# Patient Record
Sex: FEMALE | Race: WHITE | NOT HISPANIC OR LATINO | Employment: OTHER | URBAN - NONMETROPOLITAN AREA
[De-identification: names, ages, dates, MRNs, and addresses within clinical notes are randomized per-mention and may not be internally consistent; named-entity substitution may affect disease eponyms.]

---

## 2017-01-11 ENCOUNTER — HISTORY (OUTPATIENT)
Dept: FAMILY MEDICINE | Facility: OTHER | Age: 72
End: 2017-01-11

## 2017-01-11 ENCOUNTER — COMMUNICATION - GICH (OUTPATIENT)
Dept: FAMILY MEDICINE | Facility: OTHER | Age: 72
End: 2017-01-11

## 2017-01-11 DIAGNOSIS — J43.9 PULMONARY EMPHYSEMA (H): ICD-10-CM

## 2017-01-13 ENCOUNTER — OFFICE VISIT - GICH (OUTPATIENT)
Dept: FAMILY MEDICINE | Facility: OTHER | Age: 72
End: 2017-01-13

## 2017-01-13 ENCOUNTER — HISTORY (OUTPATIENT)
Dept: FAMILY MEDICINE | Facility: OTHER | Age: 72
End: 2017-01-13

## 2017-01-13 DIAGNOSIS — M89.9 DISORDER OF BONE: ICD-10-CM

## 2017-01-13 DIAGNOSIS — J43.9 PULMONARY EMPHYSEMA (H): ICD-10-CM

## 2017-01-13 DIAGNOSIS — K21.9 GASTRO-ESOPHAGEAL REFLUX DISEASE WITHOUT ESOPHAGITIS: ICD-10-CM

## 2017-01-13 DIAGNOSIS — F17.200 NICOTINE DEPENDENCE, UNCOMPLICATED: ICD-10-CM

## 2017-01-13 DIAGNOSIS — Z23 ENCOUNTER FOR IMMUNIZATION: ICD-10-CM

## 2017-01-13 DIAGNOSIS — M94.9 DISORDER OF CARTILAGE: ICD-10-CM

## 2017-01-19 ENCOUNTER — AMBULATORY - GICH (OUTPATIENT)
Dept: RADIOLOGY | Facility: OTHER | Age: 72
End: 2017-01-19

## 2017-01-19 DIAGNOSIS — Z12.31 ENCOUNTER FOR SCREENING MAMMOGRAM FOR MALIGNANT NEOPLASM OF BREAST: ICD-10-CM

## 2017-01-24 ENCOUNTER — HOSPITAL ENCOUNTER (OUTPATIENT)
Dept: RADIOLOGY | Facility: OTHER | Age: 72
End: 2017-01-24
Attending: NURSE PRACTITIONER

## 2017-01-24 ENCOUNTER — HISTORY (OUTPATIENT)
Dept: RADIOLOGY | Facility: OTHER | Age: 72
End: 2017-01-24

## 2017-01-24 ENCOUNTER — OFFICE VISIT - GICH (OUTPATIENT)
Dept: FAMILY MEDICINE | Facility: OTHER | Age: 72
End: 2017-01-24

## 2017-01-24 DIAGNOSIS — Z12.31 ENCOUNTER FOR SCREENING MAMMOGRAM FOR MALIGNANT NEOPLASM OF BREAST: ICD-10-CM

## 2017-01-24 DIAGNOSIS — Z72.0 TOBACCO USE: ICD-10-CM

## 2017-01-30 ENCOUNTER — HISTORY (OUTPATIENT)
Dept: FAMILY MEDICINE | Facility: OTHER | Age: 72
End: 2017-01-30

## 2017-02-09 ENCOUNTER — COMMUNICATION - GICH (OUTPATIENT)
Dept: FAMILY MEDICINE | Facility: OTHER | Age: 72
End: 2017-02-09

## 2017-05-26 ENCOUNTER — HOSPITAL ENCOUNTER (OUTPATIENT)
Dept: RADIOLOGY | Facility: OTHER | Age: 72
End: 2017-05-26
Attending: NURSE PRACTITIONER

## 2017-05-26 ENCOUNTER — HISTORY (OUTPATIENT)
Dept: FAMILY MEDICINE | Facility: OTHER | Age: 72
End: 2017-05-26

## 2017-05-26 ENCOUNTER — OFFICE VISIT - GICH (OUTPATIENT)
Dept: FAMILY MEDICINE | Facility: OTHER | Age: 72
End: 2017-05-26

## 2017-05-26 DIAGNOSIS — J18.1 LOBAR PNEUMONIA (H): ICD-10-CM

## 2017-05-26 DIAGNOSIS — R50.9 FEVER: ICD-10-CM

## 2017-05-26 DIAGNOSIS — R69 ILLNESS: ICD-10-CM

## 2017-05-26 LAB
A/G RATIO - HISTORICAL: 1 (ref 1–2)
ABSOLUTE BASOPHILS - HISTORICAL: 0 THOU/CU MM
ABSOLUTE EOSINOPHILS - HISTORICAL: 0.1 THOU/CU MM
ABSOLUTE LYMPHOCYTES - HISTORICAL: 1 THOU/CU MM (ref 0.9–2.9)
ABSOLUTE MONOCYTES - HISTORICAL: 0.6 THOU/CU MM
ABSOLUTE NEUTROPHILS - HISTORICAL: 8.8 THOU/CU MM (ref 1.7–7)
ALBUMIN SERPL-MCNC: 3.7 G/DL (ref 3.5–5.7)
ALP SERPL-CCNC: 65 IU/L (ref 34–104)
ALT (SGPT) - HISTORICAL: 13 IU/L (ref 7–52)
ANION GAP - HISTORICAL: 11 (ref 5–18)
AST SERPL-CCNC: 17 IU/L (ref 13–39)
BASOPHILS # BLD AUTO: 0.2 %
BILIRUB SERPL-MCNC: 0.8 MG/DL (ref 0.3–1)
BUN SERPL-MCNC: 9 MG/DL (ref 7–25)
BUN/CREAT RATIO - HISTORICAL: 11
CALCIUM SERPL-MCNC: 9.2 MG/DL (ref 8.6–10.3)
CHLORIDE SERPLBLD-SCNC: 102 MMOL/L (ref 98–107)
CO2 SERPL-SCNC: 24 MMOL/L (ref 21–31)
CREAT SERPL-MCNC: 0.8 MG/DL (ref 0.7–1.3)
EOSINOPHIL NFR BLD AUTO: 0.8 %
ERYTHROCYTE [DISTWIDTH] IN BLOOD BY AUTOMATED COUNT: 13.3 % (ref 11.5–15.5)
GFR IF NOT AFRICAN AMERICAN - HISTORICAL: >60 ML/MIN/1.73M2
GLOBULIN - HISTORICAL: 3.6 G/DL (ref 2–3.7)
GLUCOSE SERPL-MCNC: 107 MG/DL (ref 70–105)
HCT VFR BLD AUTO: 35.9 % (ref 33–51)
HEMOGLOBIN: 12.1 G/DL (ref 12–16)
LYMPHOCYTES NFR BLD AUTO: 9.5 % (ref 20–44)
MCH RBC QN AUTO: 30.7 PG (ref 26–34)
MCHC RBC AUTO-ENTMCNC: 33.7 G/DL (ref 32–36)
MCV RBC AUTO: 91 FL (ref 80–100)
MONOCYTES NFR BLD AUTO: 5.6 %
NEUTROPHILS NFR BLD AUTO: 83.4 % (ref 42–72)
PLATELET # BLD AUTO: 203 THOU/CU MM (ref 140–440)
PMV BLD: 10.7 FL (ref 6.5–11)
POTASSIUM SERPL-SCNC: 3.8 MMOL/L (ref 3.5–5.1)
PROT SERPL-MCNC: 7.3 G/DL (ref 6.4–8.9)
RED BLOOD COUNT - HISTORICAL: 3.94 MIL/CU MM (ref 4–5.2)
SODIUM SERPL-SCNC: 137 MMOL/L (ref 133–143)
WHITE BLOOD COUNT - HISTORICAL: 10.5 THOU/CU MM (ref 4.5–11)

## 2017-05-28 ENCOUNTER — HISTORY (OUTPATIENT)
Dept: EMERGENCY MEDICINE | Facility: OTHER | Age: 72
End: 2017-05-28

## 2017-06-06 ENCOUNTER — HISTORY (OUTPATIENT)
Dept: FAMILY MEDICINE | Facility: OTHER | Age: 72
End: 2017-06-06

## 2017-06-06 ENCOUNTER — OFFICE VISIT - GICH (OUTPATIENT)
Dept: FAMILY MEDICINE | Facility: OTHER | Age: 72
End: 2017-06-06

## 2017-06-06 DIAGNOSIS — F17.200 NICOTINE DEPENDENCE, UNCOMPLICATED: ICD-10-CM

## 2017-06-06 DIAGNOSIS — J18.1 LOBAR PNEUMONIA (H): ICD-10-CM

## 2017-06-06 DIAGNOSIS — J43.8 OTHER EMPHYSEMA (H): ICD-10-CM

## 2017-06-06 DIAGNOSIS — R01.1 CARDIAC MURMUR: ICD-10-CM

## 2017-06-08 LAB
HISTOPLASMA AG RESULT - HISTORICAL: NEGATIVE
Lab: 0 NG/ML

## 2017-06-09 LAB
INTERPRETATION - HISTORICAL: NEGATIVE
Lab: NORMAL
Lab: NORMAL NG/ML

## 2017-06-19 ENCOUNTER — MEDICAL CORRESPONDENCE (OUTPATIENT)
Facility: CLINIC | Age: 72
End: 2017-06-19
Payer: MEDICARE

## 2017-06-19 ENCOUNTER — TRANSFERRED RECORDS (OUTPATIENT)
Dept: HEALTH INFORMATION MANAGEMENT | Facility: CLINIC | Age: 72
End: 2017-06-19

## 2017-06-19 ENCOUNTER — HOSPITAL ENCOUNTER (OUTPATIENT)
Dept: RADIOLOGY | Facility: OTHER | Age: 72
End: 2017-06-19
Attending: FAMILY MEDICINE

## 2017-06-19 DIAGNOSIS — R01.1 CARDIAC MURMUR: ICD-10-CM

## 2017-06-19 PROCEDURE — 93306 TTE W/DOPPLER COMPLETE: CPT | Mod: 26 | Performed by: INTERNAL MEDICINE

## 2017-12-28 NOTE — PROGRESS NOTES
Patient Information     Patient Name MRN Sex Loulou Mondragon [id number] Female 1945      Progress Notes by Obi Johnston MD at 2017 10:30 AM     Author:  Obi Johnston MD Service:  (none) Author Type:  Physician     Filed:  2017 11:03 AM Encounter Date:  2017 Status:  Signed     :  Obi Johnston MD (Physician)            SUBJECTIVE:  Loulou Lucero is a 72 y.o. female here for follow-up. Patient was recently in emergency room for shortness of breath and cough. While there she ultimately had a chest CT which showed apical nodules bilaterally and left lower lobe pneumonia. She was placed on Levaquin and she has 4 days left of that. She continues with her Advair and is also been switched to DuoNeb 4 times a day. She states that she continues to have short of breath, weakness and fatigue and she's had no fevers or chills. She continues to smoke daily. She has a history of emphysema. She's had no chest pain.      Patient Active Problem List       Diagnosis  Date Noted     Systolic murmur  2017     Advanced care planning/counseling discussion  2014     Declined         COPD  2010     TOBACCO ABUSE       Trying to quit          PULMONARY NODULE       Pulmonary nodule, stable since 2002.  It is located in her right upper   lobe peripheral pulmonary nodule.          DIVERTICULOSIS, COLON       OSTEOPENIA       Hip            Past Medical History:     Diagnosis  Date     Benign paroxysmal positional vertigo 2010     Bloody diarrhea 10/1/2015     COPD 2010     DIVERTICULOSIS, COLON      Hx of pregnancy     G-3, P-2, A-0  (Son had SIDS)      Intervertebral disc protrusion     L3, L4-4; Last MRI 12      Lower abdominal pain 10/1/2015     OSTEOPENIA     Hip; Last dxa 2010      PULMONARY NODULE     Stable since 2002. RU lobe.      SHINGLES 3/31/2012     VARICOSE VEINS, LOWER EXTREMITIES 2012       Past Surgical History:      Procedure   Laterality Date     COLONOSCOPY SCREENING  03/22/2010    Normal; + family hx, next due 2015       COLONOSCOPY SCREENING  2015    recommend follow up in 2020.       COLONOSCOPY W/ POLYPECTOMY  10/1/2015            ESOPHAGOGASTRODUODENOSCOPY  4/23/2014    Arce's esophagus fu egd 2 yrs       Tonsillectomy and adenoidectomy  1951     TUBAL LIGATION  1978       Current Outpatient Prescriptions       Medication  Sig Dispense Refill     acetaminophen (TYLENOL) 325 mg tablet Take 650 mg by mouth every 4 hours if needed. Max acetaminophen dose: 4000mg in 24 hrs.       albuterol HFA 90 mcg/actuation inhaler Inhale 1-2 Puffs by mouth 4 times daily if needed. 1 Inhaler 0     albuterol-ipratropium (DUONEB) (2.5-0.5 mg) in 3 mL NEBULIZATION solution Inhale 3 mL via a nebulizer 4 times daily. 1 box 3     alendronate (FOSAMAX) 70 mg tablet Take 1 tablet by mouth once a week in the morning. Take on empty stomach with full glass of water. Do not lie down for 1 hr. 12 tablet 3     ascorbic acid (VITAMIN C) 500 mg tablet Take 1 tablet by mouth once daily.  0     calcium carbonate-vitamin D3, 500 mg-400 units, (OSCAL 500 + D) tablet Take 1 tablet by mouth once daily.  0     fluticasone-salmeterol (ADVAIR DISKUS) 250-50 mcg/Dose diskus inhaler Inhale 1 Puff by mouth 2 times daily. 3 Inhaler 3     levoFLOXacin (LEVAQUIN) 750 mg tablet Take 1 tablet by mouth once daily for 14 days. 14 tablet 0     Nebulizer Nebulizer, disposable neb kit x 4, reuseable neb kit x 1, mask x 1, filters x 1.   Frequency of use: daily;  Medication: duoneb  Length of need: 1 months 1 Device 0     pantoprazole (PROTONIX) 40 mg delayed-release tablet Take 1 tablet by mouth once daily. 90 tablet 3     predniSONE (DELTASONE) 20 mg tablet Take 1 tablet by mouth once daily with a meal for 5 days. 5 tablet 0     No current facility-administered medications for this visit.      Medications have been reviewed by me and are current to the best of my knowledge and  ability.      Allergies:  Allergies      Allergen   Reactions     Metronidazole  Nausea And Vomiting     Pneumococcal Vaccine  Edema     Arm swelling      Tramadol  Hallucinations       Family History       Problem   Relation Age of Onset     Cancer-colon  Father      Other  Mother      Alzheimer's       Other  Maternal Grandmother      Alzheimer's       Other  Brother      Alzheimer's       Other  Brother      aneurysm and stents       Cancer  Brother      lung       Cancer  Brother      skin       Other  Brother      cirrhosis of the liver       Other  Maternal Uncle      3, Alzheimer's       Cancer-breast  No Family History        Social History      Substance Use Topics        Smoking status:  Current Every Day Smoker     Packs/day: 1.00     Years: 50.00     Types: Cigarettes     Smokeless tobacco:  Never Used     Alcohol use  No       ROS:    As above otherwise ROS is unremarkable.      OBJECTIVE:  /68  Pulse 84  Wt 45.5 kg (100 lb 6.4 oz)  BMI 20.28 kg/m2    EXAM:  General Appearance: Pleasant, alert, appropriate appearance for age. No acute distress  Head: Normal. Normocephalic, atraumatic.  Eyes: PERRL, EOMI  Ears: Normal TM's bilaterally. Normal auditory canals and external ears.   OroPharynx: Dental hygiene adequate. Normal buccal mucosa. Normal pharynx.  Neck: Supple, no masses or nodes, no lymphadenopathy.  No thyromegaly.  Lungs: Normal chest wall and respirations. Clear to auscultation, no wheezes or crackles.  Cardiovascular: Regular rate and rhythm. S1, S2. 2/6 systolic murmur heard best in the right upper sternal border.  Musculoskeletal: No edema.    ASSESSEMENT AND PLAN:    Loulou was seen today for follow up.    Diagnoses and all orders for this visit:    Pneumonia of left lower lobe due to infectious organism (HC)  -     albuterol-ipratropium (DUONEB) (2.5-0.5 mg) in 3 mL NEBULIZATION solution; Inhale 3 mL via a nebulizer 4 times daily.  -     BLASTOMYCES ANTIGEN; Future  -      HISTOPLASMA ANTIGEN,URINE; Future  -     LEGIONELLA URINE ANTIGEN; Future    Other emphysema (HC)  -     predniSONE (DELTASONE) 20 mg tablet; Take 1 tablet by mouth once daily with a meal for 5 days.    TOBACCO ABUSE    Systolic murmur  -     ECHO COMPLETE WO CONTRAST; Future    Given her history of COPD we'll continue with her antibiotics regimen until completion but will add prednisone 20 mg daily for 5 days. Discussed potential side effects.    We'll also check urine for blasto, histo and Legionella at her request.    Discussed importance of quitting smoking, she is not interested at this time.    She has a history of systolic murmur but given her short of breath and fatigue will get an echocardiogram to assess severity.      Alvaro Johnston MD

## 2017-12-30 NOTE — NURSING NOTE
Patient Information     Patient Name MRN Loulou Olson [id number] Female 1945      Nursing Note by Anabella Goodwin at 2017 10:30 AM     Author:  Anabella Goodwin Service:  (none) Author Type:  (none)     Filed:  2017 10:46 AM Encounter Date:  2017 Status:  Signed     :  Anabella Goodwin            Patient presents today to follow up from an ED visit on . Patient states she is still feeling weak and experiencing shortness of breath.  Anabella Goodwin LPN .............2017  10:42 AM

## 2018-01-02 NOTE — TELEPHONE ENCOUNTER
Patient Information     Patient Name MRN Sex Loulou Mondragon [id number] Female 1945      Telephone Encounter by Titi Gilmore RN at 2017 10:23 AM     Author:  Titi Gilmore RN Service:  (none) Author Type:  NURS- Registered Nurse     Filed:  2017 10:33 AM Encounter Date:  2017 Status:  Signed     :  Titi Gilmore RN (NURS- Registered Nurse)            Writer called patient back as requested. Patient stated she is looking for a refill of her advair, and would like the rx refill sent to [facility] pharmacy in [place], [place]. Patient states that she has about a 3 week supply of Advair left at this time. Writer and patient discussed her chart, and that she is actually due for a medication management/physical office visit with PCP. Patient was agreeable to this. Was transferred to  to schedule an appointment. Writer will fill a three month supply of advair per protocol. Patient in agreement with this as well.    Asthma/COPD    Office visit in the past 12 months.    Last visit with [facility] was on: 2016 in [facility]  Next visit with [facility] is on: 2017 in [facility]  Next visit with Family Practice is on: 2017 in [facility]    Max refills 12 months from last office visit.    Prescription refilled per RN Medication Refill Policy.................... Titi Gilmore RN ....................  2017   10:30 AM

## 2018-01-03 NOTE — PROGRESS NOTES
Patient Information     Patient Name MRN Sex Loulou Mondragon [id number] Female 1945      Progress Notes by Cora Catalan NP at 2017  1:00 PM     Author:  Cora Catalan NP Service:  (none) Author Type:  PHYS- Nurse Practitioner     Filed:  2017  8:47 PM Encounter Date:  2017 Status:  Signed     :  Cora Catalan NP (PHYS- Nurse Practitioner)            SUBJECTIVE:    Loulou Lucero is a 71 y.o. female who presents for breast exam and screening mammography. Last mammogram  and negative. Denies any breast concerns. Tobacco 50 + years.      HPI    Allergies      Allergen   Reactions     Metronidazole  Nausea And Vomiting     Pneumococcal Vaccine  Edema     Arm swelling      Tramadol  Hallucinations   ,   Family History       Problem   Relation Age of Onset     Cancer-colon  Father      Other  Mother      Alzheimer's       Other  Maternal Grandmother      Alzheimer's       Other  Brother      Alzheimer's       Other  Brother      aneurysm and stents       Cancer  Brother      lung       Cancer  Brother      skin       Other  Brother      cirrhosis of the liver       Other  Maternal Uncle      3, Alzheimer's       Cancer-breast  No Family History    ,   Current Outpatient Prescriptions on File Prior to Visit       Medication  Sig Dispense Refill     alendronate (FOSAMAX) 70 mg tablet Take 1 tablet by mouth once a week in the morning. Take on empty stomach with full glass of water. Do not lie down for 1 hr. 12 tablet 3     ascorbic acid (VITAMIN C) 500 mg tablet Take 1 tablet by mouth once daily.  0     calcium carbonate-vitamin D3, 500 mg-400 units, (OSCAL 500 + D) tablet Take 2 tablets by mouth once daily.  0     fluticasone-salmeterol (ADVAIR DISKUS) 250-50 mcg/Dose diskus inhaler Inhale 1 Puff by mouth 2 times daily. 3 Inhaler 3     pantoprazole (PROTONIX) 40 mg delayed-release tablet Take 1 tablet by mouth once daily. 90 tablet 3     No current facility-administered medications on  file prior to visit.    ,   Current Outpatient Prescriptions:      alendronate (FOSAMAX) 70 mg tablet, Take 1 tablet by mouth once a week in the morning. Take on empty stomach with full glass of water. Do not lie down for 1 hr., Disp: 12 tablet, Rfl: 3     ascorbic acid (VITAMIN C) 500 mg tablet, Take 1 tablet by mouth once daily., Disp: , Rfl: 0     calcium carbonate-vitamin D3, 500 mg-400 units, (OSCAL 500 + D) tablet, Take 2 tablets by mouth once daily., Disp: , Rfl: 0     fluticasone-salmeterol (ADVAIR DISKUS) 250-50 mcg/Dose diskus inhaler, Inhale 1 Puff by mouth 2 times daily., Disp: 3 Inhaler, Rfl: 3     pantoprazole (PROTONIX) 40 mg delayed-release tablet, Take 1 tablet by mouth once daily., Disp: 90 tablet, Rfl: 3  Medications have been reviewed by me and are current to the best of my knowledge and ability.,   Past Medical History      Diagnosis   Date     Benign paroxysmal positional vertigo  12/29/2010     Bloody diarrhea  10/1/2015     COPD  12/29/2010     DIVERTICULOSIS, COLON       Hx of pregnancy       G-3, P-2, A-0  (Son had SIDS)      Intervertebral disc protrusion       L3, L4-4; Last MRI 7/09/12      Lower abdominal pain  10/1/2015     OSTEOPENIA       Hip; Last dxa 06/2010      PULMONARY NODULE       Stable since July of 2002. RU lobe.      SHINGLES  3/31/2012     VARICOSE VEINS, LOWER EXTREMITIES  6/12/2012   ,   Patient Active Problem List       Diagnosis  Date Noted     Advanced care planning/counseling discussion  04/07/2014     Declined         COPD  12/29/2010     TOBACCO ABUSE       Trying to quit          PULMONARY NODULE       Pulmonary nodule, stable since July of 2002.  It is located in her right upper   lobe peripheral pulmonary nodule.          DIVERTICULOSIS, COLON       OSTEOPENIA       Hip        ,   Past Surgical History       Procedure   Laterality Date     Colonoscopy screening   03/22/2010     Normal; + family hx, next due 2015       Tonsillectomy and adenoidectomy   1951      "Tubal ligation   1978     Esophagogastroduodenoscopy   4/23/2014     Arce's esophagus fu egd 2 yrs       Colonoscopy w/ polypectomy   10/1/2015             Colonoscopy screening   2015     recommend follow up in 2020.      and   Social History        Substance Use Topics          Smoking status:   Current Every Day Smoker      Packs/day:  1.00      Years:  50.00      Types:  Cigarettes      Smokeless tobacco:   Never Used      Alcohol use   0.0 oz/week     0 Standard drinks or equivalent per week        Comment: Rarely         REVIEW OF SYSTEMS:  Review of Systems   Constitutional: Negative.    HENT: Negative.    Eyes: Negative.    Respiratory: Negative.    Cardiovascular: Negative.    Gastrointestinal: Negative.    Genitourinary: Negative.    Musculoskeletal: Negative.    Skin: Negative.    Neurological: Negative.    Endo/Heme/Allergies: Negative.    Psychiatric/Behavioral: Negative.        OBJECTIVE:  /64  Pulse 82  Ht 1.52 m (4' 11.84\")  Wt 45.9 kg (101 lb 4 oz)  BMI 19.88 kg/m2    EXAM:   Physical Exam   Constitutional: She is oriented to person, place, and time and well-developed, well-nourished, and in no distress.   Cardiovascular: Normal rate.    Pulmonary/Chest: Effort normal.   Bilateral breast exam negative for any palpable masses. No erythema or dimpling. No nipple discharge. No palpable lymphadenopathy   Musculoskeletal: Normal range of motion.   Neurological: She is alert and oriented to person, place, and time. Gait normal.   Skin: Skin is warm and dry.   Psychiatric: Mood, memory, affect and judgment normal.   Nursing note and vitals reviewed.      ASSESSMENT/PLAN:    ICD-10-CM    1. Encounter for screening mammogram for breast cancer Z12.31    2. Tobacco use Z72.0       no positive findings on breast exam    Plan:  Screening mammography scheduled today, results pending    Recommend yearly mammography screening and physicals  continue to do self exams    Strongly encouraged tobacco " cessation

## 2018-01-03 NOTE — NURSING NOTE
Patient Information     Patient Name MRN Sex Loulou Mondragon [id number] Female 1945      Nursing Note by Shari Rodriguez at 2017  1:00 PM     Author:  Shari Rodriguez Service:  (none) Author Type:  (none)     Filed:  2017  1:21 PM Encounter Date:  2017 Status:  Signed     :  Shari Rodriguez            Patient presents to clinic today for a breast exam.  Shari Rodriguez LPN...................2017  1:07 PM

## 2018-01-03 NOTE — PROGRESS NOTES
Patient Information     Patient Name MRN Sex Loulou Mondragon [id number] Female 1945      Progress Notes by Marilin Cordova at 2017  1:48 PM     Author:  Marilin Cordova Service:  (none) Author Type:  (none)     Filed:  2017  1:48 PM Date of Service:  2017  1:48 PM Status:  Signed     :  Marilin Cordova            Falls Risk Criteria:    Age 65 and older or under age 4        Sensory deficits    Poor vision    Use of ambulatory aides    Impaired judgment    Unable to walk independently    Meets High Risk criteria for falls:  Yes             1.  Do you have dizziness or vertigo?    no                    2.  Do you need help standing or walking?   no                 3.  Have you fallen within the last 6 months?    no           4.  Has the patient been fasting?      no       If any risks are marked Yes, the following interventions are utilized:    Do not leave patient unattended     Assist patient in the dressing room and bathroom    Have ambulatory aides available throughout procedure    Involve patient s family if available

## 2018-01-03 NOTE — TELEPHONE ENCOUNTER
Patient Information     Patient Name MRN Loulou Olson [id number] Female 1945      Telephone Encounter by Marion Hooper at 2/10/2017  1:44 PM     Author:  Marion Hooper Service:  (none) Author Type:  NURS- Registered Nurse     Filed:  2/10/2017  1:48 PM Encounter Date:  2017 Status:  Signed     :  Marion Hooper (NURS- Registered Nurse)            Patient calling about questionnaire she received in the mail. Was seen on 2017 with  for mammogram for breast cancer. Patient stated that she will try and fill out questionnaire.     Marion Hooper RN ....................  2/10/2017   1:47 PM

## 2018-01-03 NOTE — TELEPHONE ENCOUNTER
Patient Information     Patient Name MRN Loulou Olson [id number] Female 1945      Telephone Encounter by Sudha Davila at 2017  3:34 PM     Author:  Sudha Davila Service:  (none) Author Type:  (none)     Filed:  2017  3:42 PM Encounter Date:  2017 Status:  Signed     :  Sudha Davila            Patient received a questionnaire about her last visit on 2017 but does not remember what she was in for.

## 2018-01-03 NOTE — PROGRESS NOTES
Patient Information     Patient Name MRN Sex Loulou Mondragon [id number] Female 1945      Progress Notes by Obi Johnston MD at 2017 12:00 PM     Author:  Obi Johnston MD Service:  (none) Author Type:  Physician     Filed:  2017 12:52 PM Encounter Date:  2017 Status:  Signed     :  Obi Johnston MD (Physician)            SUBJECTIVE:  Loulou Lucero is a 71 y.o. female here for follow-up.  Patient has a history of COPD and continues on Advair daily. Her breathing is overall been well recently. She also has a history of osteopenia. She tried Boniva last year but she had an upset stomach for the first 2 months that she use this so she stopped on her own. She is currently not taking anything for her bones.    Finally, she has a history of acid reflux and continues on Protonix daily. She tried stopping this but has not been able to.    She continues to smoke daily has not itched in quitting.      Patient Active Problem List       Diagnosis  Date Noted     Advanced care planning/counseling discussion  2014     Declined         COPD  2010     TOBACCO ABUSE       Trying to quit          PULMONARY NODULE       Pulmonary nodule, stable since 2002.  It is located in her right upper   lobe peripheral pulmonary nodule.          DIVERTICULOSIS, COLON       OSTEOPENIA       Hip            Past Medical History      Diagnosis   Date     Benign paroxysmal positional vertigo  2010     Bloody diarrhea  10/1/2015     COPD  2010     DIVERTICULOSIS, COLON       Hx of pregnancy       G-3, P-2, A-0  (Son had SIDS)      Intervertebral disc protrusion       L3, L4-4; Last MRI 12      Lower abdominal pain  10/1/2015     OSTEOPENIA       Hip; Last dxa 2010      PULMONARY NODULE       Stable since 2002. RU lobe.      SHINGLES  3/31/2012     VARICOSE VEINS, LOWER EXTREMITIES  2012       Past Surgical History       Procedure   Laterality Date     Colonoscopy  screening   03/22/2010     Normal; + family hx, next due 2015       Tonsillectomy and adenoidectomy   1951     Tubal ligation   1978     Esophagogastroduodenoscopy   4/23/2014     Arce's esophagus fu egd 2 yrs       Colonoscopy w/ polypectomy   10/1/2015             Colonoscopy screening   2015     recommend follow up in 2020.         Current Outpatient Prescriptions       Medication  Sig Dispense Refill     alendronate (FOSAMAX) 70 mg tablet Take 1 tablet by mouth once a week in the morning. Take on empty stomach with full glass of water. Do not lie down for 1 hr. 12 tablet 3     ascorbic acid (VITAMIN C) 500 mg tablet Take 1 tablet by mouth once daily.  0     calcium carbonate-vitamin D3, 500 mg-400 units, (OSCAL 500 + D) tablet Take 2 tablets by mouth once daily.  0     fluticasone-salmeterol (ADVAIR DISKUS) 250-50 mcg/Dose diskus inhaler Inhale 1 Puff by mouth 2 times daily. 3 Inhaler 3     pantoprazole (PROTONIX) 40 mg delayed-release tablet Take 1 tablet by mouth once daily. 90 tablet 3     No current facility-administered medications for this visit.      Medications have been reviewed by me and are current to the best of my knowledge and ability.      Allergies:  Allergies      Allergen   Reactions     Metronidazole  Nausea And Vomiting     Pneumococcal Vaccine  Edema     Arm swelling      Tramadol  Hallucinations       Family History       Problem   Relation Age of Onset     Cancer-colon  Father      Other  Mother      Alzheimer's       Other  Maternal Grandmother      Alzheimer's       Other  Brother      Alzheimer's       Other  Brother      aneurysm and stents       Cancer  Brother      lung       Cancer  Brother      skin       Other  Brother      cirrhosis of the liver       Other  Maternal Uncle      3, Alzheimer's       Cancer-breast  No Family History        Social History        Substance Use Topics          Smoking status:   Current Every Day Smoker      Packs/day:  1.00      Years:  50.00       "Types:  Cigarettes      Smokeless tobacco:   Never Used      Alcohol use   0.0 oz/week     0 Standard drinks or equivalent per week        Comment: Rarely         ROS:    As above otherwise ROS is unremarkable.      OBJECTIVE:  /77  Pulse 75  Ht 1.518 m (4' 11.75\")  Wt 45.5 kg (100 lb 3.2 oz)  Breastfeeding? No  BMI 19.73 kg/m2    EXAM:  General Appearance: Pleasant, alert, appropriate appearance for age. No acute distress  Head: Normal. Normocephalic, atraumatic.  Eyes: PERRL, EOMI  Ears: Normal TM's bilaterally. Normal auditory canals and external ears.   OroPharynx: Dental hygiene adequate. Normal buccal mucosa. Normal pharynx.  Neck: Supple, no masses or nodes, no lymphadenopathy.  No thyromegaly.  Lungs: Normal chest wall and respirations. Clear to auscultation, no wheezes or crackles.  Cardiovascular: Regular rate and rhythm. S1, S2.  2/6 REGGIE heard best in the right upper sternal border.  Gastrointestinal: Soft, nontender, no abnormal masses or organomegaly. BS normal.  Musculoskeletal: No edema.  Skin: no concerning or new rashes.  Neurologic Exam: CN 2-12 grossly intact.  Normal gait.  Symmetric DTRs, No focal motor or sensory deficits. No tremor.  Psychiatric Exam: Alert and oriented, appropriate affect.    ASSESSEMENT AND PLAN:    Loulou was seen today for physical.    Diagnoses and all orders for this visit:    Pulmonary emphysema, unspecified emphysema type  -     fluticasone-salmeterol (ADVAIR DISKUS) 250-50 mcg/Dose diskus inhaler; Inhale 1 Puff by mouth 2 times daily.    Continue Advair daily follow-up if breathing worsens.    Gastroesophageal reflux disease without esophagitis  -     pantoprazole (PROTONIX) 40 mg delayed-release tablet; Take 1 tablet by mouth once daily.    Continue Protonix daily.    OSTEOPENIA  -     alendronate (FOSAMAX) 70 mg tablet; Take 1 tablet by mouth once a week in the morning. Take on empty stomach with full glass of water. Do not lie down for 1 hr.    we discussed " her options and I would recommend that she try Fosamax weekly. Also discussed importance of calcium plus vitamin D and weightbearing exercise.    TOBACCO ABUSE    Discussed the importance of quitting smoking. Discussed nicotine replacement options, bupropion and chantix.  Information on quit plan was given.  Greater then 3 minutes were spent in separate discussion of this.     Needs flu shot  -     FLU VACCINE => 65 YRS HIGH DOSE TRIVALENT IIV3 IM    Flu shot was given.          Alvaro Johnston MD

## 2018-01-05 NOTE — PROGRESS NOTES
Patient Information     Patient Name MRN Sex Loulou Mondragon [id number] Female 1945      Progress Notes by Maya Trujillo NP at 2017  6:00 PM     Author:  Maya Trujillo NP Service:  (none) Author Type:  PHYS- Nurse Practitioner     Filed:  2017  3:11 PM Encounter Date:  2017 Status:  Signed     :  Maya Trujillo NP (PHYS- Nurse Practitioner)            HPI:  Nursing Notes:   Cheryl Curran  2017  6:27 PM  Signed  Patient presents with headache, sinus congestion sore throat last week. Starting Tuesday fever, body aches, nausea, lethargy. Cheryl Curran LPN .............2017  6:05 PM    Loulou Lucero is a 72 y.o. female who presents to clinic today for headache, sinus congestion, fever, body aches, hurts to breath and sore throat symptoms that started ten days ago. Denies vomiting, diarrhea, SOB, difficulty breathing, wheezing. Treating symptoms with Tylenol. Hasn't eaten today, decreased oral intake. Tylenol improves symptoms, nothing worsens symptoms. Smokes daily, history of COPD.    Past Medical History:     Diagnosis  Date     Benign paroxysmal positional vertigo 2010     Bloody diarrhea 10/1/2015     COPD 2010     DIVERTICULOSIS, COLON      Hx of pregnancy     G-3, P-2, A-0  (Son had SIDS)      Intervertebral disc protrusion     L3, L4-4; Last MRI 12      Lower abdominal pain 10/1/2015     OSTEOPENIA     Hip; Last dxa 2010      PULMONARY NODULE     Stable since 2002. RU lobe.      SHINGLES 3/31/2012     VARICOSE VEINS, LOWER EXTREMITIES 2012     Past Surgical History:      Procedure  Laterality Date     COLONOSCOPY SCREENING  2010    Normal; + family hx, next due        COLONOSCOPY SCREENING      recommend follow up in .       COLONOSCOPY W/ POLYPECTOMY  10/1/2015            ESOPHAGOGASTRODUODENOSCOPY  2014    Arce's esophagus fu egd 2 yrs       Tonsillectomy and  "adenoidectomy  1951     TUBAL LIGATION  1978     Social History      Substance Use Topics        Smoking status:  Current Every Day Smoker     Packs/day: 1.00     Years: 50.00     Types: Cigarettes     Smokeless tobacco:  Never Used     Alcohol use  No     Current Outpatient Prescriptions       Medication  Sig Dispense Refill     alendronate (FOSAMAX) 70 mg tablet Take 1 tablet by mouth once a week in the morning. Take on empty stomach with full glass of water. Do not lie down for 1 hr. 12 tablet 3     ascorbic acid (VITAMIN C) 500 mg tablet Take 1 tablet by mouth once daily.  0     calcium carbonate-vitamin D3, 500 mg-400 units, (OSCAL 500 + D) tablet Take 2 tablets by mouth once daily.  0     fluticasone-salmeterol (ADVAIR DISKUS) 250-50 mcg/Dose diskus inhaler Inhale 1 Puff by mouth 2 times daily. 3 Inhaler 3     pantoprazole (PROTONIX) 40 mg delayed-release tablet Take 1 tablet by mouth once daily. 90 tablet 3     No current facility-administered medications for this visit.      Medications have been reviewed by me and are current to the best of my knowledge and ability.    Allergies      Allergen   Reactions     Metronidazole  Nausea And Vomiting     Pneumococcal Vaccine  Edema     Arm swelling      Tramadol  Hallucinations       ROS:  Refer to HPI    /82  Pulse (!) 109  Temp 100.2  F (37.9  C) (Oral)   Ht 1.535 m (5' 0.43\")  Wt 46.6 kg (102 lb 12.8 oz)  SpO2 95% Comment: RA  Breastfeeding? No  BMI 19.79 kg/m2    EXAM:  General Appearance: Ill appearing female, appropriate appearance for age. No acute distress  Ears: Left TM with bony landmarks appreciated with cone of light, no erythema, no effusion, no bulging, no purulence.  Right TM with bony landmarks appreciated with cone of light, no erythema, no effusion, no bulging, no purulence.   Left auditory canal clear, Right auditory canal clear, normal external ears, non tender.  Orophayrnx: moist mucous membranes, posterior pharynx, tonsils without " hypertrophy, no erythema, no exudates or petechiae, no post nasal drip seen.  No sinus pain upon palpation of the frontal, maxillary, or ethmoid sinuses  Neck: supple without adenopathy  Respiratory: normal chest wall and respirations.  Normal effort.  Clear to auscultation bilaterally, no wheezes or rhonchi or congestion, no cough appreciated, oxygen saturation-95%  Cardiac: RRR   Psychological: normal affect, alert and pleasant    ASSESSMENT/PLAN:    ICD-10-CM    1. Pneumonia of left lower lobe due to infectious organism (HC) J18.1 azithromycin (ZITHROMAX) 250 mg tablet      albuterol HFA 90 mcg/actuation inhaler   2. Influenza-like illness R69 AL PULSE OXIMETRY SINGLE DETERMINATION   3. Fever, unspecified fever cause R50.9 CBC WITH DIFFERENTIAL      COMPLETE METABOLIC PANEL      XR CHEST 2 VIEWS PA AND LATERAL      ibuprofen 400 mg tablet (ADVIL; MOTRIN)      CBC WITH DIFFERENTIAL      COMPLETE METABOLIC PANEL      CBC WITH AUTO DIFFERENTIAL      CANCELED: THROAT RAPID STREP A WITH REFLEX   Cough, congestion, fever and sore throat for 10 days  On exam: ill appearing female with fever, lungs clear to auscultation, no sinus tenderness  Ibuprofen 400 mgs given in clinic for fever >101, fever down to 100.2 after treatment  Results for orders placed or performed in visit on 05/26/17      COMPLETE METABOLIC PANEL      Result  Value Ref Range    SODIUM 137 133 - 143 mmol/L    POTASSIUM 3.8 3.5 - 5.1 mmol/L    CHLORIDE 102 98 - 107 mmol/L    CO2,TOTAL 24 21 - 31 mmol/L    ANION GAP 11 5 - 18                    GLUCOSE 107 (H) 70 - 105 mg/dL    CALCIUM 9.2 8.6 - 10.3 mg/dL    BUN 9 7 - 25 mg/dL    CREATININE 0.80 0.70 - 1.30 mg/dL    BUN/CREAT RATIO           11                    GFR if African American >60 >60 ml/min/1.73m2    GFR if not African American >60 >60 ml/min/1.73m2    ALBUMIN 3.7 3.5 - 5.7 g/dL    PROTEIN,TOTAL 7.3 6.4 - 8.9 g/dL    GLOBULIN                  3.6 2.0 - 3.7 g/dL    A/G RATIO 1.0 1.0 - 2.0                     BILIRUBIN,TOTAL 0.8 0.3 - 1.0 mg/dL    ALK PHOSPHATASE 65 34 - 104 IU/L    ALT (SGPT) 13 7 - 52 IU/L    AST (SGOT) 17 13 - 39 IU/L   CBC WITH AUTO DIFFERENTIAL      Result  Value Ref Range    WHITE BLOOD COUNT         10.5 4.5 - 11.0 thou/cu mm    RED BLOOD COUNT           3.94 (L) 4.00 - 5.20 mil/cu mm    HEMOGLOBIN                12.1 12.0 - 16.0 g/dL    HEMATOCRIT                35.9 33.0 - 51.0 %    MCV                       91 80 - 100 fL    MCH                       30.7 26.0 - 34.0 pg    MCHC                      33.7 32.0 - 36.0 g/dL    RDW                       13.3 11.5 - 15.5 %    PLATELET COUNT            203 140 - 440 thou/cu mm    MPV                       10.7 6.5 - 11.0 fL    NEUTROPHILS               83.4 (H) 42.0 - 72.0 %    LYMPHOCYTES               9.5 (L) 20.0 - 44.0 %    MONOCYTES                 5.6 <12.0 %    EOSINOPHILS               0.8 <8.0 %    BASOPHILS                 0.2 <3.0 %    ABSOLUTE NEUTROPHILS      8.8 (H) 1.7 - 7.0 thou/cu mm    ABSOLUTE LYMPHOCYTES      1.0 0.9 - 2.9 thou/cu mm    ABSOLUTE MONOCYTES        0.6 <0.9 thou/cu mm    ABSOLUTE EOSINOPHILS      0.1 <0.5 thou/cu mm    ABSOLUTE BASOPHILS        0.0 <0.3 thou/cu mm   Chest xray obtained and reviewed-Lingular consolidation  Diagnosis: Community Acquired Pneumonia  Treat with Zithromax 500 mgs today, then 250 mgs days 2-5  Albuterol every 4-6 hours prn wheezing  Mucinex DM as directed cough/congestion  Follow up if symptoms persist or worsen    Patient Instructions      Index Related topics   Pneumococcal Pneumonia   ________________________________________________________________________  KEY POINTS    Pneumococcal pneumonia is an infection of the lungs caused by bacteria.    Treatment may include oxygen, a machine to help you breathe, medicines, or a procedure to clear your lungs.    It helps if you avoid smoking, smoky places, polluted air, dust, fumes, and mold. Wash your hands often, and get a flu shot  each year.  ________________________________________________________________________  What is pneumococcal pneumonia?  Pneumococcal pneumonia is an infection of the lungs caused by Streptococcus pneumoniae or pneumococcal bacteria. These bacteria can also cause dangerous infections of the blood or brain.  What is the cause?  Pneumococcal pneumonia is caused by bacteria passed from person to person by sneezing or coughing. You may be more at risk for pneumonia if:    You have recently had a cold or the flu    You have another disease, such as diabetes, chronic bronchitis, or cancer    You are over age 65    You have not had the pneumococcal pneumonia vaccine  What are the symptoms?  The symptoms may seem to start suddenly and include:    Fever and chills    Feeling short of breath    Chest pain, especially when you take a deep breath    Cough that may bring up rust-colored or bloody mucus  How is it diagnosed?  Your healthcare provider will ask about your symptoms and medical history and examine you. Tests may include:    Blood tests    Sputum culture, which is a test of a sample of mucus coughed up from deep in your lungs    Chest X-ray  How is it treated?  Your healthcare provider may prescribe antibiotics you can take by mouth at home. Usually you will start to feel better after 2 to 3 days of antibiotics. You should feel close to normal after a week or so. If you are over 60 years old or have other medical problems, it may take longer to feel normal.  If you are very ill, you may need to be in the hospital. Treatment may include:    Giving you oxygen to breathe    Giving you IV fluids and medicines, such as antibiotics to treat infection and inhaled medicines to open up the airway    If you have a severe case, having a tube in your throat and a machine to help you breathe and to make sure you are getting enough oxygen  How can I take care of myself?  Follow the full course of treatment prescribed by your  healthcare provider. In addition:    If you are taking an antibiotic, take the medicine for as long as your healthcare provider prescribes, even if you feel better. If you stop taking the medicine too soon, you may not kill all of the bacteria and you may get sick again.    Drink more liquids (water or tea) every day to help you cough up mucus more easily unless your provider has told you to limit your fluids.    Cough up mucus as much as possible. Use cough medicine only if your provider recommends it, such as when your cough makes it hard to sleep.    Don t smoke, and stay away from others who are smoking.    Avoid breathing dust and chemical fumes.    Get extra rest.    Use a humidifier to put more moisture in the air. Avoid steam vaporizers because they can cause burns. Be sure to keep the humidifier clean, as recommended in the 's instructions. It's important to keep the water container clean to prevent bacteria and mold from growing in it.    Take nonprescription medicine, such as acetaminophen, ibuprofen, or naproxen to treat pain and fever. Read the label and take as directed. Unless recommended by your healthcare provider, you should not take these medicines for more than 10 days.    Nonsteroidal anti-inflammatory medicines (NSAIDs), such as ibuprofen, naproxen, and aspirin, may cause stomach bleeding and other problems. These risks increase with age.    Acetaminophen may cause liver damage or other problems. Unless recommended by your provider, don't take more than 3000 milligrams (mg) in 24 hours. To make sure you don t take too much, check other medicines you take to see if they also contain acetaminophen. Ask your provider if you need to avoid drinking alcohol while taking this medicine.    Contact your healthcare provider if you have new or worsening symptoms.  Ask your provider:    How and when you will get your test results    How long it will take to recover    If there are activities  you should avoid and when you can return to your normal activities    How to take care of yourself at home    What symptoms or problems you should watch for and what to do if you have them  Make sure you know when you should come back for a checkup. Keep all appointments for provider visits or tests.  How can I help prevent pneumonia?  To reduce your risk of getting a lung infection:    Wash your hands often and especially after using the restroom, coughing, sneezing, or blowing your nose. Also wash your hands before eating or touching your eyes.    Stay at least 6 feet away from people who are sick, if you can.    Take care of your health. Try to get at least 7 to 9 hours of sleep each night. Eat a healthy diet and try to keep a healthy weight. If you smoke, try to quit. If you want to drink alcohol, ask your healthcare provider how much is safe for you to drink. Learn ways to manage stress. Exercise according to your healthcare provider's instructions.    Ask your healthcare provider if you should get the pneumococcal shot. This shot helps prevent serious complications of pneumonia, such as an infection of the blood or brain.  Developed by [employer].  Adult Advisor 2016.3 published by [employer].  Last modified: 2016-04-14  Last reviewed: 2015-06-30  This content is reviewed periodically and is subject to change as new health information becomes available. The information is intended to inform and educate and is not a replacement for medical evaluation, advice, diagnosis or treatment by a healthcare professional.  References   Adult Advisor 2016.3 Index    Copyright   2016 [employer], a division of [employer] All rights reserved.

## 2018-01-05 NOTE — NURSING NOTE
Patient Information     Patient Name MRN Loulou Olson [id number] Female 1945      Nursing Note by Cheryl Curran at 2017  6:00 PM     Author:  Cheryl Curran Service:  (none) Author Type:  NURS- Student Practical Nurse     Filed:  2017  6:27 PM Encounter Date:  2017 Status:  Signed     :  Cheryl Curran (NURS- Student Practical Nurse)            Patient presents with headache, sinus congestion sore throat last week. Starting Tuesday fever, body aches, nausea, lethargy. Cheryl Curran LPN .............2017  6:05 PM

## 2018-01-05 NOTE — PATIENT INSTRUCTIONS
Patient Information     Patient Name MRN Loulou Olson [id number] Female 1945      Patient Instructions by Maya Trujillo NP at 2017  6:00 PM     Author:  Maya Trujillo NP Service:  (none) Author Type:  PHYS- Nurse Practitioner     Filed:  2017  7:37 PM Encounter Date:  2017 Status:  Signed     :  Maya Trujillo NP (PHYS- Nurse Practitioner)               Index Related topics   Pneumococcal Pneumonia   ________________________________________________________________________  KEY POINTS    Pneumococcal pneumonia is an infection of the lungs caused by bacteria.    Treatment may include oxygen, a machine to help you breathe, medicines, or a procedure to clear your lungs.    It helps if you avoid smoking, smoky places, polluted air, dust, fumes, and mold. Wash your hands often, and get a flu shot each year.  ________________________________________________________________________  What is pneumococcal pneumonia?  Pneumococcal pneumonia is an infection of the lungs caused by Streptococcus pneumoniae or pneumococcal bacteria. These bacteria can also cause dangerous infections of the blood or brain.  What is the cause?  Pneumococcal pneumonia is caused by bacteria passed from person to person by sneezing or coughing. You may be more at risk for pneumonia if:    You have recently had a cold or the flu    You have another disease, such as diabetes, chronic bronchitis, or cancer    You are over age 65    You have not had the pneumococcal pneumonia vaccine  What are the symptoms?  The symptoms may seem to start suddenly and include:    Fever and chills    Feeling short of breath    Chest pain, especially when you take a deep breath    Cough that may bring up rust-colored or bloody mucus  How is it diagnosed?  Your healthcare provider will ask about your symptoms and medical history and examine you. Tests may include:    Blood tests    Sputum culture,  which is a test of a sample of mucus coughed up from deep in your lungs    Chest X-ray  How is it treated?  Your healthcare provider may prescribe antibiotics you can take by mouth at home. Usually you will start to feel better after 2 to 3 days of antibiotics. You should feel close to normal after a week or so. If you are over 60 years old or have other medical problems, it may take longer to feel normal.  If you are very ill, you may need to be in the hospital. Treatment may include:    Giving you oxygen to breathe    Giving you IV fluids and medicines, such as antibiotics to treat infection and inhaled medicines to open up the airway    If you have a severe case, having a tube in your throat and a machine to help you breathe and to make sure you are getting enough oxygen  How can I take care of myself?  Follow the full course of treatment prescribed by your healthcare provider. In addition:    If you are taking an antibiotic, take the medicine for as long as your healthcare provider prescribes, even if you feel better. If you stop taking the medicine too soon, you may not kill all of the bacteria and you may get sick again.    Drink more liquids (water or tea) every day to help you cough up mucus more easily unless your provider has told you to limit your fluids.    Cough up mucus as much as possible. Use cough medicine only if your provider recommends it, such as when your cough makes it hard to sleep.    Don t smoke, and stay away from others who are smoking.    Avoid breathing dust and chemical fumes.    Get extra rest.    Use a humidifier to put more moisture in the air. Avoid steam vaporizers because they can cause burns. Be sure to keep the humidifier clean, as recommended in the 's instructions. It's important to keep the water container clean to prevent bacteria and mold from growing in it.    Take nonprescription medicine, such as acetaminophen, ibuprofen, or naproxen to treat pain and fever.  Read the label and take as directed. Unless recommended by your healthcare provider, you should not take these medicines for more than 10 days.    Nonsteroidal anti-inflammatory medicines (NSAIDs), such as ibuprofen, naproxen, and aspirin, may cause stomach bleeding and other problems. These risks increase with age.    Acetaminophen may cause liver damage or other problems. Unless recommended by your provider, don't take more than 3000 milligrams (mg) in 24 hours. To make sure you don t take too much, check other medicines you take to see if they also contain acetaminophen. Ask your provider if you need to avoid drinking alcohol while taking this medicine.    Contact your healthcare provider if you have new or worsening symptoms.  Ask your provider:    How and when you will get your test results    How long it will take to recover    If there are activities you should avoid and when you can return to your normal activities    How to take care of yourself at home    What symptoms or problems you should watch for and what to do if you have them  Make sure you know when you should come back for a checkup. Keep all appointments for provider visits or tests.  How can I help prevent pneumonia?  To reduce your risk of getting a lung infection:    Wash your hands often and especially after using the restroom, coughing, sneezing, or blowing your nose. Also wash your hands before eating or touching your eyes.    Stay at least 6 feet away from people who are sick, if you can.    Take care of your health. Try to get at least 7 to 9 hours of sleep each night. Eat a healthy diet and try to keep a healthy weight. If you smoke, try to quit. If you want to drink alcohol, ask your healthcare provider how much is safe for you to drink. Learn ways to manage stress. Exercise according to your healthcare provider's instructions.    Ask your healthcare provider if you should get the pneumococcal shot. This shot helps prevent serious  complications of pneumonia, such as an infection of the blood or brain.  Developed by [employer].  Adult Advisor 2016.3 published by [employer].  Last modified: 2016-04-14  Last reviewed: 2015-06-30  This content is reviewed periodically and is subject to change as new health information becomes available. The information is intended to inform and educate and is not a replacement for medical evaluation, advice, diagnosis or treatment by a healthcare professional.  References   Adult Advisor 2016.3 Index    Copyright   2016 [employer], a division of [employer] All rights reserved.

## 2018-01-08 ENCOUNTER — COMMUNICATION - GICH (OUTPATIENT)
Dept: FAMILY MEDICINE | Facility: OTHER | Age: 73
End: 2018-01-08

## 2018-01-08 DIAGNOSIS — M89.9 DISORDER OF BONE: ICD-10-CM

## 2018-01-08 DIAGNOSIS — M94.9 DISORDER OF CARTILAGE: ICD-10-CM

## 2018-01-23 ENCOUNTER — DOCUMENTATION ONLY (OUTPATIENT)
Dept: FAMILY MEDICINE | Facility: OTHER | Age: 73
End: 2018-01-23

## 2018-01-23 PROBLEM — J98.4 OTHER DISEASES OF LUNG, NOT ELSEWHERE CLASSIFIED: Status: ACTIVE | Noted: 2018-01-23

## 2018-01-23 PROBLEM — M89.9 DISORDER OF BONE AND CARTILAGE: Status: ACTIVE | Noted: 2018-01-23

## 2018-01-23 PROBLEM — K57.30 DIVERTICULAR DISEASE OF COLON: Status: ACTIVE | Noted: 2018-01-23

## 2018-01-23 PROBLEM — R01.1 SYSTOLIC MURMUR: Status: ACTIVE | Noted: 2017-06-06

## 2018-01-23 PROBLEM — Z72.0 TOBACCO ABUSE: Status: ACTIVE | Noted: 2018-01-23

## 2018-01-23 PROBLEM — M94.9 DISORDER OF BONE AND CARTILAGE: Status: ACTIVE | Noted: 2018-01-23

## 2018-01-23 RX ORDER — PANTOPRAZOLE SODIUM 40 MG/1
40 TABLET, DELAYED RELEASE ORAL DAILY
COMMUNITY
Start: 2017-01-13 | End: 2018-07-31

## 2018-01-23 RX ORDER — ACETAMINOPHEN 325 MG/1
650 TABLET ORAL EVERY 4 HOURS PRN
COMMUNITY
End: 2019-09-03

## 2018-01-23 RX ORDER — IPRATROPIUM BROMIDE AND ALBUTEROL SULFATE 2.5; .5 MG/3ML; MG/3ML
3 SOLUTION RESPIRATORY (INHALATION) 4 TIMES DAILY
COMMUNITY
Start: 2017-06-06 | End: 2018-03-29

## 2018-01-23 RX ORDER — ALBUTEROL SULFATE 90 UG/1
1-2 AEROSOL, METERED RESPIRATORY (INHALATION) 4 TIMES DAILY PRN
COMMUNITY
Start: 2017-05-26 | End: 2018-09-24

## 2018-01-23 RX ORDER — ALENDRONATE SODIUM 70 MG/1
70 TABLET ORAL
COMMUNITY
Start: 2018-01-08 | End: 2018-03-29

## 2018-01-23 RX ORDER — ASCORBIC ACID 500 MG
500 TABLET ORAL DAILY
COMMUNITY
Start: 2012-10-22 | End: 2022-10-18

## 2018-01-25 ENCOUNTER — COMMUNICATION - GICH (OUTPATIENT)
Dept: FAMILY MEDICINE | Facility: OTHER | Age: 73
End: 2018-01-25

## 2018-01-25 DIAGNOSIS — K21.9 GASTRO-ESOPHAGEAL REFLUX DISEASE WITHOUT ESOPHAGITIS: ICD-10-CM

## 2018-01-26 VITALS
BODY MASS INDEX: 19.88 KG/M2 | HEIGHT: 60 IN | WEIGHT: 100.4 LBS | SYSTOLIC BLOOD PRESSURE: 132 MMHG | SYSTOLIC BLOOD PRESSURE: 130 MMHG | DIASTOLIC BLOOD PRESSURE: 68 MMHG | BODY MASS INDEX: 19.71 KG/M2 | HEART RATE: 84 BPM | HEART RATE: 82 BPM | WEIGHT: 101.25 LBS | DIASTOLIC BLOOD PRESSURE: 64 MMHG

## 2018-01-26 VITALS
SYSTOLIC BLOOD PRESSURE: 153 MMHG | HEIGHT: 60 IN | HEART RATE: 75 BPM | DIASTOLIC BLOOD PRESSURE: 77 MMHG | WEIGHT: 100.2 LBS | BODY MASS INDEX: 19.67 KG/M2

## 2018-01-26 VITALS
WEIGHT: 102.8 LBS | TEMPERATURE: 100.2 F | DIASTOLIC BLOOD PRESSURE: 82 MMHG | HEIGHT: 60 IN | HEART RATE: 109 BPM | OXYGEN SATURATION: 95 % | SYSTOLIC BLOOD PRESSURE: 170 MMHG | BODY MASS INDEX: 20.18 KG/M2

## 2018-02-06 ENCOUNTER — HOSPITAL ENCOUNTER (OUTPATIENT)
Dept: RADIOLOGY | Facility: OTHER | Age: 73
End: 2018-02-06
Attending: FAMILY MEDICINE

## 2018-02-06 ENCOUNTER — HISTORY (OUTPATIENT)
Dept: RADIOLOGY | Facility: OTHER | Age: 73
End: 2018-02-06

## 2018-02-06 DIAGNOSIS — Z12.31 ENCOUNTER FOR SCREENING MAMMOGRAM FOR MALIGNANT NEOPLASM OF BREAST: ICD-10-CM

## 2018-02-11 ENCOUNTER — HEALTH MAINTENANCE LETTER (OUTPATIENT)
Age: 73
End: 2018-02-11

## 2018-02-12 NOTE — TELEPHONE ENCOUNTER
Patient Information     Patient Name MRN Loulou Olson [id number] Female 1945      Telephone Encounter by Titi Gilmore RN at 2018  8:04 AM     Author:  Titi Gilmore RN Service:  (none) Author Type:  NURS- Registered Nurse     Filed:  2018  8:13 AM Encounter Date:  2018 Status:  Signed     :  Titi Gilmore RN (NURS- Registered Nurse)            Osteoporosis    Office visit in the past 12 months or per provider note.    Last visit with [facility] was on: 2017 in [facility]  Next visit with [facility] is on: No future appointment listed with this provider  Next visit with Family Practice is on: No future appointment listed in this department    No limitation on refills for calcium and calcium with D.  Max refill for 12 months from last office visit or per provider note.    Chart review shows that patient was last seen by PCP for ED follow up on 17. Rx as requested was noted and reviewed by PCP as per office visit notes on that date. No changes noted. Writer will refill rx as requested at this time.    Prescription refilled per RN Medication Refill Policy.................... Titi Gilmore RN ....................  2018   8:07 AM

## 2018-02-12 NOTE — TELEPHONE ENCOUNTER
Patient Information     Patient Name MRN Loulou Olson [id number] Female 1945      Telephone Encounter by Titi Gilmore RN at 2018  8:11 AM     Author:  Titi Gilmore RN Service:  (none) Author Type:  NURS- Registered Nurse     Filed:  2018  8:13 AM Encounter Date:  2018 Status:  Signed     :  Titi Gilmore RN (NURS- Registered Nurse)            Writer contacted patient and let her know that a refill of her fosamax had been sent in as requested. Patient happy with plan of care. Nothing further needed at this time.    Titi Gilmore RN ....................  2018   8:12 AM

## 2018-02-13 ENCOUNTER — HOSPITAL ENCOUNTER (OUTPATIENT)
Dept: MAMMOGRAPHY | Facility: OTHER | Age: 73
End: 2018-02-13
Attending: FAMILY MEDICINE
Payer: MEDICARE

## 2018-02-13 ENCOUNTER — HOSPITAL ENCOUNTER (OUTPATIENT)
Dept: ULTRASOUND IMAGING | Facility: OTHER | Age: 73
Discharge: HOME | End: 2018-02-13
Attending: FAMILY MEDICINE | Admitting: FAMILY MEDICINE
Payer: MEDICARE

## 2018-02-13 ENCOUNTER — HOSPITAL ENCOUNTER (OUTPATIENT)
Dept: GENERAL RADIOLOGY | Facility: OTHER | Age: 73
End: 2018-02-13
Attending: FAMILY MEDICINE
Payer: MEDICARE

## 2018-02-13 DIAGNOSIS — R92.8 ABNORMAL FINDING ON BREAST IMAGING: ICD-10-CM

## 2018-02-13 PROCEDURE — 76642 ULTRASOUND BREAST LIMITED: CPT | Mod: LT

## 2018-02-13 PROCEDURE — 77065 DX MAMMO INCL CAD UNI: CPT | Mod: LT

## 2018-02-13 NOTE — PROGRESS NOTES
Patient Information     Patient Name MRN Sex Luolou Mondragon [id number] Female 1945      Progress Notes by Courtney Collins at 2018  2:54 PM     Author:  Courtney Collins Service:  (none) Author Type:  (none)     Filed:  2018  2:54 PM Date of Service:  2018  2:54 PM Status:  Signed     :  Courtney Collins            Falls Risk Criteria:    Age 65 and older or under age 4        Sensory deficits    Poor vision    Use of ambulatory aides    Impaired judgment    Unable to walk independently    Meets High Risk criteria for falls:  no

## 2018-02-13 NOTE — TELEPHONE ENCOUNTER
Patient Information     Patient Name MRN Loulou Olson [id number] Female 1945      Telephone Encounter by Titi Gilmore RN at 2018 10:00 AM     Author:  Titi Gilmore RN Service:  (none) Author Type:  NURS- Registered Nurse     Filed:  2018 10:03 AM Encounter Date:  2018 Status:  Signed     :  Titi Gilmore RN (NURS- Registered Nurse)            Writer also called patient. Advised her that rx as requested had been filled as requested. Patient happy with plan of care. Writer will close this encounter at this time.    Titi Gilmore RN ....................  2018   10:03 AM

## 2018-02-13 NOTE — TELEPHONE ENCOUNTER
Patient Information     Patient Name MRN Sex Loulou Mondragon [id number] Female 1945      Telephone Encounter by Titi Gilmore RN at 2018  9:55 AM     Author:  Titi Gilmore RN Service:  (none) Author Type:  NURS- Registered Nurse     Filed:  2018 10:03 AM Encounter Date:  2018 Status:  Signed     :  Titi Gilmore RN (NURS- Registered Nurse)            Proton Pump Inhibitors    Office visit in the past 12 months or per provider note.    Last visit with DEXTER MERCHANT was on: 2017 in [facility]  Next visit with DEXTER MERCHANT is on: No future appointment listed with this provider  Next visit with Family Practice is on: No future appointment listed in this department    Max refill for 12 months from last office visit or per provider note.    Chart review shows that patient was last seen by PCP for an ED follow up on 17. Rx as requested was noted and reviewed by PCP as per office visit notes on that date. No changes to rx as requested. Writer will refill rx as requested at this time.    Prescription refilled per RN Medication Refill Policy.................... Titi Gilmore RN ....................  2018   9:58 AM

## 2018-03-29 ENCOUNTER — OFFICE VISIT (OUTPATIENT)
Dept: PEDIATRICS | Facility: OTHER | Age: 73
End: 2018-03-29
Attending: INTERNAL MEDICINE
Payer: MEDICARE

## 2018-03-29 VITALS
WEIGHT: 102.5 LBS | BODY MASS INDEX: 19.73 KG/M2 | SYSTOLIC BLOOD PRESSURE: 130 MMHG | HEART RATE: 102 BPM | TEMPERATURE: 97.2 F | DIASTOLIC BLOOD PRESSURE: 90 MMHG

## 2018-03-29 DIAGNOSIS — M54.9 MUSCULOSKELETAL BACK PAIN: Primary | ICD-10-CM

## 2018-03-29 DIAGNOSIS — Z86.19 HISTORY OF SHINGLES: ICD-10-CM

## 2018-03-29 DIAGNOSIS — M62.830 BACK MUSCLE SPASM: ICD-10-CM

## 2018-03-29 PROCEDURE — 99213 OFFICE O/P EST LOW 20 MIN: CPT | Performed by: INTERNAL MEDICINE

## 2018-03-29 PROCEDURE — G0463 HOSPITAL OUTPT CLINIC VISIT: HCPCS

## 2018-03-29 RX ORDER — CYCLOBENZAPRINE HCL 10 MG
10 TABLET ORAL 3 TIMES DAILY PRN
Qty: 30 TABLET | Refills: 0 | Status: SHIPPED | OUTPATIENT
Start: 2018-03-29 | End: 2018-09-30

## 2018-03-29 RX ORDER — VALACYCLOVIR HYDROCHLORIDE 1 G/1
1000 TABLET, FILM COATED ORAL 3 TIMES DAILY
Qty: 21 TABLET | Refills: 0 | Status: SHIPPED | OUTPATIENT
Start: 2018-03-29 | End: 2018-10-18

## 2018-03-29 ASSESSMENT — PAIN SCALES - GENERAL: PAINLEVEL: EXTREME PAIN (8)

## 2018-03-29 NOTE — NURSING NOTE
Pt states that she feels like she may have shingles, has a stabbing pain in back that started at 3:30 this morning  Piedad Jones LPN.......3/29/2018 10:05 AM

## 2018-03-29 NOTE — PATIENT INSTRUCTIONS
-- Physical therapy for muscular back pain   -- Valtrex prescribed, okay to wait for rash before starting   -- Trial of flexeril for muscle spasm   -- If pain/symptoms persist, follow-up as further testing including imaging of back would be recommended      Shingles  Shingles is a viral infection caused by the same virus as chicken pox. Anyone who has had chicken pox may get shingles later in life. The virus stays in the body, but remains dormant (asleep). Shingles often occurs in older persons or persons with lowered immunity. But it can affect anyone at any age.  Shingles starts as a tingling patch of skin on one side of the body. Small, painful blisters may then appear. The rash does not spread to the rest of the body.  Exposure to shingles cannot cause shingles. However, it can cause chicken pox in anyone who has not had chicken pox or has not been vaccinated. The contagious period ends when all blisters have crusted over (generally about 2 weeks after the illness begins).  After the blisters heal, the affected skin may be sensitive or painful for months (neuralgia). This often gradually goes away.  A shingles vaccine is available. This can help prevent shingles or make it less painful. It is generally recommended for adults over the age of 60 who have had chicken pox in the past, but who have never had shingles. Adults over 60 who have had neither chicken pox nor shingles can prevent both diseases with the chicken pox vaccine. Ask your healthcare provider about these vaccines.  Home care    Medicines may be prescribed to help relieve pain. Take these medicines as directed. Ask your healthcare provider or pharmacist before using over-the-counter medicines for helping treat pain and itching.    In certain cases, antiviral medicines may be prescribed to reduce pain, shorten the illness, and prevent neuralgia. Take these medicines as directed.    Compresses made from a solution of cool water mixed with  cornstarch or baking soda may help relieve pain and itching.     Gently wash skin daily with soap and water to help prevent infection.  Be certain to rinse off all of the soap, which can be irritating.    Trim fingernails and try not to scratch. Scratching the sores may leave scars.    Stay home from work or school until all blisters have formed a crust and you are no longer contagious.  Follow-up care  Follow up with your healthcare provider or as directed by our staff.  When to seek medical advice    Fever of 100.4 F (38 C) or higher, or as directed by your healthcare provider    Affected skin is on the face or neck and any of the following occur:    Headache    Eye pain    Changes in vision    Sores near the eye    Weakness of facial muscles    Pain, redness, or swelling of a joint    Signs of skin infection: colored drainage from the sores, warmth, increasing redness, or increasing pain  Date Last Reviewed: 9/25/2015 2000-2017 [employer]. [address] [place] [address], [place] [address]. All rights reserved. This information is not intended as a substitute for professional medical care. Always follow your healthcare professional's instructions.

## 2018-03-29 NOTE — PROGRESS NOTES
Subjective  Loulou Lucero is a 73 year old female who presents for recurrence of shingles.  She tends to get shingles a lot.  Happens with some regular frequency but she cannot remember the last time she had it.  She only broke out with a rash the first time.  Every time after that she comes in for the medication.  In the middle the night she woke up with pain to the left of her midline back.  It feels like a stabbing sensation.  Comes in waves at 15-20 seconds.  She has been doing a lot of shoveling of heavy wet snow.  This is associated with left shoulder discomfort as well.    Problem List/PMH: reviewed in EMR, and made relevant updates today.  Medications: reviewed in EMR, and made relevant updates today.  Allergies: reviewed in EMR, and made relevant updates today.    Social Hx:  Social History   Substance Use Topics     Smoking status: Current Every Day Smoker     Packs/day: 1.00     Years: 50.00     Types: Cigarettes     Smokeless tobacco: Never Used     Alcohol use No     Social History     Social History Narrative    Patient in second marriage. Has two living children, both live in [place], [place]. Has 2 grandchildren. Is retired, worked at [employer] in [place].      Patient currently smokes, 1 ppd x 50 yrs. Smokes less in the winter months due to not smoking in the house    .   Alcohol Use - no  - disabled.     I reviewed social history and made relevant updates today.    Family Hx:   Family History   Problem Relation Age of Onset     Colon Cancer Father      Cancer-colon     Other - See Comments Mother      Alzheimer's     Other - See Comments Maternal Grandmother      Alzheimer's     Other - See Comments Brother      Alzheimer's     Other - See Comments Brother      aneurysm and stents     CANCER Brother      Cancer,lung     CANCER Brother      Cancer,skin     Other - See Comments Brother      cirrhosis of the liver     Other - See Comments Maternal Uncle      3, Alzheimer's     Breast Cancer No  family hx of      Cancer-breast       Objective  Vitals: reviewed in EMR.  /90 (BP Location: Right arm, Patient Position: Sitting, Cuff Size: Adult Regular)  Pulse 102  Temp 97.2  F (36.2  C) (Tympanic)  Wt 102 lb 8 oz (46.5 kg)  BMI 19.73 kg/m2    Gen: Pleasant female, NAD.  HEENT: MMM  Neck: Supple  Pulm: Breathing easily  Neuro: Grossly intact  Back: No midline tenderness to palpation.  Mild tenderness to palpation over the left back over the rhomboid muscle area.  No masses.  No tenderness to percussion.  Skin: No concerning lesions.  Psychiatric: Normal affect and insight. Does not appear anxious or depressed.      Assessment    ICD-10-CM    1. Musculoskeletal back pain M54.9 PHYSICAL THERAPY REFERRAL   2. History of shingles Z86.19 valACYclovir (VALTREX) 1000 mg tablet   3. Back muscle spasm M62.830 cyclobenzaprine (FLEXERIL) 10 MG tablet     Orders Placed This Encounter   Procedures     PHYSICAL THERAPY REFERRAL       I think it is most likely all musculoskeletal.  I encouraged her to undertake physical therapy.  I think it is unlikely this is shingles however the patient was adamant about receiving Valtrex.    Plan   -- Expected clinical course discussed   -- Medications and their side effects discussed  Patient Instructions    -- Physical therapy for muscular back pain   -- Valtrex prescribed, okay to wait for rash before starting   -- Trial of flexeril for muscle spasm   -- If pain/symptoms persist, follow-up as further testing including imaging of back would be recommended      Shingles  Shingles is a viral infection caused by the same virus as chicken pox. Anyone who has had chicken pox may get shingles later in life. The virus stays in the body, but remains dormant (asleep). Shingles often occurs in older persons or persons with lowered immunity. But it can affect anyone at any age.  Shingles starts as a tingling patch of skin on one side of the body. Small, painful blisters may then appear.  The rash does not spread to the rest of the body.  Exposure to shingles cannot cause shingles. However, it can cause chicken pox in anyone who has not had chicken pox or has not been vaccinated. The contagious period ends when all blisters have crusted over (generally about 2 weeks after the illness begins).  After the blisters heal, the affected skin may be sensitive or painful for months (neuralgia). This often gradually goes away.  A shingles vaccine is available. This can help prevent shingles or make it less painful. It is generally recommended for adults over the age of 60 who have had chicken pox in the past, but who have never had shingles. Adults over 60 who have had neither chicken pox nor shingles can prevent both diseases with the chicken pox vaccine. Ask your healthcare provider about these vaccines.  Home care    Medicines may be prescribed to help relieve pain. Take these medicines as directed. Ask your healthcare provider or pharmacist before using over-the-counter medicines for helping treat pain and itching.    In certain cases, antiviral medicines may be prescribed to reduce pain, shorten the illness, and prevent neuralgia. Take these medicines as directed.    Compresses made from a solution of cool water mixed with cornstarch or baking soda may help relieve pain and itching.     Gently wash skin daily with soap and water to help prevent infection.  Be certain to rinse off all of the soap, which can be irritating.    Trim fingernails and try not to scratch. Scratching the sores may leave scars.    Stay home from work or school until all blisters have formed a crust and you are no longer contagious.  Follow-up care  Follow up with your healthcare provider or as directed by our staff.  When to seek medical advice    Fever of 100.4 F (38 C) or higher, or as directed by your healthcare provider    Affected skin is on the face or neck and any of the following occur:    Headache    Eye pain    Changes  in vision    Sores near the eye    Weakness of facial muscles    Pain, redness, or swelling of a joint    Signs of skin infection: colored drainage from the sores, warmth, increasing redness, or increasing pain  Date Last Reviewed: 9/25/2015 2000-2017 [employer]. [address] [place] [address]. All rights reserved. This information is not intended as a substitute for professional medical care. Always follow your healthcare professional's instructions.          Signed, Jesse Douglas MD  Internal Medicine & Pediatrics

## 2018-03-29 NOTE — MR AVS SNAPSHOT
After Visit Summary   3/29/2018    Loulou Lucero    MRN: [id number]           Patient Information     Date Of Birth          1945        Visit Information        Provider Department      3/29/2018 10:00 AM Jesse Douglas MD [facility]        Today's Diagnoses     Musculoskeletal back pain    -  1    History of shingles        Back muscle spasm          Care Instructions     -- Physical therapy for muscular back pain   -- Valtrex prescribed, okay to wait for rash before starting   -- Trial of flexeril for muscle spasm   -- If pain/symptoms persist, follow-up as further testing including imaging of back would be recommended      Shingles  Shingles is a viral infection caused by the same virus as chicken pox. Anyone who has had chicken pox may get shingles later in life. The virus stays in the body, but remains dormant (asleep). Shingles often occurs in older persons or persons with lowered immunity. But it can affect anyone at any age.  Shingles starts as a tingling patch of skin on one side of the body. Small, painful blisters may then appear. The rash does not spread to the rest of the body.  Exposure to shingles cannot cause shingles. However, it can cause chicken pox in anyone who has not had chicken pox or has not been vaccinated. The contagious period ends when all blisters have crusted over (generally about 2 weeks after the illness begins).  After the blisters heal, the affected skin may be sensitive or painful for months (neuralgia). This often gradually goes away.  A shingles vaccine is available. This can help prevent shingles or make it less painful. It is generally recommended for adults over the age of 60 who have had chicken pox in the past, but who have never had shingles. Adults over 60 who have had neither chicken pox nor shingles can prevent both diseases with the chicken pox vaccine. Ask your healthcare provider about these vaccines.  Home  care    Medicines may be prescribed to help relieve pain. Take these medicines as directed. Ask your healthcare provider or pharmacist before using over-the-counter medicines for helping treat pain and itching.    In certain cases, antiviral medicines may be prescribed to reduce pain, shorten the illness, and prevent neuralgia. Take these medicines as directed.    Compresses made from a solution of cool water mixed with cornstarch or baking soda may help relieve pain and itching.     Gently wash skin daily with soap and water to help prevent infection.  Be certain to rinse off all of the soap, which can be irritating.    Trim fingernails and try not to scratch. Scratching the sores may leave scars.    Stay home from work or school until all blisters have formed a crust and you are no longer contagious.  Follow-up care  Follow up with your healthcare provider or as directed by our staff.  When to seek medical advice    Fever of 100.4 F (38 C) or higher, or as directed by your healthcare provider    Affected skin is on the face or neck and any of the following occur:    Headache    Eye pain    Changes in vision    Sores near the eye    Weakness of facial muscles    Pain, redness, or swelling of a joint    Signs of skin infection: colored drainage from the sores, warmth, increasing redness, or increasing pain  Date Last Reviewed: 9/25/2015 2000-2017 [employer]. [address] [place] [address], [place] [address]. All rights reserved. This information is not intended as a substitute for professional medical care. Always follow your healthcare professional's instructions.                Follow-ups after your visit        Additional Services     PHYSICAL THERAPY REFERRAL                 Who to contact     If you have questions or need follow up information about today's clinic visit or your schedule please contact [facility] directly at [phone].  Normal or non-critical lab and imaging  "results will be communicated to you by MyChart, letter or phone within 4 business days after the clinic has received the results. If you do not hear from us within 7 days, please contact the clinic through [employer] or phone. If you have a critical or abnormal lab result, we will notify you by phone as soon as possible.  Submit refill requests through [employer] or call your pharmacy and they will forward the refill request to us. Please allow 3 business days for your refill to be completed.          Additional Information About Your Visit        [employer] Information     [employer] lets you send messages to your doctor, view your test results, renew your prescriptions, schedule appointments and more. To sign up, go to [email or web address] . Click on \"Log in\" on the left side of the screen, which will take you to the Welcome page. Then click on \"Sign up Now\" on the right side of the page.     You will be asked to enter the access code listed below, as well as some personal information. Please follow the directions to create your username and password.     Your access code is: [email or web address]  Expires: 2018 10:25 AM     Your access code will  in 90 days. If you need help or a new code, please call your [facility] clinic or [phone].        Care EveryWhere ID     This is your Care EveryWhere ID. This could be used by other organizations to access your [facility] medical records  [id number]        Your Vitals Were     Pulse Temperature BMI (Body Mass Index)             102 97.2  F (36.2  C) (Tympanic) 19.73 kg/m2          Blood Pressure from Last 3 Encounters:   18 130/90   17 132/68   17 170/82    Weight from Last 3 Encounters:   18 102 lb 8 oz (46.5 kg)   17 100 lb 6.4 oz (45.5 kg)   17 102 lb 12.8 oz (46.6 kg)              We Performed the Following     PHYSICAL THERAPY REFERRAL          Today's Medication Changes          These changes are accurate as of 3/29/18 10:26 AM.  If " you have any questions, ask your nurse or doctor.               Start taking these medicines.        Dose/Directions    cyclobenzaprine 10 MG tablet   Commonly known as:  FLEXERIL   Used for:  Back muscle spasm   Started by:  Jesse Douglas MD        Dose:  10 mg   Take 1 tablet (10 mg) by mouth 3 times daily as needed for muscle spasms   Quantity:  30 tablet   Refills:  0       valACYclovir 1000 mg tablet   Commonly known as:  VALTREX   Used for:  History of shingles   Started by:  Jesse Douglas MD        Dose:  1000 mg   Take 1 tablet (1,000 mg) by mouth 3 times daily   Quantity:  21 tablet   Refills:  0            Where to get your medicines      These medications were sent to [facility] - [place], [place] - [address]  [address] [facility] [address]     Phone:  [phone]     cyclobenzaprine 10 MG tablet    valACYclovir 1000 mg tablet                Primary Care Provider Office Phone # Fax #    Obi Johnston [phone]       [address]  [facility] [address]        Equal Access to Services     [facility]: Hadii aad ku hadasho Soomaali, waaxda luqadaha, qaybta kaalmada adeegyaradha, fadia velasco . So [facility] [phone].    ATENCIÓN: Si habla español, tiene a taylor disposición servicios gratuitos de asistencia lingüística. [facility] [phone].    We comply with applicable federal civil rights laws and [place] laws. We do not discriminate on the basis of race, color, national origin, age, disability, sex, sexual orientation, or gender identity.            Thank you!     Thank you for choosing [facility] AND [facility]  for your care. Our goal is always to provide you with excellent care. Hearing back from our patients is one way we can continue to improve our services. Please take a few minutes to complete the written survey that you may receive in the mail after your visit with us. Thank  you!             Your Updated Medication List - Protect others around you: Learn how to safely use, store and throw away your medicines at www.disposemymeds.org.          This list is accurate as of 3/29/18 10:26 AM.  Always use your most recent med list.                   Brand Name Dispense Instructions for use Diagnosis    acetaminophen 325 MG tablet    TYLENOL     Take 650 mg by mouth every 4 hours as needed Max acetaminophen dose 4000 mg in 24 hrs        ADVAIR DISKUS 250-50 MCG/DOSE diskus inhaler   Generic drug:  fluticasone-salmeterol      Inhale 1 puff into the lungs 2 times daily        albuterol 108 (90 BASE) MCG/ACT Inhaler    PROAIR HFA/PROVENTIL HFA/VENTOLIN HFA     Inhale 1-2 puffs into the lungs 4 times daily as needed        ascorbic acid 500 MG tablet    VITAMIN C     Take 500 mg by mouth daily        cyclobenzaprine 10 MG tablet    FLEXERIL    30 tablet    Take 1 tablet (10 mg) by mouth 3 times daily as needed for muscle spasms    Back muscle spasm       pantoprazole 40 MG EC tablet    PROTONIX     Take 40 mg by mouth daily        SM CALCIUM 500/VITAMIN D3 500-400 MG-UNIT Tabs per tablet   Generic drug:  calcium carbonate-vitamin D      Take 1 tablet by mouth daily        valACYclovir 1000 mg tablet    VALTREX    21 tablet    Take 1 tablet (1,000 mg) by mouth 3 times daily    History of shingles

## 2018-04-06 DIAGNOSIS — J43.9 PULMONARY EMPHYSEMA, UNSPECIFIED EMPHYSEMA TYPE (H): Primary | ICD-10-CM

## 2018-04-06 NOTE — TELEPHONE ENCOUNTER
Writer contacted patient as requested. Patient reports she has a month left of her advair, but is looking for a refill as it takes 10 days to get refills from [facility] pharmacy, and pharmacy states she is out of refills. Writer advised patient that he would look at getting rx filled for her. Patient happy with plan of care.     Chart review shows that patient was seen by Dr. Douglas recently on 3/29/18. However, rx as requested needs to be printed to send in to [facility] pharmacy??    Writer is unable to fill rx as requested as cannot print and sign rx. Will augusto up and route rx request to PCP fo rhis consideration/approval at this time.    Unable to complete prescription refill per RN Medication Refill Policy. Titi Gilmore 4/6/2018 1:21 PM

## 2018-06-26 ENCOUNTER — TELEPHONE (OUTPATIENT)
Dept: FAMILY MEDICINE | Facility: OTHER | Age: 73
End: 2018-06-26

## 2018-06-26 DIAGNOSIS — M81.0 AGE-RELATED OSTEOPOROSIS WITHOUT CURRENT PATHOLOGICAL FRACTURE: Primary | ICD-10-CM

## 2018-06-26 PROBLEM — M89.9 DISORDER OF BONE AND CARTILAGE: Status: RESOLVED | Noted: 2018-01-23 | Resolved: 2018-06-26

## 2018-06-26 PROBLEM — J98.4 OTHER DISEASES OF LUNG, NOT ELSEWHERE CLASSIFIED: Status: RESOLVED | Noted: 2018-01-23 | Resolved: 2018-06-26

## 2018-06-26 PROBLEM — M94.9 DISORDER OF BONE AND CARTILAGE: Status: RESOLVED | Noted: 2018-01-23 | Resolved: 2018-06-26

## 2018-06-26 RX ORDER — ALENDRONATE SODIUM 70 MG/1
TABLET ORAL
Qty: 12 TABLET | Refills: 3 | Status: SHIPPED | OUTPATIENT
Start: 2018-06-26 | End: 2018-07-10

## 2018-06-26 NOTE — TELEPHONE ENCOUNTER
"Returned call to patient. Patient is requesting a refill of FOSAMAX 70mg. It looks like the medication was discontinued 3/29/2018. Patient reports,\"she has still been taking it and she will be out\". Pharmacy and allergies have been reviewed. Please advise    Mehnaz Ledesma LPN on 6/26/2018 at 1:18 PM    "

## 2018-06-26 NOTE — TELEPHONE ENCOUNTER
Pt states that refill for medication needs to be [facility]

## 2018-07-10 ENCOUNTER — TELEPHONE (OUTPATIENT)
Dept: FAMILY MEDICINE | Facility: OTHER | Age: 73
End: 2018-07-10

## 2018-07-10 DIAGNOSIS — M81.0 AGE-RELATED OSTEOPOROSIS WITHOUT CURRENT PATHOLOGICAL FRACTURE: ICD-10-CM

## 2018-07-10 RX ORDER — ALENDRONATE SODIUM 70 MG/1
TABLET ORAL
Qty: 12 TABLET | Refills: 3 | Status: SHIPPED | OUTPATIENT
Start: 2018-07-10 | End: 2018-07-10

## 2018-07-10 RX ORDER — ALENDRONATE SODIUM 70 MG/1
TABLET ORAL
Qty: 2 TABLET | Refills: 0 | Status: SHIPPED | OUTPATIENT
Start: 2018-07-10 | End: 2019-06-20

## 2018-07-10 NOTE — TELEPHONE ENCOUNTER
Patient called stating she is OUT OF MEDICATION.    This was filled:   alendronate (FOSAMAX) 70 MG tablet 12 tablet 3 2018  --   Sig: Take 1 tablet (70 mg) by mouth with 8oz water every 7 days 30 minutes before breakfast and remain upright during this time.   Class: Local Print  [facility]   Order: [id number]     Called and spoke to Patient after verifying last name and date of birth. She states, [facility] [place] and they never received the Rx on  and she was told it was not fillable. Patient used her last pill on Monday. Patient is leaving out of town tomorrow before noon and will be gone past next Monday and will not have a tablet for that day.    [facility] and [facility] Lauren , after verifying Patient's last name and . She states they are only able to pull up the Patient by SSN, Medicare # (nothing found under number listed in chart), or [facility] number. She also states she sees nothing under [employer].    Per face to face with Dr. Johnston refill nurse given authorization to resent Rx to [facility] via eRx and emergency refill of 2 tablets to [facility].    Mary Mccormack RN .............. 7/10/2018  1:11 PM

## 2018-07-24 NOTE — PROGRESS NOTES
Patient Information     Patient Name  Loulou Lucero MRN  [id number] Sex  Female   1945      Letter by Mary Suero MD at      Author:  Mary Suero MD Service:  (none) Author Type:  (none)    Filed:   Date of Service:   Status:  (Other)       [facility]  [address]  [place] [address]  [phone]         Loulou Lucero   [address] [facility] [address]      2018  Date of Breast Imagin2018  3:08 PM    Dear Ms. Lucero:    Your recent breast imaging examination showed a finding that requires additional imaging studies for a complete evaluation. Most findings are benign (not cancer). Please call [phone] to schedule an appointment for these tests if you have not already done so.    A report of your results was sent to your health care provider(s).    Your images will become part of your medical file here at [facility] and will be available for your continuing care. You are responsible for informing any new health care provider or breast imaging facility of the date and location of this examination.    Although mammography is the most accurate method for early detection, not all cancers are found through mammography. If you notice any new changes in your breast(s) please inform your health care provider without delay.    Thank you for choosing [facility] to participate in your healthcare needs.       [facility] Recommendations for Early Breast Cancer Detection   in Women without Symptoms  When to start having mammograms to screen for breast cancer, and how often to have them, is a personal decision. It should be based on your preferences, your values and your risk for developing breast cancer. [facility] recommends that you and your health care provider together determine when mammograms are right for you.    [facility] recommends the following guidelines for  women who have an average risk for breast cancer, based on American Cancer Society guidelines:    Age 40 to 44: Mammograms are optional.     Age 45 to 54: Have a mammogram every year.    Age 55 and older: Have a mammogram every year, or transition to having one every 2 years. Continue to have mammograms as long as your health is good.    If you have a higher than average risk for breast cancer, your health care provider may recommend a different schedule.

## 2018-07-24 NOTE — PROGRESS NOTES
"Patient Information     Patient Name  Loulou Lucero MRN  [id number] Sex  Female   1945      Letter by Obi Johnston MD at      Author:  Obi Johnston MD Service:  (none) Author Type:  (none)    Filed:   Encounter Date:  2017 Status:  (Other)           Loulou Lucero  [address] [facility] [address]          2017    Dear Ms. Lucero:    Your recent lab values can be seen below.     Your urine testing for blastomycosis, histoplasmosis and legionella all came back negative which is reassuring.    If you have any questions, do not hesitate to contact me.    Results for orders placed or performed in visit on 17      BLASTOMYCES ANTIGEN      Result  Value Ref Range    SPECIMEN TYPE URINE     BLASTOMYCES ANTIGEN None Detected ng/mL    INTERPRETATION Negative    HISTOPLASMA ANTIGEN,URINE      Result  Value Ref Range    HISTOPLASMA AG RESULT Negative Negative    HISTOPLASMA AG VALUE 0.00 ng/mL   LEGIONELLA URINE ANTIGEN      Result  Value Ref Range    LEGIONELLA URINE ANTIGEN Negative \" \"         Sincerely,        Alvaro Johnston MD  Family Medicine          "

## 2018-07-24 NOTE — PROGRESS NOTES
Patient Information     Patient Name  Loulou Lucero MRN  [id number] Sex  Female   1945      Letter by Obi Johnston MD at      Author:  Obi Johnston MD Service:  (none) Author Type:  (none)    Filed:   Date of Service:   Status:  (Other)           Loulou Lucero  [address] [facility] [address]          2017    Dear Ms. Lucero:    Your recent echocardiogram came back showing aortic sclerosis without significant aortic stenosis. This means that you have calcification on your aortic valve however this is not producing any significant problems with the valve. Overall this is reassuring. Feel free to contact me if you have any further concerns.    Sincerely,        Obi Johnston MD

## 2018-07-31 DIAGNOSIS — K21.9 GASTROESOPHAGEAL REFLUX DISEASE WITHOUT ESOPHAGITIS: Primary | ICD-10-CM

## 2018-07-31 RX ORDER — PANTOPRAZOLE SODIUM 40 MG/1
40 TABLET, DELAYED RELEASE ORAL DAILY
Qty: 90 TABLET | Refills: 3 | Status: SHIPPED | OUTPATIENT
Start: 2018-07-31 | End: 2019-08-19

## 2018-07-31 NOTE — TELEPHONE ENCOUNTER
In clinical absence of patient's primary, Obi Johnston, patient is requesting that this message be sent to the primary provider's Teamlet for consideration please.  Dr. Johnston is out of the clinic all week, rounding.    pantoprazole (PROTONIX) 40 MG EC tablet  Last Written Prescription Date:  1/25/18  Last Fill Quantity: 90,   # refills: 1  Last Office Visit: 3/29/18  Future Office visit:   None.    Routing refill request to provider for review/approval because:  PPI Protocol Failed7/31 1:56 PM   No diagnosis of osteoporosis on record     Unable to complete prescription refill per RN Medication Refill Policy. Mary Mccormack RN .............. 7/31/2018  1:57 PM

## 2018-08-03 ENCOUNTER — TELEPHONE (OUTPATIENT)
Dept: FAMILY MEDICINE | Facility: OTHER | Age: 73
End: 2018-08-03

## 2018-08-21 ENCOUNTER — TELEPHONE (OUTPATIENT)
Dept: FAMILY MEDICINE | Facility: OTHER | Age: 73
End: 2018-08-21

## 2018-08-21 DIAGNOSIS — K21.9 GASTROESOPHAGEAL REFLUX DISEASE WITHOUT ESOPHAGITIS: ICD-10-CM

## 2018-08-21 NOTE — TELEPHONE ENCOUNTER
Contacted patient and shared that an Rx was sent and received by pharmacy ([facility]) 7/31/2018 for 90 X 3 (1 year).  Patient states this the second time she has called but still has not received medication.    Contacted pharmacy and they state that medication was backordered and is now in the mail.  No ETA at this time.    Contacted patient back and advised her to call back if she does not receive medication before the weekend as she has run out and may need a limited refill to get her through.    Patient appreciative.    Nataliia Marrero RN  ....................  8/21/2018   12:13 PM

## 2018-09-24 ENCOUNTER — OFFICE VISIT (OUTPATIENT)
Dept: FAMILY MEDICINE | Facility: OTHER | Age: 73
End: 2018-09-24
Attending: NURSE PRACTITIONER
Payer: MEDICARE

## 2018-09-24 ENCOUNTER — HOSPITAL ENCOUNTER (OUTPATIENT)
Dept: GENERAL RADIOLOGY | Facility: OTHER | Age: 73
Discharge: HOME | End: 2018-09-24
Attending: NURSE PRACTITIONER | Admitting: NURSE PRACTITIONER
Payer: MEDICARE

## 2018-09-24 VITALS
HEART RATE: 93 BPM | SYSTOLIC BLOOD PRESSURE: 138 MMHG | OXYGEN SATURATION: 95 % | DIASTOLIC BLOOD PRESSURE: 66 MMHG | BODY MASS INDEX: 19.73 KG/M2 | WEIGHT: 102.5 LBS | TEMPERATURE: 99.4 F

## 2018-09-24 DIAGNOSIS — Z72.0 TOBACCO ABUSE: ICD-10-CM

## 2018-09-24 DIAGNOSIS — J44.9 CHRONIC OBSTRUCTIVE PULMONARY DISEASE, UNSPECIFIED COPD TYPE (H): ICD-10-CM

## 2018-09-24 DIAGNOSIS — R05.9 COUGH: ICD-10-CM

## 2018-09-24 DIAGNOSIS — R50.9 FEVER, UNSPECIFIED FEVER CAUSE: ICD-10-CM

## 2018-09-24 DIAGNOSIS — J22 LOWER RESPIRATORY INFECTION (E.G., BRONCHITIS, PNEUMONIA, PNEUMONITIS, PULMONITIS): Primary | ICD-10-CM

## 2018-09-24 LAB
ERYTHROCYTE [DISTWIDTH] IN BLOOD BY AUTOMATED COUNT: 13.3 % (ref 10–15)
HCT VFR BLD AUTO: 37 % (ref 35–47)
HGB BLD-MCNC: 12.4 G/DL (ref 11.7–15.7)
MCH RBC QN AUTO: 31.1 PG (ref 26.5–33)
MCHC RBC AUTO-ENTMCNC: 33.5 G/DL (ref 31.5–36.5)
MCV RBC AUTO: 93 FL (ref 78–100)
PLATELET # BLD AUTO: 255 10E9/L (ref 150–450)
RBC # BLD AUTO: 3.99 10E12/L (ref 3.8–5.2)
WBC # BLD AUTO: 9.8 10E9/L (ref 4–11)

## 2018-09-24 PROCEDURE — 85027 COMPLETE CBC AUTOMATED: CPT | Performed by: NURSE PRACTITIONER

## 2018-09-24 PROCEDURE — 36415 COLL VENOUS BLD VENIPUNCTURE: CPT | Performed by: NURSE PRACTITIONER

## 2018-09-24 PROCEDURE — 71046 X-RAY EXAM CHEST 2 VIEWS: CPT

## 2018-09-24 PROCEDURE — 99213 OFFICE O/P EST LOW 20 MIN: CPT | Performed by: NURSE PRACTITIONER

## 2018-09-24 PROCEDURE — G0463 HOSPITAL OUTPT CLINIC VISIT: HCPCS | Mod: 25

## 2018-09-24 PROCEDURE — G0463 HOSPITAL OUTPT CLINIC VISIT: HCPCS

## 2018-09-24 RX ORDER — ALBUTEROL SULFATE 90 UG/1
1-2 AEROSOL, METERED RESPIRATORY (INHALATION) 4 TIMES DAILY PRN
Qty: 1 INHALER | Refills: 3 | Status: SHIPPED | OUTPATIENT
Start: 2018-09-24 | End: 2020-04-20

## 2018-09-24 RX ORDER — AZITHROMYCIN 250 MG/1
TABLET, FILM COATED ORAL
Qty: 6 TABLET | Refills: 0 | Status: SHIPPED | OUTPATIENT
Start: 2018-09-24 | End: 2018-09-30

## 2018-09-24 NOTE — PROGRESS NOTES
Cough, sore throat, head congestion, intermittent loss of voice. Started with sore throat and runny nose around Sept 12.  Cough worse when lying down. Has taken daytime cold medications, tylenol for headache, which helped. Fevers started yesterday, this morning 101.2F at home. Having chills. Coughing up a little bit. Feels a little worse. Decreased appetite, denies current n/v, but cough did make her throw up in beginning. No diarrhea/constipation. Seems like she is urinating a lot. Has been using just Advair inhaler, which she always uses. About a year ago she was sick and had nebulizers prescribed. No sick contacts. Some SOB, feels like throat is closing.

## 2018-09-24 NOTE — NURSING NOTE
Patient presents to clinic today for a cough, fever, chills starting about two weeks ago. She states it started as a cold and has progressed.    Shari Rodriguez LPN...................9/24/2018  2:48 PM

## 2018-09-24 NOTE — PROGRESS NOTES
HPI:    Loulou Lucero is a 73 year old female who presents to clinic today for cough. Has had cough for 2 weeks. Started with cold sx that has progressed. Having cough, fever, chills. Cough is productive someimes. Has COPD, uses advair inhaler, but not albuterol currently. Continues to smoke daily. Today she is complaining of cough, sore throat, head congestion, and intermittent loss of voice.  Some shortness of breath, and feeling like her throat is closing, but denies respiratory distress.  Illness started with sore throat and runny nose around Sept 12.  Cough is worse when lying down. Has taken daytime cold medications and tylenol for headache, which helped. Fevers started yesterday, this morning 101.2F at home. Endorsing chills, no sweats. Overall she feels this illness has changed in the type of symptoms, but severity has remained the same. Also endorses decreased appetite, denies current n/v, but cough did make her vomit in beginning. No diarrhea/constipation or abdominal pain. She states it seems like she is urinating a lot, but denies increasing fluid intake. She has been using just her Advair inhaler twice a day, which she always uses. About a year ago she was sick and had nebulizers prescribed. No known sick contacts.    Past Medical History:   Diagnosis Date     Asymptomatic varicose veins of lower extremity     6/12/2012     Benign paroxysmal vertigo     12/29/2010     Chronic obstructive pulmonary disease (H)     12/29/2010     Diarrhea     10/1/2015     Disorder of cartilage     Hip; Last dxa 06/2010     Diverticulosis of large intestine without perforation or abscess without bleeding     No Comments Provided     Lower abdominal pain     10/1/2015     Other disorders of lung (CODE)     Stable since July of 2002. RU lobe.     Personal history of other medical treatment (CODE)     L3, L4-4; Last MRI 7/09/12     Personal history of other medical treatment (CODE)     G-3, P-2, A-0  (Son had SIDS)     Zoster  without complications     3/31/2012       Social History     Social History     Marital status:      Spouse name: N/A     Number of children: N/A     Years of education: N/A     Occupational History     Not on file.     Social History Main Topics     Smoking status: Current Every Day Smoker     Packs/day: 1.00     Years: 50.00     Types: Cigarettes     Smokeless tobacco: Never Used     Alcohol use No     Drug use: No      Comment: Drug use: No     Sexual activity: No     Other Topics Concern     Not on file     Social History Narrative    Patient in second marriage. Has two living children, both live in [place], [place]. Has 2 grandchildren. Is retired, worked at [employer] in [place].      Patient currently smokes, 1 ppd x 50 yrs. Smokes less in the winter months due to not smoking in the house    .   Alcohol Use - no  - disabled.       Current Outpatient Prescriptions   Medication Sig Dispense Refill     acetaminophen (TYLENOL) 325 MG tablet Take 650 mg by mouth every 4 hours as needed Max acetaminophen dose 4000 mg in 24 hrs       albuterol (PROAIR HFA/PROVENTIL HFA/VENTOLIN HFA) 108 (90 Base) MCG/ACT inhaler Inhale 1-2 puffs into the lungs 4 times daily as needed for shortness of breath / dyspnea or wheezing 1 Inhaler 3     alendronate (FOSAMAX) 70 MG tablet Take 1 tablet (70 mg) by mouth with 8oz water every 7 days 30 minutes before breakfast and remain upright during this time. 2 tablet 0     ascorbic acid (VITAMIN C) 500 MG tablet Take 500 mg by mouth daily       azithromycin (ZITHROMAX) 250 MG tablet Two tablets first day, then one tablet daily for four days. 6 tablet 0     calcium carbonate-vitamin D (SM CALCIUM 500/VITAMIN D3) 500-400 MG-UNIT TABS per tablet Take 1 tablet by mouth daily       cyclobenzaprine (FLEXERIL) 10 MG tablet Take 1 tablet (10 mg) by mouth 3 times daily as needed for muscle spasms 30 tablet 0     fluticasone-salmeterol (ADVAIR DISKUS) 250-50 MCG/DOSE diskus inhaler Inhale 1  puff into the lungs 2 times daily 3 Inhaler 3     pantoprazole (PROTONIX) 40 MG EC tablet Take 1 tablet (40 mg) by mouth daily 90 tablet 3     valACYclovir (VALTREX) 1000 mg tablet Take 1 tablet (1,000 mg) by mouth 3 times daily 21 tablet 0     [DISCONTINUED] albuterol (PROAIR HFA/PROVENTIL HFA/VENTOLIN HFA) 108 (90 BASE) MCG/ACT Inhaler Inhale 1-2 puffs into the lungs 4 times daily as needed         Allergies   Allergen Reactions     Metronidazole Nausea and Vomiting     Pneumococcal Vaccine      Other reaction(s): Edema  Arm swelling     Tramadol Visual Disturbance       ROS:  Pertinent positives and negatives are noted in HPI.    EXAM:  General appearance: well appearing female, in no acute distress  Head: nontender maxillary and frontal sinuses  Ears: TM's with cone of light, effusion behind right TM, no erythema, canals clear bilaterally  Orophayrnx: moist mucous membranes, tonsils with mild erythema, no exudates or petechiae, no post nasal drip seen  Neck: supple without adenopathy  Respiratory: clear to auscultation bilaterally  Cardiac: RRR with pansystolic murmur  Psychological: normal affect, alert and pleasant  Xray: xray independently reviewed and no acute findings appreciated; pending radiology over-read      ASSESSMENT AND PLAN:    1. Lower respiratory infection (e.g., bronchitis, pneumonia, pneumonitis, pulmonitis)    2. Cough    3. Fever, unspecified fever cause    4. Chronic obstructive pulmonary disease, unspecified COPD type (H)    5. Tobacco abuse      Albuterol inhaler 1-2 puffs up to four times daily as needed for wheezing. Recommend sx management. Tx with azithromycin. Reviewed need to complete all antibiotics. Discussed typical course of illness, symptomatic treatment and when to return to clinic. Patient in agreement with plan and all questions were answered.        Avani Barrera..................9/24/2018 2:48 PM

## 2018-09-24 NOTE — MR AVS SNAPSHOT
"              After Visit Summary   9/24/2018    Loulou Lucero    MRN: [id number]           Patient Information     Date Of Birth          1945        Visit Information        Provider Department      9/24/2018 2:45 PM Avani Barrera APRN CNP [facility]        Today's Diagnoses     Lower respiratory infection (e.g., bronchitis, pneumonia, pneumonitis, pulmonitis)    -  1    Cough        Fever, unspecified fever cause        Chronic obstructive pulmonary disease, unspecified COPD type (H)        Tobacco abuse           Follow-ups after your visit        Future tests that were ordered for you today     Open Future Orders        Priority Expected Expires Ordered    XR Chest 2 Views Routine 9/24/2018 9/24/2019 9/24/2018            Who to contact     If you have questions or need follow up information about today's clinic visit or your schedule please contact [facility] directly at [phone].  Normal or non-critical lab and imaging results will be communicated to you by Tr[employer], letter or phone within 4 business days after the clinic has received the results. If you do not hear from us within 7 days, please contact the clinic through Tr[employer] or phone. If you have a critical or abnormal lab result, we will notify you by phone as soon as possible.  Submit refill requests through [employer] or call your pharmacy and they will forward the refill request to us. Please allow 3 business days for your refill to be completed.          Additional Information About Your Visit        Tr[employer] Information     Act[employer]agine lets you send messages to your doctor, view your test results, renew your prescriptions, schedule appointments and more. To sign up, go to [email or web address] . Click on \"Log in\" on the left side of the screen, which will take you to the Welcome page. Then click on \"Sign up Now\" on the right side of the page.     You will be asked to enter the access code listed " below, as well as some personal information. Please follow the directions to create your username and password.     Your access code is: [email or web address]  Expires: 2018  3:33 PM     Your access code will  in 90 days. If you need help or a new code, please call your [facility] clinic or [phone].        Care EveryWhere ID     This is your Care EveryWhere ID. This could be used by other organizations to access your [facility] medical records  [id number]        Your Vitals Were     Pulse Temperature Pulse Oximetry Breastfeeding? BMI (Body Mass Index)       93 99.4  F (37.4  C) (Tympanic) 95% No 19.73 kg/m2        Blood Pressure from Last 3 Encounters:   18 138/66   18 130/90   17 132/68    Weight from Last 3 Encounters:   18 102 lb 8 oz (46.5 kg)   18 102 lb 8 oz (46.5 kg)   17 100 lb 6.4 oz (45.5 kg)              We Performed the Following     CBC W PLT No Diff          Today's Medication Changes          These changes are accurate as of 18  3:34 PM.  If you have any questions, ask your nurse or doctor.               Start taking these medicines.        Dose/Directions    azithromycin 250 MG tablet   Commonly known as:  ZITHROMAX   Used for:  Lower respiratory infection (e.g., bronchitis, pneumonia, pneumonitis, pulmonitis)   Started by:  Avani Barrera APRN CNP        Two tablets first day, then one tablet daily for four days.   Quantity:  6 tablet   Refills:  0         These medicines have changed or have updated prescriptions.        Dose/Directions    albuterol 108 (90 Base) MCG/ACT inhaler   Commonly known as:  PROAIR HFA/PROVENTIL HFA/VENTOLIN HFA   This may have changed:  reasons to take this   Used for:  Lower respiratory infection (e.g., bronchitis, pneumonia, pneumonitis, pulmonitis)   Changed by:  Avani Barrera APRN CNP        Dose:  1-2 puff   Inhale 1-2 puffs into the lungs 4 times daily as needed for shortness of breath / dyspnea or wheezing    Quantity:  1 Inhaler   Refills:  3            Where to get your medicines      These medications were sent to [id number] - [place], [place] - [address]  [address], [place] [address]     Phone:  [phone]     albuterol 108 (90 Base) MCG/ACT inhaler    azithromycin 250 MG tablet                Primary Care Provider Office Phone # Fax #    Obi Johnston [phone] [address]  [facility] [address]        Equal Access to Services     [facility]: Hadii aad ku hadasho Soomaali, waaxda luqadaha, qaybta kaalmada adeegyada, fadia velasco . So [facility] [phone].    ATENCIÓN: Si habla español, tiene a taylor disposición servicios gratuitos de asistencia lingüística. [facility] [phone].    We comply with applicable federal civil rights laws and [place] laws. We do not discriminate on the basis of race, color, national origin, age, disability, sex, sexual orientation, or gender identity.            Thank you!     Thank you for choosing [facility]  for your care. Our goal is always to provide you with excellent care. Hearing back from our patients is one way we can continue to improve our services. Please take a few minutes to complete the written survey that you may receive in the mail after your visit with us. Thank you!             Your Updated Medication List - Protect others around you: Learn how to safely use, store and throw away your medicines at www.disposemymeds.org.          This list is accurate as of 9/24/18  3:34 PM.  Always use your most recent med list.                   Brand Name Dispense Instructions for use Diagnosis    acetaminophen 325 MG tablet    TYLENOL     Take 650 mg by mouth every 4 hours as needed Max acetaminophen dose 4000 mg in 24 hrs        albuterol 108 (90 Base) MCG/ACT inhaler    PROAIR HFA/PROVENTIL HFA/VENTOLIN HFA    1 Inhaler    Inhale 1-2 puffs into  the lungs 4 times daily as needed for shortness of breath / dyspnea or wheezing    Lower respiratory infection (e.g., bronchitis, pneumonia, pneumonitis, pulmonitis)       alendronate 70 MG tablet    FOSAMAX    2 tablet    Take 1 tablet (70 mg) by mouth with 8oz water every 7 days 30 minutes before breakfast and remain upright during this time.    Age-related osteoporosis without current pathological fracture       ascorbic acid 500 MG tablet    VITAMIN C     Take 500 mg by mouth daily        azithromycin 250 MG tablet    ZITHROMAX    6 tablet    Two tablets first day, then one tablet daily for four days.    Lower respiratory infection (e.g., bronchitis, pneumonia, pneumonitis, pulmonitis)       cyclobenzaprine 10 MG tablet    FLEXERIL    30 tablet    Take 1 tablet (10 mg) by mouth 3 times daily as needed for muscle spasms    Back muscle spasm       fluticasone-salmeterol 250-50 MCG/DOSE diskus inhaler    ADVAIR DISKUS    3 Inhaler    Inhale 1 puff into the lungs 2 times daily    Pulmonary emphysema, unspecified emphysema type (H)       pantoprazole 40 MG EC tablet    PROTONIX    90 tablet    Take 1 tablet (40 mg) by mouth daily    Gastroesophageal reflux disease without esophagitis       SM CALCIUM 500/VITAMIN D3 500-400 MG-UNIT Tabs per tablet   Generic drug:  calcium carbonate-vitamin D      Take 1 tablet by mouth daily        valACYclovir 1000 mg tablet    VALTREX    21 tablet    Take 1 tablet (1,000 mg) by mouth 3 times daily    History of shingles

## 2018-09-25 ASSESSMENT — PATIENT HEALTH QUESTIONNAIRE - PHQ9: SUM OF ALL RESPONSES TO PHQ QUESTIONS 1-9: 0

## 2018-09-30 ENCOUNTER — OFFICE VISIT (OUTPATIENT)
Dept: FAMILY MEDICINE | Facility: OTHER | Age: 73
End: 2018-09-30
Payer: MEDICARE

## 2018-09-30 ENCOUNTER — HOSPITAL ENCOUNTER (EMERGENCY)
Facility: OTHER | Age: 73
Discharge: HOME | End: 2018-09-30
Admitting: PHYSICIAN ASSISTANT
Payer: MEDICARE

## 2018-09-30 ENCOUNTER — APPOINTMENT (OUTPATIENT)
Dept: CT IMAGING | Facility: OTHER | Age: 73
End: 2018-09-30
Attending: PHYSICIAN ASSISTANT
Payer: MEDICARE

## 2018-09-30 VITALS
BODY MASS INDEX: 20.03 KG/M2 | DIASTOLIC BLOOD PRESSURE: 85 MMHG | OXYGEN SATURATION: 95 % | HEIGHT: 60 IN | RESPIRATION RATE: 16 BRPM | TEMPERATURE: 98 F | SYSTOLIC BLOOD PRESSURE: 159 MMHG | HEART RATE: 90 BPM | WEIGHT: 102 LBS

## 2018-09-30 VITALS
BODY MASS INDEX: 20.18 KG/M2 | WEIGHT: 102.8 LBS | HEART RATE: 91 BPM | SYSTOLIC BLOOD PRESSURE: 140 MMHG | OXYGEN SATURATION: 94 % | DIASTOLIC BLOOD PRESSURE: 72 MMHG | TEMPERATURE: 98 F | HEIGHT: 60 IN

## 2018-09-30 DIAGNOSIS — R07.1 PAINFUL RESPIRATION: ICD-10-CM

## 2018-09-30 DIAGNOSIS — J18.9 PNEUMONIA OF LEFT LOWER LOBE DUE TO INFECTIOUS ORGANISM: ICD-10-CM

## 2018-09-30 DIAGNOSIS — J44.1 COPD EXACERBATION (H): ICD-10-CM

## 2018-09-30 DIAGNOSIS — Z53.9 ERRONEOUS ENCOUNTER--DISREGARD: Primary | ICD-10-CM

## 2018-09-30 LAB
ANION GAP SERPL CALCULATED.3IONS-SCNC: 10 MMOL/L (ref 3–14)
BASOPHILS # BLD AUTO: 0.1 10E9/L (ref 0–0.2)
BASOPHILS NFR BLD AUTO: 0.7 %
BUN SERPL-MCNC: 8 MG/DL (ref 7–25)
CALCIUM SERPL-MCNC: 9.3 MG/DL (ref 8.6–10.3)
CHLORIDE SERPL-SCNC: 104 MMOL/L (ref 98–107)
CO2 SERPL-SCNC: 28 MMOL/L (ref 21–31)
CREAT SERPL-MCNC: 0.78 MG/DL (ref 0.6–1.2)
CRP SERPL-MCNC: 7.9 MG/L
DIFFERENTIAL METHOD BLD: ABNORMAL
EOSINOPHIL # BLD AUTO: 0.1 10E9/L (ref 0–0.7)
EOSINOPHIL NFR BLD AUTO: 1.1 %
ERYTHROCYTE [DISTWIDTH] IN BLOOD BY AUTOMATED COUNT: 12.8 % (ref 10–15)
GFR SERPL CREATININE-BSD FRML MDRD: 72 ML/MIN/1.7M2
GLUCOSE SERPL-MCNC: 90 MG/DL (ref 70–105)
HCT VFR BLD AUTO: 33.5 % (ref 35–47)
HGB BLD-MCNC: 11 G/DL (ref 11.7–15.7)
IMM GRANULOCYTES # BLD: 0 10E9/L (ref 0–0.4)
IMM GRANULOCYTES NFR BLD: 0.4 %
LYMPHOCYTES # BLD AUTO: 1.8 10E9/L (ref 0.8–5.3)
LYMPHOCYTES NFR BLD AUTO: 25.7 %
MCH RBC QN AUTO: 30.6 PG (ref 26.5–33)
MCHC RBC AUTO-ENTMCNC: 32.8 G/DL (ref 31.5–36.5)
MCV RBC AUTO: 93 FL (ref 78–100)
MONOCYTES # BLD AUTO: 0.6 10E9/L (ref 0–1.3)
MONOCYTES NFR BLD AUTO: 8.8 %
NEUTROPHILS # BLD AUTO: 4.4 10E9/L (ref 1.6–8.3)
NEUTROPHILS NFR BLD AUTO: 63.3 %
PLATELET # BLD AUTO: 293 10E9/L (ref 150–450)
POTASSIUM SERPL-SCNC: 4.1 MMOL/L (ref 3.5–5.1)
RBC # BLD AUTO: 3.6 10E12/L (ref 3.8–5.2)
SODIUM SERPL-SCNC: 142 MMOL/L (ref 134–144)
TROPONIN I SERPL-MCNC: <0.03 UG/L (ref 0–0.03)
WBC # BLD AUTO: 7 10E9/L (ref 4–11)

## 2018-09-30 PROCEDURE — 25000132 ZZH RX MED GY IP 250 OP 250 PS 637: Mod: GY | Performed by: PHYSICIAN ASSISTANT

## 2018-09-30 PROCEDURE — 25000125 ZZHC RX 250: Performed by: PHYSICIAN ASSISTANT

## 2018-09-30 PROCEDURE — 36415 COLL VENOUS BLD VENIPUNCTURE: CPT | Performed by: PHYSICIAN ASSISTANT

## 2018-09-30 PROCEDURE — 99284 EMERGENCY DEPT VISIT MOD MDM: CPT | Mod: Z6 | Performed by: PHYSICIAN ASSISTANT

## 2018-09-30 PROCEDURE — 25500064 ZZH RX 255 OP 636: Performed by: RADIOLOGY

## 2018-09-30 PROCEDURE — 80048 BASIC METABOLIC PNL TOTAL CA: CPT | Performed by: PHYSICIAN ASSISTANT

## 2018-09-30 PROCEDURE — A9270 NON-COVERED ITEM OR SERVICE: HCPCS | Mod: GY | Performed by: PHYSICIAN ASSISTANT

## 2018-09-30 PROCEDURE — 93010 ELECTROCARDIOGRAM REPORT: CPT | Performed by: INTERNAL MEDICINE

## 2018-09-30 PROCEDURE — 71260 CT THORAX DX C+: CPT

## 2018-09-30 PROCEDURE — 86140 C-REACTIVE PROTEIN: CPT | Performed by: PHYSICIAN ASSISTANT

## 2018-09-30 PROCEDURE — G0463 HOSPITAL OUTPT CLINIC VISIT: HCPCS

## 2018-09-30 PROCEDURE — 85025 COMPLETE CBC W/AUTO DIFF WBC: CPT | Performed by: PHYSICIAN ASSISTANT

## 2018-09-30 PROCEDURE — 84484 ASSAY OF TROPONIN QUANT: CPT | Performed by: PHYSICIAN ASSISTANT

## 2018-09-30 PROCEDURE — 99285 EMERGENCY DEPT VISIT HI MDM: CPT | Mod: 25 | Performed by: PHYSICIAN ASSISTANT

## 2018-09-30 RX ORDER — PREDNISONE 20 MG/1
40 TABLET ORAL ONCE
Status: COMPLETED | OUTPATIENT
Start: 2018-09-30 | End: 2018-09-30

## 2018-09-30 RX ORDER — LEVOFLOXACIN 500 MG/1
500 TABLET, FILM COATED ORAL DAILY
Qty: 10 TABLET | Refills: 0 | Status: SHIPPED | OUTPATIENT
Start: 2018-09-30 | End: 2018-10-10

## 2018-09-30 RX ORDER — LEVOFLOXACIN 750 MG/1
750 TABLET, FILM COATED ORAL ONCE
Status: COMPLETED | OUTPATIENT
Start: 2018-09-30 | End: 2018-09-30

## 2018-09-30 RX ORDER — PREDNISONE 20 MG/1
TABLET ORAL
Qty: 10 TABLET | Refills: 0 | Status: SHIPPED | OUTPATIENT
Start: 2018-09-30 | End: 2018-10-18

## 2018-09-30 RX ADMIN — IOHEXOL 100 ML: 350 INJECTION, SOLUTION INTRAVENOUS at 15:29

## 2018-09-30 RX ADMIN — LEVOFLOXACIN 750 MG: 750 TABLET, FILM COATED ORAL at 16:55

## 2018-09-30 RX ADMIN — PREDNISONE 40 MG: 20 TABLET ORAL at 16:55

## 2018-09-30 ASSESSMENT — PAIN SCALES - GENERAL: PAINLEVEL: MODERATE PAIN (5)

## 2018-09-30 NOTE — ED TRIAGE NOTES
Pt presents to ED from [facility] for c/o sharp left upper back/rib pain that increases with deep breaths. Per pt, was seen earlier this week at [facility] clinic for URI symptoms, was given a Z-pack, which was completed on Friday. Pt has had intermittent productive cough, has had decreased appetite. Pt states SOB with exertion, unable to take a deep breath without sharp pain. Has been waking at night with sweats and low-grade temps for last few days. Hx of COPD, lives next door to family and lives with disabled .   Penny Barfield

## 2018-09-30 NOTE — ED PROVIDER NOTES
History     Chief Complaint   Patient presents with     Shortness of Breath     Rib Pain     HPI  Loulou Lucero is a 73 year old female who is here from [facility] complaining of sharp left upper back pain worse with inspiration.  She has been short of breath as well.  She does have a history of COPD.  She was recently diagnosed with bronchitis and given Z-Alex which was completed on Friday.  She takes Advair daily.  She did not have a rescue inhaler until she was seen in [facility] and was given pro-air.  He smokes a pack a day rolls her own cigarettes.  No fever in the daytime she feels feverish at night has been having night sweats.  Been ongoing since the original illness started.  She has not had any lightheadedness no headache.  No sore throat.  I believe her cough is nonproductive.  No abdominal pain nausea vomiting diarrhea no dysuria no flank pain no pain numbness tingling weakness swelling to the extremities.  No rashes.    Problem List:    Patient Active Problem List    Diagnosis Date Noted     Age-related osteoporosis without current pathological fracture 06/26/2018     Priority: Medium     Diverticular disease of colon 01/23/2018     Priority: Medium     Tobacco abuse 01/23/2018     Priority: Medium     Overview:   Trying to quit       Systolic murmur 06/06/2017     Priority: Medium     Overview:   Aortic sclerosis with no significant aortic stenosis per echo June 2017       Advanced care planning/counseling discussion 04/07/2014     Priority: Medium     Overview:   Declined        Chronic obstructive pulmonary disease (H) 12/29/2010     Priority: Medium        Past Medical History:    Past Medical History:   Diagnosis Date     Asymptomatic varicose veins of lower extremity      Benign paroxysmal vertigo      Chronic obstructive pulmonary disease (H)      Diarrhea      Disorder of cartilage      Diverticulosis of large intestine without perforation or abscess without bleeding      Lower abdominal pain       Other disorders of lung (CODE)      Personal history of other medical treatment (CODE)      Personal history of other medical treatment (CODE)      Zoster without complications        Past Surgical History:    Past Surgical History:   Procedure Laterality Date     COLONOSCOPY      03/22/2010,Normal; + family hx, next due 2015     COLONOSCOPY      2015,recommend follow up in 2020.     ESOPHAGOSCOPY, GASTROSCOPY, DUODENOSCOPY (EGD), COMBINED      4/23/2014,Arce's esophagus fu egd 2 yrs     LAPAROSCOPIC TUBAL LIGATION      1978     OTHER SURGICAL HISTORY      1951,[id number],OTHER     OTHER SURGICAL HISTORY      10/1/2015,[id number],COLONOSCOPY W/ POLYPECTOMY       Family History:    Family History   Problem Relation Age of Onset     Colon Cancer Father      Cancer-colon     Other - See Comments Mother      Alzheimer's     Other - See Comments Maternal Grandmother      Alzheimer's     Other - See Comments Brother      Alzheimer's     Other - See Comments Brother      aneurysm and stents     Cancer Brother      Cancer,lung     Cancer Brother      Cancer,skin     Other - See Comments Brother      cirrhosis of the liver     Other - See Comments Maternal Uncle      3, Alzheimer's     Breast Cancer No family hx of      Cancer-breast       Social History:  Marital Status:   [2]  Social History   Substance Use Topics     Smoking status: Current Every Day Smoker     Packs/day: 1.00     Years: 50.00     Types: Cigarettes     Smokeless tobacco: Never Used     Alcohol use No        Medications:      acetaminophen (TYLENOL) 325 MG tablet   albuterol (PROAIR HFA/PROVENTIL HFA/VENTOLIN HFA) 108 (90 Base) MCG/ACT inhaler   alendronate (FOSAMAX) 70 MG tablet   ascorbic acid (VITAMIN C) 500 MG tablet   calcium carbonate-vitamin D (SM CALCIUM 500/VITAMIN D3) 500-400 MG-UNIT TABS per tablet   fluticasone-salmeterol (ADVAIR DISKUS) 250-50 MCG/DOSE diskus inhaler   pantoprazole (PROTONIX) 40 MG EC tablet   valACYclovir (VALTREX)  "1000 mg tablet         Review of Systems  I did do a complete 10 point review of systems. Pertinent positives and negatives listed per HPI. All other systems have been reviewed and are negative.      Physical Exam   BP: 140/72  Pulse: 90  Temp: 98  F (36.7  C)  Resp: 16  Height: 153 cm (5' 0.24\")  Weight: 46.3 kg (102 lb)  SpO2: 98 %      Physical Exam   Constitutional: She is oriented to person, place, and time. She appears well-developed and well-nourished. No distress.   HENT:   Head: Normocephalic and atraumatic.   Mouth/Throat: Oropharynx is clear and moist. No oropharyngeal exudate.   Eyes: Conjunctivae and EOM are normal. Pupils are equal, round, and reactive to light. No scleral icterus.   Neck: Neck supple.   Cardiovascular: Normal rate, regular rhythm and intact distal pulses.    Murmur heard.  She has a mild known murmur   Pulmonary/Chest: Effort normal and breath sounds normal. No stridor. No respiratory distress. She exhibits no tenderness.   She does have pain on inspiration you can tell when she has had certain point where it catches and gives her sharp pain it interrupts her respiration.   Abdominal: Soft. Bowel sounds are normal. There is no tenderness.   Musculoskeletal: She exhibits no edema or tenderness.   Neurological: She is alert and oriented to person, place, and time. No cranial nerve deficit.   Skin: Skin is warm and dry. No rash noted. She is not diaphoretic. No erythema.   Psychiatric: She has a normal mood and affect. Her behavior is normal. Judgment and thought content normal.   Nursing note and vitals reviewed.      ED Course   She presents here with pleuritic chest pain she was recently seen in clinic had a negative chest x-ray started on Z-Alex completed on Friday really has had no improvement now she has more severe pleuritic pain with some shortness of breath on exertion.  She been having some night sweats low-grade temperature at night.  History of COPD and emphysema smokes a pack " a day she also herself has no intention of quitting.  She has pro-air which she just received on her last visit [facility] does not use that she does take Advair twice daily.  My concern today is that she may have a PE we did obtain some laboratories did do a PE study.  CT PE study shows no PE but she does have a left lower lobe consolidation which was there may however notes a little more prevalent and a little multifocal looking like possible multifocal multifocal pneumonia.  It is unclear if this is chronic or this recurrent.  I had a discussion with her regarding this I think that we will treat with Levaquin but I would like her to have a follow-up chest CT through her primary care doctor after she is done with Levaquin feeling well will make sure that this area in the left lower lobe resolves because of does not present with concern for malignancy and she need further workup.  Both her daughter tell me understand this.  I did talk to her about different options for quitting smoking but ultimately does not seem like she is interested in quitting smoking.  He does not wish to have anything for pain.  She was given 750 of Levaquin p.o. here.  She was given 40 of prednisone here as well.  She will be discharged home on Levaquin 500 daily for 10 days.  She will be given 40 mg of prednisone daily for 4 more days.  Vital signs are stable laboratories are unremarkable.  At this time she is comfortable being discharged home  EKG unremarkable troponin is negative  Procedures                   Results for orders placed or performed during the hospital encounter of 09/30/18 (from the past 24 hour(s))   Basic metabolic panel   Result Value Ref Range    Sodium 142 134 - 144 mmol/L    Potassium 4.1 3.5 - 5.1 mmol/L    Chloride 104 98 - 107 mmol/L    Carbon Dioxide 28 21 - 31 mmol/L    Anion Gap 10 3 - 14 mmol/L    Glucose 90 70 - 105 mg/dL    Urea Nitrogen 8 7 - 25 mg/dL    Creatinine 0.78 0.60 - 1.20 mg/dL    GFR Estimate 72  >60 mL/min/1.7m2    GFR Estimate If Black 88 >60 mL/min/1.7m2    Calcium 9.3 8.6 - 10.3 mg/dL   Troponin I (now)   Result Value Ref Range    Troponin I ES <0.030 0.000 - 0.034 ug/L   CBC with platelets differential   Result Value Ref Range    WBC 7.0 4.0 - 11.0 10e9/L    RBC Count 3.60 (L) 3.8 - 5.2 10e12/L    Hemoglobin 11.0 (L) 11.7 - 15.7 g/dL    Hematocrit 33.5 (L) 35.0 - 47.0 %    MCV 93 78 - 100 fl    MCH 30.6 26.5 - 33.0 pg    MCHC 32.8 31.5 - 36.5 g/dL    RDW 12.8 10.0 - 15.0 %    Platelet Count 293 150 - 450 10e9/L    Diff Method Automated Method     % Neutrophils 63.3 %    % Lymphocytes 25.7 %    % Monocytes 8.8 %    % Eosinophils 1.1 %    % Basophils 0.7 %    % Immature Granulocytes 0.4 %    Absolute Neutrophil 4.4 1.6 - 8.3 10e9/L    Absolute Lymphocytes 1.8 0.8 - 5.3 10e9/L    Absolute Monocytes 0.6 0.0 - 1.3 10e9/L    Absolute Eosinophils 0.1 0.0 - 0.7 10e9/L    Absolute Basophils 0.1 0.0 - 0.2 10e9/L    Abs Immature Granulocytes 0.0 0 - 0.4 10e9/L   CRP inflammation   Result Value Ref Range    CRP Inflammation 7.9 (H) <0.5 mg/L   CT Chest Pulmonary Embolism w Contrast    Narrative    CT CHEST PULMONARY EMBOLISM W CONTRAST  9/30/2018 3:39 PM    CLINICAL HISTORY: Female, age 73 years,  pleuritic cp and dyspnea; ;    Comparison:  CT scan of the chest 5/28/2017    TECHNIQUE:  CT was performed of the chest  with IV contrast.   Sagittal, coronal and axial reconstructions were reviewed.     FINDINGS:  Chest CT:   Lungs : Emphysematous changes are similar in appearance. A  consolidative process seen previously within the left lower lobe  appears more diffuse and multifocal when compared to prior study.  Nodularity throughout both lungs does not appear to be significantly  changed.  Thyroid: The visualized portions are normal.  Heart and Great Vessels:  Good quality examination demonstrates no  evidence of pulmonary embolus. The central pulmonary arterial  structures are prominent in caliber suggesting  pulmonary arterial  hypertension. Scattered calcifications are again seen throughout the  aortic arch.  Lymph Nodes:  Prominent hilar and subcarinal lymph nodes are similar  in appearance.  Pleura: Nodular pleural thickening, most evident posteriorly in the  right upper lobe and in the apex of the left hemithorax.  Bony Structures:  No acute abnormality. Mild degenerative changes are  again seen throughout the thoracic spine.  Esophagus/stomach: Normal.    Visualized portions of the upper abdomen are unremarkable.      Impression    IMPRESSION:  1.   Good quality examination demonstrates no evidence of acute  vascular abnormality.    2.  Recurrent versus chronic pneumonia in the left lower lobe now  appears more diffuse and multifocal when compared to the previous  study dated 5/28/2017.    3.  Chronic changes including emphysema, pleural thickening and  nodularity of both the parenchyma of the lungs and pleural are similar  in appearance. Prominent mediastinal lymph nodes and hilar nodes are  also unchanged.    DEE LEIGH MD       Medications   levofloxacin (LEVAQUIN) tablet 750 mg (not administered)   predniSONE (DELTASONE) tablet 40 mg (not administered)   iohexol (OMNIPAQUE) 350 mg/mL solution 100 mL (100 mLs Intravenous Given 9/30/18 6938)       Assessments & Plan (with Medical Decision Making)     I have reviewed the nursing notes.    I have reviewed the findings, diagnosis, plan and need for follow up with the patient.      New Prescriptions    No medications on file       Final diagnoses:   Pneumonia of left lower lobe due to infectious organism (H)   Painful respiration   COPD exacerbation (H)       9/30/2018   [facility] [place] [place]  09/30/18 4919

## 2018-09-30 NOTE — ED AVS SNAPSHOT
[facility]    [address]    [place] [address]    Phone:  [phone]    Fax:  [phone]                                       Loulou Lucero   MRN: [id number]    Department:  [facility]   Date of Visit:  9/30/2018           After Visit Summary Signature Page     I have received my discharge instructions, and my questions have been answered. I have discussed any challenges I see with this plan with the nurse or doctor.    ..........................................................................................................................................  Patient/Patient Representative Signature      ..........................................................................................................................................  Patient Representative Print Name and Relationship to Patient    ..................................................               ................................................  Date                                   Time    ..........................................................................................................................................  Reviewed by Signature/Title    ...................................................              ..............................................  Date                                               Time          22EPIC Rev 08/18

## 2018-09-30 NOTE — ED AVS SNAPSHOT
[facility]    [address]    [place] [address]    Phone:  [phone]    Fax:  [phone]                                       Loulou Lucero   MRN: [id number]    Department:  [facility]   Date of Visit:  9/30/2018           Patient Information     Date Of Birth          1945        Your diagnoses for this visit were:     Pneumonia of left lower lobe due to infectious organism (H)     Painful respiration     COPD exacerbation (H)       Follow-up Information     Follow up with Obi Johnston MD.    Specialty:  Family Practice    Contact information:    [address]  [place] [address] [phone]          Discharge Instructions       Levaquin as directed.  Continue your Advair as directed.  Use the pro-air 2 puffs every 4-6 hours as needed.  Prednisone as directed.  Follow-up with your primary care doctor after the antibiotics are finished and you are feeling well for a repeat CT to make sure that this area in the lung has resolved.  Return here for concerns no worsening symptoms.    24 Hour Appointment Hotline     To schedule an appointment at [facility], please call [phone]. If you don't have a family doctor or clinic, we will help you find one. [facility] clinics are conveniently located to serve the needs of you and your family.           Review of your medicines      Our records show that you are taking the medicines listed below. If these are incorrect, please call your family doctor or clinic.        Dose / Directions Last dose taken    acetaminophen 325 MG tablet   Commonly known as:  TYLENOL   Dose:  650 mg        Take 650 mg by mouth every 4 hours as needed Max acetaminophen dose 4000 mg in 24 hrs   Refills:  0        albuterol 108 (90 Base) MCG/ACT inhaler   Commonly known as:  PROAIR HFA/PROVENTIL HFA/VENTOLIN HFA   Dose:  1-2 puff   Quantity:  1 Inhaler        Inhale 1-2 puffs into the lungs 4 times daily as needed for  shortness of breath / dyspnea or wheezing   Refills:  3        alendronate 70 MG tablet   Commonly known as:  FOSAMAX   Quantity:  2 tablet        Take 1 tablet (70 mg) by mouth with 8oz water every 7 days 30 minutes before breakfast and remain upright during this time.   Refills:  0        ascorbic acid 500 MG tablet   Commonly known as:  VITAMIN C   Dose:  500 mg        Take 500 mg by mouth daily   Refills:  0        fluticasone-salmeterol 250-50 MCG/DOSE diskus inhaler   Commonly known as:  ADVAIR DISKUS   Dose:  1 puff   Quantity:  3 Inhaler        Inhale 1 puff into the lungs 2 times daily   Refills:  3        pantoprazole 40 MG EC tablet   Commonly known as:  PROTONIX   Dose:  40 mg   Quantity:  90 tablet        Take 1 tablet (40 mg) by mouth daily   Refills:  3        SM CALCIUM 500/VITAMIN D3 500-400 MG-UNIT Tabs per tablet   Dose:  1 tablet   Generic drug:  calcium carbonate-vitamin D        Take 1 tablet by mouth daily   Refills:  0        valACYclovir 1000 mg tablet   Commonly known as:  VALTREX   Dose:  1000 mg   Quantity:  21 tablet        Take 1 tablet (1,000 mg) by mouth 3 times daily   Refills:  0                Procedures and tests performed during your visit     Basic metabolic panel    CBC with platelets differential    CRP inflammation    CT Chest Pulmonary Embolism w Contrast    EKG 12 lead    Troponin I (now)      Orders Needing Specimen Collection     None      Pending Results     No orders found from 9/28/2018 to 10/1/2018.            Pending Culture Results     No orders found from 9/28/2018 to 10/1/2018.            Pending Results Instructions     If you had any lab results that were not finalized at the time of your Discharge, you can call the ED Lab Result RN at [phone]. You will be contacted by this team for any positive Lab results or changes in treatment. The nurses are available 7 days a week from 10A to 6:30P.  You can leave a message 24 hours per day and they will return your  call.        Thank you for choosing [facility]       Thank you for choosing [facility] for your care. Our goal is always to provide you with excellent care. Hearing back from our patients is one way we can continue to improve our services. Please take a few minutes to complete the written survey that you may receive in the mail after you visit with us. Thank you!        Care EveryWhere ID     This is your Care EveryWhere ID. This could be used by other organizations to access your [facility] medical records  [id number]        Equal Access to Services     JOCELYNN HERRERA : Devan rochao Sonicole, waaxda luqadaha, qaybta kaalmada adeyady, fadia velasco . So [facility] [phone].    ATENCIÓN: Si habla español, tiene a taylor disposición servicios gratuitos de asistencia lingüística. [facility] [phone].    We comply with applicable federal civil rights laws and [place] laws. We do not discriminate on the basis of race, color, national origin, age, disability, sex, sexual orientation, or gender identity.            After Visit Summary       This is your record. Keep this with you and show to your community pharmacist(s) and doctor(s) at your next visit.

## 2018-09-30 NOTE — NURSING NOTE
After speaking with Gay Mckeon MD brought patient to ED via w/mely. Cheryl Curran LPN .............9/30/2018  12:57 PM

## 2018-09-30 NOTE — DISCHARGE INSTRUCTIONS
Levaquin as directed.  Continue your Advair as directed.  Use the pro-air 2 puffs every 4-6 hours as needed.  Prednisone as directed.  Follow-up with your primary care doctor after the antibiotics are finished and you are feeling well for a repeat CT to make sure that this area in the lung has resolved.  Return here for concerns no worsening symptoms.

## 2018-09-30 NOTE — MR AVS SNAPSHOT
"              After Visit Summary   9/30/2018    Loulou Lucero    MRN: [id number]           Patient Information     Date Of Birth          1945        Visit Information        Provider Department      9/30/2018 12:30 PM Gay Mckeon MD [facility]        Today's Diagnoses     ERRONEOUS ENCOUNTER--DISREGARD    -  1       Follow-ups after your visit        Who to contact     If you have questions or need follow up information about today's clinic visit or your schedule please contact [facility] directly at [phone].  Normal or non-critical lab and imaging results will be communicated to you by MyChart, letter or phone within 4 business days after the clinic has received the results. If you do not hear from us within 7 days, please contact the clinic through MyChart or phone. If you have a critical or abnormal lab result, we will notify you by phone as soon as possible.  Submit refill requests through [employer] or call your pharmacy and they will forward the refill request to us. Please allow 3 business days for your refill to be completed.          Additional Information About Your Visit        Care EveryWhere ID     This is your Care EveryWhere ID. This could be used by other organizations to access your [facility] medical records  [id number]        Your Vitals Were     Pulse Temperature Height Pulse Oximetry Breastfeeding? BMI (Body Mass Index)    91 98  F (36.7  C) (Tympanic) 5' 0.24\" (1.53 m) 94% No 19.92 kg/m2       Blood Pressure from Last 3 Encounters:   09/30/18 140/72   09/30/18 140/72   09/24/18 138/66    Weight from Last 3 Encounters:   09/30/18 102 lb (46.3 kg)   09/30/18 102 lb 12.8 oz (46.6 kg)   09/24/18 102 lb 8 oz (46.5 kg)              Today, you had the following     No orders found for display       Primary Care Provider Office Phone # Fax #    Obi Johnston [phone] [address]   [facility] [facility] [address]      "   Equal Access to Services     [facility]: Hadii wilbert alberto jay jay Rader, wastephenda luqadaha, qaybta kahiltonfadia velez. So [facility] [phone].    ATENCIÓN: Si habla español, tiene a taylor disposición servicios gratuitos de asistencia lingüística. Ankitame al [phone].    We comply with applicable federal civil rights laws and [place] laws. We do not discriminate on the basis of race, color, national origin, age, disability, sex, sexual orientation, or gender identity.            Thank you!     Thank you for choosing [facility]  for your care. Our goal is always to provide you with excellent care. Hearing back from our patients is one way we can continue to improve our services. Please take a few minutes to complete the written survey that you may receive in the mail after your visit with us. Thank you!             Your Updated Medication List - Protect others around you: Learn how to safely use, store and throw away your medicines at www.disposemymeds.org.          This list is accurate as of 9/30/18  1:30 PM.  Always use your most recent med list.                   Brand Name Dispense Instructions for use Diagnosis    acetaminophen 325 MG tablet    TYLENOL     Take 650 mg by mouth every 4 hours as needed Max acetaminophen dose 4000 mg in 24 hrs        albuterol 108 (90 Base) MCG/ACT inhaler    PROAIR HFA/PROVENTIL HFA/VENTOLIN HFA    1 Inhaler    Inhale 1-2 puffs into the lungs 4 times daily as needed for shortness of breath / dyspnea or wheezing    Lower respiratory infection (e.g., bronchitis, pneumonia, pneumonitis, pulmonitis)       alendronate 70 MG tablet    FOSAMAX    2 tablet    Take 1 tablet (70 mg) by mouth with 8oz water every 7 days 30 minutes before breakfast and remain upright during this time.    Age-related osteoporosis without current pathological fracture       ascorbic acid 500 MG tablet    VITAMIN C     Take 500 mg by mouth daily         fluticasone-salmeterol 250-50 MCG/DOSE diskus inhaler    ADVAIR DISKUS    3 Inhaler    Inhale 1 puff into the lungs 2 times daily    Pulmonary emphysema, unspecified emphysema type (H)       pantoprazole 40 MG EC tablet    PROTONIX    90 tablet    Take 1 tablet (40 mg) by mouth daily    Gastroesophageal reflux disease without esophagitis       SM CALCIUM 500/VITAMIN D3 500-400 MG-UNIT Tabs per tablet   Generic drug:  calcium carbonate-vitamin D      Take 1 tablet by mouth daily        valACYclovir 1000 mg tablet    VALTREX    21 tablet    Take 1 tablet (1,000 mg) by mouth 3 times daily    History of shingles

## 2018-09-30 NOTE — NURSING NOTE
Patient presents with pain in back with inhalation. Patient was given antibiotic on the 24th. Patient is not getting better. Patient has had pleurisy in the past. Patient states if she holds breath she does not have the pain.  Medication Reconciliation: complete    Cheryl Curran LPN  ..................9/30/2018   12:39 PM

## 2018-10-18 ENCOUNTER — OFFICE VISIT (OUTPATIENT)
Dept: FAMILY MEDICINE | Facility: OTHER | Age: 73
End: 2018-10-18
Attending: FAMILY MEDICINE
Payer: MEDICARE

## 2018-10-18 VITALS
BODY MASS INDEX: 19.53 KG/M2 | DIASTOLIC BLOOD PRESSURE: 72 MMHG | HEART RATE: 88 BPM | OXYGEN SATURATION: 97 % | WEIGHT: 100.8 LBS | SYSTOLIC BLOOD PRESSURE: 140 MMHG

## 2018-10-18 DIAGNOSIS — Z72.0 TOBACCO ABUSE: ICD-10-CM

## 2018-10-18 DIAGNOSIS — Z23 NEED FOR INFLUENZA VACCINATION: ICD-10-CM

## 2018-10-18 DIAGNOSIS — R91.8 LEFT LOWER LOBE PULMONARY INFILTRATE: Primary | ICD-10-CM

## 2018-10-18 PROCEDURE — G0008 ADMIN INFLUENZA VIRUS VAC: HCPCS

## 2018-10-18 PROCEDURE — G0463 HOSPITAL OUTPT CLINIC VISIT: HCPCS | Mod: 25

## 2018-10-18 PROCEDURE — 99213 OFFICE O/P EST LOW 20 MIN: CPT | Performed by: FAMILY MEDICINE

## 2018-10-18 PROCEDURE — G0463 HOSPITAL OUTPT CLINIC VISIT: HCPCS

## 2018-10-18 PROCEDURE — 90662 IIV NO PRSV INCREASED AG IM: CPT | Performed by: FAMILY MEDICINE

## 2018-10-18 ASSESSMENT — PAIN SCALES - GENERAL: PAINLEVEL: NO PAIN (0)

## 2018-10-18 NOTE — PROGRESS NOTES
SUBJECTIVE:  Loulou Lucero is a 73 year old female here for follow-up.  She was diagnosed with left lower lobe pneumonia in September after CT scan showed a left lower lobe infiltrate.  She completed antibiotics and is scheduled to repeat a CT scan to make sure that this has resolved.  She does have a history of smoking.  She continues to use Advair daily.  She overall feels that she has improved significantly and is essentially back to normal.    Allergies:  Allergies   Allergen Reactions     Metronidazole Nausea and Vomiting     Pneumococcal Vaccine      Other reaction(s): Edema  Arm swelling     Tramadol Visual Disturbance       ROS:    As above otherwise ROS is unremarkable.    OBJECTIVE:  /72  Pulse 88  Wt 100 lb 12.8 oz (45.7 kg)  SpO2 97%  BMI 19.53 kg/m2    EXAM:  General Appearance: Pleasant, alert, appropriate appearance for age. No acute distress  Lungs: Normal chest wall and respirations. Clear to auscultation, no wheezes or crackles.    ASSESSEMENT AND PLAN:    1. Left lower lobe pulmonary infiltrate    2. Need for influenza vaccination    3. Tobacco abuse      Clinically she seems to have improved.  We will have her repeat a CT scan at the end of November to make sure that her left lower lobe infiltrate has resolved.    Flu shot was given today.    Alvaro Johnston MD    This document was prepared using voice generated software.  While every attempt was made for accuracy, grammatical errors may exist.

## 2018-10-18 NOTE — MR AVS SNAPSHOT
After Visit Summary   10/18/2018    Loulou Lucero    MRN: [id number]           Patient Information     Date Of Birth          1945        Visit Information        Provider Department      10/18/2018 10:30 AM Obi Johnston MD [facility]        Today's Diagnoses     Left lower lobe pulmonary infiltrate    -  1    Need for influenza vaccination        Tobacco abuse           Follow-ups after your visit        Your next 10 appointments already scheduled     Nov 30, 2018 12:30 PM CST   (Arrive by 12:15 PM)   CT CHEST W/O CONTRAST with GHCT1   [facility] ([facility])    [address]  [place] [address]   [phone]           How do I prepare for my exam? (Food and drink instructions) No Food and Drink Restrictions.  How do I prepare for my exam? (Other instructions) You do not need to do anything special to prepare for this exam. For a sinus scan: Use your nose spray (nasal decongestant spray) as directed.  What should I wear: Please wear loose clothing, such as a sweat suit or jogging clothes. Avoid snaps, zippers and other metal. We may ask you to undress and put on a hospital gown.  How long does the exam take: Most scans take less than 20 minutes.  What should I bring: Please bring any scans or X-rays taken at other hospitals, if similar tests were done. Also bring a list of your medicines, including vitamins, minerals and over-the-counter drugs. It is safest to leave personal items at home.  Do I need a : No  is needed.  What do I need to tell my doctor? Be sure to tell your doctor: * If you have any allergies. * If there s any chance you are pregnant. * If you are breastfeeding.  What should I do after the exam: No restrictions, You may resume normal activities.  What is this test: A CT (computed tomography) scan is a series of pictures that allows us to look inside your body. The scanner creates images of the  body in cross sections, much like slices of bread. This helps us see any problems more clearly.  Who should I call with questions: If you have any questions, please call the Imaging Department where you will have your exam. Directions, parking instructions, and other information is available on our website, [email or web address].              Future tests that were ordered for you today     Open Future Orders        Priority Expected Expires Ordered    CT Chest w/o Contrast Routine  10/18/2019 10/18/2018            Who to contact     If you have questions or need follow up information about today's clinic visit or your schedule please contact [facility] directly at [phone].  Normal or non-critical lab and imaging results will be communicated to you by MyChart, letter or phone within 4 business days after the clinic has received the results. If you do not hear from us within 7 days, please contact the clinic through MyChart or phone. If you have a critical or abnormal lab result, we will notify you by phone as soon as possible.  Submit refill requests through [employer] or call your pharmacy and they will forward the refill request to us. Please allow 3 business days for your refill to be completed.          Additional Information About Your Visit        Care EveryWhere ID     This is your Care EveryWhere ID. This could be used by other organizations to access your [facility] medical records  [id number]        Your Vitals Were     Pulse Pulse Oximetry BMI (Body Mass Index)             88 97% 19.53 kg/m2          Blood Pressure from Last 3 Encounters:   10/18/18 140/72   09/30/18 159/85   09/30/18 140/72    Weight from Last 3 Encounters:   10/18/18 100 lb 12.8 oz (45.7 kg)   09/30/18 102 lb (46.3 kg)   09/30/18 102 lb 12.8 oz (46.6 kg)              We Performed the Following     HC FLU VACCINE, INCREASED ANTIGEN, PRESV FREE        Primary Care Provider Office Phone # Fax #    Obi Johnston MD  [phone]       [address]   [place] [address]        Equal Access to Services     JOCELYNN HERRERA : Hadii aad ku hadbarron Rader, sandra juliajb, haylee valle, fadia carlosin hayaafatimah giordanoanastasiia rios laKiracatie loredo. So [facility] [phone].    ATENCIÓN: Si habla español, tiene a taylor disposición servicios gratuitos de asistencia lingüística. Llame al [phone].    We comply with applicable federal civil rights laws and [place] laws. We do not discriminate on the basis of race, color, national origin, age, disability, sex, sexual orientation, or gender identity.            Thank you!     Thank you for choosing [facility]  for your care. Our goal is always to provide you with excellent care. Hearing back from our patients is one way we can continue to improve our services. Please take a few minutes to complete the written survey that you may receive in the mail after your visit with us. Thank you!             Your Updated Medication List - Protect others around you: Learn how to safely use, store and throw away your medicines at www.disposemymeds.org.          This list is accurate as of 10/18/18 11:09 AM.  Always use your most recent med list.                   Brand Name Dispense Instructions for use Diagnosis    acetaminophen 325 MG tablet    TYLENOL     Take 650 mg by mouth every 4 hours as needed Max acetaminophen dose 4000 mg in 24 hrs        albuterol 108 (90 Base) MCG/ACT inhaler    PROAIR HFA/PROVENTIL HFA/VENTOLIN HFA    1 Inhaler    Inhale 1-2 puffs into the lungs 4 times daily as needed for shortness of breath / dyspnea or wheezing    Lower respiratory infection (e.g., bronchitis, pneumonia, pneumonitis, pulmonitis)       alendronate 70 MG tablet    FOSAMAX    2 tablet    Take 1 tablet (70 mg) by mouth with 8oz water every 7 days 30 minutes before breakfast and remain upright during this time.    Age-related osteoporosis without current pathological fracture        ascorbic acid 500 MG tablet    VITAMIN C     Take 500 mg by mouth daily        fluticasone-salmeterol 250-50 MCG/DOSE diskus inhaler    ADVAIR DISKUS    3 Inhaler    Inhale 1 puff into the lungs 2 times daily    Pulmonary emphysema, unspecified emphysema type (H)       pantoprazole 40 MG EC tablet    PROTONIX    90 tablet    Take 1 tablet (40 mg) by mouth daily    Gastroesophageal reflux disease without esophagitis       SM CALCIUM 500/VITAMIN D3 500-400 MG-UNIT Tabs per tablet   Generic drug:  calcium carbonate-vitamin D      Take 1 tablet by mouth daily

## 2018-10-18 NOTE — NURSING NOTE
Patient presents today for ER follow up for pneumonia.     Mehnaz Ledesma LPN on 10/18/2018 at 10:26 AM

## 2018-10-19 ASSESSMENT — PATIENT HEALTH QUESTIONNAIRE - PHQ9: SUM OF ALL RESPONSES TO PHQ QUESTIONS 1-9: 0

## 2018-11-30 ENCOUNTER — HOSPITAL ENCOUNTER (OUTPATIENT)
Dept: CT IMAGING | Facility: OTHER | Age: 73
Discharge: HOME | End: 2018-11-30
Attending: FAMILY MEDICINE | Admitting: FAMILY MEDICINE
Payer: MEDICARE

## 2018-11-30 DIAGNOSIS — R91.8 LEFT LOWER LOBE PULMONARY INFILTRATE: ICD-10-CM

## 2018-11-30 PROCEDURE — 71250 CT THORAX DX C-: CPT

## 2018-12-03 ENCOUNTER — TELEPHONE (OUTPATIENT)
Dept: FAMILY MEDICINE | Facility: OTHER | Age: 73
End: 2018-12-03

## 2019-04-11 DIAGNOSIS — J43.9 PULMONARY EMPHYSEMA, UNSPECIFIED EMPHYSEMA TYPE (H): ICD-10-CM

## 2019-04-11 NOTE — TELEPHONE ENCOUNTER
Writer contacted patient who reports that she would like a refill of Advair sent to [facility] pharmacy. She reports that she opened her last inhaler today so refill needs to be cared for promptly. Writer advised patient that he would care for Rx request at this time. Chart review shows that patient was last seen by PCP on 10/18/18. Use of Rx as requested is noted in office visit notes on that date with no changes and writer does not note a specific follow up timeline as well. Writer will refill Rx as requested for a 6 month supply.    Prescription refilled per RN Medication Refill Policy..................Titi Gilmore 4/11/2019 12:06 PM

## 2019-04-25 ENCOUNTER — APPOINTMENT (OUTPATIENT)
Dept: CT IMAGING | Facility: OTHER | Age: 74
End: 2019-04-25
Attending: PHYSICIAN ASSISTANT
Payer: MEDICARE

## 2019-04-25 ENCOUNTER — TELEPHONE (OUTPATIENT)
Dept: FAMILY MEDICINE | Facility: OTHER | Age: 74
End: 2019-04-25

## 2019-04-25 ENCOUNTER — HOSPITAL ENCOUNTER (EMERGENCY)
Facility: OTHER | Age: 74
Discharge: HOME | End: 2019-04-25
Attending: PHYSICIAN ASSISTANT | Admitting: PHYSICIAN ASSISTANT
Payer: MEDICARE

## 2019-04-25 VITALS
SYSTOLIC BLOOD PRESSURE: 164 MMHG | WEIGHT: 99 LBS | BODY MASS INDEX: 19.96 KG/M2 | RESPIRATION RATE: 16 BRPM | HEIGHT: 59 IN | OXYGEN SATURATION: 97 % | HEART RATE: 89 BPM | TEMPERATURE: 98.1 F | DIASTOLIC BLOOD PRESSURE: 80 MMHG

## 2019-04-25 DIAGNOSIS — K57.32 DIVERTICULITIS OF SIGMOID COLON: ICD-10-CM

## 2019-04-25 DIAGNOSIS — R11.0 NAUSEA: ICD-10-CM

## 2019-04-25 DIAGNOSIS — R10.31 BILATERAL LOWER ABDOMINAL PAIN: ICD-10-CM

## 2019-04-25 DIAGNOSIS — R10.32 BILATERAL LOWER ABDOMINAL PAIN: ICD-10-CM

## 2019-04-25 LAB
ALBUMIN SERPL-MCNC: 4 G/DL (ref 3.5–5.7)
ALBUMIN UR-MCNC: NEGATIVE MG/DL
ALP SERPL-CCNC: 48 U/L (ref 34–104)
ALT SERPL W P-5'-P-CCNC: 6 U/L (ref 7–52)
ANION GAP SERPL CALCULATED.3IONS-SCNC: 7 MMOL/L (ref 3–14)
APPEARANCE UR: CLEAR
AST SERPL W P-5'-P-CCNC: 14 U/L (ref 13–39)
BASOPHILS # BLD AUTO: 0 10E9/L (ref 0–0.2)
BASOPHILS NFR BLD AUTO: 0.8 %
BILIRUB SERPL-MCNC: 0.5 MG/DL (ref 0.3–1)
BILIRUB UR QL STRIP: NEGATIVE
BUN SERPL-MCNC: 11 MG/DL (ref 7–25)
CALCIUM SERPL-MCNC: 9.7 MG/DL (ref 8.6–10.3)
CHLORIDE SERPL-SCNC: 105 MMOL/L (ref 98–107)
CO2 SERPL-SCNC: 30 MMOL/L (ref 21–31)
COLOR UR AUTO: YELLOW
CREAT SERPL-MCNC: 0.88 MG/DL (ref 0.6–1.2)
DIFFERENTIAL METHOD BLD: NORMAL
EOSINOPHIL # BLD AUTO: 0.1 10E9/L (ref 0–0.7)
EOSINOPHIL NFR BLD AUTO: 2.7 %
ERYTHROCYTE [DISTWIDTH] IN BLOOD BY AUTOMATED COUNT: 12.6 % (ref 10–15)
GFR SERPL CREATININE-BSD FRML MDRD: 63 ML/MIN/{1.73_M2}
GLUCOSE SERPL-MCNC: 97 MG/DL (ref 70–105)
GLUCOSE UR STRIP-MCNC: NEGATIVE MG/DL
HCT VFR BLD AUTO: 39.1 % (ref 35–47)
HGB BLD-MCNC: 12.9 G/DL (ref 11.7–15.7)
HGB UR QL STRIP: ABNORMAL
IMM GRANULOCYTES # BLD: 0 10E9/L (ref 0–0.4)
IMM GRANULOCYTES NFR BLD: 0.4 %
KETONES UR STRIP-MCNC: NEGATIVE MG/DL
LACTATE SERPL-SCNC: 0.6 MMOL/L (ref 0.5–2.2)
LEUKOCYTE ESTERASE UR QL STRIP: NEGATIVE
LYMPHOCYTES # BLD AUTO: 1.4 10E9/L (ref 0.8–5.3)
LYMPHOCYTES NFR BLD AUTO: 27.1 %
MCH RBC QN AUTO: 31.3 PG (ref 26.5–33)
MCHC RBC AUTO-ENTMCNC: 33 G/DL (ref 31.5–36.5)
MCV RBC AUTO: 95 FL (ref 78–100)
MONOCYTES # BLD AUTO: 0.4 10E9/L (ref 0–1.3)
MONOCYTES NFR BLD AUTO: 6.8 %
NEUTROPHILS # BLD AUTO: 3.2 10E9/L (ref 1.6–8.3)
NEUTROPHILS NFR BLD AUTO: 62.2 %
NITRATE UR QL: NEGATIVE
PH UR STRIP: 6.5 PH (ref 5–9)
PLATELET # BLD AUTO: 241 10E9/L (ref 150–450)
POTASSIUM SERPL-SCNC: 3.9 MMOL/L (ref 3.5–5.1)
PROT SERPL-MCNC: 7.7 G/DL (ref 6.4–8.9)
RBC # BLD AUTO: 4.12 10E12/L (ref 3.8–5.2)
RBC #/AREA URNS AUTO: NORMAL /HPF
SODIUM SERPL-SCNC: 142 MMOL/L (ref 134–144)
SOURCE: ABNORMAL
SP GR UR STRIP: 1.01 (ref 1–1.03)
TROPONIN I SERPL-MCNC: <0.03 UG/L (ref 0–0.03)
UROBILINOGEN UR STRIP-ACNC: 0.2 EU/DL (ref 0.2–1)
WBC # BLD AUTO: 5.1 10E9/L (ref 4–11)
WBC #/AREA URNS AUTO: NORMAL /HPF

## 2019-04-25 PROCEDURE — 36415 COLL VENOUS BLD VENIPUNCTURE: CPT | Mod: 91 | Performed by: PHYSICIAN ASSISTANT

## 2019-04-25 PROCEDURE — 99285 EMERGENCY DEPT VISIT HI MDM: CPT | Mod: 25 | Performed by: PHYSICIAN ASSISTANT

## 2019-04-25 PROCEDURE — 25500064 ZZH RX 255 OP 636: Performed by: PHYSICIAN ASSISTANT

## 2019-04-25 PROCEDURE — 96374 THER/PROPH/DIAG INJ IV PUSH: CPT | Mod: XU | Performed by: PHYSICIAN ASSISTANT

## 2019-04-25 PROCEDURE — 93010 ELECTROCARDIOGRAM REPORT: CPT | Performed by: INTERNAL MEDICINE

## 2019-04-25 PROCEDURE — 25800030 ZZH RX IP 258 OP 636: Performed by: PHYSICIAN ASSISTANT

## 2019-04-25 PROCEDURE — 87040 BLOOD CULTURE FOR BACTERIA: CPT | Performed by: PHYSICIAN ASSISTANT

## 2019-04-25 PROCEDURE — 25000128 H RX IP 250 OP 636: Performed by: PHYSICIAN ASSISTANT

## 2019-04-25 PROCEDURE — 25000132 ZZH RX MED GY IP 250 OP 250 PS 637: Performed by: PHYSICIAN ASSISTANT

## 2019-04-25 PROCEDURE — 81001 URINALYSIS AUTO W/SCOPE: CPT | Performed by: PHYSICIAN ASSISTANT

## 2019-04-25 PROCEDURE — 36415 COLL VENOUS BLD VENIPUNCTURE: CPT | Performed by: PHYSICIAN ASSISTANT

## 2019-04-25 PROCEDURE — 93005 ELECTROCARDIOGRAM TRACING: CPT | Performed by: PHYSICIAN ASSISTANT

## 2019-04-25 PROCEDURE — 99283 EMERGENCY DEPT VISIT LOW MDM: CPT | Mod: Z6 | Performed by: PHYSICIAN ASSISTANT

## 2019-04-25 PROCEDURE — 80053 COMPREHEN METABOLIC PANEL: CPT | Performed by: PHYSICIAN ASSISTANT

## 2019-04-25 PROCEDURE — 87040 BLOOD CULTURE FOR BACTERIA: CPT | Mod: 91 | Performed by: PHYSICIAN ASSISTANT

## 2019-04-25 PROCEDURE — 84484 ASSAY OF TROPONIN QUANT: CPT | Performed by: PHYSICIAN ASSISTANT

## 2019-04-25 PROCEDURE — 85025 COMPLETE CBC W/AUTO DIFF WBC: CPT | Performed by: PHYSICIAN ASSISTANT

## 2019-04-25 PROCEDURE — 83605 ASSAY OF LACTIC ACID: CPT | Performed by: PHYSICIAN ASSISTANT

## 2019-04-25 PROCEDURE — A9270 NON-COVERED ITEM OR SERVICE: HCPCS | Performed by: PHYSICIAN ASSISTANT

## 2019-04-25 PROCEDURE — 74177 CT ABD & PELVIS W/CONTRAST: CPT

## 2019-04-25 RX ORDER — CIPROFLOXACIN 500 MG/1
500 TABLET, FILM COATED ORAL 2 TIMES DAILY
Qty: 20 TABLET | Refills: 0 | Status: SHIPPED | OUTPATIENT
Start: 2019-04-25 | End: 2019-09-03

## 2019-04-25 RX ORDER — CIPROFLOXACIN 500 MG/1
500 TABLET, FILM COATED ORAL ONCE
Status: COMPLETED | OUTPATIENT
Start: 2019-04-25 | End: 2019-04-25

## 2019-04-25 RX ORDER — ONDANSETRON 4 MG/1
4 TABLET, ORALLY DISINTEGRATING ORAL EVERY 6 HOURS PRN
Qty: 20 TABLET | Refills: 0 | Status: SHIPPED | OUTPATIENT
Start: 2019-04-25 | End: 2019-09-03

## 2019-04-25 RX ORDER — SODIUM CHLORIDE 9 MG/ML
1000 INJECTION, SOLUTION INTRAVENOUS CONTINUOUS
Status: DISCONTINUED | OUTPATIENT
Start: 2019-04-25 | End: 2019-04-25 | Stop reason: HOSPADM

## 2019-04-25 RX ORDER — ONDANSETRON 2 MG/ML
4 INJECTION INTRAMUSCULAR; INTRAVENOUS EVERY 30 MIN PRN
Status: DISCONTINUED | OUTPATIENT
Start: 2019-04-25 | End: 2019-04-25 | Stop reason: HOSPADM

## 2019-04-25 RX ADMIN — IOHEXOL 100 ML: 350 INJECTION, SOLUTION INTRAVENOUS at 14:31

## 2019-04-25 RX ADMIN — SODIUM CHLORIDE 1000 ML: 9 INJECTION, SOLUTION INTRAVENOUS at 12:35

## 2019-04-25 RX ADMIN — AMOXICILLIN AND CLAVULANATE POTASSIUM 1 TABLET: 875; 125 TABLET, FILM COATED ORAL at 15:42

## 2019-04-25 RX ADMIN — SODIUM CHLORIDE 1000 ML: 9 INJECTION, SOLUTION INTRAVENOUS at 14:45

## 2019-04-25 RX ADMIN — ONDANSETRON 4 MG: 2 INJECTION INTRAMUSCULAR; INTRAVENOUS at 12:35

## 2019-04-25 RX ADMIN — CIPROFLOXACIN HYDROCHLORIDE 500 MG: 500 TABLET, FILM COATED ORAL at 15:42

## 2019-04-25 ASSESSMENT — MIFFLIN-ST. JEOR: SCORE: 854.69

## 2019-04-25 NOTE — ED TRIAGE NOTES
Pt reports abdomen pain since Tuesday and had fever 101.2. Pt states a lot of lower abdomen pressure, poor appetite, and states she feels like a ball is in her stomach. Hx: diverticulitis. Denies urinary symptoms and states to have had 2 normal BM's today.

## 2019-04-25 NOTE — ED AVS SNAPSHOT
[facility]  [address]  [place] [address]  Phone:  [phone]  Fax:  [phone]                                    Loulou Lucero   MRN: [id number]    Department:  [facility]   Date of Visit:  4/25/2019           After Visit Summary Signature Page    I have received my discharge instructions, and my questions have been answered. I have discussed any challenges I see with this plan with the nurse or doctor.    ..........................................................................................................................................  Patient/Patient Representative Signature      ..........................................................................................................................................  Patient Representative Print Name and Relationship to Patient    ..................................................               ................................................  Date                                   Time    ..........................................................................................................................................  Reviewed by Signature/Title    ...................................................              ..............................................  Date                                               Time          22EPIC Rev 08/18

## 2019-04-25 NOTE — TELEPHONE ENCOUNTER
Writer was asked to complete triage on patient by Keren in scheduling. Per scheduling staff, patient is calling for an appointment as she has abdominal pain. Writer accepted soft transfer. Patient reports that she started with abdominal pain on Tuesday. Symptoms started in the morning. Whole stomach feels as though it is in a knot. She is kind of bloated feeling as well. Tuesday night she had a fever, chills, sweating, but fever broke Tuesday night-overnight. She is stooling as per her norm, is voiding as per her norm. No vomiting, but she did feel nauseated on Tuesday and nausea has resolved. It hurts to walk. She can walk normally but she does walk slower due to the cramps. Cramps do not let up, even with use of tylenol. She has to walk a little hunched over. She does have a history of diverticulitis. Pain is a 5 out of 10 at this time, worst pain is a 7 or 8 out of 10.    She does have her appendix intact at this time.    As patient is unable to walk straight up with abdominal pain as present, writer is concerned that patient may have appendicitis. Clinic appointment is not appropriate at this time, patient should be seen in the ED now. Writer advised patient as such. She states understanding and is willing to come to the ED at this time. She will have her daughter drive her. Writer did advise patient to be NPO as well.     Patient is happy with plan of care. She will call back as needed.  will close this encounter at this time.    Titi Gilmore RN on 4/25/2019 at 9:07 AM

## 2019-04-26 NOTE — ED PROVIDER NOTES
History     Chief Complaint   Patient presents with     Abdominal Pain     This is a 74-year-old female who has had abdominal pain over the past couple days.  She did have a fever up to 101 2 days ago but she reports this has improved.  She has had some decreased appetite as well but denies any nausea or vomiting.  She did have 2 bowel movements this morning which appeared to be normal.  This patient reports she does have a history of diverticulitis in the past but the presentation today is different.  She feels like there is a mass or a ball in her lower abdomen.  She has pain on palpation to both lower quadrants.  Denies any history of abdominal surgeries.  No history of kidney stones.  She appears in no distress.            Allergies:  Allergies   Allergen Reactions     Metronidazole Nausea and Vomiting     Pneumococcal Vaccine      Other reaction(s): Edema  Arm swelling     Tramadol Visual Disturbance       Problem List:    Patient Active Problem List    Diagnosis Date Noted     Age-related osteoporosis without current pathological fracture 06/26/2018     Priority: Medium     Diverticular disease of colon 01/23/2018     Priority: Medium     Tobacco abuse 01/23/2018     Priority: Medium     Overview:   Trying to quit       Systolic murmur 06/06/2017     Priority: Medium     Overview:   Aortic sclerosis with no significant aortic stenosis per echo June 2017       GERD (gastroesophageal reflux disease) 06/18/2014     Priority: Medium     Advanced care planning/counseling discussion 04/07/2014     Priority: Medium     Overview:   Declined        Chronic obstructive pulmonary disease (H) 12/29/2010     Priority: Medium        Past Medical History:    Past Medical History:   Diagnosis Date     Asymptomatic varicose veins of lower extremity      Benign paroxysmal vertigo      Chronic obstructive pulmonary disease (H)      Diarrhea      Disorder of cartilage      Diverticulosis of large intestine without perforation  or abscess without bleeding      Lower abdominal pain      Other disorders of lung (CODE)      Personal history of other medical treatment (CODE)      Personal history of other medical treatment (CODE)      Zoster without complications        Past Surgical History:    Past Surgical History:   Procedure Laterality Date     COLONOSCOPY  03/22/2010    Normal; + family hx, next due 2015     COLONOSCOPY  10/01/2015    W/ POLYPECTOMY recommend follow up in 2020.     ESOPHAGOSCOPY, GASTROSCOPY, DUODENOSCOPY (EGD), COMBINED  04/23/2014    Arce's esophagus fu egd 2 yrs     LAPAROSCOPIC TUBAL LIGATION  1978          TONSILLECTOMY & ADENOIDECTOMY  1951            Family History:    Family History   Problem Relation Age of Onset     Colon Cancer Father         Cancer-colon     Other - See Comments Mother         Alzheimer's     Other - See Comments Maternal Grandmother         Alzheimer's     Other - See Comments Brother         Alzheimer's     Other - See Comments Brother         aneurysm and stents     Cancer Brother         Cancer,lung     Cancer Brother         Cancer,skin     Other - See Comments Brother         cirrhosis of the liver     Other - See Comments Maternal Uncle         3, Alzheimer's     Breast Cancer No family hx of         Cancer-breast       Social History:  Marital Status:   [2]  Social History     Tobacco Use     Smoking status: Current Every Day Smoker     Packs/day: 1.00     Years: 50.00     Pack years: 50.00     Types: Cigarettes     Smokeless tobacco: Never Used   Substance Use Topics     Alcohol use: Yes     Alcohol/week: 0.0 oz     Comment: rare     Drug use: No        Medications:      acetaminophen (TYLENOL) 325 MG tablet   amoxicillin-clavulanate (AUGMENTIN) 875-125 MG tablet   ascorbic acid (VITAMIN C) 500 MG tablet   calcium carbonate-vitamin D (SM CALCIUM 500/VITAMIN D3) 500-400 MG-UNIT TABS per tablet   ciprofloxacin (CIPRO) 500 MG tablet   fluticasone-salmeterol (ADVAIR DISKUS)  "250-50 MCG/DOSE inhaler   ondansetron (ZOFRAN ODT) 4 MG ODT tab   pantoprazole (PROTONIX) 40 MG EC tablet   albuterol (PROAIR HFA/PROVENTIL HFA/VENTOLIN HFA) 108 (90 Base) MCG/ACT inhaler   alendronate (FOSAMAX) 70 MG tablet         Review of Systems   Constitutional: Positive for appetite change. Negative for chills and fever.   HENT: Negative for congestion.    Eyes: Negative for visual disturbance.   Respiratory: Negative for chest tightness and shortness of breath.    Cardiovascular: Negative for chest pain.   Gastrointestinal: Positive for abdominal distention and abdominal pain. Negative for nausea and vomiting.   Genitourinary: Negative for dysuria, flank pain, hematuria and urgency.   Musculoskeletal: Negative for back pain.   Skin: Negative for rash and wound.   Neurological: Negative for dizziness, seizures, syncope and light-headedness.   Hematological: Negative for adenopathy. Does not bruise/bleed easily.   Psychiatric/Behavioral: Negative for confusion.       Physical Exam   BP: 164/80  Pulse: 89  Temp: 98.1  F (36.7  C)  Resp: 16  Height: 149.9 cm (4' 11\")  Weight: 44.9 kg (99 lb)  SpO2: 97 %      Physical Exam   Constitutional: She is oriented to person, place, and time. She appears well-developed and well-nourished. No distress.   HENT:   Head: Normocephalic and atraumatic.   Eyes: Conjunctivae are normal. No scleral icterus.   Neck: Neck supple.   Cardiovascular: Normal rate and regular rhythm.   Pulmonary/Chest: Effort normal.   Abdominal: Soft. She exhibits no mass. There is tenderness. There is no rebound.   Abdomen is soft but with some tenderness to both lower quadrants.  No rebound or masses.  Bowel sounds are hyperactive.   Musculoskeletal: Normal range of motion. She exhibits no deformity.   Lymphadenopathy:     She has no cervical adenopathy.   Neurological: She is alert and oriented to person, place, and time.   Skin: Skin is warm and dry. No rash noted. She is not diaphoretic. "   Psychiatric: She has a normal mood and affect.       ED Course        Procedures          EKG shows normal sinus rhythm.  Minimal voltage criteria for LVH.  May be normal variant.  Heart rate is 79.  This is unchanged from previous EKG on 9/30/2018.             Results for orders placed or performed during the hospital encounter of 04/25/19 (from the past 24 hour(s))   UA reflex to Microscopic and Culture   Result Value Ref Range    Color Urine Yellow     Appearance Urine Clear     Glucose Urine Negative NEG^Negative mg/dL    Bilirubin Urine Negative NEG^Negative    Ketones Urine Negative NEG^Negative mg/dL    Specific Gravity Urine 1.015 1.000 - 1.030    Blood Urine Small (A) NEG^Negative    pH Urine 6.5 5.0 - 9.0 pH    Protein Albumin Urine Negative NEG^Negative mg/dL    Urobilinogen Urine 0.2 0.2 - 1.0 EU/dL    Nitrite Urine Negative NEG^Negative    Leukocyte Esterase Urine Negative NEG^Negative    Source Midstream Urine    Urine Microscopic   Result Value Ref Range    WBC Urine 0 - 5 OTO5^0 - 5 /HPF    RBC Urine O - 2 OTO2^O - 2 /HPF   Comprehensive metabolic panel   Result Value Ref Range    Sodium 142 134 - 144 mmol/L    Potassium 3.9 3.5 - 5.1 mmol/L    Chloride 105 98 - 107 mmol/L    Carbon Dioxide 30 21 - 31 mmol/L    Anion Gap 7 3 - 14 mmol/L    Glucose 97 70 - 105 mg/dL    Urea Nitrogen 11 7 - 25 mg/dL    Creatinine 0.88 0.60 - 1.20 mg/dL    GFR Estimate 63 >60 mL/min/[1.73_m2]    GFR Estimate If Black 76 >60 mL/min/[1.73_m2]    Calcium 9.7 8.6 - 10.3 mg/dL    Bilirubin Total 0.5 0.3 - 1.0 mg/dL    Albumin 4.0 3.5 - 5.7 g/dL    Protein Total 7.7 6.4 - 8.9 g/dL    Alkaline Phosphatase 48 34 - 104 U/L    ALT 6 (L) 7 - 52 U/L    AST 14 13 - 39 U/L   Troponin I   Result Value Ref Range    Troponin I ES <0.030 0.000 - 0.034 ug/L   Lactic acid   Result Value Ref Range    Lactic Acid 0.6 0.5 - 2.2 mmol/L   CBC with platelets differential   Result Value Ref Range    WBC 5.1 4.0 - 11.0 10e9/L    RBC Count 4.12  3.8 - 5.2 10e12/L    Hemoglobin 12.9 11.7 - 15.7 g/dL    Hematocrit 39.1 35.0 - 47.0 %    MCV 95 78 - 100 fl    MCH 31.3 26.5 - 33.0 pg    MCHC 33.0 31.5 - 36.5 g/dL    RDW 12.6 10.0 - 15.0 %    Platelet Count 241 150 - 450 10e9/L    Diff Method Automated Method     % Neutrophils 62.2 %    % Lymphocytes 27.1 %    % Monocytes 6.8 %    % Eosinophils 2.7 %    % Basophils 0.8 %    % Immature Granulocytes 0.4 %    Absolute Neutrophil 3.2 1.6 - 8.3 10e9/L    Absolute Lymphocytes 1.4 0.8 - 5.3 10e9/L    Absolute Monocytes 0.4 0.0 - 1.3 10e9/L    Absolute Eosinophils 0.1 0.0 - 0.7 10e9/L    Absolute Basophils 0.0 0.0 - 0.2 10e9/L    Abs Immature Granulocytes 0.0 0 - 0.4 10e9/L   CT Abdomen Pelvis w Contrast    Narrative    PROCEDURE:  CT ABDOMEN PELVIS W CONTRAST    HISTORY: Abd pain, diverticulitis suspected    TECHNIQUE:  Helical CT of the abdomen and pelvis was performed  following injection of intravenous contrast.  Ingested oral contrast  partially opacifies the bowel.      COMPARISON:  10/7/2015    MEDS/CONTRAST: 100 mL of Omnipaque    FINDINGS:      Limited images through the lung bases demonstrate some centrilobular  emphysematous changes. The heart size is normal.    The liver demonstrates no mass or intrahepatic biliary ductal  dilatation.  The gallbladder, spleen, pancreas and adrenal glands are  unremarkable.  Symmetric nephrograms are present; fullness of the  renal pelvis on each side is noted. The aorta is normal in size with  advanced atherosclerotic calcifications.    The bowel is normal in caliber. Extensive colonic diverticula  reticular disease is noted. There is focal thickening and mucosal  hyperemia at a diverticulum within the mid to distal sigmoid colon  best seen on coronal image 69. There is adjacent fat stranding. No  free air or abscess formation is seen.    No adenopathy is present.  No suspicious osseous lesions are  identified.      Impression    IMPRESSION:      Acute sigmoid diverticulitis  without evidence of complication.  Consider follow-up colonoscopy if not recently performed.    ARIS CANNON MD       Medications   0.9% sodium chloride BOLUS (0 mLs Intravenous Stopped 4/25/19 1358)   iohexol (OMNIPAQUE) 240 mg/mL solution 50 mL (50 mLs Oral Given 4/25/19 1230)   iohexol (OMNIPAQUE) 350 mg/mL solution 100 mL (100 mLs Intravenous Given 4/25/19 1431)   ciprofloxacin (CIPRO) tablet 500 mg (500 mg Oral Given 4/25/19 1542)   amoxicillin-clavulanate (AUGMENTIN) 875-125 MG per tablet 1 tablet (1 tablet Oral Given 4/25/19 1542)       Assessments & Plan (with Medical Decision Making)     I have reviewed the nursing notes.    I have reviewed the findings, diagnosis, plan and need for follow up with the patient.         Medication List      Started    amoxicillin-clavulanate 875-125 MG tablet  Commonly known as:  AUGMENTIN  1 tablet, Oral, 2 TIMES DAILY     ciprofloxacin 500 MG tablet  Commonly known as:  CIPRO  500 mg, Oral, 2 TIMES DAILY     ondansetron 4 MG ODT tab  Commonly known as:  ZOFRAN ODT  4 mg, Oral, EVERY 6 HOURS PRN            Final diagnoses:   Diverticulitis of sigmoid colon   Nausea   Bilateral lower abdominal pain   Afebrile.  Vital signs stable.  2-day history of fever and nausea as well as decreased appetite.  IV established and she was given fluids.  EKG shows normal sinus rhythm.  Minimal voltage criteria for LVH.  May be normal variant.  Heart rate is 79.  This is unchanged from previous EKG on 9/30/2018.  Troponin is normal. CBC shows normal white blood cells no left shift.  CMP is normal.  UA is unremarkable.  Blood cultures pending.  Lactate is 0.6.  Given her history of diverticulitis as well as her continued abdominal pain CT abdomen pelvis with oral and IV contrast was performed and shows Acute sigmoid diverticulitis without evidence of complication.  Patient does have an allergy to Flagyl.  I discussed antibiotic treatment with pharmacist.  Patient was given her first  Cipro and Augmentin orally in the ER.  Rx for these as well.  Discussed fluid diet gradually transitioning to a brat diet as tolerated.  Follow-up with her primary care provider as needed for further evaluation should her symptoms persist sooner if there is any concerns problems or questions.           4/25/2019 [facility]     Henok Saavedra PA-C  04/25/19 1913

## 2019-05-01 LAB
BACTERIA SPEC CULT: NORMAL
BACTERIA SPEC CULT: NORMAL
SPECIMEN SOURCE: NORMAL
SPECIMEN SOURCE: NORMAL

## 2019-05-01 NOTE — RESULT ENCOUNTER NOTE
Final blood culture report is NEGATIVE.    No treatment or change in treatment per [facility] ED Lab Result protocol.

## 2019-06-19 ENCOUNTER — TELEPHONE (OUTPATIENT)
Dept: FAMILY MEDICINE | Facility: OTHER | Age: 74
End: 2019-06-19

## 2019-06-19 DIAGNOSIS — M81.0 AGE-RELATED OSTEOPOROSIS WITHOUT CURRENT PATHOLOGICAL FRACTURE: ICD-10-CM

## 2019-06-19 NOTE — TELEPHONE ENCOUNTER
Pt calling to request a refill on her Alendronate, states that her pharmacy will not send in a refill request.

## 2019-06-19 NOTE — TELEPHONE ENCOUNTER
Pt requesting refill for:    alendronate (FOSAMAX) 70 MG tablet    Noted medication listed as filled for 2 tablets to [facility] 7/10/2018    Contacted pt and she stated that she has been filling through [facility] for the past year or so and has been taking the medication weekly (every Monday) as prescribed by Dr. Johnston.  Pt looked at a bottle and stated the medication had refills starting in July 2018 and Dr. Johnston's name was on the bottle.  Offered transfer to scheduling for assistance in setting up a med management appt as pt will be due in July.  Pt verbalized understanding and will call back to schedule as she has several other upcoming appts and would like to look at her schedule first.  Relayed to pt that pharmacy would be contacted to verify and a limited supply would be ordered to allow time to schedule appt and if any issues would contact pt. Verified medication/dose with pt.  Pt appreciative.      Contacted [facility] to Lauren GAN and they confirmed that patient had been prescribed a 12 month supply of Fosamax July 2018 and last fill 3/26/19 for 90 day supply and prescribed by Dr. Johnston.     Limited supply sent    Nataliia Marrero RN  ....................  6/20/2019   11:02 AM

## 2019-06-20 RX ORDER — ALENDRONATE SODIUM 70 MG/1
TABLET ORAL
Qty: 8 TABLET | Refills: 0 | Status: SHIPPED | OUTPATIENT
Start: 2019-06-20 | End: 2019-08-19

## 2019-08-19 DIAGNOSIS — K21.9 GASTROESOPHAGEAL REFLUX DISEASE WITHOUT ESOPHAGITIS: ICD-10-CM

## 2019-08-19 DIAGNOSIS — M81.0 AGE-RELATED OSTEOPOROSIS WITHOUT CURRENT PATHOLOGICAL FRACTURE: ICD-10-CM

## 2019-08-19 RX ORDER — PANTOPRAZOLE SODIUM 40 MG/1
40 TABLET, DELAYED RELEASE ORAL DAILY
Qty: 90 TABLET | Refills: 3 | Status: SHIPPED | OUTPATIENT
Start: 2019-08-19 | End: 2020-04-20

## 2019-08-19 RX ORDER — ALENDRONATE SODIUM 70 MG/1
TABLET ORAL
Qty: 12 TABLET | Refills: 3 | Status: SHIPPED | OUTPATIENT
Start: 2019-08-19 | End: 2020-04-20

## 2019-08-19 NOTE — TELEPHONE ENCOUNTER
Called and left message for patient to please return call.     Desirae Vyas LPN on 8/19/2019 at 12:49 PM

## 2019-08-19 NOTE — TELEPHONE ENCOUNTER
Patient called back. Full name and  verified. Patient stated she took her last dose of weekly Fosamax today and only has about 2 weeks left of her Protonix remaining. Has appt 9/3/19. Wondering if she can get a temp supply. Did not want meds sent to local pharmacy.     Medications pending for approval.     Thank you!     Desirae Vyas LPN on 2019 at 1:59 PM

## 2019-08-19 NOTE — TELEPHONE ENCOUNTER
Patient called in regards to getting a short refill on her medication because she cant be seen telll 09/03/2019. Please call her back in regards to this. Thank you!

## 2019-08-20 NOTE — TELEPHONE ENCOUNTER
Called and notified patient that RX's were sent to pharmacy per request.     Desirae Vyas LPN on 8/20/2019 at 8:42 AM

## 2019-09-03 ENCOUNTER — OFFICE VISIT (OUTPATIENT)
Dept: FAMILY MEDICINE | Facility: OTHER | Age: 74
End: 2019-09-03
Attending: FAMILY MEDICINE
Payer: MEDICARE

## 2019-09-03 VITALS
OXYGEN SATURATION: 98 % | BODY MASS INDEX: 18.67 KG/M2 | SYSTOLIC BLOOD PRESSURE: 136 MMHG | HEIGHT: 59 IN | RESPIRATION RATE: 18 BRPM | DIASTOLIC BLOOD PRESSURE: 64 MMHG | HEART RATE: 88 BPM | WEIGHT: 92.6 LBS

## 2019-09-03 DIAGNOSIS — J43.9 PULMONARY EMPHYSEMA, UNSPECIFIED EMPHYSEMA TYPE (H): ICD-10-CM

## 2019-09-03 DIAGNOSIS — M81.0 AGE-RELATED OSTEOPOROSIS WITHOUT CURRENT PATHOLOGICAL FRACTURE: ICD-10-CM

## 2019-09-03 DIAGNOSIS — Z12.31 ENCOUNTER FOR SCREENING MAMMOGRAM FOR BREAST CANCER: Primary | ICD-10-CM

## 2019-09-03 PROCEDURE — 99213 OFFICE O/P EST LOW 20 MIN: CPT | Performed by: FAMILY MEDICINE

## 2019-09-03 PROCEDURE — G0463 HOSPITAL OUTPT CLINIC VISIT: HCPCS

## 2019-09-03 ASSESSMENT — MIFFLIN-ST. JEOR: SCORE: 825.66

## 2019-09-03 ASSESSMENT — PAIN SCALES - GENERAL: PAINLEVEL: NO PAIN (0)

## 2019-09-03 NOTE — NURSING NOTE
Patient presents today for medication management.    Medication Reconciliation Complete    Mehnaz Ledesma LPN  9/3/2019 3:24 PM

## 2019-09-03 NOTE — PROGRESS NOTES
SUBJECTIVE:  Loulou Lucero is a 74 year old female here for follow-up.  She has a history of COPD and is due for refill of her Advair.  She had pneumonia last year and she has recovered from that.  She does not use albuterol in the past year.  She reports that she is had no shortness of breath, chest pain or other exertional symptoms.    She continues using Fosamax for osteoporosis and Protonix for acid reflux.    She otherwise has no concerns today.      Patient Active Problem List    Diagnosis Date Noted     Age-related osteoporosis without current pathological fracture 06/26/2018     Priority: Medium     Diverticular disease of colon 01/23/2018     Priority: Medium     Tobacco abuse 01/23/2018     Priority: Medium     Overview:   Trying to quit       Systolic murmur 06/06/2017     Priority: Medium     Overview:   Aortic sclerosis with no significant aortic stenosis per echo June 2017       GERD (gastroesophageal reflux disease) 06/18/2014     Priority: Medium     Advanced care planning/counseling discussion 04/07/2014     Priority: Medium     Overview:   Declined        Chronic obstructive pulmonary disease (H) 12/29/2010     Priority: Medium       Past Medical History:   Diagnosis Date     Asymptomatic varicose veins of lower extremity     6/12/2012     Benign paroxysmal vertigo     12/29/2010     Chronic obstructive pulmonary disease (H)     12/29/2010     Diarrhea     10/1/2015     Disorder of cartilage     Hip; Last dxa 06/2010     Diverticulosis of large intestine without perforation or abscess without bleeding           Lower abdominal pain     10/1/2015     Other disorders of lung (CODE)     Stable since July of 2002. RU lobe.     Personal history of other medical treatment (CODE)     L3, L4-4; Last MRI 7/09/12     Personal history of other medical treatment (CODE)     G-3, P-2, A-0  (Son had SIDS)     Zoster without complications     3/31/2012       Past Surgical History:   Procedure Laterality Date      COLONOSCOPY  03/22/2010    Normal; + family hx, next due 2015     COLONOSCOPY  10/01/2015    W/ POLYPECTOMY recommend follow up in 2020.     ESOPHAGOSCOPY, GASTROSCOPY, DUODENOSCOPY (EGD), COMBINED  04/23/2014    Arce's esophagus fu egd 2 yrs     LAPAROSCOPIC TUBAL LIGATION  1978          TONSILLECTOMY & ADENOIDECTOMY  1951            Current Outpatient Medications   Medication Sig Dispense Refill     alendronate (FOSAMAX) 70 MG tablet Take 1 tablet (70 mg) by mouth with 8oz water every 7 days 30 minutes before breakfast and remain upright during this time. 12 tablet 3     ascorbic acid (VITAMIN C) 500 MG tablet Take 500 mg by mouth daily       calcium carbonate-vitamin D (SM CALCIUM 500/VITAMIN D3) 500-400 MG-UNIT TABS per tablet Take 1 tablet by mouth daily       fluticasone-salmeterol (ADVAIR DISKUS) 250-50 MCG/DOSE inhaler Inhale 1 puff into the lungs 2 times daily 3 Inhaler 1     pantoprazole (PROTONIX) 40 MG EC tablet Take 1 tablet (40 mg) by mouth daily 90 tablet 3     albuterol (PROAIR HFA/PROVENTIL HFA/VENTOLIN HFA) 108 (90 Base) MCG/ACT inhaler Inhale 1-2 puffs into the lungs 4 times daily as needed for shortness of breath / dyspnea or wheezing (Patient not taking: Reported on 9/3/2019) 1 Inhaler 3       Allergies:  Allergies   Allergen Reactions     Metronidazole Nausea and Vomiting     Pneumococcal Vaccine      Other reaction(s): Edema  Arm swelling     Tramadol Visual Disturbance       Family History   Problem Relation Age of Onset     Colon Cancer Father         Cancer-colon     Other - See Comments Mother         Alzheimer's     Other - See Comments Maternal Grandmother         Alzheimer's     Other - See Comments Brother         Alzheimer's     Other - See Comments Brother         aneurysm and stents     Cancer Brother         Cancer,lung     Cancer Brother         Cancer,skin     Other - See Comments Brother         cirrhosis of the liver     Other - See Comments Maternal Uncle         3,  "Alzheimer's     Breast Cancer No family hx of         Cancer-breast       Social History     Tobacco Use     Smoking status: Current Every Day Smoker     Packs/day: 1.00     Years: 50.00     Pack years: 50.00     Types: Cigarettes     Smokeless tobacco: Never Used   Substance Use Topics     Alcohol use: Yes     Alcohol/week: 0.0 oz     Comment: rare     Drug use: No       ROS:    As above otherwise ROS is unremarkable.      OBJECTIVE:  /64   Pulse 88   Resp 18   Ht 1.499 m (4' 11\")   Wt 42 kg (92 lb 9.6 oz)   SpO2 98%   BMI 18.70 kg/m      EXAM:  General Appearance: Pleasant, alert, appropriate appearance for age. No acute distress  Lungs: Normal chest wall and respirations. Clear to auscultation, no wheezes or crackles.  Cardiovascular: Regular rate and rhythm. S1, S2.  2 out of 6 soft murmur heard best at the right upper sternal border..  Musculoskeletal: No edema.  Skin 2 small seborrheic keratoses are seen on her left arm.  Neurologic Exam: CN 2-12 grossly intact.  Normal gait.   Psychiatric Exam: Alert and oriented, appropriate affect.    ASSESSEMENT AND PLAN:    1. Encounter for screening mammogram for breast cancer    2. Pulmonary emphysema, unspecified emphysema type (H)    3. Age-related osteoporosis without current pathological fracture      Medications refilled again for another year.    She reports that she has had pneumonia vaccines in the past and is \"allergic\" to them.  She will get flu shot later this fall.    Referral for mammography was completed.    Last DEXA scan 2 years ago and showed osteoporosis, will not repeat at this time.    Alvaro Johnston MD  Family Medicine      This document was prepared using voice generated software.  While every attempt was made for accuracy, grammatical errors may exist.  "

## 2019-09-11 ENCOUNTER — HOSPITAL ENCOUNTER (OUTPATIENT)
Dept: MAMMOGRAPHY | Facility: OTHER | Age: 74
Discharge: HOME | End: 2019-09-11
Attending: FAMILY MEDICINE | Admitting: FAMILY MEDICINE
Payer: MEDICARE

## 2019-09-11 DIAGNOSIS — Z12.31 ENCOUNTER FOR SCREENING MAMMOGRAM FOR BREAST CANCER: ICD-10-CM

## 2019-09-11 PROCEDURE — 77063 BREAST TOMOSYNTHESIS BI: CPT

## 2019-10-11 ENCOUNTER — TELEPHONE (OUTPATIENT)
Dept: FAMILY MEDICINE | Facility: OTHER | Age: 74
End: 2019-10-11

## 2019-10-11 NOTE — TELEPHONE ENCOUNTER
Patient received the generic version of the ADVAIR inhaler and she was unsure why. Patient was informed what prescription we sent in. I told her to call the pharmacy if she had questions on why she received the generic version.  Mehnaz Ledesma LPN on 10/11/2019 at 10:13 AM

## 2019-10-11 NOTE — TELEPHONE ENCOUNTER
Pt has questions about the new prescription for Wixela [facility].  Would like to know if DWS knew about the change

## 2019-10-24 ENCOUNTER — APPOINTMENT (OUTPATIENT)
Dept: CT IMAGING | Facility: OTHER | Age: 74
End: 2019-10-24
Attending: EMERGENCY MEDICINE
Payer: MEDICARE

## 2019-10-24 ENCOUNTER — HOSPITAL ENCOUNTER (EMERGENCY)
Facility: OTHER | Age: 74
Discharge: HOME | End: 2019-10-24
Attending: EMERGENCY MEDICINE | Admitting: EMERGENCY MEDICINE
Payer: MEDICARE

## 2019-10-24 ENCOUNTER — OFFICE VISIT (OUTPATIENT)
Dept: FAMILY MEDICINE | Facility: OTHER | Age: 74
End: 2019-10-24
Attending: PHYSICIAN ASSISTANT
Payer: MEDICARE

## 2019-10-24 VITALS
HEART RATE: 87 BPM | RESPIRATION RATE: 20 BRPM | TEMPERATURE: 99.2 F | WEIGHT: 88.6 LBS | SYSTOLIC BLOOD PRESSURE: 138 MMHG | OXYGEN SATURATION: 96 % | BODY MASS INDEX: 17.86 KG/M2 | DIASTOLIC BLOOD PRESSURE: 84 MMHG | HEIGHT: 59 IN

## 2019-10-24 VITALS
OXYGEN SATURATION: 94 % | RESPIRATION RATE: 15 BRPM | DIASTOLIC BLOOD PRESSURE: 67 MMHG | SYSTOLIC BLOOD PRESSURE: 139 MMHG | BODY MASS INDEX: 17.86 KG/M2 | TEMPERATURE: 100 F | HEART RATE: 84 BPM | WEIGHT: 88.6 LBS | HEIGHT: 59 IN

## 2019-10-24 DIAGNOSIS — J20.9 ACUTE BRONCHITIS, UNSPECIFIED ORGANISM: ICD-10-CM

## 2019-10-24 DIAGNOSIS — R19.7 DIARRHEA, UNSPECIFIED TYPE: ICD-10-CM

## 2019-10-24 DIAGNOSIS — R63.4 WEIGHT LOSS: ICD-10-CM

## 2019-10-24 DIAGNOSIS — R53.1 WEAKNESS: Primary | ICD-10-CM

## 2019-10-24 DIAGNOSIS — R11.10 VOMITING AND DIARRHEA: ICD-10-CM

## 2019-10-24 DIAGNOSIS — R19.7 VOMITING AND DIARRHEA: ICD-10-CM

## 2019-10-24 LAB
ALBUMIN SERPL-MCNC: 4.2 G/DL (ref 3.5–5.7)
ALP SERPL-CCNC: 44 U/L (ref 34–104)
ALT SERPL W P-5'-P-CCNC: 8 U/L (ref 7–52)
ANION GAP SERPL CALCULATED.3IONS-SCNC: 7 MMOL/L (ref 3–14)
AST SERPL W P-5'-P-CCNC: 17 U/L (ref 13–39)
BASOPHILS # BLD AUTO: 0 10E9/L (ref 0–0.2)
BASOPHILS NFR BLD AUTO: 0.6 %
BILIRUB SERPL-MCNC: 0.7 MG/DL (ref 0.3–1)
BUN SERPL-MCNC: 9 MG/DL (ref 7–25)
CALCIUM SERPL-MCNC: 9.1 MG/DL (ref 8.6–10.3)
CHLORIDE SERPL-SCNC: 103 MMOL/L (ref 98–107)
CO2 SERPL-SCNC: 30 MMOL/L (ref 21–31)
CREAT SERPL-MCNC: 0.81 MG/DL (ref 0.6–1.2)
DIFFERENTIAL METHOD BLD: NORMAL
EOSINOPHIL # BLD AUTO: 0.1 10E9/L (ref 0–0.7)
EOSINOPHIL NFR BLD AUTO: 1.1 %
ERYTHROCYTE [DISTWIDTH] IN BLOOD BY AUTOMATED COUNT: 12.9 % (ref 10–15)
GFR SERPL CREATININE-BSD FRML MDRD: 69 ML/MIN/{1.73_M2}
GLUCOSE SERPL-MCNC: 131 MG/DL (ref 70–105)
HCT VFR BLD AUTO: 36.5 % (ref 35–47)
HGB BLD-MCNC: 12.1 G/DL (ref 11.7–15.7)
IMM GRANULOCYTES # BLD: 0 10E9/L (ref 0–0.4)
IMM GRANULOCYTES NFR BLD: 0.3 %
LACTATE SERPL-SCNC: 0.8 MMOL/L (ref 0.5–2.2)
LIPASE SERPL-CCNC: 15 U/L (ref 11–82)
LYMPHOCYTES # BLD AUTO: 1.1 10E9/L (ref 0.8–5.3)
LYMPHOCYTES NFR BLD AUTO: 16.2 %
MCH RBC QN AUTO: 31.1 PG (ref 26.5–33)
MCHC RBC AUTO-ENTMCNC: 33.2 G/DL (ref 31.5–36.5)
MCV RBC AUTO: 94 FL (ref 78–100)
MONOCYTES # BLD AUTO: 0.5 10E9/L (ref 0–1.3)
MONOCYTES NFR BLD AUTO: 7.4 %
NEUTROPHILS # BLD AUTO: 5.2 10E9/L (ref 1.6–8.3)
NEUTROPHILS NFR BLD AUTO: 74.4 %
PLATELET # BLD AUTO: 230 10E9/L (ref 150–450)
POTASSIUM SERPL-SCNC: 3.4 MMOL/L (ref 3.5–5.1)
PROT SERPL-MCNC: 7.6 G/DL (ref 6.4–8.9)
RBC # BLD AUTO: 3.89 10E12/L (ref 3.8–5.2)
SODIUM SERPL-SCNC: 140 MMOL/L (ref 134–144)
WBC # BLD AUTO: 7 10E9/L (ref 4–11)

## 2019-10-24 PROCEDURE — 94640 AIRWAY INHALATION TREATMENT: CPT

## 2019-10-24 PROCEDURE — 80053 COMPREHEN METABOLIC PANEL: CPT | Performed by: EMERGENCY MEDICINE

## 2019-10-24 PROCEDURE — 25000125 ZZHC RX 250: Performed by: EMERGENCY MEDICINE

## 2019-10-24 PROCEDURE — 25500064 ZZH RX 255 OP 636: Performed by: EMERGENCY MEDICINE

## 2019-10-24 PROCEDURE — 83605 ASSAY OF LACTIC ACID: CPT | Performed by: EMERGENCY MEDICINE

## 2019-10-24 PROCEDURE — 83690 ASSAY OF LIPASE: CPT | Performed by: EMERGENCY MEDICINE

## 2019-10-24 PROCEDURE — 40000275 ZZH STATISTIC RCP TIME EA 10 MIN

## 2019-10-24 PROCEDURE — 36415 COLL VENOUS BLD VENIPUNCTURE: CPT | Performed by: EMERGENCY MEDICINE

## 2019-10-24 PROCEDURE — 99283 EMERGENCY DEPT VISIT LOW MDM: CPT | Mod: Z6 | Performed by: EMERGENCY MEDICINE

## 2019-10-24 PROCEDURE — 99285 EMERGENCY DEPT VISIT HI MDM: CPT | Mod: 25 | Performed by: EMERGENCY MEDICINE

## 2019-10-24 PROCEDURE — 85025 COMPLETE CBC W/AUTO DIFF WBC: CPT | Performed by: EMERGENCY MEDICINE

## 2019-10-24 PROCEDURE — 25000128 H RX IP 250 OP 636: Performed by: EMERGENCY MEDICINE

## 2019-10-24 PROCEDURE — 74177 CT ABD & PELVIS W/CONTRAST: CPT | Mod: TC

## 2019-10-24 PROCEDURE — 99207 ZZC NO CHARGE NURSE ONLY: CPT | Performed by: NURSE PRACTITIONER

## 2019-10-24 RX ORDER — IPRATROPIUM BROMIDE AND ALBUTEROL SULFATE 2.5; .5 MG/3ML; MG/3ML
3 SOLUTION RESPIRATORY (INHALATION) ONCE
Status: COMPLETED | OUTPATIENT
Start: 2019-10-24 | End: 2019-10-24

## 2019-10-24 RX ORDER — SODIUM CHLORIDE 9 MG/ML
1000 INJECTION, SOLUTION INTRAVENOUS CONTINUOUS
Status: DISCONTINUED | OUTPATIENT
Start: 2019-10-24 | End: 2019-10-24 | Stop reason: HOSPADM

## 2019-10-24 RX ORDER — AZITHROMYCIN 250 MG/1
TABLET, FILM COATED ORAL
Qty: 6 TABLET | Refills: 0 | Status: SHIPPED | OUTPATIENT
Start: 2019-10-24 | End: 2019-10-31

## 2019-10-24 RX ADMIN — IOHEXOL 54 ML: 350 INJECTION, SOLUTION INTRAVENOUS at 17:50

## 2019-10-24 RX ADMIN — IPRATROPIUM BROMIDE AND ALBUTEROL SULFATE 3 ML: .5; 3 SOLUTION RESPIRATORY (INHALATION) at 15:16

## 2019-10-24 RX ADMIN — SODIUM CHLORIDE 1000 ML: 9 INJECTION, SOLUTION INTRAVENOUS at 15:27

## 2019-10-24 ASSESSMENT — MIFFLIN-ST. JEOR
SCORE: 807.52
SCORE: 807.52

## 2019-10-24 ASSESSMENT — PAIN SCALES - GENERAL: PAINLEVEL: MODERATE PAIN (5)

## 2019-10-24 NOTE — ED PROVIDER NOTES
[facility]  Emergency Department Visit Note    Diarrhea and decreased appetite      History of Present Illness     HPI:  Loulou Lucero is a 74 year old female presenting with cough and diarrhea. The diarrhea started 5 days ago and resolved yesterday but cough and sore throat started yesterday ans has been progressively worsening. The patient has been exposed to sick contacts with diarrhea but not URI symptoms. She is able to tolerate oral intake including plenty of fluids. Her daughter is concerned because she is losing weight and she does not eat well. There is no purulent sputum,  Is no hemoptysis, and is no chest pain. Fever is not present. Shortness of breath is not present. No abdominal pain, nausea, vomiting, rash. She has tried ibuprofen without significant improvement in symptoms. No known history of immunosuppression.    Medications:  Prior to Admission medications    Medication Sig Last Dose Taking? Auth Provider   albuterol (PROAIR HFA/PROVENTIL HFA/VENTOLIN HFA) 108 (90 Base) MCG/ACT inhaler Inhale 1-2 puffs into the lungs 4 times daily as needed for shortness of breath / dyspnea or wheezing 10/24/2019 at 0800 Yes Avani Barrera APRN CNP   ascorbic acid (VITAMIN C) 500 MG tablet Take 500 mg by mouth daily 10/24/2019 at 0800 Yes Reported, Patient   calcium carbonate-vitamin D (SM CALCIUM 500/VITAMIN D3) 500-400 MG-UNIT TABS per tablet Take 1 tablet by mouth daily 10/24/2019 at 0800 Yes Reported, Patient   fluticasone-salmeterol (ADVAIR DISKUS) 250-50 MCG/DOSE inhaler Inhale 1 puff into the lungs 2 times daily 10/24/2019 at 0800 Yes Obi Johnston MD   pantoprazole (PROTONIX) 40 MG EC tablet Take 1 tablet (40 mg) by mouth daily 10/24/2019 at 0800 Yes Obi Johnston MD   alendronate (FOSAMAX) 70 MG tablet Take 1 tablet (70 mg) by mouth with 8oz water every 7 days 30 minutes before breakfast and remain upright during this time. Monday at unknown  Obi Johnston MD        Allergies:  Allergies   Allergen Reactions     Metronidazole Nausea and Vomiting     Pneumococcal Vaccine      Other reaction(s): Edema  Arm swelling     Tramadol Visual Disturbance       Problem List:  Patient Active Problem List   Diagnosis     Advanced care planning/counseling discussion     Chronic obstructive pulmonary disease (H)     Diverticular disease of colon     Systolic murmur     Tobacco abuse     Age-related osteoporosis without current pathological fracture     GERD (gastroesophageal reflux disease)       Past Medical History:  Past Medical History:   Diagnosis Date     Asymptomatic varicose veins of lower extremity     6/12/2012     Benign paroxysmal vertigo     12/29/2010     Chronic obstructive pulmonary disease (H)     12/29/2010     Diarrhea     10/1/2015     Disorder of cartilage     Hip; Last dxa 06/2010     Diverticulosis of large intestine without perforation or abscess without bleeding           Lower abdominal pain     10/1/2015     Other disorders of lung (CODE)     Stable since July of 2002. RU lobe.     Personal history of other medical treatment (CODE)     L3, L4-4; Last MRI 7/09/12     Personal history of other medical treatment (CODE)     G-3, P-2, A-0  (Son had SIDS)     Zoster without complications     3/31/2012       Past Surgical History:  Past Surgical History:   Procedure Laterality Date     COLONOSCOPY  03/22/2010    Normal; + family hx, next due 2015     COLONOSCOPY  10/01/2015    W/ POLYPECTOMY recommend follow up in 2020.     ESOPHAGOSCOPY, GASTROSCOPY, DUODENOSCOPY (EGD), COMBINED  04/23/2014    Arce's esophagus fu egd 2 yrs     LAPAROSCOPIC TUBAL LIGATION  1978          TONSILLECTOMY & ADENOIDECTOMY  1951            Social History:  Social History     Tobacco Use     Smoking status: Current Every Day Smoker     Packs/day: 1.00     Years: 50.00     Pack years: 50.00     Types: Cigarettes     Smokeless tobacco: Never Used   Substance Use Topics     Alcohol use: Yes  "    Alcohol/week: 0.0 standard drinks     Comment: rare     Drug use: No       Review of Systems:  10 point review of systems obtained and pertinent positive and negative findings noted in HPI. Review of systems otherwise negative.      Physical Exam     Vital signs: /65   Pulse 88   Temp 99.1  F (37.3  C)   Resp 15   Ht 1.499 m (4' 11\")   Wt 40.2 kg (88 lb 9.6 oz)   SpO2 94%   Breastfeeding? No   BMI 17.90 kg/m      Physical Exam:    General: awake and alert, coughing intermittently, comfortable  HEENT: atraumatic, no scleral injection, no nasal discharge, neck supple  Chest: clear to auscultation bilaterally without wheezes or crackles, non labored respirations, symmetric chest rise  Cardiovascular: regular rate and rhythm, no murmurs or gallops  Abdomen: soft, mild tenderness to palpation, no rebound or guarding, nondistended  Extremities: no deformities, edema, or tenderness  Skin: warm, dry, no rashes  Neuro: alert and oriented x 3, moving extremities x 4, ambulates without difficulty    Medical Decision Making & ED Course     Loulou Lucero is a 74 year old female presenting with cough, and resolving diarrhea Differential includes viral upper respiratory infection, influenza specifically, pneumonia, bronchitis, pulmonary embolism, pneumothorax, pertussis, sinusitis, post-nasal drip. Given the patient's age, clinical appearance, duration of symptoms, and co-morbidities, a chest radiograph was not indicated. She had a CT abd/pelvis due to diarrhea and cramping similar to previous diverticulitsPulmonary embolism, pneumothorax, or other non-infectious etiology is less likely given preceding symptoms and current findings. The constellation of symptoms and exam findings suggest that bronchitis is the most likely etiology. With lack of fever or purulent sputum, this is likely viral rather than bacterial and antibiotics are not indicated at this time. She was give an Zpak and she did have a temp of 100 on " discharge, Given the duration and intensity of cough, steroids is not indicated. Return to the ED with increasing fever, trouble swallowing, difficulty breathing, or any other concerns. I have palced a referral for Surgery for rotine colonsocpy which is due. All questions were answered and the patient is comfortable with plan for discharge. The patient was discharged in stable condition.    I have reviewed the patient's medical record, diagnostic images and laboratory studies    Diagnosis & Disposition     Diagnosis:  1. Acute bronchitis, unspecified organism    2. Diarrhea, unspecified type        Disposition:  Home    MD Vish Condon Theresa M, MD  10/24/19 1911

## 2019-10-24 NOTE — H&P (VIEW-ONLY)
[facility]  Emergency Department Visit Note    Diarrhea and decreased appetite      History of Present Illness     HPI:  Loulou Lucero is a 74 year old female presenting with cough and diarrhea. The diarrhea started 5 days ago and resolved yesterday but cough and sore throat started yesterday ans has been progressively worsening. The patient has been exposed to sick contacts with diarrhea but not URI symptoms. She is able to tolerate oral intake including plenty of fluids. Her daughter is concerned because she is losing weight and she does not eat well. There is no purulent sputum,  Is no hemoptysis, and is no chest pain. Fever is not present. Shortness of breath is not present. No abdominal pain, nausea, vomiting, rash. She has tried ibuprofen without significant improvement in symptoms. No known history of immunosuppression.    Medications:  Prior to Admission medications    Medication Sig Last Dose Taking? Auth Provider   albuterol (PROAIR HFA/PROVENTIL HFA/VENTOLIN HFA) 108 (90 Base) MCG/ACT inhaler Inhale 1-2 puffs into the lungs 4 times daily as needed for shortness of breath / dyspnea or wheezing 10/24/2019 at 0800 Yes Avani Barrera APRN CNP   ascorbic acid (VITAMIN C) 500 MG tablet Take 500 mg by mouth daily 10/24/2019 at 0800 Yes Reported, Patient   calcium carbonate-vitamin D (SM CALCIUM 500/VITAMIN D3) 500-400 MG-UNIT TABS per tablet Take 1 tablet by mouth daily 10/24/2019 at 0800 Yes Reported, Patient   fluticasone-salmeterol (ADVAIR DISKUS) 250-50 MCG/DOSE inhaler Inhale 1 puff into the lungs 2 times daily 10/24/2019 at 0800 Yes Obi Johnston MD   pantoprazole (PROTONIX) 40 MG EC tablet Take 1 tablet (40 mg) by mouth daily 10/24/2019 at 0800 Yes Obi Johnston MD   alendronate (FOSAMAX) 70 MG tablet Take 1 tablet (70 mg) by mouth with 8oz water every 7 days 30 minutes before breakfast and remain upright during this time. Monday at unknown  Obi Johnston MD        Allergies:  Allergies   Allergen Reactions     Metronidazole Nausea and Vomiting     Pneumococcal Vaccine      Other reaction(s): Edema  Arm swelling     Tramadol Visual Disturbance       Problem List:  Patient Active Problem List   Diagnosis     Advanced care planning/counseling discussion     Chronic obstructive pulmonary disease (H)     Diverticular disease of colon     Systolic murmur     Tobacco abuse     Age-related osteoporosis without current pathological fracture     GERD (gastroesophageal reflux disease)       Past Medical History:  Past Medical History:   Diagnosis Date     Asymptomatic varicose veins of lower extremity     6/12/2012     Benign paroxysmal vertigo     12/29/2010     Chronic obstructive pulmonary disease (H)     12/29/2010     Diarrhea     10/1/2015     Disorder of cartilage     Hip; Last dxa 06/2010     Diverticulosis of large intestine without perforation or abscess without bleeding           Lower abdominal pain     10/1/2015     Other disorders of lung (CODE)     Stable since July of 2002. RU lobe.     Personal history of other medical treatment (CODE)     L3, L4-4; Last MRI 7/09/12     Personal history of other medical treatment (CODE)     G-3, P-2, A-0  (Son had SIDS)     Zoster without complications     3/31/2012       Past Surgical History:  Past Surgical History:   Procedure Laterality Date     COLONOSCOPY  03/22/2010    Normal; + family hx, next due 2015     COLONOSCOPY  10/01/2015    W/ POLYPECTOMY recommend follow up in 2020.     ESOPHAGOSCOPY, GASTROSCOPY, DUODENOSCOPY (EGD), COMBINED  04/23/2014    Arce's esophagus fu egd 2 yrs     LAPAROSCOPIC TUBAL LIGATION  1978          TONSILLECTOMY & ADENOIDECTOMY  1951            Social History:  Social History     Tobacco Use     Smoking status: Current Every Day Smoker     Packs/day: 1.00     Years: 50.00     Pack years: 50.00     Types: Cigarettes     Smokeless tobacco: Never Used   Substance Use Topics     Alcohol use: Yes  "    Alcohol/week: 0.0 standard drinks     Comment: rare     Drug use: No       Review of Systems:  10 point review of systems obtained and pertinent positive and negative findings noted in HPI. Review of systems otherwise negative.      Physical Exam     Vital signs: /65   Pulse 88   Temp 99.1  F (37.3  C)   Resp 15   Ht 1.499 m (4' 11\")   Wt 40.2 kg (88 lb 9.6 oz)   SpO2 94%   Breastfeeding? No   BMI 17.90 kg/m      Physical Exam:    General: awake and alert, coughing intermittently, comfortable  HEENT: atraumatic, no scleral injection, no nasal discharge, neck supple  Chest: clear to auscultation bilaterally without wheezes or crackles, non labored respirations, symmetric chest rise  Cardiovascular: regular rate and rhythm, no murmurs or gallops  Abdomen: soft, mild tenderness to palpation, no rebound or guarding, nondistended  Extremities: no deformities, edema, or tenderness  Skin: warm, dry, no rashes  Neuro: alert and oriented x 3, moving extremities x 4, ambulates without difficulty    Medical Decision Making & ED Course     Loulou Lucero is a 74 year old female presenting with cough, and resolving diarrhea Differential includes viral upper respiratory infection, influenza specifically, pneumonia, bronchitis, pulmonary embolism, pneumothorax, pertussis, sinusitis, post-nasal drip. Given the patient's age, clinical appearance, duration of symptoms, and co-morbidities, a chest radiograph was not indicated. She had a CT abd/pelvis due to diarrhea and cramping similar to previous diverticulitsPulmonary embolism, pneumothorax, or other non-infectious etiology is less likely given preceding symptoms and current findings. The constellation of symptoms and exam findings suggest that bronchitis is the most likely etiology. With lack of fever or purulent sputum, this is likely viral rather than bacterial and antibiotics are not indicated at this time. She was give an Zpak and she did have a temp of 100 on " discharge, Given the duration and intensity of cough, steroids is not indicated. Return to the ED with increasing fever, trouble swallowing, difficulty breathing, or any other concerns. I have palced a referral for Surgery for rotine colonsocpy which is due. All questions were answered and the patient is comfortable with plan for discharge. The patient was discharged in stable condition.    I have reviewed the patient's medical record, diagnostic images and laboratory studies    Diagnosis & Disposition     Diagnosis:  1. Acute bronchitis, unspecified organism    2. Diarrhea, unspecified type        Disposition:  Home    MD Vish Condon Theresa M, MD  10/24/19 1911

## 2019-10-24 NOTE — ED TRIAGE NOTES
"ED Nursing Triage Note (General)   ________________________________    Loulou Lucero is a 74 year old Female that presents to triage private car  With history of  Diarrhea and abdominal cramps with weight loss and general decline reported by daughter  Significant symptoms had onset October 12th  BP (!) 164/74   Temp 99.1  F (37.3  C)   Resp 15   Ht 1.499 m (4' 11\")   Wt 40.2 kg (88 lb 9.6 oz)   BMI 17.90 kg/m  t  Patient appears alert , in mild distress., and cooperative behavior.    GCS Total = 15  Airway: intact  Breathing noted as Normal.  Circulation Normal  Skin normal  Action taken:  To waiting room      PRE HOSPITAL PRIOR LIVING SITUATION Spouse  "

## 2019-10-24 NOTE — ED AVS SNAPSHOT
[facility]  [address]  [place] [address]  Phone:  [phone]  Fax:  [phone]                                    Loulou Lucero   MRN: [id number]    Department:  [facility]   Date of Visit:  10/24/2019           After Visit Summary Signature Page    I have received my discharge instructions, and my questions have been answered. I have discussed any challenges I see with this plan with the nurse or doctor.    ..........................................................................................................................................  Patient/Patient Representative Signature      ..........................................................................................................................................  Patient Representative Print Name and Relationship to Patient    ..................................................               ................................................  Date                                   Time    ..........................................................................................................................................  Reviewed by Signature/Title    ...................................................              ..............................................  Date                                               Time          22EPIC Rev 08/18

## 2019-10-25 ENCOUNTER — TELEPHONE (OUTPATIENT)
Dept: SURGERY | Facility: OTHER | Age: 74
End: 2019-10-25

## 2019-10-25 DIAGNOSIS — R19.7 DIARRHEA, UNSPECIFIED TYPE: Primary | ICD-10-CM

## 2019-10-25 RX ORDER — BISACODYL 5 MG/1
TABLET, DELAYED RELEASE ORAL
Qty: 2 TABLET | Refills: 0 | Status: ON HOLD | OUTPATIENT
Start: 2019-10-25 | End: 2019-11-07

## 2019-10-25 RX ORDER — POLYETHYLENE GLYCOL 3350, SODIUM CHLORIDE, SODIUM BICARBONATE, POTASSIUM CHLORIDE 420; 11.2; 5.72; 1.48 G/4L; G/4L; G/4L; G/4L
4000 POWDER, FOR SOLUTION ORAL ONCE
Qty: 4000 ML | Refills: 0 | Status: ON HOLD | OUTPATIENT
Start: 2019-10-25 | End: 2019-11-07

## 2019-10-25 NOTE — TELEPHONE ENCOUNTER
Screening Questions for the Scheduling of Screening Colonoscopies   (If Colonoscopy is diagnostic, Provider should review the chart before scheduling.)  Are you younger than 50 or older than 80?  NO   Do you take aspirin or fish oil?  NO  (if yes, tell patient to stop 1 week prior to Colonoscopy)  Do you take warfarin (Coumadin), clopidogrel (Plavix), apixaban (Eliquis), dabigatram (Pradaxa), rivaroxaban (Xarelto) or any blood thinner? NO   Do you use oxygen at home?  NO   Do you have kidney disease? NO   Are you on dialysis? NO   Have you had a stroke or heart attack in the last year? NO   Have you had a stent in your heart or any blood vessel in the last year? NO   Have you had a transplant of any organ? NO   Have you had a colonoscopy or upper endoscopy (EGD) before? YES          When?  OVER 5 YRS   Date of scheduled Colonoscopy/ EGD   11/07/2019  Provider [facility]   [facility]

## 2019-10-25 NOTE — TELEPHONE ENCOUNTER
Patient seen in ER yesterday and Colonoscopy has been ordered,  Diagnosis is weight loss and she also has had some diarrhea..  Please advise if ok to schedule colonoscopy.  Thank you. Lashawn Ruff on 10/25/2019 at 8:35 AM

## 2019-10-25 NOTE — PROGRESS NOTES
"Triaged from: [facility]    DIAGNOSIS:   1. Weakness    2. Vomiting and diarrhea    3. Weight loss        Initial /84   Pulse 87   Temp 99.2  F (37.3  C) (Tympanic)   Resp 20   Ht 1.499 m (4' 11\")   Wt 40.2 kg (88 lb 9.6 oz)   SpO2 96%   BMI 17.90 kg/m   Estimated body mass index is 17.9 kg/m  as calculated from the following:    Height as of this encounter: 1.499 m (4' 11\").    Weight as of this encounter: 40.2 kg (88 lb 9.6 oz).    Patient will be transferred to higher level of care.  Patient was not seen or examined.  Patient was transferred by nursing staff to ER for further evaluation and management.    Nevaeh Hernandez NP                "

## 2019-11-06 ENCOUNTER — ANESTHESIA EVENT (OUTPATIENT)
Dept: SURGERY | Facility: OTHER | Age: 74
End: 2019-11-06
Payer: MEDICARE

## 2019-11-06 RX ORDER — ONDANSETRON 2 MG/ML
4 INJECTION INTRAMUSCULAR; INTRAVENOUS EVERY 30 MIN PRN
Status: CANCELLED | OUTPATIENT
Start: 2019-11-06

## 2019-11-06 RX ORDER — FENTANYL CITRATE 50 UG/ML
25-50 INJECTION, SOLUTION INTRAMUSCULAR; INTRAVENOUS
Status: CANCELLED | OUTPATIENT
Start: 2019-11-06

## 2019-11-06 RX ORDER — NALOXONE HYDROCHLORIDE 0.4 MG/ML
.1-.4 INJECTION, SOLUTION INTRAMUSCULAR; INTRAVENOUS; SUBCUTANEOUS
Status: CANCELLED | OUTPATIENT
Start: 2019-11-06 | End: 2019-11-07

## 2019-11-06 RX ORDER — MEPERIDINE HYDROCHLORIDE 50 MG/ML
12.5 INJECTION INTRAMUSCULAR; INTRAVENOUS; SUBCUTANEOUS
Status: CANCELLED | OUTPATIENT
Start: 2019-11-06

## 2019-11-06 RX ORDER — SODIUM CHLORIDE, SODIUM LACTATE, POTASSIUM CHLORIDE, CALCIUM CHLORIDE 600; 310; 30; 20 MG/100ML; MG/100ML; MG/100ML; MG/100ML
INJECTION, SOLUTION INTRAVENOUS CONTINUOUS
Status: CANCELLED | OUTPATIENT
Start: 2019-11-06

## 2019-11-06 RX ORDER — ONDANSETRON 4 MG/1
4 TABLET, ORALLY DISINTEGRATING ORAL EVERY 30 MIN PRN
Status: CANCELLED | OUTPATIENT
Start: 2019-11-06

## 2019-11-06 RX ORDER — HYDROMORPHONE HYDROCHLORIDE 1 MG/ML
.3-.5 INJECTION, SOLUTION INTRAMUSCULAR; INTRAVENOUS; SUBCUTANEOUS EVERY 10 MIN PRN
Status: CANCELLED | OUTPATIENT
Start: 2019-11-06

## 2019-11-07 ENCOUNTER — ANESTHESIA (OUTPATIENT)
Dept: SURGERY | Facility: OTHER | Age: 74
End: 2019-11-07
Payer: MEDICARE

## 2019-11-07 ENCOUNTER — HOSPITAL ENCOUNTER (OUTPATIENT)
Facility: OTHER | Age: 74
Discharge: HOME | End: 2019-11-07
Attending: SURGERY | Admitting: SURGERY
Payer: MEDICARE

## 2019-11-07 VITALS
SYSTOLIC BLOOD PRESSURE: 120 MMHG | DIASTOLIC BLOOD PRESSURE: 66 MMHG | RESPIRATION RATE: 18 BRPM | OXYGEN SATURATION: 92 % | TEMPERATURE: 98.6 F | BODY MASS INDEX: 17.34 KG/M2 | WEIGHT: 86 LBS | HEIGHT: 59 IN | HEART RATE: 79 BPM

## 2019-11-07 PROCEDURE — 25800030 ZZH RX IP 258 OP 636: Performed by: SURGERY

## 2019-11-07 PROCEDURE — 43239 EGD BIOPSY SINGLE/MULTIPLE: CPT | Mod: 51 | Performed by: SURGERY

## 2019-11-07 PROCEDURE — 99100 ANES PT EXTEME AGE<1 YR&>70: CPT | Performed by: NURSE ANESTHETIST, CERTIFIED REGISTERED

## 2019-11-07 PROCEDURE — 88305 TISSUE EXAM BY PATHOLOGIST: CPT

## 2019-11-07 PROCEDURE — 45385 COLONOSCOPY W/LESION REMOVAL: CPT | Performed by: SURGERY

## 2019-11-07 PROCEDURE — 25000125 ZZHC RX 250: Performed by: NURSE ANESTHETIST, CERTIFIED REGISTERED

## 2019-11-07 PROCEDURE — 45385 COLONOSCOPY W/LESION REMOVAL: CPT | Performed by: NURSE ANESTHETIST, CERTIFIED REGISTERED

## 2019-11-07 PROCEDURE — 25000125 ZZHC RX 250: Performed by: SURGERY

## 2019-11-07 PROCEDURE — 43239 EGD BIOPSY SINGLE/MULTIPLE: CPT | Performed by: SURGERY

## 2019-11-07 PROCEDURE — 45380 COLONOSCOPY AND BIOPSY: CPT | Performed by: SURGERY

## 2019-11-07 PROCEDURE — 25000128 H RX IP 250 OP 636: Performed by: NURSE ANESTHETIST, CERTIFIED REGISTERED

## 2019-11-07 RX ORDER — LIDOCAINE 40 MG/G
CREAM TOPICAL
Status: DISCONTINUED | OUTPATIENT
Start: 2019-11-07 | End: 2019-11-07 | Stop reason: HOSPADM

## 2019-11-07 RX ORDER — PROPOFOL 10 MG/ML
INJECTION, EMULSION INTRAVENOUS PRN
Status: DISCONTINUED | OUTPATIENT
Start: 2019-11-07 | End: 2019-11-07

## 2019-11-07 RX ORDER — PROPOFOL 10 MG/ML
INJECTION, EMULSION INTRAVENOUS CONTINUOUS PRN
Status: DISCONTINUED | OUTPATIENT
Start: 2019-11-07 | End: 2019-11-07

## 2019-11-07 RX ORDER — SODIUM CHLORIDE, SODIUM LACTATE, POTASSIUM CHLORIDE, CALCIUM CHLORIDE 600; 310; 30; 20 MG/100ML; MG/100ML; MG/100ML; MG/100ML
INJECTION, SOLUTION INTRAVENOUS CONTINUOUS
Status: DISCONTINUED | OUTPATIENT
Start: 2019-11-07 | End: 2019-11-07 | Stop reason: HOSPADM

## 2019-11-07 RX ORDER — NALOXONE HYDROCHLORIDE 0.4 MG/ML
.1-.4 INJECTION, SOLUTION INTRAMUSCULAR; INTRAVENOUS; SUBCUTANEOUS
Status: DISCONTINUED | OUTPATIENT
Start: 2019-11-07 | End: 2019-11-07 | Stop reason: HOSPADM

## 2019-11-07 RX ORDER — LIDOCAINE HYDROCHLORIDE 20 MG/ML
INJECTION, SOLUTION INFILTRATION; PERINEURAL PRN
Status: DISCONTINUED | OUTPATIENT
Start: 2019-11-07 | End: 2019-11-07

## 2019-11-07 RX ORDER — ONDANSETRON 2 MG/ML
4 INJECTION INTRAMUSCULAR; INTRAVENOUS
Status: DISCONTINUED | OUTPATIENT
Start: 2019-11-07 | End: 2019-11-07 | Stop reason: HOSPADM

## 2019-11-07 RX ORDER — FLUMAZENIL 0.1 MG/ML
0.2 INJECTION, SOLUTION INTRAVENOUS
Status: DISCONTINUED | OUTPATIENT
Start: 2019-11-07 | End: 2019-11-07 | Stop reason: HOSPADM

## 2019-11-07 RX ADMIN — SODIUM CHLORIDE, POTASSIUM CHLORIDE, SODIUM LACTATE AND CALCIUM CHLORIDE: 600; 310; 30; 20 INJECTION, SOLUTION INTRAVENOUS at 09:31

## 2019-11-07 RX ADMIN — PROPOFOL 140 MCG/KG/MIN: 10 INJECTION, EMULSION INTRAVENOUS at 09:51

## 2019-11-07 RX ADMIN — PROPOFOL 40 MG: 10 INJECTION, EMULSION INTRAVENOUS at 09:51

## 2019-11-07 RX ADMIN — LIDOCAINE HYDROCHLORIDE 40 MG: 20 INJECTION, SOLUTION INFILTRATION; PERINEURAL at 09:51

## 2019-11-07 ASSESSMENT — COPD QUESTIONNAIRES
COPD: 1
CAT_SEVERITY: MODERATE

## 2019-11-07 ASSESSMENT — MIFFLIN-ST. JEOR
SCORE: 795.72
SCORE: 809.33

## 2019-11-07 ASSESSMENT — LIFESTYLE VARIABLES: TOBACCO_USE: 1

## 2019-11-07 NOTE — OP NOTE
PROCEDURE NOTE    SURGEON:Santosh Barrera MD    PRE-OP DIAGNOSIS:   Hx of nelson's, diarrhea.       POST-OP DIAGNOSIS: Hx of nelson's, diarrhea.           PROCEDURE PLANNED:   Surveillance  EGD      Diagnostic Colonoscopy    PROCEDURE PERFORMED:  Flexible EGD with biopsies, Colonoscopy with cold snare    SPECIMEN:      ID Type Source Tests Collected by Time Destination   A : duodenal bx's Biopsy Small Intestine, Duodenum SURGICAL PATHOLOGY EXAM Santosh Barrera MD 11/7/2019  9:56 AM    B : antrum biopsies Biopsy Stomach, Antrum SURGICAL PATHOLOGY EXAM Santosh Barrera MD 11/7/2019  9:58 AM    C : distal esophagus biopsies Biopsy Esophagus, Distal SURGICAL PATHOLOGY EXAM Santosh Barrera MD 11/7/2019  9:59 AM    D : ascending colon polyps Polyp Large Intestine, Right/Ascending SURGICAL PATHOLOGY EXAM Santosh Barrera MD 11/7/2019 10:11 AM    E : sigmoid colon polyps Polyp Large Intestine, Sigmoid SURGICAL PATHOLOGY EXAM Santosh Barrera MD 11/7/2019 10:21 AM          ANESTHESIA:  Monitor Anesthesia Care  CRNA Independent: Henrry Burns APRN CRNA; Sheyla Vazquez APRN CRNA  Anesthesia coverage requested due to age over 70    ESTIMATED BLOOD LOSS: none    COMPLICATIONS:  None    INDICATION FOR THE PROCEDURE: The patient is a 74 year old female. The patient presents with diarrhea and hx of barrets. I explained to the patient the risks, benefits and alternatives to diagnostic EGD and colonoscopy for evaluating change in bowel symptoms and surveillance. We specifically discussed the risks of bleeding, infection, perforation, potential inability to reach the cecum and the risks of sedation. The patient's questions were answered and the patient wished to proceed. Informed consent paperwork was completed.    PROCEDURE: The patient was taken to the endoscopy suite. Appropriate monitors were attached.  The patient was placed in the left lateral decubitus position. Bite block was positioned.Timeout was performed  confirming the patient's identity and procedure to be performed.  After appropriate sedation was confirmed, the flexible endoscope was advanced into the oropharynx. The posterior oropharynx appeared grossly normal. The scope was advanced into the proximal esophagus. The esophagus was insufflated with air. The scope was advanced under direct visualization. No acute abnormalities of the esophagus were noted. The scope was advanced into the stomach. The scope was advanced through the pylorus into the duodenal bulb. The bulb and distal duodenum appeared grossly normal. The scope was withdrawn back into the stomach. Antral biopsy was obtained and sent to pathology. The scope was retroflexed and the GE junction inspected.  No abnormalities were noted.  The scope was returned to a neutral position and the stomach was decompressed. The scope was withdrawnto the GE junction and biopsy obtained. The Z line appeared normal. The mucosa of the esophagus was inspected while withdrawing the scope. No abnormalities were noted. The scope was withdrawn from the patient. The bite block was removed.    The patient was repositioned. After appropriate sedation, digital rectal exam was performed.  There was normal tone and no gross abnormality was noted.  The lubricated flexible colonoscope was introduced into the anus the colonwas insufflated with air. The prep quality was adequate. Under direct visualization the scope was advanced to the cecum. The ileocecal valve was intubated and the terminal ileum inspected. No gross abnormality was noted. The scope was withdrawn back into the cecum. Two cecal/ascending colon polyps were removed with cold snare. (5-7mm) The mucosa of colon was inspected while withdrawing the scope.  Two sigmoid polyps were removed with cold snare   (5-7mm). The scope was retroflexed in the rectum and the anorectal junction was inspected. No abnormalities were noted. The scope was returned to a neutral position and  the colon was decompressed. The scope was removed. The patient tolerated the procedure with no immediately apparent complication. The patient was taken to recovery in stable condition.    FOLLOW UP:  RECOMMENDATIONS Follow-up pathology, likely no further colonoscopy due to age.     Santosh Barrera MD     CC: Obi Johnston

## 2019-11-07 NOTE — ANESTHESIA PREPROCEDURE EVALUATION
Anesthesia Pre-Procedure Evaluation    Patient: Loulou Lucero   MRN: [id number] : 1945          Preoperative Diagnosis: * No pre-op diagnosis entered *    Procedure(s):  COLONOSCOPY  ESOPHAGOGASTRODUODENOSCOPY, WITH BIOPSY    Past Medical History:   Diagnosis Date     Asymptomatic varicose veins of lower extremity     2012     Benign paroxysmal vertigo     2010     Chronic obstructive pulmonary disease (H)     2010     Diarrhea     10/1/2015     Disorder of cartilage     Hip; Last dxa 2010     Diverticulosis of large intestine without perforation or abscess without bleeding           Lower abdominal pain     10/1/2015     Other disorders of lung (CODE)     Stable since 2002. RU lobe.     Personal history of other medical treatment (CODE)     L3, L4-4; Last MRI 12     Personal history of other medical treatment (CODE)     G-3, P-2, A-0  (Son had SIDS)     Zoster without complications     3/31/2012     Past Surgical History:   Procedure Laterality Date     COLONOSCOPY  2010    Normal; + family hx, next due      COLONOSCOPY  10/01/2015    W/ POLYPECTOMY recommend follow up in .     ESOPHAGOSCOPY, GASTROSCOPY, DUODENOSCOPY (EGD), COMBINED  2014    Arce's esophagus fu egd 2 yrs     LAPAROSCOPIC TUBAL LIGATION            TONSILLECTOMY & ADENOIDECTOMY              Anesthesia Evaluation     . Pt has had prior anesthetic.     No history of anesthetic complications          ROS/MED HX    ENT/Pulmonary: Comment: Smoking cessation information given to patient.     (+)tobacco use, Current use 1 packs/day  moderate COPD, , recent URI resolved 5 day course of antibiotics finished 1 week ago. : . .    Neurologic:  - neg neurologic ROS     Cardiovascular:  - neg cardiovascular ROS   (+) ----. : . . . :. . Previous cardiac testing Echodate:17results:date: results:ECG reviewed date:19 results: date: results:          METS/Exercise Tolerance:  4 - Raking  "leaves, gardening   Hematologic:  - neg hematologic  ROS       Musculoskeletal:  - neg musculoskeletal ROS       GI/Hepatic:     (+) GERD Asymptomatic on medication,       Renal/Genitourinary:  - ROS Renal section negative       Endo:  - neg endo ROS       Psychiatric:  - neg psychiatric ROS       Infectious Disease:  - neg infectious disease ROS       Malignancy:      - no malignancy   Other:    - neg other ROS                      Physical Exam  Normal systems: cardiovascular and pulmonary    Airway   Mallampati: II  TM distance: >3 FB  Neck ROM: full    Dental   (+) partials    Cardiovascular   Rhythm and rate: normal  (+) murmur       Pulmonary    breath sounds clear to auscultation            Lab Results   Component Value Date    WBC 7.0 10/24/2019    HGB 12.1 10/24/2019    HCT 36.5 10/24/2019     10/24/2019    CRP 7.9 (H) 09/30/2018    SED 43 (H) 04/07/2014     10/24/2019    POTASSIUM 3.4 (L) 10/24/2019    CHLORIDE 103 10/24/2019    CO2 30 10/24/2019    BUN 9 10/24/2019    CR 0.81 10/24/2019     (H) 10/24/2019    DION 9.1 10/24/2019    MAG 2.0 05/28/2017    ALBUMIN 4.2 10/24/2019    PROTTOTAL 7.6 10/24/2019    ALT 8 10/24/2019    AST 17 10/24/2019    ALKPHOS 44 10/24/2019    BILITOTAL 0.7 10/24/2019    LIPASE 15 10/24/2019       Preop Vitals  BP Readings from Last 3 Encounters:   11/07/19 (!) 166/82   10/24/19 139/67   10/24/19 138/84    Pulse Readings from Last 3 Encounters:   11/07/19 87   10/24/19 84   10/24/19 87      Resp Readings from Last 3 Encounters:   11/07/19 18   10/24/19 15   10/24/19 20    SpO2 Readings from Last 3 Encounters:   11/07/19 97%   10/24/19 94%   10/24/19 96%      Temp Readings from Last 1 Encounters:   11/07/19 98.6  F (37  C) (Temporal)    Ht Readings from Last 1 Encounters:   11/07/19 1.499 m (4' 11\")      Wt Readings from Last 1 Encounters:   11/07/19 39 kg (86 lb)    Estimated body mass index is 17.37 kg/m  as calculated from the following:    Height as of this " "encounter: 1.499 m (4' 11\").    Weight as of this encounter: 39 kg (86 lb).       Anesthesia Plan      History & Physical Review      ASA Status:  3 .    NPO Status:  > 6 hours    Plan for MAC with Intravenous induction.          Postoperative Care      Consents  Anesthetic plan, risks, benefits and alternatives discussed with:  Patient..                 VEENA MEDLEY CRNA  "

## 2019-11-07 NOTE — ANESTHESIA POSTPROCEDURE EVALUATION
Patient: Loulou Lucero    Procedure(s):  COLONOSCOPY, WITH POLYPECTOMY AND BIOPSY  ESOPHAGOGASTRODUODENOSCOPY, WITH BIOPSY    Diagnosis:* No pre-op diagnosis entered *  Diagnosis Additional Information: No value filed.    Anesthesia Type:  MAC    Note:  Anesthesia Post Evaluation    Patient location during evaluation: Phase 2  Patient participation: Able to fully participate in evaluation  Level of consciousness: awake and alert  Pain management: adequate  Airway patency: patent  Cardiovascular status: acceptable  Respiratory status: acceptable  Hydration status: acceptable  PONV: none             Last vitals:  Vitals:    11/07/19 0920 11/07/19 1040 11/07/19 1045   BP: (!) 166/82 (!) 144/87 (!) 140/72   Pulse: 87     Resp: 18 18    Temp: 98.6  F (37  C)     SpO2: 97% 95%          Electronically Signed By: VEENA MATTHEWS CRNA  November 7, 2019  10:52 AM

## 2019-11-07 NOTE — INTERVAL H&P NOTE
I saw and examined Loulou Lucero.  I have reviewed the history and physical and find no changes to the patient's medical status or condition with the exceptions noted below.     Santosh Barrera MD   9:43 AM 11/7/2019

## 2020-04-15 DIAGNOSIS — J43.9 PULMONARY EMPHYSEMA, UNSPECIFIED EMPHYSEMA TYPE (H): ICD-10-CM

## 2020-04-15 NOTE — TELEPHONE ENCOUNTER
Reason for call: Medication or medication refill    Name of medication requested: Wixela (Generic Advair)    Are you out of the medication? No    What pharmacy do you use? [facility]    Preferred method for responding to this message: Telephone Call    Phone number patient can be reached at: Home number on file [phone] (home)    If we cannot reach you directly, may we leave a detailed response at the number you provided? Yes

## 2020-04-15 NOTE — TELEPHONE ENCOUNTER
Pt sent Rx request for the following:   fluticasone-salmeterol (ADVAIR DISKUS) 250-50 MCG/DOSE inhaler-(WIXELA)  Sig:  Inhale 1 puff into the lungs 2 times daily WIXELA-Disp as covered by Pt's insurance    Last Prescription Date:   9/3/2019  Last Fill Qty/Refills:         3, R-1    Last Office Visit:              9/3/2019   Future Office visit:           None  Routing refill request to provider for review/approval because:  Long-Acting Beta Agonist Inhalers Protocol Failed   Order for Serevent, Striverdi, or Foradil and pt has steroid inhaler     Unable to complete prescription refill per RN Medication Refill Policy.................... Nataliia Marrero RN ....................  4/15/2020   11:24 AM

## 2020-04-20 ENCOUNTER — VIRTUAL VISIT (OUTPATIENT)
Dept: FAMILY MEDICINE | Facility: OTHER | Age: 75
End: 2020-04-20
Attending: FAMILY MEDICINE
Payer: MEDICARE

## 2020-04-20 DIAGNOSIS — M81.0 AGE-RELATED OSTEOPOROSIS WITHOUT CURRENT PATHOLOGICAL FRACTURE: ICD-10-CM

## 2020-04-20 DIAGNOSIS — K21.9 GASTROESOPHAGEAL REFLUX DISEASE WITHOUT ESOPHAGITIS: ICD-10-CM

## 2020-04-20 DIAGNOSIS — J43.9 PULMONARY EMPHYSEMA, UNSPECIFIED EMPHYSEMA TYPE (H): ICD-10-CM

## 2020-04-20 DIAGNOSIS — J22 LOWER RESPIRATORY INFECTION (E.G., BRONCHITIS, PNEUMONIA, PNEUMONITIS, PULMONITIS): ICD-10-CM

## 2020-04-20 PROCEDURE — 99442 ZZC PHYSICIAN TELEPHONE EVALUATION 11-20 MIN: CPT | Performed by: FAMILY MEDICINE

## 2020-04-20 RX ORDER — ALENDRONATE SODIUM 70 MG/1
TABLET ORAL
Qty: 12 TABLET | Refills: 0 | Status: SHIPPED | OUTPATIENT
Start: 2020-04-20 | End: 2020-08-24

## 2020-04-20 RX ORDER — PANTOPRAZOLE SODIUM 40 MG/1
40 TABLET, DELAYED RELEASE ORAL DAILY
Qty: 90 TABLET | Refills: 0 | Status: SHIPPED | OUTPATIENT
Start: 2020-04-20 | End: 2020-08-24

## 2020-04-20 RX ORDER — ALBUTEROL SULFATE 90 UG/1
1-2 AEROSOL, METERED RESPIRATORY (INHALATION) 4 TIMES DAILY PRN
Qty: 1 INHALER | Refills: 3 | Status: CANCELLED | OUTPATIENT
Start: 2020-04-20

## 2020-04-20 ASSESSMENT — ANXIETY QUESTIONNAIRES
2. NOT BEING ABLE TO STOP OR CONTROL WORRYING: NOT AT ALL
7. FEELING AFRAID AS IF SOMETHING AWFUL MIGHT HAPPEN: NOT AT ALL
IF YOU CHECKED OFF ANY PROBLEMS ON THIS QUESTIONNAIRE, HOW DIFFICULT HAVE THESE PROBLEMS MADE IT FOR YOU TO DO YOUR WORK, TAKE CARE OF THINGS AT HOME, OR GET ALONG WITH OTHER PEOPLE: NOT DIFFICULT AT ALL
3. WORRYING TOO MUCH ABOUT DIFFERENT THINGS: NOT AT ALL
1. FEELING NERVOUS, ANXIOUS, OR ON EDGE: NOT AT ALL
5. BEING SO RESTLESS THAT IT IS HARD TO SIT STILL: NOT AT ALL

## 2020-04-20 ASSESSMENT — PATIENT HEALTH QUESTIONNAIRE - PHQ9: 5. POOR APPETITE OR OVEREATING: NOT AT ALL

## 2020-04-20 NOTE — PROGRESS NOTES
"Loulou Lucero is a 75 year old female who is being evaluated via a billable telephone visit.      The patient has been notified of following:     \"This telephone visit will be conducted via a call between you and your physician/provider. We have found that certain health care needs can be provided without the need for a physical exam.  This service lets us provide the care you need with a short phone conversation.  If a prescription is necessary we can send it directly to your pharmacy.  If lab work is needed we can place an order for that and you can then stop by our lab to have the test done at a later time.    Telephone visits are billed at different rates depending on your insurance coverage. During this emergency period, for some insurers they may be billed the same as an in-person visit.  Please reach out to your insurance provider with any questions.    If during the course of the call the physician/provider feels a telephone visit is not appropriate, you will not be charged for this service.\"    Patient has given verbal consent for Telephone visit?  Yes    How would you like to obtain your AVS?     Subjective     Loulou Lucero is a 75 year old female who presents to clinic today for the following health issues: Patient calls requesting a refill of her Protonix Advair and Fosamax.  States that she is tolerating the medications well.  Last visit was October for weakness vomiting and diarrhea.  She currently has no specific complaints.  States that her inhaler is working well.  Tolerating the  Fosamax without difficulty.  And Protonix.  Patient does have a history of GERD that is under good control.    Medication Followup of refills on medication    Taking Medication as prescribed: yes    Side Effects:  None    Medication Helping Symptoms:  yes                  Reviewed and updated as needed this visit by Provider         Review of Systems          Objective   Reported vitals:  There were no vitals taken for this " visit.     PSYCH: Alert and oriented times 3; coherent speech, normal   rate and volume, able to articulate logical thoughts, able   to abstract reason, no tangential thoughts, no hallucinations   or delusions  Her affect is   RESP: No cough, no audible wheezing, able to talk in full sentences  Remainder of exam unable to be completed due to telephone visits            Assessment/Plan:  1. Pulmonary emphysema, unspecified emphysema type (H)  Continue  - fluticasone-salmeterol (ADVAIR DISKUS) 250-50 MCG/DOSE inhaler; Inhale 1 puff into the lungs 2 times daily WIXELA-Disp as covered by Pt's insurance  Dispense: 3 Inhaler; Refill: 1    2. Gastroesophageal reflux disease without esophagitis  Continue  - pantoprazole (PROTONIX) 40 MG EC tablet; Take 1 tablet (40 mg) by mouth daily  Dispense: 90 tablet; Refill: 0    3. Age-related osteoporosis without current pathological fracture  Continue  - alendronate (FOSAMAX) 70 MG tablet; Take 1 tablet (70 mg) by mouth with 8oz water every 7 days 30 minutes before breakfast and remain upright during this time.  Dispense: 12 tablet; Refill: 0    Patient is advised she is given 3 months.  She is advised that she should follow-up with her PCP prior to next refill.      No follow-ups on file.      Phone call duration:  12 minutes    Aleksandr Iraheta MD

## 2020-06-10 NOTE — DISCHARGE INSTRUCTIONS
From [facility]LDCT due 2nd attempt. Spoke to patient that LDCT is due. Said she will call today to schedule. Updated remind me.    [facility]

## 2020-08-24 ENCOUNTER — TELEPHONE (OUTPATIENT)
Dept: FAMILY MEDICINE | Facility: OTHER | Age: 75
End: 2020-08-24

## 2020-08-24 DIAGNOSIS — M81.0 AGE-RELATED OSTEOPOROSIS WITHOUT CURRENT PATHOLOGICAL FRACTURE: ICD-10-CM

## 2020-08-24 DIAGNOSIS — K21.9 GASTROESOPHAGEAL REFLUX DISEASE WITHOUT ESOPHAGITIS: ICD-10-CM

## 2020-08-24 RX ORDER — PANTOPRAZOLE SODIUM 40 MG/1
40 TABLET, DELAYED RELEASE ORAL DAILY
Qty: 90 TABLET | Refills: 3 | Status: SHIPPED | OUTPATIENT
Start: 2020-08-24 | End: 2021-09-13

## 2020-08-24 RX ORDER — ALENDRONATE SODIUM 70 MG/1
TABLET ORAL
Qty: 12 TABLET | Refills: 3 | Status: SHIPPED | OUTPATIENT
Start: 2020-08-24 | End: 2021-09-13

## 2020-08-24 NOTE — TELEPHONE ENCOUNTER
Patient needs med refills/px  -no appts available until Oct.  Please call to advise. Wants a work in soon.   Alicja Bejarano on 8/24/2020 at 2:01 PM

## 2020-08-24 NOTE — TELEPHONE ENCOUNTER
Transferred to scheduling to schedule Medicare wellness visit.  Norma J. Gosselin, LPN .......  8/24/2020  3:37 PM

## 2020-08-24 NOTE — TELEPHONE ENCOUNTER
You have ortho appt available in the afternoons can patient take one of those for a medicare wellness and refill meds?  meds teed-up if you want refill.  Norma J. Gosselin, LPN .......  8/24/2020  3:11 PM

## 2020-08-24 NOTE — TELEPHONE ENCOUNTER
Was filled for another year.  She can either follow-up as available in October or during a 40-minute visit in the afternoon.

## 2020-09-18 ENCOUNTER — OFFICE VISIT (OUTPATIENT)
Dept: FAMILY MEDICINE | Facility: OTHER | Age: 75
End: 2020-09-18
Attending: PHYSICIAN ASSISTANT
Payer: MEDICARE

## 2020-09-18 VITALS
RESPIRATION RATE: 22 BRPM | BODY MASS INDEX: 17.29 KG/M2 | WEIGHT: 85.6 LBS | TEMPERATURE: 97.3 F | HEART RATE: 84 BPM | DIASTOLIC BLOOD PRESSURE: 92 MMHG | SYSTOLIC BLOOD PRESSURE: 178 MMHG

## 2020-09-18 DIAGNOSIS — M62.830 BACK MUSCLE SPASM: ICD-10-CM

## 2020-09-18 DIAGNOSIS — Z86.19 HISTORY OF SHINGLES: ICD-10-CM

## 2020-09-18 DIAGNOSIS — M54.9 MUSCULOSKELETAL BACK PAIN: Primary | ICD-10-CM

## 2020-09-18 DIAGNOSIS — Z23 NEED FOR SHINGLES VACCINE: ICD-10-CM

## 2020-09-18 PROCEDURE — 99213 OFFICE O/P EST LOW 20 MIN: CPT | Performed by: PHYSICIAN ASSISTANT

## 2020-09-18 PROCEDURE — G0463 HOSPITAL OUTPT CLINIC VISIT: HCPCS

## 2020-09-18 RX ORDER — CYCLOBENZAPRINE HCL 10 MG
5-10 TABLET ORAL 3 TIMES DAILY PRN
Qty: 30 TABLET | Refills: 0 | Status: SHIPPED | OUTPATIENT
Start: 2020-09-18 | End: 2020-10-09

## 2020-09-18 RX ORDER — VALACYCLOVIR HYDROCHLORIDE 1 G/1
1000 TABLET, FILM COATED ORAL 3 TIMES DAILY
Qty: 21 TABLET | Refills: 0 | Status: SHIPPED | OUTPATIENT
Start: 2020-09-18 | End: 2020-10-09

## 2020-09-18 ASSESSMENT — PAIN SCALES - GENERAL: PAINLEVEL: EXTREME PAIN (8)

## 2020-09-18 NOTE — PROGRESS NOTES
Nursing Notes:   Mami Amaro LPN  9/18/2020  9:22 AM  Signed  Chief Complaint   Patient presents with     Musculoskeletal Problem     spasms in back         Medication Reconciliation: complete    Mami Amaro LPN      HPI:    Loulou Lucero is a 75 year old female who presents for shingles concern.  Patient has had shingles several times in the past.  Typically will get a stabbing pain in the back.  Last occurrence was in May 2018.  She only broke out with a rash the first time.  Every time after that she comes in for the medication.  In the middle the night she woke up with pain to the left of her midline back.  It feels like a stabbing sensation.     She has been up since 2 AM.  She states that she picked something on her back 3 to 4 days ago.  Unsure if it was a pimple.  Felt nauseous last night.  No current rash.  No fevers, chills, cough or cold symptoms.  No recent injury or trauma.  Patient has received Flexeril and valacyclovir in the past which helped alleviate her symptoms.    Past Medical History:   Diagnosis Date     Asymptomatic varicose veins of lower extremity     6/12/2012     Benign paroxysmal vertigo     12/29/2010     Chronic obstructive pulmonary disease (H)     12/29/2010     Diarrhea     10/1/2015     Disorder of cartilage     Hip; Last dxa 06/2010     Diverticulosis of large intestine without perforation or abscess without bleeding           Lower abdominal pain     10/1/2015     Other disorders of lung (CODE)     Stable since July of 2002. RU lobe.     Personal history of other medical treatment (CODE)     L3, L4-4; Last MRI 7/09/12     Personal history of other medical treatment (CODE)     G-3, P-2, A-0  (Son had SIDS)     Zoster without complications     3/31/2012       Past Surgical History:   Procedure Laterality Date     COLONOSCOPY  03/22/2010    Normal; + family hx, next due 2015     COLONOSCOPY  10/01/2015    W/ POLYPECTOMY recommend follow up in 2020.      COLONOSCOPY N/A 11/7/2019    4 tubular adenoma, 3 year follow up     ESOPHAGOSCOPY, GASTROSCOPY, DUODENOSCOPY (EGD), COMBINED  04/23/2014    Arce's esophagus fu egd 2 yrs     ESOPHAGOSCOPY, GASTROSCOPY, DUODENOSCOPY (EGD), COMBINED N/A 11/7/2019    Procedure: ESOPHAGOGASTRODUODENOSCOPY, WITH BIOPSY;  Surgeon: Santosh Barrera MD;  Location: [facility]     LAPAROSCOPIC TUBAL LIGATION  1978          TONSILLECTOMY & ADENOIDECTOMY  1951            Family History   Problem Relation Age of Onset     Colon Cancer Father         Cancer-colon     Other - See Comments Mother         Alzheimer's     Other - See Comments Maternal Grandmother         Alzheimer's     Other - See Comments Brother         Alzheimer's     Other - See Comments Brother         aneurysm and stents     Cancer Brother         Cancer,lung     Cancer Brother         Cancer,skin     Other - See Comments Brother         cirrhosis of the liver     Other - See Comments Maternal Uncle         3, Alzheimer's     Breast Cancer No family hx of         Cancer-breast       Social History     Tobacco Use     Smoking status: Current Every Day Smoker     Packs/day: 1.00     Years: 50.00     Pack years: 50.00     Types: Cigarettes     Smokeless tobacco: Never Used   Substance Use Topics     Alcohol use: Yes     Alcohol/week: 0.0 standard drinks     Comment: rare       Current Outpatient Medications   Medication Sig Dispense Refill     alendronate (FOSAMAX) 70 MG tablet Take 1 tablet (70 mg) by mouth with 8oz water every 7 days 30 minutes before breakfast and remain upright during this time. 12 tablet 3     ascorbic acid (VITAMIN C) 500 MG tablet Take 500 mg by mouth daily       calcium carbonate-vitamin D (SM CALCIUM 500/VITAMIN D3) 500-400 MG-UNIT TABS per tablet Take 1 tablet by mouth daily       cyclobenzaprine (FLEXERIL) 10 MG tablet Take 0.5-1 tablets (5-10 mg) by mouth 3 times daily as needed for muscle spasms 30 tablet 0     fluticasone-salmeterol (ADVAIR DISKUS)  250-50 MCG/DOSE inhaler Inhale 1 puff into the lungs 2 times daily WIXELA-Disp as covered by Pt's insurance 3 Inhaler 1     pantoprazole (PROTONIX) 40 MG EC tablet Take 1 tablet (40 mg) by mouth daily 90 tablet 3     valACYclovir (VALTREX) 1000 mg tablet Take 1 tablet (1,000 mg) by mouth 3 times daily for 7 days 21 tablet 0       Allergies   Allergen Reactions     Metronidazole Nausea and Vomiting     Pneumococcal Vaccine      Other reaction(s): Edema  Arm swelling     Tramadol Visual Disturbance     Hallucinations          REVIEW OF SYSTEMS:  Refer to HPI.    EXAM:   Vitals:    BP (!) 178/92 (BP Location: Right arm, Patient Position: Sitting, Cuff Size: Adult Regular)   Pulse 84   Temp 97.3  F (36.3  C)   Resp 22   Wt 38.8 kg (85 lb 9.6 oz)   BMI 17.29 kg/m      General Appearance: Pleasant, alert, appropriate appearance for age. No acute distress  Chest/Respiratory Exam: Normal chest wall and respirations. Clear to auscultation.  Cardiovascular Exam: Regular rate and rhythm. S1, S2, no murmur, click, gallop, or rubs.  Musculoskeletal Exam: Back is straight.  No spinal or paraspinous muscle pain to palpation.  Left lateral mid back pain with palpation.  No bruising or swelling appreciated.  Skin: no rash or abnormalities  Neurologic Exam: Nonfocal, normal gross motor, tone coordination and no tremor.  Psychiatric Exam: Alert and oriented - appropriate affect.    PHQ Depression Screen  PHQ-9 SCORE 9/14/2016 9/24/2018 10/18/2018   PHQ-9 Total Score 4 0 0       ASSESSMENT AND PLAN:      ICD-10-CM    1. Musculoskeletal back pain  M54.9 valACYclovir (VALTREX) 1000 mg tablet     cyclobenzaprine (FLEXERIL) 10 MG tablet   2. History of shingles  Z86.19 valACYclovir (VALTREX) 1000 mg tablet     cyclobenzaprine (FLEXERIL) 10 MG tablet   3. Back muscle spasm  M62.830 valACYclovir (VALTREX) 1000 mg tablet     cyclobenzaprine (FLEXERIL) 10 MG tablet   4. Need for shingles vaccine  Z23  IMM - HC ZOSTER VACCINE RECOMBINANT  ADJUVANTED IM NJX (SHINGRIX)         Patient is adamant that she is having a shingles flare.  Wanting to start on valacyclovir for treatment.  Sent valacyclovir and Flexeril to the pharmacy for treatment.  Gave warning signs and symptoms. Encouraged to take tylenol (1000mg) for relief up to 4 times per day.  Encouraged rest.  Encouraged to use ice 15 minutes at a time several times per day to decrease pain. Return to clinic with any change or worsening of symptoms.      Placed on the Shingrix waiting list.    Patient Instructions   Encouraged to take tylenol (1000mg) for relief up to 4 times per day.  Encouraged rest.  Encouraged to use ice 15 minutes at a time several times per day to decrease pain. Return to clinic with any change or worsening of symptoms.        Gifty Bhakta PA-C PA-C..................9/18/2020 9:22 AM

## 2020-09-18 NOTE — PATIENT INSTRUCTIONS
Encouraged to take tylenol (1000mg) for relief up to 4 times per day.  Encouraged rest.  Encouraged to use ice 15 minutes at a time several times per day to decrease pain. Return to clinic with any change or worsening of symptoms.

## 2020-09-18 NOTE — NURSING NOTE
Chief Complaint   Patient presents with     Musculoskeletal Problem     spasms in back         Medication Reconciliation: complete    Mami Amaro, LPN

## 2020-09-25 ENCOUNTER — TELEPHONE (OUTPATIENT)
Dept: FAMILY MEDICINE | Facility: OTHER | Age: 75
End: 2020-09-25

## 2020-09-25 DIAGNOSIS — Z23 NEED FOR SHINGLES VACCINE: Primary | ICD-10-CM

## 2020-09-25 NOTE — TELEPHONE ENCOUNTER
Wondering if she needs a shingle shot, states she is just getting over shingles.    Can leave a message.

## 2020-09-25 NOTE — TELEPHONE ENCOUNTER
I would recommend waiting until November to get the series to ensure that she is fully healed.  A prescription for the Shingrix vaccine was sent to [facility] pharmacy.  Gifty Bhakta PA-C.......... 9/25/2020 4:17 PM

## 2020-10-09 ENCOUNTER — OFFICE VISIT (OUTPATIENT)
Dept: FAMILY MEDICINE | Facility: OTHER | Age: 75
End: 2020-10-09
Attending: FAMILY MEDICINE
Payer: MEDICARE

## 2020-10-09 VITALS
TEMPERATURE: 97.8 F | SYSTOLIC BLOOD PRESSURE: 156 MMHG | HEIGHT: 60 IN | WEIGHT: 85.4 LBS | OXYGEN SATURATION: 98 % | DIASTOLIC BLOOD PRESSURE: 80 MMHG | HEART RATE: 88 BPM | BODY MASS INDEX: 16.77 KG/M2 | RESPIRATION RATE: 18 BRPM

## 2020-10-09 DIAGNOSIS — Z72.0 TOBACCO ABUSE: ICD-10-CM

## 2020-10-09 DIAGNOSIS — M81.0 AGE-RELATED OSTEOPOROSIS WITHOUT CURRENT PATHOLOGICAL FRACTURE: ICD-10-CM

## 2020-10-09 DIAGNOSIS — Z12.31 ENCOUNTER FOR SCREENING MAMMOGRAM FOR BREAST CANCER: ICD-10-CM

## 2020-10-09 DIAGNOSIS — Z87.891 PERSONAL HISTORY OF TOBACCO USE: ICD-10-CM

## 2020-10-09 DIAGNOSIS — Z00.00 ENCOUNTER FOR MEDICARE ANNUAL WELLNESS EXAM: Primary | ICD-10-CM

## 2020-10-09 DIAGNOSIS — Z23 NEED FOR VIRAL IMMUNIZATION: ICD-10-CM

## 2020-10-09 DIAGNOSIS — R23.4 SKIN TEXTURE CHANGES: ICD-10-CM

## 2020-10-09 DIAGNOSIS — Z13.220 SCREENING CHOLESTEROL LEVEL: ICD-10-CM

## 2020-10-09 DIAGNOSIS — J44.9 CHRONIC OBSTRUCTIVE PULMONARY DISEASE, UNSPECIFIED COPD TYPE (H): ICD-10-CM

## 2020-10-09 LAB
ALBUMIN SERPL-MCNC: 4 G/DL (ref 3.5–5.7)
ALP SERPL-CCNC: 47 U/L (ref 34–104)
ALT SERPL W P-5'-P-CCNC: 9 U/L (ref 7–52)
ANION GAP SERPL CALCULATED.3IONS-SCNC: 7 MMOL/L (ref 3–14)
AST SERPL W P-5'-P-CCNC: 19 U/L (ref 13–39)
BILIRUB SERPL-MCNC: 0.7 MG/DL (ref 0.3–1)
BUN SERPL-MCNC: 21 MG/DL (ref 7–25)
CALCIUM SERPL-MCNC: 10 MG/DL (ref 8.6–10.3)
CHLORIDE SERPL-SCNC: 102 MMOL/L (ref 98–107)
CHOLEST SERPL-MCNC: 205 MG/DL
CO2 SERPL-SCNC: 33 MMOL/L (ref 21–31)
CREAT SERPL-MCNC: 1 MG/DL (ref 0.6–1.2)
GFR SERPL CREATININE-BSD FRML MDRD: 54 ML/MIN/{1.73_M2}
GLUCOSE SERPL-MCNC: 111 MG/DL (ref 70–105)
HDLC SERPL-MCNC: 63 MG/DL (ref 23–92)
LDLC SERPL CALC-MCNC: 120 MG/DL
NONHDLC SERPL-MCNC: 142 MG/DL
POTASSIUM SERPL-SCNC: 4 MMOL/L (ref 3.5–5.1)
PROT SERPL-MCNC: 7.5 G/DL (ref 6.4–8.9)
SODIUM SERPL-SCNC: 142 MMOL/L (ref 134–144)
TRIGL SERPL-MCNC: 109 MG/DL
TSH SERPL DL<=0.05 MIU/L-ACNC: 2.52 IU/ML (ref 0.34–5.6)

## 2020-10-09 PROCEDURE — 84443 ASSAY THYROID STIM HORMONE: CPT | Mod: ZL | Performed by: FAMILY MEDICINE

## 2020-10-09 PROCEDURE — 90662 IIV NO PRSV INCREASED AG IM: CPT

## 2020-10-09 PROCEDURE — G0463 HOSPITAL OUTPT CLINIC VISIT: HCPCS

## 2020-10-09 PROCEDURE — 80053 COMPREHEN METABOLIC PANEL: CPT | Mod: ZL | Performed by: FAMILY MEDICINE

## 2020-10-09 PROCEDURE — G0296 VISIT TO DETERM LDCT ELIG: HCPCS | Performed by: FAMILY MEDICINE

## 2020-10-09 PROCEDURE — 36415 COLL VENOUS BLD VENIPUNCTURE: CPT | Mod: ZL

## 2020-10-09 PROCEDURE — G0439 PPPS, SUBSEQ VISIT: HCPCS | Performed by: FAMILY MEDICINE

## 2020-10-09 PROCEDURE — 99213 OFFICE O/P EST LOW 20 MIN: CPT | Mod: 25 | Performed by: FAMILY MEDICINE

## 2020-10-09 PROCEDURE — 80061 LIPID PANEL: CPT | Mod: ZL | Performed by: FAMILY MEDICINE

## 2020-10-09 ASSESSMENT — MIFFLIN-ST. JEOR: SCORE: 799.9

## 2020-10-09 ASSESSMENT — PAIN SCALES - GENERAL: PAINLEVEL: NO PAIN (0)

## 2020-10-09 NOTE — PROGRESS NOTES
"SUBJECTIVE:   Loulou Lucero is a 75 year old female who presents for Preventive Visit.    She has a history of COPD and continues to smoke approximately pack a day.  She has smoked for 60 years.  She continues on Advair twice daily.  She does not have any rescue inhalers at home and has had no need for that.    She continues on Protonix for acid reflux and Fosamax for osteoporosis.    She has had several readings of elevated blood pressures over last couple of office visits.  She does not check her blood pressure at home.  She is had no chest pain or shortness of breath.    She has noticed that she has had difficulty maintaining her weight, 2 years ago she was 100 pounds, last year 88 pounds and now she is 85 pounds.  She is had no night sweats or chills.  She admits that she is very active and has a poor appetite.  She also has noted thicker hair growth especially on her arms      Patient has been advised of split billing requirements and indicates understanding: Yes  Are you in the first 12 months of your Medicare Part B coverage?  No    Physical Health:    In general, how would you rate your overall physical health? fair    Outside of work, how many days during the week do you exercise? 6-7 days/week    Outside of work, approximately how many minutes a day do you exercise?45-60 minutes takes care of disabled     If you drink alcohol do you typically have >3 drinks per day or >7 drinks per week? Not Applicable    Do you usually eat at least 4 servings of fruit and vegetables a day, include whole grains & fiber and avoid regularly eating high fat or \"junk\" foods? NO    Do you have any problems taking medications regularly?  No    Do you have any side effects from medications? none    Needs assistance for the following daily activities: no assistance needed    Which of the following safety concerns are present in your home?  lack of grab bars in the bathroom     Hearing impairment: No    In the past 6 months, " have you been bothered by leaking of urine? no    Mental Health:    In general, how would you rate your overall mental or emotional health? good  PHQ-2 Score: 0    Do you feel safe in your environment? Yes    Have you ever done Advance Care Planning? (For example, a Health Directive, POLST, or a discussion with a medical provider or your loved ones about your wishes): No, advance care planning information given to patient to review.  Patient plans to discuss their wishes with loved ones or provider.      Additional concerns to address?  No    Fall risk:  Fallen 2 or more times in the past year?: No  Any fall with injury in the past year?: No    Cognitive Screenin) Repeat 3 items (Leader, Season, Table)    2) Clock draw: NORMAL  3) 3 item recall: Recalls 3 objects  Results: NORMAL clock, 1-2 items recalled: COGNITIVE IMPAIRMENT LESS LIKELY    Mini-CogTM Copyright S Lisandro. Licensed by the author for use in [facility]; reprinted with permission (chuck@[facility]). All rights reserved.      Do you have sleep apnea, excessive snoring or daytime drowsiness?: no      Reviewed and updated as needed this visit by clinical staff   Allergies               Reviewed and updated as needed this visit by Provider                Social History     Tobacco Use     Smoking status: Current Every Day Smoker     Packs/day: 1.00     Years: 50.00     Pack years: 50.00     Types: Cigarettes     Smokeless tobacco: Never Used   Substance Use Topics     Alcohol use: Yes     Alcohol/week: 0.0 standard drinks     Comment: rare                           Current providers sharing in care for this patient include:   Patient Care Team:  Obi Johnston MD as PCP - General (Family Practice)  Gifty Bhakta PA-C as Assigned PCP    The following health maintenance items are reviewed in Epic and correct as of today:  Health Maintenance   Topic Date Due     SPIROMETRY  1945     HEPATITIS C SCREENING  1945     COPD ACTION  "PLAN  1945     MEDICARE ANNUAL WELLNESS VISIT  02/16/2010     Pneumococcal Vaccine: 65+ Years (1 of 1 - PPSV23) 02/16/2010     DEXA  01/14/2019     ADVANCE CARE PLANNING  04/07/2019     INFLUENZA VACCINE (1) 09/01/2020     LIPID  10/07/2020     ZOSTER IMMUNIZATION (2 of 3) 09/12/2021 (Originally 2/17/2012)     FALL RISK ASSESSMENT  04/20/2021     MAMMO SCREENING  09/11/2021     DTAP/TDAP/TD IMMUNIZATION (2 - Td) 05/18/2024     COLORECTAL CANCER SCREENING  11/07/2024     PHQ-2  Completed     Pneumococcal Vaccine: Pediatrics (0 to 5 Years) and At-Risk Patients (6 to 64 Years)  Aged Out     IPV IMMUNIZATION  Aged Out     MENINGITIS IMMUNIZATION  Aged Out     HEPATITIS B IMMUNIZATION  Aged Out     Lab work is in process    ROS:  Constitutional, HEENT, cardiovascular, pulmonary, gi and gu systems are negative, except as otherwise noted.    OBJECTIVE:   There were no vitals taken for this visit. Estimated body mass index is 17.29 kg/m  as calculated from the following:    Height as of 11/7/19: 1.499 m (4' 11\").    Weight as of 9/18/20: 38.8 kg (85 lb 9.6 oz).  EXAM:   GENERAL: healthy, alert and no distress  EYES: Eyes grossly normal to inspection, PERRL and conjunctivae and sclerae normal  HENT: ear canals and TM's normal, nose and mouth without ulcers or lesions  NECK: no adenopathy, no asymmetry, masses, or scars and thyroid normal to palpation  RESP: lungs clear to auscultation - no rales, rhonchi or wheezes  CV: regular rate and rhythm, normal S1 S2,.  Systolic murmur heard best at the right upper sternal border with radiation to her right carotid. No click or rub, no peripheral edema and peripheral pulses strong  ABDOMEN: soft, nontender, no hepatosplenomegaly, no masses and bowel sounds normal  MS: no gross musculoskeletal defects noted, no edema  SKIN: no suspicious lesions or rashes  NEURO: Normal strength and tone, mentation intact and speech normal  PSYCH: mentation appears normal, affect " "normal/bright    ASSESSMENT / PLAN:       ICD-10-CM    1. Encounter for Medicare annual wellness exam  Z00.00    2. Need for viral immunization  Z23 FLUZONE HIGH DOSE 65+  [23080]   3. Chronic obstructive pulmonary disease, unspecified COPD type (H)  J44.9    4. Age-related osteoporosis without current pathological fracture  M81.0    5. Tobacco abuse  Z72.0    6. Personal history of tobacco use  Z87.891 Prof fee: Shared Decisionmaking for Lung Cancer Screening     CT Chest Lung Cancer Scrn Low Dose wo   7. Encounter for screening mammogram for breast cancer  Z12.31 MA Screen Bilateral w/Cm   8. Screening cholesterol level  Z13.220 Lipid Panel     Comprehensive Metabolic Panel   9. Skin texture changes  R23.4 TSH Reflex GH     She last had echocardiogram in 2018 which showed aortic sclerosis.  She is currently asymptomatic and discussed repeating this however we will hold off at this time.    We will get fasting labs including a TSH given her weight loss and skin texture changes as well as increased hair growth.    Discussed importance of quitting smoking, she is not interested at this time.  We will set her up for a CT scan looking for lung cancer screening.    Refer for mammography.    Flu shot will be given today.  She reacted to pneumonia vaccines in the past so we will avoid that.  Also discussed pursuing shingles vaccine on her own.    Patient has been advised of split billing requirements and indicates understanding: Yes    COUNSELING:  Reviewed preventive health counseling, as reflected in patient instructions       Regular exercise       Healthy diet/nutrition       Vision screening       Dental care       Fall risk prevention    Estimated body mass index is 17.29 kg/m  as calculated from the following:    Height as of 11/7/19: 1.499 m (4' 11\").    Weight as of 9/18/20: 38.8 kg (85 lb 9.6 oz).    Weight management plan noted, stable and monitoring    She reports that she has been smoking cigarettes. She " has a 50.00 pack-year smoking history. She has never used smokeless tobacco.  Tobacco Cessation Action Plan:   Information offered: Patient not interested at this time    Appropriate preventive services were discussed with this patient, including applicable screening as appropriate for cardiovascular disease, diabetes, osteopenia/osteoporosis, and glaucoma.  As appropriate for age/gender, discussed screening for colorectal cancer, prostate cancer, breast cancer, and cervical cancer. Checklist reviewing preventive services available has been given to the patient.    Reviewed patients plan of care and provided an AVS. The Basic Care Plan (routine screening as documented in Health Maintenance) for Loulou meets the Care Plan requirement. This Care Plan has been established and reviewed with the Patient.    Counseling Resources:  ATP IV Guidelines  Pooled Cohorts Equation Calculator  Breast Cancer Risk Calculator  BRCA-Related Cancer Risk Assessment: FHS-7 Tool  FRAX Risk Assessment  ICSI Preventive Guidelines  Dietary Guidelines for Americans, 2010  USDA's MyPlate  ASA Prophylaxis  Lung CA Screening    Obi Johnston MD  [facility] AND [facility]

## 2020-10-09 NOTE — PATIENT INSTRUCTIONS
Patient Education   Personalized Prevention Plan  You are due for the preventive services outlined below.  Your care team is available to assist you in scheduling these services.  If you have already completed any of these items, please share that information with your care team to update in your medical record.  Health Maintenance Due   Topic Date Due     Breathing Capacity Test  1945     Hepatitis C Screening  1945     COPD Action Plan  1945     Pneumococcal Vaccine (1 of 1 - PPSV23) 02/16/2010     Osteoporosis Screening  01/14/2019     Discuss Advance Care Planning  04/07/2019     Flu Vaccine (1) 09/01/2020     Cholesterol Lab  10/07/2020        Lung Cancer Screening   Frequently Asked Questions  If you are at high-risk for lung cancer, getting screened with low-dose computed tomography (LDCT) every year can help save your life. This handout offers answers to some of the most common questions about lung cancer screening. If you have other questions, please call [phone] ([phone]).     What is it?  Lung cancer screening uses special X-ray technology to create an image of your lung tissue. The exam is quick and easy and takes less than 10 seconds. We don t give you any medicine or use any needles. You can eat before and after the exam. You don t need to change your clothes as long as the clothing on your chest doesn t contain metal. But, you do need to be able to hold your breath for at least 6 seconds during the exam.    What is the goal of lung cancer screening?  The goal of lung cancer screening is to save lives. Many times, lung cancer is not found until a person starts having physical symptoms. Lung cancer screening can help detect lung cancer in the earliest stages when it may be easier to treat.    Who should be screened for lung cancer?  We suggest lung cancer screening for anyone who is at high-risk for lung cancer. You are in the high-risk group if you:      are between  the ages of 55 and 79, and    have smoked at least 1 pack of cigarettes a day for 30 or more years, and    still smoke or have quit within the past 15 years.    However, if you have a new cough or shortness of breath, you should talk to your doctor before being screened.    Some national lung health advocacy groups also recommend screening for people ages 50 to 79 who have smoked an average of 1 pack of cigarettes a day for 20 years. They must also have at least 1 other risk factor for lung cancer, not including exposure to secondhand smoke. Other risk factors are having had cancer in the past, emphysema, pulmonary fibrosis, COPD, a family history of lung cancer, or exposure to certain materials such as arsenic, asbestos, beryllium, cadmium, chromium, diesel fumes, nickel, radon or silica. Your care team can help you know if you have one of these risk factors.     Why does it matter if I have symptoms?  Certain symptoms can be a sign that you have a condition in your lungs that should be checked and treated by your doctor. These symptoms include fever, chest pain, a new or changing cough, shortness of breath that you have never felt before, coughing up blood or unexplained weight loss. Having any of these symptoms can greatly affect the results of lung cancer screening.       Should all smokers get an LDCT lung cancer screening exam?  It depends. Lung cancer screening is for a very specific group of men and women who have a history of heavy smoking over a long period of time (see  Who should be screened for lung cancer  above).  I am in the high-risk group, but have been diagnosed with cancer in the past. Is LDCT lung cancer screening right for me?  In some cases, you should not have LDCT lung screening, such as when your doctor is already following your cancer with CT scan studies. Your doctor will help you decide if LDCT lung screening is right for you.  Do I need to have a screening exam every year?  Yes. If you  are in the high-risk group described earlier, you should get an LDCT lung cancer screening exam every year until you are 79, or are no longer willing or able to undergo screening and possible procedures to diagnose and treat lung cancer.  How effective is LDCT at preventing death from lung cancer?  Studies have shown that LDCT lung cancer screening can lower the risk of death from lung cancer by 20 percent in people who are at high-risk.  What are the risks?  There are some risks and limitations of LDCT lung cancer screening. We want to make sure you understand the risks and benefits, so please let us know if you have any questions. Your doctor may want to talk with you more about these risks.    Radiation exposure: As with any exam that uses radiation, there is a very small increased risk of cancer. The amount of radiation in LDCT is small--about the same amount a person would get from a mammogram. Your doctor orders the exam when he or she feels the potential benefits outweigh the risks.    False negatives: No test is perfect, including LDCT. It is possible that you may have a medical condition, including lung cancer, that is not found during your exam. This is called a false negative result.    False positives and more testing: LDCT very often finds something in the lung that could be cancer, but in fact is not. This is called a false positive result. False positive tests often cause anxiety. To make sure these findings are not cancer, you may need to have more tests. These tests will be done only if you give us permission. Sometimes patients need a treatment that can have side effects, such as a biopsy. For more information on false positives, see  What can I expect from the results?     Findings not related to lung cancer: Your LDCT exam also takes pictures of areas of your body next to your lungs. In a very small number of cases, the CT scan will show an abnormal finding in one of these areas, such as your  kidneys, adrenal glands, liver or thyroid. This finding may not be serious, but you may need more tests. Your doctor can help you decide what other tests you may need, if any.  What can I expect from the results?  About 1 out of 4 LDCT exams will find something that may need more tests. Most of the time, these findings are lung nodules. Lung nodules are very small collections of tissue in the lung. These nodules are very common, and the vast majority--more than 97 percent--are not cancer (benign). Most are normal lymph nodes or small areas of scarring from past infections.  But, if a small lung nodule is found to be cancer, the cancer can be cured more than 90 percent of the time. To know if the nodule is cancer, we may need to get more images before your next yearly screening exam. If the nodule has suspicious features (for example, it is large, has an odd shape or grows over time), we will refer you to a specialist for further testing.  Will my doctor also get the results?  Yes. Your doctor will get a copy of your results.  Is it okay to keep smoking now that there s a cancer screening exam?  No. Tobacco is one of the strongest cancer-causing agents. It causes not only lung cancer, but other cancers and cardiovascular (heart) diseases as well. The damage caused by smoking builds over time. This means that the longer you smoke, the higher your risk of disease. While it is never too late to quit, the sooner you quit, the better.  Where can I find help to quit smoking?  The best way to prevent lung cancer is to stop smoking. If you have already quit smoking, congratulations and keep it up! For help on quitting smoking, please call QUITPLAN at 0-464-798-LZNE ([phone]) or the American Cancer Society at 1-671.134.8798 to find local resources near you.  One-on-one health coaching:  If you d prefer to work individually with a health care provider on tobacco cessation, we offer:      Medication Therapy Management:  Our  specially trained pharmacists work closely with you and your doctor to help you quit smoking.  Call [phone] or [phone] (toll free).     Can Do: Health coaching offered by [facility].  [email or web address]

## 2020-10-09 NOTE — NURSING NOTE
Patient presents today for annual medicare wellness exam.  Medication Reconciliation Complete    Mehnaz Ledesma LPN  10/9/2020 10:06 AM

## 2020-10-09 NOTE — PROGRESS NOTES
Lung Cancer Screening Shared Decision Making Visit     Loulou Lucero is eligible for lung cancer screening on the basis of the information provided in my signed lung cancer screening order.     I have discussed with patient the risks and benefits of screening for lung cancer with low-dose CT.     The risks include:  radiation exposure: one low dose chest CT has as much ionizing radiation as about 15 chest x-rays or 6 months of background radiation living in [place]    false positives: 96% of positive findings/nodules are NOT cancer, but some might still require additional diagnostic evaluation, including biopsy  over-diagnosis: some slow growing cancers that might never have been clinically significant will be detected and treated unnecessarily     The benefit of early detection of lung cancer is contingent upon adherence to annual screening or more frequent follow up if indicated.     Furthermore, reaping the benefits of screening requires Loulou Lucero to be willing and physically able to undergo diagnostic procedures, if indicated. Although no specific guide is available for determining severity of comorbidities, it is reasonable to withhold screening in patients who have greater mortality risk from other diseases.     We did discuss that the only way to prevent lung cancer is to not smoke. Smoking cessation counseling was given, duration 3-10 minutes.      I did not offer risk estimation using a calculator such as this one:    ShouldIScreen

## 2020-10-09 NOTE — LETTER
October 9, 2020      Loulou Lucero  [address] [place] [id number]        Dear ,    We are writing to inform you of your test results.    Your cholesterol panel, diabetes screening with fasting glucose, liver and kidney testing all came back normal.  This is reassuring and should be checked again in 1 year.    Thyroid testing also came back normal which is reassuring.  I would consider annual supplements on a daily basis such as boost or Ensure to try to increase your calorie intake which may help maintain your weight.  We will contact you separately with your CT scan results.    Resulted Orders   TSH Reflex GH   Result Value Ref Range    TSH Reflex 2.52 0.34 - 5.60 IU/mL   Comprehensive Metabolic Panel   Result Value Ref Range    Sodium 142 134 - 144 mmol/L    Potassium 4.0 3.5 - 5.1 mmol/L    Chloride 102 98 - 107 mmol/L    Carbon Dioxide 33 (H) 21 - 31 mmol/L    Anion Gap 7 3 - 14 mmol/L    Glucose 111 (H) 70 - 105 mg/dL    Urea Nitrogen 21 7 - 25 mg/dL    Creatinine 1.00 0.60 - 1.20 mg/dL    GFR Estimate 54 (L) >60 mL/min/[1.73_m2]    GFR Estimate If Black 65 >60 mL/min/[1.73_m2]    Calcium 10.0 8.6 - 10.3 mg/dL    Bilirubin Total 0.7 0.3 - 1.0 mg/dL    Albumin 4.0 3.5 - 5.7 g/dL    Protein Total 7.5 6.4 - 8.9 g/dL    Alkaline Phosphatase 47 34 - 104 U/L    ALT 9 7 - 52 U/L    AST 19 13 - 39 U/L   Lipid Panel   Result Value Ref Range    Cholesterol 205 (H) <200 mg/dL    Triglycerides 109 <150 mg/dL    HDL Cholesterol 63 23 - 92 mg/dL    LDL Cholesterol Calculated 120 (H) <100 mg/dL      Comment:      Above desirable:  100-129 mg/dl  Borderline High:  130-159 mg/dL  High:             160-189 mg/dL  Very high:       >189 mg/dl      Non HDL Cholesterol 142 (H) <130 mg/dL      Comment:      Above Desirable:  130-159 mg/dl  Borderline high:  160-189 mg/dl  High:             190-219 mg/dl  Very high:       >219 mg/dl         If you have any questions or concerns, please call the clinic at the number  listed above.       Sincerely,        Obi Johnston MD

## 2020-11-09 ENCOUNTER — HOSPITAL ENCOUNTER (OUTPATIENT)
Dept: CT IMAGING | Facility: OTHER | Age: 75
End: 2020-11-09
Attending: FAMILY MEDICINE
Payer: MEDICARE

## 2020-11-09 ENCOUNTER — HOSPITAL ENCOUNTER (OUTPATIENT)
Dept: MAMMOGRAPHY | Facility: OTHER | Age: 75
End: 2020-11-09
Attending: FAMILY MEDICINE
Payer: MEDICARE

## 2020-11-09 DIAGNOSIS — Z87.891 PERSONAL HISTORY OF TOBACCO USE: ICD-10-CM

## 2020-11-09 DIAGNOSIS — Z12.31 ENCOUNTER FOR SCREENING MAMMOGRAM FOR BREAST CANCER: ICD-10-CM

## 2020-11-09 PROCEDURE — 77063 BREAST TOMOSYNTHESIS BI: CPT

## 2020-11-09 PROCEDURE — G0297 LDCT FOR LUNG CA SCREEN: HCPCS

## 2020-11-11 ENCOUNTER — TELEPHONE (OUTPATIENT)
Dept: FAMILY MEDICINE | Facility: OTHER | Age: 75
End: 2020-11-11

## 2020-11-11 NOTE — TELEPHONE ENCOUNTER
"Martina from [facility] called to report abnormal finding on patients CT lung. Martina stated,\"patient has extensive bilateral pulmonary scarring but it is noted stable from 2018\".     Mehnaz Ledesma LPN on 11/11/2020 at 3:19 PM      "

## 2020-11-19 ENCOUNTER — VIRTUAL VISIT (OUTPATIENT)
Dept: FAMILY MEDICINE | Facility: OTHER | Age: 75
End: 2020-11-19
Attending: FAMILY MEDICINE
Payer: MEDICARE

## 2020-11-19 DIAGNOSIS — J98.4 PULMONARY SCARRING: ICD-10-CM

## 2020-11-19 DIAGNOSIS — J41.0 SIMPLE CHRONIC BRONCHITIS (H): Primary | ICD-10-CM

## 2020-11-19 PROCEDURE — 99442 PR PHYSICIAN TELEPHONE EVALUATION 11-20 MIN: CPT | Mod: 95 | Performed by: FAMILY MEDICINE

## 2020-11-19 PROCEDURE — G0463 HOSPITAL OUTPT CLINIC VISIT: HCPCS | Mod: TEL

## 2020-11-19 ASSESSMENT — PAIN SCALES - GENERAL: PAINLEVEL: NO PAIN (0)

## 2020-11-19 NOTE — PROGRESS NOTES
"Loulou Lucero is a 75 year old female who is being evaluated via a billable telephone visit.      The patient has been notified of following:     \"This telephone visit will be conducted via a call between you and your physician/provider. We have found that certain health care needs can be provided without the need for a physical exam.  This service lets us provide the care you need with a short phone conversation.  If a prescription is necessary we can send it directly to your pharmacy.  If lab work is needed we can place an order for that and you can then stop by our lab to have the test done at a later time.    Telephone visits are billed at different rates depending on your insurance coverage. During this emergency period, for some insurers they may be billed the same as an in-person visit.  Please reach out to your insurance provider with any questions.    If during the course of the call the physician/provider feels a telephone visit is not appropriate, you will not be charged for this service.\"    Patient has given verbal consent for Telephone visit?  Yes    What phone number would you like to be contacted at? [phone]    How would you like to obtain your AVS? Mail a copy    Subjective     Loulou Lucero is a 75 year old female who presents via phone visit today for the following health issues:    HPI   Patient calls concerned about CT scanning letter.  She states that she received a letter from radiology indicating that she has no nodules but she should follow-up for further evaluation.  A letter according the patient does not state what the further evaluation was.  I discussed with her that I reviewed her CT scan.  There is pulmonary scarring.  Stable.  And the report also indicates mild coronary L calcium.  She currently is not complaining of any chest pain with exertion.  She continues to smoke.             Review of Systems          Objective   Vitals - Patient Reported  Pain Score: No Pain " (0)          PSYCH: Alert and oriented times 3; coherent speech, normal   rate and volume, able to articulate logical thoughts, able   to abstract reason, no tangential thoughts, no hallucinations   or delusions  Her affect is   RESP: No cough, no audible wheezing, able to talk in full sentences  Remainder of exam unable to be completed due to telephone visits            Assessment/Plan:    Assessment & Plan     Simple chronic bronchitis (H)      Pulmonary scarring  I advised patient I did not know what they were concerned about.  I suspect that her age and smoking history mild coronary artery calcium is appropriate.  Encouraged her to quit smoking.  She was only mildly receptive this.  Follow-up with PCP.              No follow-ups on file.    Aleksandr Iraheta MD  [facility]    Phone call duration:  12 minutes

## 2021-01-12 ENCOUNTER — APPOINTMENT (OUTPATIENT)
Dept: FAMILY MEDICINE | Facility: OTHER | Age: 76
End: 2021-01-12
Attending: FAMILY MEDICINE
Payer: MEDICARE

## 2021-01-12 DIAGNOSIS — J43.9 PULMONARY EMPHYSEMA, UNSPECIFIED EMPHYSEMA TYPE (H): ICD-10-CM

## 2021-01-15 ENCOUNTER — TELEPHONE (OUTPATIENT)
Dept: FAMILY MEDICINE | Facility: OTHER | Age: 76
End: 2021-01-15

## 2021-01-15 NOTE — TELEPHONE ENCOUNTER
Let patient know that pharmacy receipt was confirmed on 1/12/21 and to call them to see if they got the order yet. If not, to call back and we will dig more into it to see what is going on.  Genny Carr LPN.......  1/15/2021  9:11 AM

## 2021-09-09 ENCOUNTER — TELEPHONE (OUTPATIENT)
Dept: FAMILY MEDICINE | Facility: OTHER | Age: 76
End: 2021-09-09

## 2021-09-09 DIAGNOSIS — J43.9 PULMONARY EMPHYSEMA, UNSPECIFIED EMPHYSEMA TYPE (H): ICD-10-CM

## 2021-09-09 DIAGNOSIS — K21.9 GASTROESOPHAGEAL REFLUX DISEASE WITHOUT ESOPHAGITIS: ICD-10-CM

## 2021-09-09 DIAGNOSIS — M81.0 AGE-RELATED OSTEOPOROSIS WITHOUT CURRENT PATHOLOGICAL FRACTURE: ICD-10-CM

## 2021-09-13 RX ORDER — ALENDRONATE SODIUM 70 MG/1
TABLET ORAL
Qty: 12 TABLET | Refills: 3 | Status: SHIPPED | OUTPATIENT
Start: 2021-09-13 | End: 2021-10-01

## 2021-09-13 RX ORDER — PANTOPRAZOLE SODIUM 40 MG/1
40 TABLET, DELAYED RELEASE ORAL DAILY
Qty: 90 TABLET | Refills: 3 | Status: SHIPPED | OUTPATIENT
Start: 2021-09-13 | End: 2022-10-18

## 2021-09-13 NOTE — TELEPHONE ENCOUNTER
Patient is needing refill of her medications. She is scheduled for annual physical on 10/01/21.    Mehnaz Ledesma LPN on 9/13/2021 at 8:49 AM

## 2021-10-01 ENCOUNTER — OFFICE VISIT (OUTPATIENT)
Dept: FAMILY MEDICINE | Facility: OTHER | Age: 76
End: 2021-10-01
Attending: FAMILY MEDICINE
Payer: MEDICARE

## 2021-10-01 VITALS
BODY MASS INDEX: 14.45 KG/M2 | DIASTOLIC BLOOD PRESSURE: 72 MMHG | SYSTOLIC BLOOD PRESSURE: 136 MMHG | OXYGEN SATURATION: 97 % | HEIGHT: 60 IN | HEART RATE: 92 BPM | RESPIRATION RATE: 20 BRPM | WEIGHT: 73.6 LBS | TEMPERATURE: 97.5 F

## 2021-10-01 DIAGNOSIS — Z13.220 SCREENING CHOLESTEROL LEVEL: ICD-10-CM

## 2021-10-01 DIAGNOSIS — J41.0 SIMPLE CHRONIC BRONCHITIS (H): ICD-10-CM

## 2021-10-01 DIAGNOSIS — M81.0 AGE-RELATED OSTEOPOROSIS WITHOUT CURRENT PATHOLOGICAL FRACTURE: ICD-10-CM

## 2021-10-01 DIAGNOSIS — R63.4 WEIGHT LOSS: ICD-10-CM

## 2021-10-01 DIAGNOSIS — Z00.00 ENCOUNTER FOR MEDICARE ANNUAL WELLNESS EXAM: Primary | ICD-10-CM

## 2021-10-01 DIAGNOSIS — K21.9 GASTROESOPHAGEAL REFLUX DISEASE WITHOUT ESOPHAGITIS: ICD-10-CM

## 2021-10-01 LAB
ALBUMIN SERPL-MCNC: 3.9 G/DL (ref 3.5–5.7)
ALP SERPL-CCNC: 58 U/L (ref 34–104)
ALT SERPL W P-5'-P-CCNC: 8 U/L (ref 7–52)
ANION GAP SERPL CALCULATED.3IONS-SCNC: 8 MMOL/L (ref 3–14)
AST SERPL W P-5'-P-CCNC: 19 U/L (ref 13–39)
BASOPHILS # BLD AUTO: 0.1 10E3/UL (ref 0–0.2)
BASOPHILS NFR BLD AUTO: 1 %
BILIRUB SERPL-MCNC: 1 MG/DL (ref 0.3–1)
BUN SERPL-MCNC: 15 MG/DL (ref 7–25)
CALCIUM SERPL-MCNC: 9.7 MG/DL (ref 8.6–10.3)
CHLORIDE BLD-SCNC: 102 MMOL/L (ref 98–107)
CHOLEST SERPL-MCNC: 192 MG/DL
CO2 SERPL-SCNC: 30 MMOL/L (ref 21–31)
CREAT SERPL-MCNC: 0.89 MG/DL (ref 0.6–1.2)
EOSINOPHIL # BLD AUTO: 0 10E3/UL (ref 0–0.7)
EOSINOPHIL NFR BLD AUTO: 1 %
ERYTHROCYTE [DISTWIDTH] IN BLOOD BY AUTOMATED COUNT: 13 % (ref 10–15)
FASTING STATUS PATIENT QL REPORTED: ABNORMAL
GFR SERPL CREATININE-BSD FRML MDRD: 63 ML/MIN/1.73M2
GLUCOSE BLD-MCNC: 100 MG/DL (ref 70–105)
HCT VFR BLD AUTO: 37.3 % (ref 35–47)
HDLC SERPL-MCNC: 62 MG/DL (ref 23–92)
HGB BLD-MCNC: 12.3 G/DL (ref 11.7–15.7)
IMM GRANULOCYTES # BLD: 0 10E3/UL
IMM GRANULOCYTES NFR BLD: 0 %
LDLC SERPL CALC-MCNC: 113 MG/DL
LYMPHOCYTES # BLD AUTO: 1 10E3/UL (ref 0.8–5.3)
LYMPHOCYTES NFR BLD AUTO: 19 %
MCH RBC QN AUTO: 30.2 PG (ref 26.5–33)
MCHC RBC AUTO-ENTMCNC: 33 G/DL (ref 31.5–36.5)
MCV RBC AUTO: 92 FL (ref 78–100)
MONOCYTES # BLD AUTO: 0.4 10E3/UL (ref 0–1.3)
MONOCYTES NFR BLD AUTO: 7 %
NEUTROPHILS # BLD AUTO: 4 10E3/UL (ref 1.6–8.3)
NEUTROPHILS NFR BLD AUTO: 72 %
NONHDLC SERPL-MCNC: 130 MG/DL
NRBC # BLD AUTO: 0 10E3/UL
NRBC BLD AUTO-RTO: 0 /100
PLATELET # BLD AUTO: 216 10E3/UL (ref 150–450)
POTASSIUM BLD-SCNC: 3.8 MMOL/L (ref 3.5–5.1)
PROT SERPL-MCNC: 7.1 G/DL (ref 6.4–8.9)
RBC # BLD AUTO: 4.07 10E6/UL (ref 3.8–5.2)
SODIUM SERPL-SCNC: 140 MMOL/L (ref 134–144)
TRIGL SERPL-MCNC: 85 MG/DL
WBC # BLD AUTO: 5.6 10E3/UL (ref 4–11)

## 2021-10-01 PROCEDURE — G0439 PPPS, SUBSEQ VISIT: HCPCS | Performed by: FAMILY MEDICINE

## 2021-10-01 PROCEDURE — 82040 ASSAY OF SERUM ALBUMIN: CPT | Mod: ZL | Performed by: FAMILY MEDICINE

## 2021-10-01 PROCEDURE — G0463 HOSPITAL OUTPT CLINIC VISIT: HCPCS

## 2021-10-01 PROCEDURE — 36415 COLL VENOUS BLD VENIPUNCTURE: CPT | Mod: ZL | Performed by: FAMILY MEDICINE

## 2021-10-01 PROCEDURE — 85025 COMPLETE CBC W/AUTO DIFF WBC: CPT | Mod: ZL | Performed by: FAMILY MEDICINE

## 2021-10-01 PROCEDURE — 83516 IMMUNOASSAY NONANTIBODY: CPT | Mod: ZL | Performed by: FAMILY MEDICINE

## 2021-10-01 PROCEDURE — 80061 LIPID PANEL: CPT | Mod: ZL | Performed by: FAMILY MEDICINE

## 2021-10-01 ASSESSMENT — MIFFLIN-ST. JEOR: SCORE: 737.41

## 2021-10-01 ASSESSMENT — PAIN SCALES - GENERAL: PAINLEVEL: NO PAIN (0)

## 2021-10-01 NOTE — PATIENT INSTRUCTIONS
Patient Education   Personalized Prevention Plan  You are due for the preventive services outlined below.  Your care team is available to assist you in scheduling these services.  If you have already completed any of these items, please share that information with your care team to update in your medical record.  Health Maintenance Due   Topic Date Due     Breathing Capacity Test  Never done     COPD Action Plan  Never done     Pneumococcal Vaccine (1 of 2 - PPSV23) Never done     COVID-19 Vaccine (1) Never done     Hepatitis C Screening  Never done     Zoster (Shingles) Vaccine (2 of 3) 02/17/2012     Osteoporosis Screening  01/14/2019     Flu Vaccine (1) 09/01/2021     FALL RISK ASSESSMENT  10/09/2021     Lung Cancer Screening (CT Scan)  11/09/2021         ----- Message from YAKOV Hardy sent at 1/24/2017 12:59 PM CST -----  Regarding: doppler  Can you call him and see if he scheduled this?

## 2021-10-01 NOTE — PROGRESS NOTES
"  SUBJECTIVE:   Loulou Lucero is a 76 year old female who presents for Preventive Visit.    She has a history of osteoporosis.  She has completed 5 years of bisphosphonates.  She also has a history of acid reflux and continues on Protonix daily.  She continues smoking on a daily basis 1 pack a day.  She uses Advair twice daily and this is controlling her COPD.    She continues to be concerned about her weight loss.  She admits that she is been under a large amount of stress as her  was recently hospitalized and is currently in a nursing home in [place] as well as she is trying to sell some property.  She admits that her diet is poor, she frequently only eats one small meal a day.  She does not count her calories.    Patient has been advised of split billing requirements and indicates understanding: Yes  Are you in the first 12 months of your Medicare Part B coverage?  No    Physical Health:    In general, how would you rate your overall physical health? fair    Outside of work, how many days during the week do you exercise? none    Outside of work, approximately how many minutes a day do you exercise?less than 15 minutes    If you drink alcohol do you typically have >3 drinks per day or >7 drinks per week? No    Do you usually eat at least 4 servings of fruit and vegetables a day, include whole grains & fiber and avoid regularly eating high fat or \"junk\" foods? NO    Do you have any problems taking medications regularly?  No    Do you have any side effects from medications? none    Needs assistance for the following daily activities: no assistance needed    Which of the following safety concerns are present in your home?  none identified     Hearing impairment: No    In the past 6 months, have you been bothered by leaking of urine? no    Mental Health:    In general, how would you rate your overall mental or emotional health? fair  PHQ-2 Score: 2    Do you feel safe in your environment? Yes    Have you ever " done Advance Care Planning? (For example, a Health Directive, POLST, or a discussion with a medical provider or your loved ones about your wishes): No, advance care planning information given to patient to review.  Patient declined advance care planning discussion at this time.    Additional concerns to address?  No    Fall risk:  Fallen 2 or more times in the past year?: No  Any fall with injury in the past year?: No    Cognitive Screenin) Repeat 3 items (Leader, Season, Table)    2) Clock draw: NORMAL  3) 3 item recall: Recalls 3 objects  Results: 3 items recalled: COGNITIVE IMPAIRMENT LESS LIKELY    Mini-CogTM Copyright S Lisandro. Licensed by the author for use in [facility]; reprinted with permission (soob@[facility]). All rights reserved.      Do you have sleep apnea, excessive snoring or daytime drowsiness?: no    Reviewed and updated as needed this visit by clinical staff  Tobacco  Allergies  Meds      Soc Hx        Reviewed and updated as needed this visit by Provider                Social History     Tobacco Use     Smoking status: Current Every Day Smoker     Packs/day: 1.00     Years: 50.00     Pack years: 50.00     Types: Cigarettes     Smokeless tobacco: Never Used   Substance Use Topics     Alcohol use: Yes     Alcohol/week: 0.0 standard drinks     Comment: rare                           Current providers sharing in care for this patient include:   Patient Care Team:  Obi Johnston as PCP - General (Family Practice)  Obi Johnston as Assigned PCP    The following health maintenance items are reviewed in Epic and correct as of today:  Health Maintenance   Topic Date Due     SPIROMETRY  Never done     COPD ACTION PLAN  Never done     Pneumococcal Vaccine: 65+ Years (1 of 2 - PPSV23) Never done     COVID-19 Vaccine (1) Never done     HEPATITIS C SCREENING  Never done     ZOSTER IMMUNIZATION (2 of 3) 2012     DEXA  2019     INFLUENZA VACCINE (1) 2021     FALL RISK  "ASSESSMENT  10/09/2021     LUNG CANCER SCREENING ANNUAL  11/09/2021     MEDICARE ANNUAL WELLNESS VISIT  10/01/2022     DTAP/TDAP/TD IMMUNIZATION (2 - Td or Tdap) 05/18/2024     LIPID  10/09/2025     ADVANCE CARE PLANNING  10/09/2025     PHQ-2  Completed     IPV IMMUNIZATION  Aged Out     MENINGITIS IMMUNIZATION  Aged Out     HEPATITIS B IMMUNIZATION  Aged Out     Lab work is in process    ROS:  Constitutional, HEENT, cardiovascular, pulmonary, gi and gu systems are negative, except as otherwise noted.    OBJECTIVE:   /72   Pulse 92   Temp 97.5  F (36.4  C)   Resp 20   Ht 1.511 m (4' 11.5\")   Wt 33.4 kg (73 lb 9.6 oz)   SpO2 97%   BMI 14.62 kg/m   Estimated body mass index is 14.62 kg/m  as calculated from the following:    Height as of this encounter: 1.511 m (4' 11.5\").    Weight as of this encounter: 33.4 kg (73 lb 9.6 oz).  EXAM:   GENERAL: She is quite thin.  EYES: Eyes grossly normal to inspection, PERRL and conjunctivae and sclerae normal  HENT: ear canals and TM's normal, nose and mouth without ulcers or lesions  NECK: no adenopathy, no asymmetry, masses, or scars and thyroid normal to palpation  RESP: lungs clear to auscultation - no rales, rhonchi or wheezes  BREAST: normal without masses, tenderness or nipple discharge and no palpable axillary masses or adenopathy  CV: regular rate and rhythm, normal S1 S2.  2 out of 6 systolic murmur heard best over the right upper sternal border which has been heard before.  ABDOMEN: soft, nontender, no hepatosplenomegaly, no masses and bowel sounds normal  MS: no gross musculoskeletal defects noted, no edema  SKIN: no suspicious lesions or rashes  NEURO: Normal strength and tone, mentation intact and speech normal  PSYCH: mentation appears normal, affect normal/bright    Diagnostic Test Results:  none     ASSESSMENT / PLAN:       ICD-10-CM    1. Encounter for Medicare annual wellness exam  Z00.00    2. Simple chronic bronchitis (H)  J41.0    3. Age-related " "osteoporosis without current pathological fracture  M81.0     completed 5 years of bisphosphonates    4. Gastroesophageal reflux disease without esophagitis  K21.9    5. Screening cholesterol level  Z13.220 Comprehensive Metabolic Panel     Lipid Panel   6. Weight loss  R63.4 Tissue transglutaminase Ab IgA and IgG     CBC and Differential     We will get fasting labs today.  Given her weight loss will also check for celiac at her request and CBC.  Continue to discuss dietary recommendations including protein shakes, meal replacement supplements etc.  Offered dietitian referral which she declined.    Medications refilled again for another year.    We discussed immunizations at this time she is okay with pursuing Covid vaccination so we will get her first shot today.  She does not want to get her flu shot and pneumonia shot at same time so this can be delayed a little bit later this fall.    P mammogram and CT lung cancer screening later this year when she is due again.    Patient has been advised of split billing requirements and indicates understanding: Yes    COUNSELING:  Reviewed preventive health counseling, as reflected in patient instructions       Regular exercise       Healthy diet/nutrition       Vision screening       Hearing screening       Consider lung cancer screening for ages 55-80 years and 30 pack-year smoking history     Estimated body mass index is 14.62 kg/m  as calculated from the following:    Height as of this encounter: 1.511 m (4' 11.5\").    Weight as of this encounter: 33.4 kg (73 lb 9.6 oz).    Weight management plan , Offered dietitian referral which she declined.    She reports that she has been smoking cigarettes. She has a 50.00 pack-year smoking history. She has never used smokeless tobacco.  Tobacco Cessation Action Plan:   Information offered: Patient not interested at this time    Appropriate preventive services were discussed with this patient, including applicable screening as " appropriate for cardiovascular disease, diabetes, osteopenia/osteoporosis, and glaucoma.  As appropriate for age/gender, discussed screening for colorectal cancer, prostate cancer, breast cancer, and cervical cancer. Checklist reviewing preventive services available has been given to the patient.    Reviewed patients plan of care and provided an AVS. The Basic Care Plan (routine screening as documented in Health Maintenance) for Loulou meets the Care Plan requirement. This Care Plan has been established and reviewed with the Patient.    Counseling Resources:  ATP IV Guidelines  Pooled Cohorts Equation Calculator  Breast Cancer Risk Calculator  BRCA-Related Cancer Risk Assessment: FHS-7 Tool  FRAX Risk Assessment  ICSI Preventive Guidelines  Dietary Guidelines for Americans, 2010  USDA's MyPlate  ASA Prophylaxis  Lung CA Screening    Obi Johnston  [facility]

## 2021-10-01 NOTE — LETTER
October 4, 2021      Loulou Lucero  [address] [address]        Dear ,    We are writing to inform you of your test results.    Your cholesterol panel, diabetes screening a fasting glucose, liver and kidney testing all came back normal.  This is all reassuring and should be checked again in 1 year.    Testing for celiac disease including hemoglobin and celiac panel all came back negative.  This is reassuring and does not show any evidence of celiac disease.    Resulted Orders   Comprehensive Metabolic Panel   Result Value Ref Range    Sodium 140 134 - 144 mmol/L    Potassium 3.8 3.5 - 5.1 mmol/L    Chloride 102 98 - 107 mmol/L    Carbon Dioxide (CO2) 30 21 - 31 mmol/L    Anion Gap 8 3 - 14 mmol/L    Urea Nitrogen 15 7 - 25 mg/dL    Creatinine 0.89 0.60 - 1.20 mg/dL    Calcium 9.7 8.6 - 10.3 mg/dL    Glucose 100 70 - 105 mg/dL    Alkaline Phosphatase 58 34 - 104 U/L    AST 19 13 - 39 U/L    ALT 8 7 - 52 U/L    Protein Total 7.1 6.4 - 8.9 g/dL    Albumin 3.9 3.5 - 5.7 g/dL    Bilirubin Total 1.0 0.3 - 1.0 mg/dL    GFR Estimate 63 >60 mL/min/1.73m2      Comment:      As of July 11, 2021, eGFR is calculated by the CKD-EPI creatinine equation, without race adjustment. eGFR can be influenced by muscle mass, exercise, and diet. The reported eGFR is an estimation only and is only applicable if the renal function is stable.   Lipid Panel   Result Value Ref Range    Cholesterol 192 <200 mg/dL    Triglycerides 85 <150 mg/dL    Direct Measure HDL 62 23 - 92 mg/dL    LDL Cholesterol Calculated 113 (H) <=100 mg/dL    Non HDL Cholesterol 130 (H) <130 mg/dL    Patient Fasting > 8hrs? Unknown     Narrative    Cholesterol  Desirable:  <200 mg/dL    Triglycerides  Normal:  Less than 150 mg/dL  Borderline High:  150-199 mg/dL  High:  200-499 mg/dL  Very High:  Greater than or equal to 500 mg/dL    Direct Measure HDL  Female:  Greater than or equal to 50 mg/dL   Male:  Greater than or equal to 40 mg/dL    LDL  Cholesterol  Desirable:  <100mg/dL  Above Desirable:  100-129 mg/dL   Borderline High:  130-159 mg/dL   High:  160-189 mg/dL   Very High:  >= 190 mg/dL    Non HDL Cholesterol  Desirable:  130 mg/dL  Above Desirable:  130-159 mg/dL  Borderline High:  160-189 mg/dL  High:  190-219 mg/dL  Very High:  Greater than or equal to 220 mg/dL   Tissue transglutaminase Ab IgA and IgG   Result Value Ref Range    Tissue Transglutaminase Antibody IgA 1.3 <7.0 U/mL      Comment:      Negative- The tTG-IgA assay has limited utility for patients with decreased levels of IgA. Screening for celiac disease should include IgA testing to rule out selective IgA deficiency and to guide selection and interpretation of serological testing. tTG-IgG testing may be positive in celiac disease patients with IgA deficiency.    Tissue Transglutaminase Antibody IgG 1.0 <7.0 U/mL      Comment:      Negative   CBC with platelets and differential   Result Value Ref Range    WBC Count 5.6 4.0 - 11.0 10e3/uL    RBC Count 4.07 3.80 - 5.20 10e6/uL    Hemoglobin 12.3 11.7 - 15.7 g/dL    Hematocrit 37.3 35.0 - 47.0 %    MCV 92 78 - 100 fL    MCH 30.2 26.5 - 33.0 pg    MCHC 33.0 31.5 - 36.5 g/dL    RDW 13.0 10.0 - 15.0 %    Platelet Count 216 150 - 450 10e3/uL    % Neutrophils 72 %    % Lymphocytes 19 %    % Monocytes 7 %    % Eosinophils 1 %    % Basophils 1 %    % Immature Granulocytes 0 %    NRBCs per 100 WBC 0 <1 /100    Absolute Neutrophils 4.0 1.6 - 8.3 10e3/uL    Absolute Lymphocytes 1.0 0.8 - 5.3 10e3/uL    Absolute Monocytes 0.4 0.0 - 1.3 10e3/uL    Absolute Eosinophils 0.0 0.0 - 0.7 10e3/uL    Absolute Basophils 0.1 0.0 - 0.2 10e3/uL    Absolute Immature Granulocytes 0.0 <=0.0 10e3/uL    Absolute NRBCs 0.0 10e3/uL       If you have any questions or concerns, please call the clinic at the number listed above.       Sincerely,      Obi Johnston

## 2021-10-01 NOTE — NURSING NOTE
Patient presents today for annual medicare wellness visit.    Medication Reconciliation Complete    Mehnaz Ledesma LPN  10/1/2021 9:04 AM

## 2021-10-03 LAB
TTG IGA SER-ACNC: 1.3 U/ML
TTG IGG SER-ACNC: 1 U/ML

## 2021-10-04 ENCOUNTER — TELEPHONE (OUTPATIENT)
Dept: FAMILY MEDICINE | Facility: OTHER | Age: 76
End: 2021-10-04

## 2021-10-04 NOTE — TELEPHONE ENCOUNTER
Patient calling for results of her labs. Please call. She has no computer      Jayleen Mayes on 10/4/2021 at 3:27 PM

## 2021-10-05 ENCOUNTER — IMMUNIZATION (OUTPATIENT)
Dept: FAMILY MEDICINE | Facility: OTHER | Age: 76
End: 2021-10-05
Attending: FAMILY MEDICINE
Payer: MEDICARE

## 2021-10-05 PROCEDURE — 91300 PR COVID VAC PFIZER DIL RECON 30 MCG/0.3 ML IM: CPT

## 2021-10-15 ENCOUNTER — TELEPHONE (OUTPATIENT)
Dept: FAMILY MEDICINE | Facility: OTHER | Age: 76
End: 2021-10-15
Payer: MEDICARE

## 2021-10-15 DIAGNOSIS — Z87.891 PERSONAL HISTORY OF TOBACCO USE: Primary | ICD-10-CM

## 2021-10-15 PROCEDURE — G0296 VISIT TO DETERM LDCT ELIG: HCPCS | Performed by: FAMILY MEDICINE

## 2021-10-18 NOTE — PATIENT INSTRUCTIONS

## 2021-10-18 NOTE — TELEPHONE ENCOUNTER
Lung Cancer Screening Shared Decision Making Visit     Loulou Lucero is eligible for lung cancer screening on the basis of the information provided in my signed lung cancer screening order.     I have discussed with patient the risks and benefits of screening for lung cancer with low-dose CT.     The risks include:  radiation exposure: one low dose chest CT has as much ionizing radiation as about 15 chest x-rays or 6 months of background radiation living in [place]    false positives: 96% of positive findings/nodules are NOT cancer, but some might still require additional diagnostic evaluation, including biopsy  over-diagnosis: some slow growing cancers that might never have been clinically significant will be detected and treated unnecessarily     The benefit of early detection of lung cancer is contingent upon adherence to annual screening or more frequent follow up if indicated.     Furthermore, reaping the benefits of screening requires Loulou Lucero to be willing and physically able to undergo diagnostic procedures, if indicated. Although no specific guide is available for determining severity of comorbidities, it is reasonable to withhold screening in patients who have greater mortality risk from other diseases.     We did discuss that the only way to prevent lung cancer is to not smoke. Smoking cessation counseling was not given.      I did not offer risk estimation using a calculator such as this one:    ShouldIScreen

## 2021-10-26 ENCOUNTER — IMMUNIZATION (OUTPATIENT)
Dept: FAMILY MEDICINE | Facility: OTHER | Age: 76
End: 2021-10-26
Attending: FAMILY MEDICINE
Payer: MEDICARE

## 2021-10-26 DIAGNOSIS — Z23 NEED FOR PROPHYLACTIC VACCINATION AND INOCULATION AGAINST INFLUENZA: Primary | ICD-10-CM

## 2021-10-26 PROCEDURE — G0008 ADMIN INFLUENZA VIRUS VAC: HCPCS

## 2021-10-26 PROCEDURE — 90662 IIV NO PRSV INCREASED AG IM: CPT

## 2021-10-26 PROCEDURE — 91300 PR COVID VAC PFIZER DIL RECON 30 MCG/0.3 ML IM: CPT

## 2021-10-26 NOTE — ADDENDUM NOTE
Addended by: RICHARD ROBLERO on: 10/26/2021 02:28 PM     Modules accepted: Orders, Level of Service, SmartSet

## 2021-11-04 ENCOUNTER — ALLIED HEALTH/NURSE VISIT (OUTPATIENT)
Dept: FAMILY MEDICINE | Facility: OTHER | Age: 76
End: 2021-11-04
Attending: FAMILY MEDICINE
Payer: MEDICARE

## 2021-11-04 DIAGNOSIS — Z20.822 EXPOSURE TO 2019 NOVEL CORONAVIRUS: ICD-10-CM

## 2021-11-04 DIAGNOSIS — Z20.822 COVID-19 RULED OUT: Primary | ICD-10-CM

## 2021-11-04 PROCEDURE — U0003 INFECTIOUS AGENT DETECTION BY NUCLEIC ACID (DNA OR RNA); SEVERE ACUTE RESPIRATORY SYNDROME CORONAVIRUS 2 (SARS-COV-2) (CORONAVIRUS DISEASE [COVID-19]), AMPLIFIED PROBE TECHNIQUE, MAKING USE OF HIGH THROUGHPUT TECHNOLOGIES AS DESCRIBED BY CMS-2020-01-R: HCPCS | Mod: ZL

## 2021-11-04 PROCEDURE — C9803 HOPD COVID-19 SPEC COLLECT: HCPCS

## 2021-11-06 LAB — SARS-COV-2 RNA RESP QL NAA+PROBE: NEGATIVE

## 2021-11-17 ENCOUNTER — HOSPITAL ENCOUNTER (OUTPATIENT)
Dept: CT IMAGING | Facility: OTHER | Age: 76
End: 2021-11-17
Attending: FAMILY MEDICINE
Payer: MEDICARE

## 2021-11-17 ENCOUNTER — HOSPITAL ENCOUNTER (OUTPATIENT)
Dept: MAMMOGRAPHY | Facility: OTHER | Age: 76
End: 2021-11-17
Attending: FAMILY MEDICINE
Payer: MEDICARE

## 2021-11-17 DIAGNOSIS — Z87.891 PERSONAL HISTORY OF TOBACCO USE: ICD-10-CM

## 2021-11-17 DIAGNOSIS — Z12.31 VISIT FOR SCREENING MAMMOGRAM: ICD-10-CM

## 2021-11-17 PROCEDURE — 71271 CT THORAX LUNG CANCER SCR C-: CPT | Mod: ME

## 2021-11-17 PROCEDURE — 77063 BREAST TOMOSYNTHESIS BI: CPT

## 2022-03-09 ENCOUNTER — NURSE TRIAGE (OUTPATIENT)
Dept: FAMILY MEDICINE | Facility: OTHER | Age: 77
End: 2022-03-09
Payer: OTHER GOVERNMENT

## 2022-03-09 DIAGNOSIS — B02.9 HERPES ZOSTER WITHOUT COMPLICATION: Primary | ICD-10-CM

## 2022-03-09 RX ORDER — ACYCLOVIR 800 MG/1
800 TABLET ORAL
Qty: 35 TABLET | Refills: 0 | Status: SHIPPED | OUTPATIENT
Start: 2022-03-09 | End: 2022-12-21

## 2022-03-09 NOTE — TELEPHONE ENCOUNTER
Called and informed patient of Dr. Duval response and patient verbalized understanding.    Lina Steele RN on 3/9/2022 at 11:38 AM

## 2022-03-09 NOTE — TELEPHONE ENCOUNTER
Pt states that she has shingles and she would like to get a prescription for it.  Having issues since Sunday, please sent prescription to [facility].  Please call.    Seth Reyna on 3/9/2022 at 7:08 AM

## 2022-03-09 NOTE — TELEPHONE ENCOUNTER
S-(situation): patient calling and states she thinks that she has shingles    B-(background): patient  has had shingles before in the past in the same area.    A-(assessment): states started noticing on Sunday a discomfort on back left shoulder blade area.  States does not think there is a rash but unable to see area.  States has a little itching but mainly this stabbing pain rates a 7/10 .  States is the same feeling as last time she had shingles.  Patient  could not sleep much last night.  Did try ice packs last night.    R-(recommendations): informed patient will route message to  for review and consideration .    Lina Steele RN on 3/9/2022 at 8:18 AM      Additional Information    Negative: Difficult to awaken or acting confused (e.g., disoriented, slurred speech)    Negative: Sounds like a life-threatening emergency to the triager    Negative: Localized rash and doesn't match the SYMPTOMS of shingles    Negative: Back pain and doesn't match the SYMPTOMS of shingles    Negative: Shingles Vaccine (Recombinant Zoster Vaccine; RZV; Shingrix), questions about    Negative: Shingles rash of face and eye pain or blurred vision    Negative: Shingles rash on the eyelid or tip of the nose    Negative: Shingles rash and spots start appearing other places on body    Negative: Patient sounds very sick or weak to the triager    Negative: Shingles rash (matches SYMPTOMS) and weak immune system (e.g., HIV positive,  cancer chemotherapy, chronic steroid treatment, splenectomy) and NOT taking antiviral medication    Negative: Shingles rash of face and facial weakness    Negative: Shingles rash of face or ear and earache or ringing in the ear    Negative: Fever > 100.4 F (38.0 C)    Negative: SEVERE pain (e.g., excruciating)    Negative: Shingles rash (matches SYMPTOMS) and onset within past 72 hours    Negative: Looks infected (spreading redness, pus) and no fever    Negative: Patient wants to be  "seen    Negative: Shingles rash and onset > 72 hours ago    Negative: Shingle rash already diagnosed and weak immune system (e.g., HIV positive,  cancer chemotherapy, chronic steroid treatment, splenectomy) and  taking antiviral medication    Negative: Pain lasting > 1 month after rash disappears    Negative: Shingles rash already diagnosed and taking antiviral medication    Negative: Shingles, questions about    Negative: Postherpetic neuralgia, questions about    Negative: Prevention of Shingles (vaccine info), questions about    Answer Assessment - Initial Assessment Questions  1. APPEARANCE of RASH: \"Describe the rash.\"       Does not think so  2. LOCATION: \"Where is the rash located?\"       Left back shoulder blade area  3. ONSET: \"When did the rash start?\"       Sunday back start hurting  4. ITCHING: \"Does the rash itch?\" If so, ask: \"How bad is the itch?\"  (Scale 1-10; or mild, moderate, severe)      \"sanju \" itching  5. PAIN: \"Does the rash hurt?\" If so, ask: \"How bad is the pain?\"  (Scale 1-10; or mild, moderate, severe)      7/10  6. OTHER SYMPTOMS: \"Do you have any other symptoms?\" (e.g., fever)      denies  7. PREGNANCY: \"Is there any chance you are pregnant?\" \"When was your last menstrual period?\"      n/a    Protocols used: SHINGLES-A-OH    "

## 2022-04-25 ENCOUNTER — TELEPHONE (OUTPATIENT)
Dept: FAMILY MEDICINE | Facility: OTHER | Age: 77
End: 2022-04-25
Payer: OTHER GOVERNMENT

## 2022-04-25 NOTE — TELEPHONE ENCOUNTER
Patient was notified she can get her covid booster. She was transferred to scheduling to make her appointment.    Mehnaz Ledesma LPN on 4/25/2022 at 3:18 PM

## 2022-04-25 NOTE — TELEPHONE ENCOUNTER
Patient is wondering if she can get her shingles inj at the same time she gets her 1st Booster.  Please call patient back   Annie Andrew on 4/25/2022 at 2:39 PM

## 2022-04-26 ENCOUNTER — ALLIED HEALTH/NURSE VISIT (OUTPATIENT)
Dept: FAMILY MEDICINE | Facility: OTHER | Age: 77
End: 2022-04-26
Attending: FAMILY MEDICINE
Payer: MEDICARE

## 2022-04-26 DIAGNOSIS — Z23 NEED FOR VACCINATION: Primary | ICD-10-CM

## 2022-04-26 PROCEDURE — 91305 COVID-19,PF,PFIZER (12+ YRS): CPT

## 2022-04-26 NOTE — PROGRESS NOTES
Immunization Documentation  Verified patient's first and last name, and . Stated reason for visit today is to receive COVID vaccine. Accompained to clinic visit with SELF. Denied any concerns with previous immunizations. Allergies reviewed. VIS handout(s) reviewed and given to take home. VACCINE prepared and administered per standing order. Administration documented in IMMUNIZATIONS (see flowsheet and order for further information).  Instructed to wait in lobby for 15 minutes post-injection and notify staff immediately of any reaction.     Marjorie Suarez RN ....................  2022   1:26 PM

## 2022-10-05 ENCOUNTER — IMMUNIZATION (OUTPATIENT)
Dept: FAMILY MEDICINE | Facility: OTHER | Age: 77
End: 2022-10-05
Attending: FAMILY MEDICINE
Payer: MEDICARE

## 2022-10-05 DIAGNOSIS — Z23 NEED FOR PROPHYLACTIC VACCINATION AND INOCULATION AGAINST INFLUENZA: ICD-10-CM

## 2022-10-05 DIAGNOSIS — Z23 HIGH PRIORITY FOR 2019-NCOV VACCINE: Primary | ICD-10-CM

## 2022-10-05 PROCEDURE — 0124A COVID-19,PF,PFIZER BOOSTER BIVALENT: CPT

## 2022-10-05 PROCEDURE — 91312 COVID-19,PF,PFIZER BOOSTER BIVALENT: CPT

## 2022-10-05 PROCEDURE — G0008 ADMIN INFLUENZA VIRUS VAC: HCPCS

## 2022-10-18 ENCOUNTER — OFFICE VISIT (OUTPATIENT)
Dept: FAMILY MEDICINE | Facility: OTHER | Age: 77
End: 2022-10-18
Attending: FAMILY MEDICINE
Payer: MEDICARE

## 2022-10-18 VITALS
SYSTOLIC BLOOD PRESSURE: 126 MMHG | DIASTOLIC BLOOD PRESSURE: 76 MMHG | OXYGEN SATURATION: 99 % | BODY MASS INDEX: 15.89 KG/M2 | RESPIRATION RATE: 18 BRPM | TEMPERATURE: 97.3 F | HEART RATE: 90 BPM | WEIGHT: 80 LBS

## 2022-10-18 DIAGNOSIS — J43.9 PULMONARY EMPHYSEMA, UNSPECIFIED EMPHYSEMA TYPE (H): ICD-10-CM

## 2022-10-18 DIAGNOSIS — Z78.0 ASYMPTOMATIC MENOPAUSAL STATE: ICD-10-CM

## 2022-10-18 DIAGNOSIS — Z87.891 PERSONAL HISTORY OF TOBACCO USE: ICD-10-CM

## 2022-10-18 DIAGNOSIS — R01.1 SYSTOLIC MURMUR: ICD-10-CM

## 2022-10-18 DIAGNOSIS — Z00.00 ENCOUNTER FOR MEDICARE ANNUAL WELLNESS EXAM: Primary | ICD-10-CM

## 2022-10-18 DIAGNOSIS — Z13.220 SCREENING CHOLESTEROL LEVEL: ICD-10-CM

## 2022-10-18 DIAGNOSIS — Z12.31 ENCOUNTER FOR SCREENING MAMMOGRAM FOR BREAST CANCER: ICD-10-CM

## 2022-10-18 PROBLEM — J98.4 PULMONARY SCARRING: Status: RESOLVED | Noted: 2020-11-19 | Resolved: 2022-10-18

## 2022-10-18 LAB
ALBUMIN SERPL-MCNC: 4 G/DL (ref 3.5–5.7)
ALP SERPL-CCNC: 58 U/L (ref 34–104)
ALT SERPL W P-5'-P-CCNC: 9 U/L (ref 7–52)
ANION GAP SERPL CALCULATED.3IONS-SCNC: 6 MMOL/L (ref 3–14)
AST SERPL W P-5'-P-CCNC: 19 U/L (ref 13–39)
BILIRUB SERPL-MCNC: 0.7 MG/DL (ref 0.3–1)
BUN SERPL-MCNC: 15 MG/DL (ref 7–25)
CALCIUM SERPL-MCNC: 9.9 MG/DL (ref 8.6–10.3)
CHLORIDE BLD-SCNC: 103 MMOL/L (ref 98–107)
CHOLEST SERPL-MCNC: 216 MG/DL
CO2 SERPL-SCNC: 34 MMOL/L (ref 21–31)
CREAT SERPL-MCNC: 0.92 MG/DL (ref 0.6–1.2)
FASTING STATUS PATIENT QL REPORTED: NO
GFR SERPL CREATININE-BSD FRML MDRD: 64 ML/MIN/1.73M2
GLUCOSE BLD-MCNC: 94 MG/DL (ref 70–105)
HDLC SERPL-MCNC: 66 MG/DL (ref 23–92)
LDLC SERPL CALC-MCNC: 115 MG/DL
NONHDLC SERPL-MCNC: 150 MG/DL
POTASSIUM BLD-SCNC: 4.6 MMOL/L (ref 3.5–5.1)
PROT SERPL-MCNC: 7.6 G/DL (ref 6.4–8.9)
SODIUM SERPL-SCNC: 143 MMOL/L (ref 134–144)
TRIGL SERPL-MCNC: 174 MG/DL

## 2022-10-18 PROCEDURE — 80061 LIPID PANEL: CPT | Mod: ZL | Performed by: FAMILY MEDICINE

## 2022-10-18 PROCEDURE — G0296 VISIT TO DETERM LDCT ELIG: HCPCS | Performed by: FAMILY MEDICINE

## 2022-10-18 PROCEDURE — G0439 PPPS, SUBSEQ VISIT: HCPCS | Performed by: FAMILY MEDICINE

## 2022-10-18 PROCEDURE — 36415 COLL VENOUS BLD VENIPUNCTURE: CPT | Mod: ZL | Performed by: FAMILY MEDICINE

## 2022-10-18 PROCEDURE — 80053 COMPREHEN METABOLIC PANEL: CPT | Mod: ZL | Performed by: FAMILY MEDICINE

## 2022-10-18 RX ORDER — FLUTICASONE PROPIONATE AND SALMETEROL 250; 50 UG/1; UG/1
1 POWDER RESPIRATORY (INHALATION) 2 TIMES DAILY
Qty: 3 EACH | Refills: 3 | Status: CANCELLED | OUTPATIENT
Start: 2022-10-18

## 2022-10-18 RX ORDER — FLUTICASONE PROPIONATE AND SALMETEROL 250; 50 UG/1; UG/1
1 POWDER RESPIRATORY (INHALATION) 2 TIMES DAILY
Qty: 3 EACH | Refills: 3 | Status: SHIPPED | OUTPATIENT
Start: 2022-10-18 | End: 2023-07-17

## 2022-10-18 ASSESSMENT — PAIN SCALES - GENERAL: PAINLEVEL: NO PAIN (0)

## 2022-10-18 NOTE — PROGRESS NOTES
Lung Cancer Screening Shared Decision Making Visit     Loulou Lucero, a 77 year old female, is eligible for lung cancer screening    History   Smoking Status     Every Day     Packs/day: 1.00     Years: 50.00     Types: Cigarettes   Smokeless Tobacco     Never       I have discussed with patient the risks and benefits of screening for lung cancer with low-dose CT.     The risks include:    radiation exposure: one low dose chest CT has as much ionizing radiation as about 15 chest x-rays, or 6 months of background radiation living in [place]      false positives: most findings/nodules are NOT cancer, but some might still require additional diagnostic evaluation, including biopsy    over-diagnosis: some slow growing cancers that might never have been clinically significant will be detected and treated unnecessarily     The benefit of early detection of lung cancer is contingent upon adherence to annual screening or more frequent follow up if indicated.     Furthermore, to benefit from screening, Loulou must be willing and able to undergo diagnostic procedures, if indicated. Although no specific guide is available for determining severity of comorbidities, it is reasonable to withhold screening in patients who have greater mortality risk from other diseases.     We did discuss that the best way to prevent lung cancer is to not smoke.    Some patients may value a numeric estimation of lung cancer risk when evaluating if lung cancer screening is right for them, here is one calculator:    ShouldIScreen

## 2022-10-18 NOTE — NURSING NOTE
"Chief Complaint   Patient presents with     Physical     Medicare physical      Pt here for annual wellness. Has spot on ball of right foot that seems bothersome.     Initial /76 (BP Location: Right arm, Patient Position: Sitting, Cuff Size: Adult Regular)   Pulse 90   Temp 97.3  F (36.3  C) (Tympanic)   Resp 18   Wt 36.3 kg (80 lb)   SpO2 99%   Breastfeeding No   BMI 15.89 kg/m   Estimated body mass index is 15.89 kg/m  as calculated from the following:    Height as of 10/1/21: 1.511 m (4' 11.5\").    Weight as of this encounter: 36.3 kg (80 lb).     Advance Care Directive on file? no  Advance Care Directive provided to patient? Given infomation    FOOD SECURITY SCREENING QUESTIONS:    The next two questions are to help us understand your food security.  If you are feeling you need any assistance in this area, we have resources available to support you today.    Hunger Vital Signs:  Within the past 12 months we worried whether our food would run out before we got money to buy more. Never  Within the past 12 months the food we bought just didn't last and we didn't have money to get more. Never    Medication review complete?: YES       Breann Victor LPN   "

## 2022-10-18 NOTE — PROGRESS NOTES
SUBJECTIVE:   Loulou is a 77 year old who presents for Preventive Visit.    She has a history of COPD.  She continues using Wixela twice daily with good results.  She lost her  over the summer and continues to adjust.    Patient has been advised of split billing requirements and indicates understanding: Yes  Are you in the first 12 months of your Medicare coverage?  No    HPI  Do you feel safe in your environment? Yes    Have you ever done Advance Care Planning? (For example, a Health Directive, POLST, or a discussion with a medical provider or your loved ones about your wishes): Yes, patient states has an Advance Care Planning document and will bring a copy to the clinic.       Fall risk  Fallen 2 or more times in the past year?: No  Any fall with injury in the past year?: No    Cognitive Screening   1) Repeat 3 items (Leader, Season, Table)    2) Clock draw: NORMAL  3) 3 item recall: Recalls 3 objects  Results: 3 items recalled: COGNITIVE IMPAIRMENT LESS LIKELY    Mini-CogTM Copyright S Lisandro. Licensed by the author for use in [facility]; reprinted with permission (chuck@[email or web address]). All rights reserved.          Reviewed and updated as needed this visit by clinical staff   Tobacco  Allergies  Meds   Med Hx  Surg Hx  Fam Hx  Soc Hx        Reviewed and updated as needed this visit by Provider                 Social History     Tobacco Use     Smoking status: Every Day     Packs/day: 1.00     Years: 50.00     Pack years: 50.00     Types: Cigarettes     Smokeless tobacco: Never   Substance Use Topics     Alcohol use: Not Currently     Comment: rare         No flowsheet data found.        Current providers sharing in care for this patient include:   Patient Care Team:  Obi Johnston as PCP - General (Family Practice)  Obi Johnston as Assigned PCP    The following health maintenance items are reviewed in Epic and correct as of today:  Health Maintenance   Topic Date Due     SPIROMETRY  Never  "done     COPD ACTION PLAN  Never done     HEPATITIS B IMMUNIZATION (1 of 3 - 3-dose series) Never done     Pneumococcal Vaccine: 65+ Years (1 - PCV) Never done     HEPATITIS C SCREENING  Never done     DEXA  01/14/2019     PHQ-2 (once per calendar year)  01/01/2022     COVID-19 Vaccine (4 - Booster for Pfizer series) 06/21/2022     FALL RISK ASSESSMENT  10/01/2022     ZOSTER IMMUNIZATION (3 of 3) 06/21/2022     MEDICARE ANNUAL WELLNESS VISIT  10/01/2022     LUNG CANCER SCREENING  11/17/2022     DTAP/TDAP/TD IMMUNIZATION (2 - Td or Tdap) 05/18/2024     LIPID  10/01/2026     ADVANCE CARE PLANNING  10/01/2026     INFLUENZA VACCINE  Completed     IPV IMMUNIZATION  Aged Out     MENINGITIS IMMUNIZATION  Aged Out     MAMMO SCREENING  Discontinued     COLORECTAL CANCER SCREENING  Discontinued     Lab work is in process    Mammogram Screening - Patient over age 75, has elected to continue with screening.  Pertinent mammograms are reviewed under the imaging tab.    Review of Systems      OBJECTIVE:   /76 (BP Location: Right arm, Patient Position: Sitting, Cuff Size: Adult Regular)   Pulse 90   Temp 97.3  F (36.3  C) (Tympanic)   Resp 18   Wt 36.3 kg (80 lb)   SpO2 99%   Breastfeeding No   BMI 15.89 kg/m   Estimated body mass index is 15.89 kg/m  as calculated from the following:    Height as of 10/1/21: 1.511 m (4' 11.5\").    Weight as of this encounter: 36.3 kg (80 lb).  Physical Exam  GENERAL: healthy, alert and no distress  EYES: Eyes grossly normal to inspection, PERRL and conjunctivae and sclerae normal  RESP: lungs clear to auscultation - no rales, rhonchi or wheezes  CV: regular rates and rhythm, no murmur, click or rub, grade 2/6 systolic murmur heard throughout the precordium, peripheral pulses strong and no peripheral edema  ABDOMEN: soft, nontender, no hepatosplenomegaly, no masses and bowel sounds normal  SKIN: Corn is noted underneath her right first metatarsal head  NEURO: Normal strength and tone, " "mentation intact and speech normal  PSYCH: mentation appears normal, affect normal/bright    ASSESSMENT / PLAN:       ICD-10-CM    1. Encounter for Medicare annual wellness exam  Z00.00 DX Hip/Pelvis/Spine      2. Pulmonary emphysema, unspecified emphysema type (H)  J43.9 fluticasone-salmeterol (ADVAIR DISKUS) 250-50 MCG/ACT inhaler      3. Encounter for screening mammogram for breast cancer  Z12.31 MA Screen Bilateral w/Cm      4. Asymptomatic menopausal state  Z78.0 DX Hip/Pelvis/Spine      5. Personal history of tobacco use  Z87.891 Prof fee: Shared Decision Making for Lung Cancer Screening     CT Chest Lung Cancer Scrn Low Dose wo      6. Screening cholesterol level  Z13.220 Comprehensive Metabolic Panel     Lipid Panel      7. Systolic murmur  R01.1         She is up-to-date on immunizations.    Get fasting labs.    Medications refilled again for another year.    Referral for mammography, DEXA and lung cancer screening.    Patient has been advised of split billing requirements and indicates understanding: Yes     COUNSELING:  Reviewed preventive health counseling, as reflected in patient instructions       Regular exercise       Healthy diet/nutrition       Vision screening       Hearing screening       Dental care    Estimated body mass index is 15.89 kg/m  as calculated from the following:    Height as of 10/1/21: 1.511 m (4' 11.5\").    Weight as of this encounter: 36.3 kg (80 lb).    Weight management plan noted, stable and monitoring    She reports that she has been smoking cigarettes. She has a 50.00 pack-year smoking history. She has never used smokeless tobacco.  Nicotine/Tobacco Cessation Plan:   Information offered: Patient not interested at this time      Appropriate preventive services were discussed with this patient, including applicable screening as appropriate for cardiovascular disease, diabetes, osteopenia/osteoporosis, and glaucoma.  As appropriate for age/gender, discussed screening for " colorectal cancer, prostate cancer, breast cancer, and cervical cancer. Checklist reviewing preventive services available has been given to the patient.    Reviewed patients plan of care and provided an AVS. The Basic Care Plan (routine screening as documented in Health Maintenance) for Loulou meets the Care Plan requirement. This Care Plan has been established and reviewed with the Patient.    Counseling Resources:  ATP IV Guidelines  Pooled Cohorts Equation Calculator  Breast Cancer Risk Calculator  Breast Cancer: Medication to Reduce Risk  FRAX Risk Assessment  ICSI Preventive Guidelines  Dietary Guidelines for Americans, 2010  USDA's MyPlate  ASA Prophylaxis  Lung CA Screening    Obi Johnston  [facility]    Identified Health Risks:

## 2022-10-18 NOTE — PATIENT INSTRUCTIONS
Patient Education   Personalized Prevention Plan  You are due for the preventive services outlined below.  Your care team is available to assist you in scheduling these services.  If you have already completed any of these items, please share that information with your care team to update in your medical record.  Health Maintenance Due   Topic Date Due     Breathing Capacity Test  Never done     COPD Action Plan  Never done     Hepatitis B Vaccine (1 of 3 - 3-dose series) Never done     Pneumococcal Vaccine (1 - PCV) Never done     Hepatitis C Screening  Never done     Osteoporosis Screening  01/14/2019     PHQ-2 (once per calendar year)  01/01/2022     COVID-19 Vaccine (4 - Booster for Pfizer series) 06/21/2022     Zoster (Shingles) Vaccine (3 of 3) 06/21/2022     LUNG CANCER SCREENING  11/17/2022        Lung Cancer Screening   Frequently Asked Questions  If you are at high-risk for lung cancer, getting screened with low-dose computed tomography (LDCT) every year can help save your life. This handout offers answers to some of the most common questions about lung cancer screening. If you have other questions, please call [phone] ([phone]).     What is it?  Lung cancer screening uses special X-ray technology to create an image of your lung tissue. The exam is quick and easy and takes less than 10 seconds. We don t give you any medicine or use any needles. You can eat before and after the exam. You don t need to change your clothes as long as the clothing on your chest doesn t contain metal. But, you do need to be able to hold your breath for at least 6 seconds during the exam.    What is the goal of lung cancer screening?  The goal of lung cancer screening is to save lives. Many times, lung cancer is not found until a person starts having physical symptoms. Lung cancer screening can help detect lung cancer in the earliest stages when it may be easier to treat.    Who should be screened for lung  cancer?  We suggest lung cancer screening for anyone who is at high-risk for lung cancer. You are in the high-risk group if you:      are between the ages of 55 and 79, and    have smoked at least 1 pack of cigarettes a day for 20 or more years, and    still smoke or have quit within the past 15 years.    However, if you have a new cough or shortness of breath, you should talk to your doctor before being screened.    Why does it matter if I have symptoms?  Certain symptoms can be a sign that you have a condition in your lungs that should be checked and treated by your doctor. These symptoms include fever, chest pain, a new or changing cough, shortness of breath that you have never felt before, coughing up blood or unexplained weight loss. Having any of these symptoms can greatly affect the results of lung cancer screening.       Should all smokers get an LDCT lung cancer screening exam?  It depends. Lung cancer screening is for a very specific group of men and women who have a history of heavy smoking over a long period of time (see  Who should be screened for lung cancer  above).  I am in the high-risk group, but have been diagnosed with cancer in the past. Is LDCT lung cancer screening right for me?  In some cases, you should not have LDCT lung screening, such as when your doctor is already following your cancer with CT scan studies. Your doctor will help you decide if LDCT lung screening is right for you.  Do I need to have a screening exam every year?  Yes. If you are in the high-risk group described earlier, you should get an LDCT lung cancer screening exam every year until you are 79, or are no longer willing or able to undergo screening and possible procedures to diagnose and treat lung cancer.  How effective is LDCT at preventing death from lung cancer?  Studies have shown that LDCT lung cancer screening can lower the risk of death from lung cancer by 20 percent in people who are at high-risk.  What are the  risks?  There are some risks and limitations of LDCT lung cancer screening. We want to make sure you understand the risks and benefits, so please let us know if you have any questions. Your doctor may want to talk with you more about these risks.    Radiation exposure: As with any exam that uses radiation, there is a very small increased risk of cancer. The amount of radiation in LDCT is small--about the same amount a person would get from a mammogram. Your doctor orders the exam when he or she feels the potential benefits outweigh the risks.    False negatives: No test is perfect, including LDCT. It is possible that you may have a medical condition, including lung cancer, that is not found during your exam. This is called a false negative result.    False positives and more testing: LDCT very often finds something in the lung that could be cancer, but in fact is not. This is called a false positive result. False positive tests often cause anxiety. To make sure these findings are not cancer, you may need to have more tests. These tests will be done only if you give us permission. Sometimes patients need a treatment that can have side effects, such as a biopsy. For more information on false positives, see  What can I expect from the results?     Findings not related to lung cancer: Your LDCT exam also takes pictures of areas of your body next to your lungs. In a very small number of cases, the CT scan will show an abnormal finding in one of these areas, such as your kidneys, adrenal glands, liver or thyroid. This finding may not be serious, but you may need more tests. Your doctor can help you decide what other tests you may need, if any.  What can I expect from the results?  About 1 out of 4 LDCT exams will find something that may need more tests. Most of the time, these findings are lung nodules. Lung nodules are very small collections of tissue in the lung. These nodules are very common, and the vast  majority--more than 97 percent--are not cancer (benign). Most are normal lymph nodes or small areas of scarring from past infections.  But, if a small lung nodule is found to be cancer, the cancer can be cured more than 90 percent of the time. To know if the nodule is cancer, we may need to get more images before your next yearly screening exam. If the nodule has suspicious features (for example, it is large, has an odd shape or grows over time), we will refer you to a specialist for further testing.  Will my doctor also get the results?  Yes. Your doctor will get a copy of your results.  Is it okay to keep smoking now that there s a cancer screening exam?  No. Tobacco is one of the strongest cancer-causing agents. It causes not only lung cancer, but other cancers and cardiovascular (heart) diseases as well. The damage caused by smoking builds over time. This means that the longer you smoke, the higher your risk of disease. While it is never too late to quit, the sooner you quit, the better.  Where can I find help to quit smoking?  The best way to prevent lung cancer is to stop smoking. If you have already quit smoking, congratulations and keep it up! For help on quitting smoking, please call [employer] at 7-361-QUITNOW (1-737.414.4395) or the American Cancer Society at 1-297.977.6416 to find local resources near you.  One-on-one health coaching:  If you d prefer to work individually with a health care provider on tobacco cessation, we offer:      Medication Therapy Management:  Our specially trained pharmacists work closely with you and your doctor to help you quit smoking.  Call [phone] or [phone] (toll free).

## 2022-10-18 NOTE — LETTER
October 18, 2022      Loulou Lucero  [address] [facility] [address]        Dear ,    We are writing to inform you of your test results.    Your cholesterol panel is mildly elevated when compared to last year.  This can be improved by increasing your daily exercise and improving your diet.    Diabetes testing through fasting glucose, liver and kidney testing came back normal.  We should repeat your labs again in 1 year.    Resulted Orders   Comprehensive Metabolic Panel   Result Value Ref Range    Sodium 143 134 - 144 mmol/L    Potassium 4.6 3.5 - 5.1 mmol/L    Chloride 103 98 - 107 mmol/L    Carbon Dioxide (CO2) 34 (H) 21 - 31 mmol/L    Anion Gap 6 3 - 14 mmol/L    Urea Nitrogen 15 7 - 25 mg/dL    Creatinine 0.92 0.60 - 1.20 mg/dL    Calcium 9.9 8.6 - 10.3 mg/dL    Glucose 94 70 - 105 mg/dL    Alkaline Phosphatase 58 34 - 104 U/L    AST 19 13 - 39 U/L    ALT 9 7 - 52 U/L    Protein Total 7.6 6.4 - 8.9 g/dL    Albumin 4.0 3.5 - 5.7 g/dL    Bilirubin Total 0.7 0.3 - 1.0 mg/dL    GFR Estimate 64 >60 mL/min/1.73m2      Comment:      Effective December 21, 2021 eGFRcr in adults is calculated using the 2021 CKD-EPI creatinine equation which includes age and gender (Jeison vilchis al., [place], DOI: 10.1056/PRWQma1960271)   Lipid Panel   Result Value Ref Range    Cholesterol 216 (H) <200 mg/dL    Triglycerides 174 (H) <150 mg/dL    Direct Measure HDL 66 23 - 92 mg/dL    LDL Cholesterol Calculated 115 (H) <=100 mg/dL    Non HDL Cholesterol 150 (H) <130 mg/dL    Patient Fasting > 8hrs? No     Narrative    Cholesterol  Desirable:  <200 mg/dL    Triglycerides  Normal:  Less than 150 mg/dL  Borderline High:  150-199 mg/dL  High:  200-499 mg/dL  Very High:  Greater than or equal to 500 mg/dL    Direct Measure HDL  Female:  Greater than or equal to 50 mg/dL   Male:  Greater than or equal to 40 mg/dL    LDL Cholesterol  Desirable:  <100mg/dL  Above Desirable:  100-129 mg/dL   Borderline High:  130-159 mg/dL   High:  160-189  mg/dL   Very High:  >= 190 mg/dL    Non HDL Cholesterol  Desirable:  130 mg/dL  Above Desirable:  130-159 mg/dL  Borderline High:  160-189 mg/dL  High:  190-219 mg/dL  Very High:  Greater than or equal to 220 mg/dL       If you have any questions or concerns, please call the clinic at the number listed above.       Sincerely,      Obi Johnston

## 2022-10-27 ENCOUNTER — TELEPHONE (OUTPATIENT)
Dept: FAMILY MEDICINE | Facility: OTHER | Age: 77
End: 2022-10-27

## 2022-10-27 NOTE — TELEPHONE ENCOUNTER
DWS -   Reason for call: Medication or medication refill    Name of medication requested: Wexela    Are you out of the medication? No.  Patient has about 18 days left, but she is concerned because she gets her meds mailed to her.    What pharmacy do you use?  [facility]    Preferred method for responding to this message: Telephone Call    Phone number patient can be reached at: Home number on file [phone] (home)    If we cannot reach you directly, may we leave a detailed response at the number you provided? Yes

## 2022-10-27 NOTE — TELEPHONE ENCOUNTER
Called [facility] mail order pharmacy. They reported that the prescription was mailed out yesterday. Call was placed to patient and gave her this information. States she will followup with pharmacy    Corinne R Thayer, RN on 10/27/2022 at 1:12 PM

## 2022-11-21 ENCOUNTER — HOSPITAL ENCOUNTER (OUTPATIENT)
Dept: MAMMOGRAPHY | Facility: OTHER | Age: 77
Discharge: HOME | End: 2022-11-21
Attending: FAMILY MEDICINE
Payer: MEDICARE

## 2022-11-21 ENCOUNTER — HOSPITAL ENCOUNTER (OUTPATIENT)
Dept: CT IMAGING | Facility: OTHER | Age: 77
Discharge: HOME | End: 2022-11-21
Attending: FAMILY MEDICINE
Payer: MEDICARE

## 2022-11-21 DIAGNOSIS — Z87.891 PERSONAL HISTORY OF TOBACCO USE: ICD-10-CM

## 2022-11-21 DIAGNOSIS — Z12.31 ENCOUNTER FOR SCREENING MAMMOGRAM FOR BREAST CANCER: ICD-10-CM

## 2022-11-21 PROCEDURE — 77067 SCR MAMMO BI INCL CAD: CPT

## 2022-11-21 PROCEDURE — 71271 CT THORAX LUNG CANCER SCR C-: CPT

## 2022-11-22 ENCOUNTER — OFFICE VISIT (OUTPATIENT)
Dept: FAMILY MEDICINE | Facility: OTHER | Age: 77
End: 2022-11-22
Attending: FAMILY MEDICINE
Payer: MEDICARE

## 2022-11-22 VITALS
DIASTOLIC BLOOD PRESSURE: 76 MMHG | TEMPERATURE: 98.9 F | BODY MASS INDEX: 15.51 KG/M2 | RESPIRATION RATE: 18 BRPM | HEIGHT: 60 IN | WEIGHT: 79 LBS | OXYGEN SATURATION: 96 % | HEART RATE: 88 BPM | SYSTOLIC BLOOD PRESSURE: 138 MMHG

## 2022-11-22 DIAGNOSIS — R91.1 LEFT LOWER LOBE PULMONARY NODULE: Primary | ICD-10-CM

## 2022-11-22 PROCEDURE — G0463 HOSPITAL OUTPT CLINIC VISIT: HCPCS

## 2022-11-22 PROCEDURE — 99213 OFFICE O/P EST LOW 20 MIN: CPT | Performed by: FAMILY MEDICINE

## 2022-11-22 ASSESSMENT — PAIN SCALES - GENERAL: PAINLEVEL: NO PAIN (0)

## 2022-11-22 NOTE — PROGRESS NOTES
"  Assessment & Plan     Left lower lobe pulmonary nodule  Reviewed images with radiology.  They would be willing to try a percutaneous biopsy however suggest that she may qualify for radiation therapy without tissue diagnosis.  Will refer to oncology to help sort out appropriate neck steps.  Appreciate their assistance.  - Adult Oncology/Hematology  Referral; Future                 No follow-ups on file.    Obi Johnston MD  [facility]    [facility]   Loulou is a 77 year old, presenting for the following health issues:  RECHECK (CT results)    She comes in today for follow-up.  She had routine lung cancer screening performed yesterday and this returned showing a new nodule in her left lower lobe measuring 14.5 x 11.8 x 10.6 mm.  This was not seen a year ago.  She has a significant smoking history.    HPI           Review of Systems         Objective    /76   Pulse 88   Temp 98.9  F (37.2  C)   Resp 18   Ht 1.511 m (4' 11.5\")   Wt 35.8 kg (79 lb)   SpO2 96%   BMI 15.69 kg/m    Body mass index is 15.69 kg/m .  Physical Exam   GENERAL: healthy, alert and no distress    CT chest lung cancer screening  Findings:  There is a new solid spiculated nodule now seen in the periphery of  the left lower lobe measuring 14.5 mm in craniocaudal by 11.8 x 10.6  mm in size (see image 190 of series 3, image #22 of series 8 and image  #48 of series 6, highly concerning for small malignant neoplasm.     Other nodules are otherwise unchanged dating back to the prior study.     Emphysema: Moderate centrilobular and paraseptal emphysema again seen  throughout both lungs without significant change.     Mosaic attenuation: None     Bronchial wall thickening: Localized bronchiectasis, bronchial wall  thickening and intraluminal debris again seen anteriorly in the  lingula..     Bronchiectasis: Present, not significantly changed.     Coronary artery calcium: Unchanged.     Other portions of the " "chest: Thyroid gland and esophagus are grossly  normal.      Lymph nodes: No pathologic lymph node enlargement.     Upper abdomen: No acute abnormality. Dense atherosclerotic  calcifications are again seen within the arterial structures.      Bones: No acute abnormality. Mild degenerative changes of the thoracic  spine are similar in appearance.                                                                      Impression:   1. ACR Assessment Category:  Lung-RADS Category 4B. Suspicious. .      Recommendation:  Lung-RADS Category 4B. Suspicious. Recommendation:   Chest CT with contrast (please use exam code ) or Chest CT  without contrast (please use exam code ), PET/CT and/or tissue  sampling depending on the \"probability of malignancy and  comorbidities, PET/CT may be used when there is a >/= 8 mm solid  component. .            2. Significant Incidental Finding(s):  Category S: No.  a.  Unchanged emphysema.   b.  Unchanged bronchiectasis and intraluminal bronchial debris of the  lingula.    c.  Unchanged atherosclerotic calcifications of the chest and upper  abdomen.    d.  e.  3. Prior history of Lung cancer: .  4. (Any moderate or severe Emphysema or bronchial wall thickening or  mosaic attenuation?)  5. Avoidance of tobacco smoke is strongly advised. Please consider  referral for smoking cessation to [facility] Medication Therapy Management  (MTM) if clinically appropriate.                   "

## 2022-11-22 NOTE — NURSING NOTE
Patient presents today for follow up on CT results.    Medication Reconciliation Complete    Mehnaz Ledesma LPN  11/22/2022 2:05 PM

## 2022-11-25 DIAGNOSIS — R91.8 PULMONARY NODULES: Primary | ICD-10-CM

## 2022-11-25 NOTE — PROGRESS NOTES
Orders for PET scan placed prior to seeing Reynaldo Hansen MD. Attempted to reach patient. Left message to call back ASAP.  Yareli Bardales RN...........11/25/2022 11:05 AM        Patient notified and PET scan scheduled 12/14.will need consult with Reynaldo Hansen MD week after. Yarlei Bardales RN...........11/28/2022 11:12 AM

## 2022-12-13 ENCOUNTER — TELEPHONE (OUTPATIENT)
Dept: PET IMAGING | Facility: HOSPITAL | Age: 77
End: 2022-12-13

## 2022-12-14 ENCOUNTER — HOSPITAL ENCOUNTER (OUTPATIENT)
Dept: PET IMAGING | Facility: HOSPITAL | Age: 77
Discharge: HOME | End: 2022-12-14
Attending: INTERNAL MEDICINE | Admitting: INTERNAL MEDICINE
Payer: MEDICARE

## 2022-12-14 DIAGNOSIS — R91.8 PULMONARY NODULES: ICD-10-CM

## 2022-12-14 PROCEDURE — G1010 CDSM STANSON: HCPCS | Mod: PI

## 2022-12-14 PROCEDURE — 343N000001 HC RX 343: Performed by: INTERNAL MEDICINE

## 2022-12-14 PROCEDURE — A9552 F18 FDG: HCPCS | Performed by: INTERNAL MEDICINE

## 2022-12-14 RX ADMIN — FLUDEOXYGLUCOSE F-18 11.3 MCI.: 500 INJECTION, SOLUTION INTRAVENOUS at 11:22

## 2022-12-21 ENCOUNTER — ONCOLOGY VISIT (OUTPATIENT)
Dept: ONCOLOGY | Facility: OTHER | Age: 77
End: 2022-12-21
Attending: FAMILY MEDICINE
Payer: MEDICARE

## 2022-12-21 VITALS
OXYGEN SATURATION: 97 % | TEMPERATURE: 97.4 F | SYSTOLIC BLOOD PRESSURE: 118 MMHG | RESPIRATION RATE: 14 BRPM | WEIGHT: 78 LBS | DIASTOLIC BLOOD PRESSURE: 64 MMHG | HEART RATE: 90 BPM | BODY MASS INDEX: 15.49 KG/M2

## 2022-12-21 DIAGNOSIS — R91.8 PULMONARY NODULES: Primary | ICD-10-CM

## 2022-12-21 DIAGNOSIS — R91.1 LEFT LOWER LOBE PULMONARY NODULE: ICD-10-CM

## 2022-12-21 DIAGNOSIS — R73.03 PRE-DIABETES: Primary | ICD-10-CM

## 2022-12-21 DIAGNOSIS — M89.8X9 BONE PAIN: ICD-10-CM

## 2022-12-21 DIAGNOSIS — R63.4 WEIGHT LOSS: ICD-10-CM

## 2022-12-21 LAB
ALBUMIN SERPL BCG-MCNC: 4.3 G/DL (ref 3.5–5.2)
ALP SERPL-CCNC: 63 U/L (ref 35–104)
ALT SERPL W P-5'-P-CCNC: 7 U/L (ref 10–35)
ANION GAP SERPL CALCULATED.3IONS-SCNC: 10 MMOL/L (ref 7–15)
AST SERPL W P-5'-P-CCNC: 20 U/L (ref 10–35)
BASOPHILS # BLD AUTO: 0 10E3/UL (ref 0–0.2)
BASOPHILS NFR BLD AUTO: 1 %
BILIRUB SERPL-MCNC: 0.7 MG/DL
BUN SERPL-MCNC: 13.3 MG/DL (ref 8–23)
CALCIUM SERPL-MCNC: 9.2 MG/DL (ref 8.8–10.2)
CHLORIDE SERPL-SCNC: 103 MMOL/L (ref 98–107)
CREAT SERPL-MCNC: 0.78 MG/DL (ref 0.51–0.95)
DEPRECATED HCO3 PLAS-SCNC: 29 MMOL/L (ref 22–29)
EOSINOPHIL # BLD AUTO: 0.1 10E3/UL (ref 0–0.7)
EOSINOPHIL NFR BLD AUTO: 2 %
ERYTHROCYTE [DISTWIDTH] IN BLOOD BY AUTOMATED COUNT: 12.7 % (ref 10–15)
GFR SERPL CREATININE-BSD FRML MDRD: 78 ML/MIN/1.73M2
GLUCOSE SERPL-MCNC: 83 MG/DL (ref 70–99)
HCT VFR BLD AUTO: 34.7 % (ref 35–47)
HGB BLD-MCNC: 11.6 G/DL (ref 11.7–15.7)
IMM GRANULOCYTES # BLD: 0 10E3/UL
IMM GRANULOCYTES NFR BLD: 0 %
LDH SERPL L TO P-CCNC: 169 U/L (ref 0–250)
LYMPHOCYTES # BLD AUTO: 1.4 10E3/UL (ref 0.8–5.3)
LYMPHOCYTES NFR BLD AUTO: 26 %
MCH RBC QN AUTO: 31.5 PG (ref 26.5–33)
MCHC RBC AUTO-ENTMCNC: 33.4 G/DL (ref 31.5–36.5)
MCV RBC AUTO: 94 FL (ref 78–100)
MONOCYTES # BLD AUTO: 0.4 10E3/UL (ref 0–1.3)
MONOCYTES NFR BLD AUTO: 8 %
NEUTROPHILS # BLD AUTO: 3.4 10E3/UL (ref 1.6–8.3)
NEUTROPHILS NFR BLD AUTO: 63 %
NRBC # BLD AUTO: 0 10E3/UL
NRBC BLD AUTO-RTO: 0 /100
PLATELET # BLD AUTO: 194 10E3/UL (ref 150–450)
POTASSIUM SERPL-SCNC: 3.7 MMOL/L (ref 3.4–5.3)
PROT SERPL-MCNC: 7.3 G/DL (ref 6.4–8.3)
RBC # BLD AUTO: 3.68 10E6/UL (ref 3.8–5.2)
SODIUM SERPL-SCNC: 142 MMOL/L (ref 136–145)
WBC # BLD AUTO: 5.2 10E3/UL (ref 4–11)

## 2022-12-21 PROCEDURE — 83615 LACTATE (LD) (LDH) ENZYME: CPT | Mod: ZL | Performed by: INTERNAL MEDICINE

## 2022-12-21 PROCEDURE — 83521 IG LIGHT CHAINS FREE EACH: CPT | Mod: ZL | Performed by: INTERNAL MEDICINE

## 2022-12-21 PROCEDURE — 36415 COLL VENOUS BLD VENIPUNCTURE: CPT | Mod: ZL | Performed by: INTERNAL MEDICINE

## 2022-12-21 PROCEDURE — 84155 ASSAY OF PROTEIN SERUM: CPT | Mod: ZL | Performed by: INTERNAL MEDICINE

## 2022-12-21 PROCEDURE — 86334 IMMUNOFIX E-PHORESIS SERUM: CPT | Performed by: PATHOLOGY

## 2022-12-21 PROCEDURE — 85025 COMPLETE CBC W/AUTO DIFF WBC: CPT | Mod: ZL | Performed by: INTERNAL MEDICINE

## 2022-12-21 PROCEDURE — 82232 ASSAY OF BETA-2 PROTEIN: CPT | Mod: ZL | Performed by: INTERNAL MEDICINE

## 2022-12-21 PROCEDURE — 99205 OFFICE O/P NEW HI 60 MIN: CPT | Performed by: INTERNAL MEDICINE

## 2022-12-21 PROCEDURE — 82784 ASSAY IGA/IGD/IGG/IGM EACH: CPT | Mod: ZL | Performed by: INTERNAL MEDICINE

## 2022-12-21 PROCEDURE — G0463 HOSPITAL OUTPT CLINIC VISIT: HCPCS

## 2022-12-21 PROCEDURE — 85810 BLOOD VISCOSITY EXAMINATION: CPT | Mod: ZL | Performed by: INTERNAL MEDICINE

## 2022-12-21 PROCEDURE — 84165 PROTEIN E-PHORESIS SERUM: CPT | Mod: TC | Performed by: PATHOLOGY

## 2022-12-21 PROCEDURE — 99417 PROLNG OP E/M EACH 15 MIN: CPT | Performed by: INTERNAL MEDICINE

## 2022-12-21 PROCEDURE — 80053 COMPREHEN METABOLIC PANEL: CPT | Mod: ZL | Performed by: INTERNAL MEDICINE

## 2022-12-21 ASSESSMENT — PAIN SCALES - GENERAL: PAINLEVEL: NO PAIN (0)

## 2022-12-21 NOTE — PATIENT INSTRUCTIONS
Labs today    Follow up with Reynaldo Hansen MD in 3 month      You will need imaging done prior to your next visit.  Radiology scheduling will contact you to arrange these scans.  If you have not received a call in the next 2 weeks, please call them.  For MRIs call [phone] and all other scans call [phone].

## 2022-12-22 LAB — TOTAL PROTEIN SERUM FOR ELP: 7.1 G/DL (ref 6.4–8.3)

## 2022-12-22 NOTE — PROGRESS NOTES
Visit Date: 12/21/2022    HEMATOLOGY/ONCOLOGY CONSULTATION    REASON FOR CONSULTATION:  Left lower lobe lung nodule.    REQUESTING PHYSICIAN:  Dr. Obi Johnston    HISTORY OF PRESENT ILLNESS:  Mrs. Lucero is a 77-year-old white female with history of tobacco abuse, COPD, osteoporosis we were asked to evaluate concerning a new left lower lobe lung nodule.  Apparently she had recently been seen by Dr. Johnston.  Given her history of tobacco abuse, he ordered a low dose CT scan of the chest; this was done on 11/21/2022 and it was read as a new solid spiculated nodule seen in the periphery of the left lower lobe measuring 14.5 mm in craniocaudal by 11.8 x 10.6 mm in size, highly concerning for small malignant neoplasm.  It was decided to order a PET scan.  This was done on 12/14/2022 and it was read as no evidence of abnormal FDG uptake that would suggest malignant disease.  The spiculated nodule seen previously was much smaller in size; it had decreased from 11.8 mm down to 7.8 mm, it was felt to be a sequela of possible pneumonia in the past.  It was felt to be a benign entity, possibly presenting the remnants of a focal pneumonia.  The patient otherwise has a history of tobacco abuse.  She said she smoked for at least 60 years, at least a pack per day; most recently she says she has cut back to half pack per day.  She says she gets dyspneic on exertion, but no cough, hemoptysis.  She has had at least a 15-pound weight loss over the last year.  Otherwise has no complaints of chest pain, palpitations, headaches.  She does complain of diffuse bone pain, which she attributes to osteoporosis.  There is no family history of lung cancer, although she does mention that her father had colon cancer at age 60 and she has been monitored with frequent colonoscopies, which have been reportedly negative.    PAST MEDICAL HISTORY:  Significant for as above, age-related osteoporosis, diverticular disease of the colon, tobacco abuse,  systolic murmur, gastroesophageal reflux disease, COPD.    ALLERGIES:  SHE IS ALLERGIC TO METRONIDAZOLE, PNEUMOCOCCAL VACCINE, TRAMADOL.    MEDICATIONS:  Medications she is currently on include an Advair inhaler.    SOCIAL HISTORY:  Includes a 60-pack-year smoking history.  Alcohol is negative.  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    FAMILY HISTORY:  Significant for father with colon cancer.    REVIEW OF SYSTEMS:   CENTRAL NERVOUS SYSTEM:  Negative for headaches, change in mental status.  ENT:  Negative for hearing loss.  RESPIRATORY:  Significant for dyspnea on exertion.  No cough or hemoptysis.  CARDIAC:  Negative for chest pain, palpitations, orthopnea, PND, ankle edema.  GASTROINTESTINAL:  Negative for change in bowel habits, bright red blood per rectum, hematemesis.  It was unclear if she has reflux.  MUSCULOSKELETAL:  Significant for diffuse bone pain, primarily in the spine.  GENITOURINARY:  Negative for hematuria.  CONSTITUTIONAL:  Negative for fevers, night sweats.  She says she had a 15-pound weight loss over the past year.  HEMATOLOGIC:  Negative for easy bruisability, gingival bleeding, epistaxis.    PHYSICAL EXAMINATION:    GENERAL:  She is a frail, elderly white female in no acute distress, ECOG performance status is a 1.  VITAL SIGNS:  Stable.  She is afebrile.  HEENT:  Significant for temporal wasting.  Oropharynx nonerythematous.  NECK:  Supple, no thyromegaly.  LUNGS:  Reveal few expiratory wheezes on the right.  HEART:  Regular rhythm.  S1, S2 normal with a 3/6 systolic murmur heard best at the left sternal border.  ABDOMEN:  Soft, normoactive bowel sounds.  No masses, nontender.  LYMPHATICS:  No cervical, supraclavicular, axillary or inguinal nodes.  EXTREMITIES:  Without edema.  NEUROLOGIC:  Nonfocal.    LABORATORY DATA:  Laboratories reveal CBC with white count 5.2, H and H 11.6 and 34.7, platelet count is 194.  BUN is 13.3, creatinine 0.78.  LFTs are  normal.  LDH is 169.    IMPRESSION:  New left lower lobe lung nodule in a patient with tobacco abuse.  PET scan indicated a benign entity with decrease in size of lung nodule; doubt this is malignancy.  Nonetheless, given her history of smoking abuse we would like to confirm this by repeating CT chest in 3 months and seeing the patient.  Also, obtain myeloma labs given her history of bone pain to rule out underlying monoclonal gammopathy.  She is slightly anemic.  We will also obtain iron, TIBC, ferritin, B12, folate.    TIME:  110 minutes were spent on this patient.  Time was spent reviewing imaging results with Radiology, performing history and physical, documenting history and physical and ordering followup labs and scans.      Reynaldo Hansen MD        D: 2022   T: 2022   MT: [id number]    Name:     OSMAR COLE  MRN:      [id number]        Account:    [id number]   :      1945           Visit Date: 2022     Document: [id number]    cc:  Obi Johnston MD

## 2022-12-23 ENCOUNTER — HOSPITAL ENCOUNTER (OUTPATIENT)
Dept: BONE DENSITY | Facility: OTHER | Age: 77
Discharge: HOME | End: 2022-12-23
Attending: FAMILY MEDICINE | Admitting: FAMILY MEDICINE
Payer: MEDICARE

## 2022-12-23 DIAGNOSIS — Z00.00 ENCOUNTER FOR MEDICARE ANNUAL WELLNESS EXAM: ICD-10-CM

## 2022-12-23 DIAGNOSIS — Z78.0 ASYMPTOMATIC MENOPAUSAL STATE: ICD-10-CM

## 2022-12-23 LAB
ALBUMIN SERPL ELPH-MCNC: 3.8 G/DL (ref 3.7–5.1)
ALPHA1 GLOB SERPL ELPH-MCNC: 0.4 G/DL (ref 0.2–0.4)
ALPHA2 GLOB SERPL ELPH-MCNC: 0.8 G/DL (ref 0.5–0.9)
B-GLOBULIN SERPL ELPH-MCNC: 0.9 G/DL (ref 0.6–1)
B2 MICROGLOB TUMOR MARKER SER-MCNC: 2.2 MG/L
GAMMA GLOB SERPL ELPH-MCNC: 1.2 G/DL (ref 0.7–1.6)
IGA SERPL-MCNC: 407 MG/DL (ref 84–499)
IGG SERPL-MCNC: 1282 MG/DL (ref 610–1616)
IGM SERPL-MCNC: 110 MG/DL (ref 35–242)
KAPPA LC FREE SER-MCNC: 5.48 MG/DL (ref 0.33–1.94)
KAPPA LC FREE/LAMBDA FREE SER NEPH: 2.21 {RATIO} (ref 0.26–1.65)
LAMBDA LC FREE SERPL-MCNC: 2.48 MG/DL (ref 0.57–2.63)
M PROTEIN SERPL ELPH-MCNC: 0 G/DL
PROT PATTERN SERPL ELPH-IMP: NORMAL
PROT PATTERN SERPL IFE-IMP: NORMAL
VISC SER: 1.7 CP (ref 1.4–1.8)

## 2022-12-23 PROCEDURE — 77080 DXA BONE DENSITY AXIAL: CPT

## 2022-12-23 PROCEDURE — 86334 IMMUNOFIX E-PHORESIS SERUM: CPT | Mod: 26

## 2022-12-23 PROCEDURE — 84165 PROTEIN E-PHORESIS SERUM: CPT | Mod: 26

## 2022-12-29 ENCOUNTER — DOCUMENTATION ONLY (OUTPATIENT)
Dept: OTHER | Facility: CLINIC | Age: 77
End: 2022-12-29

## 2023-03-21 ENCOUNTER — LAB (OUTPATIENT)
Dept: LAB | Facility: OTHER | Age: 78
End: 2023-03-21
Attending: INTERNAL MEDICINE
Payer: MEDICARE

## 2023-03-21 ENCOUNTER — HOSPITAL ENCOUNTER (OUTPATIENT)
Dept: CT IMAGING | Facility: OTHER | Age: 78
Discharge: HOME | End: 2023-03-21
Attending: INTERNAL MEDICINE
Payer: MEDICARE

## 2023-03-21 DIAGNOSIS — M89.8X9 BONE PAIN: ICD-10-CM

## 2023-03-21 DIAGNOSIS — R73.03 PRE-DIABETES: ICD-10-CM

## 2023-03-21 DIAGNOSIS — R91.8 PULMONARY NODULES: ICD-10-CM

## 2023-03-21 DIAGNOSIS — R63.4 WEIGHT LOSS: ICD-10-CM

## 2023-03-21 LAB
CREAT SERPL-MCNC: 0.83 MG/DL (ref 0.51–0.95)
GFR SERPL CREATININE-BSD FRML MDRD: 72 ML/MIN/1.73M2

## 2023-03-21 PROCEDURE — 82565 ASSAY OF CREATININE: CPT | Mod: ZL

## 2023-03-21 PROCEDURE — 250N000011 HC RX IP 250 OP 636: Performed by: INTERNAL MEDICINE

## 2023-03-21 PROCEDURE — 36415 COLL VENOUS BLD VENIPUNCTURE: CPT | Mod: ZL

## 2023-03-21 PROCEDURE — G1010 CDSM STANSON: HCPCS

## 2023-03-21 RX ORDER — IOPAMIDOL 755 MG/ML
50 INJECTION, SOLUTION INTRAVASCULAR ONCE
Status: COMPLETED | OUTPATIENT
Start: 2023-03-21 | End: 2023-03-21

## 2023-03-21 RX ADMIN — IOPAMIDOL 50 ML: 755 INJECTION, SOLUTION INTRAVENOUS at 10:03

## 2023-03-26 NOTE — ANESTHESIA CARE TRANSFER NOTE
Patient: Loulou Lucero    Procedure(s):  COLONOSCOPY, WITH POLYPECTOMY AND BIOPSY  ESOPHAGOGASTRODUODENOSCOPY, WITH BIOPSY    Diagnosis: * No pre-op diagnosis entered *  Diagnosis Additional Information: No value filed.    Anesthesia Type:   MAC     Note:  Airway :Room Air  Patient transferred to:Phase II  Handoff Report: Identifed the Patient, Identified the Reponsible Provider, Reviewed the pertinent medical history, Discussed the surgical course, Reviewed Intra-OP anesthesia mangement and issues during anesthesia, Set expectations for post-procedure period and Allowed opportunity for questions and acknowledgement of understanding      Vitals: (Last set prior to Anesthesia Care Transfer)    CRNA VITALS  11/7/2019 1008 - 11/7/2019 1039      11/7/2019             Resp Rate (set):  10                Electronically Signed By: VEENA MATTHEWS CRNA  November 7, 2019  10:39 AM   No

## 2023-03-28 ENCOUNTER — ONCOLOGY VISIT (OUTPATIENT)
Dept: ONCOLOGY | Facility: OTHER | Age: 78
End: 2023-03-28
Attending: INTERNAL MEDICINE
Payer: MEDICARE

## 2023-03-28 VITALS
TEMPERATURE: 97.4 F | RESPIRATION RATE: 18 BRPM | BODY MASS INDEX: 16.09 KG/M2 | SYSTOLIC BLOOD PRESSURE: 136 MMHG | DIASTOLIC BLOOD PRESSURE: 72 MMHG | HEART RATE: 74 BPM | WEIGHT: 81 LBS | OXYGEN SATURATION: 100 %

## 2023-03-28 DIAGNOSIS — D64.9 ANEMIA, UNSPECIFIED TYPE: Primary | ICD-10-CM

## 2023-03-28 LAB
ALBUMIN SERPL BCG-MCNC: 4.2 G/DL (ref 3.5–5.2)
ALP SERPL-CCNC: 75 U/L (ref 35–104)
ALT SERPL W P-5'-P-CCNC: 10 U/L (ref 10–35)
ANION GAP SERPL CALCULATED.3IONS-SCNC: 8 MMOL/L (ref 7–15)
AST SERPL W P-5'-P-CCNC: 23 U/L (ref 10–35)
BASOPHILS # BLD AUTO: 0 10E3/UL (ref 0–0.2)
BASOPHILS NFR BLD AUTO: 1 %
BILIRUB SERPL-MCNC: 0.5 MG/DL
BUN SERPL-MCNC: 12.5 MG/DL (ref 8–23)
CALCIUM SERPL-MCNC: 9.3 MG/DL (ref 8.8–10.2)
CHLORIDE SERPL-SCNC: 104 MMOL/L (ref 98–107)
CREAT SERPL-MCNC: 0.77 MG/DL (ref 0.51–0.95)
DEPRECATED HCO3 PLAS-SCNC: 30 MMOL/L (ref 22–29)
EOSINOPHIL # BLD AUTO: 0.1 10E3/UL (ref 0–0.7)
EOSINOPHIL NFR BLD AUTO: 3 %
ERYTHROCYTE [DISTWIDTH] IN BLOOD BY AUTOMATED COUNT: 12.9 % (ref 10–15)
FOLATE SERPL-MCNC: 13.5 NG/ML (ref 4.6–34.8)
GFR SERPL CREATININE-BSD FRML MDRD: 79 ML/MIN/1.73M2
GLUCOSE SERPL-MCNC: 98 MG/DL (ref 70–99)
HCT VFR BLD AUTO: 37 % (ref 35–47)
HGB BLD-MCNC: 12.1 G/DL (ref 11.7–15.7)
IMM GRANULOCYTES # BLD: 0 10E3/UL
IMM GRANULOCYTES NFR BLD: 0 %
LDH SERPL L TO P-CCNC: 218 U/L (ref 0–250)
LYMPHOCYTES # BLD AUTO: 1.3 10E3/UL (ref 0.8–5.3)
LYMPHOCYTES NFR BLD AUTO: 31 %
MCH RBC QN AUTO: 31.4 PG (ref 26.5–33)
MCHC RBC AUTO-ENTMCNC: 32.7 G/DL (ref 31.5–36.5)
MCV RBC AUTO: 96 FL (ref 78–100)
MONOCYTES # BLD AUTO: 0.4 10E3/UL (ref 0–1.3)
MONOCYTES NFR BLD AUTO: 8 %
NEUTROPHILS # BLD AUTO: 2.5 10E3/UL (ref 1.6–8.3)
NEUTROPHILS NFR BLD AUTO: 57 %
NRBC # BLD AUTO: 0 10E3/UL
NRBC BLD AUTO-RTO: 0 /100
PLATELET # BLD AUTO: 214 10E3/UL (ref 150–450)
POTASSIUM SERPL-SCNC: 3.7 MMOL/L (ref 3.4–5.3)
PROT SERPL-MCNC: 7.7 G/DL (ref 6.4–8.3)
RBC # BLD AUTO: 3.85 10E6/UL (ref 3.8–5.2)
RETICS # AUTO: 0.04 10E6/UL (ref 0.03–0.1)
RETICS/RBC NFR AUTO: 1 % (ref 0.5–2)
SODIUM SERPL-SCNC: 142 MMOL/L (ref 136–145)
VIT B12 SERPL-MCNC: 187 PG/ML (ref 232–1245)
WBC # BLD AUTO: 4.4 10E3/UL (ref 4–11)

## 2023-03-28 PROCEDURE — 83615 LACTATE (LD) (LDH) ENZYME: CPT | Mod: ZL | Performed by: INTERNAL MEDICINE

## 2023-03-28 PROCEDURE — 82607 VITAMIN B-12: CPT | Mod: ZL | Performed by: INTERNAL MEDICINE

## 2023-03-28 PROCEDURE — G0463 HOSPITAL OUTPT CLINIC VISIT: HCPCS

## 2023-03-28 PROCEDURE — 82784 ASSAY IGA/IGD/IGG/IGM EACH: CPT | Mod: ZL | Performed by: INTERNAL MEDICINE

## 2023-03-28 PROCEDURE — 36415 COLL VENOUS BLD VENIPUNCTURE: CPT | Mod: ZL | Performed by: INTERNAL MEDICINE

## 2023-03-28 PROCEDURE — 84165 PROTEIN E-PHORESIS SERUM: CPT | Mod: ZL | Performed by: PATHOLOGY

## 2023-03-28 PROCEDURE — 80053 COMPREHEN METABOLIC PANEL: CPT | Mod: ZL | Performed by: INTERNAL MEDICINE

## 2023-03-28 PROCEDURE — 85004 AUTOMATED DIFF WBC COUNT: CPT | Mod: ZL | Performed by: INTERNAL MEDICINE

## 2023-03-28 PROCEDURE — 86334 IMMUNOFIX E-PHORESIS SERUM: CPT | Mod: 26

## 2023-03-28 PROCEDURE — 99215 OFFICE O/P EST HI 40 MIN: CPT | Performed by: INTERNAL MEDICINE

## 2023-03-28 PROCEDURE — 83521 IG LIGHT CHAINS FREE EACH: CPT | Mod: ZL | Performed by: INTERNAL MEDICINE

## 2023-03-28 PROCEDURE — 82746 ASSAY OF FOLIC ACID SERUM: CPT | Mod: ZL | Performed by: INTERNAL MEDICINE

## 2023-03-28 PROCEDURE — 84155 ASSAY OF PROTEIN SERUM: CPT | Mod: ZL | Performed by: INTERNAL MEDICINE

## 2023-03-28 PROCEDURE — 84165 PROTEIN E-PHORESIS SERUM: CPT | Mod: 26

## 2023-03-28 PROCEDURE — 85045 AUTOMATED RETICULOCYTE COUNT: CPT | Mod: ZL | Performed by: INTERNAL MEDICINE

## 2023-03-28 PROCEDURE — 82232 ASSAY OF BETA-2 PROTEIN: CPT | Mod: ZL | Performed by: INTERNAL MEDICINE

## 2023-03-28 PROCEDURE — 86334 IMMUNOFIX E-PHORESIS SERUM: CPT | Mod: ZL | Performed by: PATHOLOGY

## 2023-03-28 PROCEDURE — 85810 BLOOD VISCOSITY EXAMINATION: CPT | Mod: ZL | Performed by: INTERNAL MEDICINE

## 2023-03-28 ASSESSMENT — PAIN SCALES - GENERAL: PAINLEVEL: NO PAIN (0)

## 2023-03-28 NOTE — NURSING NOTE
Chief Complaint   Patient presents with     Hematology     F/U Anemia and pulmonary nodules     Medication Reconciliation: complete    Deirdre Mariscal CMA (AAMA)

## 2023-03-28 NOTE — PROGRESS NOTES
Visit Date: 03/28/2023    HISTORY OF PRESENT ILLNESS:  Mrs. Lucero returns for followup of history of the left lower lobe lung nodule.  We had seen the patient in consultation at the request of Dr. Johnston back on 12/21/2022.  At that time, Mrs. Lucero was a 77-year-old white female with history of tobacco abuse and COPD, osteoporosis who we were asked to evaluate concerning a new left lower lobe lung nodule.  Apparently, she had recently been seen by Dr. Johnston.  Given her history of tobacco abuse, he ordered a low dose CT scan of the chest.  This was done on 11/21/2022 and was read as a new solid spiculated nodule seen in the periphery of the left lower lobe measuring 14.5 mm in craniocaudal dimension by 11.8 x 10.6 mm in size, highly concerning for small malignant neoplasm.  We decided to order a PET scan.  This was done on 12/14/2022 and was read as no evidence of abnormal FDG uptake that would suggest malignant disease.  The spiculated nodule seen previously was much smaller in size and decreased from 11.8 mm down to 7.8 mm. It was felt to be sequela of possible pneumonia in the past.  It was felt to be a benign entity, possibly representing the remnants of focal pneumonia.  The patient otherwise had a history of tobacco abuse.  She said she smoked for at least 60 years, at least a pack per day.  She most recently had cut back to half pack per day.  She had at least a 15-pound weight loss over the last year.  She was complaining of diffuse bone pain.  We recommended that we should repeat CT chest in 3 months.  This was repeated on 03/21/2023 and the previous left lower lobe lung nodule had resolved.  There was no evidence of the left lower lobe lung nodule.  It was recommended the patient undergo screening.  She did have evidence of emphysema otherwise.  We also had noted that she was anemic.  We ordered some myeloma labs as well as a CBC, which revealed that she had a hemoglobin of 11.6, hematocrit 34.7.  She did  not have any labs drawn for today.  Otherwise, she still continues to smoke.  She denies any shortness of breath, chest pain, abdominal pain.  She does have a smoker's cough.  She does plan to see her dentist as she has poor dentition.  She says she is not eating as much due to her teeth and that may have contributed to her weight loss.    PHYSICAL EXAMINATION:    GENERAL:  She is a frail, elderly white female in no acute distress.  ECOG performance status of 1.  VITAL SIGNS:  Reveal blood pressure 136/72, pulse 74, respirations 18, temperature 97.4.  HEENT:  Atraumatic, normocephalic.  Oropharynx nonerythematous.  NECK:  Supple.  LUNGS:  Reveal a few expiratory wheezes on the right.  HEART:  Regular rhythm.  S1, S2 normal.  ABDOMEN:  Soft, normoactive bowel sounds, nondistended, nontender.  LYMPHATICS:  No cervical, supraclavicular, axillary or inguinal nodes.  EXTREMITIES:  No edema.  NEUROLOGIC:  Nonfocal.    LABORATORY DATA:  Labs were not done.  Myeloma labs were essentially negative.  She had an elevated kappa free light chain in December.    IMPRESSION:    1.  Left lower lobe lung nodule in a patient with a history of tobacco abuse.  Initial scan was positive, but now her most recent CT chest indicates that the left lower lobe lung nodule has resolved.  This was likely sequelae of a previous pneumonia.  2.  Anemia, elevated kappa free light chains.  We would like to repeat myeloma labs today to confirm her monoclonal gammopathy.  Also, we will obtain a peripheral blood smear, B12 and folate levels.  Otherwise, monoclonal gammopathy was confirmed.  She will likely need a PET scan in 3 months, but we will await the results of the lab work.  Otherwise, if there is no evidence of monoclonal gammopathy or anemia, we likely will repeat labs again in 3 months and then plan on CT chest in 6 months.    TIME:  Seventy-six minutes were spent on this patient.  Time was spent reviewing multiple physician provider notes,  imaging results with the patient, performing a history and physical, documenting history and physical, and ordering followup labs and scans.    Reynaldo Hansen MD        D: 2023   T: 2023   MT: [id number]    Name:     OSMAR COLE  MRN:      [id number]        Account:    [id number]   :      1945           Visit Date: 2023     Document: [id number]    cc:  Obi Johnston MD

## 2023-03-29 LAB — TOTAL PROTEIN SERUM FOR ELP: 7.4 G/DL (ref 6.4–8.3)

## 2023-03-30 ENCOUNTER — TELEPHONE (OUTPATIENT)
Dept: ONCOLOGY | Facility: OTHER | Age: 78
End: 2023-03-30
Payer: MEDICARE

## 2023-03-30 DIAGNOSIS — E53.8 VITAMIN B12 DEFICIENCY (NON ANEMIC): Primary | ICD-10-CM

## 2023-03-30 LAB
ALBUMIN SERPL ELPH-MCNC: 4.1 G/DL (ref 3.7–5.1)
ALPHA1 GLOB SERPL ELPH-MCNC: 0.3 G/DL (ref 0.2–0.4)
ALPHA2 GLOB SERPL ELPH-MCNC: 0.8 G/DL (ref 0.5–0.9)
B-GLOBULIN SERPL ELPH-MCNC: 0.9 G/DL (ref 0.6–1)
GAMMA GLOB SERPL ELPH-MCNC: 1.3 G/DL (ref 0.7–1.6)
IGA SERPL-MCNC: 435 MG/DL (ref 84–499)
IGG SERPL-MCNC: 1252 MG/DL (ref 610–1616)
IGM SERPL-MCNC: 113 MG/DL (ref 35–242)
KAPPA LC FREE SER-MCNC: 6.68 MG/DL (ref 0.33–1.94)
KAPPA LC FREE/LAMBDA FREE SER NEPH: 2.22 {RATIO} (ref 0.26–1.65)
LAMBDA LC FREE SERPL-MCNC: 3.01 MG/DL (ref 0.57–2.63)
M PROTEIN SERPL ELPH-MCNC: 0 G/DL
PROT PATTERN SERPL ELPH-IMP: NORMAL
PROT PATTERN SERPL IFE-IMP: NORMAL
VISC SER: 1.7 CP (ref 1.4–1.8)

## 2023-03-30 RX ORDER — LANOLIN ALCOHOL/MO/W.PET/CERES
1000 CREAM (GRAM) TOPICAL DAILY
Qty: 90 TABLET | Refills: 3 | Status: SHIPPED | OUTPATIENT
Start: 2023-03-30 | End: 2023-07-17

## 2023-03-30 NOTE — TELEPHONE ENCOUNTER
Called patient with lab results. Per Reynaldo Hansen MD needs to start vitamin B12 1000 mcg daily.  Yareli Bardales RN...........3/30/2023 1:36 PM

## 2023-03-31 LAB
B2 MICROGLOB TUMOR MARKER SER-MCNC: 2.4 MG/L
PATH REPORT.FINAL DX SPEC: NORMAL

## 2023-06-15 ENCOUNTER — LAB (OUTPATIENT)
Dept: LAB | Facility: OTHER | Age: 78
End: 2023-06-15
Attending: INTERNAL MEDICINE
Payer: MEDICARE

## 2023-06-15 DIAGNOSIS — D64.9 ANEMIA, UNSPECIFIED TYPE: ICD-10-CM

## 2023-06-15 LAB
ALBUMIN SERPL BCG-MCNC: 4.3 G/DL (ref 3.5–5.2)
ALP SERPL-CCNC: 75 U/L (ref 35–104)
ALT SERPL W P-5'-P-CCNC: 16 U/L (ref 0–50)
ANION GAP SERPL CALCULATED.3IONS-SCNC: 9 MMOL/L (ref 7–15)
AST SERPL W P-5'-P-CCNC: 29 U/L (ref 0–45)
BASOPHILS # BLD AUTO: 0.1 10E3/UL (ref 0–0.2)
BASOPHILS NFR BLD AUTO: 1 %
BILIRUB SERPL-MCNC: 0.5 MG/DL
BUN SERPL-MCNC: 12.9 MG/DL (ref 8–23)
CALCIUM SERPL-MCNC: 9.1 MG/DL (ref 8.8–10.2)
CHLORIDE SERPL-SCNC: 102 MMOL/L (ref 98–107)
CREAT SERPL-MCNC: 0.79 MG/DL (ref 0.51–0.95)
DEPRECATED HCO3 PLAS-SCNC: 30 MMOL/L (ref 22–29)
EOSINOPHIL # BLD AUTO: 0.1 10E3/UL (ref 0–0.7)
EOSINOPHIL NFR BLD AUTO: 1 %
ERYTHROCYTE [DISTWIDTH] IN BLOOD BY AUTOMATED COUNT: 13.2 % (ref 10–15)
FOLATE SERPL-MCNC: 8.4 NG/ML (ref 4.6–34.8)
GFR SERPL CREATININE-BSD FRML MDRD: 76 ML/MIN/1.73M2
GLUCOSE SERPL-MCNC: 125 MG/DL (ref 70–99)
HCT VFR BLD AUTO: 35.2 % (ref 35–47)
HGB BLD-MCNC: 11.8 G/DL (ref 11.7–15.7)
IMM GRANULOCYTES # BLD: 0 10E3/UL
IMM GRANULOCYTES NFR BLD: 0 %
LDH SERPL L TO P-CCNC: 223 U/L (ref 0–250)
LYMPHOCYTES # BLD AUTO: 1.4 10E3/UL (ref 0.8–5.3)
LYMPHOCYTES NFR BLD AUTO: 27 %
MCH RBC QN AUTO: 31.7 PG (ref 26.5–33)
MCHC RBC AUTO-ENTMCNC: 33.5 G/DL (ref 31.5–36.5)
MCV RBC AUTO: 95 FL (ref 78–100)
MONOCYTES # BLD AUTO: 0.3 10E3/UL (ref 0–1.3)
MONOCYTES NFR BLD AUTO: 6 %
NEUTROPHILS # BLD AUTO: 3.3 10E3/UL (ref 1.6–8.3)
NEUTROPHILS NFR BLD AUTO: 65 %
NRBC # BLD AUTO: 0 10E3/UL
NRBC BLD AUTO-RTO: 0 /100
PLATELET # BLD AUTO: 229 10E3/UL (ref 150–450)
POTASSIUM SERPL-SCNC: 3.5 MMOL/L (ref 3.4–5.3)
PROT SERPL-MCNC: 7.8 G/DL (ref 6.4–8.3)
RBC # BLD AUTO: 3.72 10E6/UL (ref 3.8–5.2)
SODIUM SERPL-SCNC: 141 MMOL/L (ref 136–145)
VIT B12 SERPL-MCNC: 843 PG/ML (ref 232–1245)
WBC # BLD AUTO: 5.2 10E3/UL (ref 4–11)

## 2023-06-15 PROCEDURE — 82746 ASSAY OF FOLIC ACID SERUM: CPT | Mod: ZL

## 2023-06-15 PROCEDURE — 80053 COMPREHEN METABOLIC PANEL: CPT | Mod: ZL

## 2023-06-15 PROCEDURE — 84165 PROTEIN E-PHORESIS SERUM: CPT | Mod: TC,ZL | Performed by: PATHOLOGY

## 2023-06-15 PROCEDURE — 82232 ASSAY OF BETA-2 PROTEIN: CPT | Mod: ZL

## 2023-06-15 PROCEDURE — 84155 ASSAY OF PROTEIN SERUM: CPT | Mod: ZL,XU

## 2023-06-15 PROCEDURE — 82784 ASSAY IGA/IGD/IGG/IGM EACH: CPT | Mod: ZL

## 2023-06-15 PROCEDURE — 83521 IG LIGHT CHAINS FREE EACH: CPT | Mod: ZL

## 2023-06-15 PROCEDURE — 83615 LACTATE (LD) (LDH) ENZYME: CPT | Mod: ZL

## 2023-06-15 PROCEDURE — 85004 AUTOMATED DIFF WBC COUNT: CPT | Mod: ZL

## 2023-06-15 PROCEDURE — 86334 IMMUNOFIX E-PHORESIS SERUM: CPT | Mod: ZL | Performed by: PATHOLOGY

## 2023-06-15 PROCEDURE — 84165 PROTEIN E-PHORESIS SERUM: CPT | Mod: 26

## 2023-06-15 PROCEDURE — 36415 COLL VENOUS BLD VENIPUNCTURE: CPT | Mod: ZL

## 2023-06-15 PROCEDURE — 85810 BLOOD VISCOSITY EXAMINATION: CPT | Mod: ZL

## 2023-06-15 PROCEDURE — 86334 IMMUNOFIX E-PHORESIS SERUM: CPT | Mod: 26

## 2023-06-15 PROCEDURE — 82607 VITAMIN B-12: CPT | Mod: ZL

## 2023-06-16 LAB — TOTAL PROTEIN SERUM FOR ELP: 7.4 G/DL (ref 6.4–8.3)

## 2023-06-19 LAB
ALBUMIN SERPL ELPH-MCNC: 4 G/DL (ref 3.7–5.1)
ALPHA1 GLOB SERPL ELPH-MCNC: 0.4 G/DL (ref 0.2–0.4)
ALPHA2 GLOB SERPL ELPH-MCNC: 0.8 G/DL (ref 0.5–0.9)
B-GLOBULIN SERPL ELPH-MCNC: 1 G/DL (ref 0.6–1)
B2 MICROGLOB TUMOR MARKER SER-MCNC: 2.3 MG/L
GAMMA GLOB SERPL ELPH-MCNC: 1.3 G/DL (ref 0.7–1.6)
IGA SERPL-MCNC: 463 MG/DL (ref 84–499)
IGG SERPL-MCNC: 1289 MG/DL (ref 610–1616)
IGM SERPL-MCNC: 114 MG/DL (ref 35–242)
KAPPA LC FREE SER-MCNC: 6.99 MG/DL (ref 0.33–1.94)
KAPPA LC FREE/LAMBDA FREE SER NEPH: 2.31 {RATIO} (ref 0.26–1.65)
LAMBDA LC FREE SERPL-MCNC: 3.02 MG/DL (ref 0.57–2.63)
M PROTEIN SERPL ELPH-MCNC: 0 G/DL
PROT PATTERN SERPL ELPH-IMP: NORMAL
PROT PATTERN SERPL IFE-IMP: NORMAL
VISC SER: 1.7 CP (ref 1.4–1.8)

## 2023-06-26 ENCOUNTER — TELEPHONE (OUTPATIENT)
Dept: FAMILY MEDICINE | Facility: OTHER | Age: 78
End: 2023-06-26
Payer: MEDICARE

## 2023-06-26 DIAGNOSIS — B02.9 HERPES ZOSTER WITHOUT COMPLICATION: Primary | ICD-10-CM

## 2023-06-26 RX ORDER — ACYCLOVIR 800 MG/1
800 TABLET ORAL
Qty: 35 TABLET | Refills: 0 | Status: SHIPPED | OUTPATIENT
Start: 2023-06-26 | End: 2023-07-17

## 2023-06-26 NOTE — TELEPHONE ENCOUNTER
Has the shingles again- same stabbing pain in back- would like to get the medicine. [facility] pharmacy. Please call patient

## 2023-06-26 NOTE — TELEPHONE ENCOUNTER
Someone called in for patient as she is in extreme pain and would like to get this RX asap.      Jayleen Mayes on 6/26/2023 at 10:16 AM

## 2023-06-26 NOTE — TELEPHONE ENCOUNTER
After verifying pts name and date of birth with pt, pt notified of message below and states understanding.  Claudia Barrera LPN

## 2023-06-29 ENCOUNTER — ONCOLOGY VISIT (OUTPATIENT)
Dept: ONCOLOGY | Facility: OTHER | Age: 78
End: 2023-06-29
Attending: NURSE PRACTITIONER
Payer: MEDICARE

## 2023-06-29 VITALS
OXYGEN SATURATION: 98 % | BODY MASS INDEX: 15.85 KG/M2 | SYSTOLIC BLOOD PRESSURE: 136 MMHG | DIASTOLIC BLOOD PRESSURE: 72 MMHG | TEMPERATURE: 97.1 F | RESPIRATION RATE: 12 BRPM | HEART RATE: 104 BPM | WEIGHT: 79.8 LBS

## 2023-06-29 DIAGNOSIS — E53.8 VITAMIN B12 DEFICIENCY (NON ANEMIC): ICD-10-CM

## 2023-06-29 DIAGNOSIS — D47.2 MGUS (MONOCLONAL GAMMOPATHY OF UNKNOWN SIGNIFICANCE): Primary | ICD-10-CM

## 2023-06-29 DIAGNOSIS — R91.8 PULMONARY NODULES: ICD-10-CM

## 2023-06-29 PROCEDURE — 99215 OFFICE O/P EST HI 40 MIN: CPT | Performed by: NURSE PRACTITIONER

## 2023-06-29 PROCEDURE — G0463 HOSPITAL OUTPT CLINIC VISIT: HCPCS | Performed by: NURSE PRACTITIONER

## 2023-06-29 ASSESSMENT — PAIN SCALES - GENERAL: PAINLEVEL: NO PAIN (0)

## 2023-06-29 NOTE — NURSING NOTE
Chief Complaint   Patient presents with     Oncology Clinic Visit     Lung CA     Medication Reconciliation: complete    Norma Ayala CMA (AAMA) 6/29/2023 1:48 PM

## 2023-06-29 NOTE — PATIENT INSTRUCTIONS
Follow up with Reynaldo Hansen MD  in 3 months. Please come prior for lab work    Bone survey orders, radiology will call to set this up

## 2023-06-29 NOTE — PROGRESS NOTES
Oncology Follow-up Visit:  June 29, 2023  Diagnosis:MGUS, lung nodule    History Of Present Illness:  Patient presents for followup of history of the left lower lobe lung nodule.  We had seen the patient in consultation at the request of Dr. Johnston back on 12/21/2022.  At that time, Mrs. Lucero was a 77-year-old white female with history of tobacco abuse and COPD, osteoporosis who we were asked to evaluate concerning a new left lower lobe lung nodule.  Apparently, she had recently been seen by Dr. Johnston.  Given her history of tobacco abuse, he ordered a low dose CT scan of the chest.  This was done on 11/21/2022 and was read as a new solid spiculated nodule seen in the periphery of the left lower lobe measuring 14.5 mm in craniocaudal dimension by 11.8 x 10.6 mm in size, highly concerning for small malignant neoplasm.  We decided to order a PET scan. This was done on 12/14/2022 and was read as no evidence of abnormal FDG uptake that would suggest malignant disease.The spiculated nodule seen previously was much smaller in size and decreased from 11.8 mm down to 7.8 mm. It was felt to be sequela of possible pneumonia in the past.  It was felt to be a benign entity, possibly representing the remnants of focal pneumonia.The patient otherwise had a history of tobacco abuse. She said she smoked for at least 60 years, at least a pack per day. She most recently had cut back to half pack per day. She was complaining of diffuse bone pain.  We recommended that we should repeat CT chest in 3 months.This was repeated on 03/21/2023 and the previous left lower lobe lung nodule had resolved.  There was no evidence of the left lower lobe lung nodule.  It was recommended the patient undergo yearly low dose screening.  We also had noted that she was anemic.  We ordered some myeloma labs as well as a CBC, which revealed that she had a hemoglobin of 11.6, hematocrit 34.7.  She was then found to have elevated kappa free light chains. Plan  was to perform a peripheral smear. This was done on 3/28/23 and showed mild leukopenia with no evidence of dysplasia. Patient was started on oral vitamin b12. Most recent labs from 6/15/23 show a normal hemoglobin, normal wbc. Vitamin B12 is improved at 843. Kappa lambda ratio is elevated but stable. M-spike is normal at 0.0. All other labs are stable. Patient states she was recently started on acyclovir for what she thinks is shingles. She had pain in her back but did not have any lesions or rash. She states she had an episode recently where her left arm felt limp and her fist was clenched. This lasted about a minute. She has not had another episode since. Patient was advised to go to the ER if this happens again. She will also discuss this with her primary provider at her upcomingappointment.  Patient remains active. She is having cataract surgery on her right eye in August. She will be seeing her primary provider in July for follow up.     Review Of Systems:  Review Of Systems  Eyes/Ears/Nose/Throat: reports cataract surgery on right eye will be in August  Respiratory: reports shortness of breath with exertion due to COPD  Cardiovascular: denies chest pain  Gastrointestinal: reports occasional loose stools from medication, denies abdominal pain  Genitourinary: denies dysuria or hematuria  Musculoskeletal: denies new bone pain  Neurologic: reports occasional headaches and dizziness, comes and goes  Hematologic/Lymphatic/Immunologic: denies fevers, no night sweats      There are no exam notes on file for this visit.    Past medical, social, surgical, and family histories reviewed.    Allergies:  Allergies as of 06/29/2023 - Reviewed 03/28/2023   Allergen Reaction Noted     Metronidazole Nausea and Vomiting 09/24/2015     Pneumococcal vaccine  10/08/2012     Tramadol Visual Disturbance 10/08/2012       Current Medications:  Current Outpatient Medications   Medication Sig Dispense Refill     acyclovir (ZOVIRAX) 800  MG tablet Take 1 tablet (800 mg) by mouth 5 times daily for 7 days 35 tablet 0     cyanocobalamin (VITAMIN B-12) 1000 MCG tablet Take 1 tablet (1,000 mcg) by mouth daily 90 tablet 3     fluticasone-salmeterol (ADVAIR DISKUS) 250-50 MCG/ACT inhaler Inhale 1 puff into the lungs 2 times daily WIXELA-Disp as covered by Pt's insurance 3 each 3        Physical Exam:  There were no vitals taken for this visit.    GENERAL APPEARANCE: 78 year old female, alert and no distress     NECK: no adenopathy, no asymmetry or masses     LYMPHATICS: No cervical, supraclavicular, axillary lymphadenopathy     RESP: lungs clear to auscultation - no rales, rhonchi or wheezes     CARDIOVASCULAR: regular rates and rhythm, normal S1 S2     ABDOMEN:  soft, nontender, bowel sounds normal     MUSCULOSKELETAL: extremities normal- no gross deformities noted, No edema b/l LE.     SKIN: no suspicious lesions or rashes on exposed skin     PSYCHIATRIC: mentation appears normal and affect normal    Laboratory/Imaging Studies  No visits with results within 2 Week(s) from this visit.   Latest known visit with results is:   Lab on 06/15/2023   Component Date Value Ref Range Status     Vitamin B12 06/15/2023 843  232 - 1,245 pg/mL Final     Folic Acid 06/15/2023 8.4  4.6 - 34.8 ng/mL Final     Immunofixation ELP 06/15/2023 No monoclonal protein seen on immunofixation. Pathologic significance requires clinical correlation. JODY Serrano M.D., Ph.D., Pathologist   Final     Viscosity Index 06/15/2023 1.7  1.4 - 1.8 Final     Kappa Free Light Chains 06/15/2023 6.99 (H)  0.33 - 1.94 mg/dL Final     Lambda Free Light Chains 06/15/2023 3.02 (H)  0.57 - 2.63 mg/dL Final     Kappa /Lambda Ratio 06/15/2023 2.31 (H)  0.26 - 1.65 Final     Immunoglobulin G 06/15/2023 1,289  610 - 1,616 mg/dL Final     Immunoglobulin A 06/15/2023 463  84 - 499 mg/dL Final     Immunoglobulin M 06/15/2023 114  35 - 242 mg/dL Final     Lactate Dehydrogenase 06/15/2023 223  0 - 250 U/L  Final     Sodium 06/15/2023 141  136 - 145 mmol/L Final     Potassium 06/15/2023 3.5  3.4 - 5.3 mmol/L Final     Chloride 06/15/2023 102  98 - 107 mmol/L Final     Carbon Dioxide (CO2) 06/15/2023 30 (H)  22 - 29 mmol/L Final     Anion Gap 06/15/2023 9  7 - 15 mmol/L Final     Urea Nitrogen 06/15/2023 12.9  8.0 - 23.0 mg/dL Final     Creatinine 06/15/2023 0.79  0.51 - 0.95 mg/dL Final     Calcium 06/15/2023 9.1  8.8 - 10.2 mg/dL Final     Glucose 06/15/2023 125 (H)  70 - 99 mg/dL Final     Alkaline Phosphatase 06/15/2023 75  35 - 104 U/L Final     AST 06/15/2023 29  0 - 45 U/L Final    Reference intervals for this test were updated on 6/12/2023 to more accurately reflect our healthy population. There may be differences in the flagging of prior results with similar values performed with this method. Interpretation of those prior results can be made in the context of the updated reference intervals.     ALT 06/15/2023 16  0 - 50 U/L Final    Reference intervals for this test were updated on 6/12/2023 to more accurately reflect our healthy population. There may be differences in the flagging of prior results with similar values performed with this method. Interpretation of those prior results can be made in the context of the updated reference intervals.       Protein Total 06/15/2023 7.8  6.4 - 8.3 g/dL Final     Albumin 06/15/2023 4.3  3.5 - 5.2 g/dL Final     Bilirubin Total 06/15/2023 0.5  <=1.2 mg/dL Final     GFR Estimate 06/15/2023 76  >60 mL/min/1.73m2 Final    eGFR calculated using 2021 CKD-EPI equation.     Beta-2-Microglobulin 06/15/2023 2.3 (H)  <2.3 mg/L Final     Total Protein Serum for ELP 06/15/2023 7.4  6.4 - 8.3 g/dL Final     Albumin 06/15/2023 4.0  3.7 - 5.1 g/dL Final     Alpha 1 06/15/2023 0.4  0.2 - 0.4 g/dL Final     Alpha 2 06/15/2023 0.8  0.5 - 0.9 g/dL Final     Beta Globulin 06/15/2023 1.0  0.6 - 1.0 g/dL Final     Gamma Globulin 06/15/2023 1.3  0.7 - 1.6 g/dL Final     Monoclonal Peak  06/15/2023 0.0  <=0.0 g/dL Final     ELP Interpretation 06/15/2023 Essentially normal electrophoretic pattern. No obvious monoclonal proteins seen. Pathologic significance requires clinical correlation. JODY Serrano M.D., Ph.D., Pathologist.   Final     WBC Count 06/15/2023 5.2  4.0 - 11.0 10e3/uL Final     RBC Count 06/15/2023 3.72 (L)  3.80 - 5.20 10e6/uL Final     Hemoglobin 06/15/2023 11.8  11.7 - 15.7 g/dL Final     Hematocrit 06/15/2023 35.2  35.0 - 47.0 % Final     MCV 06/15/2023 95  78 - 100 fL Final     MCH 06/15/2023 31.7  26.5 - 33.0 pg Final     MCHC 06/15/2023 33.5  31.5 - 36.5 g/dL Final     RDW 06/15/2023 13.2  10.0 - 15.0 % Final     Platelet Count 06/15/2023 229  150 - 450 10e3/uL Final     % Neutrophils 06/15/2023 65  % Final     % Lymphocytes 06/15/2023 27  % Final     % Monocytes 06/15/2023 6  % Final     % Eosinophils 06/15/2023 1  % Final     % Basophils 06/15/2023 1  % Final     % Immature Granulocytes 06/15/2023 0  % Final     NRBCs per 100 WBC 06/15/2023 0  <1 /100 Final     Absolute Neutrophils 06/15/2023 3.3  1.6 - 8.3 10e3/uL Final     Absolute Lymphocytes 06/15/2023 1.4  0.8 - 5.3 10e3/uL Final     Absolute Monocytes 06/15/2023 0.3  0.0 - 1.3 10e3/uL Final     Absolute Eosinophils 06/15/2023 0.1  0.0 - 0.7 10e3/uL Final     Absolute Basophils 06/15/2023 0.1  0.0 - 0.2 10e3/uL Final     Absolute Immature Granulocytes 06/15/2023 0.0  <=0.4 10e3/uL Final     Absolute NRBCs 06/15/2023 0.0  10e3/uL Final        ASSESSMENT/PLAN:  1. Pulmonary nodule. Left lower lobe lung nodule in a patient with a history of tobacco abuse.  Initial scan was positive, but now her most recent CT chest indicates that the left lower lobe lung nodule has resolved.  This was felt to likely be a sequelae of a previous pneumonia. Will continue with yearly low dose CT scanning.     2. MGUS, elevated kappa free light chains. Patient was found to be anemic. She was then found to have elevated kappa free light chains.  Plan was to perform a peripheral smear. This was done on 3/28/23 and showed mild leukopenia with no evidence of dysplasia. Anemia has resolved. Patient was started on oral vitamin b12. Most recent labs from 6/15/23 show a normal hemoglobin, normal wbc. Vitamin B12 is improved at 843. Kappa lambda ratio is elevated but stable. M-spike is at 0.0. All other labs are stable. Labs were reviewed with Dr Hansen who feels patient has MGUS. He would like a skeletal bone survey and repeat labs in 3 months.     Forty seven minutes spent with this encounter with time spent reviewing patient records, counseling patient regarding disease process, interpretation and review of labs with patient, explaining what MGUS means, obtaining a review of systems, performing a physical exam, discussing patient with oncologist, ordering tests, documenting in EHR and coordination of care

## 2023-07-17 ENCOUNTER — OFFICE VISIT (OUTPATIENT)
Dept: FAMILY MEDICINE | Facility: OTHER | Age: 78
End: 2023-07-17
Attending: FAMILY MEDICINE
Payer: MEDICARE

## 2023-07-17 VITALS
BODY MASS INDEX: 15.84 KG/M2 | HEIGHT: 59 IN | DIASTOLIC BLOOD PRESSURE: 70 MMHG | RESPIRATION RATE: 16 BRPM | OXYGEN SATURATION: 98 % | TEMPERATURE: 98.4 F | HEART RATE: 102 BPM | SYSTOLIC BLOOD PRESSURE: 110 MMHG | WEIGHT: 78.6 LBS

## 2023-07-17 DIAGNOSIS — E53.8 VITAMIN B12 DEFICIENCY (NON ANEMIC): ICD-10-CM

## 2023-07-17 DIAGNOSIS — B02.9 HERPES ZOSTER WITHOUT COMPLICATION: ICD-10-CM

## 2023-07-17 DIAGNOSIS — E78.5 HYPERLIPIDEMIA LDL GOAL <100: ICD-10-CM

## 2023-07-17 DIAGNOSIS — Z01.818 PREOPERATIVE EXAMINATION: Primary | ICD-10-CM

## 2023-07-17 DIAGNOSIS — J43.9 PULMONARY EMPHYSEMA, UNSPECIFIED EMPHYSEMA TYPE (H): ICD-10-CM

## 2023-07-17 DIAGNOSIS — Z72.0 TOBACCO ABUSE: ICD-10-CM

## 2023-07-17 PROCEDURE — 99214 OFFICE O/P EST MOD 30 MIN: CPT | Performed by: FAMILY MEDICINE

## 2023-07-17 PROCEDURE — G0463 HOSPITAL OUTPT CLINIC VISIT: HCPCS

## 2023-07-17 PROCEDURE — 93010 ELECTROCARDIOGRAM REPORT: CPT | Performed by: INTERNAL MEDICINE

## 2023-07-17 PROCEDURE — 93005 ELECTROCARDIOGRAM TRACING: CPT | Performed by: FAMILY MEDICINE

## 2023-07-17 RX ORDER — ASPIRIN 81 MG/1
324 TABLET ORAL DAILY
COMMUNITY
End: 2024-07-26

## 2023-07-17 RX ORDER — FLUTICASONE PROPIONATE AND SALMETEROL 250; 50 UG/1; UG/1
1 POWDER RESPIRATORY (INHALATION) 2 TIMES DAILY
Qty: 3 EACH | Refills: 3 | Status: SHIPPED | OUTPATIENT
Start: 2023-07-17 | End: 2023-10-11

## 2023-07-17 RX ORDER — ACYCLOVIR 800 MG/1
800 TABLET ORAL
Qty: 35 TABLET | Refills: 1 | Status: SHIPPED | OUTPATIENT
Start: 2023-07-17 | End: 2024-01-09

## 2023-07-17 RX ORDER — LANOLIN ALCOHOL/MO/W.PET/CERES
1000 CREAM (GRAM) TOPICAL DAILY
Qty: 90 TABLET | Refills: 3 | Status: SHIPPED | OUTPATIENT
Start: 2023-07-17 | End: 2024-07-26

## 2023-07-17 RX ORDER — SIMVASTATIN 20 MG
20 TABLET ORAL AT BEDTIME
Qty: 90 TABLET | Refills: 3 | Status: SHIPPED | OUTPATIENT
Start: 2023-07-17 | End: 2023-10-11

## 2023-07-17 ASSESSMENT — PAIN SCALES - GENERAL: PAINLEVEL: NO PAIN (0)

## 2023-07-17 NOTE — NURSING NOTE
Chief Complaint   Patient presents with     Pre-Op Exam         Medication Reconciliation: complete    Desirae Vyas, LPN

## 2023-07-17 NOTE — PROGRESS NOTES
[facility]  [address]  [place] [address]  Phone: [phone]  Fax: [phone]  Primary Provider: Dexter Merchant  Pre-op Performing Provider: DEXTER MERCHANT      PREOPERATIVE EVALUATION:  Today's date: 7/17/2023    Loulou Lucero is a 78 year old female who presents for a preoperative evaluation.       No data to display              Surgical Information:  Surgery/Procedure: right eye cataract   Surgery Location: [facility]  Surgeon: Dr. Hansen   Surgery Date: 8/2/2023  Time of Surgery: TBD  Where patient plans to recover: At home with family  Fax number for surgical facility: [phone]    Assessment & Plan     The proposed surgical procedure is considered LOW risk.    Preoperative examination  Patient is optimized for procedure.  She will stop her aspirin 7 days prior to the procedure.  No other medication adjustments are necessary.  - EKG 12-lead, tracing only    Pulmonary emphysema, unspecified emphysema type (H)  Advair was refilled again for another year.  - fluticasone-salmeterol (ADVAIR DISKUS) 250-50 MCG/ACT inhaler; Inhale 1 puff into the lungs 2 times daily WIXELA-Disp as covered by Pt's insurance    Tobacco abuse  Discussed importance of quitting smoking    Herpes zoster without complication  A prescription for acyclovir was sent to her pharmacy.  - acyclovir (ZOVIRAX) 800 MG tablet; Take 1 tablet (800 mg) by mouth 5 times daily    Vitamin B12 deficiency (non anemic)  B12 was refilled once again.  - cyanocobalamin (VITAMIN B-12) 1000 MCG tablet; Take 1 tablet (1,000 mcg) by mouth daily    Hyperlipidemia LDL goal <100  Will initiate simvastatin given her persistent hyperlipidemia.  Potential side effects were discussed.  Follow-up in 3 months for repeat lipid panel.  - simvastatin (ZOCOR) 20 MG tablet; Take 1 tablet (20 mg) by mouth At Bedtime  - Lipid Panel; Future     - No identified additional risk factors other than previously  addressed      RECOMMENDATION:  APPROVAL GIVEN to proceed with proposed procedure, without further diagnostic evaluation.            Subjective       HPI related to upcoming procedure: She is planning on undergoing right cataract removal on August 2.  She has a history of COPD and continues on Advair twice daily.  She does not use a rescue inhaler.    She has had recurrent herpes zoster.  She gets good relief from acyclovir but wonders if she can have her own prescription.  It appears that she received the initial Zostavax in 2011 and only one of the 2 Shingrix since then.          7/17/2023     2:10 PM   Preop Questions   1. Have you ever had a heart attack or stroke? No   2. Have you ever had surgery on your heart or blood vessels, such as a stent placement, a coronary artery bypass, or surgery on an artery in your head, neck, heart, or legs? No   3. Do you have chest pain with activity? No   4. Do you have a history of  heart failure? No   5. Do you currently have a cold, bronchitis or symptoms of other infection? No   6. Do you have a cough, shortness of breath, or wheezing? No   7. Do you or anyone in your family have previous history of blood clots? YES -    8. Do you or does anyone in your family have a serious bleeding problem such as prolonged bleeding following surgeries or cuts? No   9. Have you ever had problems with anemia or been told to take iron pills? YES -normal hemoglobins recently   10. Have you had any abnormal blood loss such as black, tarry or bloody stools, or abnormal vaginal bleeding? No   11. Have you ever had a blood transfusion? No   12. Are you willing to have a blood transfusion if it is medically needed before, during, or after your surgery? Yes   13. Have you or any of your relatives ever had problems with anesthesia? No   14. Do you have sleep apnea, excessive snoring or daytime drowsiness? No   15. Do you have any artifical heart valves or other implanted medical devices like a  pacemaker, defibrillator, or continuous glucose monitor? No   16. Do you have artificial joints? No   17. Are you allergic to latex? No     Health Care Directive:  Patient does not have a Health Care Directive or Living Will: Discussed advance care planning with patient; however, patient declined at this time.    Preoperative Review of :   reviewed - no record of controlled substances prescribed.    Review of Systems  CONSTITUTIONAL: NEGATIVE for fever, chills, change in weight  ENT/MOUTH: NEGATIVE for ear, mouth and throat problems  RESP: NEGATIVE for significant cough or SOB  CV: NEGATIVE for chest pain, palpitations or peripheral edema    Patient Active Problem List    Diagnosis Date Noted     Anemia, unspecified type 03/28/2023     Priority: Medium     Age-related osteoporosis without current pathological fracture 06/26/2018     Priority: Medium     Diverticular disease of colon 01/23/2018     Priority: Medium     Tobacco abuse 01/23/2018     Priority: Medium     Overview:   Trying to quit       Systolic murmur 06/06/2017     Priority: Medium     Overview:   Aortic sclerosis with no significant aortic stenosis per echo June 2017       GERD (gastroesophageal reflux disease) 06/18/2014     Priority: Medium     Advanced care planning/counseling discussion 04/07/2014     Priority: Medium     Overview:   Declined        Chronic obstructive pulmonary disease (H) 12/29/2010     Priority: Medium      Past Medical History:   Diagnosis Date     Asymptomatic varicose veins of lower extremity     6/12/2012     Benign paroxysmal vertigo     12/29/2010     Chronic obstructive pulmonary disease (H)     12/29/2010     Diarrhea     10/1/2015     Disorder of cartilage     Hip; Last dxa 06/2010     Diverticulosis of large intestine without perforation or abscess without bleeding           Lower abdominal pain     10/1/2015     Other disorders of lung (CODE)     Stable since July of 2002. RU lobe.     Personal history of  other medical treatment (CODE)     L3, L4-4; Last MRI 7/09/12     Personal history of other medical treatment (CODE)     G-3, P-2, A-0  (Son had SIDS)     Zoster without complications     3/31/2012     Past Surgical History:   Procedure Laterality Date     COLONOSCOPY  03/22/2010    Normal; + family hx, next due 2015     COLONOSCOPY  10/01/2015    W/ POLYPECTOMY recommend follow up in 2020.     COLONOSCOPY N/A 11/7/2019    4 tubular adenoma, 3 year follow up     ESOPHAGOSCOPY, GASTROSCOPY, DUODENOSCOPY (EGD), COMBINED  04/23/2014    Arce's esophagus fu egd 2 yrs     ESOPHAGOSCOPY, GASTROSCOPY, DUODENOSCOPY (EGD), COMBINED N/A 11/7/2019    Procedure: ESOPHAGOGASTRODUODENOSCOPY, WITH BIOPSY;  Surgeon: Santosh Barrera MD;  Location: [facility] OR     LAPAROSCOPIC TUBAL LIGATION  1978          TONSILLECTOMY & ADENOIDECTOMY  1951          Current Outpatient Medications   Medication Sig Dispense Refill     acyclovir (ZOVIRAX) 800 MG tablet Take 1 tablet (800 mg) by mouth 5 times daily 35 tablet 1     aspirin 81 MG EC tablet Take 81 mg by mouth daily       cyanocobalamin (VITAMIN B-12) 1000 MCG tablet Take 1 tablet (1,000 mcg) by mouth daily 90 tablet 3     fluticasone-salmeterol (ADVAIR DISKUS) 250-50 MCG/ACT inhaler Inhale 1 puff into the lungs 2 times daily WIXELA-Disp as covered by Pt's insurance 3 each 3     simvastatin (ZOCOR) 20 MG tablet Take 1 tablet (20 mg) by mouth At Bedtime 90 tablet 3       Allergies   Allergen Reactions     Metronidazole Nausea and Vomiting     Pneumococcal Vaccine      Other reaction(s): Edema  Arm swelling     Tramadol Visual Disturbance     Hallucinations           Social History     Tobacco Use     Smoking status: Every Day     Packs/day: 0.50     Types: Cigarettes     Start date: 1961     Passive exposure: Past     Smokeless tobacco: Never     Tobacco comments:     Passive exposure in adult home.    Substance Use Topics     Alcohol use: Not Currently     Comment: rare       History   Drug  "Use No         Objective     /70   Pulse 102   Temp 98.4  F (36.9  C) (Temporal)   Resp 16   Ht 1.499 m (4' 11\")   Wt 35.7 kg (78 lb 9.6 oz)   SpO2 98%   Breastfeeding No   BMI 15.88 kg/m      Physical Exam  GENERAL APPEARANCE: healthy, alert and no distress  HENT: ear canals and TM's normal and nose and mouth without ulcers or lesions  RESP: lungs clear to auscultation - no rales, rhonchi or wheezes  CV: regular rates and rhythm, normal S1 S2, no S3 or S4 and grade 2/6 systolic murmur heard best over the RUSB  ABDOMEN: soft, nontender, no HSM or masses and bowel sounds normal  NEURO: Normal strength and tone, sensory exam grossly normal, mentation intact and speech normal    Recent Labs   Lab Test 06/15/23  1403 03/28/23  1521   HGB 11.8 12.1    214    142   POTASSIUM 3.5 3.7   CR 0.79 0.77        Diagnostics:  No labs were ordered during this visit.   EKG performed, personally viewed and shows sinus rhythm without any ischemic changes.    Revised Cardiac Risk Index (RCRI):  The patient has the following serious cardiovascular risks for perioperative complications:   - No serious cardiac risks = 0 points     RCRI Interpretation: 0 points: Class I (very low risk - 0.4% complication rate)           Signed Electronically by: Obi Johnston MD  Copy of this evaluation report is provided to requesting physician.      "

## 2023-07-18 LAB
ATRIAL RATE - MUSE: 78 BPM
DIASTOLIC BLOOD PRESSURE - MUSE: NORMAL MMHG
INTERPRETATION ECG - MUSE: NORMAL
P AXIS - MUSE: 84 DEGREES
PR INTERVAL - MUSE: 144 MS
QRS DURATION - MUSE: 80 MS
QT - MUSE: 396 MS
QTC - MUSE: 451 MS
R AXIS - MUSE: 71 DEGREES
SYSTOLIC BLOOD PRESSURE - MUSE: NORMAL MMHG
T AXIS - MUSE: 65 DEGREES
VENTRICULAR RATE- MUSE: 78 BPM

## 2023-09-18 ENCOUNTER — TELEPHONE (OUTPATIENT)
Dept: ONCOLOGY | Facility: OTHER | Age: 78
End: 2023-09-18
Payer: MEDICARE

## 2023-09-18 NOTE — TELEPHONE ENCOUNTER
Patient called to see if she needed to fast for her upcoming lab appointment on Monday.  Her fasting lab is due to be done in October, so told her she did not need to fast.  Went over what time her appointments were and where to go for them, the diagnostic window.  No further questions at this time from patient.  Mariah López LPN .......9/18/2023 11:21 AM

## 2023-09-25 ENCOUNTER — LAB (OUTPATIENT)
Dept: LAB | Facility: OTHER | Age: 78
End: 2023-09-25
Attending: NURSE PRACTITIONER
Payer: MEDICARE

## 2023-09-25 ENCOUNTER — HOSPITAL ENCOUNTER (OUTPATIENT)
Dept: GENERAL RADIOLOGY | Facility: OTHER | Age: 78
Discharge: HOME | End: 2023-09-25
Attending: NURSE PRACTITIONER
Payer: MEDICARE

## 2023-09-25 DIAGNOSIS — D47.2 MGUS (MONOCLONAL GAMMOPATHY OF UNKNOWN SIGNIFICANCE): ICD-10-CM

## 2023-09-25 DIAGNOSIS — E53.8 VITAMIN B12 DEFICIENCY (NON ANEMIC): ICD-10-CM

## 2023-09-25 LAB
ALBUMIN SERPL BCG-MCNC: 4 G/DL (ref 3.5–5.2)
ALP SERPL-CCNC: 72 U/L (ref 35–104)
ALT SERPL W P-5'-P-CCNC: 10 U/L (ref 0–50)
ANION GAP SERPL CALCULATED.3IONS-SCNC: 9 MMOL/L (ref 7–15)
AST SERPL W P-5'-P-CCNC: 23 U/L (ref 0–45)
BASOPHILS # BLD AUTO: 0.1 10E3/UL (ref 0–0.2)
BASOPHILS NFR BLD AUTO: 1 %
BILIRUB SERPL-MCNC: 0.5 MG/DL
BUN SERPL-MCNC: 10.9 MG/DL (ref 8–23)
CALCIUM SERPL-MCNC: 9.4 MG/DL (ref 8.8–10.2)
CHLORIDE SERPL-SCNC: 104 MMOL/L (ref 98–107)
CREAT SERPL-MCNC: 0.83 MG/DL (ref 0.51–0.95)
DEPRECATED HCO3 PLAS-SCNC: 29 MMOL/L (ref 22–29)
EGFRCR SERPLBLD CKD-EPI 2021: 72 ML/MIN/1.73M2
EOSINOPHIL # BLD AUTO: 0.1 10E3/UL (ref 0–0.7)
EOSINOPHIL NFR BLD AUTO: 2 %
ERYTHROCYTE [DISTWIDTH] IN BLOOD BY AUTOMATED COUNT: 13.2 % (ref 10–15)
GLUCOSE SERPL-MCNC: 86 MG/DL (ref 70–99)
HCT VFR BLD AUTO: 32.8 % (ref 35–47)
HGB BLD-MCNC: 10.6 G/DL (ref 11.7–15.7)
IMM GRANULOCYTES # BLD: 0 10E3/UL
IMM GRANULOCYTES NFR BLD: 0 %
LDH SERPL L TO P-CCNC: 199 U/L (ref 0–250)
LYMPHOCYTES # BLD AUTO: 1.2 10E3/UL (ref 0.8–5.3)
LYMPHOCYTES NFR BLD AUTO: 20 %
MCH RBC QN AUTO: 30.5 PG (ref 26.5–33)
MCHC RBC AUTO-ENTMCNC: 32.3 G/DL (ref 31.5–36.5)
MCV RBC AUTO: 95 FL (ref 78–100)
MONOCYTES # BLD AUTO: 0.4 10E3/UL (ref 0–1.3)
MONOCYTES NFR BLD AUTO: 6 %
NEUTROPHILS # BLD AUTO: 4.3 10E3/UL (ref 1.6–8.3)
NEUTROPHILS NFR BLD AUTO: 71 %
NRBC # BLD AUTO: 0 10E3/UL
NRBC BLD AUTO-RTO: 0 /100
PLATELET # BLD AUTO: 239 10E3/UL (ref 150–450)
POTASSIUM SERPL-SCNC: 3.7 MMOL/L (ref 3.4–5.3)
PROT SERPL-MCNC: 7.5 G/DL (ref 6.4–8.3)
RBC # BLD AUTO: 3.47 10E6/UL (ref 3.8–5.2)
SODIUM SERPL-SCNC: 142 MMOL/L (ref 136–145)
TOTAL PROTEIN SERUM FOR ELP: 7 G/DL (ref 6.4–8.3)
VIT B12 SERPL-MCNC: 1432 PG/ML (ref 232–1245)
WBC # BLD AUTO: 6 10E3/UL (ref 4–11)

## 2023-09-25 PROCEDURE — 86334 IMMUNOFIX E-PHORESIS SERUM: CPT | Mod: ZL | Performed by: STUDENT IN AN ORGANIZED HEALTH CARE EDUCATION/TRAINING PROGRAM

## 2023-09-25 PROCEDURE — 82232 ASSAY OF BETA-2 PROTEIN: CPT | Mod: ZL

## 2023-09-25 PROCEDURE — 82784 ASSAY IGA/IGD/IGG/IGM EACH: CPT | Mod: ZL

## 2023-09-25 PROCEDURE — 77075 RADEX OSSEOUS SURVEY COMPL: CPT

## 2023-09-25 PROCEDURE — 82607 VITAMIN B-12: CPT | Mod: ZL

## 2023-09-25 PROCEDURE — 84155 ASSAY OF PROTEIN SERUM: CPT | Mod: ZL

## 2023-09-25 PROCEDURE — 83615 LACTATE (LD) (LDH) ENZYME: CPT | Mod: ZL

## 2023-09-25 PROCEDURE — 80053 COMPREHEN METABOLIC PANEL: CPT | Mod: ZL

## 2023-09-25 PROCEDURE — 85004 AUTOMATED DIFF WBC COUNT: CPT | Mod: ZL

## 2023-09-25 PROCEDURE — 36415 COLL VENOUS BLD VENIPUNCTURE: CPT | Mod: ZL

## 2023-09-25 PROCEDURE — 85810 BLOOD VISCOSITY EXAMINATION: CPT | Mod: ZL

## 2023-09-25 PROCEDURE — 84165 PROTEIN E-PHORESIS SERUM: CPT | Mod: TC | Performed by: STUDENT IN AN ORGANIZED HEALTH CARE EDUCATION/TRAINING PROGRAM

## 2023-09-25 PROCEDURE — 83521 IG LIGHT CHAINS FREE EACH: CPT | Mod: ZL

## 2023-09-26 LAB
B2 MICROGLOB TUMOR MARKER SER-MCNC: 2.3 MG/L
IGA SERPL-MCNC: 464 MG/DL (ref 84–499)
IGG SERPL-MCNC: 1216 MG/DL (ref 610–1616)
IGM SERPL-MCNC: 108 MG/DL (ref 35–242)
KAPPA LC FREE SER-MCNC: 6.17 MG/DL (ref 0.33–1.94)
KAPPA LC FREE/LAMBDA FREE SER NEPH: 2.02 {RATIO} (ref 0.26–1.65)
LAMBDA LC FREE SERPL-MCNC: 3.05 MG/DL (ref 0.57–2.63)
PROT PATTERN SERPL IFE-IMP: NORMAL
VISC SER: 1.7 CP (ref 1.4–1.8)

## 2023-09-26 PROCEDURE — 86334 IMMUNOFIX E-PHORESIS SERUM: CPT | Mod: 26

## 2023-09-27 LAB
ALBUMIN SERPL ELPH-MCNC: 3.7 G/DL (ref 3.7–5.1)
ALPHA1 GLOB SERPL ELPH-MCNC: 0.4 G/DL (ref 0.2–0.4)
ALPHA2 GLOB SERPL ELPH-MCNC: 0.8 G/DL (ref 0.5–0.9)
B-GLOBULIN SERPL ELPH-MCNC: 0.9 G/DL (ref 0.6–1)
GAMMA GLOB SERPL ELPH-MCNC: 1.2 G/DL (ref 0.7–1.6)
M PROTEIN SERPL ELPH-MCNC: 0 G/DL
PROT PATTERN SERPL ELPH-IMP: NORMAL

## 2023-09-27 PROCEDURE — 84165 PROTEIN E-PHORESIS SERUM: CPT | Mod: 26

## 2023-10-03 ENCOUNTER — TELEPHONE (OUTPATIENT)
Dept: ONCOLOGY | Facility: OTHER | Age: 78
End: 2023-10-03
Payer: MEDICARE

## 2023-10-03 NOTE — TELEPHONE ENCOUNTER
Patient missed her appointment with [facility] today. Rescheduled for next week.  Norma Ayala CMA([facility])..................10/3/2023   2:32 PM

## 2023-10-10 ENCOUNTER — ONCOLOGY VISIT (OUTPATIENT)
Dept: ONCOLOGY | Facility: OTHER | Age: 78
End: 2023-10-10
Attending: INTERNAL MEDICINE
Payer: MEDICARE

## 2023-10-10 VITALS
SYSTOLIC BLOOD PRESSURE: 162 MMHG | BODY MASS INDEX: 15.75 KG/M2 | DIASTOLIC BLOOD PRESSURE: 78 MMHG | RESPIRATION RATE: 16 BRPM | TEMPERATURE: 98 F | HEART RATE: 86 BPM | WEIGHT: 78 LBS | OXYGEN SATURATION: 99 %

## 2023-10-10 DIAGNOSIS — R91.8 PULMONARY NODULES: ICD-10-CM

## 2023-10-10 DIAGNOSIS — D47.2 MGUS (MONOCLONAL GAMMOPATHY OF UNKNOWN SIGNIFICANCE): Primary | ICD-10-CM

## 2023-10-10 PROCEDURE — 99417 PROLNG OP E/M EACH 15 MIN: CPT | Performed by: INTERNAL MEDICINE

## 2023-10-10 PROCEDURE — G0463 HOSPITAL OUTPT CLINIC VISIT: HCPCS

## 2023-10-10 PROCEDURE — 99215 OFFICE O/P EST HI 40 MIN: CPT | Performed by: INTERNAL MEDICINE

## 2023-10-10 ASSESSMENT — PAIN SCALES - GENERAL: PAINLEVEL: NO PAIN (0)

## 2023-10-10 NOTE — NURSING NOTE
Chief Complaint   Patient presents with    Oncology Clinic Visit     F/U MGUS and Anemia     Medication Reconciliation: complete    Deirdre Mariscal CMA ([facility])

## 2023-10-10 NOTE — PROGRESS NOTES
[facility] Progress Note    Patient: Loulou Lucero  MRN: [id number]  Date of Service: Oct 10, 2023         Reason for Visit    Chief Complaint   Patient presents with    Oncology Clinic Visit     F/U MGUS and Anemia       ECOG Performance    1 - Can't do physically strenuous work, but fully ambyulatory and can do light sedentary work      Encounter Diagnoses:  #1: Monoclonal gammopathy   #2: History of left lower lobe lung nodule    Problem List Items Addressed This Visit    None  Visit Diagnoses       MGUS (monoclonal gammopathy of unknown significance)    -  Primary    Relevant Orders    PET Oncology (Eyes to Thighs)    Pulmonary nodules        Relevant Orders    PET Oncology (Eyes to Thighs)             History of Present Illness    Ms. Loulou Lucero  returns for follow-up of monoclonal gammopathy as well as history of left lower lobe lung nodule.We had seen the patient in consultation at the request of Dr. Johnston back on 12/21/2022. At that time, Mrs. Lucero was a 77-year-old white female with history of tobacco abuse and COPD, osteoporosis who we were asked to evaluate concerning a new left lower lobe lung nodule. Apparently, she had recently been seen by Dr. Johnston. Given her history of tobacco abuse, he ordered a low dose CT scan of the chest. This was done on 11/21/2022 and was read as a new solid spiculated nodule seen in the periphery of the left lower lobe measuring 14.5 mm in craniocaudal dimension by 11.8 x 10.6 mm in size, highly concerning for small malignant neoplasm. We decided to order a PET scan. This was done on 12/14/2022 and was read as no evidence of abnormal FDG uptake that would suggest malignant disease. The spiculated nodule seen previously was much smaller in size and decreased from 11.8 mm down to 7.8 mm. It was felt to be sequela of possible pneumonia in the past. It was felt to be a benign entity, possibly representing the remnants of focal pneumonia. The  patient otherwise had a history of tobacco abuse. She said she smoked for at least 60 years, at least a pack per day. She most recently had cut back to half pack per day. She had at least a 15-pound weight loss over the last year. She was complaining of diffuse bone pain. We recommended that we should repeat CT chest in 3 months. This was repeated on 03/21/2023 and the previous left lower lobe lung nodule had resolved. There was no evidence of the left lower lobe lung nodule. It was recommended the patient undergo screening. She did have evidence of emphysema otherwise. We also had noted that she was anemic. We ordered some myeloma labs as well as a CBC, which revealed that she had a hemoglobin of 11.6, hematocrit 34.7. She did not have any labs drawn for today. Otherwise, she still continues to smoke. She denies any shortness of breath, chest pain, abdominal pain. She does have a smoker's cough.   She comes in today for follow-up with complaints of fatigue and inability to gain weight.  She continues to smoke at least a pack per day in the summer and less in the winter.  She denies bone pain, fevers, night sweats, or weight loss.      ______________________________________________________________________________    Past History    Past Medical History:   Diagnosis Date    Asymptomatic varicose veins of lower extremity     6/12/2012    Benign paroxysmal vertigo     12/29/2010    Chronic obstructive pulmonary disease (H)     12/29/2010    Diarrhea     10/1/2015    Disorder of cartilage     Hip; Last dxa 06/2010    Diverticulosis of large intestine without perforation or abscess without bleeding          Lower abdominal pain     10/1/2015    Other disorders of lung (CODE)     Stable since July of 2002. RU lobe.    Personal history of other medical treatment (CODE)     L3, L4-4; Last MRI 7/09/12    Personal history of other medical treatment (CODE)     G-3, P-2, A-0  (Son had SIDS)    Zoster without complications      3/31/2012       Past Surgical History:   Procedure Laterality Date    COLONOSCOPY  03/22/2010    Normal; + family hx, next due 2015    COLONOSCOPY  10/01/2015    W/ POLYPECTOMY recommend follow up in 2020.    COLONOSCOPY N/A 11/7/2019    4 tubular adenoma, 3 year follow up    ESOPHAGOSCOPY, GASTROSCOPY, DUODENOSCOPY (EGD), COMBINED  04/23/2014    Arce's esophagus fu egd 2 yrs    ESOPHAGOSCOPY, GASTROSCOPY, DUODENOSCOPY (EGD), COMBINED N/A 11/7/2019    Procedure: ESOPHAGOGASTRODUODENOSCOPY, WITH BIOPSY;  Surgeon: Santosh Barrera MD;  Location: [facility] OR    LAPAROSCOPIC TUBAL LIGATION  1978         TONSILLECTOMY & ADENOIDECTOMY  1951                Review of systems.  CNS: There are no headaches, no blurred vision, no change in mental status,   ENT: There is no hearing loss.  Respiratory: There is no shortness of breath, hemoptysis, cough  Cardiac: There is no chest pain, orthopnea, PND, or ankle edema.  GI: There is no bright red blood per rectum, no hematemesis, no reflux, no diarrhea or constipation  Musculoskeletal: There is no joint pain, or or myalgias.  : There is no urinary frequency, hematuria.  Constitutional: There is no fevers, night sweats, weight loss.  Endocrine: There are are no hot flashes,   There is some fatigue,   Neuro: There is no tingling or numbness in the hands or feet.  Hematologic: There is no gingival bleeding, epistaxis, or easy bruisability.  Dermatologic: There is no skin rash.  A 14 point review of systems is otherwise negative.          Physical Exam    BP (!) 162/78   Pulse 86   Temp 98  F (36.7  C) (Tympanic)   Resp 16   Wt 35.4 kg (78 lb)   SpO2 99%   BMI 15.75 kg/m        GENERAL: Alert and oriented to time place and person. Seated comfortably. In no distress.    HEAD: Atraumatic and normocephalic.    EYES: PAULA, EOMI.  No pallor.  No icterus.    Oral cavity: no mucosal lesion or tonsillar enlargement.    NECK: supple. JVP normal.  No thyroid enlargement.    LYMPH NODES:   there are no palpable cervical, supraclavicular, axillary or inguinal nodes.    CHEST: clear to auscultation bilaterally.  Resonant to percussion throughout bilaterally.  Symmetrical breath movements bilaterally.    CVS: S1 and S2 are heard. Regular rate and rhythm.  No murmur or gallop or rub heard.  No peripheral edema.    ABDOMEN: Soft. Not tender. Not distended.  No palpable hepatomegaly or splenomegaly.  No other mass palpable.  Bowel sounds heard.    EXTREMITIES:   There is no ankle edema.    SKIN: no rash, or bruising or purpura.    NEURO: Grossly non-focal.    Lab Results  Component      Latest Ref [facility] 9/25/2023  10:58 AM   WBC      4.0 - 11.0 10e3/uL 6.0    RBC Count      3.80 - 5.20 10e6/uL 3.47 (L)    Hemoglobin      11.7 - 15.7 g/dL 10.6 (L)    Hematocrit      35.0 - 47.0 % 32.8 (L)    MCV      78 - 100 fL 95    MCH      26.5 - 33.0 pg 30.5    MCHC      31.5 - 36.5 g/dL 32.3    RDW      10.0 - 15.0 % 13.2    Platelet Count      150 - 450 10e3/uL 239    % Neutrophils      % 71    % Lymphocytes      % 20    % Monocytes      % 6    % Eosinophils      % 2    % Basophils      % 1    % Immature Granulocytes      % 0    NRBCs per 100 WBC      <1 /100 0    Absolute Neutrophils      1.6 - 8.3 10e3/uL 4.3    Absolute Lymphocytes      0.8 - 5.3 10e3/uL 1.2    Absolute Monocytes      0.0 - 1.3 10e3/uL 0.4    Absolute Eosinophils      0.0 - 0.7 10e3/uL 0.1    Absolute Basophils      0.0 - 0.2 10e3/uL 0.1    Absolute Immature Granulocytes      <=0.4 10e3/uL 0.0    Absolute NRBCs      10e3/uL 0.0    Sodium      136 - 145 mmol/L 142    Potassium      3.4 - 5.3 mmol/L 3.7    Carbon Dioxide (CO2)      22 - 29 mmol/L 29    Anion Gap      7 - 15 mmol/L 9    Urea Nitrogen      8.0 - 23.0 mg/dL 10.9    Creatinine      0.51 - 0.95 mg/dL 0.83    GFR Estimate      >60 mL/min/1.73m2 72    Calcium      8.8 - 10.2 mg/dL 9.4    Chloride      98 - 107 mmol/L 104    Glucose      70 - 99 mg/dL 86    Alkaline Phosphatase      35 - 104  U/L 72       Component      Latest Ref Rn 9/25/2023  10:58 AM   AST      0 - 45 U/L 23    ALT      0 - 50 U/L 10    Protein Total      6.4 - 8.3 g/dL 7.5    Albumin      3.5 - 5.2 g/dL 4.0    Bilirubin Total      <=1.2 mg/dL 0.5    Albumin Fraction      3.7 - 5.1 g/dL 3.7    Alpha 1 Fraction      0.2 - 0.4 g/dL 0.4    Alpha 2 Fraction      0.5 - 0.9 g/dL 0.8    Beta Fraction      0.6 - 1.0 g/dL 0.9    Gamma Fraction      0.7 - 1.6 g/dL 1.2    Monoclonal Peak      <=0.0 g/dL 0.0    ELP Interpretation: Essentially normal electrophoretic pattern. No obvious monoclonal proteins seen. Pathologic significance requires clinical correlation. Mariah Ward M.D., Ph.D.    Kappa Free Lt Chain      0.33 - 1.94 mg/dL 6.17 (H)    Lambda Free Lt Chain      0.57 - 2.63 mg/dL 3.05 (H)      Component      Latest Ref [facility] 9/25/2023  10:58 AM   Kappa Lambda Ratio      0.26 - 1.65  2.02 (H)    IGG      610 - 1,616 mg/dL 1,216    IGA      84 - 499 mg/dL 464    IGM      35 - 242 mg/dL 108    Lactate Dehydrogenase      0 - 250 U/L 199    Vitamin B12      232 - 1,245 pg/mL 1,432 (H)    Immunofixation ELP No monoclonal protein seen on immunofixation. Pathologic significance requires clinical correlation. Mariah Ward M.D., Ph.D.    Viscosity Index      1.4 - 1.8  1.7    Beta-2-Microglobulin      <2.3 mg/L 2.3 (H)    Total Protein Serum for ELP      6.4 - 8.3 g/dL 7.0       Legend:  (H) High   Legend:  (H) High  Legend:  (L) Low        Imaging    XR Bone Survey Complete    Result Date: 9/25/2023  XR BONE SURVEY COMPLETE HISTORY: elevated kappa light chains, MGUS; MGUS (monoclonal gammopathy of unknown significance) . COMPARISON: 12/14/2022. TECHNIQUE: Radiographs of the entire skeleton. FINDINGS: Osteopenia/osteoporosis. No evidence of acute pathologic fracture. No focal suspicious lytic lesion is identified.      IMPRESSION: Osteopenia/osteoporosis.  ARIS CANNON MD   SYSTEM ID:  [id number]     Assessment and Plan:  #1: MGUS: With rising  kappa lambda ratio, with dropping hemoglobin  despite B12 supplementation: We would like to absolutely rule out endorgan damage by obtaining a PET scan to rule out myeloma.    #2 history of left lower lobe lung nodule/tobacco abuse: Lung nodule did disappear on previous imaging but nonetheless given her history of tobacco abuse would also like to confirm this by obtaining a PET scan as above.  Otherwise we will see the patient after the PET scan to discuss the results.    Time spent: 76 minutes was spent on this total patient  visit: We reviewed multiple physician provider notes, we discussed lab results with the patient, we performed a history and physical, and ordered PET scan.    Cancer Staging   No matching staging information was found for the patient.      Signed by: Reynaldo Hansen MD    CC: Obi Johnston MD

## 2023-10-11 DIAGNOSIS — E78.5 HYPERLIPIDEMIA LDL GOAL <100: ICD-10-CM

## 2023-10-11 DIAGNOSIS — J43.9 PULMONARY EMPHYSEMA, UNSPECIFIED EMPHYSEMA TYPE (H): ICD-10-CM

## 2023-10-11 RX ORDER — SIMVASTATIN 20 MG
20 TABLET ORAL AT BEDTIME
Qty: 90 TABLET | Refills: 2 | Status: SHIPPED | OUTPATIENT
Start: 2023-10-11 | End: 2024-07-26

## 2023-10-11 RX ORDER — FLUTICASONE PROPIONATE AND SALMETEROL 250; 50 UG/1; UG/1
1 POWDER RESPIRATORY (INHALATION) 2 TIMES DAILY
Qty: 3 EACH | Refills: 2 | Status: SHIPPED | OUTPATIENT
Start: 2023-10-11 | End: 2024-07-26

## 2023-10-11 NOTE — TELEPHONE ENCOUNTER
simvastatin (ZOCOR) 20 MG tablet 90 tablet 3 7/17/2023  --   Sig - Route: Take 1 tablet (20 mg) by mouth At Bedtime - Oral   Sent to pharmacy as: Simvastatin 20 MG Oral Tablet (ZOCOR)   Class: E-Prescribe   Order: [id number]   E-Prescribing Status: Receipt confirmed by pharmacy (7/17/2023  2:25 PM CDT)     fluticasone-salmeterol (ADVAIR DISKUS) 250-50 MCG/ACT inhaler3 each37/17/2023NoSig - Route: Inhale 1 puff into the lungs 2 times daily WIXELA-Disp as covered by Pt's insurance - InhalationSent to pharmacy as: Fluticasone-Salmeterol 250-50 MCG/ACT Inhalation Aerosol Powder Breath ActivatedClass: E-PrescribeOrder: [id number] Status: Receipt confirmed by pharmacy (7/17/2023  2:19 PM CDT)   07/17/23 1425 Obi Perez MD     [facility] [address] [place] [address] prescriptions sent to [facility], per Pt request, and per above written orders. Mary Mccormack RN .............. 10/11/2023  11:02 AM

## 2023-10-11 NOTE — TELEPHONE ENCOUNTER
Reason for call: Medication or medication refill    Name of medication requested: Simvastatin 1 week left and Fluticasone inhaler, 4 days left.     How many days of medication do you have left? 1 week    What pharmacy do you use?   [facility]    Preferred method for responding to this message: Telephone Call    Phone number patient can be reached at: Home number on file [phone] (home)    If we cannot reach you directly, may we leave a detailed response at the number you provided? Yes    Jayleen Mayes on 10/11/2023 at 10:42 AM

## 2023-10-16 DIAGNOSIS — E78.5 HYPERLIPIDEMIA LDL GOAL <100: ICD-10-CM

## 2023-10-16 DIAGNOSIS — J43.9 PULMONARY EMPHYSEMA, UNSPECIFIED EMPHYSEMA TYPE (H): ICD-10-CM

## 2023-10-16 RX ORDER — SIMVASTATIN 20 MG
20 TABLET ORAL AT BEDTIME
Qty: 90 TABLET | Refills: 2 | Status: CANCELLED | OUTPATIENT
Start: 2023-10-16

## 2023-10-16 RX ORDER — FLUTICASONE PROPIONATE AND SALMETEROL 250; 50 UG/1; UG/1
1 POWDER RESPIRATORY (INHALATION) 2 TIMES DAILY
Qty: 3 EACH | Refills: 2 | Status: CANCELLED | OUTPATIENT
Start: 2023-10-16

## 2023-10-16 NOTE — TELEPHONE ENCOUNTER
Patient is out of Simvastatin & fluticasone-salmeterol.  They both should have gone to [facility] not [facility].        Jayleen Mayes on 10/16/2023 at 8:50 AM

## 2023-10-16 NOTE — TELEPHONE ENCOUNTER
simvastatin (ZOCOR) 20 MG tablet 90 tablet 2 10/11/2023  No   Sig - Route: Take 1 tablet (20 mg) by mouth at bedtime - Oral   Sent to pharmacy as: Simvastatin 20 MG Oral Tablet (ZOCOR)   Class: E-Prescribe   Order: [id number]   E-Prescribing Status: Receipt confirmed by pharmacy (10/11/2023 11:31 AM CDT)      Disp Refills Start End [facility]CH[facility]   fluticasone-salmeterol (ADVAIR DISKUS) 250-50 MCG/ACT inhaler 3 each 2 10/11/2023  No   Sig - Route: Inhale 1 puff into the lungs 2 times daily WIXELA-Disp as covered by Pt's insurance - Inhalation   Sent to pharmacy as: Fluticasone-Salmeterol 250-50 MCG/ACT Inhalation Aerosol Powder Breath Activated   Class: E-Prescribe   Order: [id number]   E-Prescribing Status: Receipt confirmed by pharmacy (10/11/2023 11:31 AM CDT)      [facility] [address]     Message received from Pt on 10/11. Prescriptions sent to [facility] that day. Called and spoke to Patient after verifying last name and date of birth. Pt notified. Mary cMcormack RN .............. 10/16/2023  9:05 AM

## 2023-10-24 NOTE — NURSING NOTE
Patient Information     Patient Name MRN Loulou Olson [id number] Female 1945      Nursing Note by Keren Mota at 2017 12:00 PM     Author:  Keren Mota Service:  (none) Author Type:  (none)     Filed:  2017 12:21 PM Encounter Date:  2017 Status:  Signed     :  Keren Mota            Patient presents for annual physical and medication management.  Keren Mota LPN ....................  2017   11:59 AM             35

## 2023-11-15 ENCOUNTER — HOSPITAL ENCOUNTER (OUTPATIENT)
Dept: PET IMAGING | Facility: OTHER | Age: 78
Discharge: HOME | End: 2023-11-15
Attending: INTERNAL MEDICINE | Admitting: INTERNAL MEDICINE
Payer: MEDICARE

## 2023-11-15 DIAGNOSIS — R91.8 PULMONARY NODULES: ICD-10-CM

## 2023-11-15 DIAGNOSIS — D47.2 MGUS (MONOCLONAL GAMMOPATHY OF UNKNOWN SIGNIFICANCE): ICD-10-CM

## 2023-11-15 PROCEDURE — A9552 F18 FDG: HCPCS | Performed by: INTERNAL MEDICINE

## 2023-11-15 PROCEDURE — G1010 CDSM STANSON: HCPCS | Mod: PS

## 2023-11-15 PROCEDURE — 343N000001 HC RX 343: Performed by: INTERNAL MEDICINE

## 2023-11-15 RX ADMIN — FLUDEOXYGLUCOSE F-18 12.12 MILLICURIE: 500 INJECTION, SOLUTION INTRAVENOUS at 15:00

## 2023-11-28 ENCOUNTER — ONCOLOGY VISIT (OUTPATIENT)
Dept: ONCOLOGY | Facility: OTHER | Age: 78
End: 2023-11-28
Attending: INTERNAL MEDICINE
Payer: MEDICARE

## 2023-11-28 VITALS
HEART RATE: 96 BPM | BODY MASS INDEX: 15.96 KG/M2 | SYSTOLIC BLOOD PRESSURE: 136 MMHG | OXYGEN SATURATION: 97 % | HEIGHT: 59 IN | TEMPERATURE: 97.1 F | RESPIRATION RATE: 16 BRPM | DIASTOLIC BLOOD PRESSURE: 60 MMHG | WEIGHT: 79.2 LBS

## 2023-11-28 DIAGNOSIS — R94.8 ABNORMAL PET SCAN, MEDIASTINUM: Primary | ICD-10-CM

## 2023-11-28 DIAGNOSIS — D47.2 MGUS (MONOCLONAL GAMMOPATHY OF UNKNOWN SIGNIFICANCE): ICD-10-CM

## 2023-11-28 DIAGNOSIS — I51.3 LEFT VENTRICULAR THROMBUS: ICD-10-CM

## 2023-11-28 PROCEDURE — G0463 HOSPITAL OUTPT CLINIC VISIT: HCPCS

## 2023-11-28 PROCEDURE — 99417 PROLNG OP E/M EACH 15 MIN: CPT | Performed by: INTERNAL MEDICINE

## 2023-11-28 PROCEDURE — 99215 OFFICE O/P EST HI 40 MIN: CPT | Performed by: INTERNAL MEDICINE

## 2023-11-28 ASSESSMENT — PAIN SCALES - GENERAL: PAINLEVEL: NO PAIN (0)

## 2023-11-28 NOTE — PATIENT INSTRUCTIONS
Follow up with NP in 6 months with labs week prior    Follow up with cardiology after echocardiogram    A referral was placed to Cardiology.  If you have not heard from them after 2 weeks, please notify us at [phone].

## 2023-11-28 NOTE — NURSING NOTE
Chief Complaint   Patient presents with    Oncology Clinic Visit     Follow up MGUS      Medication Reconciliation: complete    Agata Jacinto LPN on 11/28/2023 at 3:07 PM

## 2023-11-29 NOTE — PROGRESS NOTES
[facility] Hematology and Oncology Progress Note    Patient: Loulou Lucero  MRN: [id number]  Date of Service: Nov 28, 2023         Reason for Visit    Chief Complaint   Patient presents with    Oncology Clinic Visit     Follow up MGUS        ECOG Performance Status  1      Encounter Diagnoses:    MGUS  History of left lower lobe lung nodule      History of Present Illness    Ms. Loulou Lucero returns for follow-up of MGUS as well as history of left lower lobe lung nodule..We had seen the patient in consultation at the request of Dr. Johnston back on 12/21/2022. At that time, Mrs. Lucero was a 77-year-old white female with history of tobacco abuse and COPD, osteoporosis who we were asked to evaluate concerning a new left lower lobe lung nodule. Apparently, she had recently been seen by Dr. Johnston. Given her history of tobacco abuse, he ordered a low dose CT scan of the chest. This was done on 11/21/2022 and was read as a new solid spiculated nodule seen in the periphery of the left lower lobe measuring 14.5 mm in craniocaudal dimension by 11.8 x 10.6 mm in size, highly concerning for small malignant neoplasm. We decided to order a PET scan. This was done on 12/14/2022 and was read as no evidence of abnormal FDG uptake that would suggest malignant disease. The spiculated nodule seen previously was much smaller in size and decreased from 11.8 mm down to 7.8 mm. It was felt to be sequela of possible pneumonia in the past. It was felt to be a benign entity, possibly representing the remnants of focal pneumonia. The patient otherwise had a history of tobacco abuse. She said she smoked for at least 60 years, at least a pack per day. She most recently had cut back to half pack per day. She had at least a 15-pound weight loss over the last year. She was complaining of diffuse bone pain. We recommended that we should repeat CT chest in 3 months. This was repeated on 03/21/2023 and the previous left lower lobe lung nodule had  resolved. There was no evidence of the left lower lobe lung nodule. It was recommended the patient undergo screening. She did have evidence of emphysema otherwise. We also had noted that she was anemic. We ordered some myeloma labs as well as a CBC, which revealed that she had a hemoglobin of 11.6, hematocrit 34.7. She did not have any labs drawn for today. Otherwise, she still continues to smoke. She denies any shortness of breath, chest pain, abdominal pain. She does have a smoker's cough.   She remains a chronic smoker.We noted that her kappa lambda ratio was elevated in the setting with dropping hemoglobin.  We elected to rule out potential myeloma by obtaining a PET scan.  This performed on November 15 and the findings were that there was no active hypermetabolic disease there was hypermetabolic focus in the left ventricle: It was recommended the patient undergo echocardiogram to rule out a potential intracardiac thrombus.  Patient otherwise denies any chest pain she does admit to having occasional palpitations.  She denies any chest pain or history of thromboembolic disease.  She does get occasional shortness of breath she does have a smoker's cough but otherwise denies fevers, night sweats or weight loss.      ______________________________________________________________________________    Past History    Past Medical History:   Diagnosis Date    Asymptomatic varicose veins of lower extremity     6/12/2012    Benign paroxysmal vertigo     12/29/2010    Chronic obstructive pulmonary disease (H)     12/29/2010    Diarrhea     10/1/2015    Disorder of cartilage     Hip; Last dxa 06/2010    Diverticulosis of large intestine without perforation or abscess without bleeding          Lower abdominal pain     10/1/2015    Other disorders of lung (CODE)     Stable since July of 2002. RU lobe.    Personal history of other medical treatment (CODE)     L3, L4-4; Last MRI 7/09/12    Personal history of other medical  "treatment (CODE)     G-3, P-2, A-0  (Son had SIDS)    Zoster without complications     3/31/2012       Past Surgical History:   Procedure Laterality Date    COLONOSCOPY  03/22/2010    Normal; + family hx, next due 2015    COLONOSCOPY  10/01/2015    W/ POLYPECTOMY recommend follow up in 2020.    COLONOSCOPY N/A 11/7/2019    4 tubular adenoma, 3 year follow up    ESOPHAGOSCOPY, GASTROSCOPY, DUODENOSCOPY (EGD), COMBINED  04/23/2014    Arce's esophagus fu egd 2 yrs    ESOPHAGOSCOPY, GASTROSCOPY, DUODENOSCOPY (EGD), COMBINED N/A 11/7/2019    Procedure: ESOPHAGOGASTRODUODENOSCOPY, WITH BIOPSY;  Surgeon: Santosh Barrera MD;  Location: [facility] OR    LAPAROSCOPIC TUBAL LIGATION  1978         TONSILLECTOMY & ADENOIDECTOMY  1951                Review of systems.  CNS: There are no headaches, no blurred vision, no change in mental status,   ENT: There is no hearing loss.  Respiratory: There is dyspnea on exertion chronic cough  Cardiac: There is no chest pain, orthopnea, PND, or ankle edema.  GI: There is no bright red blood per rectum, no hematemesis, no reflux, no diarrhea or constipation  Musculoskeletal: There is no specific complaints of joint pains  : There is no urinary frequency, hematuria.  Constitutional: There is no fevers, night sweats, weight loss.  Endocrine: She does have chronic fatigue no hot flashes.  Neuro: There is no tingling or numbness in the hands or feet.  Hematologic: There is no gingival bleeding, epistaxis, or easy bruisability.  Dermatologic: There is no skin rash.  A 14 point review of systems is otherwise negative.          Physical Exam    /60   Pulse 96   Temp 97.1  F (36.2  C) (Tympanic)   Resp 16   Ht 1.499 m (4' 11\")   Wt 35.9 kg (79 lb 3.2 oz)   SpO2 97%   BMI 16.00 kg/m        GENERAL: Alert and oriented to time place and person. Seated comfortably. In no distress.    HEAD: Atraumatic and normocephalic.    EYES: PAULA, EOMI.  No pallor.  No icterus.    Oral cavity: no mucosal " lesion or tonsillar enlargement.    NECK: supple. JVP normal.  No thyroid enlargement.    LYMPH NODES: There are no palpable cervical, supraclavicular, axillary, or inguinal nodes.    LUNGS: clear to auscultation bilaterally.  Resonant to percussion throughout bilaterally.  Symmetrical breath movements bilaterally.    HEART: S1 and S2 are heard. Regular rate and rhythm.  No murmur or gallop or rub heard.  No peripheral edema.    ABDOMEN: Soft. Not tender. Not distended.  No palpable hepatomegaly or splenomegaly.  No other mass palpable.  Bowel sounds heard.    EXTREMITIES: There is no ankle edema.  SKIN: no rash, or bruising or purpura.    NEURO: Grossly non-focal.    Lab Results    No results found for this or any previous visit (from the past 240 hour(s)).    Imaging    PET Oncology Whole Body    Result Date: 11/16/2023  Procedure: PET ONCOLOGY WHOLE BODY Subtype: Subsequent History: MGUS (monoclonal gammopathy of unknown significance); Pulmonary nodules Comparison: Bone survey radiographs 9/25/2023; PET CT 12/14/2022 Technique:  1. PET/CT: The patient received 12.12 mCi of F-18-FDG; the serum glucose was 75 mg/dL prior to administration, body weight was 78 lb. A low dose CT examination was performed for the purpose of attenuation correction and localization. Attenuation corrected PET images from the skull vertex to the toes were acquired approximately one hour following intravenous administration of the dose, and displayed in transaxial, sagittal, and coronal projections. UPTAKE WAS MEASURED AT 60 MINUTES. BACKGROUND:  Liver SUV max = 2.24,   Aorta Blood SUV Max = 1.79. 2. 3D MIP and PET-CT fused images were processed on an independent workstation and archived to PACS and reviewed by a radiologist. FINDINGS: HEAD/NECK: -No sites of suspicious radiotracer uptake. -Resolution of spiculated nodule in the left lower lobe, likely represents resolved infectious or inflammatory change. -Stable subpleural scarring and  right greater than left pulmonary parenchymal architectural distortion. Stable low-grade uptake associated with the biapical scarring. Bilateral subpleural cysts and large right middle lobe bleb measuring measures 4.1 x 1.7 cm. CHEST: -No sites of suspicious radiotracer uptake. Hypermetabolic focus within the left ventricle. ABDOMEN/PELVIS: -No sites of suspicious radiotracer uptake. MUSCULOSKELETAL: -No sites of suspicious radiotracer uptake.     IMPRESSION: 1.  No active hypermetabolic disease. 2.  Hypermetabolic focus in the left ventricle, recommend echocardiogram to evaluate for intracardiac thrombus. PACO RIVERA MD   SYSTEM ID:  [id number]     Assessment and Plan: 1: MGUS: No evidence of endorgan damage PET scan was negative.  The plan is to continue surveillance we will see the patient in 6 months repeat myeloma labs.  2.:  History of lung nodule history of tobacco abuse: Essentially resolved PET scan was also negative.  3.  Hypermetabolic uptake within the left ventricle rule out intracardiac thrombus: We will obtain a echocardiogram and for the patient to cardiology for further evaluation i.e. Dr. Connelly/Mer Davis NP.  Time spent: 70 minutes was spent in this total patient visit: We spent time reviewing PET scan imaging results with the patient we also spent time encouraging smoking cessation, we performed history physical, with documented history physical, and ordered cardiology consultation and echocardiogram.    Cancer Staging   No matching staging information was found for the patient.        Signed by: Reynaldo Hansen MD    CC: Obi Johnston MD

## 2023-12-04 ENCOUNTER — HOSPITAL ENCOUNTER (OUTPATIENT)
Dept: CARDIOLOGY | Facility: OTHER | Age: 78
Discharge: HOME | End: 2023-12-04
Attending: INTERNAL MEDICINE | Admitting: INTERNAL MEDICINE
Payer: MEDICARE

## 2023-12-04 LAB — LVEF ECHO: NORMAL

## 2023-12-04 PROCEDURE — 93306 TTE W/DOPPLER COMPLETE: CPT

## 2023-12-04 PROCEDURE — 93306 TTE W/DOPPLER COMPLETE: CPT | Mod: 26 | Performed by: INTERNAL MEDICINE

## 2023-12-07 ENCOUNTER — TELEPHONE (OUTPATIENT)
Dept: FAMILY MEDICINE | Facility: OTHER | Age: 78
End: 2023-12-07
Payer: MEDICARE

## 2023-12-07 ENCOUNTER — MYC MEDICAL ADVICE (OUTPATIENT)
Dept: FAMILY MEDICINE | Facility: OTHER | Age: 78
End: 2023-12-07
Payer: MEDICARE

## 2023-12-07 NOTE — TELEPHONE ENCOUNTER
Echo was ordered by Dr. Saenz, I was unaware.  Looks like a finding on the PET scan prompted this.  The echo looked normal for what they were looking for.     She has some narrowing at one of her valves.  I am not aware that she has had symptoms related to this (SOB, syncope, lightheadedness when standing).  If she is having these symptoms, she should be seen in clinic by myself for further exam and workup.

## 2023-12-12 ENCOUNTER — TELEPHONE (OUTPATIENT)
Dept: FAMILY MEDICINE | Facility: OTHER | Age: 78
End: 2023-12-12
Payer: MEDICARE

## 2023-12-12 NOTE — TELEPHONE ENCOUNTER
Patient's daughter called to talk to Dr. Johnston's nurse regarding the results of patient's echocardiogram. She was very upset that her mom is thinking that everything is okay as she looked on her MyChart and is very concerned that she has severe calcification. Please call.     Medina Beckford on 12/12/2023 at 2:17 PM

## 2023-12-13 ENCOUNTER — TELEPHONE (OUTPATIENT)
Dept: ONCOLOGY | Facility: OTHER | Age: 78
End: 2023-12-13
Payer: MEDICARE

## 2023-12-13 NOTE — TELEPHONE ENCOUNTER
Danni called because she says no one has called them regarding Loulou's echo that was done on 12/4/23.  There should have been a cardiology referral made the day she saw Dr Hansen, but it was missed.   Referral placed and called . They will contact the daughter and patient to coordinate a visit.  I did also notify Loulou that she would be getting a call to make this appointment with a heart doctor.  Norma Ayala CMA([facility])..................12/13/2023   2:20 PM

## 2023-12-14 NOTE — TELEPHONE ENCOUNTER
Appointment scheduled on 1/9/24 with Dr Connelly.  Giulia Lizama, MATHIEU ....................12/14/2023   4:24 PM

## 2023-12-14 NOTE — TELEPHONE ENCOUNTER
See other phone note. I spoke to her yesterday.   There is a cardiology referral and she is seeing Dr Ansari on 1/9/24 here at [facility].   Norma Ayala CMA([facility])..................12/14/2023   11:21 AM

## 2023-12-16 ENCOUNTER — HEALTH MAINTENANCE LETTER (OUTPATIENT)
Age: 78
End: 2023-12-16

## 2024-01-08 NOTE — PROGRESS NOTES
General Cardiology Clinic-[facility]      Referring provider:Reynaldo Hansen MD     HPI: Ms. Loulou Lucero is a 78 year old  female with PMH significant for    -Current Tobacco abuse (63 years, half pack a day)  -COPD  -Severe aortic stenosis (recently diagnosed)  -MGUS (no evidence of endorgan damage)    Patient is being seen today to discuss severe aortic stenosis recently diagnosed by echocardiogram on 11/28/2023.  LV size and function is normal.  No other significant valve disease.  Aortic valve area is 0.7 cm , mean gradient 30 mm gradient and peak velocity 3.6 m/s.  Prior echocardiogram in  2017 showed nonsignificant aortic valve stenosis.  Patient was recently evaluated in lung nodule clinic for left lower lung nodule which was found to be negative for malignancy.  Patient is accompaniedby her daughter today. Her daughter tells me that she went through evaluation with regards to skin lesions and cleared not to have melanoma.    Patient reports tiredness over the last year or so.  Feels fatigued, tire easily, lightheaded, dizzy and unstable on her feet at times.  Denies syncope or lower extremity edema.  Denies chest pain.    Patient has no prior history of cardiac disease.    She has been a smoker for several years.  Currently smoking half to 1 pack a day.    Current medications: Aspirin, simvastatin, vitamins.    Medications, personal, family, and social history reviewed with patient and revised.    PAST MEDICAL HISTORY:  Past Medical History:   Diagnosis Date    Asymptomatic varicose veins of lower extremity     6/12/2012    Benign paroxysmal vertigo     12/29/2010    Chronic obstructive pulmonary disease (H)     12/29/2010    Diarrhea     10/1/2015    Disorder of cartilage     Hip; Last dxa 06/2010    Diverticulosis of large intestine without perforation or abscess without bleeding          Lower abdominal  pain     10/1/2015    Other disorders of lung (CODE)     Stable since July of 2002. RU lobe.    Personal history of other medical treatment (CODE)     L3, L4-4; Last MRI 7/09/12    Personal history of other medical treatment (CODE)     G-3, P-2, A-0  (Son had SIDS)    Zoster without complications     3/31/2012       CURRENT MEDICATIONS:  Current Outpatient Medications   Medication Sig Dispense Refill    acyclovir (ZOVIRAX) 800 MG tablet Take 1 tablet (800 mg) by mouth 5 times daily 35 tablet 1    aspirin 81 MG EC tablet Take 81 mg by mouth daily      cyanocobalamin (VITAMIN B-12) 1000 MCG tablet Take 1 tablet (1,000 mcg) by mouth daily 90 tablet 3    fluticasone-salmeterol (ADVAIR DISKUS) 250-50 MCG/ACT inhaler Inhale 1 puff into the lungs 2 times daily WIXELA-Disp as covered by Pt's insurance 3 each 2    simvastatin (ZOCOR) 20 MG tablet Take 1 tablet (20 mg) by mouth at bedtime 90 tablet 2       PAST SURGICAL HISTORY:  Past Surgical History:   Procedure Laterality Date    COLONOSCOPY  03/22/2010    Normal; + family hx, next due 2015    COLONOSCOPY  10/01/2015    W/ POLYPECTOMY recommend follow up in 2020.    COLONOSCOPY N/A 11/7/2019    4 tubular adenoma, 3 year follow up    ESOPHAGOSCOPY, GASTROSCOPY, DUODENOSCOPY (EGD), COMBINED  04/23/2014    Arce's esophagus fu egd 2 yrs    ESOPHAGOSCOPY, GASTROSCOPY, DUODENOSCOPY (EGD), COMBINED N/A 11/7/2019    Procedure: ESOPHAGOGASTRODUODENOSCOPY, WITH BIOPSY;  Surgeon: Santosh Barrera MD;  Location: [facility] OR    LAPAROSCOPIC TUBAL LIGATION  1978         TONSILLECTOMY & ADENOIDECTOMY  1951            ALLERGIES:     Allergies   Allergen Reactions    Metronidazole Nausea and Vomiting    Pneumococcal Vaccine      Other reaction(s): Edema  Arm swelling    Tramadol Visual Disturbance     Hallucinations          FAMILY HISTORY:  Family History   Problem Relation Age of Onset    Colon Cancer Father         Cancer-colon    Other - See Comments Mother         Alzheimer's    Other -  "See Comments Maternal Grandmother         Alzheimer's    Other - See Comments Brother         Alzheimer's    Other - See Comments Brother         aneurysm and stents    Cancer Brother         Cancer,lung    Cancer Brother         Cancer,skin    Other - See Comments Brother         cirrhosis of the liver    Other - See Comments Maternal Uncle         3, Alzheimer's    Breast Cancer No family hx of         Cancer-breast         SOCIAL HISTORY:  Social History     Tobacco Use    Smoking status: Every Day     Packs/day: 0.50     Years: 63.00     Additional pack years: 0.00     Total pack years: 31.50     Types: Cigarettes     Start date: 1961     Passive exposure: Past    Smokeless tobacco: Never    Tobacco comments:     Passive exposure in adult home.    Vaping Use    Vaping Use: Never used   Substance Use Topics    Alcohol use: Not Currently     Comment: rarely at a wedding or special occasion    Drug use: No       ROS:   Constitutional: No fever, chills, or sweats. Weight stable.   Cardiovascular: As per HPI.       Exam:  BP (!) 142/82   Pulse 82   Temp 98.8  F (37.1  C) (Tympanic)   Resp 16   Ht 1.511 m (4' 11.5\")   Wt 36.3 kg (80 lb)   SpO2 99%   Breastfeeding No   BMI 15.89 kg/m    GENERAL APPEARANCE: alert and no distress  HEENT: no icterus, no central cyanosis  LYMPH/NECK: no adenopathy, no asymmetry, JVP not elevated  RESPIRATORY: lungs clear to auscultation - no rales, rhonchi or wheezes, no use of accessory muscles, no retractions, respirations are unlabored, normal respiratory rate  CARDIOVASCULAR: regular rhythm, normal S1, S2, no S3 or S4, left parasternal 3/6 midsystolic ejection murmur.  GI: soft, non tender  EXTREMITIES: no edema  NEURO: alert, normal speech,and affect  SKIN: no ecchymoses, no rashes     I have reviewed the labs and personally reviewed the imaging below and made my comment in the assessment and plan.    Labs:  CBC RESULTS:   Lab Results   Component Value Date    WBC 6.0 " 09/25/2023    WBC 7.0 10/24/2019    RBC 3.47 (L) 09/25/2023    RBC 3.89 10/24/2019    HGB 10.6 (L) 09/25/2023    HGB 12.1 10/24/2019    HCT 32.8 (L) 09/25/2023    HCT 36.5 10/24/2019    MCV 95 09/25/2023    MCV 94 10/24/2019    MCH 30.5 09/25/2023    MCH 31.1 10/24/2019    MCHC 32.3 09/25/2023    MCHC 33.2 10/24/2019    RDW 13.2 09/25/2023    RDW 12.9 10/24/2019     09/25/2023     10/24/2019       BMP RESULTS:  Lab Results   Component Value Date     09/25/2023     10/09/2020    POTASSIUM 3.7 09/25/2023    POTASSIUM 4.6 10/18/2022    POTASSIUM 4.0 10/09/2020    CHLORIDE 104 09/25/2023    CHLORIDE 103 10/18/2022    CHLORIDE 102 10/09/2020    CO2 29 09/25/2023    CO2 34 (H) 10/18/2022    CO2 33 (H) 10/09/2020    ANIONGAP 9 09/25/2023    ANIONGAP 6 10/18/2022    ANIONGAP 7 10/09/2020    GLC 86 09/25/2023    GLC 94 10/18/2022     (H) 10/09/2020    BUN 10.9 09/25/2023    BUN 15 10/18/2022    BUN 21 10/09/2020    CR 0.83 09/25/2023    CR 1.00 10/09/2020    GFRESTIMATED 72 09/25/2023    GFRESTIMATED 54 (L) 10/09/2020    GFRESTBLACK 65 10/09/2020    DION 9.4 09/25/2023    DION 10.0 10/09/2020      Echocardiogram 11/28/2023  Global and regional left ventricular function is normal with an EF of 60-65%.  There is no thrombus seen in the left ventricle.  Global right ventricular function is normal.  Severe aortic stenosis is present.  IVC diameter <2.1 cm collapsing >50% with sniff suggests a normal RA pressure  of 3 mmHg.  No pericardial effusion is present.  There is no prior study for direct comparison.    Assessment and Plan:     # Severe aortic stenosis  # Severe mitral annular calcification mild mitral stenosis.  Mean gradient 6 mmHg.  -Patient is symptomatic with tiredness and dizziness.  I think she has symptomatic severe AS.  LV function is normal.  Discussed TAVR with the patient.  Daughter and patient agreeable to proceed with TAVR evaluation.    -Schedule appointment with TAVR team at  [facility].    # Anemia  -Most recent hemoglobin is 10.6 on 9/25/2023.  Will repeat CBC today.  Add ferritin to the lab test.  -Daughter tells me that they are going to establish care with PCP.  -Will message her oncologist regarding anemia.    # MGUS  -Follows with heme-onc.  -Continue aspirin and simvastatin.    # Active smoker  # COPD  -She is an active smoker.  Counseled to stop smoking.    Return to clinic as needed with me.    Total time spent today for this visit is 71 minutes including precharting, face-to-face clinic visit, review of labs/imaging and medical documentation.    Annie BROWN MD  [facility] Division of Cardiology  Pager [phone]

## 2024-01-09 ENCOUNTER — OFFICE VISIT (OUTPATIENT)
Dept: CARDIOLOGY | Facility: OTHER | Age: 79
End: 2024-01-09
Attending: INTERNAL MEDICINE
Payer: MEDICARE

## 2024-01-09 VITALS
DIASTOLIC BLOOD PRESSURE: 82 MMHG | OXYGEN SATURATION: 99 % | RESPIRATION RATE: 16 BRPM | SYSTOLIC BLOOD PRESSURE: 142 MMHG | TEMPERATURE: 98.8 F | HEIGHT: 60 IN | BODY MASS INDEX: 15.71 KG/M2 | WEIGHT: 80 LBS | HEART RATE: 82 BPM

## 2024-01-09 DIAGNOSIS — D47.2 MGUS (MONOCLONAL GAMMOPATHY OF UNKNOWN SIGNIFICANCE): ICD-10-CM

## 2024-01-09 DIAGNOSIS — D64.9 ANEMIA, UNSPECIFIED TYPE: ICD-10-CM

## 2024-01-09 DIAGNOSIS — I35.0 SEVERE AORTIC STENOSIS BY PRIOR ECHOCARDIOGRAM: Primary | ICD-10-CM

## 2024-01-09 LAB
ERYTHROCYTE [DISTWIDTH] IN BLOOD BY AUTOMATED COUNT: 14.9 % (ref 10–15)
FERRITIN SERPL-MCNC: 14 NG/ML (ref 11–328)
HCT VFR BLD AUTO: 27.5 % (ref 35–47)
HGB BLD-MCNC: 8.8 G/DL (ref 11.7–15.7)
MCH RBC QN AUTO: 27 PG (ref 26.5–33)
MCHC RBC AUTO-ENTMCNC: 32 G/DL (ref 31.5–36.5)
MCV RBC AUTO: 84 FL (ref 78–100)
PLATELET # BLD AUTO: 270 10E3/UL (ref 150–450)
RBC # BLD AUTO: 3.26 10E6/UL (ref 3.8–5.2)
WBC # BLD AUTO: 4.8 10E3/UL (ref 4–11)

## 2024-01-09 PROCEDURE — 82728 ASSAY OF FERRITIN: CPT | Mod: ZL | Performed by: INTERNAL MEDICINE

## 2024-01-09 PROCEDURE — 99205 OFFICE O/P NEW HI 60 MIN: CPT | Performed by: INTERNAL MEDICINE

## 2024-01-09 PROCEDURE — G0463 HOSPITAL OUTPT CLINIC VISIT: HCPCS | Performed by: INTERNAL MEDICINE

## 2024-01-09 PROCEDURE — 85027 COMPLETE CBC AUTOMATED: CPT | Mod: ZL | Performed by: INTERNAL MEDICINE

## 2024-01-09 PROCEDURE — 36415 COLL VENOUS BLD VENIPUNCTURE: CPT | Mod: ZL | Performed by: INTERNAL MEDICINE

## 2024-01-09 ASSESSMENT — PAIN SCALES - GENERAL: PAINLEVEL: NO PAIN (0)

## 2024-01-09 NOTE — PATIENT INSTRUCTIONS
You will need to have an appointment scheduled with Interventional Cardiologist Dr. Patricia.  Lab today.  You will be notified of results.  Follow up with Dr. Ansari as needed.

## 2024-01-09 NOTE — NURSING NOTE
"Chief Complaint   Patient presents with    Follow Up     Abnormal PET Scan, Left Ventricular Thrombus       Initial BP (!) 142/82   Pulse 82   Temp 98.8  F (37.1  C) (Tympanic)   Resp 16   Ht 1.511 m (4' 11.5\")   Wt 36.3 kg (80 lb)   SpO2 99%   Breastfeeding No   BMI 15.89 kg/m   Estimated body mass index is 15.89 kg/m  as calculated from the following:    Height as of this encounter: 1.511 m (4' 11.5\").    Weight as of this encounter: 36.3 kg (80 lb).  Medication Review: complete    Chief Complaint   Patient presents with    Follow Up     Abnormal PET Scan, Left Ventricular Thrombus       Initial BP (!) 142/82   Pulse 82   Temp 98.8  F (37.1  C) (Tympanic)   Resp 16   Ht 1.511 m (4' 11.5\")   Wt 36.3 kg (80 lb)   SpO2 99%   Breastfeeding No   BMI 15.89 kg/m   Estimated body mass index is 15.89 kg/m  as calculated from the following:    Height as of this encounter: 1.511 m (4' 11.5\").    Weight as of this encounter: 36.3 kg (80 lb).  Meds Reconciled: complete  Pt is on Aspirin  Pt is on a Statin  PHQ and/or DEENA reviewed. Pt referred to PCP/MH Provider as appropriate.    Mer Garrett LPN       "

## 2024-01-10 ENCOUNTER — PATIENT OUTREACH (OUTPATIENT)
Dept: ONCOLOGY | Facility: OTHER | Age: 79
End: 2024-01-10
Payer: MEDICARE

## 2024-01-10 DIAGNOSIS — D47.2 MGUS (MONOCLONAL GAMMOPATHY OF UNKNOWN SIGNIFICANCE): ICD-10-CM

## 2024-01-10 DIAGNOSIS — D64.9 ANEMIA, UNSPECIFIED TYPE: Primary | ICD-10-CM

## 2024-01-10 RX ORDER — FERROUS SULFATE 325(65) MG
325 TABLET ORAL 2 TIMES DAILY
Qty: 60 TABLET | Refills: 1 | Status: SHIPPED | OUTPATIENT
Start: 2024-01-10 | End: 2024-03-11

## 2024-01-10 NOTE — PROGRESS NOTES
Patient notified per Reynaldo Hansen MD to start Iron 325 twice daily. This will be sent to her pharmacy.  Yareli Bardales RN...........1/10/2024 12:27 PM

## 2024-01-10 NOTE — PROGRESS NOTES
Patient was called per Reynaldo Hansen MD she needs to have labs repeated and be seen due to dropping Hgb. She will be having heart valve replacement possibly in February. She is willing to schedule labs and provider appointment.  Yareli Bardales RN...........1/10/2024 8:49 AM

## 2024-01-12 ENCOUNTER — LAB (OUTPATIENT)
Dept: LAB | Facility: OTHER | Age: 79
End: 2024-01-12
Attending: INTERNAL MEDICINE
Payer: MEDICARE

## 2024-01-12 DIAGNOSIS — D64.9 ANEMIA, UNSPECIFIED TYPE: ICD-10-CM

## 2024-01-12 DIAGNOSIS — D47.2 MGUS (MONOCLONAL GAMMOPATHY OF UNKNOWN SIGNIFICANCE): ICD-10-CM

## 2024-01-12 LAB
ALBUMIN SERPL BCG-MCNC: 4.2 G/DL (ref 3.5–5.2)
ALP SERPL-CCNC: 77 U/L (ref 40–150)
ALT SERPL W P-5'-P-CCNC: 9 U/L (ref 0–50)
ANION GAP SERPL CALCULATED.3IONS-SCNC: 9 MMOL/L (ref 7–15)
AST SERPL W P-5'-P-CCNC: 21 U/L (ref 0–45)
BASOPHILS # BLD AUTO: 0 10E3/UL (ref 0–0.2)
BASOPHILS NFR BLD AUTO: 1 %
BILIRUB SERPL-MCNC: 0.4 MG/DL
BUN SERPL-MCNC: 13.6 MG/DL (ref 8–23)
CALCIUM SERPL-MCNC: 9.1 MG/DL (ref 8.8–10.2)
CHLORIDE SERPL-SCNC: 101 MMOL/L (ref 98–107)
CREAT SERPL-MCNC: 0.79 MG/DL (ref 0.51–0.95)
DEPRECATED HCO3 PLAS-SCNC: 28 MMOL/L (ref 22–29)
EGFRCR SERPLBLD CKD-EPI 2021: 76 ML/MIN/1.73M2
EOSINOPHIL # BLD AUTO: 0.1 10E3/UL (ref 0–0.7)
EOSINOPHIL NFR BLD AUTO: 3 %
ERYTHROCYTE [DISTWIDTH] IN BLOOD BY AUTOMATED COUNT: 15.1 % (ref 10–15)
ERYTHROCYTE [SEDIMENTATION RATE] IN BLOOD BY WESTERGREN METHOD: 48 MM/HR (ref 0–30)
FERRITIN SERPL-MCNC: 16 NG/ML (ref 11–328)
GLUCOSE SERPL-MCNC: 113 MG/DL (ref 70–99)
HCT VFR BLD AUTO: 28 % (ref 35–47)
HGB BLD-MCNC: 9 G/DL (ref 11.7–15.7)
IMM GRANULOCYTES # BLD: 0 10E3/UL
IMM GRANULOCYTES NFR BLD: 0 %
IRON BINDING CAPACITY (ROCHE): 348 UG/DL (ref 240–430)
IRON SATN MFR SERPL: 7 % (ref 15–46)
IRON SERPL-MCNC: 23 UG/DL (ref 37–145)
LDH SERPL L TO P-CCNC: 206 U/L (ref 0–250)
LYMPHOCYTES # BLD AUTO: 1 10E3/UL (ref 0.8–5.3)
LYMPHOCYTES NFR BLD AUTO: 21 %
MCH RBC QN AUTO: 27 PG (ref 26.5–33)
MCHC RBC AUTO-ENTMCNC: 32.1 G/DL (ref 31.5–36.5)
MCV RBC AUTO: 84 FL (ref 78–100)
MONOCYTES # BLD AUTO: 0.4 10E3/UL (ref 0–1.3)
MONOCYTES NFR BLD AUTO: 7 %
NEUTROPHILS # BLD AUTO: 3.4 10E3/UL (ref 1.6–8.3)
NEUTROPHILS NFR BLD AUTO: 68 %
NRBC # BLD AUTO: 0 10E3/UL
NRBC BLD AUTO-RTO: 0 /100
PLATELET # BLD AUTO: 276 10E3/UL (ref 150–450)
POTASSIUM SERPL-SCNC: 3.4 MMOL/L (ref 3.4–5.3)
PROT SERPL-MCNC: 8 G/DL (ref 6.4–8.3)
RBC # BLD AUTO: 3.33 10E6/UL (ref 3.8–5.2)
RETICS # AUTO: 0.04 10E6/UL (ref 0.03–0.1)
RETICS/RBC NFR AUTO: 1.1 % (ref 0.5–2)
SODIUM SERPL-SCNC: 138 MMOL/L (ref 135–145)
WBC # BLD AUTO: 4.9 10E3/UL (ref 4–11)

## 2024-01-12 PROCEDURE — 80053 COMPREHEN METABOLIC PANEL: CPT | Mod: ZL

## 2024-01-12 PROCEDURE — 84155 ASSAY OF PROTEIN SERUM: CPT | Mod: ZL,XU

## 2024-01-12 PROCEDURE — 84165 PROTEIN E-PHORESIS SERUM: CPT | Mod: TC,ZL | Performed by: PATHOLOGY

## 2024-01-12 PROCEDURE — 85652 RBC SED RATE AUTOMATED: CPT | Mod: ZL

## 2024-01-12 PROCEDURE — 85025 COMPLETE CBC W/AUTO DIFF WBC: CPT | Mod: ZL

## 2024-01-12 PROCEDURE — 83550 IRON BINDING TEST: CPT | Mod: ZL

## 2024-01-12 PROCEDURE — 86334 IMMUNOFIX E-PHORESIS SERUM: CPT | Mod: ZL | Performed by: PATHOLOGY

## 2024-01-12 PROCEDURE — 82784 ASSAY IGA/IGD/IGG/IGM EACH: CPT | Mod: ZL

## 2024-01-12 PROCEDURE — 83010 ASSAY OF HAPTOGLOBIN QUANT: CPT | Mod: ZL

## 2024-01-12 PROCEDURE — 83540 ASSAY OF IRON: CPT | Mod: ZL

## 2024-01-12 PROCEDURE — 86038 ANTINUCLEAR ANTIBODIES: CPT | Mod: ZL

## 2024-01-12 PROCEDURE — 84165 PROTEIN E-PHORESIS SERUM: CPT | Mod: 26 | Performed by: PATHOLOGY

## 2024-01-12 PROCEDURE — 36415 COLL VENOUS BLD VENIPUNCTURE: CPT | Mod: ZL

## 2024-01-12 PROCEDURE — 85810 BLOOD VISCOSITY EXAMINATION: CPT | Mod: ZL

## 2024-01-12 PROCEDURE — 83615 LACTATE (LD) (LDH) ENZYME: CPT | Mod: ZL

## 2024-01-12 PROCEDURE — 85045 AUTOMATED RETICULOCYTE COUNT: CPT | Mod: ZL

## 2024-01-12 PROCEDURE — 83521 IG LIGHT CHAINS FREE EACH: CPT | Mod: ZL

## 2024-01-12 PROCEDURE — 86431 RHEUMATOID FACTOR QUANT: CPT | Mod: ZL

## 2024-01-12 PROCEDURE — 82232 ASSAY OF BETA-2 PROTEIN: CPT | Mod: ZL

## 2024-01-12 PROCEDURE — 86334 IMMUNOFIX E-PHORESIS SERUM: CPT | Mod: 26 | Performed by: PATHOLOGY

## 2024-01-12 PROCEDURE — 82728 ASSAY OF FERRITIN: CPT | Mod: ZL

## 2024-01-13 LAB
HAPTOGLOB SERPL-MCNC: 270 MG/DL (ref 30–200)
RHEUMATOID FACT SERPL-ACNC: <10 IU/ML
TOTAL PROTEIN SERUM FOR ELP: 7.1 G/DL (ref 6.4–8.3)

## 2024-01-15 ENCOUNTER — HOSPITAL ENCOUNTER (EMERGENCY)
Facility: OTHER | Age: 79
Discharge: HOME | End: 2024-01-15
Attending: EMERGENCY MEDICINE | Admitting: EMERGENCY MEDICINE
Payer: MEDICARE

## 2024-01-15 ENCOUNTER — NURSE TRIAGE (OUTPATIENT)
Dept: FAMILY MEDICINE | Facility: OTHER | Age: 79
End: 2024-01-15
Payer: MEDICARE

## 2024-01-15 VITALS
OXYGEN SATURATION: 99 % | WEIGHT: 80 LBS | HEIGHT: 59 IN | DIASTOLIC BLOOD PRESSURE: 71 MMHG | BODY MASS INDEX: 16.13 KG/M2 | SYSTOLIC BLOOD PRESSURE: 155 MMHG | RESPIRATION RATE: 21 BRPM | HEART RATE: 98 BPM | TEMPERATURE: 97.1 F

## 2024-01-15 DIAGNOSIS — M54.9 ACUTE BACK PAIN, UNSPECIFIED BACK LOCATION, UNSPECIFIED BACK PAIN LATERALITY: ICD-10-CM

## 2024-01-15 DIAGNOSIS — Z86.19 HISTORY OF SHINGLES: ICD-10-CM

## 2024-01-15 LAB
ALBUMIN SERPL BCG-MCNC: 3.8 G/DL (ref 3.5–5.2)
ALBUMIN SERPL ELPH-MCNC: 3.7 G/DL (ref 3.7–5.1)
ALBUMIN UR-MCNC: NEGATIVE MG/DL
ALP SERPL-CCNC: 65 U/L (ref 40–150)
ALPHA1 GLOB SERPL ELPH-MCNC: 0.4 G/DL (ref 0.2–0.4)
ALPHA2 GLOB SERPL ELPH-MCNC: 0.8 G/DL (ref 0.5–0.9)
ALT SERPL W P-5'-P-CCNC: 9 U/L (ref 0–50)
ANA SER QL IF: NEGATIVE
ANION GAP SERPL CALCULATED.3IONS-SCNC: 8 MMOL/L (ref 7–15)
APPEARANCE UR: CLEAR
AST SERPL W P-5'-P-CCNC: 20 U/L (ref 0–45)
B-GLOBULIN SERPL ELPH-MCNC: 1 G/DL (ref 0.6–1)
B2 MICROGLOB TUMOR MARKER SER-MCNC: 2.3 MG/L
BACTERIA #/AREA URNS HPF: ABNORMAL /HPF
BASOPHILS # BLD AUTO: 0 10E3/UL (ref 0–0.2)
BASOPHILS NFR BLD AUTO: 1 %
BILIRUB SERPL-MCNC: 0.3 MG/DL
BILIRUB UR QL STRIP: NEGATIVE
BUN SERPL-MCNC: 11 MG/DL (ref 8–23)
CALCIUM SERPL-MCNC: 9.1 MG/DL (ref 8.8–10.2)
CHLORIDE SERPL-SCNC: 106 MMOL/L (ref 98–107)
COLOR UR AUTO: YELLOW
CREAT SERPL-MCNC: 0.75 MG/DL (ref 0.51–0.95)
DEPRECATED HCO3 PLAS-SCNC: 28 MMOL/L (ref 22–29)
EGFRCR SERPLBLD CKD-EPI 2021: 81 ML/MIN/1.73M2
EOSINOPHIL # BLD AUTO: 0.2 10E3/UL (ref 0–0.7)
EOSINOPHIL NFR BLD AUTO: 4 %
ERYTHROCYTE [DISTWIDTH] IN BLOOD BY AUTOMATED COUNT: 15.6 % (ref 10–15)
GAMMA GLOB SERPL ELPH-MCNC: 1.2 G/DL (ref 0.7–1.6)
GLUCOSE SERPL-MCNC: 85 MG/DL (ref 70–99)
GLUCOSE UR STRIP-MCNC: NEGATIVE MG/DL
HCT VFR BLD AUTO: 25.2 % (ref 35–47)
HGB BLD-MCNC: 8.2 G/DL (ref 11.7–15.7)
HGB UR QL STRIP: ABNORMAL
IGA SERPL-MCNC: 495 MG/DL (ref 84–499)
IGG SERPL-MCNC: 1325 MG/DL (ref 610–1616)
IGM SERPL-MCNC: 99 MG/DL (ref 35–242)
IMM GRANULOCYTES # BLD: 0 10E3/UL
IMM GRANULOCYTES NFR BLD: 0 %
KAPPA LC FREE SER-MCNC: 7.41 MG/DL (ref 0.33–1.94)
KAPPA LC FREE/LAMBDA FREE SER NEPH: 2.12 {RATIO} (ref 0.26–1.65)
KETONES UR STRIP-MCNC: NEGATIVE MG/DL
LAMBDA LC FREE SERPL-MCNC: 3.5 MG/DL (ref 0.57–2.63)
LEUKOCYTE ESTERASE UR QL STRIP: NEGATIVE
LOCATION OF TASK: NORMAL
LOCATION OF TASK: NORMAL
LYMPHOCYTES # BLD AUTO: 1.4 10E3/UL (ref 0.8–5.3)
LYMPHOCYTES NFR BLD AUTO: 29 %
M PROTEIN SERPL ELPH-MCNC: 0 G/DL
MCH RBC QN AUTO: 27.1 PG (ref 26.5–33)
MCHC RBC AUTO-ENTMCNC: 32.5 G/DL (ref 31.5–36.5)
MCV RBC AUTO: 83 FL (ref 78–100)
MONOCYTES # BLD AUTO: 0.5 10E3/UL (ref 0–1.3)
MONOCYTES NFR BLD AUTO: 11 %
MUCOUS THREADS #/AREA URNS LPF: PRESENT /LPF
NEUTROPHILS # BLD AUTO: 2.6 10E3/UL (ref 1.6–8.3)
NEUTROPHILS NFR BLD AUTO: 55 %
NITRATE UR QL: NEGATIVE
NRBC # BLD AUTO: 0 10E3/UL
NRBC BLD AUTO-RTO: 0 /100
PH UR STRIP: 7 [PH] (ref 5–9)
PLATELET # BLD AUTO: 255 10E3/UL (ref 150–450)
POTASSIUM SERPL-SCNC: 3.9 MMOL/L (ref 3.4–5.3)
PROT PATTERN SERPL ELPH-IMP: NORMAL
PROT PATTERN SERPL IFE-IMP: NORMAL
PROT SERPL-MCNC: 7 G/DL (ref 6.4–8.3)
RBC # BLD AUTO: 3.03 10E6/UL (ref 3.8–5.2)
RBC URINE: 7 /HPF
SODIUM SERPL-SCNC: 142 MMOL/L (ref 135–145)
SP GR UR STRIP: 1.01 (ref 1–1.03)
UROBILINOGEN UR STRIP-MCNC: NORMAL MG/DL
VISC SER: 1.7 CP (ref 1.4–1.8)
WBC # BLD AUTO: 4.8 10E3/UL (ref 4–11)
WBC URINE: <1 /HPF

## 2024-01-15 PROCEDURE — 99284 EMERGENCY DEPT VISIT MOD MDM: CPT | Performed by: EMERGENCY MEDICINE

## 2024-01-15 PROCEDURE — 99283 EMERGENCY DEPT VISIT LOW MDM: CPT | Performed by: EMERGENCY MEDICINE

## 2024-01-15 PROCEDURE — 80053 COMPREHEN METABOLIC PANEL: CPT | Performed by: EMERGENCY MEDICINE

## 2024-01-15 PROCEDURE — 81001 URINALYSIS AUTO W/SCOPE: CPT | Performed by: EMERGENCY MEDICINE

## 2024-01-15 PROCEDURE — 36415 COLL VENOUS BLD VENIPUNCTURE: CPT | Performed by: EMERGENCY MEDICINE

## 2024-01-15 PROCEDURE — 85025 COMPLETE CBC W/AUTO DIFF WBC: CPT | Performed by: EMERGENCY MEDICINE

## 2024-01-15 ASSESSMENT — ENCOUNTER SYMPTOMS
CHEST TIGHTNESS: 0
SHORTNESS OF BREATH: 0
LIGHT-HEADEDNESS: 0
FREQUENCY: 1
CHILLS: 0
NAUSEA: 0
DYSURIA: 0
AGITATION: 0
VOMITING: 0
BACK PAIN: 1
FEVER: 0

## 2024-01-15 ASSESSMENT — ACTIVITIES OF DAILY LIVING (ADL)
ADLS_ACUITY_SCORE: 35
ADLS_ACUITY_SCORE: 35

## 2024-01-15 NOTE — ED PROVIDER NOTES
History     Chief Complaint   Patient presents with    Generalized Weakness     HPI  Loulou Lucero is a 78 year old female who is here with her daughter complaining of increased weakness and fatigue and back pain.  She states that she has not been well lately and has just been feeling fatigued.  Her daughter reports she is losing weight and seems to be going downhill rather quickly.  Recently diagnosed with severe aortic stenosis, consultation with cardiology recently done.  They are recommending TAVR and planning to proceed with that.  Also following up with oncology for [facility].  She reports that just yesterday she got significantly worse.  She is more fatigued.  She also has a sharp stabbing pain in her left mid back area.  She believes this is shingles as she has had that before.  She actually has a prescription of acyclovir at home that she can take when this happens.  She says it is sharp stabbing pain.  The skin just does not feel normal.  She has once in the past had shingles that developed a significant rash.  Other times she has had this sharp stabbing pain and immediately started on antivirals.  She states that the sharp stabbing pain lasts for only a day and resolved and she is never gotten a rash again.  She is quite certain that these episodes of pain represent shingles.  No other new pains.  Denies shortness of breath or chest pain.  No fevers or chills.  No nausea vomiting.  Last night she was urinating very frequently and just could not stop, she wonders if it is because she was trying to drink a lot of liquids for her blood work.  Today she is still urinating a little more than normal but nothing like what she did last night.    Allergies:  Allergies   Allergen Reactions    Metronidazole Nausea and Vomiting    Pneumococcal Vaccine      Other reaction(s): Edema  Arm swelling    Tramadol Visual Disturbance     Hallucinations          Problem List:    Patient Active Problem List    Diagnosis Date Noted     Anemia, unspecified type 03/28/2023     Priority: Medium    Age-related osteoporosis without current pathological fracture 06/26/2018     Priority: Medium    Diverticular disease of colon 01/23/2018     Priority: Medium    Tobacco abuse 01/23/2018     Priority: Medium     Overview:   Trying to quit      Systolic murmur 06/06/2017     Priority: Medium     Overview:   Aortic sclerosis with no significant aortic stenosis per echo June 2017      GERD (gastroesophageal reflux disease) 06/18/2014     Priority: Medium    Advanced care planning/counseling discussion 04/07/2014     Priority: Medium     Overview:   Declined       Chronic obstructive pulmonary disease (H) 12/29/2010     Priority: Medium        Past Medical History:    Past Medical History:   Diagnosis Date    Asymptomatic varicose veins of lower extremity     Benign paroxysmal vertigo     Chronic obstructive pulmonary disease (H)     Diarrhea     Disorder of cartilage     Diverticulosis of large intestine without perforation or abscess without bleeding     Lower abdominal pain     Other disorders of lung (CODE)     Personal history of other medical treatment (CODE)     Personal history of other medical treatment (CODE)     Zoster without complications        Past Surgical History:    Past Surgical History:   Procedure Laterality Date    COLONOSCOPY  03/22/2010    Normal; + family hx, next due 2015    COLONOSCOPY  10/01/2015    W/ POLYPECTOMY recommend follow up in 2020.    COLONOSCOPY N/A 11/7/2019    4 tubular adenoma, 3 year follow up    ESOPHAGOSCOPY, GASTROSCOPY, DUODENOSCOPY (EGD), COMBINED  04/23/2014    Arce's esophagus fu egd 2 yrs    ESOPHAGOSCOPY, GASTROSCOPY, DUODENOSCOPY (EGD), COMBINED N/A 11/7/2019    Procedure: ESOPHAGOGASTRODUODENOSCOPY, WITH BIOPSY;  Surgeon: Santosh Barrera MD;  Location: [facility] OR    LAPAROSCOPIC TUBAL LIGATION  1978         TONSILLECTOMY & ADENOIDECTOMY  1951            Family History:    Family History   Problem  Relation Age of Onset    Colon Cancer Father         Cancer-colon    Other - See Comments Mother         Alzheimer's    Other - See Comments Maternal Grandmother         Alzheimer's    Other - See Comments Brother         Alzheimer's    Other - See Comments Brother         aneurysm and stents    Cancer Brother         Cancer,lung    Cancer Brother         Cancer,skin    Other - See Comments Brother         cirrhosis of the liver    Other - See Comments Maternal Uncle         3, Alzheimer's    Breast Cancer No family hx of         Cancer-breast       Social History:  Marital Status:   [5]  Social History     Tobacco Use    Smoking status: Every Day     Packs/day: 0.50     Years: 63.00     Additional pack years: 0.00     Total pack years: 31.50     Types: Cigarettes     Start date: 1961     Passive exposure: Past    Smokeless tobacco: Never    Tobacco comments:     Passive exposure in adult home.    Vaping Use    Vaping Use: Never used   Substance Use Topics    Alcohol use: Not Currently     Comment: rarely at a wedding or special occasion    Drug use: No        Medications:    aspirin 81 MG EC tablet  cyanocobalamin (VITAMIN B-12) 1000 MCG tablet  ferrous sulfate (FEROSUL) 325 (65 Fe) MG tablet  fluticasone-salmeterol (ADVAIR DISKUS) 250-50 MCG/ACT inhaler  simvastatin (ZOCOR) 20 MG tablet          Review of Systems   Constitutional:  Negative for chills and fever.   HENT:  Negative for congestion.    Eyes:  Negative for visual disturbance.   Respiratory:  Negative for chest tightness and shortness of breath.    Cardiovascular:  Negative for chest pain.   Gastrointestinal:  Negative for nausea and vomiting.   Genitourinary:  Positive for frequency. Negative for dysuria.   Musculoskeletal:  Positive for back pain.   Skin:  Negative for rash.   Neurological:  Negative for light-headedness.   Psychiatric/Behavioral:  Negative for agitation.        Physical Exam   BP: (!) 194/75  Pulse: 93  Temp: 97.1  F (36.2  " C)  Resp: 16  Height: 149.9 cm (4' 11\")  Weight: 36.3 kg (80 lb)  SpO2: 99 %      Physical Exam  Vitals and nursing note reviewed.   Constitutional:       Appearance: Normal appearance.   HENT:      Head: Normocephalic and atraumatic.      Mouth/Throat:      Mouth: Mucous membranes are moist.   Eyes:      Conjunctiva/sclera: Conjunctivae normal.   Cardiovascular:      Rate and Rhythm: Normal rate and regular rhythm.      Heart sounds: Normal heart sounds.   Pulmonary:      Effort: Pulmonary effort is normal.      Breath sounds: Normal breath sounds.   Abdominal:      General: Abdomen is flat.   Skin:     General: Skin is warm and dry.      Comments: No rash over her left posterior back and side area where she is having the discomfort.  It is not significantly tender to palpation.   Neurological:      Mental Status: She is alert and oriented to person, place, and time.   Psychiatric:         Mood and Affect: Mood normal.         ED Course                 Procedures                  Results for orders placed or performed during the hospital encounter of 01/15/24 (from the past 24 hour(s))   CBC with platelets differential    Narrative    The following orders were created for panel order CBC with platelets differential.  Procedure                               Abnormality         Status                     ---------                               -----------         ------                     CBC with platelets and d...[[id number]]  Abnormal            Final result                 Please view results for these tests on the individual orders.   Comprehensive metabolic panel   Result Value Ref Range    Sodium 142 135 - 145 mmol/L    Potassium 3.9 3.4 - 5.3 mmol/L    Carbon Dioxide (CO2) 28 22 - 29 mmol/L    Anion Gap 8 7 - 15 mmol/L    Urea Nitrogen 11.0 8.0 - 23.0 mg/dL    Creatinine 0.75 0.51 - 0.95 mg/dL    GFR Estimate 81 >60 mL/min/1.73m2    Calcium 9.1 8.8 - 10.2 mg/dL    Chloride 106 98 - 107 mmol/L    Glucose 85 " 70 - 99 mg/dL    Alkaline Phosphatase 65 40 - 150 U/L    AST 20 0 - 45 U/L    ALT 9 0 - 50 U/L    Protein Total 7.0 6.4 - 8.3 g/dL    Albumin 3.8 3.5 - 5.2 g/dL    Bilirubin Total 0.3 <=1.2 mg/dL   CBC with platelets and differential   Result Value Ref Range    WBC Count 4.8 4.0 - 11.0 10e3/uL    RBC Count 3.03 (L) 3.80 - 5.20 10e6/uL    Hemoglobin 8.2 (L) 11.7 - 15.7 g/dL    Hematocrit 25.2 (L) 35.0 - 47.0 %    MCV 83 78 - 100 fL    MCH 27.1 26.5 - 33.0 pg    MCHC 32.5 31.5 - 36.5 g/dL    RDW 15.6 (H) 10.0 - 15.0 %    Platelet Count 255 150 - 450 10e3/uL    % Neutrophils 55 %    % Lymphocytes 29 %    % Monocytes 11 %    % Eosinophils 4 %    % Basophils 1 %    % Immature Granulocytes 0 %    NRBCs per 100 WBC 0 <1 /100    Absolute Neutrophils 2.6 1.6 - 8.3 10e3/uL    Absolute Lymphocytes 1.4 0.8 - 5.3 10e3/uL    Absolute Monocytes 0.5 0.0 - 1.3 10e3/uL    Absolute Eosinophils 0.2 0.0 - 0.7 10e3/uL    Absolute Basophils 0.0 0.0 - 0.2 10e3/uL    Absolute Immature Granulocytes 0.0 <=0.4 10e3/uL    Absolute NRBCs 0.0 10e3/uL   UA with Microscopic reflex to Culture    Specimen: Urine, Clean Catch   Result Value Ref Range    Color Urine Yellow Colorless, Straw, Light Yellow, Yellow    Appearance Urine Clear Clear    Glucose Urine Negative Negative mg/dL    Bilirubin Urine Negative Negative    Ketones Urine Negative Negative mg/dL    Specific Gravity Urine 1.006 1.000 - 1.030    Blood Urine Small (A) Negative    pH Urine 7.0 5.0 - 9.0    Protein Albumin Urine Negative Negative mg/dL    Urobilinogen Urine Normal Normal, 2.0 mg/dL    Nitrite Urine Negative Negative    Leukocyte Esterase Urine Negative Negative    Bacteria Urine Few (A) None Seen /HPF    Mucus Urine Present (A) None Seen /LPF    RBC Urine 7 (H) <=2 /HPF    WBC Urine <1 <=5 /HPF    Narrative    Urine Culture not indicated       Medications - No data to display    Assessments & Plan (with Medical Decision Making)     I have reviewed the nursing notes.    I have  reviewed the findings, diagnosis, plan and need for follow up with the patient.  Patient's labs are reviewed.  Hemoglobin has gone down from 9 to 8.2 in the last 3 days.  She denies any black bloody or tarry stools.  Her biggest concern was that of the stabbing back pain.  Which she describes certainly does sound like neuropathic type pain with pulsating stabbing pains that does not seem to be related to position or movement.  She says they are improving since she arrived.  She says in the past she has treated these with the acyclovir given her history of shingles.  Will have her continue the prescription that she started for this.  She can take Tylenol as needed as well.  She does not wish anything stronger.  Will have her follow-up in clinic as planned with oncology and primary care and cardiology.  Return here if worse.        New Prescriptions    No medications on file       Final diagnoses:   Acute back pain, unspecified back location, unspecified back pain laterality   History of shingles       1/15/2024   [facility] AND [facility]       Atif Jaime MD  01/15/24 5696

## 2024-01-15 NOTE — TELEPHONE ENCOUNTER
S-(situation): Daughter calling, spoke with patient this am and has concerns.    B-(background): Patient has francisco seen in clinic recently. Told she needs a heart valve replacement, sees oncology, pain has increased, weakness per daughter report as well as urinating hourly. Has history of shingles.    A-(assessment): Increased pain, frequency, weakness that is interfering with daily life including ADLs.    R-(recommendations): Have mom seen in ED, either by EMS or if possible to have someone help her dress and drive her as daughter is hours away.    Maya Green RN on 1/15/2024 at 9:49 AM

## 2024-01-15 NOTE — ED TRIAGE NOTES
Pt here with daughter, pt reports increased weakness and back pain over the last couple of days, pt reports that she thinks that she has shingles on her back and she also needs a heart valve replacement, VSS, pt brought back into ER   Triage Assessment (Adult)       Row Name 01/15/24 163          Triage Assessment    Airway WDL WDL        Respiratory WDL    Respiratory WDL WDL        Skin Circulation/Temperature WDL    Skin Circulation/Temperature WDL WDL        Cardiac WDL    Cardiac WDL WDL        Peripheral/Neurovascular WDL    Peripheral Neurovascular WDL WDL        Cognitive/Neuro/Behavioral WDL    Cognitive/Neuro/Behavioral WDL WDL

## 2024-01-15 NOTE — TELEPHONE ENCOUNTER
"Reason for Disposition   MODERATE weakness or fatigue from poor fluid intake with no improvement after 2 hours of rest and fluids    Additional Information   Negative: SEVERE difficulty breathing (e.g., struggling for each breath, speaks in single words)   Negative: Shock suspected (e.g., cold/pale/clammy skin, too weak to stand, low BP, rapid pulse)   Negative: Difficult to awaken or acting confused (e.g., disoriented, slurred speech)   Negative: Fainted > 15 minutes ago and still feels too weak or dizzy to stand   Negative: SEVERE weakness (i.e., unable to walk or barely able to walk, requires support) and new-onset or worsening   Negative: Sounds like a life-threatening emergency to the triager   Negative: Weakness of the face, arm or leg on one side of the body   Negative: Has diabetes mellitus and weakness from low blood sugar (i.e., < 60 mg/dL or 3.5 mmol/L)   Negative: Recent heat exposure, suspected cause of weakness   Negative: Vomiting is main symptom   Negative: Diarrhea is main symptom   Negative: Difficulty breathing   Negative: Heart beating < 50 beats per minute OR > 140 beats per minute   Negative: Extra heartbeats, irregular heart beating, or heart is beating very fast (i.e., 'palpitations')   Negative: Follows large amount of bleeding (e.g., from vomiting, rectum, vagina) (Exception: Small transient weakness from sight of a small amount blood.)   Negative: Black or tarry bowel movements    Answer Assessment - Initial Assessment Questions  1. DESCRIPTION: \"Describe how you are feeling.\"      Per daughter report- extreme weakness  2. SEVERITY: \"How bad is it?\"  \"Can you stand and walk?\"    - MILD (0-3): Feels weak or tired, but does not interfere with work, school or normal activities.    - MODERATE (4-7): Able to stand and walk; weakness interferes with work, school, or normal activities.    - SEVERE (8-10): Unable to stand or walk; unable to do usual activities.      Moderate to severe  3. ONSET: " "\"When did these symptoms begin?\" (e.g., hours, days, weeks, months)        4. CAUSE: \"What do you think is causing the weakness or fatigue?\" (e.g., not drinking enough fluids, medical problem, trouble sleeping)      Has heart issues  5. NEW MEDICINES:  \"Have you started on any new medicines recently?\" (e.g., opioid pain medicines, benzodiazepines, muscle relaxants, antidepressants, antihistamines, neuroleptics, beta blockers)      no  6. OTHER SYMPTOMS: \"Do you have any other symptoms?\" (e.g., chest pain, fever, cough, SOB, vomiting, diarrhea, bleeding, other areas of pain)      Pain, increased urination  7. PREGNANCY: \"Is there any chance you are pregnant?\" \"When was your last menstrual period?\"      no    Protocols used: Weakness (Generalized) and Fatigue-A-OH    "

## 2024-01-16 LAB — PATH REPORT.FINAL DX SPEC: NORMAL

## 2024-01-16 NOTE — DISCHARGE INSTRUCTIONS
Continue to take your acyclovir as prescribed.  You could also try some Tylenol if needed little something extra.  Follow-up in clinic with Dr. Hansen and your primary provider as planned.  Return here if worse.

## 2024-01-17 ENCOUNTER — NURSE TRIAGE (OUTPATIENT)
Dept: FAMILY MEDICINE | Facility: OTHER | Age: 79
End: 2024-01-17
Payer: MEDICARE

## 2024-01-17 ENCOUNTER — APPOINTMENT (OUTPATIENT)
Dept: CT IMAGING | Facility: OTHER | Age: 79
End: 2024-01-17
Attending: PHYSICIAN ASSISTANT
Payer: MEDICARE

## 2024-01-17 ENCOUNTER — HOSPITAL ENCOUNTER (EMERGENCY)
Facility: OTHER | Age: 79
Discharge: HOME | End: 2024-01-17
Attending: PHYSICIAN ASSISTANT | Admitting: PHYSICIAN ASSISTANT
Payer: MEDICARE

## 2024-01-17 ENCOUNTER — OFFICE VISIT (OUTPATIENT)
Dept: FAMILY MEDICINE | Facility: OTHER | Age: 79
End: 2024-01-17
Attending: NURSE PRACTITIONER
Payer: MEDICARE

## 2024-01-17 VITALS
TEMPERATURE: 97.6 F | DIASTOLIC BLOOD PRESSURE: 67 MMHG | BODY MASS INDEX: 16.13 KG/M2 | HEIGHT: 59 IN | RESPIRATION RATE: 18 BRPM | WEIGHT: 80 LBS | OXYGEN SATURATION: 96 % | HEART RATE: 91 BPM | SYSTOLIC BLOOD PRESSURE: 144 MMHG

## 2024-01-17 DIAGNOSIS — M25.512 LEFT SHOULDER PAIN: ICD-10-CM

## 2024-01-17 DIAGNOSIS — R06.02 SHORTNESS OF BREATH: ICD-10-CM

## 2024-01-17 DIAGNOSIS — S29.012A STRAIN OF LEFT RHOMBOID MUSCLE: ICD-10-CM

## 2024-01-17 DIAGNOSIS — M25.519 SHOULDER PAIN, UNSPECIFIED CHRONICITY, UNSPECIFIED LATERALITY: Primary | ICD-10-CM

## 2024-01-17 LAB
ALBUMIN SERPL BCG-MCNC: 4.5 G/DL (ref 3.5–5.2)
ALP SERPL-CCNC: 75 U/L (ref 40–150)
ALT SERPL W P-5'-P-CCNC: 11 U/L (ref 0–50)
ANION GAP SERPL CALCULATED.3IONS-SCNC: 10 MMOL/L (ref 7–15)
AST SERPL W P-5'-P-CCNC: 24 U/L (ref 0–45)
BASOPHILS # BLD AUTO: 0 10E3/UL (ref 0–0.2)
BASOPHILS NFR BLD AUTO: 1 %
BILIRUB DIRECT SERPL-MCNC: <0.2 MG/DL (ref 0–0.3)
BILIRUB SERPL-MCNC: 0.5 MG/DL
BUN SERPL-MCNC: 16.8 MG/DL (ref 8–23)
CALCIUM SERPL-MCNC: 9.7 MG/DL (ref 8.8–10.2)
CHLORIDE SERPL-SCNC: 101 MMOL/L (ref 98–107)
CREAT SERPL-MCNC: 0.85 MG/DL (ref 0.51–0.95)
DEPRECATED HCO3 PLAS-SCNC: 29 MMOL/L (ref 22–29)
EGFRCR SERPLBLD CKD-EPI 2021: 70 ML/MIN/1.73M2
EOSINOPHIL # BLD AUTO: 0.2 10E3/UL (ref 0–0.7)
EOSINOPHIL NFR BLD AUTO: 3 %
ERYTHROCYTE [DISTWIDTH] IN BLOOD BY AUTOMATED COUNT: 16.1 % (ref 10–15)
GLUCOSE SERPL-MCNC: 89 MG/DL (ref 70–99)
HCT VFR BLD AUTO: 30.9 % (ref 35–47)
HGB BLD-MCNC: 9.9 G/DL (ref 11.7–15.7)
HOLD SPECIMEN: NORMAL
IMM GRANULOCYTES # BLD: 0 10E3/UL
IMM GRANULOCYTES NFR BLD: 0 %
LYMPHOCYTES # BLD AUTO: 1.2 10E3/UL (ref 0.8–5.3)
LYMPHOCYTES NFR BLD AUTO: 19 %
MCH RBC QN AUTO: 27 PG (ref 26.5–33)
MCHC RBC AUTO-ENTMCNC: 32 G/DL (ref 31.5–36.5)
MCV RBC AUTO: 84 FL (ref 78–100)
MONOCYTES # BLD AUTO: 0.4 10E3/UL (ref 0–1.3)
MONOCYTES NFR BLD AUTO: 7 %
NEUTROPHILS # BLD AUTO: 4.4 10E3/UL (ref 1.6–8.3)
NEUTROPHILS NFR BLD AUTO: 70 %
NRBC # BLD AUTO: 0 10E3/UL
NRBC BLD AUTO-RTO: 0 /100
PLATELET # BLD AUTO: 341 10E3/UL (ref 150–450)
POTASSIUM SERPL-SCNC: 3.8 MMOL/L (ref 3.4–5.3)
PROT SERPL-MCNC: 8.4 G/DL (ref 6.4–8.3)
RBC # BLD AUTO: 3.66 10E6/UL (ref 3.8–5.2)
SODIUM SERPL-SCNC: 140 MMOL/L (ref 135–145)
TROPONIN T SERPL HS-MCNC: 20 NG/L
TROPONIN T SERPL HS-MCNC: 22 NG/L
WBC # BLD AUTO: 6.2 10E3/UL (ref 4–11)

## 2024-01-17 PROCEDURE — 84484 ASSAY OF TROPONIN QUANT: CPT | Mod: 91 | Performed by: PHYSICIAN ASSISTANT

## 2024-01-17 PROCEDURE — 36415 COLL VENOUS BLD VENIPUNCTURE: CPT | Performed by: PHYSICIAN ASSISTANT

## 2024-01-17 PROCEDURE — 93005 ELECTROCARDIOGRAM TRACING: CPT | Performed by: PHYSICIAN ASSISTANT

## 2024-01-17 PROCEDURE — 80048 BASIC METABOLIC PNL TOTAL CA: CPT | Performed by: PHYSICIAN ASSISTANT

## 2024-01-17 PROCEDURE — 99283 EMERGENCY DEPT VISIT LOW MDM: CPT | Performed by: PHYSICIAN ASSISTANT

## 2024-01-17 PROCEDURE — 85025 COMPLETE CBC W/AUTO DIFF WBC: CPT | Performed by: PHYSICIAN ASSISTANT

## 2024-01-17 PROCEDURE — 99207 PR NO CHARGE LOS: CPT | Performed by: REGISTERED NURSE

## 2024-01-17 PROCEDURE — 250N000011 HC RX IP 250 OP 636: Performed by: PHYSICIAN ASSISTANT

## 2024-01-17 PROCEDURE — 80053 COMPREHEN METABOLIC PANEL: CPT | Performed by: PHYSICIAN ASSISTANT

## 2024-01-17 PROCEDURE — G1010 CDSM STANSON: HCPCS

## 2024-01-17 PROCEDURE — 99285 EMERGENCY DEPT VISIT HI MDM: CPT | Mod: 25 | Performed by: PHYSICIAN ASSISTANT

## 2024-01-17 PROCEDURE — 93010 ELECTROCARDIOGRAM REPORT: CPT | Performed by: STUDENT IN AN ORGANIZED HEALTH CARE EDUCATION/TRAINING PROGRAM

## 2024-01-17 PROCEDURE — 250N000013 HC RX MED GY IP 250 OP 250 PS 637: Performed by: PHYSICIAN ASSISTANT

## 2024-01-17 RX ORDER — IOPAMIDOL 755 MG/ML
51 INJECTION, SOLUTION INTRAVASCULAR ONCE
Status: COMPLETED | OUTPATIENT
Start: 2024-01-17 | End: 2024-01-17

## 2024-01-17 RX ORDER — LIDOCAINE 4 G/G
1 PATCH TOPICAL ONCE
Status: DISCONTINUED | OUTPATIENT
Start: 2024-01-17 | End: 2024-01-17 | Stop reason: HOSPADM

## 2024-01-17 RX ADMIN — IOPAMIDOL 51 ML: 755 INJECTION, SOLUTION INTRAVENOUS at 14:42

## 2024-01-17 RX ADMIN — LIDOCAINE 1 PATCH: 4 PATCH TOPICAL at 16:19

## 2024-01-17 ASSESSMENT — ENCOUNTER SYMPTOMS
FEVER: 0
COUGH: 0
CHILLS: 0
ABDOMINAL PAIN: 0
SHORTNESS OF BREATH: 0
HEMATURIA: 0

## 2024-01-17 ASSESSMENT — ACTIVITIES OF DAILY LIVING (ADL): ADLS_ACUITY_SCORE: 35

## 2024-01-17 NOTE — TELEPHONE ENCOUNTER
Call from patient, verified name and date of birth.    She states she was in the ER on 1/15 for shoulder pain.   She also thinks she had shingles at that time. Started acyclovir (ZOVIRAX) 800 MG tablet on 1/14. She notes no rash, just stabbing pain in her left shoulder.  She now notes a constant pain in her left shoulder, 7/10. Describes it as a sharp pain. Feels she may be having muscle spasms.     Patient declined an x-ray during ER visit. Patient reports she notes she has chronic shortness of breath. Does not feel this is worsening.  Denies chest pain. No cough. No fever.    Being shoulder pain is her only symptom, advised to be seen in the walk in clinic. Patient in agreement with this plan.  Zarina Frias RN on 1/17/2024 at 11:52 AM       Reason for Disposition   [1] MODERATE pain (e.g., interferes with normal activities) AND [2] present > 3 days    Protocols used: Shoulder Pain-A-

## 2024-01-17 NOTE — TELEPHONE ENCOUNTER
Please call the patient.  She has pain in left shoulder.  She states this is not her shingles.  She is questioning her lungs

## 2024-01-17 NOTE — PROGRESS NOTES
1.  Has the patient had a previous reaction to IV contrast? No    2.  Does the patient have kidney disease? No    3.  Is the patient on dialysis? No    If YES to any of these questions, exam will be reviewed with a Radiologist before administering contrast.       IV Contrast- Discharge Instructions After Your CT Scan      The IV contrast you received today will be filtered from your bloodstream by your kidneys during the next 24 hours and pass from the body in urine.  You will not be aware of this process and your urine will not change in color.  To help this process you should drink at least 4 additional glasses of water or juice today.  This reduces stress on your kidneys.    Most contrast reactions are immediate.  Should you develop symptoms of concern after discharge, contact the department at the number below.  After hours you should contact your personal physician.  If you develop breathing distress or wheezing, call 911.

## 2024-01-17 NOTE — PROGRESS NOTES
Patient not seen by rapid clinic provider due to concerns of htn, ongoing back pain shortness of breath. Seen in ER 2 days ago. Please disregard this encounter. Per chart review would be best evaluated in ED today.   VEENA Tapia CNP on 1/17/2024 at 1:01 PM

## 2024-01-17 NOTE — ED TRIAGE NOTES
"Patient brought over from  for left shoulder pain, SOB and hypertension.  Patient is suppose to have a valve replacement in the future.  Patient states she always is SOB due to the needing a valve replacement and patient also states she was here on Monday for shoulder pain as well.  Patient thought she had shingles and started taking medication for this when the shoulder pain began on Sunday.    BP (!) 182/74   Pulse 88   Temp 97.6  F (36.4  C) (Tympanic)   Resp 18   Ht 1.499 m (4' 11\")   Wt 36.3 kg (80 lb)   SpO2 97%   BMI 16.16 kg/m         Triage Assessment (Adult)       Row Name 01/17/24 1309          Triage Assessment    Airway WDL WDL        Respiratory WDL    Respiratory WDL X  SOB, chronic        Skin Circulation/Temperature WDL    Skin Circulation/Temperature WDL WDL        Cardiac WDL    Cardiac WDL WDL        Peripheral/Neurovascular WDL    Peripheral Neurovascular WDL WDL        Cognitive/Neuro/Behavioral WDL    Cognitive/Neuro/Behavioral WDL WDL                     "

## 2024-01-17 NOTE — DISCHARGE INSTRUCTIONS
Get plenty of fluids and rest.  As discussed, we obtained quite a few studies looking at your heart and lungs, lab work, major vessels such as the aorta.  All of those studies appeared very well and very stable for you today with no new concerning findings.  Therefore, your presentation today seems most consistent with a left rhomboid muscle strain.  I would recommend that she continue with over-the-counter lidocaine patches, you can also alternate ice and heat.  You can continue to take regularly scheduled Tylenol throughout the day to help with discomfort.  I would expect your symptoms to gradually improve over the coming days.  However, if they continue to worsen or you are developing fevers or shortness of breath or chest pain then you should return for further evaluation.  Otherwise, continue to follow-up with PCP as needed.

## 2024-01-17 NOTE — PROGRESS NOTES
Loulou Lucero  1945    ASSESSMENT/PLAN:   There are no diagnoses linked to this encounter.    Patient agrees with plan of care and verbalizes understating. AVS printed. Patient education provided verbally and written instructions provided as requested. Patient made aware of emergent signs and symptoms to monitor for and when to seek additional care/follow up.     SUBJECTIVE:   CHIEF COMPLAINT/ REASON FOR VISIT  No chief complaint on file.     HISTORY OF PRESENT ILLNESS  Loulou Lucero is a pleasant 78 year old female presents to rapid clinic today  History provided by ***    I have reviewed the nursing notes.  I have reviewed allergies, medication list, problem list, and past medical history.    REVIEW OF SYSTEMS  Review of Systems     VITAL SIGNS  There were no vitals filed for this visit.   There is no height or weight on file to calculate BMI.    OBJECTIVE:   PHYSICAL EXAM  Physical Exam     DIAGNOSTICS  No results found for any visits on 01/17/24.     VEENA Tapia [facility]

## 2024-01-20 LAB
ATRIAL RATE - MUSE: 77 BPM
DIASTOLIC BLOOD PRESSURE - MUSE: NORMAL MMHG
INTERPRETATION ECG - MUSE: NORMAL
P AXIS - MUSE: 83 DEGREES
PR INTERVAL - MUSE: 138 MS
QRS DURATION - MUSE: 84 MS
QT - MUSE: 390 MS
QTC - MUSE: 441 MS
R AXIS - MUSE: 68 DEGREES
SYSTOLIC BLOOD PRESSURE - MUSE: NORMAL MMHG
T AXIS - MUSE: 56 DEGREES
VENTRICULAR RATE- MUSE: 77 BPM

## 2024-01-23 ENCOUNTER — ONCOLOGY VISIT (OUTPATIENT)
Dept: ONCOLOGY | Facility: OTHER | Age: 79
End: 2024-01-23
Attending: INTERNAL MEDICINE
Payer: MEDICARE

## 2024-01-23 VITALS
TEMPERATURE: 97 F | HEART RATE: 84 BPM | BODY MASS INDEX: 15.75 KG/M2 | SYSTOLIC BLOOD PRESSURE: 152 MMHG | DIASTOLIC BLOOD PRESSURE: 62 MMHG | RESPIRATION RATE: 16 BRPM | OXYGEN SATURATION: 98 % | WEIGHT: 78 LBS

## 2024-01-23 DIAGNOSIS — D50.8 OTHER IRON DEFICIENCY ANEMIA: Primary | ICD-10-CM

## 2024-01-23 PROBLEM — D50.9 IRON DEFICIENCY ANEMIA: Status: ACTIVE | Noted: 2024-01-23

## 2024-01-23 PROCEDURE — 99417 PROLNG OP E/M EACH 15 MIN: CPT | Performed by: INTERNAL MEDICINE

## 2024-01-23 PROCEDURE — 99215 OFFICE O/P EST HI 40 MIN: CPT | Performed by: INTERNAL MEDICINE

## 2024-01-23 PROCEDURE — G0463 HOSPITAL OUTPT CLINIC VISIT: HCPCS

## 2024-01-23 ASSESSMENT — PAIN SCALES - GENERAL: PAINLEVEL: SEVERE PAIN (7)

## 2024-01-23 NOTE — PATIENT INSTRUCTIONS
IV feraheme ASAP      Follow up with Reynaldo Hansen MD 3-4 weeks after last infusion with lab week prior    Please call daughter caryl to schedule   [phone]

## 2024-01-23 NOTE — NURSING NOTE
"Oncology Rooming Note    January 23, 2024 11:39 AM   Loulou Lucero is a 78 year old female who presents for:    Chief Complaint   Patient presents with    Oncology Clinic Visit     [facility]US     Initial Vitals: BP (!) 152/62 (BP Location: Right arm, Patient Position: Sitting, Cuff Size: Adult Regular)   Pulse 84   Temp 97  F (36.1  C) (Tympanic)   Resp 16   Wt 35.4 kg (78 lb)   SpO2 98%   BMI 15.75 kg/m   Estimated body mass index is 15.75 kg/m  as calculated from the following:    Height as of 1/17/24: 1.499 m (4' 11\").    Weight as of this encounter: 35.4 kg (78 lb). Body surface area is 1.21 meters squared.  Severe Pain (7) Comment: Data Unavailable   No LMP recorded. Patient is postmenopausal.  Allergies reviewed: Yes  Medications reviewed: Yes    Medications: Medication refills not needed today.  Pharmacy name entered into EPIC:     [facility] [address] [place] [address] [facility] - [address]    Frailty Screening:   Is the patient here for a new oncology consult visit in cancer care? 2. No      Clinical concerns:   Dr Hansen was NOT notified.      Giulia Lizama LPN              "

## 2024-01-24 NOTE — PROGRESS NOTES
[facility] Progress Note    Patient: Loulou Lucero  MRN: [id number]  Date of Service: Jan 23, 2024         Reason for Visit    Chief Complaint   Patient presents with    Oncology Clinic Visit     MGUS       ECOG Performance Status  2      Encounter Diagnoses:    Iron deficiency anemia  MGUS  History of left lower lobe lung nodule/mass    History of Present Illness    Ms. Loulou Lucero returns for follow-up of MGUS and history of left lower lobe lung mass.  For details of history see previous notesA CT chest that was performed on March 21, 2023 revealed that the previous left lower lobe lung nodule had resolved.  There was evidence of COPD.  She was mildly anemic with hemoglobin 11.6 further workup revealed that she had elevated kappa lambda ratio.  To rule out endorgan damage by obtaining a PET scan is performed November 15 and there was no evidence of hypermetabolic disease suggest myeloma there was no evidence of lung nodules.  There was a hypermetabolic focus in the left ventricle and recommend the patient undergo echocardiogram.  Echocardiogram the findings with the patient had critical aortic stenosis.  And since then she been eval by cardiology with plans to proceed with a TAVR procedure.  In general patient is noted to be anemic is here for evaluation.  She has ongoing symptoms related to aortic stenosis with chronic shortness of breath and occasional episodes of anginal chest pain.  Recent lab work was consistent with iron deficiency anemia.Peripheral blood smear was consistent with moderate normocytic anemia.  Her ferritin is low at 16 and iron is low as well as iron percent saturation.  Her last hemoglobin was 8.2.  The patient denies any bright red blood per rectum hematemesis or melena but does have chronic fatigue.  She continues to smoke at least a pack per day.  She has had a chronic history of weight loss over the past 2  years.      ______________________________________________________________________________    Past History    Past Medical History:   Diagnosis Date    Asymptomatic varicose veins of lower extremity     6/12/2012    Benign paroxysmal vertigo     12/29/2010    Chronic obstructive pulmonary disease (H)     12/29/2010    Diarrhea     10/1/2015    Disorder of cartilage     Hip; Last dxa 06/2010    Diverticulosis of large intestine without perforation or abscess without bleeding          Lower abdominal pain     10/1/2015    Other disorders of lung (CODE)     Stable since July of 2002. RU lobe.    Personal history of other medical treatment (CODE)     L3, L4-4; Last MRI 7/09/12    Personal history of other medical treatment (CODE)     G-3, P-2, A-0  (Son had SIDS)    Zoster without complications     3/31/2012       Past Surgical History:   Procedure Laterality Date    COLONOSCOPY  03/22/2010    Normal; + family hx, next due 2015    COLONOSCOPY  10/01/2015    W/ POLYPECTOMY recommend follow up in 2020.    COLONOSCOPY N/A 11/7/2019    4 tubular adenoma, 3 year follow up    ESOPHAGOSCOPY, GASTROSCOPY, DUODENOSCOPY (EGD), COMBINED  04/23/2014    Arce's esophagus fu egd 2 yrs    ESOPHAGOSCOPY, GASTROSCOPY, DUODENOSCOPY (EGD), COMBINED N/A 11/7/2019    Procedure: ESOPHAGOGASTRODUODENOSCOPY, WITH BIOPSY;  Surgeon: Santosh Barrera MD;  Location: [facility] OR    LAPAROSCOPIC TUBAL LIGATION  1978         TONSILLECTOMY & ADENOIDECTOMY  1951                Review of systems.  CNS: There are no headaches, no blurred vision, no change in mental status,   ENT: There is no hearing loss.  Respiratory: There is chronic dyspnea at rest and exertion, has occasional smoker's cough hemoptysis.now   Cardiac: There is no chest pain, orthopnea, PND, or ankle edema.  GI: There is no bright red blood per rectum, no hematemesis, no reflux, no diarrhea or constipation  Musculoskeletal: There is bilateral lower extremity weakness there is significant  fatigue  : There is no urinary frequency, hematuria.  Constitutional: There is no fevers, night sweats, weight loss.  Endocrine: There is significant fatigue  Neuro: There is no tingling or numbness in the hands or feet.  Hematologic: There is no gingival bleeding, epistaxis, or easy bruisability.  Dermatologic: There is no skin rash.  A 14 point review of systems is otherwise negative.          Physical Exam    BP (!) 152/62 (BP Location: Right arm, Patient Position: Sitting, Cuff Size: Adult Regular)   Pulse 84   Temp 97  F (36.1  C) (Tympanic)   Resp 16   Wt 35.4 kg (78 lb)   SpO2 98%   BMI 15.75 kg/m        GENERAL: Alert and oriented to time place and person. Seated comfortably. In no distress.    HEAD: Atraumatic and normocephalic.    EYES: PAULA, EOMI.  No pallor.  No icterus.    Oral cavity: no mucosal lesion or tonsillar enlargement.    NECK: supple. JVP normal.  No thyroid enlargement.    LYMPH NODES: There are no palpable cervical, supraclavicular, axillary, or inguinal nodes.    LUNGS: clear to auscultation bilaterally.  Resonant to percussion throughout bilaterally.  Symmetrical breath movements bilaterally.    HEART: S1 and S2 are heard.  There is a 3/6 to 4/6 systolic murmur heard best at the left sternal border  No murmur or gallop or rub heard.  No peripheral edema.    ABDOMEN: Soft. Not tender. Not distended.  No palpable hepatomegaly or splenomegaly.  No other mass palpable.  Bowel sounds heard.    EXTREMITIES: There is no ankle edema.  SKIN: no rash, or bruising or purpura.    NEURO: Grossly non-focal.    Lab Results    Recent Results (from the past 240 hour(s))   Comprehensive metabolic panel    Collection Time: 01/15/24  5:20 PM   Result Value Ref Range    Sodium 142 135 - 145 mmol/L    Potassium 3.9 3.4 - 5.3 mmol/L    Carbon Dioxide (CO2) 28 22 - 29 mmol/L    Anion Gap 8 7 - 15 mmol/L    Urea Nitrogen 11.0 8.0 - 23.0 mg/dL    Creatinine 0.75 0.51 - 0.95 mg/dL    GFR Estimate 81 >60  mL/min/1.73m2    Calcium 9.1 8.8 - 10.2 mg/dL    Chloride 106 98 - 107 mmol/L    Glucose 85 70 - 99 mg/dL    Alkaline Phosphatase 65 40 - 150 U/L    AST 20 0 - 45 U/L    ALT 9 0 - 50 U/L    Protein Total 7.0 6.4 - 8.3 g/dL    Albumin 3.8 3.5 - 5.2 g/dL    Bilirubin Total 0.3 <=1.2 mg/dL   CBC with platelets and differential    Collection Time: 01/15/24  5:20 PM   Result Value Ref Range    WBC Count 4.8 4.0 - 11.0 10e3/uL    RBC Count 3.03 (L) 3.80 - 5.20 10e6/uL    Hemoglobin 8.2 (L) 11.7 - 15.7 g/dL    Hematocrit 25.2 (L) 35.0 - 47.0 %    MCV 83 78 - 100 fL    MCH 27.1 26.5 - 33.0 pg    MCHC 32.5 31.5 - 36.5 g/dL    RDW 15.6 (H) 10.0 - 15.0 %    Platelet Count 255 150 - 450 10e3/uL    % Neutrophils 55 %    % Lymphocytes 29 %    % Monocytes 11 %    % Eosinophils 4 %    % Basophils 1 %    % Immature Granulocytes 0 %    NRBCs per 100 WBC 0 <1 /100    Absolute Neutrophils 2.6 1.6 - 8.3 10e3/uL    Absolute Lymphocytes 1.4 0.8 - 5.3 10e3/uL    Absolute Monocytes 0.5 0.0 - 1.3 10e3/uL    Absolute Eosinophils 0.2 0.0 - 0.7 10e3/uL    Absolute Basophils 0.0 0.0 - 0.2 10e3/uL    Absolute Immature Granulocytes 0.0 <=0.4 10e3/uL    Absolute NRBCs 0.0 10e3/uL   UA with Microscopic reflex to Culture    Collection Time: 01/15/24  5:45 PM    Specimen: Urine, Clean Catch   Result Value Ref Range    Color Urine Yellow Colorless, Straw, Light Yellow, Yellow    Appearance Urine Clear Clear    Glucose Urine Negative Negative mg/dL    Bilirubin Urine Negative Negative    Ketones Urine Negative Negative mg/dL    Specific Gravity Urine 1.006 1.000 - 1.030    Blood Urine Small (A) Negative    pH Urine 7.0 5.0 - 9.0    Protein Albumin Urine Negative Negative mg/dL    Urobilinogen Urine Normal Normal, 2.0 mg/dL    Nitrite Urine Negative Negative    Leukocyte Esterase Urine Negative Negative    Bacteria Urine Few (A) None Seen /HPF    Mucus Urine Present (A) None Seen /LPF    RBC Urine 7 (H) <=2 /HPF    WBC Urine <1 <=5 /HPF   Extra Blue Top  Tube    Collection Time: 01/17/24  1:29 PM   Result Value Ref Range    Hold Specimen JIC    Extra Red Top Tube    Collection Time: 01/17/24  1:29 PM   Result Value Ref Range    Hold Specimen JIC    Extra Green Top (Lithium Heparin) Tube    Collection Time: 01/17/24  1:29 PM   Result Value Ref Range    Hold Specimen JIC    Extra Purple Top Tube    Collection Time: 01/17/24  1:29 PM   Result Value Ref Range    Hold Specimen JIC    Extra Green Top (Lithium Heparin) ON ICE    Collection Time: 01/17/24  1:29 PM   Result Value Ref Range    Hold Specimen JIC    Basic metabolic panel    Collection Time: 01/17/24  1:29 PM   Result Value Ref Range    Sodium 140 135 - 145 mmol/L    Potassium 3.8 3.4 - 5.3 mmol/L    Chloride 101 98 - 107 mmol/L    Carbon Dioxide (CO2) 29 22 - 29 mmol/L    Anion Gap 10 7 - 15 mmol/L    Urea Nitrogen 16.8 8.0 - 23.0 mg/dL    Creatinine 0.85 0.51 - 0.95 mg/dL    GFR Estimate 70 >60 mL/min/1.73m2    Calcium 9.7 8.8 - 10.2 mg/dL    Glucose 89 70 - 99 mg/dL   Hepatic function panel    Collection Time: 01/17/24  1:29 PM   Result Value Ref Range    Protein Total 8.4 (H) 6.4 - 8.3 g/dL    Albumin 4.5 3.5 - 5.2 g/dL    Bilirubin Total 0.5 <=1.2 mg/dL    Alkaline Phosphatase 75 40 - 150 U/L    AST 24 0 - 45 U/L    ALT 11 0 - 50 U/L    Bilirubin Direct <0.20 0.00 - 0.30 mg/dL   Troponin T, High Sensitivity    Collection Time: 01/17/24  1:29 PM   Result Value Ref Range    Troponin T, High Sensitivity 22 (H) <=14 ng/L   CBC with platelets and differential    Collection Time: 01/17/24  1:29 PM   Result Value Ref Range    WBC Count 6.2 4.0 - 11.0 10e3/uL    RBC Count 3.66 (L) 3.80 - 5.20 10e6/uL    Hemoglobin 9.9 (L) 11.7 - 15.7 g/dL    Hematocrit 30.9 (L) 35.0 - 47.0 %    MCV 84 78 - 100 fL    MCH 27.0 26.5 - 33.0 pg    MCHC 32.0 31.5 - 36.5 g/dL    RDW 16.1 (H) 10.0 - 15.0 %    Platelet Count 341 150 - 450 10e3/uL    % Neutrophils 70 %    % Lymphocytes 19 %    % Monocytes 7 %    % Eosinophils 3 %    %  Basophils 1 %    % Immature Granulocytes 0 %    NRBCs per 100 WBC 0 <1 /100    Absolute Neutrophils 4.4 1.6 - 8.3 10e3/uL    Absolute Lymphocytes 1.2 0.8 - 5.3 10e3/uL    Absolute Monocytes 0.4 0.0 - 1.3 10e3/uL    Absolute Eosinophils 0.2 0.0 - 0.7 10e3/uL    Absolute Basophils 0.0 0.0 - 0.2 10e3/uL    Absolute Immature Granulocytes 0.0 <=0.4 10e3/uL    Absolute NRBCs 0.0 10e3/uL   EKG 12 lead    Collection Time: 01/17/24  1:34 PM   Result Value Ref Range    Systolic Blood Pressure  mmHg    Diastolic Blood Pressure  mmHg    Ventricular Rate 77 BPM    Atrial Rate 77 BPM    IN Interval 138 ms    QRS Duration 84 ms     ms    QTc 441 ms    P Axis 83 degrees    R AXIS 68 degrees    T Axis 56 degrees    Interpretation ECG       Sinus rhythm  Left ventricular hypertrophy with repolarization abnormality  Cannot rule out Septal infarct , age undetermined  Abnormal ECG  When compared with ECG of 17-JUL-2023 14:36,  Minimal criteria for Septal infarct are now Present  Confirmed by Mik Espana ([id number]) on 1/20/2024 12:54:12 PM     Troponin T, High Sensitivity    Collection Time: 01/17/24  3:25 PM   Result Value Ref Range    Troponin T, High Sensitivity 20 (H) <=14 ng/L       Imaging    CT Chest Pulmonary Embolism w Contrast    Result Date: 1/17/2024  PROCEDURE: CT CHEST PULMONARY EMBOLISM W CONTRAST 1/17/2024 2:50 PM HISTORY: pain in between shoulder blades, sob. Aorta? lungs? PE? left scapula? COMPARISONS: 3/21/2023 and 11/21/2022. TECHNIQUE: Axial postcontrast enhanced images were obtained with coronal and sagittal MIP images. FINDINGS:There is excellent opacification of pulmonary vessels. There is no pulmonary embolus. Pulmonary arteries appear somewhat enlarged and there may be some element of pulmonary arterial hypertension.  No enlarged lymph nodes are seen in the mediastinum, michele or axilla. There are fairly severe changes of centrilobular emphysema with chronic appearing scarring, primarily in the upper lobes.  Few scattered nodules are stable compared to the prior exams. No new lung nodule or consolidation is seen. Heart is not enlarged. There is no aneurysm of the aorta. No pleural or pericardial effusion is seen. Limited images through the upper abdomen show no suspicious abnormality. There is mild degenerative change in the spine. There is no other bony abnormality.        IMPRESSION: 1. No pulmonary embolus. 2. Fairly severe changes of emphysema without acute mass or consolidation. LINH DAVE MD   SYSTEM ID:  [id number]     Assessment and Plan: #1: New onset iron deficiency anemia unresponsive to oral iron: The plan is to proceed with IV Feraheme 510 mg IV x 2 doses.  See the patient subsequently in 3 weeks to assess response.  Patient remains anemic she will need a GI evaluation with colonoscopy/EGD  2.  Critical aortic stenosis: Patient is scheduled for TAVR in the setting of iron deficiency anemia we will attempt to correct her anemia with IV Feraheme as above.  3.  MGUS: Serum kappa lambda ratio remains stable:  4.  Tobacco abuse: Patient was encouraged to discontinue smoking.  5.  History of lung nodule: Resolved on previous CT as well as PET scan.  Time spent: 78 minutes was spent on this total patient visit: We spent time reviewing labs with the patient and her daughter, we discussed potential causes of iron deficiency anemia, we performed a history and physical, with documented history physical and ordered IV Feraheme and follow-up labs and appointments    Cancer Staging   No matching staging information was found for the patient.        Signed by: Reynaldo Hansen MD    CC: Obi Johnston MD

## 2024-01-29 ENCOUNTER — TELEPHONE (OUTPATIENT)
Dept: ONCOLOGY | Facility: OTHER | Age: 79
End: 2024-01-29
Payer: MEDICARE

## 2024-01-29 DIAGNOSIS — D50.8 OTHER IRON DEFICIENCY ANEMIA: Primary | ICD-10-CM

## 2024-01-29 RX ORDER — METHYLPREDNISOLONE SODIUM SUCCINATE 125 MG/2ML
125 INJECTION, POWDER, LYOPHILIZED, FOR SOLUTION INTRAMUSCULAR; INTRAVENOUS
Status: CANCELLED
Start: 2024-01-30

## 2024-01-29 RX ORDER — DIPHENHYDRAMINE HYDROCHLORIDE 50 MG/ML
50 INJECTION INTRAMUSCULAR; INTRAVENOUS
Status: CANCELLED
Start: 2024-01-30

## 2024-01-29 RX ORDER — MEPERIDINE HYDROCHLORIDE 25 MG/ML
25 INJECTION INTRAMUSCULAR; INTRAVENOUS; SUBCUTANEOUS EVERY 30 MIN PRN
Status: CANCELLED | OUTPATIENT
Start: 2024-01-30

## 2024-01-29 RX ORDER — ALBUTEROL SULFATE 90 UG/1
1-2 AEROSOL, METERED RESPIRATORY (INHALATION)
Status: CANCELLED
Start: 2024-01-30

## 2024-01-29 RX ORDER — EPINEPHRINE 1 MG/ML
0.3 INJECTION, SOLUTION, CONCENTRATE INTRAVENOUS EVERY 5 MIN PRN
Status: CANCELLED | OUTPATIENT
Start: 2024-01-30

## 2024-01-29 RX ORDER — ALBUTEROL SULFATE 0.83 MG/ML
2.5 SOLUTION RESPIRATORY (INHALATION)
Status: CANCELLED | OUTPATIENT
Start: 2024-01-30

## 2024-01-31 ENCOUNTER — OFFICE VISIT (OUTPATIENT)
Dept: INTERNAL MEDICINE | Facility: OTHER | Age: 79
End: 2024-01-31
Attending: NURSE PRACTITIONER
Payer: MEDICARE

## 2024-01-31 VITALS
OXYGEN SATURATION: 98 % | SYSTOLIC BLOOD PRESSURE: 162 MMHG | BODY MASS INDEX: 15.36 KG/M2 | RESPIRATION RATE: 16 BRPM | HEIGHT: 60 IN | WEIGHT: 78.23 LBS | TEMPERATURE: 97.5 F | HEART RATE: 82 BPM | DIASTOLIC BLOOD PRESSURE: 70 MMHG

## 2024-01-31 DIAGNOSIS — Z23 NEED FOR TETANUS, DIPHTHERIA, AND ACELLULAR PERTUSSIS (TDAP) VACCINE: ICD-10-CM

## 2024-01-31 DIAGNOSIS — Z12.31 ENCOUNTER FOR SCREENING MAMMOGRAM FOR BREAST CANCER: ICD-10-CM

## 2024-01-31 DIAGNOSIS — D50.8 OTHER IRON DEFICIENCY ANEMIA: ICD-10-CM

## 2024-01-31 DIAGNOSIS — D47.2 MGUS (MONOCLONAL GAMMOPATHY OF UNKNOWN SIGNIFICANCE): ICD-10-CM

## 2024-01-31 DIAGNOSIS — J43.9 PULMONARY EMPHYSEMA, UNSPECIFIED EMPHYSEMA TYPE (H): ICD-10-CM

## 2024-01-31 DIAGNOSIS — Z12.11 SPECIAL SCREENING FOR MALIGNANT NEOPLASM OF COLON: ICD-10-CM

## 2024-01-31 DIAGNOSIS — Z23 NEED FOR PROPHYLACTIC VACCINATION AND INOCULATION AGAINST INFLUENZA: ICD-10-CM

## 2024-01-31 DIAGNOSIS — S51.012A SKIN TEAR OF LEFT ELBOW WITHOUT COMPLICATION, INITIAL ENCOUNTER: ICD-10-CM

## 2024-01-31 DIAGNOSIS — Z72.0 TOBACCO ABUSE: ICD-10-CM

## 2024-01-31 DIAGNOSIS — Z00.00 MEDICARE ANNUAL WELLNESS VISIT, SUBSEQUENT: Primary | ICD-10-CM

## 2024-01-31 PROCEDURE — 99214 OFFICE O/P EST MOD 30 MIN: CPT | Mod: 25 | Performed by: NURSE PRACTITIONER

## 2024-01-31 PROCEDURE — G0439 PPPS, SUBSEQ VISIT: HCPCS | Performed by: NURSE PRACTITIONER

## 2024-01-31 PROCEDURE — G0463 HOSPITAL OUTPT CLINIC VISIT: HCPCS | Mod: 25

## 2024-01-31 PROCEDURE — 90715 TDAP VACCINE 7 YRS/> IM: CPT

## 2024-01-31 PROCEDURE — 90662 IIV NO PRSV INCREASED AG IM: CPT

## 2024-01-31 SDOH — HEALTH STABILITY: PHYSICAL HEALTH: ON AVERAGE, HOW MANY DAYS PER WEEK DO YOU ENGAGE IN MODERATE TO STRENUOUS EXERCISE (LIKE A BRISK WALK)?: 0 DAYS

## 2024-01-31 ASSESSMENT — ENCOUNTER SYMPTOMS
WOUND: 0
NECK PAIN: 1
DYSURIA: 0
HEADACHES: 1
POLYPHAGIA: 0
ADENOPATHY: 0
HEMATURIA: 0
DYSPHORIC MOOD: 0
ABDOMINAL PAIN: 0
DIFFICULTY URINATING: 0
ANAL BLEEDING: 0
SHORTNESS OF BREATH: 1
POLYDIPSIA: 0
SORE THROAT: 0
NERVOUS/ANXIOUS: 0
CONFUSION: 0
DIZZINESS: 0
TROUBLE SWALLOWING: 0
WEAKNESS: 0
UNEXPECTED WEIGHT CHANGE: 1
BLOOD IN STOOL: 0
APPETITE CHANGE: 1
LIGHT-HEADEDNESS: 0

## 2024-01-31 ASSESSMENT — PAIN SCALES - GENERAL: PAINLEVEL: NO PAIN (0)

## 2024-01-31 ASSESSMENT — SOCIAL DETERMINANTS OF HEALTH (SDOH): HOW OFTEN DO YOU GET TOGETHER WITH FRIENDS OR RELATIVES?: ONCE A WEEK

## 2024-01-31 NOTE — NURSING NOTE
Chief Complaint   Patient presents with    Medicare Visit       FOOD SECURITY SCREENING QUESTIONS  Hunger Vital Signs:  Within the past 12 months we worried whether our food would run out before we got money to buy more. Never  Within the past 12 months the food we bought just didn't last and we didn't have money to get more. Never  Allyson Castaneda LPN 1/31/2024 8:32 AM      Initial BP (!) 170/68 (BP Location: Right arm, Patient Position: Sitting, Cuff Size: Adult Regular)   Pulse 82   Temp 97.5  F (36.4  C) (Tympanic)   Resp 16   Ht 1.524 m (5')   Wt 35.5 kg (78 lb 3.7 oz)   SpO2 98%   BMI 15.28 kg/m   Estimated body mass index is 15.28 kg/m  as calculated from the following:    Height as of this encounter: 1.524 m (5').    Weight as of this encounter: 35.5 kg (78 lb 3.7 oz).  Medication Reconciliation: complete    Allyson Castaneda LPN

## 2024-01-31 NOTE — PROGRESS NOTES
Preventive Care Visit  [facility] AND [facility]  Ele Marc NP, Internal Medicine  Jan 31, 2024      ICD-10-CM    1. Medicare annual wellness visit, subsequent  Z00.00       2. Pulmonary emphysema, unspecified emphysema type (H)  J43.9       3. MGUS (monoclonal gammopathy of unknown significance)  D47.2       4. Other iron deficiency anemia  D50.8       5. Tobacco abuse  Z72.0       6. Special screening for malignant neoplasm of colon  Z12.11       7. Encounter for screening mammogram for breast cancer  Z12.31       8. Skin tear of left elbow without complication, initial encounter  S51.012A       9. Need for tetanus, diphtheria, and acellular pertussis (Tdap) vaccine  Z23 [facility] IMM - TDAP (ADACEL, BOOSTRIX)      10. Need for prophylactic vaccination and inoculation against influenza  Z23 INFLUENZA VACCINE 65+ (FLUZONE HD)         Plan:  Medicare wellness visit complete.  Follow-up annually.  Establish care visit complete, PCP changed.  Continue to follow with oncology and cardiology.  Would recommend considering panendoscopy and mammogram but will hold off until after cardiology workup for valve replacement.  Declined lab work today as she will be having lab work with cardiology in the near future.  She will be fasting for lipid panel at that time.  Not interested in tobacco cessation.  Reviewed and discussed recent blood pressure readings, elevations only recent.  Recommend continue to monitor and will discuss this with cardiology.  Skin tear at left elbow should be treated by cleansing with soap and water, rinse clear then applying Mepilex and changing every 2-3 days/week.  Monitor closely for signs and symptoms of infection and follow-up with any concerns.  Tdap and influenza vaccines provided today.  Reviewed and discussed other vaccines she would be due for in the near future.    Vel Jamil is a 78 year old, presenting for the following:  Medicare Visit        1/31/2024     8:26 AM    Additional Questions   Roomed by Allyson MURDOCK       Health Care Directive  Patient does not have a Health Care Directive or Living Will: Discussed advance care planning with patient; information given to patient to review.  HPI  She is here today for Medicare wellness visit and chronic disease management in addition to establishing here.  She currently is being followed by cardiology and oncology.  She has had significant weight loss over the last couple of years in addition to anemia.  She was seen by oncology recently.  She has an appointment with cardiology the beginning of February.  Blood pressure has been a little elevated over the last couple weeks but is usually well-controlled.  She feels that this is because she is anxious about her procedure and anxious about her appointment today needing a new provider.  She is planning to have panendoscopy after valve repair.  She also wants to hold off on mammography.  Continues to use tobacco and is not interested in tobacco cessation.  She is in need of several vaccines today.  She has a skin tear on the left forearm that occurred about 3 days ago when a piece of metal fell off of her rough and scraped her arm.  She has been keeping this clean with soap and water applying triple antibiotic ointment and a Mepilex foam.  No evidence of infection.  Last Tdap 2014.  Up-to-date on bone density scan with last showing evidence of osteopenia.  She does not want lipids checked today since she has an appoint with cardiology in the near future and she has had recent extensive blood work with oncology.            1/31/2024   General Health   How would you rate your overall physical health? (!) FAIR   Feel stress (tense, anxious, or unable to sleep) Only a little   (!) STRESS CONCERN      1/31/2024   Nutrition   Diet: Other   If other, please elaborate: iron building diet         1/31/2024   Exercise   Days per week of moderate/strenous exercise 0 days   (!) EXERCISE CONCERN       1/31/2024   Social Factors   Frequency of gathering with friends or relatives Once a week   Worry food won't last until get money to buy more No   Food not last or not have enough money for food? No   Do you have housing?  Yes   Are you worried about losing your housing? No   Lack of transportation? No   Unable to get utilities (heat,electricity)? No         1/31/2024   Fall Risk   Fallen 2 or more times in the past year? No   Trouble with walking or balance? No          1/31/2024   Activities of Daily Living- Home Safety   Needs help with the following daily activites None of the above   Safety concerns in the home None of the above         1/31/2024   Dental   Dentist two times every year? (!) NO         1/31/2024   Hearing Screening   Hearing concerns? None of the above         1/31/2024   Driving Risk Screening   Patient/family members have concerns about driving No         1/31/2024   General Alertness/Fatigue Screening   Have you been more tired than usual lately? (!) YES         1/31/2024   Urinary Incontinence Screening   Bothered by leaking urine in past 6 months No          No data to display                  Today's PHQ-2 Score:       1/31/2024     8:20 AM   PHQ-2 ( 1999 [employer])   Q1: Little interest or pleasure in doing things 0   Q2: Feeling down, depressed or hopeless 0   PHQ-2 Score 0   Q1: Little interest or pleasure in doing things Not at all   Q2: Feeling down, depressed or hopeless Not at all   PHQ-2 Score 0           1/31/2024   Substance Use   If I could quit smoking, I would Completely disagree   I want to quit somking, worry about health affects Completely disagree   Willing to make a plan to quit smoking Completely disagree   Willing to cut down before quitting Completely disagree   Alcohol more than 3/day or more than 7/wk No   Do you have a current opioid prescription? No   How severe/bad is pain from 1 to 10? 2/10   Do you use any other substances recreationally? No     Social History      Tobacco Use    Smoking status: Every Day     Packs/day: 0.50     Years: 63.00     Additional pack years: 0.00     Total pack years: 31.50     Types: Cigarettes     Start date: 1961     Passive exposure: Past    Smokeless tobacco: Never    Tobacco comments:     Passive exposure in adult home.    Vaping Use    Vaping Use: Never used   Substance Use Topics    Alcohol use: Not Currently     Comment: rarely at a wedding or special occasion    Drug use: No            No data to display                   History of abnormal Pap smear: Aged out of screening       The 10-year ASCVD risk score (Martha FERNANDEZ, et al., 2019) is: 42.3%    Values used to calculate the score:      Age: 78 years      Sex: Female      Is Non- : No      Diabetic: No      Tobacco smoker: Yes      Systolic Blood Pressure: 162 mmHg      Is BP treated: No      HDL Cholesterol: 66 mg/dL      Total Cholesterol: 216 mg/dL            Reviewed and updated as needed this visit by Provider   Tobacco     Med Hx  Surg Hx  Fam Hx            Past Medical History:   Diagnosis Date    Asymptomatic varicose veins of lower extremity     6/12/2012    Benign paroxysmal vertigo     12/29/2010    Chronic obstructive pulmonary disease (H)     12/29/2010    Diarrhea     10/1/2015    Disorder of cartilage     Hip; Last dxa 06/2010    Diverticulosis of large intestine without perforation or abscess without bleeding          Lower abdominal pain     10/1/2015    Other disorders of lung (CODE)     Stable since July of 2002. RU lobe.    Personal history of other medical treatment (CODE)     L3, L4-4; Last MRI 7/09/12    Personal history of other medical treatment (CODE)     G-3, P-2, A-0  (Son had SIDS)    Zoster without complications     3/31/2012     Past Surgical History:   Procedure Laterality Date    COLONOSCOPY  03/22/2010    Normal; + family hx, next due 2015    COLONOSCOPY  10/01/2015    W/ POLYPECTOMY recommend follow up in 2020.     COLONOSCOPY N/A 11/7/2019    4 tubular adenoma, 3 year follow up    ESOPHAGOSCOPY, GASTROSCOPY, DUODENOSCOPY (EGD), COMBINED  04/23/2014    Arce's esophagus fu egd 2 yrs    ESOPHAGOSCOPY, GASTROSCOPY, DUODENOSCOPY (EGD), COMBINED N/A 11/7/2019    Procedure: ESOPHAGOGASTRODUODENOSCOPY, WITH BIOPSY;  Surgeon: Santosh Barrera MD;  Location: [facility] OR    LAPAROSCOPIC TUBAL LIGATION  1978         TONSILLECTOMY & ADENOIDECTOMY  1951          Patient Active Problem List   Diagnosis    Advanced care planning/counseling discussion    Chronic obstructive pulmonary disease (H)    Diverticular disease of colon    Systolic murmur    Tobacco abuse    Age-related osteoporosis without current pathological fracture    GERD (gastroesophageal reflux disease)    Anemia, unspecified type    Iron deficiency anemia    MGUS (monoclonal gammopathy of unknown significance)     Past Surgical History:   Procedure Laterality Date    COLONOSCOPY  03/22/2010    Normal; + family hx, next due 2015    COLONOSCOPY  10/01/2015    W/ POLYPECTOMY recommend follow up in 2020.    COLONOSCOPY N/A 11/7/2019    4 tubular adenoma, 3 year follow up    ESOPHAGOSCOPY, GASTROSCOPY, DUODENOSCOPY (EGD), COMBINED  04/23/2014    Arce's esophagus fu egd 2 yrs    ESOPHAGOSCOPY, GASTROSCOPY, DUODENOSCOPY (EGD), COMBINED N/A 11/7/2019    Procedure: ESOPHAGOGASTRODUODENOSCOPY, WITH BIOPSY;  Surgeon: Santosh Barrera MD;  Location: [facility] OR    LAPAROSCOPIC TUBAL LIGATION  1978         TONSILLECTOMY & ADENOIDECTOMY  1951            Social History     Tobacco Use    Smoking status: Every Day     Packs/day: 0.50     Years: 63.00     Additional pack years: 0.00     Total pack years: 31.50     Types: Cigarettes     Start date: 1961     Passive exposure: Past    Smokeless tobacco: Never    Tobacco comments:     Passive exposure in adult home.    Substance Use Topics    Alcohol use: Not Currently     Comment: rarely at a wedding or special occasion     Family History   Problem  Relation Age of Onset    Colon Cancer Father         Cancer-colon    Other - See Comments Mother         Alzheimer's    Other - See Comments Maternal Grandmother         Alzheimer's    Other - See Comments Brother         Alzheimer's    Other - See Comments Brother         aneurysm and stents    Cancer Brother         Cancer,lung    Cancer Brother         Cancer,skin    Other - See Comments Brother         cirrhosis of the liver    Other - See Comments Maternal Uncle         3, Alzheimer's    Breast Cancer No family hx of         Cancer-breast         Current Outpatient Medications   Medication Sig Dispense Refill    aspirin 81 MG EC tablet Take 81 mg by mouth daily      cyanocobalamin (VITAMIN B-12) 1000 MCG tablet Take 1 tablet (1,000 mcg) by mouth daily 90 tablet 3    ferrous sulfate (FEROSUL) 325 (65 Fe) MG tablet Take 1 tablet (325 mg) by mouth 2 times daily 60 tablet 1    fluticasone-salmeterol (ADVAIR DISKUS) 250-50 MCG/ACT inhaler Inhale 1 puff into the lungs 2 times daily WIXELA-Disp as covered by Pt's insurance 3 each 2    simvastatin (ZOCOR) 20 MG tablet Take 1 tablet (20 mg) by mouth at bedtime 90 tablet 2     Allergies   Allergen Reactions    Metronidazole Nausea and Vomiting    Pneumococcal Vaccine      Other reaction(s): Edema  Arm swelling    Tramadol Visual Disturbance     Hallucinations        Current providers sharing in care for this patient include:  Patient Care Team:  Obi Johnston MD as PCP - General (Family Practice)  Obi Johnston MD as Assigned PCP  Reynaldo Hansen MD as Assigned Cancer Care Provider  Annie Ansari MD as Assigned Heart and Vascular Provider    The following health maintenance items are reviewed in Epic and correct as of today:  Health Maintenance   Topic Date Due    SPIROMETRY  Never done    COPD ACTION PLAN  Never done    Pneumococcal Vaccine: 65+ Years (1 of 2 - PCV) Never done    RSV VACCINE (Pregnancy & 60+) (1 - 1-dose 60+ series) Never done    ZOSTER  IMMUNIZATION (3 of 3) 06/21/2022    INFLUENZA VACCINE (1) 09/01/2023    COVID-19 Vaccine (6 - 2023-24 season) 09/01/2023    NICOTINE/TOBACCO CESSATION COUNSELING Q 1 YR  10/18/2023    MEDICARE ANNUAL WELLNESS VISIT  10/18/2023    DTAP/TDAP/TD IMMUNIZATION (2 - Td or Tdap) 05/18/2024    LUNG CANCER SCREENING  01/17/2025    FALL RISK ASSESSMENT  01/31/2025    DEXA  12/23/2025    GLUCOSE  01/17/2027    LIPID  10/18/2027    ADVANCE CARE PLANNING  12/29/2027    PHQ-2 (once per calendar year)  Completed    IPV IMMUNIZATION  Aged Out    HPV IMMUNIZATION  Aged Out    MENINGITIS IMMUNIZATION  Aged Out    RSV MONOCLONAL ANTIBODY  Aged Out    HEPATITIS C SCREENING  Discontinued    MAMMO SCREENING  Discontinued    COLORECTAL CANCER SCREENING  Discontinued     Review of Systems   Constitutional:  Positive for appetite change and unexpected weight change.   HENT:  Negative for ear pain, sore throat and trouble swallowing.    Eyes:  Positive for visual disturbance.        Needs cataract surgery, left eye   Respiratory:  Positive for shortness of breath.    Cardiovascular:  Negative for chest pain and leg swelling.   Gastrointestinal:  Negative for abdominal pain, anal bleeding and blood in stool.   Endocrine: Negative for polydipsia, polyphagia and polyuria.   Genitourinary:  Negative for difficulty urinating, dysuria, hematuria and vaginal bleeding.   Musculoskeletal:  Positive for neck pain. Negative for gait problem.        Left shoulder pain, relieved with heating pad   Skin:  Negative for pallor and wound.   Neurological:  Positive for headaches. Negative for dizziness, weakness and light-headedness.        Tension headaches occasionally, not new   Hematological:  Negative for adenopathy.   Psychiatric/Behavioral:  Negative for confusion and dysphoric mood. The patient is not nervous/anxious.           Objective    Exam  BP (!) 162/70   Pulse 82   Temp 97.5  F (36.4  C) (Tympanic)   Resp 16   Ht 1.524 m (5')   Wt 35.5 kg  (78 lb 3.7 oz)   SpO2 98%   BMI 15.28 kg/m     Estimated body mass index is 15.28 kg/m  as calculated from the following:    Height as of this encounter: 1.524 m (5').    Weight as of this encounter: 35.5 kg (78 lb 3.7 oz).  Physical Exam  Pleasant thin female no acute distress.  Affect normal.  Alert and oriented x 4.  Accompanied by her friend.  Skin color pink.  Sclera nonicteric.  Conjunctiva noninflamed.  Mucous membranes moist.  Neck supple without adenopathy.  No thyromegaly.  Lung fields clear to auscultation throughout.  Cardiovascular regular rate and rhythm with 3/6 systolic ejection murmur heard throughout the precordium.  Abdomen is soft and nontender.  No hepatosplenomegaly.  No masses.  No abdominal bruits.  No inguinal adenopathy.  Extremities without edema.  Gait stable.  Left forearm with a 2.5 x 0.2 cm and superficial partial-thickness with pink wound bed.  Scant drainage.  No surrounding erythema, warmth or maceration, light ecchymosis proximal.    Recent oncology and cardiology notes including a recent ED visit reviewed and discussed.  Labs and diagnostic studies in epic reviewed and discussed.        1/31/2024   Mini Cog   Clock Draw Score 2 Normal   3 Item Recall 2 objects recalled   Mini Cog Total Score 4            Signed Electronically by: Ele Marc NP

## 2024-02-01 ENCOUNTER — HOSPITAL ENCOUNTER (OUTPATIENT)
Dept: INFUSION THERAPY | Facility: OTHER | Age: 79
Discharge: HOME | End: 2024-02-01
Attending: INTERNAL MEDICINE | Admitting: INTERNAL MEDICINE
Payer: MEDICARE

## 2024-02-01 VITALS
RESPIRATION RATE: 16 BRPM | TEMPERATURE: 98.4 F | DIASTOLIC BLOOD PRESSURE: 78 MMHG | HEART RATE: 87 BPM | SYSTOLIC BLOOD PRESSURE: 140 MMHG

## 2024-02-01 DIAGNOSIS — D50.8 OTHER IRON DEFICIENCY ANEMIA: Primary | ICD-10-CM

## 2024-02-01 PROCEDURE — 258N000003 HC RX IP 258 OP 636: Performed by: INTERNAL MEDICINE

## 2024-02-01 PROCEDURE — 96365 THER/PROPH/DIAG IV INF INIT: CPT

## 2024-02-01 PROCEDURE — 250N000011 HC RX IP 250 OP 636: Mod: JZ | Performed by: INTERNAL MEDICINE

## 2024-02-01 RX ORDER — ALBUTEROL SULFATE 90 UG/1
1-2 AEROSOL, METERED RESPIRATORY (INHALATION)
Status: CANCELLED
Start: 2024-02-03

## 2024-02-01 RX ORDER — METHYLPREDNISOLONE SODIUM SUCCINATE 125 MG/2ML
125 INJECTION, POWDER, LYOPHILIZED, FOR SOLUTION INTRAMUSCULAR; INTRAVENOUS
Status: CANCELLED
Start: 2024-02-03

## 2024-02-01 RX ORDER — DIPHENHYDRAMINE HYDROCHLORIDE 50 MG/ML
50 INJECTION INTRAMUSCULAR; INTRAVENOUS
Status: CANCELLED
Start: 2024-02-03

## 2024-02-01 RX ORDER — ALBUTEROL SULFATE 0.83 MG/ML
2.5 SOLUTION RESPIRATORY (INHALATION)
Status: CANCELLED | OUTPATIENT
Start: 2024-02-03

## 2024-02-01 RX ORDER — MEPERIDINE HYDROCHLORIDE 50 MG/ML
25 INJECTION INTRAMUSCULAR; INTRAVENOUS; SUBCUTANEOUS EVERY 30 MIN PRN
Status: CANCELLED | OUTPATIENT
Start: 2024-02-03

## 2024-02-01 RX ORDER — EPINEPHRINE 1 MG/ML
0.3 INJECTION, SOLUTION, CONCENTRATE INTRAVENOUS EVERY 5 MIN PRN
Status: CANCELLED | OUTPATIENT
Start: 2024-02-03

## 2024-02-01 RX ADMIN — FERUMOXYTOL 510 MG: 510 INJECTION INTRAVENOUS at 13:25

## 2024-02-01 RX ADMIN — SODIUM CHLORIDE 250 ML: 9 INJECTION, SOLUTION INTRAVENOUS at 13:20

## 2024-02-01 NOTE — NURSING NOTE
Infusion Nursing Note:  Loulou Lucero presents today for Feraheme 1 of 2.    Patient seen by provider today: No   present during visit today: Not Applicable.    Note: N/A.      Intravenous Access:  Peripheral IV placed.    Treatment Conditions:  Not Applicable.      Post Infusion Assessment:  Patient tolerated infusion without incident.  Patient observed for 30 minutes post infusion per protocol.  Blood return noted pre and post infusion.  Site patent and intact, free from redness, edema or discomfort.  No evidence of extravasations.  Access discontinued per protocol.       Discharge Plan:   Discharge instructions reviewed with: Patient.  Patient and/or family verbalized understanding of discharge instructions and all questions answered.  Copy of AVS reviewed with patient and/or family.  Patient will return 2/9/2024 for next appointment.  Patient discharged in stable condition accompanied by: self.  Departure Mode: Ambulatory.      Lucy Ley RN     negative... Passed

## 2024-02-05 ENCOUNTER — TELEPHONE (OUTPATIENT)
Dept: CARDIOLOGY | Facility: OTHER | Age: 79
End: 2024-02-05
Payer: MEDICARE

## 2024-02-05 NOTE — TELEPHONE ENCOUNTER
I called the patient , advised had to reschedule this appointment on 02/05/24 with [facility] because flight was delayed today , I tried to reschedule for next available in March, patient stated no she won't reschedule at that time , needs to be seen sooner, not happy appointment was cancelled . She wants to know who the cardio team would like her to see sooner instead of [facility], which the appointment was requested by Dr Ansari.     Dalila Moore on 2/5/2024 at 8:54 AM

## 2024-02-06 NOTE — TELEPHONE ENCOUNTER
Patient has been rescheduled for 3/4/2024 with Dr Patricia.     Irma Pulido LPN on 2/6/2024 at 10:26 AM

## 2024-02-09 ENCOUNTER — HOSPITAL ENCOUNTER (OUTPATIENT)
Dept: INFUSION THERAPY | Facility: OTHER | Age: 79
Discharge: HOME | End: 2024-02-09
Attending: INTERNAL MEDICINE | Admitting: INTERNAL MEDICINE
Payer: MEDICARE

## 2024-02-09 VITALS — DIASTOLIC BLOOD PRESSURE: 56 MMHG | HEART RATE: 85 BPM | SYSTOLIC BLOOD PRESSURE: 126 MMHG

## 2024-02-09 DIAGNOSIS — D50.8 OTHER IRON DEFICIENCY ANEMIA: Primary | ICD-10-CM

## 2024-02-09 PROCEDURE — 258N000003 HC RX IP 258 OP 636: Performed by: INTERNAL MEDICINE

## 2024-02-09 PROCEDURE — 250N000011 HC RX IP 250 OP 636: Mod: JZ | Performed by: INTERNAL MEDICINE

## 2024-02-09 PROCEDURE — 96365 THER/PROPH/DIAG IV INF INIT: CPT

## 2024-02-09 RX ORDER — ALBUTEROL SULFATE 90 UG/1
1-2 AEROSOL, METERED RESPIRATORY (INHALATION)
Status: CANCELLED
Start: 2024-02-09

## 2024-02-09 RX ORDER — DIPHENHYDRAMINE HYDROCHLORIDE 50 MG/ML
50 INJECTION INTRAMUSCULAR; INTRAVENOUS
Status: CANCELLED
Start: 2024-02-09

## 2024-02-09 RX ORDER — ALBUTEROL SULFATE 0.83 MG/ML
2.5 SOLUTION RESPIRATORY (INHALATION)
Status: CANCELLED | OUTPATIENT
Start: 2024-02-09

## 2024-02-09 RX ORDER — MEPERIDINE HYDROCHLORIDE 50 MG/ML
25 INJECTION INTRAMUSCULAR; INTRAVENOUS; SUBCUTANEOUS EVERY 30 MIN PRN
Status: CANCELLED | OUTPATIENT
Start: 2024-02-09

## 2024-02-09 RX ORDER — METHYLPREDNISOLONE SODIUM SUCCINATE 125 MG/2ML
125 INJECTION, POWDER, LYOPHILIZED, FOR SOLUTION INTRAMUSCULAR; INTRAVENOUS
Status: CANCELLED
Start: 2024-02-09

## 2024-02-09 RX ORDER — EPINEPHRINE 1 MG/ML
0.3 INJECTION, SOLUTION, CONCENTRATE INTRAVENOUS EVERY 5 MIN PRN
Status: CANCELLED | OUTPATIENT
Start: 2024-02-09

## 2024-02-09 RX ADMIN — SODIUM CHLORIDE 250 ML: 9 INJECTION, SOLUTION INTRAVENOUS at 13:58

## 2024-02-09 RX ADMIN — FERUMOXYTOL 510 MG: 510 INJECTION INTRAVENOUS at 14:01

## 2024-02-09 NOTE — NURSING NOTE
Infusion Nursing Note:  Loulou Lucero presents today for Feraheme 2/2.    Patient seen by provider today: No   present during visit today: Not Applicable.    Note: N/A.      Intravenous Access:  Peripheral IV placed to right antecubital space.    Treatment Conditions:  Not Applicable.      Post Infusion Assessment:  Patient tolerated infusion without incident.  Blood return noted pre and post infusion.  Site patent and intact, free from redness, edema or discomfort.  No evidence of extravasations.  Access discontinued per protocol.       Discharge Plan:   Discharge instructions reviewed with: Patient.  Patient and/or family verbalized understanding of discharge instructions and all questions answered.  Copy of AVS reviewed with patient and/or family.  Patient will return as ordered, last ordered dose for next appointment.  Patient discharged in stable condition accompanied by: self.  Departure Mode: Ambulatory.      Grace Dykes RN

## 2024-02-12 ENCOUNTER — DOCUMENTATION ONLY (OUTPATIENT)
Dept: OTHER | Facility: CLINIC | Age: 79
End: 2024-02-12
Payer: MEDICARE

## 2024-03-01 NOTE — PROGRESS NOTES
[facility]  CARDIOVASCULAR DIVISION    VALVE CLINIC INITIAL CONSULTATION    PRIMARY CARDIOLOGIST: Dr. Ansari      PERTINENT CLINICAL HISTORY:     Loulou Luceor is a very pleasant 79 year old female with severe aortic valvular stenosis referred to our clinic for evaluation and consideration for potential transcatheter aortic valve replacement (TAVR). Her severe aortic stenosis is characterized with an area of 0.7 cm2, mean gradient 33 mmHg and peak velocity of 3.6 m/sec with LVEF 60% by echocardiogram on 11/28/23. Additional notable medical history of MGUS, smoking, iron deficiency anemia.     She has ongoing symptoms related to aortic stenosis with chronic shortness of breath and occasional episodes of anginal chest pain.     Patient is being seen today to discuss severe aortic stenosis recently diagnosed by echocardiogram on 11/28/2023.  LV size and function is normal.  No other significant valve disease.  Aortic valve area is 0.7 cm , mean gradient 30 mm gradient and peak velocity 3.6 m/s.  Prior echocardiogram in  2017 showed nonsignificant aortic valve stenosis.  Patient was recently evaluated in lung nodule clinic for left lower lung nodule which was found to be negative for malignancy.      Patient reports tiredness over the last year or so.  Feels fatigued, tire easily, lightheaded, dizzy and unstable on her feet at times.  Denies syncope or lower extremity edema.  Denies chest pain. She has lost weight for the past couple of years. She will try to eat more and potentially with help of her daughter get more calories in. We discussed how TAVR outcomes are worse in frail and underweight patients.       She has been a smoker for several years.  Currently smoking half to 1 pack a day.       PAST MEDICAL HISTORY:     Past Medical History:   Diagnosis Date    Asymptomatic varicose veins of lower extremity     6/12/2012    Benign paroxysmal vertigo     12/29/2010    Chronic obstructive pulmonary disease  (H)     12/29/2010    Diarrhea     10/1/2015    Disorder of cartilage     Hip; Last dxa 06/2010    Diverticulosis of large intestine without perforation or abscess without bleeding          Lower abdominal pain     10/1/2015    Other disorders of lung (CODE)     Stable since July of 2002. RU lobe.    Personal history of other medical treatment (CODE)     L3, L4-4; Last MRI 7/09/12    Personal history of other medical treatment (CODE)     G-3, P-2, A-0  (Son had SIDS)    Zoster without complications     3/31/2012        PAST SURGICAL HISTORY:     Past Surgical History:   Procedure Laterality Date    COLONOSCOPY  03/22/2010    Normal; + family hx, next due 2015    COLONOSCOPY  10/01/2015    W/ POLYPECTOMY recommend follow up in 2020.    COLONOSCOPY N/A 11/7/2019    4 tubular adenoma, 3 year follow up    ESOPHAGOSCOPY, GASTROSCOPY, DUODENOSCOPY (EGD), COMBINED  04/23/2014    Arce's esophagus fu egd 2 yrs    ESOPHAGOSCOPY, GASTROSCOPY, DUODENOSCOPY (EGD), COMBINED N/A 11/7/2019    Procedure: ESOPHAGOGASTRODUODENOSCOPY, WITH BIOPSY;  Surgeon: Santosh Barrera MD;  Location: [facility] OR    LAPAROSCOPIC TUBAL LIGATION  1978         TONSILLECTOMY & ADENOIDECTOMY  1951             CURRENT MEDICATIONS:     Current Outpatient Medications   Medication Sig Dispense Refill    aspirin 81 MG EC tablet Take 81 mg by mouth daily      cyanocobalamin (VITAMIN B-12) 1000 MCG tablet Take 1 tablet (1,000 mcg) by mouth daily 90 tablet 3    ferrous sulfate (FEROSUL) 325 (65 Fe) MG tablet Take 1 tablet (325 mg) by mouth 2 times daily 60 tablet 1    fluticasone-salmeterol (ADVAIR DISKUS) 250-50 MCG/ACT inhaler Inhale 1 puff into the lungs 2 times daily WIXELA-Disp as covered by Pt's insurance 3 each 2    simvastatin (ZOCOR) 20 MG tablet Take 1 tablet (20 mg) by mouth at bedtime 90 tablet 2        ALLERGIES:     Allergies   Allergen Reactions    Metronidazole Nausea and Vomiting    Pneumococcal Vaccine      Other reaction(s): Edema  Arm swelling     Tramadol Visual Disturbance     Hallucinations           FAMILY HISTORY:     Family History   Problem Relation Age of Onset    Colon Cancer Father         Cancer-colon    Other - See Comments Mother         Alzheimer's    Other - See Comments Maternal Grandmother         Alzheimer's    Other - See Comments Brother         Alzheimer's    Other - See Comments Brother         aneurysm and stents    Cancer Brother         Cancer,lung    Cancer Brother         Cancer,skin    Other - See Comments Brother         cirrhosis of the liver    Other - See Comments Maternal Uncle         3, Alzheimer's    Breast Cancer No family hx of         Cancer-breast        SOCIAL HISTORY:     Social History     Socioeconomic History    Marital status:    Tobacco Use    Smoking status: Every Day     Packs/day: 0.50     Years: 63.00     Additional pack years: 0.00     Total pack years: 31.50     Types: Cigarettes     Start date: 1961     Passive exposure: Past    Smokeless tobacco: Never    Tobacco comments:     Passive exposure in adult home.    Vaping Use    Vaping Use: Never used   Substance and Sexual Activity    Alcohol use: Not Currently     Comment: rarely at a wedding or special occasion    Drug use: No    Sexual activity: Not Currently   Social History Narrative    Patient in second marriage. Has two living children, both live in [place], [place]. Has 2 grandchildren. Is retired, worked at [employer] in [place].      Patient currently smokes, 1 ppd x 50 yrs. Smokes less in the winter months due to not smoking in the house    .   Alcohol Use - no  - disabled.     Social Determinants of Health     Financial Resource Strain: Low Risk  (1/31/2024)    Financial Resource Strain     Within the past 12 months, have you or your family members you live with been unable to get utilities (heat, electricity) when it was really needed?: No   Food Insecurity: Low Risk  (1/31/2024)    Food Insecurity     Within the past 12 months, did  you worry that your food would run out before you got money to buy more?: No     Within the past 12 months, did the food you bought just not last and you didn t have money to get more?: No   Transportation Needs: Low Risk  (1/31/2024)    Transportation Needs     Within the past 12 months, has lack of transportation kept you from medical appointments, getting your medicines, non-medical meetings or appointments, work, or from getting things that you need?: No   Physical Activity: Unknown (1/31/2024)    Exercise Vital Sign     Days of Exercise per Week: 0 days   Stress: No Stress Concern Present (1/31/2024)    [employer] - Occupational Stress Questionnaire     Feeling of Stress : Only a little   Social Connections: Unknown (1/31/2024)    Social Connection and Isolation Panel [NHANES]     Frequency of Social Gatherings with Friends and Family: Once a week   Interpersonal Safety: Low Risk  (1/31/2024)    Interpersonal Safety     Do you feel physically and emotionally safe where you currently live?: Yes     Within the past 12 months, have you been hit, slapped, kicked or otherwise physically hurt by someone?: No     Within the past 12 months, have you been humiliated or emotionally abused in other ways by your partner or ex-partner?: No   Housing Stability: Low Risk  (1/31/2024)    Housing Stability     Do you have housing? : Yes     Are you worried about losing your housing?: No        REVIEW OF SYSTEMS:     Constitutional: No fevers or chills  Skin: No new rash or itching  Eyes: No acute change in vision  Ears/Nose/Throat: No purulent rhinorrhea, new hearing loss, or new vertigo  Respiratory: No cough or hemoptysis  Cardiovascular: See HPI  Gastrointestinal: No change in appetite, vomiting, hematemesis or diarrhea  Genitourinary: No dysuria or hematuria  Musculoskeletal: No new back pain, neck pain or muscle pain  Neurologic: No new headaches, focal weakness or behavior  changes  Psychiatric: No hallucinations, excessive alcohol consumption or illegal drug usage  Hematologic/Lymphatic/Immunologic: No bleeding, chills, fever, night sweats or weight loss  Endocrine: No new cold intolerance, heat intolerance, polyphagia, polydipsia or polyuria      PHYSICAL EXAMINATION:     /74 (BP Location: Right arm, Patient Position: Sitting, Cuff Size: Adult Regular)   Pulse 89   Resp 22   Ht 1.524 m (5')   Wt 35.5 kg (78 lb 3.2 oz)   LMP 01/01/1995 (Approximate)   SpO2 96%   Breastfeeding No   BMI 15.27 kg/m      GENERAL: No acute distress.  HEENT: EOMI. Sclerae white, not injected. Nares clear. Pharynx without erythema or exudate.   Neck: No adenopathy. No thyromegaly. Symmetrical.   Heart: Regular rate and rhythm. Harsh crescendo decrescendo late peaking systolic murmur with radiation to carotids. Delayed carotid pulses.   Lungs: Clear to auscultation. No ronchi, wheezes, rales.   Abdomen: Soft, nontender, nondistended. Bowel sounds present.  Extremities: No clubbing, cyanosis, or edema.   Neurologic: Alert and oriented to person/place/time, normal speech and affect. No focal deficits.  Skin: No petechiae, purpura or rash.     LABORATORY DATA:     LIPID RESULTS:  Lab Results   Component Value Date    CHOL 216 (H) 10/18/2022    CHOL 205 (H) 10/09/2020    HDL 66 10/18/2022    HDL 63 10/09/2020     (H) 10/18/2022     (H) 10/09/2020    TRIG 174 (H) 10/18/2022    TRIG 109 10/09/2020       LIVER ENZYME RESULTS:  Lab Results   Component Value Date    AST 24 01/17/2024    AST 19 10/09/2020    ALT 11 01/17/2024    ALT 9 10/09/2020       CBC RESULTS:  Lab Results   Component Value Date    WBC 6.2 01/17/2024    WBC 7.0 10/24/2019    RBC 3.66 (L) 01/17/2024    RBC 3.89 10/24/2019    HGB 9.9 (L) 01/17/2024    HGB 12.1 10/24/2019    HCT 30.9 (L) 01/17/2024    HCT 36.5 10/24/2019    MCV 84 01/17/2024    MCV 94 10/24/2019    MCH 27.0 01/17/2024    MCH 31.1 10/24/2019    MCHC 32.0  "2024    MCHC 33.2 10/24/2019    RDW 16.1 (H) 2024    RDW 12.9 10/24/2019     2024     10/24/2019       BMP RESULTS:  Lab Results   Component Value Date     2024     10/09/2020    POTASSIUM 3.8 2024    POTASSIUM 4.6 10/18/2022    POTASSIUM 4.0 10/09/2020    CHLORIDE 101 2024    CHLORIDE 103 10/18/2022    CHLORIDE 102 10/09/2020    CO2 29 2024    CO2 34 (H) 10/18/2022    CO2 33 (H) 10/09/2020    ANIONGAP 10 2024    ANIONGAP 6 10/18/2022    ANIONGAP 7 10/09/2020    GLC 89 2024    GLC 94 10/18/2022     (H) 10/09/2020    BUN 16.8 2024    BUN 15 10/18/2022    BUN 21 10/09/2020    CR 0.85 2024    CR 1.00 10/09/2020    GFRESTIMATED 70 2024    GFRESTIMATED 54 (L) 10/09/2020    GFRESTBLACK 65 10/09/2020    DION 9.7 2024    DION 10.0 10/09/2020        A1C RESULTS:  No results found for: \"A1C\"    INR RESULTS:  No results found for: \"INR\"       PROCEDURES & FURTHER ASSESSMENTS:     EC2024  Sinus rhythm, normal axis, LVH         Echocardiogram:  23    Interpretation Summary  Global and regional left ventricular function is normal with an EF of 60-65%.  There is no thrombus seen in the left ventricle.  Global right ventricular function is normal.  Severe aortic stenosis is present.  IVC diameter <2.1 cm collapsing >50% with sniff suggests a normal RA pressure  of 3 mmHg.  No pericardial effusion is present.  There is no prior study for direct comparison.    Severe mitral annular calcification is present. Trace mitral insufficiency is  present. Mild mitral stenosis is present. The mean mitral valve gradient is  6.7 mmHg. The mitral valve area is 3.0 cm^2.  ________________________________________    CT TAVR: pending     Coronary Angiogram and Right Heart Catheterization:  Pending     STS RISK SCORE: 2.89%  FRAILTY SCORE: Pending  NYHA CLASS: III     CLINICAL IMPRESSION:     Loulou Lucero is a 79 year old female " with symptomatic severe aortic stenosis and mild mitral stenosis who is a good candidate for transcatheter aortic valve replacement based on age, frailty, co-morbidities and overall surgical mortality risk estimation of 2.89% based on the STS score.      The pre-procedural TAVR evaluation currently requires additional assessment with CT TAVR, coronary angiogram prior to presentation to the Heart team for discussion during our multi-disciplinary conference for consensus decision and procedural planning.    Plan Summary:  1) Severe Aortic Stenosis:  - CT TAVR  - Coronary angiogram and RHC  - Presentation of data to the Heart team to assess the optimal treatment of her severe aortic stenosis    Patient was evaluated in clinic with Dr. Patricia of interventional cardiology.      Karl Smith  Structural Heart Disease &   Advanced Interventional Cardiology Fellow      CC  Patient Care Team:  Obi Johnston MD as PCP - General (Family Practice)  Obi Johnston MD as Assigned PCP  Reynaldo Hansen MD as Assigned Cancer Care Provider  Annie Ansari MD as Assigned Heart and Vascular Provider    Patient seen and examined with Cardiovascular fellow and agree with the assessment and plan described above.     Claude Patricia M.D.  Interventional Cardiology  [facility]

## 2024-03-04 ENCOUNTER — DOCUMENTATION ONLY (OUTPATIENT)
Dept: CARDIOLOGY | Facility: CLINIC | Age: 79
End: 2024-03-04
Payer: MEDICARE

## 2024-03-04 ENCOUNTER — OFFICE VISIT (OUTPATIENT)
Dept: CARDIOLOGY | Facility: OTHER | Age: 79
End: 2024-03-04
Attending: INTERNAL MEDICINE
Payer: MEDICARE

## 2024-03-04 VITALS
HEART RATE: 89 BPM | BODY MASS INDEX: 15.35 KG/M2 | DIASTOLIC BLOOD PRESSURE: 74 MMHG | RESPIRATION RATE: 22 BRPM | WEIGHT: 78.2 LBS | HEIGHT: 60 IN | OXYGEN SATURATION: 96 % | SYSTOLIC BLOOD PRESSURE: 138 MMHG

## 2024-03-04 DIAGNOSIS — I35.0 SEVERE AORTIC STENOSIS BY PRIOR ECHOCARDIOGRAM: Primary | ICD-10-CM

## 2024-03-04 LAB
ATRIAL RATE - MUSE: 76 BPM
DIASTOLIC BLOOD PRESSURE - MUSE: NORMAL MMHG
INTERPRETATION ECG - MUSE: NORMAL
P AXIS - MUSE: 79 DEGREES
PR INTERVAL - MUSE: 142 MS
QRS DURATION - MUSE: 84 MS
QT - MUSE: 406 MS
QTC - MUSE: 456 MS
R AXIS - MUSE: 66 DEGREES
SYSTOLIC BLOOD PRESSURE - MUSE: NORMAL MMHG
T AXIS - MUSE: 51 DEGREES
VENTRICULAR RATE- MUSE: 76 BPM

## 2024-03-04 PROCEDURE — 93005 ELECTROCARDIOGRAM TRACING: CPT | Performed by: INTERNAL MEDICINE

## 2024-03-04 PROCEDURE — 93010 ELECTROCARDIOGRAM REPORT: CPT | Performed by: INTERNAL MEDICINE

## 2024-03-04 PROCEDURE — G0463 HOSPITAL OUTPT CLINIC VISIT: HCPCS | Performed by: INTERNAL MEDICINE

## 2024-03-04 PROCEDURE — 99214 OFFICE O/P EST MOD 30 MIN: CPT | Performed by: INTERNAL MEDICINE

## 2024-03-04 ASSESSMENT — PAIN SCALES - GENERAL: PAINLEVEL: NO PAIN (0)

## 2024-03-04 NOTE — PATIENT INSTRUCTIONS
You were seen today in the Cardiovascular Clinic by the [facility].      Cardiology Providers you saw during your visit:  Dr. Patricia    Recommendations:  TAVR CT and angiogram with Right heart cath.    Follow-up:  after testing completed.       Nursing questions:  Maya FULLER;  Maryellen messages are great! Otherwise [phone] if you don't hear back within 24 hrs please call back.   For emergencies call 911.      Thank you for your visit today!     Maya Jones RN  Lead Structural Heart Coordinator  TAVR, MitraClip and Watchman

## 2024-03-04 NOTE — NURSING NOTE
Chief Complaint   Patient presents with    TAVR workup       Initial /74 (BP Location: Right arm, Patient Position: Sitting, Cuff Size: Adult Regular)   Pulse 89   Resp 22   Ht 1.524 m (5')   Wt 35.5 kg (78 lb 3.2 oz)   LMP 01/01/1995 (Approximate)   SpO2 96%   Breastfeeding No   BMI 15.27 kg/m   Estimated body mass index is 15.27 kg/m  as calculated from the following:    Height as of this encounter: 1.524 m (5').    Weight as of this encounter: 35.5 kg (78 lb 3.2 oz).  Medication Review: complete    The next two questions are to help us understand your food security.  If you are feeling you need any assistance in this area, we have resources available to support you today.          1/31/2024   SDOH- Food Insecurity   Within the past 12 months, did you worry that your food would run out before you got money to buy more? N   Within the past 12 months, did the food you bought just not last and you didn t have money to get more? N     Patient is taking an ASA  Patient is taking a Statin med    Health Care Directive:  Patient has a Health Care Directive on file      Amadeo Henderson

## 2024-03-04 NOTE — PROGRESS NOTES
Pre TAVR      [place] Cardiomyopathy Questionnaire (KCCQ-12)    The following questions refer to your heart failure and how it may affect your life. Please read and complete the following questions. There are no right or wrong answers. Please carole the answer that best applies to you.    Heart failure affects different people in different ways. Some feel shortness of breath while others feel fatigue. Please indicate how much you are limited by heart failure (shortness of breath or fatigue) in your ability to do the following activities over the past 2 weeks.    A. Showering/bathing   5. Not at all Limited    B. Walking 1 block on level ground   3. Moderately Limited    C. Hurrying or jogging (as if to catch a bus)   1. Extremely Limited          Over the past 2 weeks, how many times did you have swelling in your feet, ankles or legs when you woke up in the morning?      5 Never over the past 2 weeks         Over the past 2 weeks, on average, how many times has fatigue limited your ability to do what you wanted?     6. Less than once a week          Over the past 2 weeks, on average, how many times has shortness of breath limited your ability to do what you wanted?      6. Less than once a week       Over the past 2 weeks, on average, how many times have you been forced to sleep sitting up in a chair or with at least 3 pillows to prop you up because of shortness of breath?    5. Never over the past 2 weeks         Over the past 2 weeks, how much has your heart failure limited your enjoyment of life?      3. It has moderately limited my enjoyment of life        If you had to spend the rest of your life with your heart failure the way it is right now, how would you feel about this?      2. Mostly dissatisfied       How much does your heart failure affect your lifestyle? Please indicate how your heart failure may have limited your participation in the following activities over the past 2 weeks.       A. Gaby  recreational activities 6.  Does not apply or did not do for other reasons   B. Working or doing household chores 5.  Did not limit at all    C. Visiting family or friends out of your home 4.  Slightly Limited              5 Meter/15 foot Walk test  Trial 1  5.2  Trial 2   5.4  Trial 3  5.3        Provided additional education regarding TAVR/MitraClip procedure, after being present for discussion with physician. Explained the work-up process and next steps for patient. Patient provided our direct contact number and instructed to call with any questions.

## 2024-03-06 ENCOUNTER — TELEPHONE (OUTPATIENT)
Dept: CARDIOLOGY | Facility: CLINIC | Age: 79
End: 2024-03-06
Payer: MEDICARE

## 2024-03-06 NOTE — TELEPHONE ENCOUNTER
Spoke with patient daughter caryl to let her know that we will be calling next week as soon as Alicja smallwood gets the April schedule foir the Angiogram/RHC.

## 2024-03-08 DIAGNOSIS — D64.9 ANEMIA, UNSPECIFIED TYPE: ICD-10-CM

## 2024-03-11 RX ORDER — FERROUS SULFATE 325(65) MG
325 TABLET ORAL 2 TIMES DAILY
Qty: 60 TABLET | Refills: 0 | Status: SHIPPED | OUTPATIENT
Start: 2024-03-11 | End: 2024-04-10

## 2024-03-14 ENCOUNTER — TELEPHONE (OUTPATIENT)
Dept: CARDIOLOGY | Facility: CLINIC | Age: 79
End: 2024-03-14
Payer: MEDICARE

## 2024-03-21 ENCOUNTER — MYC MEDICAL ADVICE (OUTPATIENT)
Dept: INTERNAL MEDICINE | Facility: OTHER | Age: 79
End: 2024-03-21
Payer: MEDICARE

## 2024-03-21 ENCOUNTER — OFFICE VISIT (OUTPATIENT)
Dept: FAMILY MEDICINE | Facility: OTHER | Age: 79
End: 2024-03-21
Attending: PHYSICIAN ASSISTANT
Payer: MEDICARE

## 2024-03-21 VITALS
WEIGHT: 79.4 LBS | TEMPERATURE: 98.5 F | HEART RATE: 94 BPM | SYSTOLIC BLOOD PRESSURE: 138 MMHG | HEIGHT: 60 IN | OXYGEN SATURATION: 96 % | RESPIRATION RATE: 18 BRPM | DIASTOLIC BLOOD PRESSURE: 68 MMHG | BODY MASS INDEX: 15.59 KG/M2

## 2024-03-21 DIAGNOSIS — J01.00 ACUTE NON-RECURRENT MAXILLARY SINUSITIS: Primary | ICD-10-CM

## 2024-03-21 DIAGNOSIS — R05.1 ACUTE COUGH: ICD-10-CM

## 2024-03-21 LAB
FLUAV RNA SPEC QL NAA+PROBE: NEGATIVE
FLUBV RNA RESP QL NAA+PROBE: NEGATIVE
RSV RNA SPEC NAA+PROBE: NEGATIVE
SARS-COV-2 RNA RESP QL NAA+PROBE: NEGATIVE

## 2024-03-21 PROCEDURE — 87637 SARSCOV2&INF A&B&RSV AMP PRB: CPT | Mod: ZL | Performed by: PHYSICIAN ASSISTANT

## 2024-03-21 PROCEDURE — 99213 OFFICE O/P EST LOW 20 MIN: CPT | Performed by: PHYSICIAN ASSISTANT

## 2024-03-21 PROCEDURE — G0463 HOSPITAL OUTPT CLINIC VISIT: HCPCS

## 2024-03-21 RX ORDER — AMOXICILLIN 875 MG
875 TABLET ORAL 2 TIMES DAILY
Qty: 14 TABLET | Refills: 0 | Status: SHIPPED | OUTPATIENT
Start: 2024-03-21 | End: 2024-03-28

## 2024-03-21 ASSESSMENT — PAIN SCALES - GENERAL: PAINLEVEL: NO PAIN (0)

## 2024-03-21 NOTE — PROGRESS NOTES
Assessment & Plan   Problem List Items Addressed This Visit    None  Visit Diagnoses       Acute non-recurrent maxillary sinusitis    -  Primary    Relevant Medications    amoxicillin (AMOXIL) 875 MG tablet    Acute cough        Relevant Orders    Symptomatic Influenza A/B, RSV, & SARS-CoV2 PCR (COVID-19) Nose           Acute nonrecurrent maxillary sinusitis: Patient with amoxicillin for treatment of an acute sinus infection.  With her recurrent cough and also completed influenza A/B, recipe, and COVID-19 to rule out infection concerns.    Encouraged use over-the-counter cough and cold remedies as needed for symptomatic relief.  Increase fluids with electrolytes and rest.  Increase protein containing foods.  Encourage close follow-up if symptoms or not improving or worsening.    Ordering of each unique test  Prescription drug management       Nicotine/Tobacco Cessation  She reports that she has been smoking cigarettes. She started smoking about 63 years ago. She has a 31.5 pack-year smoking history. She has been exposed to tobacco smoke. She has never used smokeless tobacco.  Nicotine/Tobacco Cessation Plan  Information offered: Patient not interested at this time      See Patient Instructions    No follow-ups on file.      Vel Jamli is a 79 year old, presenting for the following health issues:  URI        3/21/2024     1:21 PM   Additional Questions   Roomed by Lilia sidhu     History of Present Illness       Reason for visit:  Head cold sinus  Symptom onset:  3-7 days ago  Symptoms include:  Head is stuffed, runny nose ear tender sore throat, sneezing runny eyes low fever short of breath loss of voice  Symptom intensity:  Moderate  Symptom progression:  Staying the same  Had these symptoms before:  No  What makes it worse:  No  What makes it better:  No    She eats 0-1 servings of fruits and vegetables daily.She consumes 1 sweetened beverage(s) daily.She exercises with enough effort to increase her heart  rate 9 or less minutes per day.  She exercises with enough effort to increase her heart rate 3 or less days per week.   She is taking medications regularly.     Patient is coming today with sinus congestion.  Feels like her head is stuffed.  Has a runny nose.  Lost her voice.  Having sneezing, left ear pain, sore throat, and coughing.  Headache previously however this has resolved.  Can taste or smell.  Has a head cold.  Needs to have a heart valve replaced in 2 weeks at the [facility].  Needs to be feeling better prior to the procedure.      Review of Systems  Constitutional, HEENT, cardiovascular, pulmonary, gi and gu systems are negative, except as otherwise noted.      Objective    /68   Pulse 94   Temp 98.5  F (36.9  C) (Tympanic)   Resp 18   Ht 1.524 m (5')   Wt 36 kg (79 lb 6.4 oz)   LMP 01/01/1995 (Approximate)   SpO2 96%   Breastfeeding No   BMI 15.51 kg/m    Body mass index is 15.51 kg/m .  Physical Exam  Vitals and nursing note reviewed.   Constitutional:       Appearance: Normal appearance.   HENT:      Head: Normocephalic and atraumatic.      Right Ear: Tympanic membrane, ear canal and external ear normal.      Left Ear: Tympanic membrane, ear canal and external ear normal.      Mouth/Throat:      Mouth: Mucous membranes are moist.      Pharynx: Oropharynx is clear. No oropharyngeal exudate or posterior oropharyngeal erythema.   Cardiovascular:      Rate and Rhythm: Normal rate and regular rhythm.      Heart sounds: Normal heart sounds.   Pulmonary:      Effort: Pulmonary effort is normal.      Breath sounds: Normal breath sounds. No wheezing or rales.   Musculoskeletal:         General: Normal range of motion.      Cervical back: Normal range of motion and neck supple.   Lymphadenopathy:      Cervical: No cervical adenopathy.   Skin:     General: Skin is warm and dry.   Neurological:      General: No focal deficit present.      Mental Status: She is alert.   Psychiatric:          Mood and Affect: Mood normal.         Behavior: Behavior normal.                Signed Electronically by: Gifty Bhakta PA-C

## 2024-03-21 NOTE — NURSING NOTE
Patient is here for concerns of head cold, started last Friday. Is supposed to have surgery in April for heart valve surgery.     Patient's last menstrual period was 01/01/1995 (approximate).  Medication Reconciliation: complete    Lilia Shankar LPN 3/21/2024 1:29 PM       Advance care directive on file? yes  Advance care directive provided to patient? na       Lilia Shankar LPN

## 2024-03-21 NOTE — PATIENT INSTRUCTIONS
Sinus infection - Antibiotic has been sent to pharmacy. Please take full course of antibiotic even if symptoms have completely resolved. This helps prevent against antibiotic resistance.     May use symptomatic care with tylenol. May use cough syrup or cough drops. Using a humidifier works well to break up the congestion. You can also sleep propped up on a couple pillows to decrease symptoms at night.     Use a Neti Pot/sinusflush ([place] Med Sinus Rinse) 3 times daily to irrigate sinuses/mucosal tissue.     Sudafed or mucinex work well for congestion.   If you choose pseudoephedrine, use for only 5-7 days AS DIRECTED. Speak to your pharmacist if you have any concerns about your medications. Mayalso use decongestant nasal spray, but only for 3 days MAXIMUM.    Please take tylenol as needed up to 4 times daily.  Treat symptomatically with warm salt water gargles.  Frequent swallows of cool liquid.  Oatmeal coats the throat and some patients find it soothes the pain.     Monitor for any fevers or chills. Return in 7-10 days if not feeling better. Please call clinic with any questions or concerns. Please take in a lot of fluids and get rest.     You will need to be evaluated if you start to experience:  Fever higher than 102.5 F (39.2 C)   Sudden and severe pain in the face and head   Trouble seeing or seeing double   Trouble thinking clearly   Swelling or redness around 1 or both eyes   Trouble breathing or a stiff neck    * If you are a smoker, try to quit *    Call 9-1-1 or go to the emergency room if you:  Have trouble breathing   Are drooling because you cannot swallow your saliva   Have swelling of the neck or tongue   Cannot move your neck or have trouble opening your mouth

## 2024-03-28 ENCOUNTER — TELEPHONE (OUTPATIENT)
Dept: CARDIOLOGY | Facility: CLINIC | Age: 79
End: 2024-03-28
Payer: MEDICARE

## 2024-03-28 NOTE — TELEPHONE ENCOUNTER
Telephone call to Danni. Pre-procedure instructions that were sent via [employer] were reviewed. Next steps after testing would be to present her case at Valve Conference tentatively April 11, I will contact Danni either 4/11 or 4/12 with the results.  All questioned answered at this time.        Maya Jones RN  Lead Structural Heart Coordinator  TAVR, MitraClip and Watchman

## 2024-03-29 DIAGNOSIS — I35.0 SEVERE AORTIC STENOSIS: Primary | ICD-10-CM

## 2024-03-29 RX ORDER — LIDOCAINE 40 MG/G
CREAM TOPICAL
Status: CANCELLED | OUTPATIENT
Start: 2024-03-29

## 2024-03-29 RX ORDER — SODIUM CHLORIDE 9 MG/ML
INJECTION, SOLUTION INTRAVENOUS CONTINUOUS
Status: CANCELLED | OUTPATIENT
Start: 2024-03-29

## 2024-03-29 RX ORDER — ASPIRIN 81 MG/1
243 TABLET, CHEWABLE ORAL ONCE
Status: CANCELLED | OUTPATIENT
Start: 2024-03-29

## 2024-03-29 RX ORDER — POTASSIUM CHLORIDE 1500 MG/1
40 TABLET, EXTENDED RELEASE ORAL
Status: CANCELLED | OUTPATIENT
Start: 2024-03-29

## 2024-03-29 RX ORDER — ASPIRIN 325 MG
325 TABLET ORAL ONCE
Status: CANCELLED | OUTPATIENT
Start: 2024-03-29 | End: 2024-03-29

## 2024-03-29 RX ORDER — POTASSIUM CHLORIDE 1500 MG/1
20 TABLET, EXTENDED RELEASE ORAL
Status: CANCELLED | OUTPATIENT
Start: 2024-03-29

## 2024-04-04 ENCOUNTER — VIRTUAL VISIT (OUTPATIENT)
Dept: CARDIOLOGY | Facility: CLINIC | Age: 79
End: 2024-04-04
Attending: STUDENT IN AN ORGANIZED HEALTH CARE EDUCATION/TRAINING PROGRAM
Payer: MEDICARE

## 2024-04-04 DIAGNOSIS — I35.0 SEVERE AORTIC STENOSIS: Primary | ICD-10-CM

## 2024-04-04 DIAGNOSIS — I35.0 SEVERE AORTIC STENOSIS BY PRIOR ECHOCARDIOGRAM: ICD-10-CM

## 2024-04-04 PROCEDURE — 99204 OFFICE O/P NEW MOD 45 MIN: CPT | Mod: 93 | Performed by: STUDENT IN AN ORGANIZED HEALTH CARE EDUCATION/TRAINING PROGRAM

## 2024-04-04 NOTE — LETTER
4/4/2024      RE: Loulou Lucero  [address] [place] [id number]       Dear Colleague,    Thank you for the opportunity to participate in the care of your patient, Loulou Lucero, at the [facility]. Please see a copy of my visit note below.    Cardiac Surgery TAVR Evaluation    Loulou Lucero MRN# [id number]   YOB: 1945 Age: 79 year old            Assessment and Plan:   Loulou Lucero is a 79-year-old woman with a history of MGUS, smoking, iron deficiency anemia who is being evaluated for severe aortic stenosis. I had a telephone conversation today with the patient and her daughter about options for aortic valve intervention, which included TAVR and SAVR. I support the recommendation to proceed with transcatheter aortic valve replacement, pending completion of the preoperative workup.     I described the technical details, as well as the expected postoperative course and recovery to the patient. I also discussed the risks and benefits of the procedure. The risks include but are not limited to bleeding, infection, stroke, myocardial infarction, dysrhythmia and need for pacemaker, respiratory failure, kidney or liver injury, bowel or limb ischemia, and death. The patient understands these risks and wishes to undergo the operation.     A ronny discussion was had with the patient and her daughter regarding the possible need for surgical bailout should a rare complication arise during the TAVR procedure such as aortic rupture or dissection. Though these complications are rare at <1% incidence, they carry a major morbidity and mortality risk of at least 50%. I explained the morbidity among survivors may include dialysis, heart attack, stroke, prolonged ventilation, loss of limb, and loss of independent living situation. At present the patient remains undecided about whether or not she wants to have surgical bailout for this  procedure. She and her daughter would like more time to discuss this amongst family and make the decision. Though she is frail, I think she is an appropriate candidate for surgical bailout should that be in line with her wishes. We will need to confirm her final decision before proceeding with TAVR procedure. In the case of a peripheral vascular injury she would be okay with groin incision and peripheral vascular repair if needed.         History of Present Illness:   This patient is a 79-year-old woman with a history of MGUS, smoking, iron deficiency anemia who is being evaluated for severe aortic stenosis. Her severe aortic stenosis is characterized with an area of 0.7 cm2, mean gradient 33 mmHg and peak velocity of 3.6 m/sec with LVEF 60% by echocardiogram on 11/28/23. CT scan is notable for significant aortic calcification in ascending aorta and aortic arch.     She has ongoing symptoms related to aortic stenosis with chronic shortness of breath. She denies chest pain or syncope.               Past Medical History:     Past Medical History:   Diagnosis Date     Asymptomatic varicose veins of lower extremity     6/12/2012     Benign paroxysmal vertigo     12/29/2010     Chronic obstructive pulmonary disease (H)     12/29/2010     Diarrhea     10/1/2015     Disorder of cartilage     Hip; Last dxa 06/2010     Diverticulosis of large intestine without perforation or abscess without bleeding           Lower abdominal pain     10/1/2015     Other disorders of lung (CODE)     Stable since July of 2002. RU lobe.     Personal history of other medical treatment (CODE)     L3, L4-4; Last MRI 7/09/12     Personal history of other medical treatment (CODE)     G-3, P-2, A-0  (Son had SIDS)     Zoster without complications     3/31/2012            Past Surgical History:     Past Surgical History:   Procedure Laterality Date     CATARACT EXTRACTION Right     8/2023     COLONOSCOPY  03/22/2010    Normal; + family hx, next due  2015     COLONOSCOPY  10/01/2015    W/ POLYPECTOMY recommend follow up in 2020.     COLONOSCOPY N/A 11/07/2019    4 tubular adenoma, 3 year follow up     ESOPHAGOSCOPY, GASTROSCOPY, DUODENOSCOPY (EGD), COMBINED  04/23/2014    Arce's esophagus fu egd 2 yrs     ESOPHAGOSCOPY, GASTROSCOPY, DUODENOSCOPY (EGD), COMBINED N/A 11/07/2019    Procedure: ESOPHAGOGASTRODUODENOSCOPY, WITH BIOPSY;  Surgeon: Santosh Barrera MD;  Location: [facility] OR     LAPAROSCOPIC TUBAL LIGATION  1978          TONSILLECTOMY & ADENOIDECTOMY  1951                  Family History:     Family History   Problem Relation Age of Onset     Colon Cancer Father         Cancer-colon     Other - See Comments Mother         Alzheimer's     Other - See Comments Maternal Grandmother         Alzheimer's     Other - See Comments Brother         Alzheimer's     Other - See Comments Brother         aneurysm and stents     Cancer Brother         Cancer,lung     Cancer Brother         Cancer,skin     Other - See Comments Brother         cirrhosis of the liver     Other - See Comments Maternal Uncle         3, Alzheimer's     Breast Cancer No family hx of         Cancer-breast              Social History:     Social History     Socioeconomic History     Marital status:      Spouse name: Not on file     Number of children: Not on file     Years of education: Not on file     Highest education level: Not on file   Occupational History     Not on file   Tobacco Use     Smoking status: Every Day     Packs/day: 0.50     Years: 63.00     Additional pack years: 0.00     Total pack years: 31.50     Types: Cigarettes     Start date: 1961     Passive exposure: Past     Smokeless tobacco: Never     Tobacco comments:     Passive exposure in adult home.    Vaping Use     Vaping Use: Never used   Substance and Sexual Activity     Alcohol use: Not Currently     Comment: rarely at a wedding or special occasion     Drug use: No     Sexual activity: Not Currently   Other Topics  Concern     Parent/sibling w/ CABG, MI or angioplasty before 65F 55M? Not Asked   Social History Narrative    Patient in second marriage. Has two living children, both live in [place], [place]. Has 2 grandchildren. Is retired, worked at [employer] in [place].      Patient currently smokes, 1 ppd x 50 yrs. Smokes less in the winter months due to not smoking in the house    .   Alcohol Use - no  - disabled.     Social Determinants of Health     Financial Resource Strain: Low Risk  (3/21/2024)    Financial Resource Strain      Within the past 12 months, have you or your family members you live with been unable to get utilities (heat, electricity) when it was really needed?: No   Food Insecurity: Low Risk  (3/21/2024)    Food Insecurity      Within the past 12 months, did you worry that your food would run out before you got money to buy more?: No      Within the past 12 months, did the food you bought just not last and you didn t have money to get more?: No   Transportation Needs: Low Risk  (3/21/2024)    Transportation Needs      Within the past 12 months, has lack of transportation kept you from medical appointments, getting your medicines, non-medical meetings or appointments, work, or from getting things that you need?: No   Physical Activity: Unknown (1/31/2024)    Exercise Vital Sign      Days of Exercise per Week: 0 days      Minutes of Exercise per Session: Not on file   Stress: No Stress Concern Present (1/31/2024)    [employer] [place] [employer] - Occupational Stress Questionnaire      Feeling of Stress : Only a little   Social Connections: Unknown (1/31/2024)    Social Connection and Isolation Panel [NHANES]      Frequency of Communication with Friends and Family: Not on file      Frequency of Social Gatherings with Friends and Family: Once a week      Attends Quaker Services: Not on file      Active Member of Clubs or Organizations: Not on file      Attends Club or Organization Meetings:  Not on file      Marital Status: Not on file   Interpersonal Safety: Low Risk  (3/21/2024)    Interpersonal Safety      Do you feel physically and emotionally safe where you currently live?: Yes      Within the past 12 months, have you been hit, slapped, kicked or otherwise physically hurt by someone?: No      Within the past 12 months, have you been humiliated or emotionally abused in other ways by your partner or ex-partner?: No   Housing Stability: Low Risk  (3/21/2024)    Housing Stability      Do you have housing? : Yes      Are you worried about losing your housing?: No              Allergies:     Allergies   Allergen Reactions     Metronidazole Nausea and Vomiting     Pneumococcal Vaccine      Other reaction(s): Edema  Arm swelling     Tramadol Visual Disturbance     Hallucinations                 Medications:     Current Outpatient Medications   Medication Sig Dispense Refill     aspirin 81 MG EC tablet Take 81 mg by mouth daily       cyanocobalamin (VITAMIN B-12) 1000 MCG tablet Take 1 tablet (1,000 mcg) by mouth daily 90 tablet 3     fluticasone-salmeterol (ADVAIR DISKUS) 250-50 MCG/ACT inhaler Inhale 1 puff into the lungs 2 times daily WIXELA-Disp as covered by Pt's insurance 3 each 2     simvastatin (ZOCOR) 20 MG tablet Take 1 tablet (20 mg) by mouth at bedtime 90 tablet 2     SV IRON 325 (65 Fe) MG tablet Take 1 tablet by mouth twice daily 60 tablet 0     No current facility-administered medications for this visit.             Review of Systems:   ROS: 10 point review of systems was performed and negative except as described above.         Physical Exam:   LMP 01/01/1995 (Approximate)    Alert, pleasant, in no acute distress  Sclera anicteric  Neck supple JVD flat  Trachea midline  No prior chest incisions  Breathing unlabored on room air  Regular rate and rhythm  Abdomen soft, non-tender  Extremities warm, no edema          Labs:   CBC RESULTS:   Recent Labs   Lab Test 01/17/24  1329   WBC 6.2   RBC  3.66*   HGB 9.9*   HCT 30.9*   MCV 84   MCH 27.0   MCHC 32.0   RDW 16.1*         Last Comprehensive Metabolic Panel:  Lab Results   Component Value Date     01/17/2024    POTASSIUM 3.8 01/17/2024    CHLORIDE 101 01/17/2024    CO2 29 01/17/2024    ANIONGAP 10 01/17/2024    GLC 89 01/17/2024    BUN 16.8 01/17/2024    CR 0.85 01/17/2024    GFRESTIMATED 70 01/17/2024    DION 9.7 01/17/2024            Imaging:   All imaging studies were reviewed and interpreted by me.    Echocardiogram:  11/28/23  Interpretation Summary  Global and regional left ventricular function is normal with an EF of 60-65%.  There is no thrombus seen in the left ventricle.  Global right ventricular function is normal.  Severe aortic stenosis is present.  IVC diameter <2.1 cm collapsing >50% with sniff suggests a normal RA pressure  of 3 mmHg.  No pericardial effusion is present.  There is no prior study for direct comparison.      Sakina Olivier MD

## 2024-04-05 ENCOUNTER — HOSPITAL ENCOUNTER (OUTPATIENT)
Facility: CLINIC | Age: 79
Discharge: HOME | End: 2024-04-05
Attending: INTERNAL MEDICINE | Admitting: INTERNAL MEDICINE
Payer: MEDICARE

## 2024-04-05 ENCOUNTER — APPOINTMENT (OUTPATIENT)
Dept: MEDSURG UNIT | Facility: CLINIC | Age: 79
End: 2024-04-05
Attending: INTERNAL MEDICINE
Payer: MEDICARE

## 2024-04-05 ENCOUNTER — APPOINTMENT (OUTPATIENT)
Dept: LAB | Facility: CLINIC | Age: 79
End: 2024-04-05
Attending: INTERNAL MEDICINE
Payer: MEDICARE

## 2024-04-05 ENCOUNTER — HOSPITAL ENCOUNTER (OUTPATIENT)
Dept: CT IMAGING | Facility: CLINIC | Age: 79
Discharge: HOME | End: 2024-04-05
Attending: INTERNAL MEDICINE | Admitting: INTERNAL MEDICINE
Payer: MEDICARE

## 2024-04-05 VITALS
RESPIRATION RATE: 15 BRPM | TEMPERATURE: 97.4 F | SYSTOLIC BLOOD PRESSURE: 142 MMHG | OXYGEN SATURATION: 95 % | WEIGHT: 78.3 LBS | DIASTOLIC BLOOD PRESSURE: 65 MMHG | BODY MASS INDEX: 15.29 KG/M2 | HEART RATE: 81 BPM

## 2024-04-05 DIAGNOSIS — I35.0 SEVERE AORTIC STENOSIS BY PRIOR ECHOCARDIOGRAM: ICD-10-CM

## 2024-04-05 DIAGNOSIS — I35.0 SEVERE AORTIC STENOSIS: ICD-10-CM

## 2024-04-05 PROBLEM — Z98.890 STATUS POST CORONARY ANGIOGRAM: Status: ACTIVE | Noted: 2024-04-05

## 2024-04-05 LAB
ACT BLD: 174 SECONDS (ref 74–150)
ACT BLD: 186 SECONDS (ref 74–150)
ACT BLD: 220 SECONDS (ref 74–150)
ACT BLD: 530 SECONDS (ref 74–150)
ANION GAP SERPL CALCULATED.3IONS-SCNC: 12 MMOL/L (ref 7–15)
BUN SERPL-MCNC: 21.6 MG/DL (ref 8–23)
CALCIUM SERPL-MCNC: 9.5 MG/DL (ref 8.8–10.2)
CHLORIDE SERPL-SCNC: 101 MMOL/L (ref 98–107)
CREAT SERPL-MCNC: 0.79 MG/DL (ref 0.51–0.95)
DEPRECATED HCO3 PLAS-SCNC: 24 MMOL/L (ref 22–29)
EGFRCR SERPLBLD CKD-EPI 2021: 76 ML/MIN/1.73M2
ERYTHROCYTE [DISTWIDTH] IN BLOOD BY AUTOMATED COUNT: 14.9 % (ref 10–15)
GLUCOSE SERPL-MCNC: 97 MG/DL (ref 70–99)
HCT VFR BLD AUTO: 38.4 % (ref 35–47)
HGB BLD-MCNC: 11.2 G/DL (ref 11.7–15.7)
HGB BLD-MCNC: 12.9 G/DL (ref 11.7–15.7)
INR PPP: 1.15 (ref 0.85–1.15)
MCH RBC QN AUTO: 32.5 PG (ref 26.5–33)
MCHC RBC AUTO-ENTMCNC: 33.6 G/DL (ref 31.5–36.5)
MCV RBC AUTO: 97 FL (ref 78–100)
PLATELET # BLD AUTO: 189 10E3/UL (ref 150–450)
POTASSIUM SERPL-SCNC: 4.2 MMOL/L (ref 3.4–5.3)
RBC # BLD AUTO: 3.97 10E6/UL (ref 3.8–5.2)
SODIUM SERPL-SCNC: 137 MMOL/L (ref 135–145)
WBC # BLD AUTO: 7.1 10E3/UL (ref 4–11)

## 2024-04-05 PROCEDURE — 85347 COAGULATION TIME ACTIVATED: CPT

## 2024-04-05 PROCEDURE — 93456 R HRT CORONARY ARTERY ANGIO: CPT | Performed by: INTERNAL MEDICINE

## 2024-04-05 PROCEDURE — 99152 MOD SED SAME PHYS/QHP 5/>YRS: CPT | Mod: GC | Performed by: INTERNAL MEDICINE

## 2024-04-05 PROCEDURE — 258N000003 HC RX IP 258 OP 636: Performed by: INTERNAL MEDICINE

## 2024-04-05 PROCEDURE — 250N000009 HC RX 250: Performed by: INTERNAL MEDICINE

## 2024-04-05 PROCEDURE — C1726 CATH, BAL DIL, NON-VASCULAR: HCPCS | Performed by: INTERNAL MEDICINE

## 2024-04-05 PROCEDURE — 71275 CT ANGIOGRAPHY CHEST: CPT | Mod: 26 | Performed by: INTERNAL MEDICINE

## 2024-04-05 PROCEDURE — 85018 HEMOGLOBIN: CPT | Mod: 91

## 2024-04-05 PROCEDURE — 99152 MOD SED SAME PHYS/QHP 5/>YRS: CPT | Performed by: INTERNAL MEDICINE

## 2024-04-05 PROCEDURE — G1010 CDSM STANSON: HCPCS | Performed by: INTERNAL MEDICINE

## 2024-04-05 PROCEDURE — 250N000011 HC RX IP 250 OP 636: Performed by: INTERNAL MEDICINE

## 2024-04-05 PROCEDURE — 93005 ELECTROCARDIOGRAM TRACING: CPT

## 2024-04-05 PROCEDURE — 85610 PROTHROMBIN TIME: CPT | Performed by: INTERNAL MEDICINE

## 2024-04-05 PROCEDURE — 999N000054 HC STATISTIC EKG NON-CHARGEABLE

## 2024-04-05 PROCEDURE — 80048 BASIC METABOLIC PNL TOTAL CA: CPT | Performed by: INTERNAL MEDICINE

## 2024-04-05 PROCEDURE — 74174 CTA ABD&PLVS W/CONTRAST: CPT | Mod: 26 | Performed by: INTERNAL MEDICINE

## 2024-04-05 PROCEDURE — C1894 INTRO/SHEATH, NON-LASER: HCPCS | Performed by: INTERNAL MEDICINE

## 2024-04-05 PROCEDURE — 74174 CTA ABD&PLVS W/CONTRAST: CPT | Mod: MG

## 2024-04-05 PROCEDURE — 93456 R HRT CORONARY ARTERY ANGIO: CPT | Mod: 26 | Performed by: INTERNAL MEDICINE

## 2024-04-05 PROCEDURE — 999N000142 HC STATISTIC PROCEDURE PREP ONLY

## 2024-04-05 PROCEDURE — 272N000001 HC OR GENERAL SUPPLY STERILE: Performed by: INTERNAL MEDICINE

## 2024-04-05 PROCEDURE — 36415 COLL VENOUS BLD VENIPUNCTURE: CPT | Performed by: INTERNAL MEDICINE

## 2024-04-05 PROCEDURE — C1887 CATHETER, GUIDING: HCPCS | Performed by: INTERNAL MEDICINE

## 2024-04-05 PROCEDURE — 250N000011 HC RX IP 250 OP 636: Performed by: STUDENT IN AN ORGANIZED HEALTH CARE EDUCATION/TRAINING PROGRAM

## 2024-04-05 PROCEDURE — 999N000134 HC STATISTIC PP CARE STAGE 3

## 2024-04-05 PROCEDURE — C1751 CATH, INF, PER/CENT/MIDLINE: HCPCS | Performed by: INTERNAL MEDICINE

## 2024-04-05 PROCEDURE — 85041 AUTOMATED RBC COUNT: CPT | Performed by: INTERNAL MEDICINE

## 2024-04-05 RX ORDER — NALOXONE HYDROCHLORIDE 0.4 MG/ML
0.4 INJECTION, SOLUTION INTRAMUSCULAR; INTRAVENOUS; SUBCUTANEOUS
Status: DISCONTINUED | OUTPATIENT
Start: 2024-04-05 | End: 2024-04-05 | Stop reason: HOSPADM

## 2024-04-05 RX ORDER — IOPAMIDOL 755 MG/ML
120 INJECTION, SOLUTION INTRAVASCULAR ONCE
Status: COMPLETED | OUTPATIENT
Start: 2024-04-05 | End: 2024-04-05

## 2024-04-05 RX ORDER — ASPIRIN 81 MG/1
243 TABLET, CHEWABLE ORAL ONCE
Status: COMPLETED | OUTPATIENT
Start: 2024-04-05 | End: 2024-04-05

## 2024-04-05 RX ORDER — HEPARIN SODIUM 1000 [USP'U]/ML
INJECTION, SOLUTION INTRAVENOUS; SUBCUTANEOUS
Status: DISCONTINUED | OUTPATIENT
Start: 2024-04-05 | End: 2024-04-05 | Stop reason: HOSPADM

## 2024-04-05 RX ORDER — SODIUM CHLORIDE 9 MG/ML
INJECTION, SOLUTION INTRAVENOUS CONTINUOUS
Status: DISCONTINUED | OUTPATIENT
Start: 2024-04-05 | End: 2024-04-05 | Stop reason: HOSPADM

## 2024-04-05 RX ORDER — SODIUM CHLORIDE 9 MG/ML
INJECTION, SOLUTION INTRAVENOUS CONTINUOUS
Status: SHIPPED | OUTPATIENT
Start: 2024-04-05 | End: 2024-04-05

## 2024-04-05 RX ORDER — ASPIRIN 325 MG
325 TABLET ORAL ONCE
Status: COMPLETED | OUTPATIENT
Start: 2024-04-05 | End: 2024-04-05

## 2024-04-05 RX ORDER — OXYCODONE HYDROCHLORIDE 5 MG/1
5 TABLET ORAL EVERY 4 HOURS PRN
Status: DISCONTINUED | OUTPATIENT
Start: 2024-04-05 | End: 2024-04-05 | Stop reason: HOSPADM

## 2024-04-05 RX ORDER — LIDOCAINE 40 MG/G
CREAM TOPICAL
Status: DISCONTINUED | OUTPATIENT
Start: 2024-04-05 | End: 2024-04-05 | Stop reason: HOSPADM

## 2024-04-05 RX ORDER — NALOXONE HYDROCHLORIDE 0.4 MG/ML
0.2 INJECTION, SOLUTION INTRAMUSCULAR; INTRAVENOUS; SUBCUTANEOUS
Status: DISCONTINUED | OUTPATIENT
Start: 2024-04-05 | End: 2024-04-05 | Stop reason: HOSPADM

## 2024-04-05 RX ORDER — POTASSIUM CHLORIDE 750 MG/1
40 TABLET, EXTENDED RELEASE ORAL
Status: DISCONTINUED | OUTPATIENT
Start: 2024-04-05 | End: 2024-04-05 | Stop reason: HOSPADM

## 2024-04-05 RX ORDER — FENTANYL CITRATE 50 UG/ML
25 INJECTION, SOLUTION INTRAMUSCULAR; INTRAVENOUS
Status: DISCONTINUED | OUTPATIENT
Start: 2024-04-05 | End: 2024-04-05 | Stop reason: HOSPADM

## 2024-04-05 RX ORDER — NICARDIPINE HYDROCHLORIDE 2.5 MG/ML
INJECTION INTRAVENOUS
Status: DISCONTINUED | OUTPATIENT
Start: 2024-04-05 | End: 2024-04-05 | Stop reason: HOSPADM

## 2024-04-05 RX ORDER — POTASSIUM CHLORIDE 750 MG/1
20 TABLET, EXTENDED RELEASE ORAL
Status: DISCONTINUED | OUTPATIENT
Start: 2024-04-05 | End: 2024-04-05 | Stop reason: HOSPADM

## 2024-04-05 RX ORDER — FENTANYL CITRATE 50 UG/ML
INJECTION, SOLUTION INTRAMUSCULAR; INTRAVENOUS
Status: DISCONTINUED | OUTPATIENT
Start: 2024-04-05 | End: 2024-04-05 | Stop reason: HOSPADM

## 2024-04-05 RX ORDER — OXYCODONE HYDROCHLORIDE 10 MG/1
10 TABLET ORAL EVERY 4 HOURS PRN
Status: DISCONTINUED | OUTPATIENT
Start: 2024-04-05 | End: 2024-04-05 | Stop reason: HOSPADM

## 2024-04-05 RX ORDER — ATROPINE SULFATE 0.1 MG/ML
0.5 INJECTION INTRAVENOUS
Status: DISCONTINUED | OUTPATIENT
Start: 2024-04-05 | End: 2024-04-05 | Stop reason: HOSPADM

## 2024-04-05 RX ORDER — IOPAMIDOL 755 MG/ML
INJECTION, SOLUTION INTRAVASCULAR
Status: DISCONTINUED | OUTPATIENT
Start: 2024-04-05 | End: 2024-04-05 | Stop reason: HOSPADM

## 2024-04-05 RX ORDER — FLUMAZENIL 0.1 MG/ML
0.2 INJECTION, SOLUTION INTRAVENOUS
Status: DISCONTINUED | OUTPATIENT
Start: 2024-04-05 | End: 2024-04-05 | Stop reason: HOSPADM

## 2024-04-05 RX ADMIN — IOPAMIDOL 120 ML: 755 INJECTION, SOLUTION INTRAVENOUS at 11:49

## 2024-04-05 RX ADMIN — SODIUM CHLORIDE: 9 INJECTION, SOLUTION INTRAVENOUS at 13:40

## 2024-04-05 ASSESSMENT — ACTIVITIES OF DAILY LIVING (ADL)
ADLS_ACUITY_SCORE: 35

## 2024-04-05 NOTE — PROGRESS NOTES
Cardiac Surgery TAVR Evaluation    Loulou Lucero MRN# [id number]   YOB: 1945 Age: 79 year old            Assessment and Plan:   Loulou Lucero is a 79-year-old woman with a history of MGUS, smoking, iron deficiency anemia who is being evaluated for severe aortic stenosis. I had a telephone conversation today with the patient and her daughter about options for aortic valve intervention, which included TAVR and SAVR. I support the recommendation to proceed with transcatheter aortic valve replacement, pending completion of the preoperative workup.     I described the technical details, as well as the expected postoperative course and recovery to the patient. I also discussed the risks and benefits of the procedure. The risks include but are not limited to bleeding, infection, stroke, myocardial infarction, dysrhythmia and need for pacemaker, respiratory failure, kidney or liver injury, bowel or limb ischemia, and death. The patient understands these risks and wishes to undergo the operation.     A ronny discussion was had with the patient and her daughter regarding the possible need for surgical bailout should a rare complication arise during the TAVR procedure such as aortic rupture or dissection. Though these complications are rare at <1% incidence, they carry a major morbidity and mortality risk of at least 50%. I explained the morbidity among survivors may include dialysis, heart attack, stroke, prolonged ventilation, loss of limb, and loss of independent living situation. At present the patient remains undecided about whether or not she wants to have surgical bailout for this procedure. She and her daughter would like more time to discuss this amongst family and make the decision. Though she is frail, I think she is an appropriate candidate for surgical bailout should that be in line with her wishes. We will need to confirm her final decision before proceeding with TAVR procedure. In the case of a  peripheral vascular injury she would be okay with groin incision and peripheral vascular repair if needed.         History of Present Illness:   This patient is a 79-year-old woman with a history of MGUS, smoking, iron deficiency anemia who is being evaluated for severe aortic stenosis. Her severe aortic stenosis is characterized with an area of 0.7 cm2, mean gradient 33 mmHg and peak velocity of 3.6 m/sec with LVEF 60% by echocardiogram on 11/28/23. CT scan is notable for significant aortic calcification in ascending aorta and aortic arch.     She has ongoing symptoms related to aortic stenosis with chronic shortness of breath. She denies chest pain or syncope.               Past Medical History:     Past Medical History:   Diagnosis Date    Asymptomatic varicose veins of lower extremity     6/12/2012    Benign paroxysmal vertigo     12/29/2010    Chronic obstructive pulmonary disease (H)     12/29/2010    Diarrhea     10/1/2015    Disorder of cartilage     Hip; Last dxa 06/2010    Diverticulosis of large intestine without perforation or abscess without bleeding          Lower abdominal pain     10/1/2015    Other disorders of lung (CODE)     Stable since July of 2002. RU lobe.    Personal history of other medical treatment (CODE)     L3, L4-4; Last MRI 7/09/12    Personal history of other medical treatment (CODE)     G-3, P-2, A-0  (Son had SIDS)    Zoster without complications     3/31/2012            Past Surgical History:     Past Surgical History:   Procedure Laterality Date    CATARACT EXTRACTION Right     8/2023    COLONOSCOPY  03/22/2010    Normal; + family hx, next due 2015    COLONOSCOPY  10/01/2015    W/ POLYPECTOMY recommend follow up in 2020.    COLONOSCOPY N/A 11/07/2019    4 tubular adenoma, 3 year follow up    ESOPHAGOSCOPY, GASTROSCOPY, DUODENOSCOPY (EGD), COMBINED  04/23/2014    Arce's esophagus fu egd 2 yrs    ESOPHAGOSCOPY, GASTROSCOPY, DUODENOSCOPY (EGD), COMBINED N/A 11/07/2019     Procedure: ESOPHAGOGASTRODUODENOSCOPY, WITH BIOPSY;  Surgeon: Santosh Barrera MD;  Location: [facility] OR    LAPAROSCOPIC TUBAL LIGATION  1978         TONSILLECTOMY & ADENOIDECTOMY  1951                  Family History:     Family History   Problem Relation Age of Onset    Colon Cancer Father         Cancer-colon    Other - See Comments Mother         Alzheimer's    Other - See Comments Maternal Grandmother         Alzheimer's    Other - See Comments Brother         Alzheimer's    Other - See Comments Brother         aneurysm and stents    Cancer Brother         Cancer,lung    Cancer Brother         Cancer,skin    Other - See Comments Brother         cirrhosis of the liver    Other - See Comments Maternal Uncle         3, Alzheimer's    Breast Cancer No family hx of         Cancer-breast              Social History:     Social History     Socioeconomic History    Marital status:      Spouse name: Not on file    Number of children: Not on file    Years of education: Not on file    Highest education level: Not on file   Occupational History    Not on file   Tobacco Use    Smoking status: Every Day     Packs/day: 0.50     Years: 63.00     Additional pack years: 0.00     Total pack years: 31.50     Types: Cigarettes     Start date: 1961     Passive exposure: Past    Smokeless tobacco: Never    Tobacco comments:     Passive exposure in adult home.    Vaping Use    Vaping Use: Never used   Substance and Sexual Activity    Alcohol use: Not Currently     Comment: rarely at a wedding or special occasion    Drug use: No    Sexual activity: Not Currently   Other Topics Concern    Parent/sibling w/ CABG, MI or angioplasty before 65F 55M? Not Asked   Social History Narrative    Patient in second marriage. Has two living children, both live in [place], [place]. Has 2 grandchildren. Is retired, worked at [employer] in [place].      Patient currently smokes, 1 ppd x 50 yrs. Smokes less in the winter months due to not smoking in the  house    .   Alcohol Use - no  - disabled.     Social Determinants of Health     Financial Resource Strain: Low Risk  (3/21/2024)    Financial Resource Strain     Within the past 12 months, have you or your family members you live with been unable to get utilities (heat, electricity) when it was really needed?: No   Food Insecurity: Low Risk  (3/21/2024)    Food Insecurity     Within the past 12 months, did you worry that your food would run out before you got money to buy more?: No     Within the past 12 months, did the food you bought just not last and you didn t have money to get more?: No   Transportation Needs: Low Risk  (3/21/2024)    Transportation Needs     Within the past 12 months, has lack of transportation kept you from medical appointments, getting your medicines, non-medical meetings or appointments, work, or from getting things that you need?: No   Physical Activity: Unknown (1/31/2024)    Exercise Vital Sign     Days of Exercise per Week: 0 days     Minutes of Exercise per Session: Not on file   Stress: No Stress Concern Present (1/31/2024)    [employer] [place] [employer] - Occupational Stress Questionnaire     Feeling of Stress : Only a little   Social Connections: Unknown (1/31/2024)    Social Connection and Isolation Panel [NHANES]     Frequency of Communication with Friends and Family: Not on file     Frequency of Social Gatherings with Friends and Family: Once a week     Attends Worship Services: Not on file     Active Member of Clubs or Organizations: Not on file     Attends Club or Organization Meetings: Not on file     Marital Status: Not on file   Interpersonal Safety: Low Risk  (3/21/2024)    Interpersonal Safety     Do you feel physically and emotionally safe where you currently live?: Yes     Within the past 12 months, have you been hit, slapped, kicked or otherwise physically hurt by someone?: No     Within the past 12 months, have you been humiliated or emotionally  abused in other ways by your partner or ex-partner?: No   Housing Stability: Low Risk  (3/21/2024)    Housing Stability     Do you have housing? : Yes     Are you worried about losing your housing?: No              Allergies:     Allergies   Allergen Reactions    Metronidazole Nausea and Vomiting    Pneumococcal Vaccine      Other reaction(s): Edema  Arm swelling    Tramadol Visual Disturbance     Hallucinations                 Medications:     Current Outpatient Medications   Medication Sig Dispense Refill    aspirin 81 MG EC tablet Take 81 mg by mouth daily      cyanocobalamin (VITAMIN B-12) 1000 MCG tablet Take 1 tablet (1,000 mcg) by mouth daily 90 tablet 3    fluticasone-salmeterol (ADVAIR DISKUS) 250-50 MCG/ACT inhaler Inhale 1 puff into the lungs 2 times daily WIXELA-Disp as covered by Pt's insurance 3 each 2    simvastatin (ZOCOR) 20 MG tablet Take 1 tablet (20 mg) by mouth at bedtime 90 tablet 2    SV IRON 325 (65 Fe) MG tablet Take 1 tablet by mouth twice daily 60 tablet 0     No current facility-administered medications for this visit.             Review of Systems:   ROS: 10 point review of systems was performed and negative except as described above.         Physical Exam:   LMP 01/01/1995 (Approximate)    Alert, pleasant, in no acute distress  Sclera anicteric  Neck supple JVD flat  Trachea midline  No prior chest incisions  Breathing unlabored on room air  Regular rate and rhythm  Abdomen soft, non-tender  Extremities warm, no edema          Labs:   CBC RESULTS:   Recent Labs   Lab Test 01/17/24  1329   WBC 6.2   RBC 3.66*   HGB 9.9*   HCT 30.9*   MCV 84   MCH 27.0   MCHC 32.0   RDW 16.1*         Last Comprehensive Metabolic Panel:  Lab Results   Component Value Date     01/17/2024    POTASSIUM 3.8 01/17/2024    CHLORIDE 101 01/17/2024    CO2 29 01/17/2024    ANIONGAP 10 01/17/2024    GLC 89 01/17/2024    BUN 16.8 01/17/2024    CR 0.85 01/17/2024    GFRESTIMATED 70 01/17/2024    DION 9.7  01/17/2024            Imaging:   All imaging studies were reviewed and interpreted by me.    Echocardiogram:  11/28/23  Interpretation Summary  Global and regional left ventricular function is normal with an EF of 60-65%.  There is no thrombus seen in the left ventricle.  Global right ventricular function is normal.  Severe aortic stenosis is present.  IVC diameter <2.1 cm collapsing >50% with sniff suggests a normal RA pressure  of 3 mmHg.  No pericardial effusion is present.  There is no prior study for direct comparison.      Sakina Olivier MD

## 2024-04-05 NOTE — PROGRESS NOTES
Pt prepped for CORS and RHC. PIV infusing fluids per MAR. Groins prepped, pulses marked (R DP +2 and L DP doppler). Daughter, Danni, at bedside. Consent needs to be signed. Labs resulted.

## 2024-04-05 NOTE — Clinical Note
Report called to 2A RNRay. Procedure indication, procedure, outcome, and post-status reviewed. The pt is returned to 2A per stretcher in awake and stable condition.

## 2024-04-05 NOTE — PROGRESS NOTES
D/I/A: Pt roomed on 2A in room 8.  Arrived via litter and accompanied by staff RN On/Off: On monitor.  VSSA.  Rhythm upon arrival SR on monitor.  Denies pain or sob.  Reviewed activity restrictions and when to notify RN, ie-changes to breathing or increased chest pressure or chest pain.  CCL access:  R radial and R internal jugular with sheath stilll in place.  P: Continue to monitor status.  Discharge to home once meeting criteria.

## 2024-04-05 NOTE — PROGRESS NOTES
D/I/A: Manual pressure held for 7 minutes to hemostasis.  Sheath, size 7,  pulled by ALINA Bell.  Site CDI, no hematoma.  Stasis achieved at 0703. Off bedrest @ now.  A+O x4 and making needs known.  Denies pain or sob.  VSSA.  Tolerated sheath removal well.  P: Continue to monitor status.  Discharge to home once meeting criteria.

## 2024-04-05 NOTE — Clinical Note
Hemodynamic equipment used: 5 lead ECG, manual BP cuff, Calometric ETCO2 device, Machine BP Cuff and pulse oximeter probe.

## 2024-04-05 NOTE — DISCHARGE INSTRUCTIONS
Going Home after an Angioplasty or Stent Placement (Cardiac) and Right Heart Cath      CALL YOUR PRIMARY PHYSICIAN IF: You may resume all normal activity.  Monitor right neck site for bleeding, swelling, or voice changes. If you notice bleeding or swelling immediately apply pressure to the site and call number below to speak with Cardiology Fellow.  If you experience any changes in your breathing you should call your doctor immediately or come to the closest Emergency Department.  Do not drive yourself.    After you go home:  Have an adult stay with you for 24 hours.  Drink plenty of fluids.  You may eat your normal diet, unless your doctor tells you otherwise.  For 24 hours:  - Relax and take it easy.  - Do NOT smoke.  - Do NOT make any important or legal decisions.  - Do NOT drive or operate machines at home or at work.  - Do NOT drink alcohol.  Remove the Band-Aid after 24 hours. If there is minor oozing, apply another Band-aid and remove it after 12 hours.  For 2 days, do NOT have sex or do any heavy exercise.  Do NOT take a bath, or use a hot tub or pool for at least 3 days. You may shower.    Care of wrist or arm site  It is normal to have soreness at the puncture site and mild tingling in your hand for up to 3 days.  For 2 days, do not use your hand or arm to support your weight (such as rising from a chair) or bend your wrist (such as lifting a garage door).  For 2 days, do not lift more than 5 pounds or exercise your arm (tennis, golf or bowling).      If you start bleeding from the site in your arm:  Sit down and press firmly on the site with your fingers for 10 minutes. Call your doctor as soon as you can.  If the bleeding stops, sit still and keep your wrist straight for 2 hours.    Call your doctor if:  You have a large or growing hard lump around the site.    The site is red, swollen, hot or tender.  Blood or fluid is draining from the site.  You have chills or a fever greater than 101 F (38 C).  Your  leg or arm turns bluish, feels numb or cool.  You have hives, a rash or unusual itching.    Call 911 right away if you have:   Bleeding that does not stop.   Heavy bleeding.                                              Telephone Numbers [phone] Monday-Friday 8:00 am to 4:30 pm    [phone] After 4:30 pm Monday-Friday, Weekends & Holidays  Ask for Interventional Cardiologist on call. Someone is on call 24 hours/day   [facility] toll free number [phone] Monday-Friday 8:00 am to 4:30 pm   [facility] Emergency Dept [phone]

## 2024-04-05 NOTE — Clinical Note
dry, intact, no bleeding and no hematoma. Right internal jugular vein 7 FR sheath to be removed by 2A RN. Caps and TegaDERM APPLIED, SHEATH SECURED.

## 2024-04-06 LAB
ATRIAL RATE - MUSE: 88 BPM
DIASTOLIC BLOOD PRESSURE - MUSE: NORMAL MMHG
INTERPRETATION ECG - MUSE: NORMAL
P AXIS - MUSE: 83 DEGREES
PR INTERVAL - MUSE: 142 MS
QRS DURATION - MUSE: 82 MS
QT - MUSE: 392 MS
QTC - MUSE: 474 MS
R AXIS - MUSE: 71 DEGREES
SYSTOLIC BLOOD PRESSURE - MUSE: NORMAL MMHG
T AXIS - MUSE: 56 DEGREES
VENTRICULAR RATE- MUSE: 88 BPM

## 2024-04-06 NOTE — PROGRESS NOTES
D/I/A:  Patient is tolerating liquids and foods, ambulating, urinating, puncture sites are stable ( no bleeding and no hematoma) and patient has a .  A+O x4 and making needs known.  CCL access sites C/D/I; no bleeding or hematoma; CMS intact.  VSSA.  SR on monitor.  IV access removed.  Education completed and outlined in AVS or handout: medications reviewed with patient.  Questions answered prior to discharge.  Belongings returned to patient at discharge.    P: Discharged to self care.  Patient to follow up with appts as per discharge instruction.

## 2024-04-10 DIAGNOSIS — D64.9 ANEMIA, UNSPECIFIED TYPE: ICD-10-CM

## 2024-04-10 RX ORDER — FERROUS SULFATE 325(65) MG
325 TABLET ORAL 2 TIMES DAILY
Qty: 60 TABLET | Refills: 0 | Status: SHIPPED | OUTPATIENT
Start: 2024-04-10 | End: 2024-05-09

## 2024-04-10 NOTE — TELEPHONE ENCOUNTER
Iron      Last Written Prescription Date:  3/11/24  Last Fill Quantity: 60,   # refills: 0  Last Office Visit: 1/23/24  Future Office visit:    Next 5 appointments (look out 90 days)      May 30, 2024  1:30 PM  Return Visit with VEENA Chavez [facility] ([facility] ) [address]   [place] [facility] [address]  [phone]             Routing refill request to provider for review/approval because:

## 2024-04-11 ENCOUNTER — CARE COORDINATION (OUTPATIENT)
Dept: CARDIOLOGY | Facility: CLINIC | Age: 79
End: 2024-04-11
Payer: MEDICARE

## 2024-04-11 NOTE — PROGRESS NOTES
Attempted to contact Danni and Loulou to discuss valve conference decision, left voicemail for both.    Electronically filed by Maya Jones RN   4/11/2024  9:59 AM       Telephone call from aleena and Loulou, see documentation below.     Electronically filed by Maya Jones RN   4/11/2024  2:13 PM

## 2024-04-11 NOTE — PROGRESS NOTES
Patient's case was discussed at Valve conference, attended by structural heart cardiologists, CV surgeons, CNP's, and structural heart care coordinators, on April 11, 2024.  Due to challenging and limited transcatheter access, recommend additional surgical evaluation to assess appropriateness of TAVR vs SAVR.   Limited potential options due to the following:  TAVR: Transfemoral, subclavian, and transcavial is not an option based off vessel size. Only potential access would be transapical requiring assistance of cardiothoracic surgeon.    SAVR: Questionable space to clamp aorta due to significant calcification and potential porcelain aorta.  Plan: Additional clinic visit with CVTS to assess the 2 options above.    Patient contacted to review discussion at the Valve Conference. Through shared decision making, patient agrees with the plan as outlined above. All questions answered. Will contact Danni after discussion with Dr. Olivier in regards to scheduling.       Maya Jones, RN  Lead Structural Heart Care Coordinator  TAVR, MitraClip and Watchman Programs  [facility]  Office: [phone]

## 2024-04-16 ENCOUNTER — CARE COORDINATION (OUTPATIENT)
Dept: CARDIOLOGY | Facility: CLINIC | Age: 79
End: 2024-04-16
Payer: MEDICARE

## 2024-04-16 NOTE — PROGRESS NOTES
Telephone call with Christie. Dr. Olivier has requested that they see Dr. Cross in clinic, but Dr. Olivier will still be involved in the case. Notified them that someone from Dr. Cross's office should be contacting them to schedule.     Maya Jones RN  Lead Structural Heart Coordinator  TAVR, MitraClip and Watchman

## 2024-05-06 ENCOUNTER — OFFICE VISIT (OUTPATIENT)
Dept: CARDIOLOGY | Facility: CLINIC | Age: 79
End: 2024-05-06
Attending: SURGERY
Payer: MEDICARE

## 2024-05-06 VITALS
WEIGHT: 81.5 LBS | SYSTOLIC BLOOD PRESSURE: 130 MMHG | BODY MASS INDEX: 15.92 KG/M2 | HEART RATE: 82 BPM | OXYGEN SATURATION: 95 % | DIASTOLIC BLOOD PRESSURE: 74 MMHG

## 2024-05-06 DIAGNOSIS — I35.0 SEVERE AORTIC STENOSIS: Primary | ICD-10-CM

## 2024-05-06 PROCEDURE — 99213 OFFICE O/P EST LOW 20 MIN: CPT | Performed by: SURGERY

## 2024-05-06 PROCEDURE — G0463 HOSPITAL OUTPT CLINIC VISIT: HCPCS | Performed by: SURGERY

## 2024-05-06 ASSESSMENT — PAIN SCALES - GENERAL: PAINLEVEL: NO PAIN (0)

## 2024-05-06 NOTE — LETTER
5/6/2024      RE: Loulou Lucero  [address] [place] [id number]       Dear Colleague,    Thank you for the opportunity to participate in the care of your patient, Loulou Lucero, at the [facility] [place] [facility]. Please see a copy of my visit note below.        HISTORY:      Loulou Lucero is a very pleasant 79 year old female with severe aortic valvular stenosis referred to discuss options of trans apical approach for severe aortic stenosis.     She has ongoing symptoms related to aortic stenosis with chronic shortness of breath and occasional episodes of anginal chest pain.      Patient is being seen today to discuss severe aortic stenosis recently diagnosed by echocardiogram on 11/28/2023.  LV size and function is normal.  No other significant valve disease.  Aortic valve area is 0.7 cm , mean gradient 30 mm gradient and peak velocity 3.6 m/s.  Prior echocardiogram in  2017 showed nonsignificant aortic valve stenosis.  Patient was recently evaluated in lung nodule clinic for left lower lung nodule which was found to be negative for malignancy.       Patient reports tiredness over the last year or so.  Feels fatigued, tire easily, lightheaded, dizzy and unstable on her feet at times.  Denies syncope or lower extremity edema.  Denies chest pain. She has lost weight for the past couple of years. She will try to eat more and potentially with help of her daughter get more calories in. We discussed how TAVR outcomes are worse in frail and underweight patients.       She has been a smoker for several years.  Currently smoking half to 1 pack a day.         PAST MEDICAL HISTORY:           Past Medical History:   Diagnosis Date     Asymptomatic varicose veins of lower extremity       6/12/2012     Benign paroxysmal vertigo       12/29/2010     Chronic obstructive pulmonary disease (H)       12/29/2010     Diarrhea       10/1/2015     Disorder of  cartilage       Hip; Last dxa 06/2010     Diverticulosis of large intestine without perforation or abscess without bleeding             Lower abdominal pain       10/1/2015     Other disorders of lung (CODE)       Stable since July of 2002. RU lobe.     Personal history of other medical treatment (CODE)       L3, L4-4; Last MRI 7/09/12     Personal history of other medical treatment (CODE)       G-3, P-2, A-0  (Son had SIDS)     Zoster without complications       3/31/2012             PAST SURGICAL HISTORY:            Past Surgical History:   Procedure Laterality Date     COLONOSCOPY   03/22/2010     Normal; + family hx, next due 2015     COLONOSCOPY   10/01/2015     W/ POLYPECTOMY recommend follow up in 2020.     COLONOSCOPY N/A 11/7/2019     4 tubular adenoma, 3 year follow up     ESOPHAGOSCOPY, GASTROSCOPY, DUODENOSCOPY (EGD), COMBINED   04/23/2014     Arce's esophagus fu egd 2 yrs     ESOPHAGOSCOPY, GASTROSCOPY, DUODENOSCOPY (EGD), COMBINED N/A 11/7/2019     Procedure: ESOPHAGOGASTRODUODENOSCOPY, WITH BIOPSY;  Surgeon: Santosh Barrera MD;  Location: [facility] OR     LAPAROSCOPIC TUBAL LIGATION   1978           TONSILLECTOMY & ADENOIDECTOMY   1951                   CURRENT MEDICATIONS:      Current Outpatient Prescriptions          Current Outpatient Medications   Medication Sig Dispense Refill     aspirin 81 MG EC tablet Take 81 mg by mouth daily         cyanocobalamin (VITAMIN B-12) 1000 MCG tablet Take 1 tablet (1,000 mcg) by mouth daily 90 tablet 3     ferrous sulfate (FEROSUL) 325 (65 Fe) MG tablet Take 1 tablet (325 mg) by mouth 2 times daily 60 tablet 1     fluticasone-salmeterol (ADVAIR DISKUS) 250-50 MCG/ACT inhaler Inhale 1 puff into the lungs 2 times daily WIXELA-Disp as covered by Pt's insurance 3 each 2     simvastatin (ZOCOR) 20 MG tablet Take 1 tablet (20 mg) by mouth at bedtime 90 tablet 2             ALLERGIES:      Allergies         Allergies   Allergen Reactions     Metronidazole Nausea and  Vomiting     Pneumococcal Vaccine         Other reaction(s): Edema  Arm swelling     Tramadol Visual Disturbance       Hallucinations                 FAMILY HISTORY:            Family History   Problem Relation Age of Onset     Colon Cancer Father           Cancer-colon     Other - See Comments Mother           Alzheimer's     Other - See Comments Maternal Grandmother           Alzheimer's     Other - See Comments Brother           Alzheimer's     Other - See Comments Brother           aneurysm and stents     Cancer Brother           Cancer,lung     Cancer Brother           Cancer,skin     Other - See Comments Brother           cirrhosis of the liver     Other - See Comments Maternal Uncle           3, Alzheimer's     Breast Cancer No family hx of           Cancer-breast             SOCIAL HISTORY:               Socioeconomic History     Marital status:    Tobacco Use     Smoking status: Every Day       Packs/day: 0.50       Years: 63.00       Additional pack years: 0.00       Total pack years: 31.50       Types: Cigarettes       Start date: 1961       Passive exposure: Past     Smokeless tobacco: Never     Tobacco comments:       Passive exposure in adult home.    Vaping Use     Vaping Use: Never used   Substance and Sexual Activity     Alcohol use: Not Currently       Comment: rarely at a wedding or special occasion     Drug use: No     Sexual activity: Not Currently   Social History Narrative     Patient in second marriage. Has two living children, both live in [place], [place]. Has 2 grandchildren. Is retired, worked at [employer] in [place].       Patient currently smokes, 1 ppd x 50 yrs. Smokes less in the winter months due to not smoking in the house     .   Alcohol Use - no  - disabled.      Social Determinants of Health           Financial Resource Strain: Low Risk  (1/31/2024)     Financial Resource Strain       Within the past 12 months, have you or your family members you live with been unable  to get utilities (heat, electricity) when it was really needed?: No   Food Insecurity: Low Risk  (1/31/2024)     Food Insecurity       Within the past 12 months, did you worry that your food would run out before you got money to buy more?: No       Within the past 12 months, did the food you bought just not last and you didn t have money to get more?: No   Transportation Needs: Low Risk  (1/31/2024)     Transportation Needs       Within the past 12 months, has lack of transportation kept you from medical appointments, getting your medicines, non-medical meetings or appointments, work, or from getting things that you need?: No   Physical Activity: Unknown (1/31/2024)     Exercise Vital Sign       Days of Exercise per Week: 0 days   Stress: No Stress Concern Present (1/31/2024)     [employer] - Occupational Stress Questionnaire       Feeling of Stress : Only a little   Social Connections: Unknown (1/31/2024)     Social Connection and Isolation Panel [NHANES]       Frequency of Social Gatherings with Friends and Family: Once a week   Interpersonal Safety: Low Risk  (1/31/2024)     Interpersonal Safety       Do you feel physically and emotionally safe where you currently live?: Yes       Within the past 12 months, have you been hit, slapped, kicked or otherwise physically hurt by someone?: No       Within the past 12 months, have you been humiliated or emotionally abused in other ways by your partner or ex-partner?: No   Housing Stability: Low Risk  (1/31/2024)     Housing Stability       Do you have housing? : Yes       Are you worried about losing your housing?: No          REVIEW OF SYSTEMS:      10 point review of systems within normal other than mentioned in history and physical.       PHYSICAL EXAMINATION:      /74 (BP Location: Right arm, Patient Position: Sitting, Cuff Size: Adult Regular)   Pulse 89   Resp 22   Ht 1.524 m (5')   Wt 35.5 kg (78 lb 3.2 oz)   LMP 01/01/1995  (Approximate)   SpO2 96%   Breastfeeding No   BMI 15.27 kg/m       GENERAL: No acute distress.  HEENT: EOMI. Sclerae white, not injected. Nares clear. Pharynx without erythema or exudate.   Neck: No adenopathy. No thyromegaly. Symmetrical.   Heart: Regular rate and rhythm. Harsh crescendo decrescendo late peaking systolic murmur with radiation to carotids. Delayed carotid pulses.   Lungs: Clear to auscultation. No ronchi, wheezes, rales.   Abdomen: Soft, nontender, nondistended. Bowel sounds present.  Extremities: No clubbing, cyanosis, or edema.   Neurologic: Alert and oriented to person/place/time, normal speech and affect. No focal deficits.  Skin: No petechiae, purpura or rash.      LABORATORY DATA:      LIPID RESULTS:        Lab Results   Component Value Date     CHOL 216 (H) 10/18/2022     CHOL 205 (H) 10/09/2020     HDL 66 10/18/2022     HDL 63 10/09/2020      (H) 10/18/2022      (H) 10/09/2020     TRIG 174 (H) 10/18/2022     TRIG 109 10/09/2020         LIVER ENZYME RESULTS:        Lab Results   Component Value Date     AST 24 01/17/2024     AST 19 10/09/2020     ALT 11 01/17/2024     ALT 9 10/09/2020         CBC RESULTS:        Lab Results   Component Value Date     WBC 6.2 01/17/2024     WBC 7.0 10/24/2019     RBC 3.66 (L) 01/17/2024     RBC 3.89 10/24/2019     HGB 9.9 (L) 01/17/2024     HGB 12.1 10/24/2019     HCT 30.9 (L) 01/17/2024     HCT 36.5 10/24/2019     MCV 84 01/17/2024     MCV 94 10/24/2019     MCH 27.0 01/17/2024     MCH 31.1 10/24/2019     MCHC 32.0 01/17/2024     MCHC 33.2 10/24/2019     RDW 16.1 (H) 01/17/2024     RDW 12.9 10/24/2019      01/17/2024      10/24/2019         BMP RESULTS:        Lab Results   Component Value Date      01/17/2024      10/09/2020     POTASSIUM 3.8 01/17/2024     POTASSIUM 4.6 10/18/2022     POTASSIUM 4.0 10/09/2020     CHLORIDE 101 01/17/2024     CHLORIDE 103 10/18/2022     CHLORIDE 102 10/09/2020     CO2 29 01/17/2024     " CO2 34 (H) 10/18/2022     CO2 33 (H) 10/09/2020     ANIONGAP 10 2024     ANIONGAP 6 10/18/2022     ANIONGAP 7 10/09/2020     GLC 89 2024     GLC 94 10/18/2022      (H) 10/09/2020     BUN 16.8 2024     BUN 15 10/18/2022     BUN 21 10/09/2020     CR 0.85 2024     CR 1.00 10/09/2020     GFRESTIMATED 70 2024     GFRESTIMATED 54 (L) 10/09/2020     GFRESTBLACK 65 10/09/2020     DION 9.7 2024     DION 10.0 10/09/2020         A1C RESULTS:  No results found for: \"A1C\"     INR RESULTS:  No results found for: \"INR\"         PROCEDURES & FURTHER ASSESSMENTS:      EC2024  Sinus rhythm, normal axis, LVH           Echocardiogram:  23     Interpretation Summary  Global and regional left ventricular function is normal with an EF of 60-65%.  There is no thrombus seen in the left ventricle.  Global right ventricular function is normal.  Severe aortic stenosis is present.  IVC diameter <2.1 cm collapsing >50% with sniff suggests a normal RA pressure  of 3 mmHg.  No pericardial effusion is present.  There is no prior study for direct comparison.     Severe mitral annular calcification is present. Trace mitral insufficiency is  present. Mild mitral stenosis is present. The mean mitral valve gradient is  6.7 mmHg. The mitral valve area is 3.0 cm^2.  ________________________________________     CT TAVR:     FINDINGS:     1.  Severely calcified tricuspid aortic valve. The aortic valve  calcium score is 3223. The PETER is 0.83 cm2 by CT planimetry. The LVOT  is not calcified. The ascending aorta is severely calcified, aortic  arch is  severely calcified, the descending thoracic aorta is severely  calcified. There is severe mitral annular calcification.  2.  There is moderate calcification of the aortic root as well as  borderline left coronary height.   3.  There are severe native coronary calcifications.  4.  The arch vessel branching pattern is normal variant, with a single  common " origin of the innominate and left common carotid arteries.   5.  The suprarenal abdominal aorta is severely calcified; infrarenal  abdominal aorta is severely calcified. There is significant additional  atherosclerotic disease diffusely throughout aorta.   6.  Widely patent origins of celiac trunk, superior mesenteric artery  and inferior mesenteric artery.  Single bilateral renal arteries with  widely patent origins with moderate calcifications present.   7.  Femoral access site issues: Minimal luminal dimensions are 4.7 mm  x 4.1 mm in the right common femoral artery and 4.7 mm x 2.4 mm in the  left common femoral artery. There is a small focal contained  dissection flap in the right common iliac artery and a large ulcerated  plaque in the proximal left common iliac artery.   8.  Subclavian access site issues: there is significant stenosis of  the bilateral subclavian arteries.  Minimal dimensions in the right  subclavian artery are 6.4 mm x  2.4 mm at a focal area of extrinsic  compression by the clavicle. The left subclavian artery has sequential  stenoses with dimensions 5.5 mm x  3.9 mm in mid segment (due to  stenosis from non-calcified plaque) and 6.4 x 2.6 mm more distally due  to extrinsic compression by the clavicle.   9.  Transcaval access issues. The window for transcaval access is 11.7  cm in length due to extensive aortic calcification facing the inferior  vena cava in the remainder of the infrarenal abdominal aorta.      MEASUREMENTS:   Representative dimensions of the thoracoabdominal aorta are as  follows:     1. AORTIC ANNULUS MEASUREMENTS:     1.  The average aortic annulus diameter is 20.5 mm, (long diameter is  23.1 mm and short diameter is 18.7 mm)  2.  Aortic annulus area is 3.32 cm2  3.  Aortic annulus perimeter is 66.7 mm  4.  Suggested 3-cusp coaxial angle for aortic valve is (REYNA 15 , CAU  15) and alternate A-P coaxial angle is (MONTILLA 0 , CAU 22), these angles  will vary depending upon the  patient's body position  5.  Aortic root angle is 26.9 degrees  6.  Left main - Annulus distance: 11.5 mm, RCA - Annulus distance:  14.6 mm     2. LVOT MEASUREMENTS:     1.  The average LVOT diameter (measured 4 mm below annular plane) is  19.8 mm  2.  LVOT area is 3.07 cm2  3.  LVOT perimeter is 63.8 mm     3. AORTA MEASUREMENTS     1.  The aortic root at the sinuses of Valsalva: 25.8 mm x  24.1 mm x  25.0 mm  2.  The sinotubular junction:  23.3 mm x 21.5 mm  3.  Sinotubular junction height: 19.3 mm x 19.2 mm  4.  Proximal ascending aorta: 27.5 mm x 26.9 mm  5.  Distal abdominal aorta proximal to the bifurcation: 12.7 mm x 11.1  mm  6.  Innominate artery 3 cm from ostium: 10.4 mm x 7.7 mm  7.  Left common carotid artery 3 cm from ostium: 6.4 mm x 6.2 mm     4. ILIOFEMORAL MEASUREMENTS:     RIGHT:  There is a small contained focal dissection flap in the right common  iliac artery.  1.  Right common iliac artery: 5.3 mm x 4.6 mm.   2.  Right external iliac artery: 5.4 mm x 4.7 mm   3.  Right common femoral artery: 4.7 mm x 4.1 mm   4.  Tortuosity: mild   5.  Calcification: severe      LEFT:  There is a large ulcerated atheroma in the proximal left common iliac  artery.  1.  Left common iliac artery: 7.8 mm x  6.9 mm   2.  Left external iliac artery: 4.7 mm x 2.4 mm   3.  Left common femoral artery: 4.5 mm x 3.6 mm  4.  Tortuosity: mild   5.  Calcification: severe      5. SUBCLAVIAN MEASUREMENTS:     RIGHT:  1. Minimal luminal diameter: 6.4 mm x  2.4 mm. This appears to be due  to extrinsic compression by the clavicle.   2. Tortuosity: moderate   3. Calcification: severe      LEFT:  1. Minimal luminal diameter: 5.5 mm x  3.9 mm in mid segment (due to  stenosis from non-calcified plaque) and 6.4 x 2.6 mm more distally due  to extrinsic compression by the clavicle.   2. Tortuosity: mild   3. Calcification: severe      Coronary Angiogram and Right Heart Catheterization:  Pending      STS RISK SCORE: 2.89%  FRAILTY SCORE:  Pending  NYHA CLASS: III      CLINICAL IMPRESSION:      Loulou Lucero is a 79 year old female with symptomatic severe aortic stenosis and mild mitral stenosis who ist high risk for adverse outcomes after surgical aortic valve replacement based on age, frailty, co-morbidities and overall surgical mortality risk.    Due to challenging and limited transcatheter access, Transfemoral, subclavian, and transcavial is not an option based off vessel size. Only potential access would be transapical approach.    I discussed the risks and benefits of trans apical approach via mini left thoracotomy including the risks of death, bleeding, stroke, infection, renal failure and arrhythmias. She understands and is willing to proceed with this.  No option of bail out because of calcified aorta.    Please do not hesitate to contact me if you have any questions/concerns.     Sincerely,     Frank Cross MD

## 2024-05-06 NOTE — NURSING NOTE
Chief Complaint   Patient presents with    New Patient     New CV surgery       Vitals were taken and medications reconciled.    Ashtyn Grey CNA  3:05 PM

## 2024-05-07 ENCOUNTER — TELEPHONE (OUTPATIENT)
Dept: CARDIOLOGY | Facility: CLINIC | Age: 79
End: 2024-05-07
Payer: MEDICARE

## 2024-05-07 NOTE — TELEPHONE ENCOUNTER
Telephone call to Danni, daughter, and Loulou to see how yesterday's appointment with Dr. Cross went (no note to read). Per Danni, the appointment went well. Dr. Cross agreed to perform transapical TAVR. Per Dr. Cross, next step is to move forward with scheduling TAVR. Danni and Loulou had a lot of questions. Plan for them to send list of questions via [employer] to writer. Will start coordinating date with Dr. Cross's team, will update them with additional information as needed.    Maya Jones, RN, BSN  Lead Structural Heart Coordinator  TAVR, MitraClip and Watchman

## 2024-05-08 DIAGNOSIS — D64.9 ANEMIA, UNSPECIFIED TYPE: ICD-10-CM

## 2024-05-08 NOTE — TELEPHONE ENCOUNTER
[facility] Iron       Last Written Prescription Date:  4/10/2024  Last Fill Quantity: 60,   # refills: 0  Last Office Visit: 1/23/2024  Future Office visit:    Next 5 appointments (look out 90 days)      May 30, 2024  1:30 PM  Return Visit with VEENA Chavez [facility] ([facility] ) [address]  [place] [address]  [phone]

## 2024-05-09 ENCOUNTER — TELEPHONE (OUTPATIENT)
Dept: CARDIOLOGY | Facility: CLINIC | Age: 79
End: 2024-05-09
Payer: MEDICARE

## 2024-05-09 RX ORDER — FERROUS SULFATE 325(65) MG
325 TABLET ORAL 2 TIMES DAILY
Qty: 60 TABLET | Refills: 0 | Status: SHIPPED | OUTPATIENT
Start: 2024-05-09 | End: 2024-06-05 | Stop reason: ALTCHOICE

## 2024-05-09 NOTE — TELEPHONE ENCOUNTER
Spoke to daughter, Danni. 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 Plan for Danni to speak to Loulou, prior to attempting to schedule.      Maya Jones RN, BSN  Lead Structural Heart Coordinator  TAVR, MitraClip and Watchman

## 2024-05-09 NOTE — TELEPHONE ENCOUNTER
Attempted telephone call to Danni to look at future dates to schedule TAVR, left voicemail.   Structural preferred date would be June 5th.        Maya Jones RN, BSN  Lead Structural Heart Coordinator  TAVR, MitraSophia and Watchnghia

## 2024-05-10 NOTE — TELEPHONE ENCOUNTER
Telephone call with Danni. They would like June 19th date for TAVR. Will notify them once TAVR is scheduled.  Additional concerns brought up by Danni is discharge planning, will discuss with team on Monday.

## 2024-05-13 ENCOUNTER — CARE COORDINATION (OUTPATIENT)
Dept: CARDIOLOGY | Facility: CLINIC | Age: 79
End: 2024-05-13
Payer: MEDICARE

## 2024-05-13 DIAGNOSIS — I35.0 AORTIC VALVE STENOSIS: ICD-10-CM

## 2024-05-13 DIAGNOSIS — I50.9 ACUTE HEART FAILURE, UNSPECIFIED HEART FAILURE TYPE (H): ICD-10-CM

## 2024-05-13 DIAGNOSIS — I35.0 SEVERE AORTIC STENOSIS: Primary | ICD-10-CM

## 2024-05-13 RX ORDER — CEFAZOLIN SODIUM 2 G/50ML
2 SOLUTION INTRAVENOUS
Status: CANCELLED | OUTPATIENT
Start: 2024-05-13

## 2024-05-13 RX ORDER — SODIUM CHLORIDE 9 MG/ML
INJECTION, SOLUTION INTRAVENOUS CONTINUOUS
Status: CANCELLED | OUTPATIENT
Start: 2024-05-13

## 2024-05-13 RX ORDER — LIDOCAINE 40 MG/G
CREAM TOPICAL
Status: CANCELLED | OUTPATIENT
Start: 2024-05-13

## 2024-05-13 RX ORDER — ASPIRIN 325 MG
325 TABLET, DELAYED RELEASE (ENTERIC COATED) ORAL ONCE
Status: CANCELLED | OUTPATIENT
Start: 2024-05-13 | End: 2024-05-13

## 2024-05-13 NOTE — PROGRESS NOTES
Reviewed Loulou's upcoming appointments with daughter Danni, also sent via Plink. All questioned answered at this time.        Maya Jones RN  Lead Structural Heart Coordinator  TAVR, [facility] and Watchman

## 2024-05-20 NOTE — PROGRESS NOTES
HISTORY:      Loulou Lucero is a very pleasant 79 year old female with severe aortic valvular stenosis referred to discuss options of trans apical approach for severe aortic stenosis.     She has ongoing symptoms related to aortic stenosis with chronic shortness of breath and occasional episodes of anginal chest pain.      Patient is being seen today to discuss severe aortic stenosis recently diagnosed by echocardiogram on 11/28/2023.  LV size and function is normal.  No other significant valve disease.  Aortic valve area is 0.7 cm , mean gradient 30 mm gradient and peak velocity 3.6 m/s.  Prior echocardiogram in  2017 showed nonsignificant aortic valve stenosis.  Patient was recently evaluated in lung nodule clinic for left lower lung nodule which was found to be negative for malignancy.       Patient reports tiredness over the last year or so.  Feels fatigued, tire easily, lightheaded, dizzy and unstable on her feet at times.  Denies syncope or lower extremity edema.  Denies chest pain. She has lost weight for the past couple of years. She will try to eat more and potentially with help of her daughter get more calories in. We discussed how TAVR outcomes are worse in frail and underweight patients.       She has been a smoker for several years.  Currently smoking half to 1 pack a day.         PAST MEDICAL HISTORY:           Past Medical History:   Diagnosis Date    Asymptomatic varicose veins of lower extremity       6/12/2012    Benign paroxysmal vertigo       12/29/2010    Chronic obstructive pulmonary disease (H)       12/29/2010    Diarrhea       10/1/2015    Disorder of cartilage       Hip; Last dxa 06/2010    Diverticulosis of large intestine without perforation or abscess without bleeding            Lower abdominal pain       10/1/2015    Other disorders of lung (CODE)       Stable since July of 2002. RU lobe.    Personal history of other medical treatment (CODE)       L3, L4-4; Last MRI 7/09/12     Personal history of other medical treatment (CODE)       G-3, P-2, A-0  (Son had SIDS)    Zoster without complications       3/31/2012             PAST SURGICAL HISTORY:            Past Surgical History:   Procedure Laterality Date    COLONOSCOPY   03/22/2010     Normal; + family hx, next due 2015    COLONOSCOPY   10/01/2015     W/ POLYPECTOMY recommend follow up in 2020.    COLONOSCOPY N/A 11/7/2019     4 tubular adenoma, 3 year follow up    ESOPHAGOSCOPY, GASTROSCOPY, DUODENOSCOPY (EGD), COMBINED   04/23/2014     Arce's esophagus fu egd 2 yrs    ESOPHAGOSCOPY, GASTROSCOPY, DUODENOSCOPY (EGD), COMBINED N/A 11/7/2019     Procedure: ESOPHAGOGASTRODUODENOSCOPY, WITH BIOPSY;  Surgeon: Santosh Barrera MD;  Location: [facility] OR    LAPAROSCOPIC TUBAL LIGATION   1978          TONSILLECTOMY & ADENOIDECTOMY   1951                   CURRENT MEDICATIONS:      Current Outpatient Prescriptions          Current Outpatient Medications   Medication Sig Dispense Refill    aspirin 81 MG EC tablet Take 81 mg by mouth daily        cyanocobalamin (VITAMIN B-12) 1000 MCG tablet Take 1 tablet (1,000 mcg) by mouth daily 90 tablet 3    ferrous sulfate (FEROSUL) 325 (65 Fe) MG tablet Take 1 tablet (325 mg) by mouth 2 times daily 60 tablet 1    fluticasone-salmeterol (ADVAIR DISKUS) 250-50 MCG/ACT inhaler Inhale 1 puff into the lungs 2 times daily WIXELA-Disp as covered by Pt's insurance 3 each 2    simvastatin (ZOCOR) 20 MG tablet Take 1 tablet (20 mg) by mouth at bedtime 90 tablet 2             ALLERGIES:      Allergies         Allergies   Allergen Reactions    Metronidazole Nausea and Vomiting    Pneumococcal Vaccine         Other reaction(s): Edema  Arm swelling    Tramadol Visual Disturbance       Hallucinations                 FAMILY HISTORY:            Family History   Problem Relation Age of Onset    Colon Cancer Father           Cancer-colon    Other - See Comments Mother           Alzheimer's    Other - See Comments Maternal  Grandmother           Alzheimer's    Other - See Comments Brother           Alzheimer's    Other - See Comments Brother           aneurysm and stents    Cancer Brother           Cancer,lung    Cancer Brother           Cancer,skin    Other - See Comments Brother           cirrhosis of the liver    Other - See Comments Maternal Uncle           3, Alzheimer's    Breast Cancer No family hx of           Cancer-breast             SOCIAL HISTORY:               Socioeconomic History    Marital status:    Tobacco Use    Smoking status: Every Day       Packs/day: 0.50       Years: 63.00       Additional pack years: 0.00       Total pack years: 31.50       Types: Cigarettes       Start date: 1961       Passive exposure: Past    Smokeless tobacco: Never    Tobacco comments:       Passive exposure in adult home.    Vaping Use    Vaping Use: Never used   Substance and Sexual Activity    Alcohol use: Not Currently       Comment: rarely at a wedding or special occasion    Drug use: No    Sexual activity: Not Currently   Social History Narrative     Patient in second marriage. Has two living children, both live in [place], [place]. Has 2 grandchildren. Is retired, worked at [employer] in [place].       Patient currently smokes, 1 ppd x 50 yrs. Smokes less in the winter months due to not smoking in the house     .   Alcohol Use - no  - disabled.      Social Determinants of Health           Financial Resource Strain: Low Risk  (1/31/2024)     Financial Resource Strain      Within the past 12 months, have you or your family members you live with been unable to get utilities (heat, electricity) when it was really needed?: No   Food Insecurity: Low Risk  (1/31/2024)     Food Insecurity      Within the past 12 months, did you worry that your food would run out before you got money to buy more?: No      Within the past 12 months, did the food you bought just not last and you didn t have money to get more?: No   Transportation  Needs: Low Risk  (1/31/2024)     Transportation Needs      Within the past 12 months, has lack of transportation kept you from medical appointments, getting your medicines, non-medical meetings or appointments, work, or from getting things that you need?: No   Physical Activity: Unknown (1/31/2024)     Exercise Vital Sign      Days of Exercise per Week: 0 days   Stress: No Stress Concern Present (1/31/2024)     [employer] [place] [employer] - Occupational Stress Questionnaire      Feeling of Stress : Only a little   Social Connections: Unknown (1/31/2024)     Social Connection and Isolation Panel [NHANES]      Frequency of Social Gatherings with Friends and Family: Once a week   Interpersonal Safety: Low Risk  (1/31/2024)     Interpersonal Safety      Do you feel physically and emotionally safe where you currently live?: Yes      Within the past 12 months, have you been hit, slapped, kicked or otherwise physically hurt by someone?: No      Within the past 12 months, have you been humiliated or emotionally abused in other ways by your partner or ex-partner?: No   Housing Stability: Low Risk  (1/31/2024)     Housing Stability      Do you have housing? : Yes      Are you worried about losing your housing?: No          REVIEW OF SYSTEMS:      10 point review of systems within normal other than mentioned in history and physical.       PHYSICAL EXAMINATION:      /74 (BP Location: Right arm, Patient Position: Sitting, Cuff Size: Adult Regular)   Pulse 89   Resp 22   Ht 1.524 m (5')   Wt 35.5 kg (78 lb 3.2 oz)   LMP 01/01/1995 (Approximate)   SpO2 96%   Breastfeeding No   BMI 15.27 kg/m       GENERAL: No acute distress.  HEENT: EOMI. Sclerae white, not injected. Nares clear. Pharynx without erythema or exudate.   Neck: No adenopathy. No thyromegaly. Symmetrical.   Heart: Regular rate and rhythm. Harsh crescendo decrescendo late peaking systolic murmur with radiation to carotids. Delayed carotid  pulses.   Lungs: Clear to auscultation. No ronchi, wheezes, rales.   Abdomen: Soft, nontender, nondistended. Bowel sounds present.  Extremities: No clubbing, cyanosis, or edema.   Neurologic: Alert and oriented to person/place/time, normal speech and affect. No focal deficits.  Skin: No petechiae, purpura or rash.      LABORATORY DATA:      LIPID RESULTS:        Lab Results   Component Value Date     CHOL 216 (H) 10/18/2022     CHOL 205 (H) 10/09/2020     HDL 66 10/18/2022     HDL 63 10/09/2020      (H) 10/18/2022      (H) 10/09/2020     TRIG 174 (H) 10/18/2022     TRIG 109 10/09/2020         LIVER ENZYME RESULTS:        Lab Results   Component Value Date     AST 24 01/17/2024     AST 19 10/09/2020     ALT 11 01/17/2024     ALT 9 10/09/2020         CBC RESULTS:        Lab Results   Component Value Date     WBC 6.2 01/17/2024     WBC 7.0 10/24/2019     RBC 3.66 (L) 01/17/2024     RBC 3.89 10/24/2019     HGB 9.9 (L) 01/17/2024     HGB 12.1 10/24/2019     HCT 30.9 (L) 01/17/2024     HCT 36.5 10/24/2019     MCV 84 01/17/2024     MCV 94 10/24/2019     MCH 27.0 01/17/2024     MCH 31.1 10/24/2019     MCHC 32.0 01/17/2024     MCHC 33.2 10/24/2019     RDW 16.1 (H) 01/17/2024     RDW 12.9 10/24/2019      01/17/2024      10/24/2019         BMP RESULTS:        Lab Results   Component Value Date      01/17/2024      10/09/2020     POTASSIUM 3.8 01/17/2024     POTASSIUM 4.6 10/18/2022     POTASSIUM 4.0 10/09/2020     CHLORIDE 101 01/17/2024     CHLORIDE 103 10/18/2022     CHLORIDE 102 10/09/2020     CO2 29 01/17/2024     CO2 34 (H) 10/18/2022     CO2 33 (H) 10/09/2020     ANIONGAP 10 01/17/2024     ANIONGAP 6 10/18/2022     ANIONGAP 7 10/09/2020     GLC 89 01/17/2024     GLC 94 10/18/2022      (H) 10/09/2020     BUN 16.8 01/17/2024     BUN 15 10/18/2022     BUN 21 10/09/2020     CR 0.85 01/17/2024     CR 1.00 10/09/2020     GFRESTIMATED 70 01/17/2024     GFRESTIMATED 54 (L)  "10/09/2020     GFRESTBLACK 65 10/09/2020     DION 9.7 2024     DION 10.0 10/09/2020         A1C RESULTS:  No results found for: \"A1C\"     INR RESULTS:  No results found for: \"INR\"         PROCEDURES & FURTHER ASSESSMENTS:      EC2024  Sinus rhythm, normal axis, LVH           Echocardiogram:  23     Interpretation Summary  Global and regional left ventricular function is normal with an EF of 60-65%.  There is no thrombus seen in the left ventricle.  Global right ventricular function is normal.  Severe aortic stenosis is present.  IVC diameter <2.1 cm collapsing >50% with sniff suggests a normal RA pressure  of 3 mmHg.  No pericardial effusion is present.  There is no prior study for direct comparison.     Severe mitral annular calcification is present. Trace mitral insufficiency is  present. Mild mitral stenosis is present. The mean mitral valve gradient is  6.7 mmHg. The mitral valve area is 3.0 cm^2.  ________________________________________     CT TAVR:     FINDINGS:     1.  Severely calcified tricuspid aortic valve. The aortic valve  calcium score is 3223. The PETER is 0.83 cm2 by CT planimetry. The LVOT  is not calcified. The ascending aorta is severely calcified, aortic  arch is  severely calcified, the descending thoracic aorta is severely  calcified. There is severe mitral annular calcification.  2.  There is moderate calcification of the aortic root as well as  borderline left coronary height.   3.  There are severe native coronary calcifications.  4.  The arch vessel branching pattern is normal variant, with a single  common origin of the innominate and left common carotid arteries.   5.  The suprarenal abdominal aorta is severely calcified; infrarenal  abdominal aorta is severely calcified. There is significant additional  atherosclerotic disease diffusely throughout aorta.   6.  Widely patent origins of celiac trunk, superior mesenteric artery  and inferior mesenteric artery.  Single " bilateral renal arteries with  widely patent origins with moderate calcifications present.   7.  Femoral access site issues: Minimal luminal dimensions are 4.7 mm  x 4.1 mm in the right common femoral artery and 4.7 mm x 2.4 mm in the  left common femoral artery. There is a small focal contained  dissection flap in the right common iliac artery and a large ulcerated  plaque in the proximal left common iliac artery.   8.  Subclavian access site issues: there is significant stenosis of  the bilateral subclavian arteries.  Minimal dimensions in the right  subclavian artery are 6.4 mm x  2.4 mm at a focal area of extrinsic  compression by the clavicle. The left subclavian artery has sequential  stenoses with dimensions 5.5 mm x  3.9 mm in mid segment (due to  stenosis from non-calcified plaque) and 6.4 x 2.6 mm more distally due  to extrinsic compression by the clavicle.   9.  Transcaval access issues. The window for transcaval access is 11.7  cm in length due to extensive aortic calcification facing the inferior  vena cava in the remainder of the infrarenal abdominal aorta.      MEASUREMENTS:   Representative dimensions of the thoracoabdominal aorta are as  follows:     1. AORTIC ANNULUS MEASUREMENTS:     1.  The average aortic annulus diameter is 20.5 mm, (long diameter is  23.1 mm and short diameter is 18.7 mm)  2.  Aortic annulus area is 3.32 cm2  3.  Aortic annulus perimeter is 66.7 mm  4.  Suggested 3-cusp coaxial angle for aortic valve is ([place] 15 , CAU  15) and alternate A-P coaxial angle is (M[place]ILLA 0 , CAU 22), these angles  will vary depending upon the patient's body position  5.  Aortic root angle is 26.9 degrees  6.  Left main - Annulus distance: 11.5 mm, RCA - Annulus distance:  14.6 mm     2. LVOT MEASUREMENTS:     1.  The average LVOT diameter (measured 4 mm below annular plane) is  19.8 mm  2.  LVOT area is 3.07 cm2  3.  LVOT perimeter is 63.8 mm     3. AORTA MEASUREMENTS     1.  The aortic root at the  sinuses of Valsalva: 25.8 mm x  24.1 mm x  25.0 mm  2.  The sinotubular junction:  23.3 mm x 21.5 mm  3.  Sinotubular junction height: 19.3 mm x 19.2 mm  4.  Proximal ascending aorta: 27.5 mm x 26.9 mm  5.  Distal abdominal aorta proximal to the bifurcation: 12.7 mm x 11.1  mm  6.  Innominate artery 3 cm from ostium: 10.4 mm x 7.7 mm  7.  Left common carotid artery 3 cm from ostium: 6.4 mm x 6.2 mm     4. ILIOFEMORAL MEASUREMENTS:     RIGHT:  There is a small contained focal dissection flap in the right common  iliac artery.  1.  Right common iliac artery: 5.3 mm x 4.6 mm.   2.  Right external iliac artery: 5.4 mm x 4.7 mm   3.  Right common femoral artery: 4.7 mm x 4.1 mm   4.  Tortuosity: mild   5.  Calcification: severe      LEFT:  There is a large ulcerated atheroma in the proximal left common iliac  artery.  1.  Left common iliac artery: 7.8 mm x  6.9 mm   2.  Left external iliac artery: 4.7 mm x 2.4 mm   3.  Left common femoral artery: 4.5 mm x 3.6 mm  4.  Tortuosity: mild   5.  Calcification: severe      5. SUBCLAVIAN MEASUREMENTS:     RIGHT:  1. Minimal luminal diameter: 6.4 mm x  2.4 mm. This appears to be due  to extrinsic compression by the clavicle.   2. Tortuosity: moderate   3. Calcification: severe      LEFT:  1. Minimal luminal diameter: 5.5 mm x  3.9 mm in mid segment (due to  stenosis from non-calcified plaque) and 6.4 x 2.6 mm more distally due  to extrinsic compression by the clavicle.   2. Tortuosity: mild   3. Calcification: severe      Coronary Angiogram and Right Heart Catheterization:  Pending      STS RISK SCORE: 2.89%  FRAILTY SCORE: Pending  NYHA CLASS: III      CLINICAL IMPRESSION:      Loulou Lucero is a 79 year old female with symptomatic severe aortic stenosis and mild mitral stenosis who ist high risk for adverse outcomes after surgical aortic valve replacement based on age, frailty, co-morbidities and overall surgical mortality risk.    Due to challenging and limited transcatheter  access, Transfemoral, subclavian, and transcavial is not an option based off vessel size. Only potential access would be transapical approach.    I discussed the risks and benefits of trans apical approach via mini left thoracotomy including the risks of death, bleeding, stroke, infection, renal failure and arrhythmias. She understands and is willing to proceed with this.  No option of bail out because of calcified aorta.

## 2024-05-23 ENCOUNTER — LAB (OUTPATIENT)
Dept: LAB | Facility: OTHER | Age: 79
End: 2024-05-23
Attending: NURSE PRACTITIONER
Payer: MEDICARE

## 2024-05-23 DIAGNOSIS — D47.2 MGUS (MONOCLONAL GAMMOPATHY OF UNKNOWN SIGNIFICANCE): ICD-10-CM

## 2024-05-23 LAB
ALBUMIN SERPL BCG-MCNC: 4.5 G/DL (ref 3.5–5.2)
ALP SERPL-CCNC: 69 U/L (ref 40–150)
ALT SERPL W P-5'-P-CCNC: 15 U/L (ref 0–50)
ANION GAP SERPL CALCULATED.3IONS-SCNC: 12 MMOL/L (ref 7–15)
AST SERPL W P-5'-P-CCNC: 35 U/L (ref 0–45)
BASOPHILS # BLD AUTO: 0 10E3/UL (ref 0–0.2)
BASOPHILS NFR BLD AUTO: 1 %
BILIRUB SERPL-MCNC: 0.7 MG/DL
BUN SERPL-MCNC: 18.9 MG/DL (ref 8–23)
CALCIUM SERPL-MCNC: 10.1 MG/DL (ref 8.8–10.2)
CHLORIDE SERPL-SCNC: 102 MMOL/L (ref 98–107)
CREAT SERPL-MCNC: 0.87 MG/DL (ref 0.51–0.95)
DEPRECATED HCO3 PLAS-SCNC: 28 MMOL/L (ref 22–29)
EGFRCR SERPLBLD CKD-EPI 2021: 67 ML/MIN/1.73M2
EOSINOPHIL # BLD AUTO: 0.1 10E3/UL (ref 0–0.7)
EOSINOPHIL NFR BLD AUTO: 1 %
ERYTHROCYTE [DISTWIDTH] IN BLOOD BY AUTOMATED COUNT: 12.5 % (ref 10–15)
GLUCOSE SERPL-MCNC: 92 MG/DL (ref 70–99)
HCT VFR BLD AUTO: 39.3 % (ref 35–47)
HGB BLD-MCNC: 13 G/DL (ref 11.7–15.7)
IMM GRANULOCYTES # BLD: 0 10E3/UL
IMM GRANULOCYTES NFR BLD: 1 %
LDH SERPL L TO P-CCNC: 193 U/L (ref 0–250)
LYMPHOCYTES # BLD AUTO: 1 10E3/UL (ref 0.8–5.3)
LYMPHOCYTES NFR BLD AUTO: 19 %
MCH RBC QN AUTO: 33.4 PG (ref 26.5–33)
MCHC RBC AUTO-ENTMCNC: 33.1 G/DL (ref 31.5–36.5)
MCV RBC AUTO: 101 FL (ref 78–100)
MONOCYTES # BLD AUTO: 0.3 10E3/UL (ref 0–1.3)
MONOCYTES NFR BLD AUTO: 6 %
NEUTROPHILS # BLD AUTO: 3.9 10E3/UL (ref 1.6–8.3)
NEUTROPHILS NFR BLD AUTO: 72 %
NRBC # BLD AUTO: 0 10E3/UL
NRBC BLD AUTO-RTO: 0 /100
PLATELET # BLD AUTO: 222 10E3/UL (ref 150–450)
POTASSIUM SERPL-SCNC: 5.2 MMOL/L (ref 3.4–5.3)
PROT SERPL-MCNC: 8.1 G/DL (ref 6.4–8.3)
RBC # BLD AUTO: 3.89 10E6/UL (ref 3.8–5.2)
SODIUM SERPL-SCNC: 142 MMOL/L (ref 135–145)
WBC # BLD AUTO: 5.4 10E3/UL (ref 4–11)

## 2024-05-23 PROCEDURE — 86334 IMMUNOFIX E-PHORESIS SERUM: CPT | Mod: ZL | Performed by: PATHOLOGY

## 2024-05-23 PROCEDURE — 82232 ASSAY OF BETA-2 PROTEIN: CPT | Mod: ZL

## 2024-05-23 PROCEDURE — 84165 PROTEIN E-PHORESIS SERUM: CPT | Mod: TC,ZL | Performed by: PATHOLOGY

## 2024-05-23 PROCEDURE — 36415 COLL VENOUS BLD VENIPUNCTURE: CPT | Mod: ZL

## 2024-05-23 PROCEDURE — 85025 COMPLETE CBC W/AUTO DIFF WBC: CPT | Mod: ZL

## 2024-05-23 PROCEDURE — 85810 BLOOD VISCOSITY EXAMINATION: CPT | Mod: ZL

## 2024-05-23 PROCEDURE — 84155 ASSAY OF PROTEIN SERUM: CPT | Mod: ZL

## 2024-05-23 PROCEDURE — 80053 COMPREHEN METABOLIC PANEL: CPT | Mod: ZL

## 2024-05-23 PROCEDURE — 83615 LACTATE (LD) (LDH) ENZYME: CPT | Mod: ZL

## 2024-05-23 PROCEDURE — 82784 ASSAY IGA/IGD/IGG/IGM EACH: CPT | Mod: ZL

## 2024-05-23 PROCEDURE — 84165 PROTEIN E-PHORESIS SERUM: CPT | Mod: 26 | Performed by: PATHOLOGY

## 2024-05-23 PROCEDURE — 86334 IMMUNOFIX E-PHORESIS SERUM: CPT | Mod: 26 | Performed by: PATHOLOGY

## 2024-05-23 PROCEDURE — 83521 IG LIGHT CHAINS FREE EACH: CPT | Mod: ZL

## 2024-05-25 LAB — TOTAL PROTEIN SERUM FOR ELP: 7.4 G/DL (ref 6.4–8.3)

## 2024-05-28 LAB
B2 MICROGLOB TUMOR MARKER SER-MCNC: 2.5 MG/L
IGA SERPL-MCNC: 433 MG/DL (ref 84–499)
IGG SERPL-MCNC: 1262 MG/DL (ref 610–1616)
IGM SERPL-MCNC: 97 MG/DL (ref 35–242)
KAPPA LC FREE SER-MCNC: 5.73 MG/DL (ref 0.33–1.94)
KAPPA LC FREE/LAMBDA FREE SER NEPH: 1.89 {RATIO} (ref 0.26–1.65)
LAMBDA LC FREE SERPL-MCNC: 3.03 MG/DL (ref 0.57–2.63)
VISC SER: 1.7 CP (ref 1.4–1.8)

## 2024-05-29 LAB
ALBUMIN SERPL ELPH-MCNC: 4.1 G/DL (ref 3.7–5.1)
ALPHA1 GLOB SERPL ELPH-MCNC: 0.4 G/DL (ref 0.2–0.4)
ALPHA2 GLOB SERPL ELPH-MCNC: 0.8 G/DL (ref 0.5–0.9)
B-GLOBULIN SERPL ELPH-MCNC: 0.9 G/DL (ref 0.6–1)
GAMMA GLOB SERPL ELPH-MCNC: 1.2 G/DL (ref 0.7–1.6)
M PROTEIN SERPL ELPH-MCNC: 0 G/DL
PATH REPORT.COMMENTS IMP SPEC: NORMAL
PATH REPORT.COMMENTS IMP SPEC: NORMAL
PROT PATTERN SERPL ELPH-IMP: NORMAL
PROT PATTERN SERPL IFE-IMP: NORMAL

## 2024-05-30 ENCOUNTER — ONCOLOGY VISIT (OUTPATIENT)
Dept: ONCOLOGY | Facility: OTHER | Age: 79
End: 2024-05-30
Attending: NURSE PRACTITIONER
Payer: MEDICARE

## 2024-05-30 VITALS
OXYGEN SATURATION: 97 % | HEART RATE: 88 BPM | DIASTOLIC BLOOD PRESSURE: 72 MMHG | WEIGHT: 78.2 LBS | BODY MASS INDEX: 15.27 KG/M2 | TEMPERATURE: 97 F | RESPIRATION RATE: 12 BRPM | SYSTOLIC BLOOD PRESSURE: 130 MMHG

## 2024-05-30 DIAGNOSIS — D47.2 MGUS (MONOCLONAL GAMMOPATHY OF UNKNOWN SIGNIFICANCE): Primary | ICD-10-CM

## 2024-05-30 DIAGNOSIS — R91.8 PULMONARY NODULES: ICD-10-CM

## 2024-05-30 DIAGNOSIS — D50.8 OTHER IRON DEFICIENCY ANEMIA: ICD-10-CM

## 2024-05-30 LAB
FERRITIN SERPL-MCNC: 361 NG/ML (ref 11–328)
IRON BINDING CAPACITY (ROCHE): 216 UG/DL (ref 240–430)
IRON SATN MFR SERPL: 59 % (ref 15–46)
IRON SERPL-MCNC: 128 UG/DL (ref 37–145)

## 2024-05-30 PROCEDURE — 83550 IRON BINDING TEST: CPT | Mod: ZL | Performed by: NURSE PRACTITIONER

## 2024-05-30 PROCEDURE — 82728 ASSAY OF FERRITIN: CPT | Mod: ZL | Performed by: NURSE PRACTITIONER

## 2024-05-30 PROCEDURE — 99215 OFFICE O/P EST HI 40 MIN: CPT | Performed by: NURSE PRACTITIONER

## 2024-05-30 PROCEDURE — G0463 HOSPITAL OUTPT CLINIC VISIT: HCPCS | Performed by: NURSE PRACTITIONER

## 2024-05-30 PROCEDURE — 36415 COLL VENOUS BLD VENIPUNCTURE: CPT | Mod: ZL | Performed by: NURSE PRACTITIONER

## 2024-05-30 NOTE — NURSING NOTE
Oncology Rooming Note    May 30, 2024 1:47 PM   Loulou Lucero is a 79 year old female who presents for:    Chief Complaint   Patient presents with    Oncology Clinic Visit     MGUS, GIRMA     Initial Vitals: /72 (BP Location: Right arm, Patient Position: Sitting, Cuff Size: Child)   Pulse 88   Temp 97  F (36.1  C) (Tympanic)   Resp 12   Wt 35.5 kg (78 lb 3.2 oz)   LMP 01/01/1995 (Approximate)   SpO2 97%   BMI 15.27 kg/m   Estimated body mass index is 15.27 kg/m  as calculated from the following:    Height as of 3/21/24: 1.524 m (5').    Weight as of this encounter: 35.5 kg (78 lb 3.2 oz). Body surface area is 1.23 meters squared.  Data Unavailable Comment: Data Unavailable   Patient's last menstrual period was 01/01/1995 (approximate).  Allergies reviewed: Yes  Medications reviewed: Yes    Medications: Medication refills not needed today.  Pharmacy name entered into EPIC:     [facility] [place], [place] - [address] [facility] - [address]    Frailty Screening:   Is the patient here for a new oncology consult visit in cancer care? 2. No      Clinical concerns: just here for follow up on labs. She is having heart surgery on 6/19/24      Norma Ayala CMA ([facility]) 5/30/2024 1:47 PM

## 2024-05-30 NOTE — PROGRESS NOTES
Oncology Follow-up Visit:  May 30, 2024  Diagnosis:MGUS, Iron deficiency, lung mass    History Of Present Illness:  Patient presents for follow-up of MGUS and history of left lower lobe lung mass as well as iron deficiency anemia. For complete history, please see previous notes. Patient was seen in consultation on 12/21/22 to evaluate concerning a new left lower lobe lung nodule. Apparently, she had recently been seen by Dr. Johnston. Given her history of tobacco abuse, he ordered a low dose CT scan of the chest. This was done on 11/21/2022 and was read as a new solid spiculated nodule seen in the periphery of the left lower lobe measuring 14.5 mm in craniocaudal dimension by 11.8 x 10.6 mm in size, highly concerning for small malignant neoplasm. We decided to order a PET scan. This was done on 12/14/2022 and was read as no evidence of abnormal FDG uptake that would suggest malignant disease. The spiculated nodule seen previously was much smaller in size and decreased from 11.8 mm down to 7.8 mm. It was felt to be sequela of possible pneumonia in the past. It was felt to be a benign entity, possibly representing the remnants of focal pneumonia. The patient otherwise had a history of tobacco abuse. She said she smoked for at least 60 years, at least a pack per day. She most recently had cut back to half pack per day. She had at least a 15-pound weight loss over the last year. She was complaining of diffuse bone pain. We recommended that we should repeat CT chest in 3 months. A CT chest that was performed on March 21, 2023 revealed that the previous left lower lobe lung nodule had resolved. There was evidence of COPD. Patient was mildly anemic with hemoglobin 11.6 further workup revealed that she had elevated kappa lambda ratio. Dr Hansen wanted to rule out end organ damage by obtaining a PET scan. This was performed on 11/15/23 and there was no evidence of hypermetabolic disease suggest myeloma there was no evidence  of lung nodules.  There was a hypermetabolic focus in the left ventricle and recommend the patient undergo echocardiogram. Echocardiogram revealed the patient had critical aortic stenosis. Since then, she been eval by cardiology with plans to proceed with a TAVR procedure. She did have a bone survey on 9/25/23 which did not show any suspicious lytic lesions. When patient was seen on 1/23/24, she was noted to be anemic. Her lab work was consistent with iron deficiency anemia. Peripheral blood smear was consistent with moderate normocytic anemia.  Her ferritin was low at 16 and iron is low as well as iron percent saturation.  Her hemoglobin was 8.2. Patient was set up for IV Feraheme. She did have her infusions on 2/1 and 2/9/2024. Patient did not follow up after her infusions. Labs done on 5/23/24 show a normal M-spike. Kappa lambda light chains are stable. Hemoglobin is normal at 13.0. All other labs are stable. She did not have her iron and ferritin drawn prior to this appointment. Patient will be having T[facility]R on 6/16/24 at the [facility]. Patient does have ongoing shortness of breath. She has dizziness with position changes. Patient has been taking her oral iron twice daily. She continues to smoke about 1ppd. Patient has no other new concerns.     Review Of Systems:  Review Of Systems  Eyes/Ears/Nose/Throat: denies nasal or sinus congestion  Respiratory: reports ongoing shortness of breath and cough, stable  Cardiovascular: denies chest pain  Gastrointestinal: denies abdominal pain, no bowel changes  Genitourinary: denies dysuria or hematuria  Musculoskeletal: reports chronic low back pain  Neurologic: reports occasional headaches and dizziness with position changes  Hematologic/Lymphatic/Immunologic: denies fevers, no recent illness        There are no exam notes on file for this visit.    Past medical, social, surgical, and family histories reviewed.    Allergies:  Allergies as of 05/30/2024 - Reviewed 05/06/2024    Allergen Reaction Noted    Metronidazole Nausea and Vomiting 09/24/2015    Pneumococcal vaccine  10/08/2012    Tramadol Visual Disturbance 10/08/2012       Current Medications:  Current Outpatient Medications   Medication Sig Dispense Refill    aspirin 81 MG EC tablet Take 81 mg by mouth daily      cyanocobalamin (VITAMIN B-12) 1000 MCG tablet Take 1 tablet (1,000 mcg) by mouth daily 90 tablet 3    fluticasone-salmeterol (ADVAIR DISKUS) 250-50 MCG/ACT inhaler Inhale 1 puff into the lungs 2 times daily WIXELA-Disp as covered by Pt's insurance 3 each 2    simvastatin (ZOCOR) 20 MG tablet Take 1 tablet (20 mg) by mouth at bedtime 90 tablet 2    SV IRON 325 (65 Fe) MG tablet Take 1 tablet by mouth twice daily 60 tablet 0        Physical Exam:  LMP 01/01/1995 (Approximate)     GENERAL APPEARANCE: 79 year old female, alert and no distress     NECK: no adenopathy, no asymmetry or masses     LYMPHATICS: No cervical, supraclavicular, axillary lymphadenopathy     RESP: lung sounds diminished, respirations regular and unlabored      CARDIOVASCULAR: regular rates and rhythm, normal S1 S2     ABDOMEN:  soft, nontender, bowel sounds normal     MUSCULOSKELETAL: extremities normal- no gross deformities noted, No edema b/l LE.     SKIN: no suspicious lesions or rashes on exposed skin     PSYCHIATRIC: mentation appears normal and affect normal    Laboratory/Imaging Studies  Lab on 05/23/2024   Component Date Value Ref Range Status    Beta-2-Microglobulin 05/23/2024 2.5 (H)  <2.3 mg/L Final    Sodium 05/23/2024 142  135 - 145 mmol/L Final    Reference intervals for this test were updated on 09/26/2023 to more accurately reflect our healthy population. There may be differences in the flagging of prior results with similar values performed with this method. Interpretation of those prior results can be made in the context of the updated reference intervals.     Potassium 05/23/2024 5.2  3.4 - 5.3 mmol/L Final    Carbon Dioxide (CO2)  05/23/2024 28  22 - 29 mmol/L Final    Anion Gap 05/23/2024 12  7 - 15 mmol/L Final    Urea Nitrogen 05/23/2024 18.9  8.0 - 23.0 mg/dL Final    Creatinine 05/23/2024 0.87  0.51 - 0.95 mg/dL Final    GFR Estimate 05/23/2024 67  >60 mL/min/1.73m2 Final    Calcium 05/23/2024 10.1  8.8 - 10.2 mg/dL Final    Chloride 05/23/2024 102  98 - 107 mmol/L Final    Glucose 05/23/2024 92  70 - 99 mg/dL Final    Alkaline Phosphatase 05/23/2024 69  40 - 150 U/L Final    AST 05/23/2024 35  0 - 45 U/L Final    Reference intervals for this test were updated on 6/12/2023 to more accurately reflect our healthy population. There may be differences in the flagging of prior results with similar values performed with this method. Interpretation of those prior results can be made in the context of the updated reference intervals.    ALT 05/23/2024 15  0 - 50 U/L Final    Reference intervals for this test were updated on 6/12/2023 to more accurately reflect our healthy population. There may be differences in the flagging of prior results with similar values performed with this method. Interpretation of those prior results can be made in the context of the updated reference intervals.      Protein Total 05/23/2024 8.1  6.4 - 8.3 g/dL Final    Albumin 05/23/2024 4.5  3.5 - 5.2 g/dL Final    Bilirubin Total 05/23/2024 0.7  <=1.2 mg/dL Final    Lactate Dehydrogenase 05/23/2024 193  0 - 250 U/L Final    Kappa Free Light Chains 05/23/2024 5.73 (H)  0.33 - 1.94 mg/dL Final    Lambda Free Light Chains 05/23/2024 3.03 (H)  0.57 - 2.63 mg/dL Final    Kappa /Lambda Ratio 05/23/2024 1.89 (H)  0.26 - 1.65 Final    Immunoglobulin G 05/23/2024 1,262  610 - 1,616 mg/dL Final    Immunoglobulin A 05/23/2024 433  84 - 499 mg/dL Final    Immunoglobulin M 05/23/2024 97  35 - 242 mg/dL Final    Viscosity Index 05/23/2024 1.7  1.4 - 1.8 Final    Immunofixation ELP 05/23/2024 No monoclonal protein seen on immunofixation. Pathologic significance requires clinical  cally.  DIPESH Weller M.D., Ph.D., Pathologist ()   Final    Signout Location if Remote 05/23/2024 [id number]   Final    WBC Count 05/23/2024 5.4  4.0 - 11.0 10e3/uL Final    RBC Count 05/23/2024 3.89  3.80 - 5.20 10e6/uL Final    Hemoglobin 05/23/2024 13.0  11.7 - 15.7 g/dL Final    Hematocrit 05/23/2024 39.3  35.0 - 47.0 % Final    MCV 05/23/2024 101 (H)  78 - 100 fL Final    MCH 05/23/2024 33.4 (H)  26.5 - 33.0 pg Final    MCHC 05/23/2024 33.1  31.5 - 36.5 g/dL Final    RDW 05/23/2024 12.5  10.0 - 15.0 % Final    Platelet Count 05/23/2024 222  150 - 450 10e3/uL Final    % Neutrophils 05/23/2024 72  % Final    % Lymphocytes 05/23/2024 19  % Final    % Monocytes 05/23/2024 6  % Final    % Eosinophils 05/23/2024 1  % Final    % Basophils 05/23/2024 1  % Final    % Immature Granulocytes 05/23/2024 1  % Final    NRBCs per 100 WBC 05/23/2024 0  <1 /100 Final    Absolute Neutrophils 05/23/2024 3.9  1.6 - 8.3 10e3/uL Final    Absolute Lymphocytes 05/23/2024 1.0  0.8 - 5.3 10e3/uL Final    Absolute Monocytes 05/23/2024 0.3  0.0 - 1.3 10e3/uL Final    Absolute Eosinophils 05/23/2024 0.1  0.0 - 0.7 10e3/uL Final    Absolute Basophils 05/23/2024 0.0  0.0 - 0.2 10e3/uL Final    Absolute Immature Granulocytes 05/23/2024 0.0  <=0.4 10e3/uL Final    Absolute NRBCs 05/23/2024 0.0  10e3/uL Final    Total Protein Serum for ELP 05/23/2024 7.4  6.4 - 8.3 g/dL Final    Albumin 05/23/2024 4.1  3.7 - 5.1 g/dL Final    Alpha 1 05/23/2024 0.4  0.2 - 0.4 g/dL Final    Alpha 2 05/23/2024 0.8  0.5 - 0.9 g/dL Final    Beta Globulin 05/23/2024 0.9  0.6 - 1.0 g/dL Final    Gamma Globulin 05/23/2024 1.2  0.7 - 1.6 g/dL Final    Monoclonal Peak 05/23/2024 0.0  <=0.0 g/dL Final    ELP Interpretation 05/23/2024 Essentially normal electrophoretic pattern. No obvious monoclonal proteins seen. Pathologic significance requires clinical correlation. DIPESH Weller M.D., Ph.D., Pathologist ().   Final    Signout Location if  Remote 05/23/2024 [facility]   Final        ASSESSMENT/PLAN:   1. MGUS, elevated kappa free light chains. Patient was found to be anemic. She was then found to have elevated kappa free light chains. Plan was to perform a peripheral smear. This was done on 3/28/23 and showed mild leukopenia with no evidence of dysplasia. She did have a bone survey on 9/25/23 which did not show any suspicious lytic lesions. PET scan was performed on 11/15/23 and there was no evidence of hypermetabolic disease suggest myeloma there was no evidence of lung nodules. Labs done on 5/23/24 show her M-spike is at 0.0. Kappa lambda ratio is stable. All other labs are stable. Will see patient in 6 months with repeat labs.     2. Iron deficiency anemia. This was unresponsive to oral iron. When patient was seen on 1/23/24, she was noted to be anemic. Her lab work was consistent with iron deficiency anemia. Peripheral blood smear was consistent with moderate normocytic anemia.  Her ferritin was low at 16 and iron is low as well as iron percent saturation.  Her hemoglobin was 8.2. Patient was set up for IV Feraheme. She did have her infusions on 2/1 and 2/9/2024. Patient did not follow up after her infusions. Iron studies done today show an elevated ferritin of 361, iron is normal at 128, TIBC is 216 and iron saturation is at 59. Will discuss these results with Dr Hansen.     3. Pulmonary nodule. Left lower lobe lung nodule in a patient with a history of tobacco abuse.  Initial scan was positive, but now her most recent CT chest indicates that the left lower lobe lung nodule has resolved.  This was felt to likely be a sequelae of a previous pneumonia. Will continue with yearly low dose CT scanning.     Forty three minutes spent with this encounter with time spent reviewing patient records, counseling patient regarding disease process, interpretation and review of labs with patient, discussing upcoming TAVR, obtaining a review of systems, performing a  physical exam, discussing patient with oncologist, ordering tests, documenting in EHR and coordination of care

## 2024-06-02 NOTE — PROGRESS NOTES
[facility]  H&P    Referring Provider: Dr. Patricia   Primary Cardiologist: Dr. Ansari     History of Present Illness  Loulou Lucero is a 79 year old female who presents for pre-operative H&P in preparation for transapical transcatheter aortic valve replacement on 6/19/24 at [facility].     Patient has a history of tobacco use, COPD, lung nodule, HLD, mild CAD, chronic low back pain, MGUS, anemia and severe aortic stenosis recently diagnosed by echocardiogram on 11/28/2023 with aortic valve area is 0.7 cm , mean gradient 30 mm gradient and peak velocity 3.6 m/s. She has ongoing symptoms related to aortic stenosis with chronic shortness of breath and occasional episodes of angina. She was evaluated for aortic valve replacement. Was felt to be high risk for SAVR. Access for TAVR limited to transapical. She was seen by Dr. Frank Cross who felt she was a suitable candidate for transapical TAVR.    Has been feeling well. Continues to have exertional dyspnea and fatigue. No fevers, chills and cough.     History of blood transfusions/reactions: No  History of abnormal bleeding/anti-platelet use: No  Steroid use in the last year: No  Personal or family history with difficulty with anesthesia: No    Infection precautions: No  Difficulty w/intubation/bedrest: No    Current Medications:  Current Outpatient Medications   Medication Sig Dispense Refill    aspirin 81 MG EC tablet Take 81 mg by mouth daily      cyanocobalamin (VITAMIN B-12) 1000 MCG tablet Take 1 tablet (1,000 mcg) by mouth daily 90 tablet 3    fluticasone-salmeterol (ADVAIR DISKUS) 250-50 MCG/ACT inhaler Inhale 1 puff into the lungs 2 times daily WIXELA-Disp as covered by Pt's insurance 3 each 2    simvastatin (ZOCOR) 20 MG tablet Take 1 tablet (20 mg) by mouth at bedtime 90 tablet 2    SV IRON 325 (65 Fe) MG tablet Take 1 tablet by mouth twice daily 60 tablet 0       Allergies:     Allergies   Allergen Reactions     Metronidazole Nausea and Vomiting    Pneumococcal Vaccine      Other reaction(s): Edema  Arm swelling    Tramadol Visual Disturbance     Hallucinations          Past Medical History:  Past Medical History:   Diagnosis Date    Asymptomatic varicose veins of lower extremity     6/12/2012    Benign paroxysmal vertigo     12/29/2010    Chronic obstructive pulmonary disease (H)     12/29/2010    Diarrhea     10/1/2015    Disorder of cartilage     Hip; Last dxa 06/2010    Diverticulosis of large intestine without perforation or abscess without bleeding          Lower abdominal pain     10/1/2015    Other disorders of lung (CODE)     Stable since July of 2002. RU lobe.    Personal history of other medical treatment (CODE)     L3, L4-4; Last MRI 7/09/12    Personal history of other medical treatment (CODE)     G-3, P-2, A-0  (Son had SIDS)    Zoster without complications     3/31/2012       Past Surgical History:  Past Surgical History:   Procedure Laterality Date    CATARACT EXTRACTION Right     8/2023    COLONOSCOPY  03/22/2010    Normal; + family hx, next due 2015    COLONOSCOPY  10/01/2015    W/ POLYPECTOMY recommend follow up in 2020.    COLONOSCOPY N/A 11/07/2019    4 tubular adenoma, 3 year follow up    CV CORONARY ANGIOGRAM N/A 4/5/2024    Procedure: Coronary Angiogram;  Surgeon: Min So MD;  Location:  HEART CARDIAC CATH LAB    CV RIGHT HEART CATH MEASUREMENTS RECORDED N/A 4/5/2024    Procedure: Right Heart Catheterization;  Surgeon: Min So MD;  Location:  HEART CARDIAC CATH LAB    ESOPHAGOSCOPY, GASTROSCOPY, DUODENOSCOPY (EGD), COMBINED  04/23/2014    Arce's esophagus fu egd 2 yrs    ESOPHAGOSCOPY, GASTROSCOPY, DUODENOSCOPY (EGD), COMBINED N/A 11/07/2019    Procedure: ESOPHAGOGASTRODUODENOSCOPY, WITH BIOPSY;  Surgeon: Santosh Barrera MD;  Location: [facility] OR    LAPAROSCOPIC TUBAL LIGATION  1978         TONSILLECTOMY & ADENOIDECTOMY  1951            Family  History:  Family History   Problem Relation Age of Onset    Colon Cancer Father         Cancer-colon    Other - See Comments Mother         Alzheimer's    Other - See Comments Maternal Grandmother         Alzheimer's    Other - See Comments Brother         Alzheimer's    Other - See Comments Brother         aneurysm and stents    Cancer Brother         Cancer,lung    Cancer Brother         Cancer,skin    Other - See Comments Brother         cirrhosis of the liver    Other - See Comments Maternal Uncle         3, Alzheimer's    Breast Cancer No family hx of         Cancer-breast       Social History:  Social History     Socioeconomic History    Marital status:    Tobacco Use    Smoking status: Every Day     Current packs/day: 0.50     Average packs/day: 0.5 packs/day for 63.4 years (31.7 ttl pk-yrs)     Types: Cigarettes     Start date: 1961     Passive exposure: Past    Smokeless tobacco: Never    Tobacco comments:     Passive exposure in adult home.    Vaping Use    Vaping status: Never Used   Substance and Sexual Activity    Alcohol use: Not Currently     Comment: rarely at a wedding or special occasion    Drug use: No    Sexual activity: Not Currently   Social History Narrative    Patient in second marriage. Has two living children, both live in [place], [place]. Has 2 grandchildren. Is retired, worked at [employer] in [place].      Patient currently smokes, 1 ppd x 50 yrs. Smokes less in the winter months due to not smoking in the house    .   Alcohol Use - no  - disabled.     Social Determinants of Health     Financial Resource Strain: Low Risk  (3/21/2024)    Financial Resource Strain     Within the past 12 months, have you or your family members you live with been unable to get utilities (heat, electricity) when it was really needed?: No   Food Insecurity: Low Risk  (3/21/2024)    Food Insecurity     Within the past 12 months, did you worry that your food would run out before you got money to buy  more?: No     Within the past 12 months, did the food you bought just not last and you didn t have money to get more?: No   Transportation Needs: Low Risk  (3/21/2024)    Transportation Needs     Within the past 12 months, has lack of transportation kept you from medical appointments, getting your medicines, non-medical meetings or appointments, work, or from getting things that you need?: No   Physical Activity: Unknown (1/31/2024)    Exercise Vital Sign     Days of Exercise per Week: 0 days   Stress: No Stress Concern Present (1/31/2024)    [employer] - Occupational Stress Questionnaire     Feeling of Stress : Only a little   Social Connections: Unknown (1/31/2024)    Social Connection and Isolation Panel [NHANES]     Frequency of Social Gatherings with Friends and Family: Once a week   Interpersonal Safety: Low Risk  (5/30/2024)    Interpersonal Safety     Do you feel physically and emotionally safe where you currently live?: Yes     Within the past 12 months, have you been hit, slapped, kicked or otherwise physically hurt by someone?: No     Within the past 12 months, have you been humiliated or emotionally abused in other ways by your partner or ex-partner?: No   Housing Stability: Low Risk  (3/21/2024)    Housing Stability     Do you have housing? : Yes     Are you worried about losing your housing?: No       Review of Systems:    Functional status: Independent in ADL's. Unable to achieve METS > 4 due to dyspnea and fatigue.    Constitutional: No fever, chills, or sweats. No weight gain/loss   ENT: No visual disturbance, ear ache, epistaxis, sore throat  Allergies/Immunologic: Negative.   Respiratory: No cough, hemoptysia  Cardiovascular: As per HPI  GI: No nausea, vomiting, hematemesis, melena, or hematochezia  : No urinary frequency, dysuria, or hematuria  Integument: Negative  Psychiatric: Negative  Neuro: Negative  Endocrinology: Negative   Musculoskeletal:  "Negative    Physical Exam:  Vitals: BP (!) 140/70 (BP Location: Right arm)   Pulse 91   Temp 98.5  F (36.9  C) (Tympanic)   Resp 14   Ht 1.52 m (4' 11.84\")   Wt 36.4 kg (80 lb 3.2 oz)   LMP 01/01/1995 (Approximate)   SpO2 98%   BMI 15.75 kg/m     General: NAD  HEENT:  Dentition intact.    Neck: No jugular venous distension.   Heart: RRR with 3/6 REGGIE at RUSB  Lungs: CTA.    Abdomen: Soft, nontender, nondistended.   Extremities: No clubbing, cyanosis, or edema.  The pulses are +4/4 at the radial, brachial, femoral, popliteal, DP, and PT sites bilaterally.  No bruits are noted.  Neurologic: Alert and oriented to person/place/time, normal speech, gait and affect  Skin: No petechiae, purpura or rash.      Diagnostic Studies:      ECG:  Personally reviewed and interpreted by me.  NSR HR 88    CT TAVR 4/5/24:    IMPRESSION:     1.  See below for TAVR related aortic annular and vascular  measurements.   2.  No acute thoracic or abdominal aortic dissection, intramural  hematoma or contained rupture noted.  3.  Small focal contained dissection flap in the right common iliac  artery as described below. On comparison, this was likely present on  the prior study from 10/24/2019 but much better visualized on today's  study due to higher-quality imaging of the iliofemoral arteries.   4.  Please review the separate Radiology report for incidental  noncardiac findings.     FINDINGS:     1.  Severely calcified tricuspid aortic valve. The aortic valve  calcium score is 3223. The PETER is 0.83 cm2 by CT planimetry. The LVOT  is not calcified. The ascending aorta is severely calcified, aortic  arch is  severely calcified, the descending thoracic aorta is severely  calcified. There is severe mitral annular calcification.  2.  There is moderate calcification of the aortic root as well as  borderline left coronary height.   3.  There are severe native coronary calcifications.  4.  The arch vessel branching pattern is normal variant, " with a single  common origin of the innominate and left common carotid arteries.   5.  The suprarenal abdominal aorta is severely calcified; infrarenal  abdominal aorta is severely calcified. There is significant additional  atherosclerotic disease diffusely throughout aorta.   6.  Widely patent origins of celiac trunk, superior mesenteric artery  and inferior mesenteric artery.  Single bilateral renal arteries with  widely patent origins with moderate calcifications present.   7.  Femoral access site issues: Minimal luminal dimensions are 4.7 mm  x 4.1 mm in the right common femoral artery and 4.7 mm x 2.4 mm in the  left common femoral artery. There is a small focal contained  dissection flap in the right common iliac artery and a large ulcerated  plaque in the proximal left common iliac artery.   8.  Subclavian access site issues: there is significant stenosis of  the bilateral subclavian arteries.  Minimal dimensions in the right  subclavian artery are 6.4 mm x  2.4 mm at a focal area of extrinsic  compression by the clavicle. The left subclavian artery has sequential  stenoses with dimensions 5.5 mm x  3.9 mm in mid segment (due to  stenosis from non-calcified plaque) and 6.4 x 2.6 mm more distally due  to extrinsic compression by the clavicle.   9.  Transcaval access issues. The window for transcaval access is 11.7  cm in length due to extensive aortic calcification facing the inferior  vena cava in the remainder of the infrarenal abdominal aorta.      MEASUREMENTS:   Representative dimensions of the thoracoabdominal aorta are as  follows:     1. AORTIC ANNULUS MEASUREMENTS:     1.  The average aortic annulus diameter is 20.5 mm, (long diameter is  23.1 mm and short diameter is 18.7 mm)  2.  Aortic annulus area is 3.32 cm2  3.  Aortic annulus perimeter is 66.7 mm  4.  Suggested 3-cusp coaxial angle for aortic valve is (REYNA 15 , CAU  15) and alternate A-P coaxial angle is (MONTILLA 0 , CAU 22), these angles  will  vary depending upon the patient's body position  5.  Aortic root angle is 26.9 degrees  6.  Left main - Annulus distance: 11.5 mm, RCA - Annulus distance:  14.6 mm     2. LVOT MEASUREMENTS:     1.  The average LVOT diameter (measured 4 mm below annular plane) is  19.8 mm  2.  LVOT area is 3.07 cm2  3.  LVOT perimeter is 63.8 mm     3. AORTA MEASUREMENTS     1.  The aortic root at the sinuses of Valsalva: 25.8 mm x  24.1 mm x  25.0 mm  2.  The sinotubular junction:  23.3 mm x 21.5 mm  3.  Sinotubular junction height: 19.3 mm x 19.2 mm  4.  Proximal ascending aorta: 27.5 mm x 26.9 mm  5.  Distal abdominal aorta proximal to the bifurcation: 12.7 mm x 11.1  mm  6.  Innominate artery 3 cm from ostium: 10.4 mm x 7.7 mm  7.  Left common carotid artery 3 cm from ostium: 6.4 mm x 6.2 mm     4. ILIOFEMORAL MEASUREMENTS:     RIGHT:  There is a small contained focal dissection flap in the right common  iliac artery.  1.  Right common iliac artery: 5.3 mm x 4.6 mm.   2.  Right external iliac artery: 5.4 mm x 4.7 mm   3.  Right common femoral artery: 4.7 mm x 4.1 mm   4.  Tortuosity: mild   5.  Calcification: severe      LEFT:  There is a large ulcerated atheroma in the proximal left common iliac  artery.  1.  Left common iliac artery: 7.8 mm x  6.9 mm   2.  Left external iliac artery: 4.7 mm x 2.4 mm   3.  Left common femoral artery: 4.5 mm x 3.6 mm  4.  Tortuosity: mild   5.  Calcification: severe      5. SUBCLAVIAN MEASUREMENTS:     RIGHT:  1. Minimal luminal diameter: 6.4 mm x  2.4 mm. This appears to be due  to extrinsic compression by the clavicle.   2. Tortuosity: moderate   3. Calcification: severe      LEFT:  1. Minimal luminal diameter: 5.5 mm x  3.9 mm in mid segment (due to  stenosis from non-calcified plaque) and 6.4 x 2.6 mm more distally due  to extrinsic compression by the clavicle.   2. Tortuosity: mild   3. Calcification: severe     ECHO 12/4/23:  Interpretation Summary  Global and regional left ventricular  function is normal with an EF of 60-65%.  There is no thrombus seen in the left ventricle.  Global right ventricular function is normal.  Severe aortic stenosis is present.  IVC diameter <2.1 cm collapsing >50% with sniff suggests a normal RA pressure  of 3 mmHg.  No pericardial effusion is present.  There is no prior study for direct comparison.  ______________________________________________________________________________  Left Ventricle  Global and regional left ventricular function is normal with an EF of 60-65%.  Left ventricular size is normal. Left ventricular wall thickness is normal.  Diastolic function not assessed due to significant mitral annular  calcification. There is no thrombus seen in the left ventricle.     Right Ventricle  The right ventricle is normal size. Global right ventricular function is  normal.     Atria  Both atria appear normal.     Mitral Valve  Severe mitral annular calcification is present. Trace mitral insufficiency is  present. Mild mitral stenosis is present. The mean mitral valve gradient is  6.7 mmHg. The mitral valve area is 3.0 cm^2.     Aortic Valve  Severe aortic valve calcification is present. Severe aortic stenosis is  present. The calculated aortic valve are is 0.73 cm^2. The mean gradient  across the aortic valve is 30 mmHg. The peak aortic velocity is 3.6 m/sec. The  V2/V1 ratio is 0.26.     Tricuspid Valve  The tricuspid valve is normal. The right ventricular systolic pressure is  approximated at 26.3 mmHg plus the right atrial pressure.     Pulmonic Valve  On Doppler interrogation, there is no significant stenosis or regurgitation.     Vessels  The thoracic aorta is normal. IVC diameter <2.1 cm collapsing >50% with sniff  suggests a normal RA pressure of 3 mmHg.     Pericardium  No pericardial effusion is present.     Compared to Previous Study  There is no prior study for direct comparison.    Laboratory Studies:  Personally reviewed and interpreted by me.    LIPID  RESULTS:  Lab Results   Component Value Date    CHOL 216 (H) 10/18/2022    CHOL 205 (H) 10/09/2020    HDL 66 10/18/2022    HDL 63 10/09/2020     (H) 10/18/2022     (H) 10/09/2020    TRIG 174 (H) 10/18/2022    TRIG 109 10/09/2020       LIVER ENZYME RESULTS:  Lab Results   Component Value Date    AST 35 05/23/2024    AST 19 10/09/2020    ALT 15 05/23/2024    ALT 9 10/09/2020       CBC RESULTS:  Lab Results   Component Value Date    WBC 5.4 05/23/2024    WBC 7.0 10/24/2019    RBC 3.89 05/23/2024    RBC 3.89 10/24/2019    HGB 13.0 05/23/2024    HGB 12.1 10/24/2019    HCT 39.3 05/23/2024    HCT 36.5 10/24/2019     (H) 05/23/2024    MCV 94 10/24/2019    MCH 33.4 (H) 05/23/2024    MCH 31.1 10/24/2019    MCHC 33.1 05/23/2024    MCHC 33.2 10/24/2019    RDW 12.5 05/23/2024    RDW 12.9 10/24/2019     05/23/2024     10/24/2019       BMP RESULTS:  Lab Results   Component Value Date     05/23/2024     10/09/2020    POTASSIUM 5.2 05/23/2024    POTASSIUM 4.6 10/18/2022    POTASSIUM 4.0 10/09/2020    CHLORIDE 102 05/23/2024    CHLORIDE 103 10/18/2022    CHLORIDE 102 10/09/2020    CO2 28 05/23/2024    CO2 34 (H) 10/18/2022    CO2 33 (H) 10/09/2020    ANIONGAP 12 05/23/2024    ANIONGAP 6 10/18/2022    ANIONGAP 7 10/09/2020    GLC 92 05/23/2024    GLC 94 10/18/2022     (H) 10/09/2020    BUN 18.9 05/23/2024    BUN 15 10/18/2022    BUN 21 10/09/2020    CR 0.87 05/23/2024    CR 1.00 10/09/2020    GFRESTIMATED 67 05/23/2024    GFRESTIMATED 54 (L) 10/09/2020    GFRESTBLACK 65 10/09/2020    DION 10.1 05/23/2024    DION 10.0 10/09/2020      Assessment and Plan   Loulou Lucero is a 79 year old female who presents for pre-operative H&P in preparation for transcatheter aortic valve replacement on 6/19/24 at [facility].     She has the following specific operative considerations:      - RCRI score 1 corresponding with a 1% perioperative risk of MACE      - AFIA # of  risks 0/8      - VTE risk = 4. If equal to or > 4, pharmacologic prophylaxis is indicated      - RIsk of PONV score = 1. If > 2, anti-emetic intervention recommended    1. Severe aortic valve stenosis:   2. Chronic Diastolic Heart Failure, NYHA class II, Stage B:  Patient with severe aortic stenosis with NYHA class II heart failure secondary to valvular disease. No acute volume overload on exam, though patient does endorse significant exertional dyspnea. She has been evaluated by the our structural heart team and has been deemed an appropriate candidate for transapical transcatheter aortic valve replacement. We discussed the procedure in detail, including pre and post-procedure care, restrictions and follow-up. Plan for 20 mm Diaz Benedict valve via transapical approach. Will be performed under GA and will be VAN guided. RTF access for pigtail and transvenous pacemaker.     All questions answered  Type and screen orders complete  Supplies for scrubbing provided  No known contrast dye allergy   No trouble with anesthesia in the past  Labs today pending, no s/s of infection    3. Mild to moderate CAD:  4. HLD:  Continue ASA and statin therapy    5. Tobacco use:  6. COPD:   Recommend smoking cessation  Continue inhalers    7. MGUS:  8. Malnutrition:  Stable blood counts. CTM.    Medication Recommendations:    Please GIVE the following NEW medications:  325 mg Asprin: Please take day before and day of procedure (it is okay to take 4 baby Asprin)    Please GIVE the following medications morning of procedure as prescribed:    fluticasone-salmeterol (ADVAIR DISKUS)       Please HOLD the following medications morning of procedure:    cyanocobalamin (VITAMIN B-12)     SV IRON 325 (65 Fe) MG tablet    simvastatin (ZOCOR) 20 MG tablet    Patient is optimized and is acceptable candidate for the proposed procedure.  No further diagnostic evaluation is needed. Pre-procedure instructions provided in written format.     Polina  VEENA De Leon, CNP  Structural Heart Care Nurse Practitioner  [facility]  Clinic: [phone]  Pager: [phone]

## 2024-06-03 ENCOUNTER — OFFICE VISIT (OUTPATIENT)
Dept: CARDIOLOGY | Facility: OTHER | Age: 79
End: 2024-06-03
Attending: NURSE PRACTITIONER
Payer: MEDICARE

## 2024-06-03 VITALS
BODY MASS INDEX: 15.75 KG/M2 | OXYGEN SATURATION: 98 % | SYSTOLIC BLOOD PRESSURE: 140 MMHG | HEIGHT: 60 IN | WEIGHT: 80.2 LBS | DIASTOLIC BLOOD PRESSURE: 70 MMHG | TEMPERATURE: 98.5 F | RESPIRATION RATE: 14 BRPM | HEART RATE: 91 BPM

## 2024-06-03 DIAGNOSIS — Z01.810 PRE-OPERATIVE CARDIOVASCULAR EXAMINATION: Primary | ICD-10-CM

## 2024-06-03 DIAGNOSIS — I35.0 SEVERE AORTIC STENOSIS: ICD-10-CM

## 2024-06-03 DIAGNOSIS — D64.9 ANEMIA, UNSPECIFIED TYPE: ICD-10-CM

## 2024-06-03 PROCEDURE — G0463 HOSPITAL OUTPT CLINIC VISIT: HCPCS

## 2024-06-03 PROCEDURE — 99214 OFFICE O/P EST MOD 30 MIN: CPT | Performed by: NURSE PRACTITIONER

## 2024-06-03 ASSESSMENT — PAIN SCALES - GENERAL: PAINLEVEL: NO PAIN (0)

## 2024-06-03 NOTE — NURSING NOTE
[place] Cardiomyopathy Questionnaire  (CQ-12)  Date: 6/3/24      Pre TAVR      1.  A.  5      B.  3       C.  5  2.  5  3.  5  4.  4  5.  5  6.  4  7.  2  8.  A. 3     B.  4     C.  4         5 Meter/15 foot Walk test  Trial 1  5.3  Trial 2   5.2  Trial 3  5.5

## 2024-06-03 NOTE — PATIENT INSTRUCTIONS
Transcatheter aortic valve replacement (TAVR)    Check in to the hospital, at 5:30 AM.      Please disregard any Mychart messages or reminders in regards to ECHO scheduling, this is done as part of procedure.   If you are unsure if your check in time has changed, please call number [phone].    3rd floor: [address],     [facility], [address]  [address]   parking is available in front of the hospital, 24 hours a day  -  Stop at the Information Desk and the admissions desk in the lobby.    -------------------------------------------------------------------------  Nothing to eat after midnight  Clear liquids like water, apple juice or 7up/Sprite is OK up to two hours prior to your scheduled procedure.    You may take your medications in the morning with a sip of water.      *Please use the special cleansing wipes, received at your pre-op History and Physical, the night before and the morning of your procedure.    Please focus the use of these wipes from neck to groin, avoiding the face and genital area.    If you did NOT receive these special cleansing wipes at your pre-op History and Physical, then:   * Please use a special soap such as hibicleanse or chlorhexadine.  (This can be purchased, over the counter, at a pharmacy) If this is not available, please use an antibacterial soap.  * Take a shower, using soap, the night before the procedure, and the morning of the procedure.    Please focus the use of this soap from neck to groin, avoiding the face and genital area.    Contrast Allergy:   No    Medications Instructions:      Please GIVE the following NEW medications:  325 mg Asprin: Please take day before and day of procedure (it is okay to take 4 baby Asprin)    Please GIVE the following medications morning of procedure as prescribed:    fluticasone-salmeterol (ADVAIR DISKUS)       Please HOLD the following medications morning of procedure:     cyanocobalamin (VITAMIN B-12)     SV IRON 325 (65 Fe) MG tablet    simvastatin (ZOCOR) 20 MG tablet        If any questions please contact:  Maya Jones RN  Structural Heart Care Coordinator  [facility]  Office: [phone]

## 2024-06-03 NOTE — NURSING NOTE
Patient presents for H & P for her upcoming TAVR on 6-19-24.  Alicja Shelton LPN.........................6/3/2024  2:53 PM

## 2024-06-03 NOTE — TELEPHONE ENCOUNTER
"Last Labs 5/30/24, next due 6 months from date, coming in for cardiology labs 6/18/24. Next appt (with [facility]) scheduled for 12-3-24. Per last OV note \"Iron deficiency anemia. This was unresponsive to oral iron\". Patient did have IV infusion 2/1 and 2/9  (Feraheme) and did per OV note did not follow up after these.     Please advise on refmichael Hurtado LPN...................6/3/2024   2:42 PM    "

## 2024-06-05 ENCOUNTER — TELEPHONE (OUTPATIENT)
Dept: ONCOLOGY | Facility: OTHER | Age: 79
End: 2024-06-05
Payer: MEDICARE

## 2024-06-05 NOTE — TELEPHONE ENCOUNTER
Patient had IV Feraheme in February. She has been taking her oral iron twice daily. Ferritin is still elevated and TIBC low and iron saturation is high. Per Reynaldo Hansen MD ok to stop the Iron pills.  Yareli Bardales RN...........6/5/2024 1:15 PM

## 2024-06-16 ENCOUNTER — ANESTHESIA EVENT (OUTPATIENT)
Dept: SURGERY | Facility: CLINIC | Age: 79
DRG: 266 | End: 2024-06-16
Payer: MEDICARE

## 2024-06-16 NOTE — H&P
[facility]  Loulou Lucero MRN: [id number]  1945  Date of Admission: 6/19/2024  Primary care provider: Ele Marc         Chief Complaint:   S/p TAVR with trans apical approach, patient presents to the ICU intubated and sedated with chest tube placed by CVTS team      Assessment and Plan:     Loulou Lucero is a 79 year old female with past medical history of tobacco use, COPD, lung nodule, HLD, mild CAD, chronic low back pain, MGUS, anemia and severe aortic stenosis who was admitted for planned transapical approach TAVR.      # Severe aortic stenosis  # s/p TAVR with transapical approach  # Chronic heart failure with preserved ejection fraction secondary to valvular disease  # HLD  # Mild to moderate CAD  # Acute post op pain  # Post procedural hypertension  Prior to procedure, pt noted to have a mean gradient 30 mmHG, PETER 0.7 cm^2, PV 3.6 m/s. Now s/p TAVR with 20mm Diaz valve. Procedure was uncomplicated. Patient arrives to the ICU intubated and sedated, with moderate hypertension.    Trans apical approach, CVTS left anterior incision with chest tube placement, anesthesia team placed EPS catheter which has since been removed.     - Pain control regimen for incision- anesthesia pain team planning to replace catheter  - Nicardipine for hypertension in post procedure setting, SBP goal < 140 mmHg  - Wean vent to extubate post bedrest, ventilator bundle with management for now  - Bedrest 2 hours post procedure.    - Neuro checks, per protocol.  - Monitor groin sites.   - EKG in morning.   - Echocardiogram tomorrow.   - Aspirin 81 mg for anti-platelet therapy.   - continue Simvastatin  - Cardiac rehab/PT/OT.  - ADAT post extubation  - Daily weights.   - Strict intake/output.     # COPD  # Tobacco Use  - Encourage cessation  - Continue PTA fluticasone-salmeterol    # Chronic iron deficiency anemia  # MGUS  - Baseline Hgb 9 range, since starting iron, increased  "to 13 range  - Continue PTA iron supplementation  - CBC daily      DVT ppx: MATTHEW  Diet: 2 g sodium   CODE: Full  Disposition: Anticipate discharge home in 1-2 days pending PT/OT assessment    VEENA Cruz CNP  [facility]  ICU Cardiology-CICU Service  Send message or 10 digit call back number Securely via [employer] with the [employer] Web Console (learn more here)     Clinically Significant Risk Factors Present on Admission                  # Cachexia: Estimated body mass index is 15.75 kg/m  as calculated from the following:    Height as of 6/3/24: 1.52 m (4' 11.84\").    Weight as of 6/3/24: 36.4 kg (80 lb 3.2 oz).    Cardiovascular: Aortic valve stenosis with insufficiency              History of Present Illness:   Loulou Lucero is a 79 year old female with a PMH significant for tobacco use, COPD, lung nodule, HLD, mild CAD, chronic low back pain, MGUS, anemia and severe aortic stenosis who was admitted for planned TAVR.     Underwent planned TAVR with 20 mm Diaz valve. Procedure underwent transapical approach, presents to the ICU intubated and sedated, moderately hypertensive, with chest tube in place by CVTS for trans apical approach. No headache, visual changes, numbness, weakness, or speech difficulties. No dyspnea, chest pain, or palpitations. No pain around access sites. No bleeding.          Review of Systems:    10 point review of systems negative except for stated above in HPI.          Past Medical History:   Medical History reviewed.   Past Medical History:   Diagnosis Date    Asymptomatic varicose veins of lower extremity     6/12/2012    Benign paroxysmal vertigo     12/29/2010    Chronic obstructive pulmonary disease (H)     12/29/2010    Diarrhea     10/1/2015    Disorder of cartilage     Hip; Last dxa 06/2010    Diverticulosis of large intestine without perforation or abscess without bleeding          Lower abdominal pain     10/1/2015    Other disorders of lung (CODE)     Stable since " July of 2002. RU lobe.    Personal history of other medical treatment (CODE)     L3, L4-4; Last MRI 7/09/12    Personal history of other medical treatment (CODE)     G-3, P-2, A-0  (Son had SIDS)    Zoster without complications     3/31/2012             Past Surgical History:   Surgical History reviewed.   Past Surgical History:   Procedure Laterality Date    CATARACT EXTRACTION Right     8/2023    COLONOSCOPY  03/22/2010    Normal; + family hx, next due 2015    COLONOSCOPY  10/01/2015    W/ POLYPECTOMY recommend follow up in 2020.    COLONOSCOPY N/A 11/07/2019    4 tubular adenoma, 3 year follow up    CV CORONARY ANGIOGRAM N/A 4/5/2024    Procedure: Coronary Angiogram;  Surgeon: Min So MD;  [facility]    CV RIGHT HEART CATH MEASUREMENTS RECORDED N/A 4/5/2024    Procedure: Right Heart Catheterization;  Surgeon: Min So MD;  Location:  HEART CARDIAC CATH LAB    ESOPHAGOSCOPY, GASTROSCOPY, DUODENOSCOPY (EGD), COMBINED  04/23/2014    Arce's esophagus fu egd 2 yrs    ESOPHAGOSCOPY, GASTROSCOPY, DUODENOSCOPY (EGD), COMBINED N/A 11/07/2019    Procedure: ESOPHAGOGASTRODUODENOSCOPY, WITH BIOPSY;  Surgeon: Santosh Barrera MD;  Location: [facility] OR    LAPAROSCOPIC TUBAL LIGATION  1978         TONSILLECTOMY & ADENOIDECTOMY  1951                  Social History:   Social History reviewed.  Social History     Tobacco Use    Smoking status: Every Day     Current packs/day: 0.50     Average packs/day: 0.5 packs/day for 63.5 years (31.7 ttl pk-yrs)     Types: Cigarettes     Start date: 1961     Passive exposure: Past    Smokeless tobacco: Never    Tobacco comments:     Passive exposure in adult home.    Substance Use Topics    Alcohol use: Not Currently     Comment: rarely at a wedding or special occasion             Family History:   Family History reviewed.   Family History   Problem Relation Age of Onset    Colon Cancer Father         Cancer-colon    Other  - See Comments Mother         Alzheimer's    Other - See Comments Maternal Grandmother         Alzheimer's    Other - See Comments Brother         Alzheimer's    Other - See Comments Brother         aneurysm and stents    Cancer Brother         Cancer,lung    Cancer Brother         Cancer,skin    Other - See Comments Brother         cirrhosis of the liver    Other - See Comments Maternal Uncle         3, Alzheimer's    Breast Cancer No family hx of         Cancer-breast             Allergies:     Allergies   Allergen Reactions    Metronidazole Nausea and Vomiting    Pneumococcal Vaccine      Other reaction(s): Edema  Arm swelling    Tramadol Visual Disturbance     Hallucinations                Medications:   Medications Reviewed.   No current facility-administered medications for this encounter.     Current Outpatient Medications   Medication Sig Dispense Refill    aspirin 81 MG EC tablet Take 81 mg by mouth daily      cyanocobalamin (VITAMIN B-12) 1000 MCG tablet Take 1 tablet (1,000 mcg) by mouth daily 90 tablet 3    fluticasone-salmeterol (ADVAIR DISKUS) 250-50 MCG/ACT inhaler Inhale 1 puff into the lungs 2 times daily WIXELA-Disp as covered by Pt's insurance 3 each 2    simvastatin (ZOCOR) 20 MG tablet Take 1 tablet (20 mg) by mouth at bedtime 90 tablet 2             Physical Exam:   Vitals were reviewed.  Last menstrual period 01/01/1995, not currently breastfeeding.    General: No acute distress  Skin: Not jaundiced, no rash, no ecchymoses, warm and dry to touch.  CV: RRR, normal S1S2, Bilateral radial, femoral, and pedal pulses palpable. Bilateral femoral access sites are c/d/i. Flat and soft to palpation. No hematoma.   Resp: Intubated, lungs clear bilaterally  Extremities: warm and well perfused, no edema or cyanosis   Neuro: Intubated and sedated. Moves all extremities.           Labs:     CMPNo lab results found in last 7 days.  CBCNo lab results found in last 7 days.  INRNo lab results found in last 7  days.        Diagnostics:      EKG 4/5/24:      ECHO 11/28/23:  Interpretation Summary  Global and regional left ventricular function is normal with an EF of 60-65%.  There is no thrombus seen in the left ventricle.  Global right ventricular function is normal.  Severe aortic stenosis is present.  IVC diameter <2.1 cm collapsing >50% with sniff suggests a normal RA pressure  of 3 mmHg.  No pericardial effusion is present.  There is no prior study for direct comparison.    Coronary angiogram/RHC 6/16/24:  Coronary Findings    Diagnostic  Dominance: Right  Left Main   The vessel is moderate in size and is angiographically normal.   Mid LM lesion is 30% stenosed.      Left Anterior Descending   The vessel is moderate in size and is angiographically normal.   Prox LAD to Mid LAD lesion is 50% stenosed.      First Diagonal Branch   The vessel is moderate in size and is angiographically normal.   1st Diag lesion is 30% stenosed.      Left Circumflex   The vessel is moderate in size and is angiographically normal.   Ost Cx lesion is 30% stenosed.      First Obtuse Marginal Branch   The vessel is moderate in size and is angiographically normal.      Right Coronary Artery   The vessel is moderate in size and is angiographically normal.   Prox RCA lesion is 40% stenosed.      Right Posterior Descending Artery   The vessel is small and is angiographically normal.      Right Posterior Atrioventricular Artery   The vessel is small and is angiographically normal.      First Right Posterolateral Branch   The vessel is small and is angiographically normal.      Second Right Posterolateral Branch   The vessel is small and is angiographically normal.         Intervention     No interventions have been documented.     Hemodynamics    RA: 3/6/3 mm Hg  RV: 30/2 mm Hg  PA: 30/10 (16) mm Hg  PCWP: 10 mm Hg  CO/CI: 3.7/3.0 (TD)  PVR 1.6  Right sided filling pressures are normal. Left sided filling pressures are normal. Normal PA pressures.  Normal cardiac output level. Hemodynamic data has been modified in Epic per physician review.     Pressures Phase: Baseline

## 2024-06-17 LAB
ABO/RH(D): NORMAL
ANTIBODY SCREEN: NEGATIVE
SPECIMEN EXPIRATION DATE: NORMAL

## 2024-06-18 ENCOUNTER — LAB (OUTPATIENT)
Dept: LAB | Facility: CLINIC | Age: 79
End: 2024-06-18
Payer: MEDICARE

## 2024-06-18 ENCOUNTER — TELEPHONE (OUTPATIENT)
Dept: CARDIOLOGY | Facility: CLINIC | Age: 79
End: 2024-06-18

## 2024-06-18 DIAGNOSIS — I50.9 ACUTE HEART FAILURE, UNSPECIFIED HEART FAILURE TYPE (H): ICD-10-CM

## 2024-06-18 DIAGNOSIS — I35.0 SEVERE AORTIC STENOSIS: ICD-10-CM

## 2024-06-18 DIAGNOSIS — I35.0 AORTIC VALVE STENOSIS: ICD-10-CM

## 2024-06-18 LAB
ALBUMIN SERPL BCG-MCNC: 4.3 G/DL (ref 3.5–5.2)
ALP SERPL-CCNC: 60 U/L (ref 40–150)
ALT SERPL W P-5'-P-CCNC: 11 U/L (ref 0–50)
ANION GAP SERPL CALCULATED.3IONS-SCNC: 9 MMOL/L (ref 7–15)
AST SERPL W P-5'-P-CCNC: 26 U/L (ref 0–45)
BILIRUB SERPL-MCNC: 0.6 MG/DL
BUN SERPL-MCNC: 18.6 MG/DL (ref 8–23)
CALCIUM SERPL-MCNC: 9.7 MG/DL (ref 8.8–10.2)
CHLORIDE SERPL-SCNC: 104 MMOL/L (ref 98–107)
CREAT SERPL-MCNC: 0.73 MG/DL (ref 0.51–0.95)
DEPRECATED HCO3 PLAS-SCNC: 29 MMOL/L (ref 22–29)
EGFRCR SERPLBLD CKD-EPI 2021: 83 ML/MIN/1.73M2
ERYTHROCYTE [DISTWIDTH] IN BLOOD BY AUTOMATED COUNT: 12.9 % (ref 10–15)
GLUCOSE SERPL-MCNC: 90 MG/DL (ref 70–99)
HCT VFR BLD AUTO: 38.4 % (ref 35–47)
HGB BLD-MCNC: 12.7 G/DL (ref 11.7–15.7)
INR PPP: 1.2 (ref 0.85–1.15)
MCH RBC QN AUTO: 33 PG (ref 26.5–33)
MCHC RBC AUTO-ENTMCNC: 33.1 G/DL (ref 31.5–36.5)
MCV RBC AUTO: 100 FL (ref 78–100)
NT-PROBNP SERPL-MCNC: 606 PG/ML (ref 0–1800)
PLATELET # BLD AUTO: 206 10E3/UL (ref 150–450)
POTASSIUM SERPL-SCNC: 4.8 MMOL/L (ref 3.4–5.3)
PROT SERPL-MCNC: 7.8 G/DL (ref 6.4–8.3)
RBC # BLD AUTO: 3.85 10E6/UL (ref 3.8–5.2)
SODIUM SERPL-SCNC: 142 MMOL/L (ref 135–145)
WBC # BLD AUTO: 6 10E3/UL (ref 4–11)

## 2024-06-18 PROCEDURE — 85027 COMPLETE CBC AUTOMATED: CPT | Performed by: PATHOLOGY

## 2024-06-18 PROCEDURE — 86900 BLOOD TYPING SEROLOGIC ABO: CPT

## 2024-06-18 PROCEDURE — 36415 COLL VENOUS BLD VENIPUNCTURE: CPT | Performed by: PATHOLOGY

## 2024-06-18 PROCEDURE — 85610 PROTHROMBIN TIME: CPT | Performed by: PATHOLOGY

## 2024-06-18 PROCEDURE — 80053 COMPREHEN METABOLIC PANEL: CPT | Performed by: PATHOLOGY

## 2024-06-18 PROCEDURE — 83880 ASSAY OF NATRIURETIC PEPTIDE: CPT | Performed by: PATHOLOGY

## 2024-06-18 NOTE — TELEPHONE ENCOUNTER
Spoke to Olvin on 4A to confirm ICU bed requested for tomorrow.    Maya Jones, RN, BSN  Lead Structural Heart Coordinator  TAVR, MitraClip and Watchman

## 2024-06-18 NOTE — TELEPHONE ENCOUNTER
Telephone call to Danni to touch base prior to Loulou's procedure tomorrow's. Answered all of Danni's questions.     Maya Jones, RN, BSN  Lead Structural Heart Coordinator  TAVR, MitraClip and Watchman

## 2024-06-19 ENCOUNTER — TRANSFERRED RECORDS (OUTPATIENT)
Dept: HEALTH INFORMATION MANAGEMENT | Facility: OTHER | Age: 79
End: 2024-06-19

## 2024-06-19 ENCOUNTER — ANESTHESIA EVENT (OUTPATIENT)
Dept: INTENSIVE CARE | Facility: CLINIC | Age: 79
DRG: 266 | End: 2024-06-19
Payer: MEDICARE

## 2024-06-19 ENCOUNTER — HOSPITAL ENCOUNTER (OUTPATIENT)
Dept: CARDIOLOGY | Facility: CLINIC | Age: 79
Discharge: HOME | DRG: 266 | End: 2024-06-19
Attending: INTERNAL MEDICINE | Admitting: INTERNAL MEDICINE
Payer: MEDICARE

## 2024-06-19 ENCOUNTER — HOSPITAL ENCOUNTER (INPATIENT)
Facility: CLINIC | Age: 79
LOS: 9 days | Discharge: NURSING, REHAB OR LONG-TERM CARE | DRG: 266 | End: 2024-06-28
Attending: INTERNAL MEDICINE | Admitting: SURGERY
Payer: MEDICARE

## 2024-06-19 ENCOUNTER — ANESTHESIA (OUTPATIENT)
Dept: SURGERY | Facility: CLINIC | Age: 79
DRG: 266 | End: 2024-06-19
Payer: MEDICARE

## 2024-06-19 ENCOUNTER — APPOINTMENT (OUTPATIENT)
Dept: GENERAL RADIOLOGY | Facility: CLINIC | Age: 79
DRG: 266 | End: 2024-06-19
Attending: INTERNAL MEDICINE
Payer: MEDICARE

## 2024-06-19 ENCOUNTER — ANESTHESIA (OUTPATIENT)
Dept: INTENSIVE CARE | Facility: CLINIC | Age: 79
DRG: 266 | End: 2024-06-19
Payer: MEDICARE

## 2024-06-19 DIAGNOSIS — I35.0 SEVERE AORTIC STENOSIS BY PRIOR ECHOCARDIOGRAM: Primary | ICD-10-CM

## 2024-06-19 DIAGNOSIS — I35.0 SEVERE AORTIC STENOSIS: ICD-10-CM

## 2024-06-19 DIAGNOSIS — I35.0 AORTIC VALVE STENOSIS: Primary | ICD-10-CM

## 2024-06-19 DIAGNOSIS — I48.0 PAROXYSMAL A-FIB (H): ICD-10-CM

## 2024-06-19 DIAGNOSIS — Z95.2 S/P TAVR (TRANSCATHETER AORTIC VALVE REPLACEMENT): ICD-10-CM

## 2024-06-19 DIAGNOSIS — K21.9 GASTROESOPHAGEAL REFLUX DISEASE WITHOUT ESOPHAGITIS: ICD-10-CM

## 2024-06-19 DIAGNOSIS — E53.8 VITAMIN B12 DEFICIENCY (NON ANEMIC): ICD-10-CM

## 2024-06-19 DIAGNOSIS — Z95.2 HISTORY OF TRANSCATHETER AORTIC VALVE REPLACEMENT (TAVR): ICD-10-CM

## 2024-06-19 LAB
ALBUMIN SERPL BCG-MCNC: 3.3 G/DL (ref 3.5–5.2)
ALBUMIN SERPL BCG-MCNC: 3.5 G/DL (ref 3.5–5.2)
ALLEN'S TEST: ABNORMAL
ALP SERPL-CCNC: 46 U/L (ref 40–150)
ALP SERPL-CCNC: 48 U/L (ref 40–150)
ALT SERPL W P-5'-P-CCNC: 13 U/L (ref 0–50)
ALT SERPL W P-5'-P-CCNC: 14 U/L (ref 0–50)
ANION GAP SERPL CALCULATED.3IONS-SCNC: 10 MMOL/L (ref 7–15)
ANION GAP SERPL CALCULATED.3IONS-SCNC: 11 MMOL/L (ref 7–15)
AST SERPL W P-5'-P-CCNC: 30 U/L (ref 0–45)
AST SERPL W P-5'-P-CCNC: 54 U/L (ref 0–45)
BASE EXCESS BLDA CALC-SCNC: -0.1 MMOL/L (ref -3–3)
BASE EXCESS BLDA CALC-SCNC: -0.2 MMOL/L (ref -3–3)
BASE EXCESS BLDA CALC-SCNC: -0.7 MMOL/L (ref -3–3)
BASE EXCESS BLDA CALC-SCNC: 1.3 MMOL/L (ref -3–3)
BILIRUB SERPL-MCNC: 0.5 MG/DL
BILIRUB SERPL-MCNC: 0.6 MG/DL
BLD PROD TYP BPU: NORMAL
BLD PROD TYP BPU: NORMAL
BLOOD COMPONENT TYPE: NORMAL
BLOOD COMPONENT TYPE: NORMAL
BUN SERPL-MCNC: 22.6 MG/DL (ref 8–23)
BUN SERPL-MCNC: 25.6 MG/DL (ref 8–23)
CA-I BLD-MCNC: 4.4 MG/DL (ref 4.4–5.2)
CA-I BLD-MCNC: 4.5 MG/DL (ref 4.4–5.2)
CALCIUM SERPL-MCNC: 7.9 MG/DL (ref 8.8–10.2)
CALCIUM SERPL-MCNC: 9.3 MG/DL (ref 8.8–10.2)
CHLORIDE SERPL-SCNC: 106 MMOL/L (ref 98–107)
CHLORIDE SERPL-SCNC: 106 MMOL/L (ref 98–107)
CLOT INIT KAOL IND TO POST HEP NEUT TRTO: 1 {RATIO}
CLOT INIT KAOLIN IND BLD US: 142 SEC (ref 113–166)
CLOT INIT KAOLIN IND P HEP NEUT BLD US: 139 SEC (ref 103–153)
CLOT STIFF PLT CONT BLD CALC: 25.2 HPA (ref 11.9–29.8)
CLOT STIFF TF IND P HEP NEUT BLD US: 30.2 HPA (ref 13–33.2)
CLOT STIFF TF IND+IIB-IIIA INH P HEP NEU: 5 HPA (ref 1–3.7)
CODING SYSTEM: NORMAL
CODING SYSTEM: NORMAL
COHGB MFR BLD: 99.7 % (ref 95–96)
COHGB MFR BLD: 99.9 % (ref 95–96)
COHGB MFR BLD: >100 % (ref 95–96)
CREAT SERPL-MCNC: 0.78 MG/DL (ref 0.51–0.95)
CREAT SERPL-MCNC: 0.83 MG/DL (ref 0.51–0.95)
CROSSMATCH: NORMAL
CROSSMATCH: NORMAL
DEPRECATED HCO3 PLAS-SCNC: 23 MMOL/L (ref 22–29)
DEPRECATED HCO3 PLAS-SCNC: 23 MMOL/L (ref 22–29)
EGFRCR SERPLBLD CKD-EPI 2021: 71 ML/MIN/1.73M2
EGFRCR SERPLBLD CKD-EPI 2021: 77 ML/MIN/1.73M2
ERYTHROCYTE [DISTWIDTH] IN BLOOD BY AUTOMATED COUNT: 13 % (ref 10–15)
GLUCOSE BLD-MCNC: 157 MG/DL (ref 70–99)
GLUCOSE BLDC GLUCOMTR-MCNC: 103 MG/DL (ref 70–99)
GLUCOSE BLDC GLUCOMTR-MCNC: 122 MG/DL (ref 70–99)
GLUCOSE BLDC GLUCOMTR-MCNC: 124 MG/DL (ref 70–99)
GLUCOSE BLDC GLUCOMTR-MCNC: 125 MG/DL (ref 70–99)
GLUCOSE SERPL-MCNC: 122 MG/DL (ref 70–99)
GLUCOSE SERPL-MCNC: 134 MG/DL (ref 70–99)
HCO3 BLD-SCNC: 26 MMOL/L (ref 21–28)
HCO3 BLDA-SCNC: 25 MMOL/L (ref 21–28)
HCT VFR BLD AUTO: 28.5 % (ref 35–47)
HGB BLD-MCNC: 9.3 G/DL (ref 11.7–15.7)
HGB BLD-MCNC: 9.5 G/DL (ref 11.7–15.7)
ISSUE DATE AND TIME: NORMAL
ISSUE DATE AND TIME: NORMAL
LACTATE BLD-SCNC: 1.2 MMOL/L (ref 0.7–2)
LACTATE SERPL-SCNC: 0.7 MMOL/L (ref 0.7–2)
LACTATE SERPL-SCNC: 0.9 MMOL/L (ref 0.7–2)
LVEF ECHO: NORMAL
MAGNESIUM SERPL-MCNC: 1.8 MG/DL (ref 1.7–2.3)
MAGNESIUM SERPL-MCNC: 2 MG/DL (ref 1.7–2.3)
MCH RBC QN AUTO: 33 PG (ref 26.5–33)
MCHC RBC AUTO-ENTMCNC: 33.3 G/DL (ref 31.5–36.5)
MCV RBC AUTO: 99 FL (ref 78–100)
O2/TOTAL GAS SETTING VFR VENT: 100 %
O2/TOTAL GAS SETTING VFR VENT: 23 %
O2/TOTAL GAS SETTING VFR VENT: 30 %
O2/TOTAL GAS SETTING VFR VENT: 40 %
OXYHGB MFR BLDA: 98 % (ref 92–100)
PCO2 BLD: 40 MM HG (ref 35–45)
PCO2 BLD: 45 MM HG (ref 35–45)
PCO2 BLD: 47 MM HG (ref 35–45)
PCO2 BLDA: 45 MM HG (ref 35–45)
PEEP: 5 CM H2O
PH BLD: 7.35 [PH] (ref 7.35–7.45)
PH BLD: 7.36 [PH] (ref 7.35–7.45)
PH BLD: 7.42 [PH] (ref 7.35–7.45)
PH BLDA: 7.36 [PH] (ref 7.35–7.45)
PHOSPHATE SERPL-MCNC: 3.9 MG/DL (ref 2.5–4.5)
PHOSPHATE SERPL-MCNC: 5.7 MG/DL (ref 2.5–4.5)
PLATELET # BLD AUTO: 139 10E3/UL (ref 150–450)
PO2 BLD: 128 MM HG (ref 80–105)
PO2 BLD: 151 MM HG (ref 80–105)
PO2 BLD: 193 MM HG (ref 80–105)
PO2 BLDA: 533 MM HG (ref 80–105)
POTASSIUM BLD-SCNC: 4.2 MMOL/L (ref 3.4–5.3)
POTASSIUM SERPL-SCNC: 4.3 MMOL/L (ref 3.4–5.3)
POTASSIUM SERPL-SCNC: 4.4 MMOL/L (ref 3.4–5.3)
PROT SERPL-MCNC: 5.7 G/DL (ref 6.4–8.3)
PROT SERPL-MCNC: 6.2 G/DL (ref 6.4–8.3)
RBC # BLD AUTO: 2.88 10E6/UL (ref 3.8–5.2)
SAO2 % BLDA: 97 % (ref 92–100)
SAO2 % BLDA: 98 % (ref 92–100)
SAO2 % BLDA: 99 % (ref 92–100)
SAO2 % BLDA: >100 % (ref 95–96)
SODIUM BLD-SCNC: 140 MMOL/L (ref 135–145)
SODIUM SERPL-SCNC: 139 MMOL/L (ref 135–145)
SODIUM SERPL-SCNC: 140 MMOL/L (ref 135–145)
UNIT ABO/RH: NORMAL
UNIT ABO/RH: NORMAL
UNIT NUMBER: NORMAL
UNIT NUMBER: NORMAL
UNIT STATUS: NORMAL
UNIT STATUS: NORMAL
UNIT TYPE ISBT: 5100
UNIT TYPE ISBT: 5100
WBC # BLD AUTO: 8.5 10E3/UL (ref 4–11)

## 2024-06-19 PROCEDURE — 33366 TRCATH REPLACE AORTIC VALVE: CPT | Mod: 62 | Performed by: SURGERY

## 2024-06-19 PROCEDURE — 258N000003 HC RX IP 258 OP 636: Mod: JZ | Performed by: STUDENT IN AN ORGANIZED HEALTH CARE EDUCATION/TRAINING PROGRAM

## 2024-06-19 PROCEDURE — 99100 ANES PT EXTEME AGE<1 YR&>70: CPT | Performed by: ANESTHESIOLOGY

## 2024-06-19 PROCEDURE — 370N000017 HC ANESTHESIA TECHNICAL FEE, PER MIN: Performed by: INTERNAL MEDICINE

## 2024-06-19 PROCEDURE — 250N000011 HC RX IP 250 OP 636: Mod: JZ | Performed by: STUDENT IN AN ORGANIZED HEALTH CARE EDUCATION/TRAINING PROGRAM

## 2024-06-19 PROCEDURE — 410N000003 HC PER-PERFUSION 1ST 30 MIN: Performed by: INTERNAL MEDICINE

## 2024-06-19 PROCEDURE — 250N000024 HC ISOFLURANE, PER MIN: Performed by: INTERNAL MEDICINE

## 2024-06-19 PROCEDURE — 93325 DOPPLER ECHO COLOR FLOW MAPG: CPT

## 2024-06-19 PROCEDURE — 82247 BILIRUBIN TOTAL: CPT | Performed by: INTERNAL MEDICINE

## 2024-06-19 PROCEDURE — 86923 COMPATIBILITY TEST ELECTRIC: CPT

## 2024-06-19 PROCEDURE — 250N000009 HC RX 250: Performed by: STUDENT IN AN ORGANIZED HEALTH CARE EDUCATION/TRAINING PROGRAM

## 2024-06-19 PROCEDURE — 83735 ASSAY OF MAGNESIUM: CPT | Performed by: STUDENT IN AN ORGANIZED HEALTH CARE EDUCATION/TRAINING PROGRAM

## 2024-06-19 PROCEDURE — 999N000065 XR CHEST PORT 1 VIEW

## 2024-06-19 PROCEDURE — 250N000013 HC RX MED GY IP 250 OP 250 PS 637: Performed by: STUDENT IN AN ORGANIZED HEALTH CARE EDUCATION/TRAINING PROGRAM

## 2024-06-19 PROCEDURE — 999N000157 HC STATISTIC RCP TIME EA 10 MIN

## 2024-06-19 PROCEDURE — 82330 ASSAY OF CALCIUM: CPT

## 2024-06-19 PROCEDURE — 82330 ASSAY OF CALCIUM: CPT | Performed by: STUDENT IN AN ORGANIZED HEALTH CARE EDUCATION/TRAINING PROGRAM

## 2024-06-19 PROCEDURE — C1769 GUIDE WIRE: HCPCS | Performed by: INTERNAL MEDICINE

## 2024-06-19 PROCEDURE — 02RF38H REPLACEMENT OF AORTIC VALVE WITH ZOOPLASTIC TISSUE, TRANSAPICAL, PERCUTANEOUS APPROACH: ICD-10-PCS | Performed by: INTERNAL MEDICINE

## 2024-06-19 PROCEDURE — 370N000003 HC ANESTHESIA WARD SERVICE

## 2024-06-19 PROCEDURE — 272N000001 HC OR GENERAL SUPPLY STERILE: Performed by: INTERNAL MEDICINE

## 2024-06-19 PROCEDURE — 93010 ELECTROCARDIOGRAM REPORT: CPT | Mod: 76 | Performed by: INTERNAL MEDICINE

## 2024-06-19 PROCEDURE — 94002 VENT MGMT INPAT INIT DAY: CPT

## 2024-06-19 PROCEDURE — 84100 ASSAY OF PHOSPHORUS: CPT | Performed by: STUDENT IN AN ORGANIZED HEALTH CARE EDUCATION/TRAINING PROGRAM

## 2024-06-19 PROCEDURE — 93320 DOPPLER ECHO COMPLETE: CPT

## 2024-06-19 PROCEDURE — 83605 ASSAY OF LACTIC ACID: CPT | Performed by: STUDENT IN AN ORGANIZED HEALTH CARE EDUCATION/TRAINING PROGRAM

## 2024-06-19 PROCEDURE — P9016 RBC LEUKOCYTES REDUCED: HCPCS

## 2024-06-19 PROCEDURE — C9248 INJ, CLEVIDIPINE BUTYRATE: HCPCS | Mod: JZ | Performed by: STUDENT IN AN ORGANIZED HEALTH CARE EDUCATION/TRAINING PROGRAM

## 2024-06-19 PROCEDURE — 82247 BILIRUBIN TOTAL: CPT | Performed by: STUDENT IN AN ORGANIZED HEALTH CARE EDUCATION/TRAINING PROGRAM

## 2024-06-19 PROCEDURE — 120N000002 HC R&B MED SURG/OB UMMC

## 2024-06-19 PROCEDURE — 250N000011 HC RX IP 250 OP 636: Mod: JZ

## 2024-06-19 PROCEDURE — 99291 CRITICAL CARE FIRST HOUR: CPT | Mod: 25 | Performed by: INTERNAL MEDICINE

## 2024-06-19 PROCEDURE — 258N000003 HC RX IP 258 OP 636

## 2024-06-19 PROCEDURE — 93325 DOPPLER ECHO COLOR FLOW MAPG: CPT | Mod: 26 | Performed by: INTERNAL MEDICINE

## 2024-06-19 PROCEDURE — 85396 CLOTTING ASSAY WHOLE BLOOD: CPT

## 2024-06-19 PROCEDURE — 250N000011 HC RX IP 250 OP 636: Performed by: INTERNAL MEDICINE

## 2024-06-19 PROCEDURE — C1760 CLOSURE DEV, VASC: HCPCS | Performed by: INTERNAL MEDICINE

## 2024-06-19 PROCEDURE — 93005 ELECTROCARDIOGRAM TRACING: CPT

## 2024-06-19 PROCEDURE — 250N000011 HC RX IP 250 OP 636: Performed by: ANESTHESIOLOGY

## 2024-06-19 PROCEDURE — C1730 CATH, EP, 19 OR FEW ELECT: HCPCS | Performed by: INTERNAL MEDICINE

## 2024-06-19 PROCEDURE — 85027 COMPLETE CBC AUTOMATED: CPT | Performed by: STUDENT IN AN ORGANIZED HEALTH CARE EDUCATION/TRAINING PROGRAM

## 2024-06-19 PROCEDURE — 250N000011 HC RX IP 250 OP 636: Mod: JZ | Performed by: INTERNAL MEDICINE

## 2024-06-19 PROCEDURE — 93320 DOPPLER ECHO COMPLETE: CPT | Mod: 26 | Performed by: INTERNAL MEDICINE

## 2024-06-19 PROCEDURE — 33366 TRCATH REPLACE AORTIC VALVE: CPT | Performed by: ANESTHESIOLOGY

## 2024-06-19 PROCEDURE — 83605 ASSAY OF LACTIC ACID: CPT | Performed by: INTERNAL MEDICINE

## 2024-06-19 PROCEDURE — 33366 TRCATH REPLACE AORTIC VALVE: CPT | Performed by: INTERNAL MEDICINE

## 2024-06-19 PROCEDURE — 82805 BLOOD GASES W/O2 SATURATION: CPT | Performed by: STUDENT IN AN ORGANIZED HEALTH CARE EDUCATION/TRAINING PROGRAM

## 2024-06-19 PROCEDURE — 999N000141 HC STATISTIC PRE-PROCEDURE NURSING ASSESSMENT: Performed by: INTERNAL MEDICINE

## 2024-06-19 PROCEDURE — 84100 ASSAY OF PHOSPHORUS: CPT | Performed by: INTERNAL MEDICINE

## 2024-06-19 PROCEDURE — 271N000002 HC RX 271

## 2024-06-19 PROCEDURE — 250N000011 HC RX IP 250 OP 636: Performed by: STUDENT IN AN ORGANIZED HEALTH CARE EDUCATION/TRAINING PROGRAM

## 2024-06-19 PROCEDURE — 250N000013 HC RX MED GY IP 250 OP 250 PS 637: Performed by: INTERNAL MEDICINE

## 2024-06-19 PROCEDURE — 83735 ASSAY OF MAGNESIUM: CPT | Performed by: INTERNAL MEDICINE

## 2024-06-19 PROCEDURE — 93312 ECHO TRANSESOPHAGEAL: CPT | Mod: 26 | Performed by: INTERNAL MEDICINE

## 2024-06-19 PROCEDURE — 71045 X-RAY EXAM CHEST 1 VIEW: CPT | Mod: 26 | Performed by: RADIOLOGY

## 2024-06-19 PROCEDURE — 84155 ASSAY OF PROTEIN SERUM: CPT | Performed by: INTERNAL MEDICINE

## 2024-06-19 PROCEDURE — C1894 INTRO/SHEATH, NON-LASER: HCPCS | Performed by: INTERNAL MEDICINE

## 2024-06-19 PROCEDURE — 250N000009 HC RX 250: Performed by: INTERNAL MEDICINE

## 2024-06-19 PROCEDURE — 999N000253 HC STATISTIC WEANING TRIALS

## 2024-06-19 PROCEDURE — 272N000085 HC PACK CELL SAVER CSP: Performed by: INTERNAL MEDICINE

## 2024-06-19 PROCEDURE — 258N000003 HC RX IP 258 OP 636: Mod: JZ | Performed by: INTERNAL MEDICINE

## 2024-06-19 PROCEDURE — C1889 IMPLANT/INSERT DEVICE, NOC: HCPCS | Performed by: INTERNAL MEDICINE

## 2024-06-19 DEVICE — CLOSURE ANGIOSEAL 6FR 610130: Type: IMPLANTABLE DEVICE | Status: FUNCTIONAL

## 2024-06-19 DEVICE — IMPLANTABLE DEVICE: Type: IMPLANTABLE DEVICE | Status: FUNCTIONAL

## 2024-06-19 RX ORDER — ACETAMINOPHEN 325 MG/1
650 TABLET ORAL EVERY 4 HOURS PRN
Status: DISCONTINUED | OUTPATIENT
Start: 2024-06-22 | End: 2024-06-19

## 2024-06-19 RX ORDER — HEPARIN SODIUM 5000 [USP'U]/.5ML
5000 INJECTION, SOLUTION INTRAVENOUS; SUBCUTANEOUS EVERY 8 HOURS
Status: DISCONTINUED | OUTPATIENT
Start: 2024-06-20 | End: 2024-06-19

## 2024-06-19 RX ORDER — NALOXONE HYDROCHLORIDE 0.4 MG/ML
0.4 INJECTION, SOLUTION INTRAMUSCULAR; INTRAVENOUS; SUBCUTANEOUS
Status: DISCONTINUED | OUTPATIENT
Start: 2024-06-19 | End: 2024-06-19 | Stop reason: HOSPADM

## 2024-06-19 RX ORDER — CHLORHEXIDINE GLUCONATE ORAL RINSE 1.2 MG/ML
15 SOLUTION DENTAL EVERY 12 HOURS
Status: DISCONTINUED | OUTPATIENT
Start: 2024-06-19 | End: 2024-06-20

## 2024-06-19 RX ORDER — HEPARIN SODIUM 5000 [USP'U]/.5ML
5000 INJECTION, SOLUTION INTRAVENOUS; SUBCUTANEOUS EVERY 12 HOURS
Status: DISCONTINUED | OUTPATIENT
Start: 2024-06-20 | End: 2024-06-28 | Stop reason: HOSPADM

## 2024-06-19 RX ORDER — METHOCARBAMOL 500 MG/1
500 TABLET, FILM COATED ORAL 3 TIMES DAILY
Status: DISCONTINUED | OUTPATIENT
Start: 2024-06-19 | End: 2024-06-28 | Stop reason: HOSPADM

## 2024-06-19 RX ORDER — LIDOCAINE 40 MG/G
CREAM TOPICAL
Status: DISCONTINUED | OUTPATIENT
Start: 2024-06-19 | End: 2024-06-19 | Stop reason: HOSPADM

## 2024-06-19 RX ORDER — FENTANYL CITRATE 50 UG/ML
INJECTION, SOLUTION INTRAMUSCULAR; INTRAVENOUS PRN
Status: DISCONTINUED | OUTPATIENT
Start: 2024-06-19 | End: 2024-06-19

## 2024-06-19 RX ORDER — CEFAZOLIN SODIUM 1 G/3ML
1 INJECTION, POWDER, FOR SOLUTION INTRAMUSCULAR; INTRAVENOUS EVERY 8 HOURS
Status: COMPLETED | OUTPATIENT
Start: 2024-06-19 | End: 2024-06-20

## 2024-06-19 RX ORDER — ROPIVACAINE HYDROCHLORIDE 2 MG/ML
INJECTION, SOLUTION EPIDURAL; INFILTRATION; PERINEURAL
Status: DISCONTINUED | OUTPATIENT
Start: 2024-06-19 | End: 2024-06-19

## 2024-06-19 RX ORDER — ASPIRIN 81 MG/1
81 TABLET ORAL DAILY
Status: DISCONTINUED | OUTPATIENT
Start: 2024-06-20 | End: 2024-06-19

## 2024-06-19 RX ORDER — NALOXONE HYDROCHLORIDE 0.4 MG/ML
0.2 INJECTION, SOLUTION INTRAMUSCULAR; INTRAVENOUS; SUBCUTANEOUS
Status: DISCONTINUED | OUTPATIENT
Start: 2024-06-19 | End: 2024-06-28 | Stop reason: HOSPADM

## 2024-06-19 RX ORDER — LIDOCAINE HYDROCHLORIDE 20 MG/ML
INJECTION, SOLUTION INFILTRATION; PERINEURAL PRN
Status: DISCONTINUED | OUTPATIENT
Start: 2024-06-19 | End: 2024-06-19

## 2024-06-19 RX ORDER — GABAPENTIN 100 MG/1
100 CAPSULE ORAL 2 TIMES DAILY
Status: DISCONTINUED | OUTPATIENT
Start: 2024-06-19 | End: 2024-06-28 | Stop reason: HOSPADM

## 2024-06-19 RX ORDER — HYDROMORPHONE HCL IN WATER/PF 6 MG/30 ML
0.2 PATIENT CONTROLLED ANALGESIA SYRINGE INTRAVENOUS
Status: COMPLETED | OUTPATIENT
Start: 2024-06-19 | End: 2024-06-19

## 2024-06-19 RX ORDER — SODIUM CHLORIDE, SODIUM GLUCONATE, SODIUM ACETATE, POTASSIUM CHLORIDE AND MAGNESIUM CHLORIDE 526; 502; 368; 37; 30 MG/100ML; MG/100ML; MG/100ML; MG/100ML; MG/100ML
INJECTION, SOLUTION INTRAVENOUS CONTINUOUS PRN
Status: DISCONTINUED | OUTPATIENT
Start: 2024-06-19 | End: 2024-06-19

## 2024-06-19 RX ORDER — NALOXONE HYDROCHLORIDE 0.4 MG/ML
0.2 INJECTION, SOLUTION INTRAMUSCULAR; INTRAVENOUS; SUBCUTANEOUS
Status: DISCONTINUED | OUTPATIENT
Start: 2024-06-19 | End: 2024-06-19 | Stop reason: HOSPADM

## 2024-06-19 RX ORDER — POLYETHYLENE GLYCOL 3350 17 G/17G
17 POWDER, FOR SOLUTION ORAL DAILY
Status: DISCONTINUED | OUTPATIENT
Start: 2024-06-20 | End: 2024-06-28 | Stop reason: HOSPADM

## 2024-06-19 RX ORDER — HYDROMORPHONE HCL IN WATER/PF 6 MG/30 ML
0.2 PATIENT CONTROLLED ANALGESIA SYRINGE INTRAVENOUS
Status: DISCONTINUED | OUTPATIENT
Start: 2024-06-19 | End: 2024-06-20

## 2024-06-19 RX ORDER — CHLORHEXIDINE GLUCONATE ORAL RINSE 1.2 MG/ML
15 SOLUTION DENTAL EVERY 12 HOURS
Status: DISCONTINUED | OUTPATIENT
Start: 2024-06-19 | End: 2024-06-19

## 2024-06-19 RX ORDER — HEPARIN SODIUM 1000 [USP'U]/ML
INJECTION, SOLUTION INTRAVENOUS; SUBCUTANEOUS PRN
Status: DISCONTINUED | OUTPATIENT
Start: 2024-06-19 | End: 2024-06-19

## 2024-06-19 RX ORDER — OXYCODONE HYDROCHLORIDE 5 MG/1
5 TABLET ORAL EVERY 4 HOURS PRN
Status: DISCONTINUED | OUTPATIENT
Start: 2024-06-19 | End: 2024-06-19

## 2024-06-19 RX ORDER — DEXMEDETOMIDINE HYDROCHLORIDE 4 UG/ML
.2-.7 INJECTION, SOLUTION INTRAVENOUS CONTINUOUS
Status: DISCONTINUED | OUTPATIENT
Start: 2024-06-19 | End: 2024-06-19

## 2024-06-19 RX ORDER — CALCIUM CHLORIDE 100 MG/ML
INJECTION INTRAVENOUS; INTRAVENTRICULAR
Status: COMPLETED
Start: 2024-06-19 | End: 2024-06-19

## 2024-06-19 RX ORDER — ACETAMINOPHEN 325 MG/1
650 TABLET ORAL EVERY 4 HOURS PRN
Status: DISCONTINUED | OUTPATIENT
Start: 2024-06-19 | End: 2024-06-19

## 2024-06-19 RX ORDER — BUPIVACAINE HYDROCHLORIDE 2.5 MG/ML
INJECTION, SOLUTION EPIDURAL; INFILTRATION; INTRACAUDAL
Status: COMPLETED | OUTPATIENT
Start: 2024-06-19 | End: 2024-06-19

## 2024-06-19 RX ORDER — ASPIRIN 81 MG/1
81 TABLET, CHEWABLE ORAL DAILY
Status: DISCONTINUED | OUTPATIENT
Start: 2024-06-19 | End: 2024-06-28 | Stop reason: HOSPADM

## 2024-06-19 RX ORDER — NALOXONE HYDROCHLORIDE 0.4 MG/ML
0.4 INJECTION, SOLUTION INTRAMUSCULAR; INTRAVENOUS; SUBCUTANEOUS
Status: DISCONTINUED | OUTPATIENT
Start: 2024-06-19 | End: 2024-06-28 | Stop reason: HOSPADM

## 2024-06-19 RX ORDER — ASPIRIN 81 MG/1
81 TABLET, CHEWABLE ORAL DAILY
Status: DISCONTINUED | OUTPATIENT
Start: 2024-06-19 | End: 2024-06-19

## 2024-06-19 RX ORDER — ONDANSETRON 2 MG/ML
4 INJECTION INTRAMUSCULAR; INTRAVENOUS EVERY 6 HOURS PRN
Status: DISCONTINUED | OUTPATIENT
Start: 2024-06-19 | End: 2024-06-28 | Stop reason: HOSPADM

## 2024-06-19 RX ORDER — BISACODYL 10 MG
10 SUPPOSITORY, RECTAL RECTAL DAILY PRN
Status: DISCONTINUED | OUTPATIENT
Start: 2024-06-22 | End: 2024-06-28 | Stop reason: HOSPADM

## 2024-06-19 RX ORDER — ONDANSETRON 2 MG/ML
INJECTION INTRAMUSCULAR; INTRAVENOUS PRN
Status: DISCONTINUED | OUTPATIENT
Start: 2024-06-19 | End: 2024-06-19

## 2024-06-19 RX ORDER — PROPOFOL 10 MG/ML
INJECTION, EMULSION INTRAVENOUS CONTINUOUS PRN
Status: DISCONTINUED | OUTPATIENT
Start: 2024-06-19 | End: 2024-06-19

## 2024-06-19 RX ORDER — DEXMEDETOMIDINE HYDROCHLORIDE 4 UG/ML
.1-1.2 INJECTION, SOLUTION INTRAVENOUS CONTINUOUS
Status: DISCONTINUED | OUTPATIENT
Start: 2024-06-19 | End: 2024-06-19

## 2024-06-19 RX ORDER — AMOXICILLIN 250 MG
1 CAPSULE ORAL 2 TIMES DAILY
Status: DISCONTINUED | OUTPATIENT
Start: 2024-06-19 | End: 2024-06-28 | Stop reason: HOSPADM

## 2024-06-19 RX ORDER — NOREPINEPHRINE BITARTRATE 0.06 MG/ML
.01-.6 INJECTION, SOLUTION INTRAVENOUS CONTINUOUS
Status: DISCONTINUED | OUTPATIENT
Start: 2024-06-19 | End: 2024-06-19

## 2024-06-19 RX ORDER — PROCHLORPERAZINE MALEATE 5 MG
5 TABLET ORAL EVERY 6 HOURS PRN
Status: DISCONTINUED | OUTPATIENT
Start: 2024-06-19 | End: 2024-06-28 | Stop reason: HOSPADM

## 2024-06-19 RX ORDER — FLUTICASONE FUROATE AND VILANTEROL 200; 25 UG/1; UG/1
1 POWDER RESPIRATORY (INHALATION) DAILY
Status: DISCONTINUED | OUTPATIENT
Start: 2024-06-19 | End: 2024-06-19

## 2024-06-19 RX ORDER — ESMOLOL HYDROCHLORIDE 10 MG/ML
INJECTION INTRAVENOUS PRN
Status: DISCONTINUED | OUTPATIENT
Start: 2024-06-19 | End: 2024-06-19

## 2024-06-19 RX ORDER — VASOPRESSIN IN 0.9 % NACL 2 UNIT/2ML
SYRINGE (ML) INTRAVENOUS PRN
Status: DISCONTINUED | OUTPATIENT
Start: 2024-06-19 | End: 2024-06-19

## 2024-06-19 RX ORDER — PROPOFOL 10 MG/ML
5-75 INJECTION, EMULSION INTRAVENOUS CONTINUOUS
Status: DISCONTINUED | OUTPATIENT
Start: 2024-06-19 | End: 2024-06-19

## 2024-06-19 RX ORDER — ONDANSETRON 4 MG/1
4 TABLET, ORALLY DISINTEGRATING ORAL EVERY 6 HOURS PRN
Status: DISCONTINUED | OUTPATIENT
Start: 2024-06-19 | End: 2024-06-28 | Stop reason: HOSPADM

## 2024-06-19 RX ORDER — DEXAMETHASONE SODIUM PHOSPHATE 4 MG/ML
INJECTION, SOLUTION INTRA-ARTICULAR; INTRALESIONAL; INTRAMUSCULAR; INTRAVENOUS; SOFT TISSUE PRN
Status: DISCONTINUED | OUTPATIENT
Start: 2024-06-19 | End: 2024-06-19

## 2024-06-19 RX ORDER — HYDROMORPHONE HCL IN WATER/PF 6 MG/30 ML
0.1 PATIENT CONTROLLED ANALGESIA SYRINGE INTRAVENOUS
Status: DISCONTINUED | OUTPATIENT
Start: 2024-06-19 | End: 2024-06-20

## 2024-06-19 RX ORDER — ACETAMINOPHEN 325 MG/1
975 TABLET ORAL EVERY 8 HOURS
Status: COMPLETED | OUTPATIENT
Start: 2024-06-19 | End: 2024-06-22

## 2024-06-19 RX ORDER — FLUMAZENIL 0.1 MG/ML
0.2 INJECTION, SOLUTION INTRAVENOUS
Status: DISCONTINUED | OUTPATIENT
Start: 2024-06-19 | End: 2024-06-19 | Stop reason: HOSPADM

## 2024-06-19 RX ORDER — FENTANYL CITRATE 50 UG/ML
25-50 INJECTION, SOLUTION INTRAMUSCULAR; INTRAVENOUS
Status: DISCONTINUED | OUTPATIENT
Start: 2024-06-19 | End: 2024-06-19 | Stop reason: HOSPADM

## 2024-06-19 RX ORDER — CEFAZOLIN SODIUM/WATER 2 G/20 ML
2 SYRINGE (ML) INTRAVENOUS
Status: COMPLETED | OUTPATIENT
Start: 2024-06-19 | End: 2024-06-19

## 2024-06-19 RX ORDER — CALCIUM CHLORIDE 100 MG/ML
1 INJECTION INTRAVENOUS; INTRAVENTRICULAR ONCE
Status: COMPLETED | OUTPATIENT
Start: 2024-06-19 | End: 2024-06-19

## 2024-06-19 RX ORDER — DEXTROSE MONOHYDRATE 25 G/50ML
25-50 INJECTION, SOLUTION INTRAVENOUS
Status: DISCONTINUED | OUTPATIENT
Start: 2024-06-19 | End: 2024-06-28 | Stop reason: HOSPADM

## 2024-06-19 RX ORDER — METHOCARBAMOL 500 MG/1
500 TABLET, FILM COATED ORAL 4 TIMES DAILY
Status: DISCONTINUED | OUTPATIENT
Start: 2024-06-19 | End: 2024-06-19

## 2024-06-19 RX ORDER — EPINEPHRINE 0.1 MG/ML
INJECTION INTRAVENOUS PRN
Status: DISCONTINUED | OUTPATIENT
Start: 2024-06-19 | End: 2024-06-19

## 2024-06-19 RX ORDER — PANTOPRAZOLE SODIUM 40 MG/1
40 TABLET, DELAYED RELEASE ORAL DAILY
Status: DISCONTINUED | OUTPATIENT
Start: 2024-06-19 | End: 2024-06-19

## 2024-06-19 RX ORDER — IOPAMIDOL 755 MG/ML
INJECTION, SOLUTION INTRAVASCULAR
Status: DISCONTINUED | OUTPATIENT
Start: 2024-06-19 | End: 2024-06-19 | Stop reason: HOSPADM

## 2024-06-19 RX ORDER — NOREPINEPHRINE BITARTRATE 0.06 MG/ML
.01-.6 INJECTION, SOLUTION INTRAVENOUS CONTINUOUS
Status: DISCONTINUED | OUTPATIENT
Start: 2024-06-19 | End: 2024-06-19 | Stop reason: HOSPADM

## 2024-06-19 RX ORDER — DEXMEDETOMIDINE HYDROCHLORIDE 4 UG/ML
.1-1.2 INJECTION, SOLUTION INTRAVENOUS CONTINUOUS
Status: DISCONTINUED | OUTPATIENT
Start: 2024-06-19 | End: 2024-06-20

## 2024-06-19 RX ORDER — HYDRALAZINE HYDROCHLORIDE 20 MG/ML
10 INJECTION INTRAMUSCULAR; INTRAVENOUS
Status: DISCONTINUED | OUTPATIENT
Start: 2024-06-19 | End: 2024-06-28 | Stop reason: HOSPADM

## 2024-06-19 RX ORDER — PROTAMINE SULFATE 10 MG/ML
INJECTION, SOLUTION INTRAVENOUS PRN
Status: DISCONTINUED | OUTPATIENT
Start: 2024-06-19 | End: 2024-06-19

## 2024-06-19 RX ORDER — ASPIRIN 325 MG
325 TABLET, DELAYED RELEASE (ENTERIC COATED) ORAL ONCE
Status: COMPLETED | OUTPATIENT
Start: 2024-06-19 | End: 2024-06-19

## 2024-06-19 RX ORDER — OXYCODONE HYDROCHLORIDE 5 MG/1
5 TABLET ORAL EVERY 4 HOURS PRN
Status: DISCONTINUED | OUTPATIENT
Start: 2024-06-19 | End: 2024-06-28 | Stop reason: HOSPADM

## 2024-06-19 RX ORDER — FLUTICASONE PROPIONATE AND SALMETEROL XINAFOATE 230; 21 UG/1; UG/1
1 AEROSOL, METERED RESPIRATORY (INHALATION) EVERY 12 HOURS
Status: DISCONTINUED | OUTPATIENT
Start: 2024-06-19 | End: 2024-06-28 | Stop reason: HOSPADM

## 2024-06-19 RX ORDER — NICOTINE POLACRILEX 4 MG
15-30 LOZENGE BUCCAL
Status: DISCONTINUED | OUTPATIENT
Start: 2024-06-19 | End: 2024-06-28 | Stop reason: HOSPADM

## 2024-06-19 RX ORDER — SODIUM CHLORIDE 9 MG/ML
INJECTION, SOLUTION INTRAVENOUS CONTINUOUS
Status: DISCONTINUED | OUTPATIENT
Start: 2024-06-19 | End: 2024-06-19 | Stop reason: HOSPADM

## 2024-06-19 RX ORDER — NITROGLYCERIN 0.4 MG/1
0.4 TABLET SUBLINGUAL EVERY 5 MIN PRN
Status: DISCONTINUED | OUTPATIENT
Start: 2024-06-19 | End: 2024-06-28 | Stop reason: HOSPADM

## 2024-06-19 RX ORDER — SIMVASTATIN 20 MG
20 TABLET ORAL EVERY EVENING
Status: DISCONTINUED | OUTPATIENT
Start: 2024-06-19 | End: 2024-06-28 | Stop reason: HOSPADM

## 2024-06-19 RX ORDER — PROPOFOL 10 MG/ML
INJECTION, EMULSION INTRAVENOUS PRN
Status: DISCONTINUED | OUTPATIENT
Start: 2024-06-19 | End: 2024-06-19

## 2024-06-19 RX ADMIN — CALCIUM CHLORIDE 1 G: 100 INJECTION INTRAVENOUS; INTRAVENTRICULAR at 15:13

## 2024-06-19 RX ADMIN — ROPIVACAINE HYDROCHLORIDE 8 ML: 2 INJECTION, SOLUTION EPIDURAL; INFILTRATION at 14:50

## 2024-06-19 RX ADMIN — FLUTICASONE PROPIONATE AND SALMETEROL XINAFOATE 1 PUFF: 230; 21 AEROSOL, METERED RESPIRATORY (INHALATION) at 20:45

## 2024-06-19 RX ADMIN — BUPIVACAINE HYDROCHLORIDE 15 ML: 2.5 INJECTION, SOLUTION EPIDURAL; INFILTRATION; INTRACAUDAL; PERINEURAL at 07:10

## 2024-06-19 RX ADMIN — SODIUM CHLORIDE: 9 INJECTION, SOLUTION INTRAVENOUS at 07:17

## 2024-06-19 RX ADMIN — PHENYLEPHRINE HYDROCHLORIDE 100 MCG: 10 INJECTION INTRAVENOUS at 09:33

## 2024-06-19 RX ADMIN — CEFAZOLIN 1 G: 1 INJECTION, POWDER, FOR SOLUTION INTRAMUSCULAR; INTRAVENOUS at 16:35

## 2024-06-19 RX ADMIN — CLEVIPIDINE 0.3 MG: 0.5 EMULSION INTRAVENOUS at 10:47

## 2024-06-19 RX ADMIN — HYDROMORPHONE HYDROCHLORIDE 0.1 MG: 0.2 INJECTION, SOLUTION INTRAMUSCULAR; INTRAVENOUS; SUBCUTANEOUS at 13:01

## 2024-06-19 RX ADMIN — FENTANYL CITRATE 50 MCG: 50 INJECTION INTRAMUSCULAR; INTRAVENOUS at 08:40

## 2024-06-19 RX ADMIN — GABAPENTIN 100 MG: 100 CAPSULE ORAL at 20:34

## 2024-06-19 RX ADMIN — ACETAMINOPHEN 975 MG: 325 TABLET, FILM COATED ORAL at 20:26

## 2024-06-19 RX ADMIN — EPINEPHRINE 10 MCG: 0.1 INJECTION INTRACARDIAC; INTRAVENOUS at 10:42

## 2024-06-19 RX ADMIN — FENTANYL CITRATE 50 MCG: 50 INJECTION INTRAMUSCULAR; INTRAVENOUS at 08:05

## 2024-06-19 RX ADMIN — Medication 200 MG: at 11:20

## 2024-06-19 RX ADMIN — PHENYLEPHRINE HYDROCHLORIDE 100 MCG: 10 INJECTION INTRAVENOUS at 09:01

## 2024-06-19 RX ADMIN — SIMVASTATIN 20 MG: 10 TABLET, FILM COATED ORAL at 20:34

## 2024-06-19 RX ADMIN — SODIUM CHLORIDE, SODIUM GLUCONATE, SODIUM ACETATE, POTASSIUM CHLORIDE AND MAGNESIUM CHLORIDE: 526; 502; 368; 37; 30 INJECTION, SOLUTION INTRAVENOUS at 07:54

## 2024-06-19 RX ADMIN — Medication 10 MG: at 09:56

## 2024-06-19 RX ADMIN — PROPOFOL 20 MG: 10 INJECTION, EMULSION INTRAVENOUS at 08:10

## 2024-06-19 RX ADMIN — HEPARIN SODIUM 5000 UNITS: 1000 INJECTION INTRAVENOUS; SUBCUTANEOUS at 10:32

## 2024-06-19 RX ADMIN — HYDROMORPHONE HYDROCHLORIDE 0.2 MG: 0.2 INJECTION, SOLUTION INTRAMUSCULAR; INTRAVENOUS; SUBCUTANEOUS at 14:22

## 2024-06-19 RX ADMIN — Medication 30 MG: at 08:11

## 2024-06-19 RX ADMIN — NOREPINEPHRINE BITARTRATE 6.4 MCG: 1 INJECTION, SOLUTION, CONCENTRATE INTRAVENOUS at 10:41

## 2024-06-19 RX ADMIN — METHOCARBAMOL 500 MG: 500 TABLET ORAL at 20:34

## 2024-06-19 RX ADMIN — PROPOFOL 20 MG: 10 INJECTION, EMULSION INTRAVENOUS at 08:11

## 2024-06-19 RX ADMIN — LIDOCAINE HYDROCHLORIDE 20 MG: 20 INJECTION, SOLUTION INFILTRATION; PERINEURAL at 08:10

## 2024-06-19 RX ADMIN — Medication 8 ML/HR: at 10:57

## 2024-06-19 RX ADMIN — ESMOLOL HYDROCHLORIDE 20 MG: 10 INJECTION, SOLUTION INTRAVENOUS at 08:14

## 2024-06-19 RX ADMIN — PROPOFOL 50 MCG/KG/MIN: 10 INJECTION, EMULSION INTRAVENOUS at 11:00

## 2024-06-19 RX ADMIN — PHENYLEPHRINE HYDROCHLORIDE 100 MCG: 10 INJECTION INTRAVENOUS at 08:34

## 2024-06-19 RX ADMIN — HYDROMORPHONE HYDROCHLORIDE 0.1 MG: 0.2 INJECTION, SOLUTION INTRAMUSCULAR; INTRAVENOUS; SUBCUTANEOUS at 18:51

## 2024-06-19 RX ADMIN — NOREPINEPHRINE BITARTRATE 6.4 MCG: 1 INJECTION, SOLUTION, CONCENTRATE INTRAVENOUS at 10:37

## 2024-06-19 RX ADMIN — PHENYLEPHRINE HYDROCHLORIDE 100 MCG: 10 INJECTION INTRAVENOUS at 08:11

## 2024-06-19 RX ADMIN — NICARDIPINE HYDROCHLORIDE 2.5 MG/HR: 0.2 INJECTION, SOLUTION INTRAVENOUS at 13:10

## 2024-06-19 RX ADMIN — SODIUM CHLORIDE, POTASSIUM CHLORIDE, SODIUM LACTATE AND CALCIUM CHLORIDE 500 ML: 600; 310; 30; 20 INJECTION, SOLUTION INTRAVENOUS at 16:52

## 2024-06-19 RX ADMIN — ONDANSETRON 4 MG: 2 INJECTION INTRAMUSCULAR; INTRAVENOUS at 11:15

## 2024-06-19 RX ADMIN — PROTAMINE SULFATE 80 MG: 10 INJECTION, SOLUTION INTRAVENOUS at 10:51

## 2024-06-19 RX ADMIN — EPINEPHRINE 10 MCG: 0.1 INJECTION INTRACARDIAC; INTRAVENOUS at 10:43

## 2024-06-19 RX ADMIN — NOREPINEPHRINE BITARTRATE 6.4 MCG: 1 INJECTION, SOLUTION, CONCENTRATE INTRAVENOUS at 10:36

## 2024-06-19 RX ADMIN — FENTANYL CITRATE 50 MCG: 50 INJECTION, SOLUTION INTRAMUSCULAR; INTRAVENOUS at 06:58

## 2024-06-19 RX ADMIN — FENTANYL CITRATE 50 MCG: 50 INJECTION INTRAMUSCULAR; INTRAVENOUS at 08:58

## 2024-06-19 RX ADMIN — HEPARIN SODIUM 3000 UNITS: 1000 INJECTION INTRAVENOUS; SUBCUTANEOUS at 10:40

## 2024-06-19 RX ADMIN — CLEVIPIDINE 0.1 MG: 0.5 EMULSION INTRAVENOUS at 09:48

## 2024-06-19 RX ADMIN — FENTANYL CITRATE 50 MCG: 50 INJECTION INTRAMUSCULAR; INTRAVENOUS at 10:58

## 2024-06-19 RX ADMIN — DEXAMETHASONE SODIUM PHOSPHATE 4 MG: 4 INJECTION, SOLUTION INTRA-ARTICULAR; INTRALESIONAL; INTRAMUSCULAR; INTRAVENOUS; SOFT TISSUE at 10:12

## 2024-06-19 RX ADMIN — NOREPINEPHRINE BITARTRATE 0.03 MCG/KG/MIN: 1 INJECTION, SOLUTION, CONCENTRATE INTRAVENOUS at 10:10

## 2024-06-19 RX ADMIN — DEXMEDETOMIDINE HYDROCHLORIDE 0.2 MCG/KG/HR: 400 INJECTION INTRAVENOUS at 12:59

## 2024-06-19 RX ADMIN — Medication 2 G: at 09:05

## 2024-06-19 RX ADMIN — PHENYLEPHRINE HYDROCHLORIDE 100 MCG: 10 INJECTION INTRAVENOUS at 09:23

## 2024-06-19 RX ADMIN — FENTANYL CITRATE 50 MCG: 50 INJECTION INTRAMUSCULAR; INTRAVENOUS at 11:36

## 2024-06-19 RX ADMIN — Medication 1 UNITS: at 10:42

## 2024-06-19 ASSESSMENT — ACTIVITIES OF DAILY LIVING (ADL)
ADLS_ACUITY_SCORE: 41
ADLS_ACUITY_SCORE: 41
ADLS_ACUITY_SCORE: 49
ADLS_ACUITY_SCORE: 35
ADLS_ACUITY_SCORE: 41
ADLS_ACUITY_SCORE: 49
ADLS_ACUITY_SCORE: 49
ADLS_ACUITY_SCORE: 37
ADLS_ACUITY_SCORE: 41
ADLS_ACUITY_SCORE: 45
ADLS_ACUITY_SCORE: 37
ADLS_ACUITY_SCORE: 49
ADLS_ACUITY_SCORE: 37
ADLS_ACUITY_SCORE: 45
ADLS_ACUITY_SCORE: 49

## 2024-06-19 ASSESSMENT — COPD QUESTIONNAIRES
COPD: 1
CAT_SEVERITY: MILD

## 2024-06-19 ASSESSMENT — ENCOUNTER SYMPTOMS: SEIZURES: 0

## 2024-06-19 NOTE — Clinical Note
Potential access sites were evaluated for patency using ultrasound.   Apical was selected. Access was obtained under with Fluoroscopic guidance using a standard 18 guage needle with direct visualization of needle entry.

## 2024-06-19 NOTE — PROGRESS NOTES
[facility], Procedure Note          Extubation:       Loulou Lucero  MRN# [id number]   June 19, 2024, 6:31 PM         Patient extubated at: June 19, 2024, 6:15 PM   Supplemental Oxygen: Via oxymask at 4 liters per minute   Cough: The cough is strong, good, and productive   Secretion Mode: PRN suction with assistance   Secretion Amount: Small amount, moderately thin and clear in color   Respiratory Exam:: Breath sounds: clear     Location: all lobes   Skin Exam:: Patient color: natural   Patient Status: Currently appears comfortable   Arterial Blood Gasses: pH Arterial (no units)   Date Value   06/19/2024 7.36     pO2 Arterial (mm Hg)   Date Value   06/19/2024 128 (H)     pCO2 Arterial (mm Hg)   Date Value   06/19/2024 45     Bicarbonate Arterial (mmol/L)   Date Value   06/19/2024 26            Recorded by Aaron Viveros, RT

## 2024-06-19 NOTE — ANESTHESIA PROCEDURE NOTES
Airway       Patient location during procedure: OR       Procedure Start/Stop Times: 6/19/2024 8:15 AM  Staff -        Anesthesiologist:  Liu Zepeda MD       Resident/Fellow: Robert Castillo MD       Performed By: fellow  Consent for Airway        Urgency: elective  Indications and Patient Condition       Indications for airway management: sandie-procedural       Induction type:intravenous       Mask difficulty assessment: 1 - vent by mask    Final Airway Details       Final airway type: endotracheal airway       Successful airway: ETT - single  Endotracheal Airway Details        ETT size (mm): 7.5       Cuffed: yes       Successful intubation technique: direct laryngoscopy       DL Blade Type: MAC 3       Grade View of Cords: 2       Adjucts: stylet       Position: Right       Measured from: gums/teeth       Secured at (cm): 22       Bite block used: None    Post intubation assessment        Placement verified by: capnometry and equal breath sounds        Number of attempts at approach: 1       Number of other approaches attempted: 0       Secured with: tape       Ease of procedure: easy       Dentition: Intact and Unchanged    Medication(s) Administered   Medication Administration Time: 6/19/2024 8:15 AM

## 2024-06-19 NOTE — ANESTHESIA PROCEDURE NOTES
Arterial Line Procedure Note    Pre-Procedure   Staff -        Anesthesiologist:  Liu Zepeda MD       Resident/Fellow: Robert Castillo MD       Performed By: fellow       Location: OR       Pre-Anesthestic Checklist: patient identified, IV checked, risks and benefits discussed, informed consent, monitors and equipment checked, pre-op evaluation and at physician/surgeon's request  Timeout:       Correct Patient: Yes        Correct Procedure: Yes        Correct Site: Yes        Correct Position: Yes   Line Placement:   This line was placed Pre Induction starting at 6/19/2024 7:55 AM and ending at 6/19/2024 8:05 AM  Procedure   Procedure: arterial line       Laterality: left       Insertion Site: radial.  Sterile Prep        Standard elements of sterile barrier followed       Skin prep: Chloraprep  Insertion/Injection        Technique: ultrasound guided and Seldinger Technique        1. Ultrasound was used to evaluate the access site.       2. Artery evaluated via ultrasound for patency/adequacy.       3. Using real-time ultrasound the needle/catheter was observed entering the artery/vein.       Catheter Type/Size: 20 G, 12 cm  Narrative         Secured by: suture       Tegaderm dressing used.       Complications: None apparent,        Arterial waveform: Yes        IBP within 10% of NIBP: Yes

## 2024-06-19 NOTE — Clinical Note
Pacer on @ 180 Will send to PCP once patient responds    - Can you please confirm when my A1C lab work is do? (Already ordered and expected from 10.13.2023 to 7.13.2024)  - I wanted to let Dr. Song know that the appointment with Haris Shine is scheduling to next year in August. I was able to secure a cancelled appointment for 01/08/2024.  - MRI is schedule for 10/21  - Sleep Study is scheduled for 10/30  - The freestyle for the diabetes is over $300 can your office write a letter of medical necessity?    SpunLivehart message sent to patient, awaiting response.

## 2024-06-19 NOTE — ANESTHESIA PROCEDURE NOTES
Central Line/PA Catheter Placement    Pre-Procedure   Staff -        Anesthesiologist:  Liu Zepeda MD       Resident/Fellow: Robert Castillo MD       Performed By: fellow       Location: OR       Pre-Anesthestic Checklist: patient identified, IV checked, site marked, risks and benefits discussed, informed consent, monitors and equipment checked, pre-op evaluation and at physician/surgeon's request  Timeout:       Correct Patient: Yes        Correct Procedure: Yes        Correct Site: Yes        Correct Position: Yes        Correct Laterality: Yes   Line Placement:   This line was placed Post Induction    Procedure   Procedure: central line       Laterality: right       Insertion Site: internal jugular.       Patient Position: supine  Sterile Prep        All elements of maximal sterile barrier technique followed       Patient Prep/Sterile Barriers: draped, hand hygiene, gloves , hat , mask , draped, gown, sterile gel and probe cover       Skin prep: Chloraprep  Insertion/Injection        Technique: ultrasound guided and Seldinger Technique        1. Ultrasound was used to evaluate the access site.       2. Vein evaluated via ultrasound for patency/adequacy.       3. Using real-time ultrasound the needle/catheter was observed entering the artery/vein.       5. The visualized structures were anatomically normal.       6. There were no apparent abnormal pathologic findings.       Introducer Type: 9 Fr, 2-lumen MAC        Type: Introducer  Narrative         Secured by: suture       Biopatch dressing used.       Complications: None apparent,        blood aspirated from all lumens,        All lumens flushed: Yes       Verification method: Ultrasound

## 2024-06-19 NOTE — ANESTHESIA CARE TRANSFER NOTE
Patient: Loulou Lucero    Procedure: Procedure(s):  Transcatheter Aortic Valve Replacement - Transapical Approach  Transcatheter Aortic Valve Replacement, Transapical Open Approach using Diaz Pericardial Tissue Heart Valve size 20mm, Cardiopulmonary Bypass Pump on Standby, and Transesophageal Echocardiogram by Cardiology       Diagnosis: Severe aortic stenosis [I35.0]  Diagnosis Additional Information: No value filed.    Anesthesia Type:   No value filed.     Note:    Oropharynx: endotracheal tube in place  Level of Consciousness: unresponsive      Independent Airway: airway patency not satisfactory and stable  Dentition: dentition unchanged  Vital Signs Stable: post-procedure vital signs reviewed and stable  Report to RN Given: handoff report given  Patient transferred to: ICU    ICU Handoff: Call for PAUSE to initiate/utilize ICU HANDOFF, Identified Patient, Identified Responsible Provider, Reviewed the Pertinent Medical History, Discussed Surgical Course, Reviewed Intra-OP Anesthesia Management and Issues during Anesthesia, Set Expectations for Post Procedure Period and Allowed Opportunity for Questions and Acknowledgement of Understanding      Vitals:  Vitals Value Taken Time   BP     Temp     Pulse 75 06/19/24 1156   Resp     SpO2 100 % 06/19/24 1156   Vitals shown include unfiled device data.    Electronically Signed By: Robert Castillo MD  June 19, 2024  11:56 AM

## 2024-06-19 NOTE — ANESTHESIA PROCEDURE NOTES
Paravertebral Procedure Note    Pre-Procedure   Staff -        Anesthesiologist:  Vanita Lara MD       Resident/Fellow: Josh Henderson MD       Performed By: resident       Location: ICU       Procedure Start/Stop Times: 6/19/2024 2:30 PM and 6/19/2024 2:50 PM       Pre-Anesthestic Checklist: patient identified, IV checked, site marked, risks and benefits discussed, informed consent, monitors and equipment checked, pre-op evaluation, at physician/surgeon's request and post-op pain management  Timeout:       Correct Patient: Yes        Correct Procedure: Yes        Correct Site: Yes        Correct Position: Yes        Correct Laterality: Yes        Site Marked: Yes  Procedure Documentation  Procedure: Paravertebral       Diagnosis: POSTOPERATIVE PAIN CONTROL-TRANSCATHETER AORTIC VALVE REPLACEMENT VIA LEFT THORACOTOMY       Laterality: left       Patient Position: lateral       Skin prep: Chloraprep       Local skin infiltrated with 5 mL of 2% lidocaine.        Insertion Site: T4-5.       Needle Type: Touhy needle       Needle Gauge: 17.        Needle Length (millimeters): 100        Catheter: 19 G.          Catheter threaded easily.             Ultrasound guided       1. Ultrasound was used to identify targeted nerve, plexus, vascular marker, or fascial plane and place a needle adjacent to it in real-time.       2. Ultrasound was used to visualize the spread of anesthetic in close proximity to the above referenced structure.       3. A permanent image is entered into the patient's record.       4. The visualized anatomic structures appeared normal.    Assessment/Narrative         The placement was negative for: blood aspirated, painful injection and site bleeding       Bolus given via catheter. no blood aspirated via catheter.        Secured via Tegaderm, steristrips and Dermabond.        Insertion/Infusion Method: Continuous Infusion       Complications: none    Medication(s) Administered   Ropivacaine  "0.2% PF (Infiltration) - Infiltration   8 mL - 6/19/2024 2:50:00 PM  Medication Administration Time: 6/19/2024 2:30 PM     Comments:  Left T4-5 paravertebral catheter placed preoperative (see separate note for 1st procedure). Surgical case completed and patient transported to ICU by anesthesiology team doing the case. After patient's arrival to ICU we were notified that catheter was accidentally pulled-out from the patient while repositioning. Hence procedure repeated at 14:30 hrs.       FOR [facility] ([facility] ONLY:   Pain Team Contact information: please page the Pain Team Via [email or web address]. Search \"Pain\". During daytime hours, please page the attending first. At night please page the resident first.      "

## 2024-06-19 NOTE — ANESTHESIA PROCEDURE NOTES
Paravertebral Procedure Note    Pre-Procedure   Staff -        Anesthesiologist:  Gilberto Warner MD       Resident/Fellow: Josh Henderson MD       Performed By: resident and with residents       Procedure performed by resident/fellow/CRNA in presence of a teaching physician.         Location: pre-op       Procedure Start/Stop Times: 6/19/2024 6:50 AM and 6/19/2024 7:10 AM       Pre-Anesthestic Checklist: patient identified, IV checked, site marked, risks and benefits discussed, informed consent, monitors and equipment checked, pre-op evaluation, at physician/surgeon's request and post-op pain management  Timeout:       Correct Patient: Yes        Correct Procedure: Yes        Correct Site: Yes        Correct Position: Yes        Correct Laterality: Yes        Site Marked: Yes  Procedure Documentation  Procedure: Paravertebral       Diagnosis: POSTOP PAIN       Laterality: left       Patient Position: prone       Patient Prep/Sterile Barriers: sterile gloves, mask, patient draped       Skin prep: Chloraprep       Local skin infiltrated with mL of 1% lidocaine.        Insertion Site: T8-9.       Needle Type: Touhy needle       Needle Gauge: 17.        Needle Length (millimeters): 90        Catheter: 19 G.          Catheter threaded easily.           Threaded 10 cm at skin.         Ultrasound guided       1. Ultrasound was used to identify targeted nerve, plexus, vascular marker, or fascial plane and place a needle adjacent to it in real-time.       2. Ultrasound was used to visualize the spread of anesthetic in close proximity to the above referenced structure.       3. A permanent image is entered into the patient's record.    Assessment/Narrative         The placement was negative for: blood aspirated, painful injection and site bleeding       Paresthesias: No.       Bolus given via needle. no blood aspirated via catheter.        Secured via.        Insertion/Infusion Method: Single Shot        "Complications: none    Medication(s) Administered   Bupivacaine 0.25% PF (Infiltration) - Infiltration   15 mL - 6/19/2024 7:10:00 AM  10 mL bupivacaine liposome 1.3 %  Medication Administration Time: 6/19/2024 6:50 AM     Comments:  Routine left paravertebral nerve catheter placement without complications. Patient tolerated well.      FOR [facility] ([facility]) ONLY:   Pain Team Contact information: please page the Pain Team Via [employer]. Search \"Pain\". During daytime hours, please page the attending first. At night please page the resident first.      "

## 2024-06-19 NOTE — ANESTHESIA PREPROCEDURE EVALUATION
Anesthesia Pre-Procedure Evaluation    Patient: Loulou Lucero   MRN: [id number] : 1945        Procedure : Procedure(s):  Transcatheter Aortic Valve Replacement - Transapical Approach  Transcatheter Aortic Valve Replacement, Transapical Open Approach using Diaz Pericardial Tissue Heart Valve size 20mm, Cardiopulmonary Bypass Pump on Standby, and Transesophageal Echocardiogram by Cardiology          Past Medical History:   Diagnosis Date    Asymptomatic varicose veins of lower extremity     2012    Benign paroxysmal vertigo     2010    Chronic obstructive pulmonary disease (H)     2010    Diarrhea     10/1/2015    Disorder of cartilage     Hip; Last dxa 2010    Diverticulosis of large intestine without perforation or abscess without bleeding          Lower abdominal pain     10/1/2015    Other disorders of lung (CODE)     Stable since 2002. RU lobe.    Personal history of other medical treatment (CODE)     L3, L4-4; Last MRI 12    Personal history of other medical treatment (CODE)     G-3, P-2, A-0  (Son had SIDS)    Zoster without complications     3/31/2012      Past Surgical History:   Procedure Laterality Date    CATARACT EXTRACTION Right     2023    COLONOSCOPY  2010    Normal; + family hx, next due     COLONOSCOPY  10/01/2015    W/ POLYPECTOMY recommend follow up in .    COLONOSCOPY N/A 2019    4 tubular adenoma, 3 year follow up    CV CORONARY ANGIOGRAM N/A 2024    Procedure: Coronary Angiogram;  Surgeon: Min So MD;  Location:  [facility]    CV RIGHT HEART CATH MEASUREMENTS RECORDED N/A 2024    Procedure: Right Heart Catheterization;  Surgeon: Min So MD;  Location:  [facility]    ESOPHAGOSCOPY, GASTROSCOPY, DUODENOSCOPY (EGD), COMBINED  2014    Arce's esophagus fu egd 2 yrs    ESOPHAGOSCOPY, GASTROSCOPY, DUODENOSCOPY (EGD), COMBINED N/A 2019     Procedure: ESOPHAGOGASTRODUODENOSCOPY, WITH BIOPSY;  Surgeon: Santosh Barrera MD;  Location: [facility]    LAPAROSCOPIC TUBAL LIGATION  1978         TONSILLECTOMY & ADENOIDECTOMY  1951           Allergies   Allergen Reactions    Metronidazole Nausea and Vomiting    Pneumococcal Vaccine      Other reaction(s): Edema  Arm swelling    Tramadol Visual Disturbance     Hallucinations         Social History     Tobacco Use    Smoking status: Every Day     Current packs/day: 0.50     Average packs/day: 0.5 packs/day for 63.5 years (31.7 ttl pk-yrs)     Types: Cigarettes     Start date: 1961     Passive exposure: Past    Smokeless tobacco: Never    Tobacco comments:     Passive exposure in adult home.    Substance Use Topics    Alcohol use: Not Currently     Comment: rarely at a wedding or special occasion      Wt Readings from Last 1 Encounters:   06/19/24 36.6 kg (80 lb 11 oz)        Anesthesia Evaluation   Pt has not had prior anesthetic         ROS/MED HX  ENT/Pulmonary:     (+)                         mild,  COPD,              Neurologic:    (-) no seizures and no CVA   Cardiovascular:     (+)  hypertension- -   -  - -                                   (-) CHF   METS/Exercise Tolerance:     Hematologic:  - neg hematologic  ROS     Musculoskeletal:  - neg musculoskeletal ROS     GI/Hepatic:     (+) GERD,                (-) liver disease   Renal/Genitourinary:  - neg Renal ROS     Endo:  - neg endo ROS     Psychiatric/Substance Use:  - neg psychiatric ROS     Infectious Disease:  - neg infectious disease ROS     Malignancy: Comment: MGUS      Other:            Physical Exam    Airway        Mallampati: I   TM distance: > 3 FB   Neck ROM: full   Mouth opening: > 3 cm    Respiratory Devices and Support         Dental       (+) Modest Abnormalities - crowns, retainers, 1 or 2 missing teeth and Removable bridges or other hardware      Cardiovascular          Rhythm and rate: regular and normal     Pulmonary   pulmonary exam  "normal                OUTSIDE LABS:  CBC:   Lab Results   Component Value Date    WBC 6.0 06/18/2024    WBC 5.4 05/23/2024    HGB 9.3 (L) 06/19/2024    HGB 12.7 06/18/2024    HCT 38.4 06/18/2024    HCT 39.3 05/23/2024     06/18/2024     05/23/2024     BMP:   Lab Results   Component Value Date     06/19/2024     06/18/2024    POTASSIUM 4.2 06/19/2024    POTASSIUM 4.8 06/18/2024    CHLORIDE 104 06/18/2024    CHLORIDE 102 05/23/2024    CO2 29 06/18/2024    CO2 28 05/23/2024    BUN 18.6 06/18/2024    BUN 18.9 05/23/2024    CR 0.73 06/18/2024    CR 0.87 05/23/2024     (H) 06/19/2024     (H) 06/19/2024     COAGS:   Lab Results   Component Value Date    INR 1.20 (H) 06/18/2024     POC: No results found for: \"BGM\", \"HCG\", \"HCGS\"  HEPATIC:   Lab Results   Component Value Date    ALBUMIN 4.3 06/18/2024    PROTTOTAL 7.8 06/18/2024    ALT 11 06/18/2024    AST 26 06/18/2024    ALKPHOS 60 06/18/2024    BILITOTAL 0.6 06/18/2024     OTHER:   Lab Results   Component Value Date    PH 7.42 06/19/2024    LACT 0.9 06/19/2024    DION 9.7 06/18/2024    MAG 2.0 05/28/2017    LIPASE 15 10/24/2019    CRP 7.9 (H) 09/30/2018    SED 48 (H) 01/12/2024       Anesthesia Plan    ASA Status:  4    NPO Status:  NPO Appropriate    Anesthesia Type: General.   Induction: Intravenous.   Maintenance: Inhalation.   Techniques and Equipment:     - Lines/Monitors: Arterial Line, Central Line, NIRS, VAN            VAN Absolute Contra-indication: NONE            VAN Relative Contra-indication: NONE     - Blood: Blood in Room     Consents    Anesthesia Plan(s) and associated risks, benefits, and realistic alternatives discussed. Questions answered and patient/representative(s) expressed understanding.     - Discussed: Risks, Benefits and Alternatives for BOTH SEDATION and the PROCEDURE were discussed     - Discussed with:  Patient, Other (See Comment)      - Specific Concerns: kids.     - Extended Intubation/Ventilatory " "Support Discussed: Yes.      - Patient is DNR/DNI Status: No     Use of blood products discussed: Yes.     - Discussed with: Patient, Other (see comment).     - Consented: consented to blood products     Postoperative Care    Pain management: IV analgesics, Oral pain medications, Neuraxial analgesia.   PONV prophylaxis: Ondansetron (or other 5HT-3), Dexamethasone or Solumedrol, Background Propofol Infusion     Comments:              PAC Discussion and Assessment    ASA Classification: 4  Case is suitable for: Anna                                                          Liu Zepeda MD    I have reviewed the pertinent notes and labs in the chart from the past 30 days and (re)examined the patient.  Any updates or changes from those notes are reflected in this note.            # Coagulation Defect: INR = 1.20 (Ref range: 0.85 - 1.15) and/or PTT = N/A, will monitor for bleeding  # Drug Induced Platelet Defect: home medication list includes an antiplatelet medication  # Cachexia: Estimated body mass index is 16.3 kg/m  as calculated from the following:    Height as of this encounter: 1.499 m (4' 11\").    Weight as of this encounter: 36.6 kg (80 lb 11 oz).      "

## 2024-06-19 NOTE — ANESTHESIA POSTPROCEDURE EVALUATION
Patient: Loulou Lucero    Procedure: Procedure(s):  Transcatheter Aortic Valve Replacement - Transapical Approach  Transcatheter Aortic Valve Replacement, Transapical Open Approach using Diaz Pericardial Tissue Heart Valve size 20mm, Cardiopulmonary Bypass Pump on Standby, and Transesophageal Echocardiogram by Cardiology       Anesthesia Type:  No value filed.    Note:  Disposition: ICU            ICU Sign Out: Anesthesiologist/ICU physician sign out WAS performed   Postop Pain Control:    PONV:    Neuro/Psych:    Airway/Respiratory:             Sign Out: AIRWAY IN SITU/Resp. Support               Airway in situ/Resp. Support: ETT                 Reason: Planned Pre-op   CV/Hemodynamics:             Sign Out: Acceptable CV status   Other NRE:    DID A NON-ROUTINE EVENT OCCUR?            Last vitals:  Vitals:    06/19/24 1215 06/19/24 1228 06/19/24 1230   BP:      Pulse: 77 77 79   Resp: 18 18 18   Temp:  36.9  C (98.4  F)    SpO2: 100% 100% 100%       Electronically Signed By: Liu Zepeda MD  June 19, 2024  12:52 PM

## 2024-06-19 NOTE — BRIEF OP NOTE
[facility]    Brief Operative Note    Pre-operative diagnosis: Severe aortic stenosis [I35.0]  Post-operative diagnosis Same as pre-operative diagnosis    Procedure: Transcatheter Aortic Valve Replacement - Transapical Approach, N/A - Heart  Transcatheter Aortic Valve Replacement, Transapical Open Approach using Diaz Pericardial Tissue Heart Valve size 20mm, Cardiopulmonary Bypass Pump on Standby, and Transesophageal Echocardiogram by Cardiology, Left - Chest    Surgeon: Surgeons and Role:  Panel 1:     * Claude Patricia MD - Primary     * Nahun Hu MD - Assisting  Panel 2:     * Frank Cross MD - Primary     * Sakina Olivier MD - Assisting     * Sonya Lopez PA-C - Assisting  Anesthesia: General with Block   Estimated Blood Loss: Minimal    Drains: One 28F left pleural chest tube  Specimens: * No specimens in log *  Findings:   Transapical TAVR deployed via left thoracotomy. LV repaired primarily with pledgeted suture. No pericardial effusion at the end of the case. Ribs reapproximated and soft tissues closed in layers. See cardiology report for further details of their portion of the case .  Complications: None.  Implants:   Implant Name Type Inv. Item Serial No.  Lot No. LRB No. Used Action   20MM GINGER 3 ULTRA TRANSCATHETER HEART VALVE (PART OF [id number], CAN'T BE ORDERED SEPARATELY ACCORDING TO MFG) Valve  [id number] [employer] [id number]  1 Implanted   CLOSURE ANGIOSEAL 6FR [id number] - [id number]  CLOSURE ANGIOSEAL 6FR [id number]  [employer] [id number]  1 Implanted

## 2024-06-19 NOTE — ANESTHESIA PROCEDURE NOTES
Paravertebral Procedure Note    Pre-Procedure   Staff -        Anesthesiologist:  Vanita Lara MD       Resident/Fellow: Josh Henderson MD       Performed By: resident and with residents       Procedure performed by resident/fellow/CRNA in presence of a teaching physician.         Location: pre-op       Pre-Anesthestic Checklist: patient identified, IV checked, site marked, risks and benefits discussed, informed consent, monitors and equipment checked, pre-op evaluation, at physician/surgeon's request and post-op pain management  Timeout:       Correct Patient: Yes        Correct Procedure: Yes        Correct Site: Yes        Correct Position: Yes        Correct Laterality: Yes        Site Marked: Yes  Procedure Documentation  Procedure: Paravertebral       Diagnosis: POSTOP PAIN       Laterality: left       Patient Position: RLD       Patient Prep/Sterile Barriers: sterile gloves, mask, patient draped       Skin prep: Chloraprep       Insertion Site: T9-10.       Needle Type: Touhy needle       Needle Gauge: 17.        Needle Length (millimeters): 90        Catheter: 19 G.          Catheter threaded easily.             Ultrasound guided       1. Ultrasound was used to identify targeted nerve, plexus, vascular marker, or fascial plane and place a needle adjacent to it in real-time.       2. Ultrasound was used to visualize the spread of anesthetic in close proximity to the above referenced structure.       3. A permanent image is entered into the patient's record.    Assessment/Narrative         The placement was negative for: blood aspirated, painful injection and site bleeding       Paresthesias: No.       Bolus given via catheter. no blood aspirated via catheter.        Secured via Tegaderm, Dermabond and steristrips.        Insertion/Infusion Method: Continuous Infusion       Complications: none    Medication(s) Administered   15 mL bupivacaine HCl (PF) 0.25 %  10 mL bupivacaine liposome 1.3 %  "  Comments:  Left paravertebral catheter placement without complications. Patient tolerated well.      FOR [facility] ONLY:   Pain Team Contact information: please page the Pain Team Via [employer]. Search \"Pain\". During daytime hours, please page the attending first. At night please page the resident first.      "

## 2024-06-19 NOTE — ADDENDUM NOTE
Addendum  created 06/19/24 172 by Vanita Lara MD    Attestation recorded in Intraprocedure, Child order released for a procedure order, Clinical Note Signed, Diagnosis association updated, Flowsheet accepted, Intraprocedure Attestations filed, Intraprocedure Blocks edited, LDA created via procedure documentation, LDA updated via procedure documentation, SmartForm saved

## 2024-06-19 NOTE — PROGRESS NOTES
Loulou Lucero is a 79 female who is now s/p Trans-apical Aortic Valve Replacement via left thoracotomy + left side 28Fr chest tube placement.   RAPS team placed a left T4-5 paravertebral catheter preoperatively.   Surgical case was completed and patient transported to ICU by anesthesiology team doing the case. After patient's arrival to ICU we were notified that catheter was accidentally pulled-out from the patient while repositioning in the ICU.  Of note, case was done off pump. Patient received 2 doses of IV Heparin: 5000IU at 10:32AM, and 2000IU at 10:40AM for a total of 8000IU through the case. She received 80mg of protamine at 10:45. I cannot see coag assessment documented in the intraoperative chart.   We were called by the ICU for replacement of the catheter. We will repeat the procedure, but will delay until 14:40 to allow for ~4 hrs from documented IV Heparin administration.   Vanita Jensen MD.

## 2024-06-19 NOTE — LETTER
[facility] [address] [place]  [address] [address]  [phone]    FACSIMILE TRANSMITTAL SHEET    TO: TCU admissions/referral   COMPANY: [facility]  FAX NUMBER: [phone]  PHONE NUMBER: [phone]     FROM: CAMRON Small  PHONE: Return call to unit  at [phone]   DATE: 06/23/24      _____URGENT _____REVIEW ONLY __X__PLEASE COMMENT__X_PLEASE REPLY    NOTES/COMMENTS: TCU referral; patient expected to be medically ready to discharge early this week.                                       IF YOU DID NOT RECEIVE THE CORRECT NUMBER OF PAGES OR THE FAX DID NOT COME THROUGH CLEARLY, PLEASE CALL THE SENDER     CONFIDENTIALITY STATEMENT: Confidential information that may accompany this transmission contains protected health information under state and federal law and is legally privileged. This information is intended only for the use of the individual or entity named above and may be used only for carrying out treatment, payment or other healthcare operations. The recipient or person responsible for delivering this information is prohibited by law from disclosing this information without proper authorization to any other party, unless required to do so by law or regulation. If you are not the intended recipient, you are hereby notified that any review, dissemination, distribution, or copying of this message is strictly prohibited. If you have received this communication in error, please destroy the materials and contact us immediately by calling the number listed above. No response indicates that the information was received by the appropriate authorized party

## 2024-06-19 NOTE — LETTER
Transition Communication Hand-off for Care Transitions to Next Level of Care Provider    Name: Loulou Lucero  : 1945  MRN #: [id number]  Primary Care Provider: Ele Marc     Primary Clinic: [address]  [place] [address]     Reason for Hospitalization:  Severe aortic stenosis [I35.0]  Admit Date/Time: 2024  5:27 AM  Discharge Date: 2024  Payor Source: Payor: MEDICARE / Plan: MEDICARE / Product Type: Medicare /     Concern for non-adherence with plan of care:   no    Any outstanding tests or procedures:        Radiology & Cardiology Orders       Future Labs/Procedures Complete By Expires    X-ray Chest 2 vws*  2024 (Approximate) 2024          Referrals       Future Labs/Procedures    Cardiac Rehab  Referral     Process Instructions:    Advance to Wellness and Exercise for Life (WEL) Program or to another maintenance exercise program upon completion of phase 2 cardiac rehab.        Comments:    Patient may choose their preference of the site for Cardiac Rehab.  If you have not heard from the scheduling office within 2 business days, please call [phone] for [facility], [phone] for [facility] and [phone] for [facility].    Physical Therapy Adult Consult     Comments:    Evaluate and treat as clinically indicated.    Reason:  long hospitalization; deconditioned              Key Recommendations:      TOREY Willis    AVS/Discharge Summary is the source of truth; this is a helpful guide for improved communication of patient story

## 2024-06-20 ENCOUNTER — APPOINTMENT (OUTPATIENT)
Dept: CARDIOLOGY | Facility: CLINIC | Age: 79
DRG: 266 | End: 2024-06-20
Attending: NURSE PRACTITIONER
Payer: MEDICARE

## 2024-06-20 ENCOUNTER — APPOINTMENT (OUTPATIENT)
Dept: GENERAL RADIOLOGY | Facility: CLINIC | Age: 79
DRG: 266 | End: 2024-06-20
Attending: NURSE PRACTITIONER
Payer: MEDICARE

## 2024-06-20 ENCOUNTER — APPOINTMENT (OUTPATIENT)
Dept: OCCUPATIONAL THERAPY | Facility: CLINIC | Age: 79
DRG: 266 | End: 2024-06-20
Attending: INTERNAL MEDICINE
Payer: MEDICARE

## 2024-06-20 ENCOUNTER — APPOINTMENT (OUTPATIENT)
Dept: ULTRASOUND IMAGING | Facility: CLINIC | Age: 79
DRG: 266 | End: 2024-06-20
Attending: STUDENT IN AN ORGANIZED HEALTH CARE EDUCATION/TRAINING PROGRAM
Payer: MEDICARE

## 2024-06-20 LAB
ANION GAP SERPL CALCULATED.3IONS-SCNC: 9 MMOL/L (ref 7–15)
APTT PPP: 33 SECONDS (ref 22–38)
ATRIAL RATE - MUSE: 73 BPM
ATRIAL RATE - MUSE: 76 BPM
ATRIAL RATE - MUSE: 88 BPM
BUN SERPL-MCNC: 25.6 MG/DL (ref 8–23)
CALCIUM SERPL-MCNC: 8.7 MG/DL (ref 8.8–10.2)
CHLORIDE SERPL-SCNC: 107 MMOL/L (ref 98–107)
CREAT SERPL-MCNC: 0.81 MG/DL (ref 0.51–0.95)
DEPRECATED HCO3 PLAS-SCNC: 23 MMOL/L (ref 22–29)
DIASTOLIC BLOOD PRESSURE - MUSE: NORMAL MMHG
EGFRCR SERPLBLD CKD-EPI 2021: 73 ML/MIN/1.73M2
ERYTHROCYTE [DISTWIDTH] IN BLOOD BY AUTOMATED COUNT: 13.2 % (ref 10–15)
FIBRINOGEN PPP-MCNC: 500 MG/DL (ref 170–490)
GLUCOSE BLDC GLUCOMTR-MCNC: 122 MG/DL (ref 70–99)
GLUCOSE SERPL-MCNC: 120 MG/DL (ref 70–99)
HCT VFR BLD AUTO: 30.2 % (ref 35–47)
HGB BLD-MCNC: 10.1 G/DL (ref 11.7–15.7)
INR PPP: 1.49 (ref 0.85–1.15)
INTERPRETATION ECG - MUSE: NORMAL
LVEF ECHO: NORMAL
MAGNESIUM SERPL-MCNC: 1.9 MG/DL (ref 1.7–2.3)
MCH RBC QN AUTO: 33.7 PG (ref 26.5–33)
MCHC RBC AUTO-ENTMCNC: 33.4 G/DL (ref 31.5–36.5)
MCV RBC AUTO: 101 FL (ref 78–100)
P AXIS - MUSE: 74 DEGREES
P AXIS - MUSE: 79 DEGREES
P AXIS - MUSE: 79 DEGREES
PHOSPHATE SERPL-MCNC: 5.5 MG/DL (ref 2.5–4.5)
PLATELET # BLD AUTO: 132 10E3/UL (ref 150–450)
POTASSIUM SERPL-SCNC: 4.5 MMOL/L (ref 3.4–5.3)
PR INTERVAL - MUSE: 148 MS
PR INTERVAL - MUSE: 150 MS
PR INTERVAL - MUSE: 152 MS
QRS DURATION - MUSE: 110 MS
QRS DURATION - MUSE: 120 MS
QRS DURATION - MUSE: 80 MS
QT - MUSE: 378 MS
QT - MUSE: 458 MS
QT - MUSE: 460 MS
QTC - MUSE: 457 MS
QTC - MUSE: 504 MS
QTC - MUSE: 517 MS
R AXIS - MUSE: 50 DEGREES
R AXIS - MUSE: 56 DEGREES
R AXIS - MUSE: 72 DEGREES
RBC # BLD AUTO: 3 10E6/UL (ref 3.8–5.2)
SODIUM SERPL-SCNC: 139 MMOL/L (ref 135–145)
SYSTOLIC BLOOD PRESSURE - MUSE: NORMAL MMHG
T AXIS - MUSE: 60 DEGREES
T AXIS - MUSE: 73 DEGREES
T AXIS - MUSE: 78 DEGREES
VENTRICULAR RATE- MUSE: 73 BPM
VENTRICULAR RATE- MUSE: 76 BPM
VENTRICULAR RATE- MUSE: 88 BPM
WBC # BLD AUTO: 7.7 10E3/UL (ref 4–11)

## 2024-06-20 PROCEDURE — 36415 COLL VENOUS BLD VENIPUNCTURE: CPT

## 2024-06-20 PROCEDURE — 83735 ASSAY OF MAGNESIUM: CPT | Performed by: NURSE PRACTITIONER

## 2024-06-20 PROCEDURE — 84100 ASSAY OF PHOSPHORUS: CPT | Performed by: NURSE PRACTITIONER

## 2024-06-20 PROCEDURE — 271N000002 HC RX 271

## 2024-06-20 PROCEDURE — 85027 COMPLETE CBC AUTOMATED: CPT | Performed by: STUDENT IN AN ORGANIZED HEALTH CARE EDUCATION/TRAINING PROGRAM

## 2024-06-20 PROCEDURE — 80048 BASIC METABOLIC PNL TOTAL CA: CPT | Performed by: NURSE PRACTITIONER

## 2024-06-20 PROCEDURE — 71045 X-RAY EXAM CHEST 1 VIEW: CPT | Mod: 26 | Performed by: STUDENT IN AN ORGANIZED HEALTH CARE EDUCATION/TRAINING PROGRAM

## 2024-06-20 PROCEDURE — 93880 EXTRACRANIAL BILAT STUDY: CPT

## 2024-06-20 PROCEDURE — 250N000013 HC RX MED GY IP 250 OP 250 PS 637: Performed by: STUDENT IN AN ORGANIZED HEALTH CARE EDUCATION/TRAINING PROGRAM

## 2024-06-20 PROCEDURE — 93306 TTE W/DOPPLER COMPLETE: CPT | Mod: 26 | Performed by: INTERNAL MEDICINE

## 2024-06-20 PROCEDURE — 999N000128 HC STATISTIC PERIPHERAL IV START W/O US GUIDANCE

## 2024-06-20 PROCEDURE — 97165 OT EVAL LOW COMPLEX 30 MIN: CPT | Mod: GO

## 2024-06-20 PROCEDURE — 99233 SBSQ HOSP IP/OBS HIGH 50: CPT | Mod: 25 | Performed by: STUDENT IN AN ORGANIZED HEALTH CARE EDUCATION/TRAINING PROGRAM

## 2024-06-20 PROCEDURE — 258N000003 HC RX IP 258 OP 636: Mod: JZ | Performed by: INTERNAL MEDICINE

## 2024-06-20 PROCEDURE — 120N000005 HC R&B MS OVERFLOW UMMC

## 2024-06-20 PROCEDURE — 85730 THROMBOPLASTIN TIME PARTIAL: CPT

## 2024-06-20 PROCEDURE — 71045 X-RAY EXAM CHEST 1 VIEW: CPT

## 2024-06-20 PROCEDURE — 93010 ELECTROCARDIOGRAM REPORT: CPT | Mod: 76 | Performed by: INTERNAL MEDICINE

## 2024-06-20 PROCEDURE — 97535 SELF CARE MNGMENT TRAINING: CPT | Mod: GO

## 2024-06-20 PROCEDURE — 85384 FIBRINOGEN ACTIVITY: CPT

## 2024-06-20 PROCEDURE — 250N000011 HC RX IP 250 OP 636: Mod: JZ | Performed by: STUDENT IN AN ORGANIZED HEALTH CARE EDUCATION/TRAINING PROGRAM

## 2024-06-20 PROCEDURE — 85610 PROTHROMBIN TIME: CPT

## 2024-06-20 PROCEDURE — 250N000011 HC RX IP 250 OP 636: Performed by: STUDENT IN AN ORGANIZED HEALTH CARE EDUCATION/TRAINING PROGRAM

## 2024-06-20 PROCEDURE — 250N000011 HC RX IP 250 OP 636: Mod: JZ

## 2024-06-20 PROCEDURE — 93005 ELECTROCARDIOGRAM TRACING: CPT

## 2024-06-20 PROCEDURE — 93880 EXTRACRANIAL BILAT STUDY: CPT | Mod: 26 | Performed by: RADIOLOGY

## 2024-06-20 PROCEDURE — 93306 TTE W/DOPPLER COMPLETE: CPT

## 2024-06-20 PROCEDURE — 250N000013 HC RX MED GY IP 250 OP 250 PS 637: Performed by: INTERNAL MEDICINE

## 2024-06-20 RX ORDER — PANTOPRAZOLE SODIUM 40 MG/1
40 TABLET, DELAYED RELEASE ORAL DAILY
Status: DISCONTINUED | OUTPATIENT
Start: 2024-06-20 | End: 2024-06-28 | Stop reason: HOSPADM

## 2024-06-20 RX ADMIN — SIMVASTATIN 20 MG: 10 TABLET, FILM COATED ORAL at 20:41

## 2024-06-20 RX ADMIN — SODIUM CHLORIDE, POTASSIUM CHLORIDE, SODIUM LACTATE AND CALCIUM CHLORIDE 500 ML: 600; 310; 30; 20 INJECTION, SOLUTION INTRAVENOUS at 01:20

## 2024-06-20 RX ADMIN — GABAPENTIN 100 MG: 100 CAPSULE ORAL at 20:41

## 2024-06-20 RX ADMIN — CEFAZOLIN 1 G: 1 INJECTION, POWDER, FOR SOLUTION INTRAMUSCULAR; INTRAVENOUS at 00:35

## 2024-06-20 RX ADMIN — HYDROMORPHONE HYDROCHLORIDE 0.1 MG: 0.2 INJECTION, SOLUTION INTRAMUSCULAR; INTRAVENOUS; SUBCUTANEOUS at 00:59

## 2024-06-20 RX ADMIN — ACETAMINOPHEN 975 MG: 325 TABLET, FILM COATED ORAL at 12:28

## 2024-06-20 RX ADMIN — OXYCODONE HYDROCHLORIDE 2.5 MG: 5 TABLET ORAL at 19:09

## 2024-06-20 RX ADMIN — ASPIRIN 81 MG CHEWABLE TABLET 81 MG: 81 TABLET CHEWABLE at 09:22

## 2024-06-20 RX ADMIN — FLUTICASONE PROPIONATE AND SALMETEROL XINAFOATE 1 PUFF: 230; 21 AEROSOL, METERED RESPIRATORY (INHALATION) at 08:59

## 2024-06-20 RX ADMIN — GABAPENTIN 100 MG: 100 CAPSULE ORAL at 08:58

## 2024-06-20 RX ADMIN — Medication: at 21:28

## 2024-06-20 RX ADMIN — METHOCARBAMOL 500 MG: 500 TABLET ORAL at 08:58

## 2024-06-20 RX ADMIN — POLYETHYLENE GLYCOL 3350 17 G: 17 POWDER, FOR SOLUTION ORAL at 08:59

## 2024-06-20 RX ADMIN — DOCUSATE SODIUM 50 MG AND SENNOSIDES 8.6 MG 1 TABLET: 8.6; 5 TABLET, FILM COATED ORAL at 08:59

## 2024-06-20 RX ADMIN — PANTOPRAZOLE SODIUM 40 MG: 40 TABLET, DELAYED RELEASE ORAL at 08:59

## 2024-06-20 RX ADMIN — OXYCODONE HYDROCHLORIDE 2.5 MG: 5 TABLET ORAL at 12:28

## 2024-06-20 RX ADMIN — HEPARIN SODIUM 5000 UNITS: 5000 INJECTION, SOLUTION INTRAVENOUS; SUBCUTANEOUS at 12:28

## 2024-06-20 RX ADMIN — OXYCODONE HYDROCHLORIDE 2.5 MG: 5 TABLET ORAL at 04:54

## 2024-06-20 RX ADMIN — DOCUSATE SODIUM 50 MG AND SENNOSIDES 8.6 MG 1 TABLET: 8.6; 5 TABLET, FILM COATED ORAL at 20:41

## 2024-06-20 RX ADMIN — CEFAZOLIN 1 G: 1 INJECTION, POWDER, FOR SOLUTION INTRAMUSCULAR; INTRAVENOUS at 08:59

## 2024-06-20 RX ADMIN — METHOCARBAMOL 500 MG: 500 TABLET ORAL at 13:41

## 2024-06-20 RX ADMIN — ACETAMINOPHEN 975 MG: 325 TABLET, FILM COATED ORAL at 20:41

## 2024-06-20 RX ADMIN — OXYCODONE HYDROCHLORIDE 2.5 MG: 5 TABLET ORAL at 00:06

## 2024-06-20 RX ADMIN — FLUTICASONE PROPIONATE AND SALMETEROL XINAFOATE 1 PUFF: 230; 21 AEROSOL, METERED RESPIRATORY (INHALATION) at 20:41

## 2024-06-20 RX ADMIN — ACETAMINOPHEN 975 MG: 325 TABLET, FILM COATED ORAL at 04:24

## 2024-06-20 RX ADMIN — METHOCARBAMOL 500 MG: 500 TABLET ORAL at 20:41

## 2024-06-20 ASSESSMENT — ACTIVITIES OF DAILY LIVING (ADL)
ADLS_ACUITY_SCORE: 50
ADLS_ACUITY_SCORE: 49
ADLS_ACUITY_SCORE: 50
ADLS_ACUITY_SCORE: 49
ADLS_ACUITY_SCORE: 50
ADLS_ACUITY_SCORE: 45
ADLS_ACUITY_SCORE: 49
ADLS_ACUITY_SCORE: 50
ADLS_ACUITY_SCORE: 49
ADLS_ACUITY_SCORE: 50
ADLS_ACUITY_SCORE: 49
ADLS_ACUITY_SCORE: 50
ADLS_ACUITY_SCORE: 45
ADLS_ACUITY_SCORE: 50

## 2024-06-20 NOTE — PROGRESS NOTES
Cardiology ICU Progress Note    Brief HPI:  Loulou Lucero is a 79 year old female with a PMH significant for tobacco use, COPD, lung nodule, HLD, mild CAD, chronic low back pain, MGUS, anemia and severe aortic stenosis who was admitted for planned TAVR.      Underwent planned TAVR with 20 mm Diaz valve 6/19. Procedure underwent transapical approach, presents to the ICU intubated and sedated, moderately hypertensive, with chest tube in place by CVTS for trans apical approach. No headache, visual changes, numbness, weakness, or speech difficulties. No dyspnea, chest pain, or palpitations. No pain around access sites. No bleeding.     Subjective and Interval:  - Extubated to nasal cannula  - Paravertebral block replaced by anesthesia pkain team  - Weaned off nicardipine  - Received fluid boluses over night    Assessment and plan by system:   Today's changes:  - Post procedure TTE  - Daily ASA, statin  - Remove CVC, a-line, gutierrez  - Transfer to floor     Neurology   # Acute post op pain    Current sedation: None  RASS (Dawson Agitation-Sedation Scale): 0-->alert and calm      Plan:  - Catheter management per anesthesia pain team  - Scheduled APAP, robaxin  - PRN oxy    # Syncope  Syncopal episodes when patient rotates neck to turn head left    Pan:  - Carotid US to assess for stenosis  - Fall precautions  - Consider OP neurologic work up     Cardiovascular  # Severe aortic stenosis  # s/p TAVR with transapical approach  # Chronic heart failure with preserved ejection fraction secondary to valvular disease  # HLD  # Mild to moderate CAD    Prior to procedure, pt noted to have a mean gradient 30 mmHG, PETER 0.7 cm^2, PV 3.6 m/s. Now s/p TAVR with 20mm Diaz valve. Procedure was uncomplicated.      Trans apical approach, CVTS left anterior incision with chest tube placement, anesthesia team placed EPS catheter which has since been removed.      Plan:  - Neuro checks, per protocol.  - Monitor groin sites.   - EKG in  morning.   - Echocardiogram   - Aspirin 81 mg for anti-platelet therapy.   - continue Simvastatin  - Cardiac rehab/PT/OT.  - ADAT   - Daily weights.   - Strict intake/output.     Pulmonary  # COPD  # Current Smoker      ABG:   Recent Labs   Lab 06/19/24  2252 06/19/24  1706 06/19/24  1204   PH 7.35 7.36 7.42   PCO2 47* 45 40   PO2 151* 128* 193*   HCO3 26 26 26   O2PER 23 30 40       Plan:  - Encourage cessation  - Continue PTA fluticasone-salmeterol     Hematology  # Chronic iron deficiency anemia  # MGUS  - Baseline Hgb 9 range, since starting iron, increased to 13 range  - Continue PTA iron supplementation  - CBC daily    Integumentary:  - No skin issues    Lines/Tubes/Drains:  Peripheral IV 06/19/24 Right;Dorsal Lower forearm (Active)   Site Assessment [facility] 06/20/24 1600   Line Status Saline locked 06/20/24 1600   Dressing Transparent 06/20/24 1600   Dressing Status clean;dry;intact 06/20/24 1600   Line Intervention Flushed 06/19/24 1200   Phlebitis Scale 0-->no symptoms 06/20/24 1600   Infiltration? no 06/20/24 1600   Number of days: 1       Peripheral IV 06/20/24 Anterior;Right Lower forearm (Active)   Site Assessment [facility] 06/20/24 1600   Line Status Saline locked 06/20/24 1600   Dressing Transparent 06/20/24 1600   Dressing Status clean;dry;intact 06/20/24 1600   Phlebitis Scale 0-->no symptoms 06/20/24 1600   Infiltration? no 06/20/24 1600   Number of days: 0       Chest Tube 1 Left Pleural 28 Albanian Angled (Active)   Site Assessment UTV 06/20/24 1600   Suction -20 cm H2O 06/20/24 1600   Chest Tube Airleak No 06/20/24 1600   Drainage Description Serosanguinous 06/20/24 1600   Dressing Status Normal: Clean, Dry & Intact 06/20/24 1600   Dressing Change Due 06/21/24 06/20/24 0800   Dressing Intervention Gauze 06/20/24 1600   Patency Intervention Tip/Tilt 06/20/24 1600   Chest Tube Clamps at Bedside present 06/20/24 0800   Container Amount 410 06/20/24 1600   Output (ml) 20 ml 06/20/24 1600   Number of days: 1      "  Urethral Catheter 06/19/24 Double-lumen;Non-latex;Straight-tip;Temperature probe 16 fr (Active)   Tube Description Positional 06/20/24 1600   Catheter Care Done;Catheter wipes 06/20/24 0800   Collection Container Standard 06/20/24 1600   Securement Method Securing device (Describe) 06/20/24 1600   Rationale for Continued Use ICU only: hourly urine output needed for patient care 06/20/24 1600   Urine Output 175 mL 06/20/24 1600   Number of days: 1       Incision/Surgical Site 06/19/24 Left;Lower Chest (Active)   Incision Assessment UTV 06/20/24 1600   Dressing Dry gauze 06/20/24 0800   Closure KELLY 06/20/24 1600   Incision Drainage Amount UTV 06/20/24 1600   Drainage Description UTV 06/20/24 1600   Dressing Intervention Clean, dry, intact 06/20/24 1600   Number of days: 1       Incision/Surgical Site 06/19/24 Bilateral Groin (Active)   Incision Assessment WDL 06/20/24 1600   Dressing Transparent film (Opsite, Tegaderm) 06/20/24 0800   Closure Liquid bandage 06/20/24 1600   Incision Drainage Amount None 06/20/24 1600   Dressing Intervention Clean, dry, intact 06/20/24 1600   Number of days: 1          ICU  Feeding: ADAT  Analgesia:   anesthesia block, robaxin, apap, prn oxy    Sedation:  N/a    Thrombopx: Subcutaneous Heparin  Head of bed: reverse trend   Ulcers: n/a  Glucose: SSI  Medically Ready for Discharge: Anticipated in 2-4 Days      Family will be updated by me    Patient seen and discussed with staff physician Dr. Niño.    VEENA [facility]  ICU Cardiology-CICU Service  Send message or 10 digit call back number Securely via [employer] with the Vocera Web Console (learn more here)    Objective:  Most recent vital signs:  /64   Pulse 83   Temp 99  F (37.2  C) (Oral)   Resp 22   Ht 1.499 m (4' 11\")   Wt 38.6 kg (85 lb 1.6 oz)   LMP 01/01/1995 (Approximate)   SpO2 99%   BMI 17.19 kg/m    Temp:  [98.2  F (36.8  C)-100  F (37.8  C)] 99  F (37.2  C)  Pulse:  [] " 83  Resp:  [11-22] 22  BP: ()/(58-66) 129/64  MAP:  [63 mmHg-313 mmHg] 157 mmHg  Arterial Line BP: (102-145)/() 128/110  SpO2:  [90 %-100 %] 99 %      Physical exam:  General: In bed, in NAD  HEENT: PERRL, no scleral icterus or injection  CARDIAC: RRR, no m/r/g appreciated. Peripheral pulses dopplered  RESP: CTAB, no wheezes, rhonchi or crackles appreciated.  GI: soft, BS hypoactive  : Roberson  EXTREMITIES: NO LE edema, pulses 2+.   SKIN: No acute lesions appreciated  NEURO: awake, alert, oriented x 3    Imaging/procedure results:   Reviewed

## 2024-06-20 NOTE — PLAN OF CARE
Neuro: A&Ox4, moves all extremities, follows commands.  2.5mg oxy, tylenol, robaxin, and gabapentin given for pain.  ES catheter.  Got up with PT and marched in place.  Pt reports dizzyness whenever she looks to the left and stated that has been happening for months - team notified; carotid US done.    CV:   Rate/rhythm:  Sinus Rhythm 80s-90s.  MAP goal <65 and SBP goal <140.      Pulm: 1L NC.  Frequent productive cough.      GI: Poor appetite - only ate pudding for lunch.    : Roberson - around 30mL/hr UOP.    Skin: Left transapical incision and R groin site    Drains: Chest Tube    Drips:   none    Labs:     Other:  MAC and A-line out

## 2024-06-20 NOTE — PROGRESS NOTES
06/20/24 1300   Appointment Info   Signing Clinician's Name / Credentials (OT) Manny Ramirez OTR/L   Rehab Comments (OT) left thoracotomy.   Living Environment   People in Home alone   Current Living Arrangements house   Home Accessibility stairs to enter home;stairs within home   Number of Stairs, Main Entrance 3   Stair Railings, Main Entrance railing on right side (ascending)   Number of Stairs, Within Home, Primary one   Transportation Anticipated car, drives self   Living Environment Comments Pt has a walk in shower with grab bars and seat.   Self-Care   Usual Activity Tolerance good   Current Activity Tolerance fair   Regular Exercise No   Equipment Currently Used at Home none   Fall history within last six months no   Activity/Exercise/Self-Care Comment Pt reports being very active and independent at home.   General Information   Onset of Illness/Injury or Date of Surgery 06/19/24   Referring Physician Polina De Leon NP   Patient/Family Therapy Goal Statement (OT) Pt would like to regain function and return home independently.   Additional Occupational Profile Info/Pertinent History of Current Problem Loulou Lucero is a 79 year old female with past medical history of tobacco use, COPD, lung nodule, HLD, mild CAD, chronic low back pain, MGUS, anemia and severe aortic stenosis who was admitted for planned transapical approach TAVR.  Patient was extubated on POD #0.   CVTS following for chest tube management.   Existing Precautions/Restrictions fall;cardiac;thoracotomy   Left Upper Extremity (Weight-bearing Status)   (10lbs)   Right Upper Extremity (Weight-bearing Status) full weight-bearing (FWB)   Left Lower Extremity (Weight-bearing Status) full weight-bearing (FWB)   Right Lower Extremity (Weight-bearing Status) full weight-bearing (FWB)   Cognitive Status Examination   Orientation Status orientation to person, place and time   Visual Perception   Visual Impairment/Limitations WFL;corrective lenses  full-time  (Pt reports she has cataracts.)   Sensory   Sensory Quick Adds sensation intact   Pain Assessment   Patient Currently in Pain Yes, see Vital Sign flowsheet   Range of Motion Comprehensive   Comment, General Range of Motion BUE AROM WFL within precautions.   Strength Comprehensive (MMT)   Comment, General Manual Muscle Testing (MMT) Assessment Pt presents with generalized weakness throughout BUE's and BLE's.   Bed Mobility   Comment (Bed Mobility) Pt required Mod A and Max vc's.   Transfers   Transfer Comments Min-Mod A and vc's STS.   Activities of Daily Living   BADL Assessment/Intervention bathing;upper body dressing;lower body dressing;grooming;toileting   Bathing Assessment/Intervention   [place] Level (Bathing) set up;verbal cues;maximum assist (25% patient effort)   Comment, (Bathing) Per clinical judgement.   Upper Body Dressing Assessment/Training   Comment, (Upper Body Dressing) Per clinical judgement.   [place] Level (Upper Body Dressing) set up;verbal cues;moderate assist (50% patient effort)   Lower Body Dressing Assessment/Training   [place] Level (Lower Body Dressing) set up;verbal cues;maximum assist (25% patient effort)   Grooming Assessment/Training   [place] Level (Grooming) set up;verbal cues;minimum assist (75% patient effort)   Toileting   [place] Level (Toileting) set up;verbal cues;maximum assist (25% patient effort)   Clinical Impression   Criteria for Skilled Therapeutic Interventions Met (OT) Yes, treatment indicated   OT Diagnosis Decreased independence with functional transfers and ADLS.   OT Problem List-Impairments impacting ADL problems related to;activity tolerance impaired;fear & anxiety;flexibility;mobility;range of motion (ROM);strength;pain;post-surgical precautions   Assessment of Occupational Performance 3-5 Performance Deficits   Identified Performance Deficits Decreased independence with functional transfers and ADLS/IADLS.   Planned Therapy  Interventions (OT) ADL retraining;IADL retraining;bed mobility training;ROM;strengthening;stretching;transfer training;home program guidelines;progressive activity/exercise;risk factor education   Clinical Decision Making Complexity (OT) problem focused assessment/low complexity   Risk & Benefits of therapy have been explained evaluation/treatment results reviewed;care plan/treatment goals reviewed;risks/benefits reviewed;patient   OT Total Evaluation Time   OT Eval, Low Complexity Minutes (81381) 10   OT Goals   Therapy Frequency (OT) Daily   OT Predicted Duration/Target Date for Goal Attainment 07/04/24   OT Discharge Planning   OT Plan OT: Functional transfers, ADLS, Strength/endurance, Ambulation.   OT Discharge Recommendation (DC Rec) Transitional Care Facility   OT Rationale for DC Rec Pt is currently below baseline functiona and would benefit from continued rehab to maximize functional independence.   OT Brief overview of current status Mod A and vc's bed mobility, Min-Mod A STS.   Total Session Time   Total Session Time (sum of timed and untimed services) 10

## 2024-06-20 NOTE — PLAN OF CARE
Admitted/transferred from: OR   Reason for admission/transfer: Hemodynamic monitoring   2 RN skin assessment: completed by Austyn COSTA  Result of skin assessment and interventions/actions: Sacral dressing  Height, weight, drug calc weight: Done  Patient belongings (see Flowsheet)  MDRO education added to care planN/A  ?     ICU End of Shift Summary. See flowsheets for vital signs and detailed assessment.    Changes this shift: Patient Aox4, NSR. Remains on drip for BP. Extubated at 1815 on oxymask able to wean to 2L. Pain managed with PRN medication. Roberson in place and remains appropriate.     Plan: Continue plan of care.       Goal Outcome Evaluation:      Plan of Care Reviewed With: patient, family    Overall Patient Progress: improvingOverall Patient Progress: improving    Outcome Evaluation: Extubated

## 2024-06-20 NOTE — PLAN OF CARE
Major Shift Events:    Neuro: AxOx4, MARTIN. Voice hoarse and quiet. Pt reports shoulder pain and incisional pain, managed with oxycodone, robaxin, tylenol, and dilaudid.   CV: Pt remains NSR, MAP goal > 65 and SBP goal < 140. Nicardipine stopped around 2100 for MAPs < 65. CVP of 11. BP maintaining MAP goal remainder of evening.   Pulm: on 1L oxymask. Dry cough pt reports is baseline.   GI: Pt passed RN bedside swallow. Clear liquids, poor appetite.   : Roberson. Minimal output for a couple hours, on call fellow notified. Bladder scanned for < 10cc. 500ml LR bolus given.      For vital signs and complete assessments, please see documentation flowsheets.     Alma Chua RN on 6/20/2024 at 6:08 AM

## 2024-06-20 NOTE — PROGRESS NOTES
"    BRIEF CVTS PROGRESS NOTE    S:  Osmar Cole is a 79 year old female with past medical history of tobacco use, COPD, lung nodule, HLD, mild CAD, chronic low back pain, MGUS, anemia and severe aortic stenosis who was admitted for planned transapical approach TAVR.  Patient was extubated on POD #0.   CVTS following for chest tube management.    Hemodynamically stable overnight. Patient is doing well this morning but reports difficulty breathing and frequent excretion of mucous. She is working on her IS with the encouragement of staff and her family. Pain adequately managed    O:  /59   Pulse 75   Temp 98.2  F (36.8  C)   Resp 12   Ht 1.499 m (4' 11\")   Wt 38.6 kg (85 lb 1.6 oz)   LMP 1995 (Approximate)   SpO2 99%   BMI 17.19 kg/m      I/O last 3 completed shifts:  In: 1927.99 [P.O.:400; I.V.:527.99; IV Piggyback:1000]  Out: 1662 [Urine:1435; Chest Tube:227]    Recent Results (from the past 24 hour(s))   Transesophageal Echocardiogram   Result Value    LVEF  60-65%    Narrative    [id number]  [facility]  [id number]  [id number]^HEATHER^DERRICK     [facility],[place]  Echocardiography Laboratory  [address] [place] [id number]     Name: OSMAR COLE  MRN: [id number]  : 1945  Study Date: 2024 07:08 AM  Age: 79 yrs  Gender: Female  Patient Location: [facility]  Reason For Study: Severe aortic stenosis  History: AS,Intraprocedural VAN for Transapical TAVR with 20 mm Diaz Benedict  3  Ordering Physician: DERRICK ROMERO  Referring Physician: DERRICK ROMERO  Performed By: Pilar Palacios RDCS     BSA: 1.2 m2  Height: 59 in  Weight: 80 lb  HR: 88  BP: 182/85 mmHg  ______________________________________________________________________________  Interpretation Summary  Intraprocedural VAN for TAVR with 20 mm Diaz Benedict 3 TAVR via transapical  approach. Real-time VAN images were obtained for intraprocedural guidance.     Preprocedural images show a severely " calcified tricuspid aortic valve with  severe aortic stenosis (PV 4.0 m/s MG 36 mmHg) and mild aortic insufficiency.     Under fluoroscopic and VAN guidance, transapical access was established.     Final postprocedural images show well-seated 20 mm Benedict 3 TAVR prosthesis in  the aortic position with normal Doppler parameters (PV 1.5 m/s MG 5 mmHg.)  There is trace paravalvular AI near the commissure at 1 o'clock on the  midesophageal short axis view.  There is no pericardial effusion on either the pre- or postprocedural images.     This study was compared with the study from 12/4/23: There has been interval  TAVR with resolution of aortic stenosis on the postprocedural images.  ______________________________________________________________________________  Procedure  Intraprocedural VAN for transapical TAVR. Intraoperative Transesophageal  Echocardiogram with color and spectral Doppler performed. 3D image  acquisition, reconstruction, and real-time interpretation was performed.  Procedure location Operating Room. The procedure was performed in the  Operating Room. Informed consent for Transesophegeal echo obtained. VAN Probe  #67 was used during the procedure. Sedation, endotracheal intubation, and  mechanical ventilation were initiated prior to the VAN and were monitored by  anesthesia. The Transducer was inserted without difficulty . The patient  tolerated the procedure well. Complications None. Good quality two-dimensional  was performed and interpreted. Good quality color and spectral Doppler were  performed and interpreted.     Left Ventricle  Left ventricular size, wall motion and function are normal. The ejection  fraction is 60-65%.     Right Ventricle  Right ventricular function, chamber size, wall motion, and thickness are  normal.     Atria  Both atria appear normal. The left atrial appendage is normal. It is free of  spontaneous echo contrast and thrombus. The left atrial appendage Doppler  velocities  are normal. A small patent foramen ovale is present. The patent  foramen ovale was demonstrated by color Doppler .     Mitral Valve  Severe mitral annular calcification is present. Trace mitral insufficiency is  present.     Tricuspid Valve  The tricuspid valve is normal. Trace tricuspid insufficiency is present. The  peak velocity of the tricuspid regurgitant jet is not obtainable. Pulmonary  artery systolic pressure cannot be assessed.     Pulmonic Valve  The pulmonic valve is normal. Trace pulmonic insufficiency is present.     Vessels  Complex atheroma of the aorta are present in the acsending aorta, arch, and  descending thoracic aorta.     Pericardium  No pericardial effusion is present.     Compared to Previous Study  This study was compared with the study from 23 . There has been interval  TAVR with resolution of aortic stenosis on the postprocedural images.     ______________________________________________________________________________  MMode/2D Measurements & Calculations  asc Aorta Diam: 2.7 cm  Asc Ao diam index BSA (cm/m2): 2.2     Asc Ao diam index Ht(cm/m): 1.8     Doppler Measurements & Calculations  MV max PG: 10.1 mmHg  MV mean P.3 mmHg  MV V2 VTI: 43.3 cm  Ao V2 max: 146.9 cm/sec  Ao max P.6 mmHg  Ao V2 mean: 104.6 cm/sec  Ao mean P.0 mmHg  Ao V2 VTI: 32.5 cm     ______________________________________________________________________________  Report approved by: Jane Hua 2024 01:03 PM         Cardiac Catheterization    Narrative    1. Lifestyle-limiting severe aortic stenosis.  2. Successful transapical transcatheter aortic valve replacement with a 20   mm Diaz Benedict 3 valve (nominal).  3. Temporary pacemaker insertion.  4. Left heart catheterization with LVEDP of 20 mmHg.  5. Right  common femoral arteriotomies successfully closed with closure   devices.     XR Chest Port 1 View    Narrative    Portable chest    INDICATION: Endotracheal tube  positioning    COMPARISON: CT 1/17/2024. Plain films 9/24/2018.    FINDINGS: Heart size normal. Transcatheter aortic valve replacement  device via left thoracotomy noted. Atherosclerotic calcification at  the aortic knob. Left chest wall subcutaneous emphysema with left  lower thoracostomy tube present. Apical capping unchanged on the left  with no obvious pneumothorax. Bones appear somewhat osteopenic. There  is some mild suprahilar retraction bilaterally.  Right IJ catheter tip in the uppermost SVC. Endotracheal tube tip  approximately 3 cm above the ryan. Mild tenting of the medial aspect  of the right hemidiaphragm consistent with upper lung volume loss  again also associated with the suprahilar retraction.      Impression    IMPRESSION: Transapical approach TAVR via left thoracotomy.  Subcutaneous edema left chest wall. Cannot exclude small left basilar  pneumothorax. Bilateral upper lung volume loss.    ZA MEHTA MD         SYSTEM ID:  [id number]       CBC RESULTS:   Recent Labs   Lab Test 06/20/24  0412 06/19/24  1204 06/19/24  1101 06/18/24  1228   WBC 7.7 8.5  --  6.0   HGB 10.1* 9.5* 9.3* 12.7   HCT 30.2* 28.5*  --  38.4   * 139*  --  206     CMP RESULTS:  Recent Labs   Lab Test 06/20/24  0412 06/19/24  2256 06/19/24  2252 06/19/24  1212 06/19/24  1204 06/19/24  0544 06/18/24  1228     --  140  --  139   < > 142   POTASSIUM 4.5  --  4.4  --  4.3   < > 4.8   CHLORIDE 107  --  106  --  106  --  104   CO2 23  --  23  --  23  --  29   ANIONGAP 9  --  11  --  10  --  9   * 124* 134*   < > 122*   < > 90   BUN 25.6*  --  25.6*  --  22.6  --  18.6   CR 0.81  --  0.83  --  0.78  --  0.73   GFRESTIMATED 73  --  71  --  77  --  83   DION 8.7*  --  9.3  --  7.9*  --  9.7   BILITOTAL  --   --  0.6  --  0.5  --  0.6   ALKPHOS  --   --  48  --  46  --  60   ALT  --   --  13  --  14  --  11   AST  --   --  54*  --  30  --  26    < > = values in this interval not displayed.     Gen: sitting up  in bed, working on IS  Neuro: Intact with no focal deficits, A&O X4  CV: RRR, normal S1 S2, no murmurs, rubs or gallops  Pulm: diffuse crackles throughout, no wheezing or rhonchi, unlabored breathing on supplemental oxygen   Skin:  Left thoracotomy incision: clean, dry, intact, no erythema, sternum stable  Tubes/drain sites: dressing clean and dry, serosanguinous drainage, 192cc since midnight, to remain for drainage. Tape removed - chest tube is secured with suture and should not need additional securement besides a routine drain dressing.     A/P:  S/p transapical TAVR on 6/19/2024 by Dr. Cross. CVTS following for chest tube management.     - Left pleural chest tube with 192cc since midnight, to remain for drainage today per discussion with surgeon. Will re-evaluate daily. 6/20 echo without evidence of pericardial effusion (see full read above).   - Daily wound care:  wound cleanser/soap & water, pat dry, keep wound clean and dry using Interdry prn   - Recommend adding nebulizers to support respiratory function   - Agree with ASA 81 mg daily.   - Encourage good nutrition, particularly protein intake   - Maintain BG control <180 for optimal wound healing   - Maintain euvolemia   - Remainder of plan per primary teams; please call with questions.    Betsy Finley PA-C  Cardiothoracic Surgery  Pager [phone]    7:52 AM June 20, 2024

## 2024-06-20 NOTE — PROGRESS NOTES
Pain Service Progress Note  [facility]  Date: 06/20/2024       Patient Name: Loulou Lucero  MRN: [id number]  Age: 79 year old  Sex: female      Assessment:  80 y/o female with PMHx significant for tobacco use, COPD, lung nodule, HLD, mild CAD, chronic low back pain, MGUS, anemia and severe aortic stenosis who is now s/p trans-apical Aortic Valve Replacement via left thoracotomy + left side 28Fr chest tube placement. RAPS team placed a left T4-5 paravertebral catheter preoperatively which was iatrogenically removed at some point intra-operatively or during transport. RAPS team replaced left paravertebral catheter in the ICU on 6/19/24.     Procedure: Trans-apical aortic valve replacement     Date of Surgery: 6/19/24    Date of Catheter Placement: 6/19/24    Plan/Recommendations:  1. Regional Anesthesia/Analgesia  -Continuous Catheter Type/Site: left paravertebral (PV) T4-5  Infusate: ropivicaine 0.2%   Continuous Infusion at 8 mL/hr       3. Multimodal Analgesia  - per primary team     Pain Service will continue to follow.     Discussed with attending anesthesiologist    BAO PAVON MD  06/20/2024     Overnight Events: Extubated.      Tubes/Drains: Yes  Chest tube in place     Subjective: Patient reports pain is 3/10 and mostly in her left shoulder area. Pain increases with movement.   Nausea: No  Vomiting: No  Pruritus: No  Symptoms of LAST: No    Pain Location:  Left shoulder    Pain Intensity:    Pain at Rest: 3/10   Pain with Activity: increases  Satisfied with your level of pain control: Yes    Diet: Advance Diet as Tolerated: Clear Liquid Diet  Advance Diet as Tolerated: Fully Advanced to diet(s) per Provider order    Relevant Labs:  Recent Labs   Lab Test 06/20/24  0412 06/19/24  1204 06/18/24  1228   INR  --   --  1.20*   *   < > 206   BUN 25.6*   < > 18.6    < > = values in this interval not displayed.       Physical Exam:  Vitals: /66   Pulse 79   Temp 98.8  F  "(37.1  C)   Resp 15   Ht 1.499 m (4' 11\")   Wt 38.6 kg (85 lb 1.6 oz)   LMP 01/01/1995 (Approximate)   SpO2 99%   BMI 17.19 kg/m      Physical Exam:   Orientation:  Alert, oriented, and in no acute distress: Yes  Sedation: No    Motor Examination:  5/5 Strength in lower extremities: Yes    Sensory Level:   Decrease in sensation: No    Catheter Site:   Catheter entry site is clean/dry/intact: Yes    Tender: No      Relevant Medications:  Current Pain Medications:  Medications related to Pain Management (From now, onward)      Start     Dose/Rate Route Frequency Ordered Stop    06/22/24 0000  bisacodyl (DULCOLAX) suppository 10 mg         10 mg Rectal DAILY PRN 06/19/24 1155      06/21/24 0000  magnesium hydroxide (MILK OF MAGNESIA) suspension 30 mL         30 mL Oral DAILY PRN 06/19/24 1155      06/20/24 0800  polyethylene glycol (MIRALAX) Packet 17 g         17 g Oral DAILY 06/19/24 1155      06/19/24 2000  gabapentin (NEURONTIN) capsule 100 mg         100 mg Oral or Feeding Tube 2 TIMES DAILY 06/19/24 1306      06/19/24 1400  methocarbamol (ROBAXIN) tablet 500 mg         500 mg Oral or Feeding Tube 3 TIMES DAILY 06/19/24 1306      06/19/24 1230  aspirin (ASA) chewable tablet 81 mg        Note to Pharmacy: PTA Sig:Take 81 mg by mouth daily      81 mg Oral or Feeding Tube DAILY 06/19/24 1201      06/19/24 1230  dexmedeTOMIDine (PRECEDEX) 4 mcg/mL in sodium chloride 0.9 % 100 mL infusion         0.1-1.2 mcg/kg/hr × 36.6 kg  0.9-11 mL/hr  Intravenous CONTINUOUS 06/19/24 1228      06/19/24 1200  acetaminophen (TYLENOL) tablet 975 mg         975 mg Oral EVERY 8 HOURS 06/19/24 1155 06/22/24 1159    06/19/24 1200  senna-docusate (SENOKOT-S/PERICOLACE) 8.6-50 MG per tablet 1 tablet         1 tablet Oral 2 TIMES DAILY 06/19/24 1155      06/19/24 1155  HYDROmorphone (DILAUDID) injection 0.1 mg        Placed in \"Or\" Linked Group    0.1 mg Intravenous EVERY 2 HOURS PRN 06/19/24 1155      06/19/24 1155  HYDROmorphone " "(DILAUDID) injection 0.2 mg        Placed in \"Or\" Linked Group    0.2 mg Intravenous EVERY 2 HOURS PRN 06/19/24 1155      06/19/24 1155  oxyCODONE IR (ROXICODONE) half-tab 2.5 mg        Placed in \"Or\" Linked Group    2.5 mg Oral EVERY 4 HOURS PRN 06/19/24 1155      06/19/24 1155  oxyCODONE (ROXICODONE) tablet 5 mg        Placed in \"Or\" Linked Group    5 mg Oral EVERY 4 HOURS PRN 06/19/24 1155      06/19/24 1030  ROPivacaine 0.2% in sodium chloride 0.9% PERINEURAL infusion          Perineural Continuous Nerve Block 06/19/24 1005              Primary Service Contacted with new Recommendations? No  - no new recommendations            Acute Inpatient Pain Service [facility]  Hours of pain coverage 24/7   Page via [employer]- Please Page the Pain Team Via [employer]: \"PAIN MANAGEMENT ACUTE INPATIENT/ [facility] [place]            "

## 2024-06-21 ENCOUNTER — APPOINTMENT (OUTPATIENT)
Dept: PHYSICAL THERAPY | Facility: CLINIC | Age: 79
DRG: 266 | End: 2024-06-21
Attending: INTERNAL MEDICINE
Payer: MEDICARE

## 2024-06-21 ENCOUNTER — APPOINTMENT (OUTPATIENT)
Dept: GENERAL RADIOLOGY | Facility: CLINIC | Age: 79
DRG: 266 | End: 2024-06-21
Attending: NURSE PRACTITIONER
Payer: MEDICARE

## 2024-06-21 ENCOUNTER — APPOINTMENT (OUTPATIENT)
Dept: INTERVENTIONAL RADIOLOGY/VASCULAR | Facility: CLINIC | Age: 79
DRG: 266 | End: 2024-06-21
Attending: PHYSICIAN ASSISTANT
Payer: MEDICARE

## 2024-06-21 ENCOUNTER — APPOINTMENT (OUTPATIENT)
Dept: GENERAL RADIOLOGY | Facility: CLINIC | Age: 79
DRG: 266 | End: 2024-06-21
Attending: STUDENT IN AN ORGANIZED HEALTH CARE EDUCATION/TRAINING PROGRAM
Payer: MEDICARE

## 2024-06-21 LAB
ANION GAP SERPL CALCULATED.3IONS-SCNC: 10 MMOL/L (ref 7–15)
ATRIAL RATE - MUSE: 75 BPM
ATRIAL RATE - MUSE: 85 BPM
ATRIAL RATE - MUSE: 92 BPM
BUN SERPL-MCNC: 32.2 MG/DL (ref 8–23)
CALCIUM SERPL-MCNC: 8.9 MG/DL (ref 8.8–10.2)
CHLORIDE SERPL-SCNC: 104 MMOL/L (ref 98–107)
CREAT SERPL-MCNC: 0.78 MG/DL (ref 0.51–0.95)
DEPRECATED HCO3 PLAS-SCNC: 22 MMOL/L (ref 22–29)
DIASTOLIC BLOOD PRESSURE - MUSE: NORMAL MMHG
EGFRCR SERPLBLD CKD-EPI 2021: 77 ML/MIN/1.73M2
ERYTHROCYTE [DISTWIDTH] IN BLOOD BY AUTOMATED COUNT: 13.2 % (ref 10–15)
GLUCOSE SERPL-MCNC: 105 MG/DL (ref 70–99)
HCT VFR BLD AUTO: 30 % (ref 35–47)
HGB BLD-MCNC: 10 G/DL (ref 11.7–15.7)
HOLD SPECIMEN: NORMAL
INTERPRETATION ECG - MUSE: NORMAL
MAGNESIUM SERPL-MCNC: 2 MG/DL (ref 1.7–2.3)
MCH RBC QN AUTO: 33.6 PG (ref 26.5–33)
MCHC RBC AUTO-ENTMCNC: 33.3 G/DL (ref 31.5–36.5)
MCV RBC AUTO: 101 FL (ref 78–100)
P AXIS - MUSE: 79 DEGREES
P AXIS - MUSE: 79 DEGREES
P AXIS - MUSE: 80 DEGREES
PHOSPHATE SERPL-MCNC: 2.4 MG/DL (ref 2.5–4.5)
PLATELET # BLD AUTO: 117 10E3/UL (ref 150–450)
POTASSIUM SERPL-SCNC: 4.4 MMOL/L (ref 3.4–5.3)
PR INTERVAL - MUSE: 130 MS
PR INTERVAL - MUSE: 136 MS
PR INTERVAL - MUSE: 144 MS
QRS DURATION - MUSE: 106 MS
QRS DURATION - MUSE: 110 MS
QRS DURATION - MUSE: 114 MS
QT - MUSE: 388 MS
QT - MUSE: 410 MS
QT - MUSE: 454 MS
QTC - MUSE: 479 MS
QTC - MUSE: 487 MS
QTC - MUSE: 506 MS
R AXIS - MUSE: 33 DEGREES
R AXIS - MUSE: 36 DEGREES
R AXIS - MUSE: 53 DEGREES
RBC # BLD AUTO: 2.98 10E6/UL (ref 3.8–5.2)
SODIUM SERPL-SCNC: 136 MMOL/L (ref 135–145)
SYSTOLIC BLOOD PRESSURE - MUSE: NORMAL MMHG
T AXIS - MUSE: 50 DEGREES
T AXIS - MUSE: 62 DEGREES
T AXIS - MUSE: 67 DEGREES
TROPONIN T SERPL HS-MCNC: 536 NG/L
VENTRICULAR RATE- MUSE: 75 BPM
VENTRICULAR RATE- MUSE: 85 BPM
VENTRICULAR RATE- MUSE: 92 BPM
WBC # BLD AUTO: 9.9 10E3/UL (ref 4–11)

## 2024-06-21 PROCEDURE — 71045 X-RAY EXAM CHEST 1 VIEW: CPT | Mod: 26 | Performed by: RADIOLOGY

## 2024-06-21 PROCEDURE — C1729 CATH, DRAINAGE: HCPCS

## 2024-06-21 PROCEDURE — 250N000013 HC RX MED GY IP 250 OP 250 PS 637: Performed by: NURSE PRACTITIONER

## 2024-06-21 PROCEDURE — 250N000013 HC RX MED GY IP 250 OP 250 PS 637: Performed by: STUDENT IN AN ORGANIZED HEALTH CARE EDUCATION/TRAINING PROGRAM

## 2024-06-21 PROCEDURE — 272N000196 HC ACCESSORY CR5

## 2024-06-21 PROCEDURE — 84100 ASSAY OF PHOSPHORUS: CPT | Performed by: NURSE PRACTITIONER

## 2024-06-21 PROCEDURE — 80048 BASIC METABOLIC PNL TOTAL CA: CPT | Performed by: NURSE PRACTITIONER

## 2024-06-21 PROCEDURE — 120N000003 HC R&B IMCU UMMC

## 2024-06-21 PROCEDURE — 250N000013 HC RX MED GY IP 250 OP 250 PS 637: Performed by: INTERNAL MEDICINE

## 2024-06-21 PROCEDURE — 36415 COLL VENOUS BLD VENIPUNCTURE: CPT | Performed by: NURSE PRACTITIONER

## 2024-06-21 PROCEDURE — 83735 ASSAY OF MAGNESIUM: CPT | Performed by: NURSE PRACTITIONER

## 2024-06-21 PROCEDURE — 97530 THERAPEUTIC ACTIVITIES: CPT | Mod: GP

## 2024-06-21 PROCEDURE — 272N000192 HC ACCESSORY CR2

## 2024-06-21 PROCEDURE — 32557 INSERT CATH PLEURA W/ IMAGE: CPT

## 2024-06-21 PROCEDURE — 97161 PT EVAL LOW COMPLEX 20 MIN: CPT | Mod: GP

## 2024-06-21 PROCEDURE — 93010 ELECTROCARDIOGRAM REPORT: CPT | Mod: 76 | Performed by: INTERNAL MEDICINE

## 2024-06-21 PROCEDURE — 0W9B30Z DRAINAGE OF LEFT PLEURAL CAVITY WITH DRAINAGE DEVICE, PERCUTANEOUS APPROACH: ICD-10-PCS | Performed by: RADIOLOGY

## 2024-06-21 PROCEDURE — 84484 ASSAY OF TROPONIN QUANT: CPT | Performed by: INTERNAL MEDICINE

## 2024-06-21 PROCEDURE — C1769 GUIDE WIRE: HCPCS

## 2024-06-21 PROCEDURE — 71045 X-RAY EXAM CHEST 1 VIEW: CPT | Mod: 77

## 2024-06-21 PROCEDURE — 71045 X-RAY EXAM CHEST 1 VIEW: CPT

## 2024-06-21 PROCEDURE — 250N000011 HC RX IP 250 OP 636: Performed by: STUDENT IN AN ORGANIZED HEALTH CARE EDUCATION/TRAINING PROGRAM

## 2024-06-21 PROCEDURE — 272N000500 HC NEEDLE CR2

## 2024-06-21 PROCEDURE — 999N000065 XR CHEST PORT 1 VIEW

## 2024-06-21 PROCEDURE — 85027 COMPLETE CBC AUTOMATED: CPT | Performed by: NURSE PRACTITIONER

## 2024-06-21 PROCEDURE — 99232 SBSQ HOSP IP/OBS MODERATE 35: CPT | Performed by: NURSE PRACTITIONER

## 2024-06-21 PROCEDURE — 250N000009 HC RX 250: Performed by: RADIOLOGY

## 2024-06-21 PROCEDURE — 93005 ELECTROCARDIOGRAM TRACING: CPT

## 2024-06-21 PROCEDURE — 32557 INSERT CATH PLEURA W/ IMAGE: CPT | Mod: LT | Performed by: RADIOLOGY

## 2024-06-21 RX ORDER — MULTIPLE VITAMINS W/ MINERALS TAB 9MG-400MCG
1 TAB ORAL DAILY
Status: DISCONTINUED | OUTPATIENT
Start: 2024-06-21 | End: 2024-06-28 | Stop reason: HOSPADM

## 2024-06-21 RX ORDER — LIDOCAINE HYDROCHLORIDE 10 MG/ML
1-30 INJECTION, SOLUTION EPIDURAL; INFILTRATION; INTRACAUDAL; PERINEURAL
Status: COMPLETED | OUTPATIENT
Start: 2024-06-21 | End: 2024-06-21

## 2024-06-21 RX ORDER — LIDOCAINE 4 G/G
1 PATCH TOPICAL
Status: DISCONTINUED | OUTPATIENT
Start: 2024-06-21 | End: 2024-06-28 | Stop reason: HOSPADM

## 2024-06-21 RX ADMIN — ACETAMINOPHEN 975 MG: 325 TABLET, FILM COATED ORAL at 20:14

## 2024-06-21 RX ADMIN — ASPIRIN 81 MG CHEWABLE TABLET 81 MG: 81 TABLET CHEWABLE at 08:58

## 2024-06-21 RX ADMIN — DOCUSATE SODIUM 50 MG AND SENNOSIDES 8.6 MG 1 TABLET: 8.6; 5 TABLET, FILM COATED ORAL at 20:14

## 2024-06-21 RX ADMIN — OXYCODONE HYDROCHLORIDE 2.5 MG: 5 TABLET ORAL at 04:36

## 2024-06-21 RX ADMIN — FLUTICASONE PROPIONATE AND SALMETEROL XINAFOATE 1 PUFF: 230; 21 AEROSOL, METERED RESPIRATORY (INHALATION) at 20:27

## 2024-06-21 RX ADMIN — SIMVASTATIN 20 MG: 10 TABLET, FILM COATED ORAL at 20:14

## 2024-06-21 RX ADMIN — METHOCARBAMOL 500 MG: 500 TABLET ORAL at 08:58

## 2024-06-21 RX ADMIN — OXYCODONE HYDROCHLORIDE 5 MG: 5 TABLET ORAL at 09:32

## 2024-06-21 RX ADMIN — DOCUSATE SODIUM 50 MG AND SENNOSIDES 8.6 MG 1 TABLET: 8.6; 5 TABLET, FILM COATED ORAL at 08:58

## 2024-06-21 RX ADMIN — GABAPENTIN 100 MG: 100 CAPSULE ORAL at 08:58

## 2024-06-21 RX ADMIN — Medication 1 TABLET: at 13:20

## 2024-06-21 RX ADMIN — METHOCARBAMOL 500 MG: 500 TABLET ORAL at 20:14

## 2024-06-21 RX ADMIN — OXYCODONE HYDROCHLORIDE 2.5 MG: 5 TABLET ORAL at 16:03

## 2024-06-21 RX ADMIN — POLYETHYLENE GLYCOL 3350 17 G: 17 POWDER, FOR SOLUTION ORAL at 08:58

## 2024-06-21 RX ADMIN — HEPARIN SODIUM 5000 UNITS: 5000 INJECTION, SOLUTION INTRAVENOUS; SUBCUTANEOUS at 00:43

## 2024-06-21 RX ADMIN — ACETAMINOPHEN 975 MG: 325 TABLET, FILM COATED ORAL at 04:36

## 2024-06-21 RX ADMIN — LIDOCAINE HYDROCHLORIDE 5 ML: 10 INJECTION, SOLUTION EPIDURAL; INFILTRATION; INTRACAUDAL; PERINEURAL at 15:15

## 2024-06-21 RX ADMIN — LIDOCAINE 4% 1 PATCH: 40 PATCH TOPICAL at 20:14

## 2024-06-21 RX ADMIN — GABAPENTIN 100 MG: 100 CAPSULE ORAL at 20:14

## 2024-06-21 RX ADMIN — ACETAMINOPHEN 975 MG: 325 TABLET, FILM COATED ORAL at 13:20

## 2024-06-21 RX ADMIN — PANTOPRAZOLE SODIUM 40 MG: 40 TABLET, DELAYED RELEASE ORAL at 08:58

## 2024-06-21 RX ADMIN — FLUTICASONE PROPIONATE AND SALMETEROL XINAFOATE 1 PUFF: 230; 21 AEROSOL, METERED RESPIRATORY (INHALATION) at 08:59

## 2024-06-21 ASSESSMENT — ACTIVITIES OF DAILY LIVING (ADL)
ADLS_ACUITY_SCORE: 34
ADLS_ACUITY_SCORE: 35
ADLS_ACUITY_SCORE: 35
ADLS_ACUITY_SCORE: 34
ADLS_ACUITY_SCORE: 35
ADLS_ACUITY_SCORE: 34
ADLS_ACUITY_SCORE: 35
ADLS_ACUITY_SCORE: 34
ADLS_ACUITY_SCORE: 34
ADLS_ACUITY_SCORE: 35
ADLS_ACUITY_SCORE: 35
ADLS_ACUITY_SCORE: 34
ADLS_ACUITY_SCORE: 50
ADLS_ACUITY_SCORE: 35
ADLS_ACUITY_SCORE: 34
ADLS_ACUITY_SCORE: 35
ADLS_ACUITY_SCORE: 34
ADLS_ACUITY_SCORE: 35
ADLS_ACUITY_SCORE: 34
ADLS_ACUITY_SCORE: 35
ADLS_ACUITY_SCORE: 34
ADLS_ACUITY_SCORE: 35
ADLS_ACUITY_SCORE: 35

## 2024-06-21 NOTE — PROGRESS NOTES
"    BRIEF CVTS PROGRESS NOTE    S:  Loulou Lucero is a 79 year old female with past medical history of tobacco use, COPD, lung nodule, HLD, mild CAD, chronic low back pain, MGUS, anemia and severe aortic stenosis who was admitted for planned transapical approach TAVR.  Patient was extubated on POD #0.   CVTS following for chest tube management.    Hemodynamically stable overnight but EPS catheter inadvertently pulled. Developed crepitus overnight around chest tube site. No air leak or tidaling in atrium noted.  Patient reports dyspnea and pain at chest tube site, burning in nature.     O:  BP (!) 148/75   Pulse 93   Temp 98.1  F (36.7  C) (Oral)   Resp 16   Ht 1.499 m (4' 11\")   Wt 38.4 kg (84 lb 10.5 oz)   LMP 01/01/1995 (Approximate)   SpO2 94%   BMI 17.10 kg/m      I/O last 3 completed shifts:  In: 687 [P.O.:560; I.V.:127]  Out: 1415 [Urine:1185; Chest Tube:230]        CBC RESULTS:   Recent Labs   Lab Test 06/21/24  0535 06/20/24  0412 06/19/24  1204   WBC 9.9 7.7 8.5   HGB 10.0* 10.1* 9.5*   HCT 30.0* 30.2* 28.5*   * 132* 139*     CMP RESULTS:  Recent Labs   Lab Test 06/21/24  0535 06/20/24  2209 06/20/24  0412 06/19/24  2256 06/19/24  2252 06/19/24  1212 06/19/24  1204 06/19/24  0544 06/18/24  1228     --  139  --  140  --  139   < > 142   POTASSIUM 4.4  --  4.5  --  4.4  --  4.3   < > 4.8   CHLORIDE 104  --  107  --  106  --  106  --  104   CO2 22  --  23  --  23  --  23  --  29   ANIONGAP 10  --  9  --  11  --  10  --  9   * 122* 120*   < > 134*   < > 122*   < > 90   BUN 32.2*  --  25.6*  --  25.6*  --  22.6  --  18.6   CR 0.78  --  0.81  --  0.83  --  0.78  --  0.73   GFRESTIMATED 77  --  73  --  71  --  77  --  83   DION 8.9  --  8.7*  --  9.3  --  7.9*  --  9.7   BILITOTAL  --   --   --   --  0.6  --  0.5  --  0.6   ALKPHOS  --   --   --   --  48  --  46  --  60   ALT  --   --   --   --  13  --  14  --  11   AST  --   --   --   --  54*  --  30  --  26    < > = values in this " interval not displayed.     Gen: sitting up in chair, finishing breakfast  Neuro: Intact with no focal deficits, A&O X4  CV: RRR, normal S1 S2, no murmurs, rubs or gallops  Pulm: diffuse crackles throughout, no wheezing or rhonchi, dyspneic on room air. Noted crepitus on left lateral side  Skin:  Left thoracotomy incision: clean, dry, intact, no erythema, + crepitus, sternum stable  Tubes/drain sites: dressing clean and dry, serosanguinous drainage, 242 mL past 24h, to remain for drainage. Tape removed - chest tube is secured with suture, dressing changed    A/P:  S/p transapical TAVR on 6/19/2024 by Dr. Cross. CVTS following for chest tube management.     - Left pleural chest tube with 242 mL past 24h, to remain in place today. Will re-evaluate daily.    - Left apical pneumothorax on CXR today with associated dyspnea. Pt placed on nasal cannula. Given left pleural chest tube is not catching PTX, will consult IR for pigtail placement.   - Daily wound care:  wound cleanser/soap & water, pat dry, keep wound clean and dry using Interdry prn   - Encourage good nutrition, particularly protein intake   - Maintain BG control <180 for optimal wound healing   - Maintain euvolemia   - Remainder of plan per primary teams; please call with questions.    VEENA Sharma, ACNPC-AG, CCRN  Nurse Practitioner  Cardiothoracic Surgery  Pager: [phone]

## 2024-06-21 NOTE — CONSULTS
"      [facility] Consult Service Note  Interventional Radiology  06/21/24   11:14 AM    Consult Requested: left apical PTX with dyspnea    Recommendations/Plan:    Patient is approved for Flouro guided IR right apical chest tube placement.    Timing of procedure is TBD based on IR staffing/schedule and triage.  Please contact the IR charge RN at [phone] for estimated time of procedure.     Labs WNL for procedure.    Orders entered for procedure, NPO status, and no pre procedure IV antibiotics. No NPO required.  Medications to be held include: none   Informed consent will be completed prior to procedure.     Case and imaging discussed with IR attending Dr. Lane Moss MD   Recommendations were reviewed with Lisa Chinchilla PA-C    History of Present Illness:  Loulou Lucero is a 79 year old female with past medical history of tobacco use, COPD, lung nodule, HLD, mild CAD, chronic low back pain, MGUS, anemia and severe aortic stenosis who was admitted 6/19 for planned transapical TAVR and left chest tube in place 6/19/2024 now with symptomatic left apical pneumothorax on CXR.      There was crepitus on exam this AM, team evaluated tube redressed site, no air leak appreciated, however given smoking history c/f ruptured bleb not related to surgery that is now enlarging.  Currently on 2L on 94.      Team is planning on repeat CXR on 12:30P today.    Repeat CXR below    Pertinent Imaging Reviewed:       Expected date of discharge:  06/24/2024    Vitals:   BP (!) 148/75   Pulse 93   Temp 98.1  F (36.7  C) (Oral)   Resp 16   Ht 1.499 m (4' 11\")   Wt 38.4 kg (84 lb 10.5 oz)   LMP 01/01/1995 (Approximate)   SpO2 94%   BMI 17.10 kg/m      Pertinent Labs Reviewed:  CBC:  Lab Results   Component Value Date    WBC 9.9 06/21/2024    WBC 7.7 06/20/2024    WBC 8.5 06/19/2024    WBC 7.0 10/24/2019    WBC 5.1 04/25/2019    WBC 7.0 09/30/2018     Lab Results   Component Value Date    HGB 10.0 06/21/2024    HGB " 10.1 06/20/2024    HGB 9.5 06/19/2024    HGB 9.3 06/19/2024    HGB 12.1 10/24/2019    HGB 12.9 04/25/2019    HGB 11.0 09/30/2018     Lab Results   Component Value Date     06/21/2024     06/20/2024     06/19/2024     10/24/2019     04/25/2019     09/30/2018    INR:  Lab Results   Component Value Date    INR 1.49 (H) 06/20/2024    PTT 33 06/20/2024          COVID Results:  COVID-19 Antibody Results, Testing for Immunity           No data to display              COVID-19 PCR Results          3/21/2024    13:48   COVID-19 PCR Results   SARS CoV2 PCR Negative     Potassium   Date Value Ref Range Status   06/21/2024 4.4 3.4 - 5.3 mmol/L Final   10/18/2022 4.6 3.5 - 5.1 mmol/L Final   10/09/2020 4.0 3.5 - 5.1 mmol/L Final     Potassium POCT   Date Value Ref Range Status   06/19/2024 4.2 3.4 - 5.3 mmol/L Final     Comment:     CRITICAL VALUES NOTED AND REPORTED TO MD Vianney Adam PA-C  Interventional Radiology  Pager: [phone]

## 2024-06-21 NOTE — PROCEDURES
[facility]    Procedure: IR Procedure Note    Date/Time: 6/21/2024 3:31 PM    Performed by: Omid Bradley MD  Authorized by: Lane Moss MD      UNIVERSAL PROTOCOL   Site Marked: NA  Prior Images Obtained and Reviewed:  Yes  Required items: Required blood products, implants, devices and special equipment available    Patient identity confirmed:  Verbally with patient, arm band, provided demographic data and hospital-assigned identification number  Patient was reevaluated immediately before administering moderate or deep sedation or anesthesia  Confirmation Checklist:  Patient's identity using two indicators, relevant allergies, procedure was appropriate and matched the consent or emergent situation and correct equipment/implants were available  Time out: Immediately prior to the procedure a time out was called    Universal Protocol: the Joint Commission Universal Protocol was followed    Preparation: Patient was prepped and draped in usual sterile fashion       ANESTHESIA    Anesthesia:  Local infiltration  Local Anesthetic:  Lidocaine 1% without epinephrine      SEDATION    Patient Sedated: No    Vital signs: Vital signs monitored during sedation    See dictated procedure note for full details.  Findings: L Apical chest tube placement. Patient developed left apical small consolidation/hemorrage during the procedure. Ordered stat post procedure chest xray. Recommend holding anticoagulation for 24 hours.     Specimens: none    Complications: None    Condition: Stable    Plan: L Apical chest tube placement. Patient developed left apical small consolidation/hemorrage during the procedure. Ordered stat post procedure chest xray. Recommend holding anticoagulation for 24 hours.       PROCEDURE  Describe Procedure: L Apical chest tube placement. Patient developed left apical small consolidation/hemorrage during the procedure. Ordered stat post procedure chest xray.  Recommend holding anticoagulation for 24 hours.   Patient Tolerance:  Patient tolerated the procedure well with no immediate complications  Length of time physician/provider present for 1:1 monitoring during sedation: 30

## 2024-06-21 NOTE — PROGRESS NOTES
Pain Service Progress Note  [facility]  Date: 06/21/2024       Patient Name: Loulou Lucero  MRN: [id number]  Age: 79 year old  Sex: female      Assessment:  80 y/o female with PMHx significant for tobacco use, COPD, lung nodule, HLD, mild CAD, chronic low back pain, MGUS, anemia and severe aortic stenosis who is now s/p trans-apical Aortic Valve Replacement via left thoracotomy + left side 28Fr chest tube placement. RAPS team placed a left T4-5 paravertebral catheter preoperatively which was iatrogenically removed at some point intra-operatively or during transport. RAPS team replaced left paravertebral catheter in the ICU on 6/19/24.     Procedure: Trans-apical aortic valve replacement     Date of Surgery: 6/19/24    Date of Catheter Placement: 6/19/24    Plan/Recommendations:  1. Regional Anesthesia/Analgesia  -Continuous Catheter Type/Site: left paravertebral (PV) T4-5  Infusate: ropivicaine 0.2%   Continuous Infusion at 8 mL/hr  Catheter became disconnected overnight - unclear exactly when. Paravertebral removed this AM. Tip intact and site is clean without erythema or edema.     2. Anticoagulation  - Patient can continue on her SubQ heparin as scheduled.      3. Multimodal Analgesia  - Agree with scheduled tylenol   - Agree with robaxin  - Agree 2.5-5mg oxycodone Q4H PRN (pt only received 3 - 2.5mg doses in the last 24H - encouraged patient to ask for medications when in pain.)    Pain Service will sign off.     Discussed with attending anesthesiologist    BAO PAVON MD  06/21/2024     Overnight Events: Paravertebral catheter disconnected from pump overnight.      Tubes/Drains: Yes  Chest tube in place     Subjective: Patient reports pain is doing a little better today. She is having pain in her left shoulder area and left chest area. Reports that pain medication has been helping but it hurts when she coughs. Discussed replacing her pain catheter; however, the patient was not  "interested in replacement at this time and preferred to manage her pain with medications. Encouraged patient to ask for medication when she is in pain.   Nausea: No  Vomiting: No  Pruritus: No  Symptoms of LAST: No    Pain Location:  Left shoulder    Pain Intensity:    Pain at Rest: 3/10   Pain with Activity: increases  Satisfied with your level of pain control: Yes    Diet: Advance Diet as Tolerated: Fully Advanced to diet(s) per Provider order  Room Service    Relevant Labs:  Recent Labs   Lab Test 06/20/24  0412 06/19/24  1204 06/18/24  1228   INR  --   --  1.20*   *   < > 206   BUN 25.6*   < > 18.6    < > = values in this interval not displayed.       Physical Exam:  Vitals: BP (!) 148/75   Pulse 86   Temp 98.1  F (36.7  C) (Oral)   Resp 16   Ht 1.499 m (4' 11\")   Wt 38.4 kg (84 lb 10.5 oz)   LMP 01/01/1995 (Approximate)   SpO2 94%   BMI 17.10 kg/m      Physical Exam:   Orientation:  Alert, oriented, and in no acute distress: Yes  Sedation: No    Motor Examination:  5/5 Strength in lower extremities: Yes    Sensory Level:   Decrease in sensation: No    Catheter Site:   Catheter entry site is clean/dry/intact: Yes    Tender: No      Relevant Medications:  Current Pain Medications:  Medications related to Pain Management (From now, onward)      Start     Dose/Rate Route Frequency Ordered Stop    06/22/24 0000  bisacodyl (DULCOLAX) suppository 10 mg         10 mg Rectal DAILY PRN 06/19/24 1155      06/21/24 0000  magnesium hydroxide (MILK OF MAGNESIA) suspension 30 mL         30 mL Oral DAILY PRN 06/19/24 1155      06/20/24 0800  polyethylene glycol (MIRALAX) Packet 17 g         17 g Oral DAILY 06/19/24 1155      06/19/24 2000  gabapentin (NEURONTIN) capsule 100 mg         100 mg Oral or Feeding Tube 2 TIMES DAILY 06/19/24 1306      06/19/24 1400  methocarbamol (ROBAXIN) tablet 500 mg         500 mg Oral or Feeding Tube 3 TIMES DAILY 06/19/24 1306      06/19/24 1230  aspirin (ASA) chewable tablet 81 " "mg        Note to Pharmacy: PTA Sig:Take 81 mg by mouth daily      81 mg Oral or Feeding Tube DAILY 06/19/24 1201      06/19/24 1230  dexmedeTOMIDine (PRECEDEX) 4 mcg/mL in sodium chloride 0.9 % 100 mL infusion         0.1-1.2 mcg/kg/hr × 36.6 kg  0.9-11 mL/hr  Intravenous CONTINUOUS 06/19/24 1228      06/19/24 1200  acetaminophen (TYLENOL) tablet 975 mg         975 mg Oral EVERY 8 HOURS 06/19/24 1155 06/22/24 1159    06/19/24 1200  senna-docusate (SENOKOT-S/PERICOLACE) 8.6-50 MG per tablet 1 tablet         1 tablet Oral 2 TIMES DAILY 06/19/24 1155      06/19/24 1155  HYDROmorphone (DILAUDID) injection 0.1 mg        Placed in \"Or\" Linked Group    0.1 mg Intravenous EVERY 2 HOURS PRN 06/19/24 1155      06/19/24 1155  HYDROmorphone (DILAUDID) injection 0.2 mg        Placed in \"Or\" Linked Group    0.2 mg Intravenous EVERY 2 HOURS PRN 06/19/24 1155      06/19/24 1155  oxyCODONE IR (ROXICODONE) half-tab 2.5 mg        Placed in \"Or\" Linked Group    2.5 mg Oral EVERY 4 HOURS PRN 06/19/24 1155      06/19/24 1155  oxyCODONE (ROXICODONE) tablet 5 mg        Placed in \"Or\" Linked Group    5 mg Oral EVERY 4 HOURS PRN 06/19/24 1155      06/19/24 1030  ROPivacaine 0.2% in sodium chloride 0.9% PERINEURAL infusion          Perineural Continuous Nerve Block 06/19/24 1005              Primary Service Contacted with new Recommendations? No  - no new recommendations            Acute Inpatient Pain Service [facility]  Hours of pain coverage 24/7   Page via [employer]- Please Page the Pain Team Via [facility] [place]: \"PAIN MANAGEMENT ACUTE INPATIENT/ [facility] [place]\"            "

## 2024-06-21 NOTE — PLAN OF CARE
Hours of Care:2511-1570  Neuro: A&Ox4. Lethargic.   Cardiac: SR. HR 80's-90's. VSS. Denies dizziness.   Respiratory: Sating >90% on 1L NC. Weaned to RA @ 0051, tolerated well.  GI/: Adequate urine output via gutierrez. Removed 0400. No BM on this shift. Bowel sounds active. Per pt they are passing gas. Gutierrez catheter leaking. Removed 4am per orders. Pt due to void.  Diet/appetite: Tolerating clear liquid diet. To be advanced as tolerated. Unable to advance diet, pt wanted to sleep.  Activity:  Assist of 1-2, not OOB.  Pain: At acceptable level on current regimen. PRN Oxy given x2 effective per pt, but drowsy afterwards. Ropivacaine catheter broke at 4am. Pain management provider paged, came to bedside and assessed site. Per provider, morning team to fix catheter.   Skin: No new deficits noted. 2 RN skin checks completed.  LDA's: 2 R PIV -SL.    Plan: Continue with POC. Notify primary team with changes.

## 2024-06-21 NOTE — IR NOTE
Patient Name: Loulou Lucero  Medical Record Number: [id number]  Today's Date: 6/21/2024    Procedure: Image guided apical chest tube placement  Proceduralist: Lane Moss MD, Omid Bradley MD    Procedure Start: 1515  Procedure end: 1528  Sedation medications administered: none. Local lidocaine.      Report given to: Pilar FULLER   : jennifer    Other Notes: Pt arrived to IR room 4 from . Consent reviewed. Pt denies any questions or concerns regarding procedure. Pt positioned supine and monitored per protocol. Pt tolerated procedure without any noted complications. Pt transferred back to .

## 2024-06-21 NOTE — PROGRESS NOTES
"  Interventional Cardiology Progress Note    Assessment & Plan:  Loulou Lucero is a 79 year old female with past medical history of tobacco use, COPD, lung nodule, HLD, mild CAD, chronic low back pain, MGUS, anemia and severe aortic stenosis who was admitted 6/19 for planned transapical TAVR.     Today's Changes  - xray to assess chest tube placement/air leak  - CVTS to see pt     # Severe aortic stenosis  # s/p TAVR with transapical approach  # Chronic heart failure with preserved ejection fraction secondary to valvular disease  # HLD  # Mild to moderate CAD  # Acute post op pain  # Post procedural hypertension  # Elevated troponin  Prior to procedure, pt noted to have a mean gradient 30 mmHG, PETER 0.7 cm^2, PV 3.6 m/s. Now s/p TAVR with 20mm Diaz valve via transapical approach. Procedure was uncomplicated.      - Chest tube in place, crepitus felt around site, no air leak seen, xray this am, CVTS aware  - 242 mL output from chest tube yesterday  - Pain team now signed off; EPS inadvertently pulled overnight, now trying prn PO medications   - POD 1 Echo with post op MG 11 mmHg, no PVL  - BP remains elevated this am, 148/75, likely driven by pain  - ON concern for Drew on EKG, troponin checked and elevated at 536. EKG with RBBB rather than Drew, troponin elevated in setting of recent TAVR, pt with no chest pain  - Aspirin 81 mg for anti-platelet therapy.   - continue Simvastatin  - Cardiac rehab/PT/OT.  - Daily weights.   - Strict intake/output.      # COPD  # Tobacco Use  - Encourage cessation  - Continue PTA fluticasone-salmeterol     # Chronic iron deficiency anemia  # MGUS  - Baseline Hgb 9 range, since starting iron, increased to 13 range  - Continue PTA iron supplementation  - CBC daily    # PAD  # Near syncope  Pt reported to RN and ICU CELI that she feels \"close to passing out\" when turns neck certain directions.   - Carotid US completed; less than 50% narrowing in right and left    # Severe Malnutrition   2/2 " "chronic illness  - RD following  - Regular diet with supplements in between meals    Diet: Regular  Activity: Up as tolerated  Code Status: Full  Disposition: possible discharge home pending chest tube removal, pain control    Medically Ready for Discharge: Anticipated in 2-4 Days       Patient discussed w/ Dr. Mays.      VEENA Zuleta, CNP  [facility] Structural/Interventional Cardiology   Pager [phone]      Interval History:  - EPS inadvertently pulled overnight; pain team saw patient this am, no plans to replace, pt reporting burning pain around chest tube this am  - EKG completed overnight, c/f Drew, pt with known RBBB, not Drew  - Crepitus felt around tube, no air seen in chest tube atrium  - Pt denies any SOB, chest pain      Most recent vital signs:  BP (!) 148/75   Pulse 93   Temp 98.1  F (36.7  C) (Oral)   Resp 16   Ht 1.499 m (4' 11\")   Wt 38.4 kg (84 lb 10.5 oz)   LMP 01/01/1995 (Approximate)   SpO2 94%   BMI 17.10 kg/m    Temp:  [98.1  F (36.7  C)-99.5  F (37.5  C)] 98.1  F (36.7  C)  Pulse:  [] 93  Resp:  [16-22] 16  BP: (117-148)/(58-75) 148/75  SpO2:  [93 %-100 %] 94 %  Wt Readings from Last 2 Encounters:   06/21/24 38.4 kg (84 lb 10.5 oz)   06/03/24 36.4 kg (80 lb 3.2 oz)       Intake/Output Summary (Last 24 hours) at 6/21/2024 1036  Last data filed at 6/21/2024 0900  Gross per 24 hour   Intake 560 ml   Output 1660 ml   Net -1100 ml       Physical exam:  General: In bed, in NAD  HEENT: EOMI, PERRLA, no scleral icterus or injection  CARDIAC: RRR, no m/r/g appreciated. Peripheral pulses 2+  RESP: CTAB, no wheezes, rhonchi or crackles appreciated.  GI: NABS, NT/ND, no guarding or rebound  EXTREMITIES: NO LE edema, pulses 2+.   SKIN: No acute lesions appreciated  NEURO: Alert and oriented X3    Drains and Tubes: Chest tube in place; crepitus felt around tube    Labs (Past three days):  CBC  Recent Labs   Lab 06/21/24  0535 06/20/24  0412 06/19/24  1204 06/19/24  1101 06/18/24  1228   WBC 9.9 " 7.7 8.5  --  6.0   RBC 2.98* 3.00* 2.88*  --  3.85   HGB 10.0* 10.1* 9.5* 9.3* 12.7   HCT 30.0* 30.2* 28.5*  --  38.4   * 101* 99  --  100   MCH 33.6* 33.7* 33.0  --  33.0   MCHC 33.3 33.4 33.3  --  33.1   RDW 13.2 13.2 13.0  --  12.9   * 132* 139*  --  206     BMP  Recent Labs   Lab 06/21/24  0535 06/20/24  2209 06/20/24  0412 06/19/24  2256 06/19/24  2252 06/19/24  1212 06/19/24  1204     --  139  --  140  --  139   POTASSIUM 4.4  --  4.5  --  4.4  --  4.3   CHLORIDE 104  --  107  --  106  --  106   CO2 22  --  23  --  23  --  23   ANIONGAP 10  --  9  --  11  --  10   * 122* 120* 124* 134*   < > 122*   BUN 32.2*  --  25.6*  --  25.6*  --  22.6   CR 0.78  --  0.81  --  0.83  --  0.78   GFRESTIMATED 77  --  73  --  71  --  77   DION 8.9  --  8.7*  --  9.3  --  7.9*   MAG 2.0  --  1.9  --  1.8  --  2.0   PHOS 2.4*  --  5.5*  --  5.7*  --  3.9    < > = values in this interval not displayed.     Troponins:   Lab Results   Component Value Date    CTROPT 536 (HH) 06/21/2024    CTROPT 20 (H) 01/17/2024    CTROPT 22 (H) 01/17/2024        INR  Recent Labs   Lab 06/20/24  1151 06/18/24  1228   INR 1.49* 1.20*     Liver panel  Recent Labs   Lab 06/19/24  2252 06/19/24  1204 06/18/24  1228   PROTTOTAL 6.2* 5.7* 7.8   ALBUMIN 3.5 3.3* 4.3   BILITOTAL 0.6 0.5 0.6   ALKPHOS 48 46 60   AST 54* 30 26   ALT 13 14 11       Troponin T High Sensitivity   Lab Results   Component Value Date    TROPI <0.030 04/25/2019    TROPI <0.030 09/30/2018       Imaging/procedure results:  EKG 12 Lead 6/21/2024: NSR, RBBB (baseline)       ECHO 6/20/2024:  Interpretation Summary  s/p TAVR with 20 mm Diaz Benedict 3 on 6/19/2024. The valve is well-seated  and without paravalvular regurgitation. Doppler interrogation of the  prosthetic valve reveals Vmax 2.2 m/s, mean pressure gradient 11 mmHg.     Left ventricular function is normal.The ejection fraction is 60-65%.  Global right ventricular function is normal.  Severe mitral  annular calcification.  IVC diameter and respiratory changes fall into an intermediate range  suggesting an RA pressure of 8 mmHg.  No pericardial effusion.     This study was compared with the study from 6/19/2024. No significant changes  noted compared to post-TAVR images.    Medical Decision Making   60 MINUTES SPENT BY ME on the date of service doing chart review, history, exam, documentation & further activities per the note.

## 2024-06-21 NOTE — PROGRESS NOTES
"   06/21/24 0929   Appointment Info   Signing Clinician's Name / Credentials (PT) Judit Levin, PT, DPT   Rehab Comments (PT) L thoracotomy, SBP , CT ok to WS   Living Environment   People in Home alone   Current Living Arrangements house   Home Accessibility stairs to enter home;stairs within home   Number of Stairs, Main Entrance 3   Stair Railings, Main Entrance railing on right side (ascending)   Number of Stairs, Within Home, Primary one   Transportation Anticipated car, drives self   Living Environment Comments Pt has a walk in shower with grab bars and seat.   Self-Care   Usual Activity Tolerance good   Current Activity Tolerance fair   Equipment Currently Used at Home none   Fall history within last six months no   Activity/Exercise/Self-Care Comment Pt IND with mobility and ADLs at baseline. Pt reports her son is planning to move in with her when she is discharged to assist as needed.   General Information   Onset of Illness/Injury or Date of Surgery 06/19/24   Referring Physician Chaz Andrade MD   Patient/Family Therapy Goals Statement (PT) return home   Pertinent History of Current Problem (include personal factors and/or comorbidities that impact the POC) Per EMR: \"79 year old female with past medical history of tobacco use, COPD, lung nodule, HLD, mild CAD, chronic low back pain, MGUS, anemia and severe aortic stenosis who was admitted for planned transapical approach TAVR\"   Existing Precautions/Restrictions   (L thoracotomy)   General Observations Activity: Ambulate with assist   Cognition   Cognitive Status Comments Followed commands appropriatey, oriented to self, location and day of week, pt with soft voice volume   Integumentary/Edema   Integumentary/Edema no deficits were identifed   Posture    Posture Protracted shoulders   Range of Motion (ROM)   Range of Motion ROM is WFL   Strength (Manual Muscle Testing)   Strength (Manual Muscle Testing) strength is WFL   Strength Comments Low " muscle bulk   Bed Mobility   Bed Mobility supine-sit   Supine-Sit [place] (Bed Mobility) minimum assist (75% patient effort)   Comment, (Bed Mobility) Slightly slow speed, assist trequired at trunk to reacj upright sitting, limited by pain   Transfers   Transfers sit-stand transfer   Sit-Stand Transfer   Sit-Stand [place] (Transfers) contact guard   Comment, (Sit-Stand Transfer) Good initiation   Gait/Stairs (Locomotion)   [place] Level (Gait) contact guard   Assistive Device (Gait) walker, front-wheeled   Comment, (Gait/Stairs) Lateral stepping completed to bedside chair, slow speed, good walker management   Balance   Balance Comments Unsupported sitting: SBA   Sensory Examination   Sensory Perception patient reports no sensory changes   Clinical Impression   Criteria for Skilled Therapeutic Intervention Yes, treatment indicated   PT Diagnosis (PT) impaired mobility   Influenced by the following impairments activity tolerance, pain, drowsiness   Functional limitations due to impairments transfers, gait   Clinical Presentation (PT Evaluation Complexity) stable   Clinical Presentation Rationale clinical judgement   Clinical Decision Making (Complexity) low complexity   Planned Therapy Interventions (PT) balance training;bed mobility training;gait training;home exercise program;stair training;strengthening;transfer training;progressive activity/exercise   Risk & Benefits of therapy have been explained evaluation/treatment results reviewed;care plan/treatment goals reviewed;risks/benefits reviewed;current/potential barriers reviewed;participants voiced agreement with care plan;participants included;patient   PT Total Evaluation Time   PT Eval, Low Complexity Minutes (98448) 10   Physical Therapy Goals   PT Frequency Daily   PT Predicted Duration/Target Date for Goal Attainment 07/26/24   PT Goals Bed Mobility;Transfers;Gait;Stairs   PT: Bed Mobility Supervision/stand-by assist;Within precautions   PT:  Transfers Supervision/stand-by assist;Within precautions   PT: Gait Supervision/stand-by assist;100 feet   PT: Stairs Supervision/stand-by assist;3 stairs   Interventions   Interventions Quick Adds Therapeutic Activity   Therapeutic Activity   Therapeutic Activities: dynamic activities to improve functional performance Minutes (15453) 24   Treatment Detail/Skilled Intervention Pt supine upon arrival, agreeable to therapy session, RN ok'd mobility. Pt maintained unsupported sitting on EOB for ~5 minutes, good tolerance, no balance concerns. BP taken with position changes. Systolic in 160's following transfer to sitting from supine. Following pivot, increased to 190's, retaken on other arm and read to be in 150's. No further gait or transfers completed due to BP being outside of parameters. Pt quite fatigued following transfer but agreeable to sit in chair for at least 30 minutes. Mobility goals established including time and frequency in chair. Discussion held on discharge recs and reasoning. Pt upright in chair at end of session, call light in reach, denied further needs.   PT Discharge Planning   PT Plan Initiate gait out to hallway, trial stairs as able   PT Discharge Recommendation (DC Rec) home with assist;home with home care physical therapy;Transitional Care Facility   PT Rationale for DC Rec Pt mobilizing with SBA, max tolerance of pivot due to fatigue and hypertension. Pt normally lives alone but will have 24 hour assist from her son upon discharge. Recommend TCU to progress activity tolerance but anticipate progression and may be able to discharge home with assist and  PT.   PT Brief overview of current status Ax1 pivot with FWW   Total Session Time   Timed Code Treatment Minutes 24   Total Session Time (sum of timed and untimed services) 34

## 2024-06-21 NOTE — PHARMACY-ADMISSION MEDICATION HISTORY
Pharmacy Intern Admission Medication History    Admission medication history is complete. The information provided in this note is only as accurate as the sources available at the time of the update.    Information Source(s): Patient and CareEverywhere/SureScripts via in-person    Pertinent Information:   - Patient was knowledgeable of medications that she has been taking at home.  - Patient reported that she has been taking 4 tabs of aspirin 81 mg daily at home.  - Aspirin, Vit B-12 OTC  - [facility]  - Found iron supplement on her dispense history, and patient reported that she stopped taking iron, because her iron levels went up.    Changes made to PTA medication list:  Added: None  Deleted: None  Changed:   aspirin 81 MG EC tablet - Take 81 mg by mouth daily --> Take 324 mg by mouth daily    Allergies reviewed with patient and updates made in EHR: yes    Medication History Completed By: Hayeong Yun 6/21/2024 8:24 AM    Prior to Admission medications    Medication Sig Last Dose Taking? Auth Provider Long Term End Date   aspirin 81 MG EC tablet Take 324 mg by mouth daily 6/19/2024 at AM Yes Reported, Patient     cyanocobalamin (VITAMIN B-12) 1000 MCG tablet Take 1 tablet (1,000 mcg) by mouth daily 6/17/2024 at AM Yes Obi Johnston MD     fluticasone-salmeterol (ADVAIR DISKUS) 250-50 MCG/ACT inhaler Inhale 1 puff into the lungs 2 times daily WIXELA-Disp as covered by Pt's insurance 6/19/2024 at AM Yes Obi Johnston MD Yes    simvastatin (ZOCOR) 20 MG tablet Take 1 tablet (20 mg) by mouth at bedtime 6/18/2024 at PM Yes Obi Johnston MD Yes

## 2024-06-21 NOTE — PROGRESS NOTES
.Major Shift Events:  Increased pain +crepitus at chest tube site. New left chest tube placed in IR today. Increased pain at CT sites. Oxy and robaxin effective. Hypertensive with increased pain. Chest CT  Plan: Pain mgmt, monitor chest tube/site  For vital signs and complete assessments, please see documentation flowsheets

## 2024-06-21 NOTE — PROGRESS NOTES
"CLINICAL NUTRITION SERVICES - ASSESSMENT NOTE     Nutrition Prescription    RECOMMENDATIONS FOR MDs/PROVIDERS TO ORDER:  Encourage PO efforts with protein sources    Malnutrition Status:    Severe malnutrition in the context of chronic illness/disease    Recommendations already ordered by Registered Dietitian (RD):  Encourage small, frequent PO attempts with protein sources  Ensure Clear or gelatein plus(advance to ensure enlive as diet progresses)  Thera-vit-M daily  If having fatigue with eating,  rest 30 minutes before/after meals  easy to chew foods  sit upright during meals  larger meals earlier in day    Future/Additional Recommendations:  Monitor nutrition related findings and follow up per protocol     REASON FOR ASSESSMENT  Loulou Lucero is a/an 79 year old female assessed by the dietitian for Positive Admission Nutrition Risk Screen     Patient admitted for  planned transapical approach TAVR.     MEDICAL HISTORY  tobacco use, COPD, lung nodule, HLD, mild CAD, chronic low back pain, MGUS, anemia and severe aortic stenosis     NUTRITION HISTORY  Met with pt at bedside. Pt had difficult time maintaining wakefulness/alert. Pt reports appetite is better today. Denies N/V/D/C. Pt with tray of clear liquids at this time. Bites taken of tray. Pt nods head that she has had protein shakes before. Pt fell asleep after this. Will trial ONS and follow up to gather more subjective information as able.    CURRENT NUTRITION ORDERS  Diet: Clear Liquid    Intake/Tolerance: Intake of 25 % of 1 meal(s) so far.    LABS  BUN 32.2  Phos 2.4  Glu 105  Hgb 10  Hematocrit 30    MEDICATIONS  Protonix  Miralax  Senna-docusate  simvastatin    ANTHROPOMETRICS  Height: 149.9 cm (4' 11\")  Most Recent Weight: 38.4 kg (84 lb 10.5 oz)    IBW: 44.5kg  Body mass index is 17.1 kg/m . BMI Category: Underweight BMI <18.5  Weight History: Wt fluctuates but overall stable   Wt Readings from Last 20 Encounters:   06/21/24 38.4 kg (84 lb 10.5 oz) "   06/03/24 36.4 kg (80 lb 3.2 oz)   05/30/24 35.5 kg (78 lb 3.2 oz)   05/06/24 37 kg (81 lb 8 oz)   04/05/24 35.5 kg (78 lb 4.8 oz)   03/21/24 36 kg (79 lb 6.4 oz)   03/04/24 35.5 kg (78 lb 3.2 oz)   01/31/24 35.5 kg (78 lb 3.7 oz)   01/23/24 35.4 kg (78 lb)   01/17/24 36.3 kg (80 lb)   01/15/24 36.3 kg (80 lb)   01/09/24 36.3 kg (80 lb)   11/28/23 35.9 kg (79 lb 3.2 oz)   10/10/23 35.4 kg (78 lb)   07/17/23 35.7 kg (78 lb 9.6 oz)   06/29/23 36.2 kg (79 lb 12.8 oz)   03/28/23 36.7 kg (81 lb)   12/21/22 35.4 kg (78 lb)   11/22/22 35.8 kg (79 lb)   10/18/22 36.3 kg (80 lb)         ASSESSED NUTRITION NEEDS  Dosing Weight: 38.4 kg (Admission weight)   Estimated Energy Needs: 3112-6272 kcals/day (30 - 35 kcals/kg )  Justification: Increased needs and Underweight  Estimated Protein Needs: 46-58 grams protein/day (1.2 - 1.5 grams of pro/kg)  Justification: Increased needs  Estimated Fluid Needs:  (1 mL/kcal)   Justification: Maintenance    PHYSICAL FINDINGS  See malnutrition section below.    Frederick Score: 16  Per EMR: Skin  Skin WDL: .WDL except  Skin WDL: .WDL except  Skin Integrity: bruised (ecchymotic)  Other (see comments): transapical incision, CT sites  Device Skin Interventions Taken: No adjustments needed  Focused assessment (identify areas inspected) : Grounding, Operative, Positioning    MALNUTRITION  % Intake: Unable to assess  % Weight Loss: Weight loss does not meet criteria for malnutrition   Subcutaneous Fat Loss: Upper arm:  severe   Muscle Loss: Temporal:  severe and Upper arm (bicep, tricep):  severe  Fluid Accumulation/Edema: None noted  Malnutrition Diagnosis: Severe malnutrition in the context of chronic illness/disease    NUTRITION DIAGNOSIS  Underweight related to suspected inadequate oral intake as evidenced by BMI of 17.10kg/m2.      INTERVENTIONS  Implementation  Nutrition Education: will be provided if nutrition education needs arise.  and Nutrition Education: Introduced role of RD   Medical  food supplement therapy  Multivitamin/mineral supplement therapy     Goals  Patient to consume % of nutritionally adequate meal trays TID, or the equivalent with supplements/snacks.        Monitoring/Evaluation  Progress toward goals will be monitored and evaluated per protocol.  Margarita Delong MS, RD, LD, [facility]    6C (beds 6102-3806) + 7C (beds 6918-3195) + ED   Available in [facility] by name or unit dietitian

## 2024-06-22 ENCOUNTER — APPOINTMENT (OUTPATIENT)
Dept: CT IMAGING | Facility: CLINIC | Age: 79
DRG: 266 | End: 2024-06-22
Payer: MEDICARE

## 2024-06-22 LAB
ANION GAP SERPL CALCULATED.3IONS-SCNC: 6 MMOL/L (ref 7–15)
BUN SERPL-MCNC: 20.9 MG/DL (ref 8–23)
CALCIUM SERPL-MCNC: 8.7 MG/DL (ref 8.8–10.2)
CHLORIDE SERPL-SCNC: 107 MMOL/L (ref 98–107)
CREAT SERPL-MCNC: 0.6 MG/DL (ref 0.51–0.95)
DEPRECATED HCO3 PLAS-SCNC: 26 MMOL/L (ref 22–29)
EGFRCR SERPLBLD CKD-EPI 2021: >90 ML/MIN/1.73M2
ERYTHROCYTE [DISTWIDTH] IN BLOOD BY AUTOMATED COUNT: 13 % (ref 10–15)
GLUCOSE SERPL-MCNC: 109 MG/DL (ref 70–99)
HCT VFR BLD AUTO: 28.5 % (ref 35–47)
HGB BLD-MCNC: 9.3 G/DL (ref 11.7–15.7)
MAGNESIUM SERPL-MCNC: 1.8 MG/DL (ref 1.7–2.3)
MCH RBC QN AUTO: 33.1 PG (ref 26.5–33)
MCHC RBC AUTO-ENTMCNC: 32.6 G/DL (ref 31.5–36.5)
MCV RBC AUTO: 101 FL (ref 78–100)
PHOSPHATE SERPL-MCNC: 1.6 MG/DL (ref 2.5–4.5)
PHOSPHATE SERPL-MCNC: 3.4 MG/DL (ref 2.5–4.5)
PLATELET # BLD AUTO: 106 10E3/UL (ref 150–450)
POTASSIUM SERPL-SCNC: 4.1 MMOL/L (ref 3.4–5.3)
RBC # BLD AUTO: 2.81 10E6/UL (ref 3.8–5.2)
SODIUM SERPL-SCNC: 139 MMOL/L (ref 135–145)
WBC # BLD AUTO: 7.2 10E3/UL (ref 4–11)

## 2024-06-22 PROCEDURE — 93005 ELECTROCARDIOGRAM TRACING: CPT

## 2024-06-22 PROCEDURE — 84100 ASSAY OF PHOSPHORUS: CPT | Performed by: NURSE PRACTITIONER

## 2024-06-22 PROCEDURE — 250N000009 HC RX 250: Performed by: SURGERY

## 2024-06-22 PROCEDURE — 93010 ELECTROCARDIOGRAM REPORT: CPT | Mod: 76 | Performed by: INTERNAL MEDICINE

## 2024-06-22 PROCEDURE — 80048 BASIC METABOLIC PNL TOTAL CA: CPT | Performed by: NURSE PRACTITIONER

## 2024-06-22 PROCEDURE — 36415 COLL VENOUS BLD VENIPUNCTURE: CPT | Performed by: SURGERY

## 2024-06-22 PROCEDURE — 250N000009 HC RX 250: Performed by: INTERNAL MEDICINE

## 2024-06-22 PROCEDURE — 84100 ASSAY OF PHOSPHORUS: CPT | Performed by: SURGERY

## 2024-06-22 PROCEDURE — 250N000011 HC RX IP 250 OP 636: Mod: JZ

## 2024-06-22 PROCEDURE — 250N000013 HC RX MED GY IP 250 OP 250 PS 637

## 2024-06-22 PROCEDURE — 250N000013 HC RX MED GY IP 250 OP 250 PS 637: Performed by: STUDENT IN AN ORGANIZED HEALTH CARE EDUCATION/TRAINING PROGRAM

## 2024-06-22 PROCEDURE — G1010 CDSM STANSON: HCPCS | Performed by: STUDENT IN AN ORGANIZED HEALTH CARE EDUCATION/TRAINING PROGRAM

## 2024-06-22 PROCEDURE — 71250 CT THORAX DX C-: CPT | Mod: 26 | Performed by: STUDENT IN AN ORGANIZED HEALTH CARE EDUCATION/TRAINING PROGRAM

## 2024-06-22 PROCEDURE — 83735 ASSAY OF MAGNESIUM: CPT | Performed by: NURSE PRACTITIONER

## 2024-06-22 PROCEDURE — 36415 COLL VENOUS BLD VENIPUNCTURE: CPT | Performed by: NURSE PRACTITIONER

## 2024-06-22 PROCEDURE — 71250 CT THORAX DX C-: CPT | Mod: MG

## 2024-06-22 PROCEDURE — 258N000003 HC RX IP 258 OP 636: Mod: JZ

## 2024-06-22 PROCEDURE — 99232 SBSQ HOSP IP/OBS MODERATE 35: CPT

## 2024-06-22 PROCEDURE — 999N000248 HC STATISTIC IV INSERT WITH US BY RN

## 2024-06-22 PROCEDURE — 258N000003 HC RX IP 258 OP 636: Mod: JZ | Performed by: SURGERY

## 2024-06-22 PROCEDURE — 120N000003 HC R&B IMCU UMMC

## 2024-06-22 PROCEDURE — 250N000011 HC RX IP 250 OP 636: Performed by: STUDENT IN AN ORGANIZED HEALTH CARE EDUCATION/TRAINING PROGRAM

## 2024-06-22 PROCEDURE — 250N000013 HC RX MED GY IP 250 OP 250 PS 637: Performed by: INTERNAL MEDICINE

## 2024-06-22 PROCEDURE — 85027 COMPLETE CBC AUTOMATED: CPT | Performed by: NURSE PRACTITIONER

## 2024-06-22 RX ORDER — METOPROLOL TARTRATE 25 MG/1
25 TABLET, FILM COATED ORAL EVERY 6 HOURS
Status: DISCONTINUED | OUTPATIENT
Start: 2024-06-22 | End: 2024-06-23

## 2024-06-22 RX ORDER — METOPROLOL TARTRATE 1 MG/ML
INJECTION, SOLUTION INTRAVENOUS
Status: COMPLETED
Start: 2024-06-22 | End: 2024-06-22

## 2024-06-22 RX ORDER — MAGNESIUM SULFATE HEPTAHYDRATE 40 MG/ML
2 INJECTION, SOLUTION INTRAVENOUS ONCE
Status: COMPLETED | OUTPATIENT
Start: 2024-06-22 | End: 2024-06-22

## 2024-06-22 RX ORDER — AMIODARONE HYDROCHLORIDE 200 MG/1
200 TABLET ORAL DAILY
Status: DISCONTINUED | OUTPATIENT
Start: 2024-06-24 | End: 2024-06-23

## 2024-06-22 RX ADMIN — OXYCODONE HYDROCHLORIDE 2.5 MG: 5 TABLET ORAL at 08:12

## 2024-06-22 RX ADMIN — METOPROLOL TARTRATE 5 MG: 5 INJECTION INTRAVENOUS at 09:01

## 2024-06-22 RX ADMIN — GABAPENTIN 100 MG: 100 CAPSULE ORAL at 19:51

## 2024-06-22 RX ADMIN — MAGNESIUM SULFATE HEPTAHYDRATE 2 G: 2 INJECTION, SOLUTION INTRAVENOUS at 09:17

## 2024-06-22 RX ADMIN — Medication 1 TABLET: at 08:12

## 2024-06-22 RX ADMIN — METOPROLOL TARTRATE 25 MG: 25 TABLET, FILM COATED ORAL at 08:50

## 2024-06-22 RX ADMIN — OXYCODONE HYDROCHLORIDE 2.5 MG: 5 TABLET ORAL at 14:28

## 2024-06-22 RX ADMIN — ONDANSETRON 4 MG: 4 TABLET, ORALLY DISINTEGRATING ORAL at 22:12

## 2024-06-22 RX ADMIN — AMIODARONE HYDROCHLORIDE 150 MG: 1.5 INJECTION, SOLUTION INTRAVENOUS at 10:19

## 2024-06-22 RX ADMIN — FLUTICASONE PROPIONATE AND SALMETEROL XINAFOATE 1 PUFF: 230; 21 AEROSOL, METERED RESPIRATORY (INHALATION) at 08:14

## 2024-06-22 RX ADMIN — METHOCARBAMOL 500 MG: 500 TABLET ORAL at 14:28

## 2024-06-22 RX ADMIN — METHOCARBAMOL 500 MG: 500 TABLET ORAL at 08:11

## 2024-06-22 RX ADMIN — METOPROLOL TARTRATE 25 MG: 25 TABLET, FILM COATED ORAL at 19:51

## 2024-06-22 RX ADMIN — METHOCARBAMOL 500 MG: 500 TABLET ORAL at 19:51

## 2024-06-22 RX ADMIN — ASPIRIN 81 MG CHEWABLE TABLET 81 MG: 81 TABLET CHEWABLE at 08:10

## 2024-06-22 RX ADMIN — GABAPENTIN 100 MG: 100 CAPSULE ORAL at 08:10

## 2024-06-22 RX ADMIN — METOPROLOL TARTRATE 25 MG: 25 TABLET, FILM COATED ORAL at 14:28

## 2024-06-22 RX ADMIN — ACETAMINOPHEN 975 MG: 325 TABLET, FILM COATED ORAL at 03:08

## 2024-06-22 RX ADMIN — PANTOPRAZOLE SODIUM 40 MG: 40 TABLET, DELAYED RELEASE ORAL at 08:12

## 2024-06-22 RX ADMIN — FLUTICASONE PROPIONATE AND SALMETEROL XINAFOATE 1 PUFF: 230; 21 AEROSOL, METERED RESPIRATORY (INHALATION) at 19:51

## 2024-06-22 RX ADMIN — SODIUM PHOSPHATE, MONOBASIC, MONOHYDRATE AND SODIUM PHOSPHATE, DIBASIC, ANHYDROUS 15 MMOL: 142; 276 INJECTION, SOLUTION INTRAVENOUS at 11:44

## 2024-06-22 RX ADMIN — AMIODARONE HYDROCHLORIDE 1 MG/MIN: 50 INJECTION, SOLUTION INTRAVENOUS at 11:33

## 2024-06-22 RX ADMIN — DOCUSATE SODIUM 50 MG AND SENNOSIDES 8.6 MG 1 TABLET: 8.6; 5 TABLET, FILM COATED ORAL at 08:11

## 2024-06-22 RX ADMIN — DOCUSATE SODIUM 50 MG AND SENNOSIDES 8.6 MG 1 TABLET: 8.6; 5 TABLET, FILM COATED ORAL at 19:51

## 2024-06-22 RX ADMIN — POLYETHYLENE GLYCOL 3350 17 G: 17 POWDER, FOR SOLUTION ORAL at 08:12

## 2024-06-22 RX ADMIN — SIMVASTATIN 20 MG: 10 TABLET, FILM COATED ORAL at 19:51

## 2024-06-22 RX ADMIN — SODIUM CHLORIDE, POTASSIUM CHLORIDE, SODIUM LACTATE AND CALCIUM CHLORIDE 500 ML: 600; 310; 30; 20 INJECTION, SOLUTION INTRAVENOUS at 09:26

## 2024-06-22 ASSESSMENT — ACTIVITIES OF DAILY LIVING (ADL)
ADLS_ACUITY_SCORE: 32
ADLS_ACUITY_SCORE: 34
ADLS_ACUITY_SCORE: 32
ADLS_ACUITY_SCORE: 34
ADLS_ACUITY_SCORE: 34
ADLS_ACUITY_SCORE: 32
ADLS_ACUITY_SCORE: 34
ADLS_ACUITY_SCORE: 34
DEPENDENT_IADLS:: INDEPENDENT
ADLS_ACUITY_SCORE: 32
ADLS_ACUITY_SCORE: 34
ADLS_ACUITY_SCORE: 32
ADLS_ACUITY_SCORE: 34
ADLS_ACUITY_SCORE: 32
ADLS_ACUITY_SCORE: 34
ADLS_ACUITY_SCORE: 32

## 2024-06-22 NOTE — PROGRESS NOTES
Interventional Cardiology Progress Note    Assessment & Plan:  Loulou Lucero is a 79 year old female with past medical history of tobacco use, COPD, lung nodule, HLD, mild CAD, chronic low back pain, MGUS, anemia and severe aortic stenosis who was admitted 6/19 for planned transapical TAVR.     Today's Changes  - new afib with RVR this AM with rates up to 170's and SOB, now on amio gtt load  - not anticoagulated: need to hold off, per discussion with CVTS     # Severe aortic stenosis  # s/p transapical TAVR on 6/19/24 by Dr. Cross  # Chronic heart failure with preserved ejection fraction secondary to valvular disease (EF 60-65%)  # HLD  # Mild to moderate CAD  # Acute post op pain  # Post procedural hypertension  # Elevated troponin  Prior to procedure, pt noted to have a mean gradient 30 mmHG, PETER 0.7 cm^2, PV 3.6 m/s. Now s/p TAVR with 20mm Diaz valve via transapical approach. Procedure was uncomplicated.      - Reagrding concern for Drew on EKG, troponin checked and elevated at 536. EKG with RBBB rather than Drew, troponin elevated in setting of recent TAVR, pt with no chest pain  - CVTS following for chest tube management   - left apical pneumo resolved after pigtail placement: stable Hgb after left pulmonary hemorrhage into bleb. Pigtail to suction  - left pleural chest tube with 160 mL output from chest tube yesterday, 242 mL day prior  - Pain team now signed off; EPS inadvertently pulled on 6/21 overnight, now on PRNs  - POD 1 Echo with post op MG 11 mmHg, no PVL  - Aspirin 81 mg for anti-platelet therapy  - continue Simvastatin  - Cardiac rehab/PT/OT  - Daily weights   - Strict intake/output  - Electrolyte replacement protocols     # Paroxsymal Atrial Fibrillation  New atrial fibrillation with RVR on morning of 6/22. Patient reported intense shortness of breath.   - Given 5 mg IV metoprolol x 1, 2 g Mag IV x 1, 500 mL LR bolus over 2 hours  - started on lopressor 25 mg Q6  - started on amio gtt and plan to  "transition to PO on 6/23.  - not anticoagulated: per discussion with Dr. Patricia, decision deferred to CVTS - need to hold off per discussion with CVTS as patient has chest tubes with small apical hemorrhage on 6/21, as well as c/f pulmonary hemorrhage into bleb on CT    # COPD  # Tobacco Use  - Encourage cessation  - Continue PTA fluticasone-salmeterol     # Chronic iron deficiency anemia  # MGUS  Baseline Hgb ~ 9  - Continue PTA iron supplementation  - CBC daily    # PAD  # Near syncope  Pt reported to RN and ICU CELI that she feels \"close to passing out\" when turns neck certain directions.   - Carotid US completed; less than 50% narrowing in right and left    # Severe Malnutrition   2/2 chronic illness  - RD following  - Regular diet with supplements in between meals    Diet: Regular  Activity: Up as tolerated  Code Status: Full  Disposition: possible discharge home pending chest tube removal, pain control, rate control    Medically Ready for Discharge: Anticipated in 2-4 Days     Patient discussed w/ Dr. Patricia.    VEENA Tolentino, FNP-C  [facility] General Cardiology  Securely message with [employer]   Text page via [employer] Paging/Directory        Interval History:  - new Afib with RVR this AM, pt endorsed SOB  - Given 5 mg IV metoprolol x 1 with immediate symptom improvement   - 2 g Mag IV x 1, 500 mL LR bolus over 2 hours  - started on lopressor 25 mg Q6 and started on amio gtt and plan to transition to PO on 6/23  - EKG with c/f Drew, pt with known RBBB, not Drew and unchanged from prior  - AC deferred to CVTS, who would like to hold off at this time d/t chest tubes    Most recent vital signs:  /70   Pulse (!) 130   Temp 98.1  F (36.7  C) (Oral)   Resp 20   Ht 1.499 m (4' 11\")   Wt 38.6 kg (85 lb 1.6 oz)   LMP 01/01/1995 (Approximate)   SpO2 99%   BMI 17.19 kg/m    Temp:  [97.9  F (36.6  C)-98.5  F (36.9  C)] 98.1  F (36.7  C)  Pulse:  [] 130  Resp:  [14-26] 20  BP: ()/(63-87) " 107/70  SpO2:  [98 %-100 %] 99 %  Wt Readings from Last 2 Encounters:   06/22/24 38.6 kg (85 lb 1.6 oz)   06/03/24 36.4 kg (80 lb 3.2 oz)       Intake/Output Summary (Last 24 hours) at 6/22/2024 1437  Last data filed at 6/22/2024 1300  Gross per 24 hour   Intake 1360 ml   Output 1050 ml   Net 310 ml        Physical exam:  General: In bed  HEENT: EOMI, PERRLA, no scleral icterus or injection  CARDIAC: irregularly irregular, no m/r/g appreciated. Peripheral pulses 2+  RESP: diminished L > R, no wheezes, rhonchi or crackles appreciated.  GI: NABS, NT/ND, no guarding or rebound  EXTREMITIES: NO LE edema, pulses 2+.   SKIN: No acute lesions appreciated  NEURO: Alert and oriented X3    Drains and Tubes: Chest tube and pigtail in place; crepitus felt around tube    Labs (Past three days):  CBC  Recent Labs   Lab 06/22/24  0622 06/21/24  0535 06/20/24  0412 06/19/24  1204   WBC 7.2 9.9 7.7 8.5   RBC 2.81* 2.98* 3.00* 2.88*   HGB 9.3* 10.0* 10.1* 9.5*   HCT 28.5* 30.0* 30.2* 28.5*   * 101* 101* 99   MCH 33.1* 33.6* 33.7* 33.0   MCHC 32.6 33.3 33.4 33.3   RDW 13.0 13.2 13.2 13.0   * 117* 132* 139*     BMP  Recent Labs   Lab 06/22/24  0622 06/21/24  0535 06/20/24  2209 06/20/24  0412 06/19/24  2256 06/19/24  2252    136  --  139  --  140   POTASSIUM 4.1 4.4  --  4.5  --  4.4   CHLORIDE 107 104  --  107  --  106   CO2 26 22  --  23  --  23   ANIONGAP 6* 10  --  9  --  11   * 105* 122* 120*   < > 134*   BUN 20.9 32.2*  --  25.6*  --  25.6*   CR 0.60 0.78  --  0.81  --  0.83   GFRESTIMATED >90 77  --  73  --  71   DION 8.7* 8.9  --  8.7*  --  9.3   MAG 1.8 2.0  --  1.9  --  1.8   PHOS 1.6* 2.4*  --  5.5*  --  5.7*    < > = values in this interval not displayed.     Troponins:   Lab Results   Component Value Date    CTROPT 536 (HH) 06/21/2024    CTROPT 20 (H) 01/17/2024    CTROPT 22 (H) 01/17/2024        INR  Recent Labs   Lab 06/20/24  1151 06/18/24  1228   INR 1.49* 1.20*     Liver panel  Recent Labs    Lab 06/19/24  2252 06/19/24  1204 06/18/24  1228   PROTTOTAL 6.2* 5.7* 7.8   ALBUMIN 3.5 3.3* 4.3   BILITOTAL 0.6 0.5 0.6   ALKPHOS 48 46 60   AST 54* 30 26   ALT 13 14 11       Troponin T High Sensitivity   Lab Results   Component Value Date    TROPI <0.030 04/25/2019    TROPI <0.030 09/30/2018       Imaging/procedure results:  EKG 12 Lead 6/21/2024: NSR, RBBB (baseline)       ECHO 6/20/2024:  Interpretation Summary  s/p TAVR with 20 mm Diaz Benedict 3 on 6/19/2024. The valve is well-seated  and without paravalvular regurgitation. Doppler interrogation of the  prosthetic valve reveals Vmax 2.2 m/s, mean pressure gradient 11 mmHg.     Left ventricular function is normal.The ejection fraction is 60-65%.  Global right ventricular function is normal.  Severe mitral annular calcification.  IVC diameter and respiratory changes fall into an intermediate range  suggesting an RA pressure of 8 mmHg.  No pericardial effusion.     This study was compared with the study from 6/19/2024. No significant changes  noted compared to post-TAVR images.    CT Chest 6/22/24  1.  New (compared to CT from 4/5/2024) left upper lobe ill marginated  confluent consolidative appearing density with surrounding  groundglass, mild anteroseptal thinning and possible tiny cystic  change suspicious for infection or potentially pulmonary hemorrhage.  Neoplastic etiology unlikely though cannot be entirely excluded.  Recommend short-term follow-up imaging to resolution.  2.  Left chest tube and apical pigtail chest catheter without  significant pleural fluid or evidence of hemothorax. Residual small  anterior left pneumothorax. Extensive presumed postprocedural  subcutaneous air in the left chest wall and lower neck.  3.  Small-to-moderate right pleural effusion with simple fluid  attenuation.  4.  Advanced pulmonary emphysema and chronic scarring, most prominent  in the apices and in the right upper lobe.  5.  Prominent precarinal lymph node,  similar to prior. Consider  attention on follow-up.  6.   Additional incidental finding similar to prior as described  including extensive atherosclerotic calcification of the thoracic  aorta, coronary artery calcification.     Medical Decision Making   75 MINUTES SPENT BY ME on the date of service doing chart review, history, exam, documentation & further activities per the note.

## 2024-06-22 NOTE — CONSULTS
Care Management Initial Consult    General Information  Assessment completed with: Patient, Family, Patient; Danni (daughter), Wanda (son), and Nevin (granddaughter)  Type of CM/SW Visit: Initial Assessment    Primary Care Provider verified and updated as needed: No   Readmission within the last 30 days: planned readmission   Return Category: Planned Surgery  Reason for Consult: discharge planning  Advance Care Planning: Advance Care Planning Reviewed: present on chart  Patient currently making decisions; HCD is on file       Communication Assessment  Patient's communication style: spoken language (English or Bilingual)    Hearing Difficulty or Deaf: no   Wear Glasses or Blind: yes    Cognitive  Cognitive/Neuro/Behavioral: WDL  Level of Consciousness: somnolent      Living Environment:   People in home: alone     Current living Arrangements: house- one level, ramp to enter      Able to return to prior arrangements: yes  Living Arrangement Comments: NA    Family/Social Support:  Care provided by: self  Provides care for: no one  Marital Status:   Children          Description of Support System: Supportive    Support Assessment: Adequate social supports    Current Resources:   Patient receiving home care services: No     Community Resources: None  Equipment currently used at home: none, other (see comments) (need to assess)  Supplies currently used at home: Other (need to assess)    Employment/Financial:  Employment Status: other (see comments) (Not working; 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  No    Lifestyle & Psychosocial Needs:  Social Determinants of Health     Food Insecurity: Low Risk  (3/21/2024)     Food Insecurity     Within the past 12 months, did you worry that your food would run out before you got money to buy more?: No     Within the past 12 months, did the food you bought just not last and you didn t have money to get more?: No   Depression: Not at risk (5/30/2024)    PHQ-2     PHQ-2 Score: 0   Housing Stability: Low Risk  (3/21/2024)    Housing Stability     Do you have housing? : Yes     Are you worried about losing your housing?: No   Tobacco Use: High Risk (5/30/2024)    Patient History     Smoking Tobacco Use: Every Day     Smokeless Tobacco Use: Never     Passive Exposure: Past   Financial Resource Strain: Low Risk  (3/21/2024)    Financial Resource Strain     Within the past 12 months, have you or your family members you live with been unable to get utilities (heat, electricity) when it was really needed?: No   Alcohol Use: Not on file   Transportation Needs: Low Risk  (3/21/2024)    Transportation Needs     Within the past 12 months, has lack of transportation kept you from medical appointments, getting your medicines, non-medical meetings or appointments, work, or from getting things that you need?: No   Physical Activity: Unknown (1/31/2024)    Exercise Vital Sign     Days of Exercise per Week: 0 days     Minutes of Exercise per Session: Not on file   Interpersonal Safety: Low Risk  (5/30/2024)    Interpersonal Safety     Do you feel physically and emotionally safe where you currently live?: Yes     Within the past 12 months, have you been hit, slapped, kicked or otherwise physically hurt by someone?: No     Within the past 12 months, have you been humiliated or emotionally abused in other ways by your partner or ex-partner?: No   Stress: No Stress Concern Present (1/31/2024)    [employer] [place] [employer] - Occupational Stress Questionnaire     Feeling of Stress : Only a little   Social Connections: Unknown (1/31/2024)    Social Connection and Isolation Panel [NHANES]      Frequency of Communication with Friends and Family: Not on file     Frequency of Social Gatherings with Friends and Family: Once a week     Attends Nondenominational Services: Not on file     Active Member of Clubs or Organizations: Not on file     Attends Club or Organization Meetings: Not on file     Marital Status: Not on file   Health Literacy: Not on file       Functional Status:  Prior to admission patient needed assistance:   Dependent ADLs:: Independent  Dependent IADLs:: Independent  Assesssment of Functional Status: Not at baseline with ADL Functioning    Mental Health Status:  Mental Health Status: No Current Concerns  Mental Health Management:  (NA)    Chemical Dependency Status:  Chemical Dependency Status: No Current Concerns  Chemical Dependency Management:  (NA)          Values/Beliefs:  Spiritual, Cultural Beliefs, Nondenominational Practices, Values that affect care: yes  Description of Beliefs that Will Affect Care: Support from Yazidi community    Cultural/Nondenominational Practices Patient Routinely Participates In: other (see comments) (NA)  Values/Beliefs Comment: NA    Additional Information:  Writer met with Loulou, Danni (daughter), Wanda (son), and Nevin (granddaughter) at bedside; introduced self and role. Writer confirmed demographic information, completed initial psychosocial assessment primarily with Reynold, with Loulou present, Loulou participates intermittently. Loulou expresses it is okay for her family members to help with answering writer's questions, as Loulou appeared somewhat somnolent. Danni reports Loulou lives independently near [place], [place]; and she (Danni) lives in [place], [place] and Wanda lives out of state. Writer discusses possibility of discharge to TCU. Danni expresses preference for TCU near [place] or [place], [place]. Loulou is agreeable to this. Writer provided medicare.gov resource and family plans to provide writer with 3 options for referrals for TCU on  6/23.     Danni also inquires about how to get connected with meals on wheels for when Loulou eventually discharges from TCU. She also inquires about how to get access to a life alert necklace or bracelet since Loulou lives alone and does not have a cellular phone (she has a land line).     Plan:   -SW to obtain preferences for TCU referrals and send referrals.   -SW to follow up regarding information on how to connect with Meals on Wheels and Life Alert.     Social work available for discharge planning and psychosocial support.     CAMRON Tang   6/22/2024       Social Work and Care Management Department       SEARCHABLE in [facility] - search SOCIAL WORK       [place] (0800 - 1630) Saturday and Sunday     Units: 4A Vocera, 4C Vocera, & 4E Vocera        Units: 5A 3583-1480 Vocera, 5A 8933-0605 Vocera , BMT SW 1 BMT SW 2, BMT SW 3 & BMT SW 4  5C Off Service 5401 - 5416  5C Off Service 4321-2945     Units: 6A Vocera & 6B Vocera      Units: 6C Vocera     Units: 7A Vocera & 7B Vocera      Units: 7C Med Surg 7401 thru 7418 and 7C Med Surg 7502 thru 7521      Unit: [place] ED Vocera & [place] Obs Vocera     [facility] ([phone]) Saturday and Sunday      Units: 5 Ortho Vocera, 5 Med Surg Vocera & WB ED Vocera     Units: 6 Med Surg Vocera, 8 Med Surg Vocera, & 10 ICU Vocera      After hours Vocera [facility] and After Hours Vocera [place]     Saturday & Sunday (1630 - 0000)    Mon-Fri ([phone])     [facility] Recognized Holidays  ([id number])    Units: ALL   - see above VOCERA links to units and after hours       CAMRON Tang

## 2024-06-22 NOTE — PROGRESS NOTES
General Appearance: VSS A=O  Respiratory: Diffuse crackles. Room air  Cardiovascular: Sinus rhythm. Rapid response called this am 2/2Afib rates 140-180. ON amio gtt after bolus. Sinus rhythm in 70s. SEE rapid response report    GI: Ate well at breakfast. No BM UOP good  Skin: Dressing to CT done CDI New PIV l arm. Sodium phos infusing  Other: Family at bedside.

## 2024-06-22 NOTE — PROGRESS NOTES
[id number]  Neuro: A&Ox4. Able to make needs known properly.   Cardiac: SR/ST. VSS. Denies cardiac chest pain.   Respiratory: Sating >92% on 2L NC.   GI/: Adequate urine output. No BM overnight.   Diet/appetite: Tolerating regular diet. Eating well.  Activity:  Assist of 1 in bed  Pain: At acceptable level on current regimen.   Skin: No new deficits noted.  LDA's: 2 chest tubes to -20 suction. 2 PIV to right arm SL.   Plan: Continue with POC. Notify primary team with changes. STAT CT ordered writer called CT to verify, CT stated they were backed up and would get to them when able. CT completed after 0200.

## 2024-06-22 NOTE — PROGRESS NOTES
"    BRIEF CVTS PROGRESS NOTE    S:  Loulou Lucero is a 79 year old female with past medical history of tobacco use, COPD, lung nodule, HLD, mild CAD, chronic low back pain, MGUS, anemia and severe aortic stenosis who was admitted for planned transapical approach TAVR.  Patient was extubated on POD #0.   CVTS following for chest tube management.    Developed crepitus and increased left apical pneumothorax with associated dyspnea. Surgical chest tube too low to capture PTX with no air leak or tidaling. Left apical pigtail placed in IR on 6/21 complicated by small left apical hemorrhage. Pneumothorax resolved. CT chest obtained and revealed no e/o hemothorax  but again noted c/f pulmonary hemorrhage. Likely bled into existing bleb. Hemoglobin stable and no e/o further bleeding. No air leak in either atrium today, subcutaneous emphysema stable today.     This AM went into stable CHERYLE with associated palpitations. Chest tube sites sore, particularly surgical chest tube site.    O:  /71   Pulse (!) 169   Temp 98.1  F (36.7  C) (Oral)   Resp 20   Ht 1.499 m (4' 11\")   Wt 38.6 kg (85 lb 1.6 oz)   LMP 01/01/1995 (Approximate)   SpO2 100%   BMI 17.19 kg/m      I/O last 3 completed shifts:  In: 480 [P.O.:480]  Out: 1200 [Urine:1050; Chest Tube:150]        CBC RESULTS:   Recent Labs   Lab Test 06/22/24  0622 06/21/24  0535 06/20/24  0412   WBC 7.2 9.9 7.7   HGB 9.3* 10.0* 10.1*   HCT 28.5* 30.0* 30.2*   * 117* 132*     CMP RESULTS:  Recent Labs   Lab Test 06/22/24  0622 06/21/24  0535 06/20/24  2209 06/20/24  0412 06/19/24  2256 06/19/24  2252 06/19/24  1212 06/19/24  1204 06/19/24  0544 06/18/24  1228    136  --  139  --  140  --  139   < > 142   POTASSIUM 4.1 4.4  --  4.5  --  4.4  --  4.3   < > 4.8   CHLORIDE 107 104  --  107  --  106  --  106  --  104   CO2 26 22  --  23  --  23  --  23  --  29   ANIONGAP 6* 10  --  9  --  11  --  10  --  9   * 105* 122* 120*   < > 134*   < > 122*   < > 90 "   BUN 20.9 32.2*  --  25.6*  --  25.6*  --  22.6  --  18.6   CR 0.60 0.78  --  0.81  --  0.83  --  0.78  --  0.73   GFRESTIMATED >90 77  --  73  --  71  --  77  --  83   DION 8.7* 8.9  --  8.7*  --  9.3  --  7.9*  --  9.7   BILITOTAL  --   --   --   --   --  0.6  --  0.5  --  0.6   ALKPHOS  --   --   --   --   --  48  --  46  --  60   ALT  --   --   --   --   --  13  --  14  --  11   AST  --   --   --   --   --  54*  --  30  --  26    < > = values in this interval not displayed.     Gen: sitting up in chair, finishing breakfast  Neuro: Intact with no focal deficits, A&O X4  CV: RRR, normal S1 S2, no murmurs, rubs or gallops  Pulm: diffuse crackles throughout, no wheezing or rhonchi, dyspneic on room air. Noted crepitus on left lateral side  Skin:  Left thoracotomy incision: clean, dry, intact, no erythema, + crepitus, sternum stable  Tubes/drain sites: dressing clean and dry, serosanguinous drainage, 242 mL past 24h, to remain for drainage. Tape removed - chest tube is secured with suture, dressing changed    A/P:  S/p transapical TAVR on 6/19/2024 by Dr. Cross. CVTS following for chest tube management.     - Left pleural chest tube with 160 mL past 24h, to remain in place today. Will re-evaluate daily.    - Left apical pneumothorax resolved after pigtail placement. Stable Hemoglobin after left pulmonary hemorrhage, likely into bleb. Leave pigtail in place to suction today.    - Daily wound care:  wound cleanser/soap & water, pat dry, keep wound clean and dry using Interdry prn   - Encourage good nutrition, particularly protein intake   - Maintain BG control <180 for optimal wound healing   - Maintain euvolemia   - Remainder of plan per primary teams; please call with questions.    VEENA Sharma, ACNPC-AG, CCRN  Nurse Practitioner  Cardiothoracic Surgery  Pager: [phone]

## 2024-06-22 NOTE — PROVIDER NOTIFICATION
"   06/22/24 0900   Call Information   Date of Call 06/22/24   Time of Call 0840   Name of person requesting the team Reo   Title of person requesting team RN   RRT Arrival time 0847   Time RRT ended 0930   Reason for call   Type of RRT Adult   Primary reason for call Cardiovascular   Cardiovascular EKG changes;HR greater than 160   Was patient transferred from the ED, ICU, or PACU within last 24 hours prior to RRT call? No   SBAR   Situation Afib RVR 160s-170s   Background Per provider note, \"ent & Plan:  Loulou Lucero is a 79 year old female with past medical history of tobacco use, COPD, lung nodule, HLD, mild CAD, chronic low back pain, MGUS, anemia and severe aortic stenosis who was admitted 6/19 for planned transapical TAVR.\"   Notable History/Conditions Cardiac;COPD   Assessment Pt lying in bed, endorses dizziness, SOB and Chest pain. Afib 140s-170s, BP labile 110s-140s SBP/90s, sat mid to high 90s on 4L NC. Afebrile.   Interventions ECG;Fluid bolus;Meds   Patient Outcome   Patient Outcome Stabilized on unit   RRT Team   Attending/Primary/Covering Physician CSI   Date Attending Physician notified 06/22/24   Time Attending Physician notified 0840   Physician(s) Tori Das   Lead RN Billie FALL RN   ALINA COPELAND RN   RT NA   Other staff Jimmy COPELAND RN   Post RRT Intervention Assessment   Post RRT Assessment Stable/Improved   Date Follow Up Done 06/22/24   Time Follow Up Done 1230   Comments NSR, HR in 70s       "

## 2024-06-23 ENCOUNTER — APPOINTMENT (OUTPATIENT)
Dept: GENERAL RADIOLOGY | Facility: CLINIC | Age: 79
DRG: 266 | End: 2024-06-23
Attending: NURSE PRACTITIONER
Payer: MEDICARE

## 2024-06-23 ENCOUNTER — APPOINTMENT (OUTPATIENT)
Dept: OCCUPATIONAL THERAPY | Facility: CLINIC | Age: 79
DRG: 266 | End: 2024-06-23
Attending: INTERNAL MEDICINE
Payer: MEDICARE

## 2024-06-23 ENCOUNTER — APPOINTMENT (OUTPATIENT)
Dept: PHYSICAL THERAPY | Facility: CLINIC | Age: 79
DRG: 266 | End: 2024-06-23
Attending: INTERNAL MEDICINE
Payer: MEDICARE

## 2024-06-23 LAB
ANION GAP SERPL CALCULATED.3IONS-SCNC: 8 MMOL/L (ref 7–15)
BUN SERPL-MCNC: 26.1 MG/DL (ref 8–23)
CALCIUM SERPL-MCNC: 8.2 MG/DL (ref 8.8–10.2)
CHLORIDE SERPL-SCNC: 107 MMOL/L (ref 98–107)
CREAT SERPL-MCNC: 0.72 MG/DL (ref 0.51–0.95)
DEPRECATED HCO3 PLAS-SCNC: 25 MMOL/L (ref 22–29)
EGFRCR SERPLBLD CKD-EPI 2021: 85 ML/MIN/1.73M2
ERYTHROCYTE [DISTWIDTH] IN BLOOD BY AUTOMATED COUNT: 13 % (ref 10–15)
GLUCOSE SERPL-MCNC: 108 MG/DL (ref 70–99)
HCT VFR BLD AUTO: 29 % (ref 35–47)
HGB BLD-MCNC: 9.3 G/DL (ref 11.7–15.7)
MAGNESIUM SERPL-MCNC: 2.2 MG/DL (ref 1.7–2.3)
MCH RBC QN AUTO: 32.6 PG (ref 26.5–33)
MCHC RBC AUTO-ENTMCNC: 32.1 G/DL (ref 31.5–36.5)
MCV RBC AUTO: 102 FL (ref 78–100)
PHOSPHATE SERPL-MCNC: 2.6 MG/DL (ref 2.5–4.5)
PLATELET # BLD AUTO: 116 10E3/UL (ref 150–450)
POTASSIUM SERPL-SCNC: 4.1 MMOL/L (ref 3.4–5.3)
RBC # BLD AUTO: 2.85 10E6/UL (ref 3.8–5.2)
SODIUM SERPL-SCNC: 140 MMOL/L (ref 135–145)
WBC # BLD AUTO: 7.9 10E3/UL (ref 4–11)

## 2024-06-23 PROCEDURE — 93005 ELECTROCARDIOGRAM TRACING: CPT

## 2024-06-23 PROCEDURE — 97530 THERAPEUTIC ACTIVITIES: CPT | Mod: GO | Performed by: OCCUPATIONAL THERAPIST

## 2024-06-23 PROCEDURE — 250N000013 HC RX MED GY IP 250 OP 250 PS 637: Performed by: STUDENT IN AN ORGANIZED HEALTH CARE EDUCATION/TRAINING PROGRAM

## 2024-06-23 PROCEDURE — 97116 GAIT TRAINING THERAPY: CPT | Mod: GP

## 2024-06-23 PROCEDURE — 97530 THERAPEUTIC ACTIVITIES: CPT | Mod: GP

## 2024-06-23 PROCEDURE — 250N000013 HC RX MED GY IP 250 OP 250 PS 637

## 2024-06-23 PROCEDURE — 71045 X-RAY EXAM CHEST 1 VIEW: CPT

## 2024-06-23 PROCEDURE — 84100 ASSAY OF PHOSPHORUS: CPT | Performed by: NURSE PRACTITIONER

## 2024-06-23 PROCEDURE — 71046 X-RAY EXAM CHEST 2 VIEWS: CPT | Mod: 26 | Performed by: RADIOLOGY

## 2024-06-23 PROCEDURE — 71045 X-RAY EXAM CHEST 1 VIEW: CPT | Mod: 26 | Performed by: RADIOLOGY

## 2024-06-23 PROCEDURE — 99232 SBSQ HOSP IP/OBS MODERATE 35: CPT

## 2024-06-23 PROCEDURE — 83735 ASSAY OF MAGNESIUM: CPT | Performed by: NURSE PRACTITIONER

## 2024-06-23 PROCEDURE — 250N000011 HC RX IP 250 OP 636: Performed by: STUDENT IN AN ORGANIZED HEALTH CARE EDUCATION/TRAINING PROGRAM

## 2024-06-23 PROCEDURE — 250N000013 HC RX MED GY IP 250 OP 250 PS 637: Performed by: SURGERY

## 2024-06-23 PROCEDURE — 71046 X-RAY EXAM CHEST 2 VIEWS: CPT

## 2024-06-23 PROCEDURE — 93010 ELECTROCARDIOGRAM REPORT: CPT | Performed by: INTERNAL MEDICINE

## 2024-06-23 PROCEDURE — 120N000003 HC R&B IMCU UMMC

## 2024-06-23 PROCEDURE — 80048 BASIC METABOLIC PNL TOTAL CA: CPT | Performed by: NURSE PRACTITIONER

## 2024-06-23 PROCEDURE — 36415 COLL VENOUS BLD VENIPUNCTURE: CPT | Performed by: NURSE PRACTITIONER

## 2024-06-23 PROCEDURE — 250N000013 HC RX MED GY IP 250 OP 250 PS 637: Performed by: INTERNAL MEDICINE

## 2024-06-23 PROCEDURE — 85027 COMPLETE CBC AUTOMATED: CPT | Performed by: NURSE PRACTITIONER

## 2024-06-23 RX ORDER — AMIODARONE HYDROCHLORIDE 200 MG/1
200 TABLET ORAL 2 TIMES DAILY
Status: DISCONTINUED | OUTPATIENT
Start: 2024-06-28 | End: 2024-06-28 | Stop reason: HOSPADM

## 2024-06-23 RX ORDER — AMIODARONE HYDROCHLORIDE 200 MG/1
200 TABLET ORAL DAILY
Status: DISCONTINUED | OUTPATIENT
Start: 2024-07-01 | End: 2024-06-28 | Stop reason: HOSPADM

## 2024-06-23 RX ORDER — METOPROLOL TARTRATE 25 MG/1
25 TABLET, FILM COATED ORAL EVERY 6 HOURS
Status: COMPLETED | OUTPATIENT
Start: 2024-06-23 | End: 2024-06-23

## 2024-06-23 RX ORDER — AMIODARONE HYDROCHLORIDE 200 MG/1
400 TABLET ORAL 2 TIMES DAILY
Status: COMPLETED | OUTPATIENT
Start: 2024-06-23 | End: 2024-06-27

## 2024-06-23 RX ORDER — METOPROLOL SUCCINATE 25 MG/1
25 TABLET, EXTENDED RELEASE ORAL 2 TIMES DAILY
Status: DISCONTINUED | OUTPATIENT
Start: 2024-06-23 | End: 2024-06-24

## 2024-06-23 RX ADMIN — METOPROLOL TARTRATE 25 MG: 25 TABLET, FILM COATED ORAL at 14:30

## 2024-06-23 RX ADMIN — METHOCARBAMOL 500 MG: 500 TABLET ORAL at 19:58

## 2024-06-23 RX ADMIN — AMIODARONE HYDROCHLORIDE 400 MG: 200 TABLET ORAL at 19:58

## 2024-06-23 RX ADMIN — POTASSIUM & SODIUM PHOSPHATES POWDER PACK 280-160-250 MG 1 PACKET: 280-160-250 PACK at 09:38

## 2024-06-23 RX ADMIN — GABAPENTIN 100 MG: 100 CAPSULE ORAL at 07:52

## 2024-06-23 RX ADMIN — METHOCARBAMOL 500 MG: 500 TABLET ORAL at 14:30

## 2024-06-23 RX ADMIN — AMIODARONE HYDROCHLORIDE 400 MG: 200 TABLET ORAL at 07:52

## 2024-06-23 RX ADMIN — PANTOPRAZOLE SODIUM 40 MG: 40 TABLET, DELAYED RELEASE ORAL at 07:52

## 2024-06-23 RX ADMIN — FLUTICASONE PROPIONATE AND SALMETEROL XINAFOATE 1 PUFF: 230; 21 AEROSOL, METERED RESPIRATORY (INHALATION) at 07:56

## 2024-06-23 RX ADMIN — ONDANSETRON 4 MG: 4 TABLET, ORALLY DISINTEGRATING ORAL at 13:52

## 2024-06-23 RX ADMIN — FLUTICASONE PROPIONATE AND SALMETEROL XINAFOATE 1 PUFF: 230; 21 AEROSOL, METERED RESPIRATORY (INHALATION) at 19:59

## 2024-06-23 RX ADMIN — METOPROLOL TARTRATE 25 MG: 25 TABLET, FILM COATED ORAL at 08:02

## 2024-06-23 RX ADMIN — ASPIRIN 81 MG CHEWABLE TABLET 81 MG: 81 TABLET CHEWABLE at 07:52

## 2024-06-23 RX ADMIN — OXYCODONE HYDROCHLORIDE 2.5 MG: 5 TABLET ORAL at 07:52

## 2024-06-23 RX ADMIN — MAGNESIUM HYDROXIDE 30 ML: 400 SUSPENSION ORAL at 09:38

## 2024-06-23 RX ADMIN — POLYETHYLENE GLYCOL 3350 17 G: 17 POWDER, FOR SOLUTION ORAL at 07:52

## 2024-06-23 RX ADMIN — METHOCARBAMOL 500 MG: 500 TABLET ORAL at 07:52

## 2024-06-23 RX ADMIN — GABAPENTIN 100 MG: 100 CAPSULE ORAL at 19:59

## 2024-06-23 RX ADMIN — SIMVASTATIN 20 MG: 10 TABLET, FILM COATED ORAL at 19:58

## 2024-06-23 RX ADMIN — POTASSIUM & SODIUM PHOSPHATES POWDER PACK 280-160-250 MG 1 PACKET: 280-160-250 PACK at 12:52

## 2024-06-23 RX ADMIN — DOCUSATE SODIUM 50 MG AND SENNOSIDES 8.6 MG 1 TABLET: 8.6; 5 TABLET, FILM COATED ORAL at 07:52

## 2024-06-23 RX ADMIN — Medication 1 TABLET: at 07:52

## 2024-06-23 RX ADMIN — METOPROLOL TARTRATE 25 MG: 25 TABLET, FILM COATED ORAL at 03:01

## 2024-06-23 RX ADMIN — METOPROLOL SUCCINATE 25 MG: 25 TABLET, EXTENDED RELEASE ORAL at 19:59

## 2024-06-23 ASSESSMENT — ACTIVITIES OF DAILY LIVING (ADL)
ADLS_ACUITY_SCORE: 33
ADLS_ACUITY_SCORE: 31
ADLS_ACUITY_SCORE: 33
ADLS_ACUITY_SCORE: 31
ADLS_ACUITY_SCORE: 33

## 2024-06-23 NOTE — PROGRESS NOTES
"    BRIEF CVTS PROGRESS NOTE    S:  Loulou Lucero is a 79 year old female with past medical history of tobacco use, COPD, lung nodule, HLD, mild CAD, chronic low back pain, MGUS, anemia and severe aortic stenosis who was admitted for planned transapical approach TAVR.  Patient was extubated on POD #0.   CVTS following for chest tube management.    Developed crepitus and increased left apical pneumothorax with associated dyspnea on 6/21. Surgical chest tube too low to capture PTX with no air leak or tidaling. Left apical pigtail placed in IR on 6/21 complicated by small left apical hemorrhage. Pneumothorax resolved. CT chest obtained and revealed no e/o hemothorax  but again noted c/f pulmonary hemorrhage. Likely bled into existing bleb. Hemoglobin stable and no e/o further bleeding. No air leak in either atrium today, subcutaneous emphysema much improved today.    This AM still having much soreness at chest tube sites, surgical drain in particular. Tired.    O:  /60 (BP Location: Right arm)   Pulse 68   Temp 98.3  F (36.8  C) (Oral)   Resp 18   Ht 1.499 m (4' 11\")   Wt 38.7 kg (85 lb 5.1 oz)   LMP 01/01/1995 (Approximate)   SpO2 100%   BMI 17.23 kg/m      I/O last 3 completed shifts:  In: 1880 [P.O.:880; I.V.:500; IV Piggyback:500]  Out: 999 [Urine:725; Emesis/NG output:120; Chest Tube:154]        CBC RESULTS:   Recent Labs   Lab Test 06/23/24  0659 06/22/24  0622 06/21/24  0535   WBC 7.9 7.2 9.9   HGB 9.3* 9.3* 10.0*   HCT 29.0* 28.5* 30.0*   * 106* 117*     CMP RESULTS:  Recent Labs   Lab Test 06/23/24  0659 06/22/24  0622 06/21/24  0535 06/19/24  2256 06/19/24  2252 06/19/24  1212 06/19/24  1204 06/19/24  0544 06/18/24  1228    139 136   < > 140  --  139   < > 142   POTASSIUM 4.1 4.1 4.4   < > 4.4  --  4.3   < > 4.8   CHLORIDE 107 107 104   < > 106  --  106  --  104   CO2 25 26 22   < > 23  --  23  --  29   ANIONGAP 8 6* 10   < > 11  --  10  --  9   * 109* 105*   < > 134*   < > " 122*   < > 90   BUN 26.1* 20.9 32.2*   < > 25.6*  --  22.6  --  18.6   CR 0.72 0.60 0.78   < > 0.83  --  0.78  --  0.73   GFRESTIMATED 85 >90 77   < > 71  --  77  --  83   DION 8.2* 8.7* 8.9   < > 9.3  --  7.9*  --  9.7   BILITOTAL  --   --   --   --  0.6  --  0.5  --  0.6   ALKPHOS  --   --   --   --  48  --  46  --  60   ALT  --   --   --   --  13  --  14  --  11   AST  --   --   --   --  54*  --  30  --  26    < > = values in this interval not displayed.     Gen: sitting up in bed, withdrawn  Neuro: Intact with no focal deficits  CV: RRR, normal S1 S2, no murmurs, rubs or gallops  Pulm: diffuse crackles throughout, no wheezing or rhonchi, dyspneic on room air. Noted crepitus on left lateral side, improved  Skin:  Left thoracotomy incision: clean, dry, intact, no erythema, + crepitus, sternum stable  Tubes/drain sites: dressing clean and dry, serosanguinous drainage, 150 mL past 24h    A/P:  S/p transapical TAVR on 6/19/2024 by Dr. Cross. CVTS following for chest tube management.     - Left pleural surgical chest tube with 150 mL past 24h, will remove today   - Left apical pneumothorax resolved after pigtail placement. Stable Hemoglobin after left pulmonary hemorrhage, likely into bleb. Minimal output. Leave pigtail in place to suction today. Look to place to water seal tomorrow and progress from there.   - Daily wound care:  wound cleanser/soap & water, pat dry, keep wound clean and dry using Interdry prn   - Encourage good nutrition, particularly protein intake   - Maintain BG control <180 for optimal wound healing   - Maintain euvolemia   - Remainder of plan per primary teams; please call with questions.    VEENA Sharma, ACNPC-AG, CCRN  Nurse Practitioner  Cardiothoracic Surgery  Pager: [phone]

## 2024-06-23 NOTE — PROGRESS NOTES
D:pt with two emesis, +BS, came on sudden both times  I:up in chair for meals, prn meds, pace eating  A:pt had about 125cc out this shift of food bolus  P:prune juice in am, revert back to clears as tolerated or regular diet as torerated

## 2024-06-23 NOTE — PLAN OF CARE
Goal Outcome Evaluation:      Plan of Care Reviewed With: patient, family    Overall Patient Progress: improvingOverall Patient Progress: improving    Outcome Evaluation: Anticipate discharge to TCU

## 2024-06-23 NOTE — PROGRESS NOTES
Care Management Follow Up    Length of Stay (days): 4    Expected Discharge Date: 06/24/2024     Concerns to be Addressed: discharge planning     Patient plan of care discussed at interdisciplinary rounds:  Discussed with OT    Anticipated Discharge Disposition: Transitional Care     Anticipated Discharge Services: Transportation Services  Anticipated Discharge DME: None    Patient/family educated on Medicare website which has current facility and service quality ratings: yes  Education Provided on the Discharge Plan: Yes  Patient/Family in Agreement with the Plan: yes    Referrals Placed by CM/SW:      Since OT recommendation continues to be TCU at this time, writer made the following TCU referrals. Family is open to locations near daughterDanni, in [place], [place] or near Loulou's home in [place]. However, current preference is for location close to [place].     [facility]  [address] [address]   Phone- [phone]    [facility]  [address] [facility] [address]   Phone- [phone]    [facility]  [address]  [facility] [address]   Phone- [phone]    [facility]  [address] [facility] [address]   Phone- [phone]    [facility]  [address] [address]   Phone- [phone]    [facility]  [address] [place] [address]  Phone- [phone]      Private pay costs discussed: Not applicable    Additional Information:  Writer met with Danni Jamil (daughter), Alfonzo (Danni's ), and Braeden (Danni's son) at bedside. Danni reports that if Loulou does not need 24/7 supervision with someone physically present and no longer needs TCU, Loulou could stay at her home. Danni reports she works 20 hours per week (M-TH 10am-1pm), but could otherwise provide care and support. Other household  family members work full time. Danni reports they do have cameras in the home and a cell phone that Loulou could use; as well as neighbors who might be available in an emergency. However, they would not have someone who could be physically present with Loulou 24/7. Loulou lives alone in [place], [place] and would not have someone to provide care giving support at her home.     Danni reports she plans to visit Loulou this week around 2:30pm.    Next Steps:   -Follow up on TCU referrals.   -Update RNCC if recommendations change to home with home care.     Social work will continue to be available for psychosocial support and discharge planning.     CAMRON Tang   6/23/2024       Social Work and Care Management Department       SEARCHABLE in [facility] - search SOCIAL WORK       [place] (0800 - 1630) Saturday and Sunday     Units: 4A Vocera, 4C Vocera, & 4E Vocera        Units: 5A 4204-0407 Vocera, 5A 1516-9951 Vocera , BMT SW 1 BMT SW 2, BMT SW 3 & BMT SW 4  5C Off Service 5401 - 5416  5C Off Service [phone]     Units: 6A Vocera & 6B Vocera      Units: 6C Vocera     Units: 7A Vocera & 7B Vocera      Units: 7C Med Surg 7401 thru 7418 and 7C Med Surg 7502 thru 7521      Unit: [place] ED Vocera & [place] Obs Vocera     [facility] ([phone]) Saturday and Sunday      Units: 5 Ortho Vocera, 5 Med Surg Vocera & WB ED Vocera     Units: 6 Med Surg Vocera, 8 Med Surg Vocera, & 10 ICU Vocera      After hours Vocera [facility] and After Hours Vocera [place]     Saturday & Sunday (1630 - 0000)    Mon-Fri (5752-7316)     [facility] Recognized Holidays  ([phone])    Units: ALL   - see above VOCERA links to units and after hours       CAMRON Tang

## 2024-06-23 NOTE — PLAN OF CARE
"/59 (BP Location: Right arm)   Pulse 69   Temp 98.2  F (36.8  C) (Oral)   Resp 18   Ht 1.499 m (4' 11\")   Wt 38.7 kg (85 lb 5.1 oz)   LMP 01/01/1995 (Approximate)   SpO2 95%   BMI 17.23 kg/m      VSS. Has remained in SR all night with rates 60's-70's. Room air while awake, 2L NC PRN while sleeping. Afebrile. Alert and oriented x 4. Denies need for pain medication. Tolerating diet with noo nausea or emesis this shift. Up to commode with assist x 1-2. Voiding. No BM overnight. Patient agreeable to try milk of mag later in morning to help stimulate a bowel movement (last BM 6-18). Left PIV with Amio infusion at 0.5 mg/min. Right PIV saline locked. Left apical CT and left lateral CT to -20 suction. Small output per both. Repositioning q 2 hours. Continue to monitor.  "

## 2024-06-23 NOTE — PROGRESS NOTES
Interventional Cardiology Progress Note    Assessment & Plan:  Loulou Lucero is a 79 year old female with past medical history of tobacco use, COPD, lung nodule, HLD, mild CAD, chronic low back pain, MGUS, anemia and severe aortic stenosis who was admitted 6/19 for planned transapical TAVR.     Today's Changes  - pleural chest tube removed by CVTS today  - not anticoagulated per discussion with CVTS  - lopressor Q6 switched to Toprol BID     # Severe aortic stenosis  # s/p transapical TAVR on 6/19/24 by Dr. Cross  # Chronic heart failure with preserved ejection fraction secondary to valvular disease (EF 60-65%)  # HLD  # Mild to moderate CAD  # Acute post op pain  # Post procedural hypertension  # Elevated troponin  Prior to procedure, pt noted to have a mean gradient 30 mmHG, PETER 0.7 cm^2, PV 3.6 m/s. Now s/p TAVR with 20mm Diaz valve via transapical approach. Procedure was uncomplicated.      - Reagrding concern for Drew on EKG, troponin checked and elevated at 536. EKG with RBBB rather than Drew, troponin elevated in setting of recent TAVR, pt with no chest pain  - CVTS following for chest tube management   - left apical pneumo resolved after pigtail placement: stable Hgb after left pulmonary hemorrhage into bleb. Pigtail to suction -> likely to water seal tomorrow  - left pleural chest tube removed 6/23  - Pain team now signed off; EPS inadvertently pulled on 6/21 overnight, now on PRNs  - POD 1 Echo with post op MG 11 mmHg, no PVL  - Aspirin 81 mg for anti-platelet therapy  - continue Simvastatin  - Cardiac rehab/PT/OT  - Daily weights   - Strict intake/output  - Electrolyte replacement protocols     # Paroxsymal Atrial Fibrillation  New atrial fibrillation with RVR on morning of 6/22. Patient reported intense shortness of breath.   - Given 5 mg IV metoprolol x 1, 2 g Mag IV x 1, 500 mL LR bolus over 2 hours  - change lopressor 25 mg Q6 to Toprol 25 BID, continue to titrate down/off  - started on amio gtt,  "transitioned to PO on 6/23   - patient should continue amio for about 4-6 weeks and can follow up with VEENA Shine outpatient in structural clinic for further assessment (need for ambulatory monitor, EP involvement)  - not anticoagulated: per discussion with Dr. Patricia, decision deferred to CVTS - need to hold off per discussion with CVTS as patient has chest tubes with small apical hemorrhage on 6/21, as well as c/f pulmonary hemorrhage into bleb on CT    # COPD  # Tobacco Use  - Encourage cessation  - Continue PTA fluticasone-salmeterol     # Chronic iron deficiency anemia  # MGUS  Baseline Hgb ~ 9  - Continue PTA iron supplementation  - CBC daily    # PAD  # Near syncope  Pt reported to RN and ICU CELI that she feels \"close to passing out\" when turns neck certain directions.   - Carotid US completed; less than 50% narrowing in right and left    # Severe Malnutrition   2/2 chronic illness  - RD following  - Regular diet with supplements in between meals    Diet: Regular  Activity: Up as tolerated  Code Status: Full  Disposition: possible discharge home pending chest tube removal, pain control, rate control    Medically Ready for Discharge: Anticipated in 2-4 Days     Patient discussed w/ Dr. Patricia.    VEENA Tolentino, FNP-C  [facility] General Cardiology  Securely message with [employer]   Text page via [employer] Paging/Directory        Interval History:  - NSR on monitor during exam  - pleural chest tube removed by CVTS today  - patient reports ambulating in hallway earlier today; denies dizziness, but continues to endorse SOB   - states she feels much better than yesterday morning with afib rvr episode    Most recent vital signs:  /73   Pulse 68   Temp 98.3  F (36.8  C) (Oral)   Resp 16   Ht 1.499 m (4' 11\")   Wt 38.7 kg (85 lb 5.1 oz)   LMP 01/01/1995 (Approximate)   SpO2 100%   BMI 17.23 kg/m    Temp:  [97.8  F (36.6  C)-98.6  F (37  C)] 98.3  F (36.8  C)  Pulse:  [65-69] 68  Resp:  [16-22] " 16  BP: (104-122)/(59-73) 121/73  SpO2:  [93 %-100 %] 100 %  Wt Readings from Last 2 Encounters:   06/23/24 38.7 kg (85 lb 5.1 oz)   06/03/24 36.4 kg (80 lb 3.2 oz)       Intake/Output Summary (Last 24 hours) at 6/23/2024 1315  Last data filed at 6/23/2024 0800  Gross per 24 hour   Intake 640 ml   Output 725 ml   Net -85 ml         Physical exam:  General: In bed, NAD  HEENT: EOMI, PERRLA, no scleral icterus or injection  CARDIAC: RRR, no m/r/g appreciated. Peripheral pulses 2+  RESP: diminished L > R, no wheezes, rhonchi or crackles appreciated.  GI: NABS, NT/ND, no guarding or rebound  EXTREMITIES: NO LE edema, pulses 2+.   SKIN: No acute lesions appreciated  NEURO: Alert and oriented X3    Drains and Tubes: pigtail in place; crepitus felt around tube    Labs (Past three days):  CBC  Recent Labs   Lab 06/23/24  0659 06/22/24 0622 06/21/24  0535 06/20/24  0412   WBC 7.9 7.2 9.9 7.7   RBC 2.85* 2.81* 2.98* 3.00*   HGB 9.3* 9.3* 10.0* 10.1*   HCT 29.0* 28.5* 30.0* 30.2*   * 101* 101* 101*   MCH 32.6 33.1* 33.6* 33.7*   MCHC 32.1 32.6 33.3 33.4   RDW 13.0 13.0 13.2 13.2   * 106* 117* 132*     BMP  Recent Labs   Lab 06/23/24  0659 06/22/24  1941 06/22/24 0622 06/21/24  0535 06/20/24 2209 06/20/24  0412     --  139 136  --  139   POTASSIUM 4.1  --  4.1 4.4  --  4.5   CHLORIDE 107  --  107 104  --  107   CO2 25  --  26 22  --  23   ANIONGAP 8  --  6* 10  --  9   *  --  109* 105* 122* 120*   BUN 26.1*  --  20.9 32.2*  --  25.6*   CR 0.72  --  0.60 0.78  --  0.81   GFRESTIMATED 85  --  >90 77  --  73   DION 8.2*  --  8.7* 8.9  --  8.7*   MAG 2.2  --  1.8 2.0  --  1.9   PHOS 2.6 3.4 1.6* 2.4*  --  5.5*     Troponins:   Lab Results   Component Value Date    CTROPT 536 (HH) 06/21/2024    CTROPT 20 (H) 01/17/2024    CTROPT 22 (H) 01/17/2024        INR  Recent Labs   Lab 06/20/24  1151 06/18/24  1228   INR 1.49* 1.20*     Liver panel  Recent Labs   Lab 06/19/24  2252 06/19/24  1204 06/18/24  1228    PROTTOTAL 6.2* 5.7* 7.8   ALBUMIN 3.5 3.3* 4.3   BILITOTAL 0.6 0.5 0.6   ALKPHOS 48 46 60   AST 54* 30 26   ALT 13 14 11       Troponin T High Sensitivity   Lab Results   Component Value Date    TROPI <0.030 04/25/2019    TROPI <0.030 09/30/2018       Imaging/procedure results:  EKG 12 Lead 6/21/2024: NSR, RBBB (baseline)       ECHO 6/20/2024:  Interpretation Summary  s/p TAVR with 20 mm Diaz Benedict 3 on 6/19/2024. The valve is well-seated  and without paravalvular regurgitation. Doppler interrogation of the  prosthetic valve reveals Vmax 2.2 m/s, mean pressure gradient 11 mmHg.     Left ventricular function is normal.The ejection fraction is 60-65%.  Global right ventricular function is normal.  Severe mitral annular calcification.  IVC diameter and respiratory changes fall into an intermediate range  suggesting an RA pressure of 8 mmHg.  No pericardial effusion.     This study was compared with the study from 6/19/2024. No significant changes  noted compared to post-TAVR images.    CT Chest 6/22/24  1.  New (compared to CT from 4/5/2024) left upper lobe ill marginated  confluent consolidative appearing density with surrounding  groundglass, mild anteroseptal thinning and possible tiny cystic  change suspicious for infection or potentially pulmonary hemorrhage.  Neoplastic etiology unlikely though cannot be entirely excluded.  Recommend short-term follow-up imaging to resolution.  2.  Left chest tube and apical pigtail chest catheter without  significant pleural fluid or evidence of hemothorax. Residual small  anterior left pneumothorax. Extensive presumed postprocedural  subcutaneous air in the left chest wall and lower neck.  3.  Small-to-moderate right pleural effusion with simple fluid  attenuation.  4.  Advanced pulmonary emphysema and chronic scarring, most prominent  in the apices and in the right upper lobe.  5.  Prominent precarinal lymph node, similar to prior. Consider  attention on follow-up.  6.    Additional incidental finding similar to prior as described  including extensive atherosclerotic calcification of the thoracic  aorta, coronary artery calcification.     Medical Decision Making   55 MINUTES SPENT BY ME on the date of service doing chart review, history, exam, documentation & further activities per the note.

## 2024-06-23 NOTE — PLAN OF CARE
Neuro: A&Ox4.   Cardiac: SR. VSS.   Respiratory: Sating above 92% on RA. Chest tube in place  GI/: Adequate urine output. BM X2  Diet/appetite: Tolerating regular diet.Poor appetite  Activity:  Assist of 2 with gait belt and walker  Pain: At acceptable level on current regimen.   Skin: No new deficits noted.  LDA's: 2 PIV, saline locked  Plan: Continue with POC. Notify primary team with changes.  Goal Outcome Evaluation:      Plan of Care Reviewed With: patient

## 2024-06-24 ENCOUNTER — APPOINTMENT (OUTPATIENT)
Dept: GENERAL RADIOLOGY | Facility: CLINIC | Age: 79
DRG: 266 | End: 2024-06-24
Attending: STUDENT IN AN ORGANIZED HEALTH CARE EDUCATION/TRAINING PROGRAM
Payer: MEDICARE

## 2024-06-24 ENCOUNTER — APPOINTMENT (OUTPATIENT)
Dept: GENERAL RADIOLOGY | Facility: CLINIC | Age: 79
DRG: 266 | End: 2024-06-24
Attending: PHYSICIAN ASSISTANT
Payer: MEDICARE

## 2024-06-24 ENCOUNTER — APPOINTMENT (OUTPATIENT)
Dept: PHYSICAL THERAPY | Facility: CLINIC | Age: 79
DRG: 266 | End: 2024-06-24
Attending: INTERNAL MEDICINE
Payer: MEDICARE

## 2024-06-24 ENCOUNTER — APPOINTMENT (OUTPATIENT)
Dept: GENERAL RADIOLOGY | Facility: CLINIC | Age: 79
DRG: 266 | End: 2024-06-24
Attending: NURSE PRACTITIONER
Payer: MEDICARE

## 2024-06-24 LAB
ANION GAP SERPL CALCULATED.3IONS-SCNC: 6 MMOL/L (ref 7–15)
ATRIAL RATE - MUSE: 66 BPM
ATRIAL RATE - MUSE: 75 BPM
ATRIAL RATE - MUSE: 93 BPM
ATRIAL RATE - MUSE: NORMAL BPM
BUN SERPL-MCNC: 27.3 MG/DL (ref 8–23)
CALCIUM SERPL-MCNC: 8 MG/DL (ref 8.8–10.2)
CHLORIDE SERPL-SCNC: 106 MMOL/L (ref 98–107)
CREAT SERPL-MCNC: 0.77 MG/DL (ref 0.51–0.95)
DEPRECATED HCO3 PLAS-SCNC: 28 MMOL/L (ref 22–29)
DIASTOLIC BLOOD PRESSURE - MUSE: NORMAL MMHG
EGFRCR SERPLBLD CKD-EPI 2021: 78 ML/MIN/1.73M2
ERYTHROCYTE [DISTWIDTH] IN BLOOD BY AUTOMATED COUNT: 13.1 % (ref 10–15)
GLUCOSE SERPL-MCNC: 109 MG/DL (ref 70–99)
HCT VFR BLD AUTO: 26.8 % (ref 35–47)
HGB BLD-MCNC: 8.7 G/DL (ref 11.7–15.7)
INTERPRETATION ECG - MUSE: NORMAL
MAGNESIUM SERPL-MCNC: 2.1 MG/DL (ref 1.7–2.3)
MCH RBC QN AUTO: 33 PG (ref 26.5–33)
MCHC RBC AUTO-ENTMCNC: 32.5 G/DL (ref 31.5–36.5)
MCV RBC AUTO: 102 FL (ref 78–100)
P AXIS - MUSE: 78 DEGREES
P AXIS - MUSE: 79 DEGREES
P AXIS - MUSE: 81 DEGREES
P AXIS - MUSE: NORMAL DEGREES
PHOSPHATE SERPL-MCNC: 2.4 MG/DL (ref 2.5–4.5)
PLATELET # BLD AUTO: 119 10E3/UL (ref 150–450)
POTASSIUM SERPL-SCNC: 4.2 MMOL/L (ref 3.4–5.3)
PR INTERVAL - MUSE: 126 MS
PR INTERVAL - MUSE: 128 MS
PR INTERVAL - MUSE: 132 MS
PR INTERVAL - MUSE: NORMAL MS
QRS DURATION - MUSE: 108 MS
QRS DURATION - MUSE: 110 MS
QRS DURATION - MUSE: 112 MS
QRS DURATION - MUSE: 118 MS
QT - MUSE: 338 MS
QT - MUSE: 398 MS
QT - MUSE: 446 MS
QT - MUSE: 466 MS
QTC - MUSE: 488 MS
QTC - MUSE: 494 MS
QTC - MUSE: 498 MS
QTC - MUSE: 508 MS
R AXIS - MUSE: 34 DEGREES
R AXIS - MUSE: 40 DEGREES
R AXIS - MUSE: 45 DEGREES
R AXIS - MUSE: 55 DEGREES
RBC # BLD AUTO: 2.64 10E6/UL (ref 3.8–5.2)
SODIUM SERPL-SCNC: 140 MMOL/L (ref 135–145)
SYSTOLIC BLOOD PRESSURE - MUSE: NORMAL MMHG
T AXIS - MUSE: 10 DEGREES
T AXIS - MUSE: 26 DEGREES
T AXIS - MUSE: 30 DEGREES
T AXIS - MUSE: 33 DEGREES
TROPONIN T SERPL HS-MCNC: 391 NG/L
VENTRICULAR RATE- MUSE: 136 BPM
VENTRICULAR RATE- MUSE: 66 BPM
VENTRICULAR RATE- MUSE: 75 BPM
VENTRICULAR RATE- MUSE: 93 BPM
WBC # BLD AUTO: 7.6 10E3/UL (ref 4–11)

## 2024-06-24 PROCEDURE — 97116 GAIT TRAINING THERAPY: CPT | Mod: GP

## 2024-06-24 PROCEDURE — 85027 COMPLETE CBC AUTOMATED: CPT | Performed by: NURSE PRACTITIONER

## 2024-06-24 PROCEDURE — 250N000013 HC RX MED GY IP 250 OP 250 PS 637: Performed by: INTERNAL MEDICINE

## 2024-06-24 PROCEDURE — 71045 X-RAY EXAM CHEST 1 VIEW: CPT

## 2024-06-24 PROCEDURE — 93010 ELECTROCARDIOGRAM REPORT: CPT | Performed by: INTERNAL MEDICINE

## 2024-06-24 PROCEDURE — 84484 ASSAY OF TROPONIN QUANT: CPT | Performed by: STUDENT IN AN ORGANIZED HEALTH CARE EDUCATION/TRAINING PROGRAM

## 2024-06-24 PROCEDURE — 99232 SBSQ HOSP IP/OBS MODERATE 35: CPT

## 2024-06-24 PROCEDURE — 250N000013 HC RX MED GY IP 250 OP 250 PS 637: Performed by: STUDENT IN AN ORGANIZED HEALTH CARE EDUCATION/TRAINING PROGRAM

## 2024-06-24 PROCEDURE — 71045 X-RAY EXAM CHEST 1 VIEW: CPT | Mod: 26 | Performed by: RADIOLOGY

## 2024-06-24 PROCEDURE — 93005 ELECTROCARDIOGRAM TRACING: CPT

## 2024-06-24 PROCEDURE — 83735 ASSAY OF MAGNESIUM: CPT | Performed by: NURSE PRACTITIONER

## 2024-06-24 PROCEDURE — 84100 ASSAY OF PHOSPHORUS: CPT | Performed by: NURSE PRACTITIONER

## 2024-06-24 PROCEDURE — 250N000013 HC RX MED GY IP 250 OP 250 PS 637

## 2024-06-24 PROCEDURE — 250N000013 HC RX MED GY IP 250 OP 250 PS 637: Performed by: SURGERY

## 2024-06-24 PROCEDURE — 71045 X-RAY EXAM CHEST 1 VIEW: CPT | Mod: 77

## 2024-06-24 PROCEDURE — 250N000013 HC RX MED GY IP 250 OP 250 PS 637: Performed by: NURSE PRACTITIONER

## 2024-06-24 PROCEDURE — 36415 COLL VENOUS BLD VENIPUNCTURE: CPT | Performed by: NURSE PRACTITIONER

## 2024-06-24 PROCEDURE — 120N000003 HC R&B IMCU UMMC

## 2024-06-24 PROCEDURE — 36415 COLL VENOUS BLD VENIPUNCTURE: CPT | Performed by: STUDENT IN AN ORGANIZED HEALTH CARE EDUCATION/TRAINING PROGRAM

## 2024-06-24 PROCEDURE — 97530 THERAPEUTIC ACTIVITIES: CPT | Mod: GP

## 2024-06-24 PROCEDURE — 80048 BASIC METABOLIC PNL TOTAL CA: CPT | Performed by: NURSE PRACTITIONER

## 2024-06-24 RX ORDER — SIMETHICONE 80 MG
80 TABLET,CHEWABLE ORAL EVERY 6 HOURS PRN
Status: DISCONTINUED | OUTPATIENT
Start: 2024-06-24 | End: 2024-06-28 | Stop reason: HOSPADM

## 2024-06-24 RX ORDER — METOPROLOL SUCCINATE 25 MG/1
25 TABLET, EXTENDED RELEASE ORAL DAILY
Status: DISCONTINUED | OUTPATIENT
Start: 2024-06-24 | End: 2024-06-25

## 2024-06-24 RX ADMIN — Medication 1 TABLET: at 08:46

## 2024-06-24 RX ADMIN — NITROGLYCERIN 0.4 MG: 0.4 TABLET SUBLINGUAL at 16:44

## 2024-06-24 RX ADMIN — LIDOCAINE 4% 1 PATCH: 40 PATCH TOPICAL at 20:11

## 2024-06-24 RX ADMIN — GABAPENTIN 100 MG: 100 CAPSULE ORAL at 08:47

## 2024-06-24 RX ADMIN — PANTOPRAZOLE SODIUM 40 MG: 40 TABLET, DELAYED RELEASE ORAL at 08:45

## 2024-06-24 RX ADMIN — GABAPENTIN 100 MG: 100 CAPSULE ORAL at 20:11

## 2024-06-24 RX ADMIN — POTASSIUM & SODIUM PHOSPHATES POWDER PACK 280-160-250 MG 1 PACKET: 280-160-250 PACK at 14:36

## 2024-06-24 RX ADMIN — METHOCARBAMOL 500 MG: 500 TABLET ORAL at 14:37

## 2024-06-24 RX ADMIN — NITROGLYCERIN 0.4 MG: 0.4 TABLET SUBLINGUAL at 16:54

## 2024-06-24 RX ADMIN — DOCUSATE SODIUM 50 MG AND SENNOSIDES 8.6 MG 1 TABLET: 8.6; 5 TABLET, FILM COATED ORAL at 20:11

## 2024-06-24 RX ADMIN — METHOCARBAMOL 500 MG: 500 TABLET ORAL at 20:11

## 2024-06-24 RX ADMIN — SIMVASTATIN 20 MG: 10 TABLET, FILM COATED ORAL at 20:11

## 2024-06-24 RX ADMIN — METHOCARBAMOL 500 MG: 500 TABLET ORAL at 08:46

## 2024-06-24 RX ADMIN — FLUTICASONE PROPIONATE AND SALMETEROL XINAFOATE 1 PUFF: 230; 21 AEROSOL, METERED RESPIRATORY (INHALATION) at 20:18

## 2024-06-24 RX ADMIN — ASPIRIN 81 MG CHEWABLE TABLET 81 MG: 81 TABLET CHEWABLE at 08:56

## 2024-06-24 RX ADMIN — AMIODARONE HYDROCHLORIDE 400 MG: 200 TABLET ORAL at 08:45

## 2024-06-24 RX ADMIN — FLUTICASONE PROPIONATE AND SALMETEROL XINAFOATE 1 PUFF: 230; 21 AEROSOL, METERED RESPIRATORY (INHALATION) at 08:50

## 2024-06-24 RX ADMIN — POTASSIUM & SODIUM PHOSPHATES POWDER PACK 280-160-250 MG 1 PACKET: 280-160-250 PACK at 17:08

## 2024-06-24 RX ADMIN — AMIODARONE HYDROCHLORIDE 400 MG: 200 TABLET ORAL at 20:11

## 2024-06-24 RX ADMIN — POTASSIUM & SODIUM PHOSPHATES POWDER PACK 280-160-250 MG 1 PACKET: 280-160-250 PACK at 08:46

## 2024-06-24 RX ADMIN — METOPROLOL SUCCINATE 25 MG: 25 TABLET, EXTENDED RELEASE ORAL at 08:46

## 2024-06-24 ASSESSMENT — ACTIVITIES OF DAILY LIVING (ADL)
ADLS_ACUITY_SCORE: 31
ADLS_ACUITY_SCORE: 33
ADLS_ACUITY_SCORE: 31
ADLS_ACUITY_SCORE: 33

## 2024-06-24 NOTE — PROGRESS NOTES
Interventional Cardiology Progress Note      Assessment & Plan:  Loulou Lucero is a 79 year old female with past medical history of tobacco use, COPD, lung nodule, HLD, mild CAD, chronic low back pain, MGUS, anemia and severe aortic stenosis who was admitted 6/19 for planned transapical TAVR.     Interval History: No acute events overnight. Patient hemodynamically stable, on room air. Chest tube removed yesterday per CVTS but pigtail left in place. At the time of interview she denies shortness of breath, orthopnea, or PND. Endorses some pain at her tube insertion site. Minimal subcutaneous emphysema. Will reduce Toprol XL to once daily today with plans to discontinue in coming days.     Changes Today:  - Reduce Toprol XL to 25mg daily   - Await additional CVTS recommendations regarding pigtail line     # Severe Aortic Stenosis  # s/p Transapical TAVR on 6/19/24 by Dr. Cross  # Chronic Heart Failure w/ Preserved EF (60-65%)  # Hyperlipidemia  # Mild to Moderate CAD  # Acute Post Op Pain  # Post Procedural Hypertension  # Elevated Troponin  # Subcutaneous Emphysema: Improving   Prior to procedure, pt noted to have a mean gradient 30 mmHG, PETER 0.7 cm^2, PV 3.6 m/s. Now s/p TAVR with 20mm Diaz valve via transapical approach. Procedure was uncomplicated. POD 1 Echo with post op MG 11 mmHg, no PVL.  - Reagrding concern for Drew on EKG, troponin checked and elevated at 536. EKG with RBBB rather than Drew, troponin elevated in setting of recent TAVR, pt with no chest pain  - CVTS following for chest tube management:             - Left apical pneumo resolved after pigtail placement: stable Hgb after left pulmonary hemorrhage into bleb. Pigtail to suction -> likely to water seal today; await CVTS recommendations   - Left pleural chest tube removed 6/23; pigtail in place  - Pain team now signed off; EPS inadvertently pulled on 6/21 overnight, now on PRNs  - Aspirin 81 mg for anti-platelet therapy  - Continue PTA Simvastatin  "20mg q HS  - Cardiac rehab/PT/OT  - Daily weights   - Strict intake/output  - Electrolyte replacement protocols     # Paroxsymal Atrial Fibrillation  New atrial fibrillation with RVR on morning of 6/22. Patient reported intense shortness of breath. Given 5 mg IV metoprolol x 1, 2 g Mag IV x 1, 500 mL LR bolus over 2 hours  - Changed lopressor 25 mg Q6 to Toprol 25 BID per Dr. Patricia continue to titrate down/off --> Reduce to daily today  - Started on amio gtt, transitioned to PO on 6/23   - Patient should continue amio for about 4-6 weeks and can follow up with VEENA Shine outpatient in structural clinic for further assessment (need for ambulatory monitor, EP involvement)  - Not anticoagulated: per discussion with Dr. Patricia, decision deferred to CVTS - need to hold off per discussion with CVTS as patient has chest tubes with small apical hemorrhage on 6/21, as well as c/f pulmonary hemorrhage into bleb on CT     # COPD  # Tobacco Use  - Encourage cessation  - Continue PTA fluticasone-salmeterol     # Chronic Iron Deficiency Anemia   # MGUS  Baseline Hgb ~ 9  - Continue PTA iron supplementation  - CBC daily  - Hemoglobin 8.7 (9.3)     # PAD  # Near Syncope  Pt reported to RN and ICU CELI that she feels \"close to passing out\" when turns neck certain directions.   - Carotid US completed; less than 50% narrowing in right and left     # Severe Malnutrition   2/2 chronic illness  - RD following  - Regular diet with supplements in between meals     Diet: Regular  Activity: Up as tolerated  Code Status: Full  Disposition: possible discharge home pending chest tube removal, pain control, rate control    Patient seen and discussed w/ Dr. Mays.      Medically Ready for Discharge: Anticipated in 2-4 Days        Norma CURIEL, CNP  [facility]  Interventional Cardiology      Most recent vital signs:  /59   Pulse 73   Temp 98.4  F (36.9  C) (Oral)   Resp 18   Ht " "1.499 m (4' 11\")   Wt 38.6 kg (85 lb)   LMP 01/01/1995 (Approximate)   SpO2 100%   BMI 17.17 kg/m    Temp:  [97.9  F (36.6  C)-98.4  F (36.9  C)] 98.4  F (36.9  C)  Pulse:  [68-79] 73  Resp:  [16-18] 18  BP: (111-122)/(51-73) 111/59  SpO2:  [93 %-100 %] 100 %  Wt Readings from Last 2 Encounters:   06/24/24 38.6 kg (85 lb)   06/03/24 36.4 kg (80 lb 3.2 oz)       Intake/Output Summary (Last 24 hours) at 6/24/2024 0716  Last data filed at 6/24/2024 0500  Gross per 24 hour   Intake 310 ml   Output 460 ml   Net -150 ml       Physical exam:  General: In bed, in NAD  HEENT: EOMI, PERRLA, no scleral icterus or injection  CARDIAC: RRR, no m/r/g appreciated. Peripheral pulses 2+  RESP: CTAB, no wheezes, rhonchi or crackles appreciated.  GI: NABS, NT/ND, no guarding or rebound  EXTREMITIES: NO LE edema, pulses 2+.  SKIN: No acute lesions appreciated. Pigtail site covered. Dressing c/d/I.   NEURO: Alert and oriented X3, CN II-XII grossly intact, no focal neurological deficits noted      Labs (Past three days):  CBC  Recent Labs   Lab 06/24/24  0550 06/23/24  0659 06/22/24  0622 06/21/24  0535   WBC 7.6 7.9 7.2 9.9   RBC 2.64* 2.85* 2.81* 2.98*   HGB 8.7* 9.3* 9.3* 10.0*   HCT 26.8* 29.0* 28.5* 30.0*   * 102* 101* 101*   MCH 33.0 32.6 33.1* 33.6*   MCHC 32.5 32.1 32.6 33.3   RDW 13.1 13.0 13.0 13.2   * 116* 106* 117*     BMP  Recent Labs   Lab 06/24/24  0550 06/23/24  0659 06/22/24  1941 06/22/24  0622 06/21/24  0535    140  --  139 136   POTASSIUM 4.2 4.1  --  4.1 4.4   CHLORIDE 106 107  --  107 104   CO2 28 25  --  26 22   ANIONGAP 6* 8  --  6* 10   * 108*  --  109* 105*   BUN 27.3* 26.1*  --  20.9 32.2*   CR 0.77 0.72  --  0.60 0.78   GFRESTIMATED 78 85  --  >90 77   DION 8.0* 8.2*  --  8.7* 8.9   MAG 2.1 2.2  --  1.8 2.0   PHOS 2.4* 2.6 3.4 1.6* 2.4*     Troponins:   Lab Results   Component Value Date    CTROPT 536 (HH) 06/21/2024    CTROPT 20 (H) 01/17/2024    CTROPT 22 (H) 01/17/2024      "   INR  Recent Labs   Lab 06/20/24  1151 06/18/24  1228   INR 1.49* 1.20*     Liver panel  Recent Labs   Lab 06/19/24  2252 06/19/24  1204 06/18/24  1228   PROTTOTAL 6.2* 5.7* 7.8   ALBUMIN 3.5 3.3* 4.3   BILITOTAL 0.6 0.5 0.6   ALKPHOS 48 46 60   AST 54* 30 26   ALT 13 14 11       Imaging/procedure results:    EKG 12 Lead 6/21/2024: NSR, RBBB (baseline)        ECHO 6/20/2024:  Interpretation Summary  s/p TAVR with 20 mm Diaz Benedict 3 on 6/19/2024. The valve is well-seated  and without paravalvular regurgitation. Doppler interrogation of the  prosthetic valve reveals Vmax 2.2 m/s, mean pressure gradient 11 mmHg.     Left ventricular function is normal.The ejection fraction is 60-65%.  Global right ventricular function is normal.  Severe mitral annular calcification.  IVC diameter and respiratory changes fall into an intermediate range  suggesting an RA pressure of 8 mmHg.  No pericardial effusion.     This study was compared with the study from 6/19/2024. No significant changes  noted compared to post-TAVR images.     CT Chest 6/22/24  1.  New (compared to CT from 4/5/2024) left upper lobe ill marginated  confluent consolidative appearing density with surrounding  groundglass, mild anteroseptal thinning and possible tiny cystic  change suspicious for infection or potentially pulmonary hemorrhage.  Neoplastic etiology unlikely though cannot be entirely excluded.  Recommend short-term follow-up imaging to resolution.  2.  Left chest tube and apical pigtail chest catheter without  significant pleural fluid or evidence of hemothorax. Residual small  anterior left pneumothorax. Extensive presumed postprocedural  subcutaneous air in the left chest wall and lower neck.  3.  Small-to-moderate right pleural effusion with simple fluid  attenuation.  4.  Advanced pulmonary emphysema and chronic scarring, most prominent  in the apices and in the right upper lobe.  5.  Prominent precarinal lymph node, similar to prior.  Consider  attention on follow-up.  6.   Additional incidental finding similar to prior as described  including extensive atherosclerotic calcification of the thoracic  aorta, coronary artery calcification.     Medical Decision Making       35 MINUTES SPENT BY ME on the date of service doing chart review, history, exam, documentation & further activities per the note.        Norma Storey, VEENA CNP on 6/24/2024 at 8:49 AM

## 2024-06-24 NOTE — PROGRESS NOTES
D: Past H/y of  COPD, HLD,CAD, chronic low back pain, severe aortic stenosis. On  6/19 pt had a transapical TAVR.     I: Monitored vitals and assessed pt status.   A: A0x4. VSS, SR. Afebrile. Reported incisional pain, refused from the pain meds, repositioned with effect. Encouraged to drink water. CT tube X 1 with  -20 suc with 0-1 cc output dressing CDI. The left chest dressing,slightly saturated and reported to Day RN to continue to monitor and reapply dressing if needed.    I/O this shift:  In: 70 [P.O.:70]  Out: -     Temp:  [97.9  F (36.6  C)-98.3  F (36.8  C)] 97.9  F (36.6  C)  Pulse:  [68-79] 76  Resp:  [16-18] 18  BP: (111-122)/(51-73) 112/61  SpO2:  [93 %-100 %] 99 %      P: Continue to monitor Pt status and report changes to treatment team. Plan for Chest X ray.

## 2024-06-24 NOTE — OP NOTE
PRE OP DIAGNOSIS: Severe aortic stenosis  POST OP DIAGNOSIS : Same  PROCEDURE: Trans apical transcatheter aortic valve replacement with 20 mm Diaz Benedict 3 prosthetic valve via left thoracotomy    SURGEON: Frank Cross MD  ASSISTANT: Sakina Olivier MD  ASSISTANT: KE Martínez    CARDIOLOGIST: Claude Patricia MD    PROCEDURE:  Patient was brought to operating room in stable condition. Following the administration of general anesthesia, patient's chest abdomen and lower extremities were prepped and draped in usual sterile manner.  Access was obtained in the right common femoral artery and the right common femoral vein.A 5Fr PACEL temporary pacemaker was positioned in the right ventricle and tested for adequate capture.     The 6Fr pigtail catheter was positioned in the right-coronary cusp and angiography was used to confirm the optimal implant angle. The pigtail was then moved to the non-coronary cusp.    A left mini thoracotomy was made.A retractor was placed and the pericardium was opened. Adequate exposure of the apex of the heart was obtained using pericardial retraction sutures. A 3-0 prolene pledgetted suture  x  2 was placed around the apex of the heart.   Access into the LV was obtained using an 18 gauge needle and a 0.035 J wire was advanced to the descending aorta. A JR 4 catheter was advanced and J wire was exchanged for an Amplazer extra stiff wire. The Certitude sheath was advanced under fluoroscopic guidance up to 4 cm marker. Then valve delivery system was advanced.    The 20mm Diaz Benedict 3 prosthetic valve was then positioned across the native aortic valve in a 80/20 position. Angiography and echocardiography were used to confirm optimal valve position. Rapid pacing at 180 bpm was initiated and the valve was implanted with expansion of the balloon using a final balloon volume of nominal mL. Subsequent transesophageal echocardiography and an ascending aortogram showed trace paravalvular  insufficiency.  Mean gradient was 4 mmHg.   The sheath was removed from the apex and the sutures carefully tied to obtain hemostasis. A left pleural chest tube was placed and the thoracotomy wound was closed in usual manner.Rest of catheters were removed.  Patient was transferred to ICU in stable condition.

## 2024-06-24 NOTE — PROGRESS NOTES
Interventional Cardiology Progress Note      Assessment & Plan:  Loulou Lucero is a 79 year old female with past medical history of tobacco use, COPD, lung nodule, HLD, mild CAD, chronic low back pain, MGUS, anemia and severe aortic stenosis who was admitted 6/19 for planned transapical TAVR.     Interval History: Patient with episode of chest pain yesterday evening that resolved with nitroglycerine x 2. Repeat CXR performed and unchanged from earlier yesterday afternoon. Pigtail remains in place to water seal per CVTS they will remove today. She is otherwise hemodynamically stable, using 2L per nasal cannula with sats 99%+. At the time of interview she endorses some pain at her chest tube site but denies additional pain.      Changes Today:  - Wean O2  - Pigtail removal per CVTS  - IV Lasix 20mg x 1     # Severe Aortic Stenosis  # s/p Transapical TAVR on 6/19/24 by Dr. Cross  # Chronic Heart Failure w/ Preserved EF (60-65%)  # Hyperlipidemia  # Mild to Moderate CAD  # Acute Post Op Pain  # Post Procedural Hypertension  # Elevated Troponin  # Subcutaneous Emphysema: Improving   Prior to procedure, pt noted to have a mean gradient 30 mmHG, PETER 0.7 cm^2, PV 3.6 m/s. Now s/p TAVR with 20mm Diaz valve via transapical approach. Procedure was uncomplicated. POD 1 Echo with post op MG 11 mmHg, no PVL.  - Reagrding concern for Drew on EKG, troponin checked and elevated at 536. EKG with RBBB rather than Drew, troponin elevated in setting of recent TAVR, pt with no chest pain  - CVTS following for chest tube management:             - Left apical pneumo resolved after pigtail placement: stable Hgb after left pulmonary hemorrhage into bleb.   - Left pleural chest tube removed 6/23; pigtail in place  - Pigtail to water seal yesterday, repeat CXR w/o pneumothorax. CVTS plan to remove pigtail today.   - Pain team now signed off; EPS inadvertently pulled on 6/21 overnight, now on PRNs  - Aspirin 81 mg for anti-platelet therapy  -  "Continue PTA Simvastatin 20mg q HS  - Cardiac rehab/PT/OT  - Daily weights   - Strict intake/output  - Electrolyte replacement protocols     # Paroxsymal Atrial Fibrillation  New atrial fibrillation with RVR on morning of 6/22. Patient reported intense shortness of breath. Given 5 mg IV metoprolol x 1, 2 g Mag IV x 1, 500 mL LR bolus over 2 hours  - Changed lopressor 25 mg Q6 to Toprol 25 BID per Dr. Patricia continue to titrate down/off --> Reduce to daily today  - Started on amio gtt, transitioned to PO on 6/23   - Patient should continue amio for about 4-6 weeks and can follow up with VEENA Shine outpatient in structural clinic for further assessment (need for ambulatory monitor, EP involvement)  - Not anticoagulated: per discussion with Dr. Patricia, decision deferred to CVTS - need to hold off per discussion with CVTS as patient has chest tubes with small apical hemorrhage on 6/21, as well as c/f pulmonary hemorrhage into bleb on CT.      # COPD  # Tobacco Use  - Encourage cessation  - Continue PTA fluticasone-salmeterol     # Chronic Iron Deficiency Anemia   # MGUS  Baseline Hgb ~ 9  - Continue PTA iron supplementation  - CBC daily  - Hemoglobin 8.5 (8.7)     # PAD  # Near Syncope  Pt reported to RN and ICU CELI that she feels \"close to passing out\" when turns neck certain directions.   - Carotid US completed; less than 50% narrowing in right and left     # Severe Malnutrition   2/2 chronic illness  - RD following  - Regular diet with supplements in between meals     Diet: Regular  Activity: Up as tolerated  Code Status: Full  Disposition: Likely home tomorrow     Patient discussed w/ Dr. Mays.      Medically Ready for Discharge: Anticipated Tomorrow        Norma CURIEL, CNP  [facility]  Interventional Cardiology      Most recent vital signs:  BP 96/60 (BP Location: Right arm)   Pulse 73   Temp 98.3  F (36.8  C) (Oral)   Resp 18   Ht 1.499 m (4' 11\")  "  Wt 38.6 kg (85 lb)   LMP 01/01/1995 (Approximate)   SpO2 100%   BMI 17.17 kg/m    Temp:  [97.9  F (36.6  C)-98.4  F (36.9  C)] 98.3  F (36.8  C)  Pulse:  [73-79] 73  Resp:  [16-18] 18  BP: ()/(51-61) 96/60  SpO2:  [93 %-100 %] 100 %  Wt Readings from Last 2 Encounters:   06/24/24 38.6 kg (85 lb)   06/03/24 36.4 kg (80 lb 3.2 oz)       Intake/Output Summary (Last 24 hours) at 6/24/2024 1535  Last data filed at 6/24/2024 0500  Gross per 24 hour   Intake 310 ml   Output 401 ml   Net -91 ml       Physical exam:  General: In bed, in NAD  HEENT: EOMI, PERRLA, no scleral icterus or injection  CARDIAC: RRR, no m/r/g appreciated. Peripheral pulses 2+  RESP: CTAB, no wheezes, rhonchi or crackles appreciated.  GI: NABS, NT/ND, no guarding or rebound  EXTREMITIES: No LE edema, pulses 2+.  SKIN: No acute lesions appreciated. Pigtail site covered. Dressing c/d/I.   NEURO: Alert and oriented X3, CN II-XII grossly intact, no focal neurological deficits noted    Labs (Past three days):  CBC  Recent Labs   Lab 06/24/24  0550 06/23/24  0659 06/22/24  0622 06/21/24  0535   WBC 7.6 7.9 7.2 9.9   RBC 2.64* 2.85* 2.81* 2.98*   HGB 8.7* 9.3* 9.3* 10.0*   HCT 26.8* 29.0* 28.5* 30.0*   * 102* 101* 101*   MCH 33.0 32.6 33.1* 33.6*   MCHC 32.5 32.1 32.6 33.3   RDW 13.1 13.0 13.0 13.2   * 116* 106* 117*     BMP  Recent Labs   Lab 06/24/24  0550 06/23/24  0659 06/22/24  1941 06/22/24  0622 06/21/24  0535    140  --  139 136   POTASSIUM 4.2 4.1  --  4.1 4.4   CHLORIDE 106 107  --  107 104   CO2 28 25  --  26 22   ANIONGAP 6* 8  --  6* 10   * 108*  --  109* 105*   BUN 27.3* 26.1*  --  20.9 32.2*   CR 0.77 0.72  --  0.60 0.78   GFRESTIMATED 78 85  --  >90 77   DION 8.0* 8.2*  --  8.7* 8.9   MAG 2.1 2.2  --  1.8 2.0   PHOS 2.4* 2.6 3.4 1.6* 2.4*     Troponins:   Lab Results   Component Value Date    CTROPT 536 (HH) 06/21/2024    CTROPT 20 (H) 01/17/2024    CTROPT 22 (H) 01/17/2024        INR  Recent Labs   Lab  06/20/24  1151 06/18/24  1228   INR 1.49* 1.20*     Liver panel  Recent Labs   Lab 06/19/24  2252 06/19/24  1204 06/18/24  1228   PROTTOTAL 6.2* 5.7* 7.8   ALBUMIN 3.5 3.3* 4.3   BILITOTAL 0.6 0.5 0.6   ALKPHOS 48 46 60   AST 54* 30 26   ALT 13 14 11       Imaging/procedure results:    EKG 12 Lead 6/21/2024: NSR, RBBB (baseline)        ECHO 6/20/2024:  Interpretation Summary  s/p TAVR with 20 mm Diaz Benedict 3 on 6/19/2024. The valve is well-seated  and without paravalvular regurgitation. Doppler interrogation of the  prosthetic valve reveals Vmax 2.2 m/s, mean pressure gradient 11 mmHg.     Left ventricular function is normal.The ejection fraction is 60-65%.  Global right ventricular function is normal.  Severe mitral annular calcification.  IVC diameter and respiratory changes fall into an intermediate range  suggesting an RA pressure of 8 mmHg.  No pericardial effusion.     This study was compared with the study from 6/19/2024. No significant changes  noted compared to post-TAVR images.     CT Chest 6/22/24  1.  New (compared to CT from 4/5/2024) left upper lobe ill marginated  confluent consolidative appearing density with surrounding  groundglass, mild anteroseptal thinning and possible tiny cystic  change suspicious for infection or potentially pulmonary hemorrhage.  Neoplastic etiology unlikely though cannot be entirely excluded.  Recommend short-term follow-up imaging to resolution.  2.  Left chest tube and apical pigtail chest catheter without  significant pleural fluid or evidence of hemothorax. Residual small  anterior left pneumothorax. Extensive presumed postprocedural  subcutaneous air in the left chest wall and lower neck.  3.  Small-to-moderate right pleural effusion with simple fluid  attenuation.  4.  Advanced pulmonary emphysema and chronic scarring, most prominent  in the apices and in the right upper lobe.  5.  Prominent precarinal lymph node, similar to prior. Consider  attention on  follow-up.  6.   Additional incidental finding similar to prior as described  including extensive atherosclerotic calcification of the thoracic  aorta, coronary artery calcification.     Medical Decision Making     35 MINUTES SPENT BY ME on the date of service doing chart review, history, exam, documentation & further activities per the note.        VEENA Acosta CNP on 6/25/2024 at 9:04 AM

## 2024-06-24 NOTE — PROGRESS NOTES
Neuro: A&Ox4.   Cardiac: SR. VSS.   Respiratory: Sating adequate on RA.  GI/: Adequate urine output. BM during days  Diet/appetite: Regular diet. Eating poor  Activity:  Assist of one, up to chair. Needs encouragement to move.  Pain: At acceptable level on current regimen.   Skin: No new deficits noted.  LDA's:piv x2 one CT    Plan: Continue with POC. Notify primary team with changes.

## 2024-06-24 NOTE — PROGRESS NOTES
Cardiovascular Surgery Progress Note  06/24/2024         Assessment and Plan:     Loulou Lucero is a 79 year old female with past medical history of tobacco use, COPD, lung nodule, HLD, mild CAD, chronic low back pain, MGUS, anemia and severe aortic stenosis who was admitted for planned transapical approach TAVR.  Patient was extubated on POD #0.   Developed crepitus and increased left apical pneumothorax with associated dyspnea on 6/21. Surgical chest tube too low to capture PTX with no air leak or tidaling. Left apical pigtail placed in IR on 6/21 complicated by small left apical hemorrhage. Pneumothorax resolved. CT chest obtained and revealed no e/o hemothorax  but again noted c/f pulmonary hemorrhage. Likely bled into existing bleb. Hemoglobin stable and no e/o further bleeding.     - CVTS following for chest tube management.  - No air leak in either atrium 6/23, subcutaneous emphysema much improved.   - Left pleural surgical chest tube removed 6/23.  - Left apical pneumothorax resolved after pigtail placement. Stable Hemoglobin after left pulmonary hemorrhage, likely into bleb. Minimal output. Leave pigtail in place but placed chest tube to water seal 6/24 and ordered CXR for 3 pm.  - Likely remove Chest tube 6/25 if CXR is stable, then could discharge 6/26 morning.  - Daily wound care: wound cleanser/soap & water, pat dry, keep wound clean and dry using Interdry prn  - Encourage good nutrition, particularly protein intake  - Maintain BG control <180 for optimal wound healing  - Maintain euvolemia  - Remainder of plan per primary teams; please call with questions.    Discussed with Dr Frank Cross through verbal communication during rounds.     Delta Rizo PA-C  Cardiothoracic Surgery  Pager [phone]    11:06 AM   June 24, 202        Interval History:     No overnight events.  States pain is well managed on current regimen. Slept ok overnight.  Feels tired most of the time.   Tolerating diet, is passing  "flatus, + BM. No nausea or vomiting.  Breathing well without complaints.   Working with therapies and ambulating in halls with assistance.          Physical Exam:   Blood pressure 96/60, pulse 73, temperature 98.3  F (36.8  C), temperature source Oral, resp. rate 18, height 1.499 m (4' 11\"), weight 38.6 kg (85 lb), last menstrual period 01/01/1995, SpO2 100%, not currently breastfeeding.  Vitals:    06/22/24 0308 06/23/24 0300 06/24/24 0454   Weight: 38.6 kg (85 lb 1.6 oz) 38.7 kg (85 lb 5.1 oz) 38.6 kg (85 lb)     Gen: A&Ox4, NAD  Neuro: no focal deficits   CV: HD stable.   Pulm: normal breathing on RA  Incision: clean, dry, intact, no erythema  Tubes/drain sites: dressing clean and dry, serosanguinous output, no air leak. 24 hr output 72 mL.     * placed tube to water seal         Data:    Imaging:  reviewed recent imaging, no acute concerns. Improving subcutaneous emphysema and no visible pneumothorax.     Labs:  BMP  Recent Labs   Lab 06/24/24  0550 06/23/24  0659 06/22/24  0622 06/21/24  0535    140 139 136   POTASSIUM 4.2 4.1 4.1 4.4   CHLORIDE 106 107 107 104   DION 8.0* 8.2* 8.7* 8.9   CO2 28 25 26 22   BUN 27.3* 26.1* 20.9 32.2*   CR 0.77 0.72 0.60 0.78   * 108* 109* 105*     CBC  Recent Labs   Lab 06/24/24  0550 06/23/24  0659 06/22/24  0622 06/21/24  0535   WBC 7.6 7.9 7.2 9.9   RBC 2.64* 2.85* 2.81* 2.98*   HGB 8.7* 9.3* 9.3* 10.0*   HCT 26.8* 29.0* 28.5* 30.0*   * 102* 101* 101*   MCH 33.0 32.6 33.1* 33.6*   MCHC 32.5 32.1 32.6 33.3   RDW 13.1 13.0 13.0 13.2   * 116* 106* 117*     INR  Recent Labs   Lab 06/20/24  1151 06/18/24  1228   INR 1.49* 1.20*      Hepatic Panel  Recent Labs   Lab 06/19/24  2252 06/19/24  1204 06/18/24  1228   AST 54* 30 26   ALT 13 14 11   ALKPHOS 48 46 60   BILITOTAL 0.6 0.5 0.6   ALBUMIN 3.5 3.3* 4.3     GLUCOSE:   Recent Labs   Lab 06/24/24  0550 06/23/24  0659 06/22/24  0622 06/21/24  0535 06/20/24  2209 06/20/24  0412   * 108* 109* 105* 122* " 120*

## 2024-06-25 ENCOUNTER — APPOINTMENT (OUTPATIENT)
Dept: GENERAL RADIOLOGY | Facility: CLINIC | Age: 79
DRG: 266 | End: 2024-06-25
Attending: PHYSICIAN ASSISTANT
Payer: MEDICARE

## 2024-06-25 ENCOUNTER — APPOINTMENT (OUTPATIENT)
Dept: PHYSICAL THERAPY | Facility: CLINIC | Age: 79
DRG: 266 | End: 2024-06-25
Attending: INTERNAL MEDICINE
Payer: MEDICARE

## 2024-06-25 PROBLEM — Z95.2 HISTORY OF TRANSCATHETER AORTIC VALVE REPLACEMENT (TAVR): Status: ACTIVE | Noted: 2024-06-25

## 2024-06-25 LAB
ANION GAP SERPL CALCULATED.3IONS-SCNC: 8 MMOL/L (ref 7–15)
ATRIAL RATE - MUSE: 73 BPM
ATRIAL RATE - MUSE: 74 BPM
BUN SERPL-MCNC: 25.7 MG/DL (ref 8–23)
CALCIUM SERPL-MCNC: 8.3 MG/DL (ref 8.8–10.2)
CHLORIDE SERPL-SCNC: 106 MMOL/L (ref 98–107)
CREAT SERPL-MCNC: 0.78 MG/DL (ref 0.51–0.95)
DEPRECATED HCO3 PLAS-SCNC: 27 MMOL/L (ref 22–29)
DIASTOLIC BLOOD PRESSURE - MUSE: NORMAL MMHG
DIASTOLIC BLOOD PRESSURE - MUSE: NORMAL MMHG
EGFRCR SERPLBLD CKD-EPI 2021: 77 ML/MIN/1.73M2
ERYTHROCYTE [DISTWIDTH] IN BLOOD BY AUTOMATED COUNT: 13.1 % (ref 10–15)
GLUCOSE SERPL-MCNC: 111 MG/DL (ref 70–99)
HCT VFR BLD AUTO: 26.1 % (ref 35–47)
HGB BLD-MCNC: 8.5 G/DL (ref 11.7–15.7)
INTERPRETATION ECG - MUSE: NORMAL
INTERPRETATION ECG - MUSE: NORMAL
MAGNESIUM SERPL-MCNC: 2.1 MG/DL (ref 1.7–2.3)
MCH RBC QN AUTO: 33.2 PG (ref 26.5–33)
MCHC RBC AUTO-ENTMCNC: 32.6 G/DL (ref 31.5–36.5)
MCV RBC AUTO: 102 FL (ref 78–100)
P AXIS - MUSE: 78 DEGREES
P AXIS - MUSE: 80 DEGREES
PHOSPHATE SERPL-MCNC: 3.4 MG/DL (ref 2.5–4.5)
PLATELET # BLD AUTO: 131 10E3/UL (ref 150–450)
POTASSIUM SERPL-SCNC: 4.6 MMOL/L (ref 3.4–5.3)
PR INTERVAL - MUSE: 132 MS
PR INTERVAL - MUSE: 132 MS
QRS DURATION - MUSE: 110 MS
QRS DURATION - MUSE: 114 MS
QT - MUSE: 424 MS
QT - MUSE: 436 MS
QTC - MUSE: 470 MS
QTC - MUSE: 480 MS
R AXIS - MUSE: 30 DEGREES
R AXIS - MUSE: 47 DEGREES
RBC # BLD AUTO: 2.56 10E6/UL (ref 3.8–5.2)
SODIUM SERPL-SCNC: 141 MMOL/L (ref 135–145)
SYSTOLIC BLOOD PRESSURE - MUSE: NORMAL MMHG
SYSTOLIC BLOOD PRESSURE - MUSE: NORMAL MMHG
T AXIS - MUSE: 30 DEGREES
T AXIS - MUSE: 32 DEGREES
VENTRICULAR RATE- MUSE: 73 BPM
VENTRICULAR RATE- MUSE: 74 BPM
WBC # BLD AUTO: 8.6 10E3/UL (ref 4–11)

## 2024-06-25 PROCEDURE — 71046 X-RAY EXAM CHEST 2 VIEWS: CPT

## 2024-06-25 PROCEDURE — 250N000013 HC RX MED GY IP 250 OP 250 PS 637: Performed by: NURSE PRACTITIONER

## 2024-06-25 PROCEDURE — 250N000013 HC RX MED GY IP 250 OP 250 PS 637: Performed by: STUDENT IN AN ORGANIZED HEALTH CARE EDUCATION/TRAINING PROGRAM

## 2024-06-25 PROCEDURE — 93010 ELECTROCARDIOGRAM REPORT: CPT | Performed by: INTERNAL MEDICINE

## 2024-06-25 PROCEDURE — 80048 BASIC METABOLIC PNL TOTAL CA: CPT | Performed by: NURSE PRACTITIONER

## 2024-06-25 PROCEDURE — 93005 ELECTROCARDIOGRAM TRACING: CPT

## 2024-06-25 PROCEDURE — 97530 THERAPEUTIC ACTIVITIES: CPT | Mod: GP | Performed by: PHYSICAL THERAPIST

## 2024-06-25 PROCEDURE — 85027 COMPLETE CBC AUTOMATED: CPT | Performed by: NURSE PRACTITIONER

## 2024-06-25 PROCEDURE — 250N000013 HC RX MED GY IP 250 OP 250 PS 637: Performed by: INTERNAL MEDICINE

## 2024-06-25 PROCEDURE — 99232 SBSQ HOSP IP/OBS MODERATE 35: CPT

## 2024-06-25 PROCEDURE — 71046 X-RAY EXAM CHEST 2 VIEWS: CPT | Mod: 26 | Performed by: RADIOLOGY

## 2024-06-25 PROCEDURE — 71046 X-RAY EXAM CHEST 2 VIEWS: CPT | Mod: 76

## 2024-06-25 PROCEDURE — 250N000011 HC RX IP 250 OP 636: Mod: JZ

## 2024-06-25 PROCEDURE — 83735 ASSAY OF MAGNESIUM: CPT | Performed by: NURSE PRACTITIONER

## 2024-06-25 PROCEDURE — 97116 GAIT TRAINING THERAPY: CPT | Mod: GP | Performed by: PHYSICAL THERAPIST

## 2024-06-25 PROCEDURE — 120N000005 HC R&B MS OVERFLOW UMMC

## 2024-06-25 PROCEDURE — 36415 COLL VENOUS BLD VENIPUNCTURE: CPT | Performed by: NURSE PRACTITIONER

## 2024-06-25 PROCEDURE — 84100 ASSAY OF PHOSPHORUS: CPT | Performed by: NURSE PRACTITIONER

## 2024-06-25 RX ORDER — FUROSEMIDE 10 MG/ML
20 INJECTION INTRAMUSCULAR; INTRAVENOUS ONCE
Status: COMPLETED | OUTPATIENT
Start: 2024-06-25 | End: 2024-06-25

## 2024-06-25 RX ADMIN — FLUTICASONE PROPIONATE AND SALMETEROL XINAFOATE 1 PUFF: 230; 21 AEROSOL, METERED RESPIRATORY (INHALATION) at 09:48

## 2024-06-25 RX ADMIN — AMIODARONE HYDROCHLORIDE 400 MG: 200 TABLET ORAL at 20:31

## 2024-06-25 RX ADMIN — METHOCARBAMOL 500 MG: 500 TABLET ORAL at 20:31

## 2024-06-25 RX ADMIN — GABAPENTIN 100 MG: 100 CAPSULE ORAL at 20:31

## 2024-06-25 RX ADMIN — Medication 1 TABLET: at 09:41

## 2024-06-25 RX ADMIN — PANTOPRAZOLE SODIUM 40 MG: 40 TABLET, DELAYED RELEASE ORAL at 09:41

## 2024-06-25 RX ADMIN — DOCUSATE SODIUM 50 MG AND SENNOSIDES 8.6 MG 1 TABLET: 8.6; 5 TABLET, FILM COATED ORAL at 09:44

## 2024-06-25 RX ADMIN — FLUTICASONE PROPIONATE AND SALMETEROL XINAFOATE 1 PUFF: 230; 21 AEROSOL, METERED RESPIRATORY (INHALATION) at 20:31

## 2024-06-25 RX ADMIN — SIMVASTATIN 20 MG: 10 TABLET, FILM COATED ORAL at 20:31

## 2024-06-25 RX ADMIN — METHOCARBAMOL 500 MG: 500 TABLET ORAL at 09:41

## 2024-06-25 RX ADMIN — FUROSEMIDE 20 MG: 10 INJECTION, SOLUTION INTRAMUSCULAR; INTRAVENOUS at 09:41

## 2024-06-25 RX ADMIN — AMIODARONE HYDROCHLORIDE 400 MG: 200 TABLET ORAL at 09:41

## 2024-06-25 RX ADMIN — GABAPENTIN 100 MG: 100 CAPSULE ORAL at 09:41

## 2024-06-25 RX ADMIN — POLYETHYLENE GLYCOL 3350 17 G: 17 POWDER, FOR SOLUTION ORAL at 09:44

## 2024-06-25 RX ADMIN — ASPIRIN 81 MG CHEWABLE TABLET 81 MG: 81 TABLET CHEWABLE at 09:41

## 2024-06-25 RX ADMIN — LIDOCAINE 4% 1 PATCH: 40 PATCH TOPICAL at 20:32

## 2024-06-25 ASSESSMENT — ACTIVITIES OF DAILY LIVING (ADL)
ADLS_ACUITY_SCORE: 31

## 2024-06-25 NOTE — PLAN OF CARE
"/57 (BP Location: Right arm)   Pulse 83   Temp 98.2  F (36.8  C) (Oral)   Resp 17   Ht 1.499 m (4' 11\")   Wt 38.6 kg (85 lb)   LMP 01/01/1995 (Approximate)   SpO2 93%   BMI 17.17 kg/m      Neuro: A&Ox4.   Cardiac: SR. VSS.   Respiratory: on RA.  GI/: Adequate urine output. BM X1  Diet/appetite: Tolerating regular diet. Appetite fair.  Activity:  Assist of one, up to chair and bedside commode.  Pain: At acceptable level on current regimen.   Skin: No new deficits noted.  LDA's: L and R PIV, SL.  Changes this shift: CT discontinued.    Plan: Discharge to LTC tomorrow. Continue with POC. Notify primary team with changes.      " 5

## 2024-06-25 NOTE — PROGRESS NOTES
Cardiovascular Surgery Progress Note  06/25/2024         Assessment and Plan:     Loulou Lucero is a 79 year old female with past medical history of tobacco use, COPD, lung nodule, HLD, mild CAD, chronic low back pain, MGUS, anemia and severe aortic stenosis who was admitted for planned transapical approach TAVR.  Patient was extubated on POD #0.   Developed crepitus and increased left apical pneumothorax with associated dyspnea on 6/21. Surgical chest tube too low to capture PTX with no air leak or tidaling. Left apical pigtail placed in IR on 6/21 complicated by small left apical hemorrhage. Pneumothorax resolved. CT chest obtained and revealed no e/o hemothorax  but again noted c/f pulmonary hemorrhage. Likely bled into existing bleb. Hemoglobin stable and no e/o further bleeding. CVTS following for chest tube management.    - No air leaks and subcutaneous emphysema much improved.   - Left pleural surgical chest tube removed 6/23.  - Left apical pneumothorax resolved after pigtail placement. Stable Hemoglobin after left pulmonary hemorrhage, likely into bleb. Minimal output. Left pigtail placed chest tube to water seal 6/24 with stable follow up CXR at 3 pm. Repeat CXR 6/25 am showed stable/improved subcutaneous emphysema and no pneumo.  - Removed left pleural Chest tube 6/25. Needs stable 6/26 am CXR, then may discharge when medically stable.   - Daily wound care: wound cleanser/soap & water, pat dry, keep wound clean and dry using Interdry prn  - Encourage good nutrition, particularly protein intake  - Maintain BG control <180 for optimal wound healing  - Maintain euvolemia  - Remainder of plan per primary teams; please call with questions.    Discussed with Dr Frank Cross through verbal communication during rounds.     Delta Rizo PA-C  Cardiothoracic Surgery  Pager [phone]    11:06 AM   June 24, 202        Interval History:     No overnight events.  States pain is well managed on current regimen.  "Slept well overnight.   Tolerating diet, is passing flatus, + BM. No nausea or vomiting.  Breathing well without complaints and weaning O2 again this AM.  Working with therapies and ambulating in halls with assistance.          Physical Exam:   Blood pressure 104/61, pulse 73, temperature 98.6  F (37  C), temperature source Oral, resp. rate 16, height 1.499 m (4' 11\"), weight 38.6 kg (85 lb), last menstrual period 01/01/1995, SpO2 99%, not currently breastfeeding.  Vitals:    06/22/24 0308 06/23/24 0300 06/24/24 0454   Weight: 38.6 kg (85 lb 1.6 oz) 38.7 kg (85 lb 5.1 oz) 38.6 kg (85 lb)     Gen: A&Ox4, NAD  Neuro: no focal deficits   CV: HD stable.   Pulm: normal breathing on RA  Incision: clean, dry, intact, no erythema  Tubes/drain sites: dressing clean and dry, serosanguinous output, no air leak. 24 hr output minimal.     * removed left pleural tube without immediate complication         Data:    Imaging:  reviewed recent imaging, no acute concerns. Improving subcutaneous emphysema and no visible pneumothorax.     Labs:  BMP  Recent Labs   Lab 06/25/24  0603 06/24/24  0550 06/23/24  0659 06/22/24  0622    140 140 139   POTASSIUM 4.6 4.2 4.1 4.1   CHLORIDE 106 106 107 107   DION 8.3* 8.0* 8.2* 8.7*   CO2 27 28 25 26   BUN 25.7* 27.3* 26.1* 20.9   CR 0.78 0.77 0.72 0.60   * 109* 108* 109*     CBC  Recent Labs   Lab 06/25/24  0603 06/24/24  0550 06/23/24  0659 06/22/24  0622   WBC 8.6 7.6 7.9 7.2   RBC 2.56* 2.64* 2.85* 2.81*   HGB 8.5* 8.7* 9.3* 9.3*   HCT 26.1* 26.8* 29.0* 28.5*   * 102* 102* 101*   MCH 33.2* 33.0 32.6 33.1*   MCHC 32.6 32.5 32.1 32.6   RDW 13.1 13.1 13.0 13.0   * 119* 116* 106*     INR  Recent Labs   Lab 06/20/24  1151 06/18/24  1228   INR 1.49* 1.20*      Hepatic Panel  Recent Labs   Lab 06/19/24  2252 06/19/24  1204 06/18/24  1228   AST 54* 30 26   ALT 13 14 11   ALKPHOS 48 46 60   BILITOTAL 0.6 0.5 0.6   ALBUMIN 3.5 3.3* 4.3     GLUCOSE:   Recent Labs   Lab " 06/25/24  0603 06/24/24  0550 06/23/24  0659 06/22/24  0622 06/21/24  0535 06/20/24  2209   * 109* 108* 109* 105* 122*

## 2024-06-25 NOTE — CONSULTS
Discharge Pharmacy Test Claim    Patient's [employer] plan covers Eliquis and Xarelto, each with an expected monthly copay of $0.00.    Test Claim Copay   Eliquis 0.00   Xarelto 0.00       Marilou Gomes  [facility] Pharmacy Liaison  Phone: [phone] Fax: [phone]  Available on Teams & Vocera

## 2024-06-25 NOTE — PLAN OF CARE
6/24/2024 @ 1703-2759    Neuro: A&O x 4. Ax1.   Cardiac: SR, HR 70s. SBP 100s. Denies chest pain.   Respiratory: 2L NC, sats >90s. Denies SOB. L CT in place.   GI/: LBM 6/22/2024. Regular diet. Ax1 to bathroom.   Skin: L CT to water seal; no output, CDI.   Pain: Denies.   LDA's: R & L PIV, SL.     All other body systems WDL.     Plan: Continue with POC; disposition to TCU once medically stable. Notify care team of any changes.     For vital signs and complete assessments, please see documentation flowsheets.      Lorna Christian RN   Cardiology

## 2024-06-25 NOTE — PLAN OF CARE
Neuro: A&Ox4.   Cardiac: SR. VSS.   Respiratory: Sating upper 90s on RA.  GI/: Adequate urine output. Constipated.   Diet/appetite: Tolerating regular diet. Eating well.  Activity:  Assist of 1, up to chair and in halls.  Pain: At acceptable level on current regimen. Chest pain this evening that resolved after x2 doses of nitroglycerin. Labs and tests ordered negative for stemi.   Skin: No new deficits noted.  LDA's: 1 CT to water seal in place likely to be removed nadeen. R and L PIV.     Plan: Continue with POC. Notify primary team with changes.    Adeline Smith RN

## 2024-06-25 NOTE — DISCHARGE SUMMARY
[address] [address]  p: [phone]    Discharge Summary: Cardiology Service    Loulou Lucero MRN# [id number]   YOB: 1945 Age: 79 year old       Admission Date: 06/19/2024  Discharge Date: 06/28/2024    Discharge Diagnoses:  # Severe Aortic Stenosis s/p Transapical TAVR on 6/19/24 by Dr. Cross  # Chronic Heart Failure w/ Preserved EF (60-65%)  # Hyperlipidemia  # Mild to Moderate CAD  # Acute Post Op Pain  # Post Procedural Hypertension  # Elevated Troponin  # Subcutaneous Emphysema, resolved  # Paroxysmal atrial fibrillation  # COPD  # Tobacco Use  # Chronic Iron Deficiency Anemia  # MGUS  # PAD  # Near syncope  # Severe Malnutrition    Brief HPI:  Loulou Lucero is a 79 year old female with a history of tobacco use, COPD, lung nodule, HLD, mild CAD, chronic low back pain, MGUS, anemia and severe aortic stenosis who was admitted 6/19 for planned transapical TAVR. Course complicated by left apical pneumothorax and left pulmonary hemorrhage. CVTS placed CT and these were removed 6/25 without incidence.     Hospital Course by Diagnosis:  # Severe Aortic Stenosis s/p Transapical TAVR on 6/19/24 by Dr. Cross  # Chronic Heart Failure w/ Preserved EF (60-65%)  # Hyperlipidemia  # Mild to Moderate CAD  # Acute Post Op Pain  # Post Procedural Hypertension  # Elevated Troponin  # Subcutaneous Emphysema: Improved   Prior to procedure, pt noted to have a mean gradient 30 mmHG, PETER 0.7 cm^2, PV 3.6 m/s. Now s/p TAVR with 20mm Diaz valve via transapical approach. Procedure was uncomplicated. POD 1 Echo with post op MG 11 mmHg, no PVL.  - Regarding concern for Drew on EKG, troponin checked and elevated at 536. EKG with RBBB rather than Drew, troponin elevated in setting of recent TAVR, pt with no chest pain  - CVTS consulted for chest tube management:             - Left apical pneumo resolved after pigtail placement: stable Hgb after left pulmonary  "hemorrhage into bleb.   - Left pleural chest tube removed 6/23; pigtail placed  - Pigtail to water seal 6/24, repeat CXR w/o pneumothorax. Removed 6/25.   - Follow up with CVTS 7/5 with CXR prior  - Aspirin 81 mg for anti-platelet therapy  - Continue PTA Simvastatin 20mg q HS  - Cardiac rehab/PT/OT recommends TCU at discharge  - Daily weights   - Cardiology follow up     # Paroxsymal Atrial Fibrillation  New atrial fibrillation with RVR on morning of 6/22. Patient reported intense shortness of breath. Given 5 mg IV metoprolol x 1, 2 g Mag IV x 1, 500 mL LR bolus over 2 hours  - Started on amio gtt, transitioned to PO on 6/23   - Patient should continue amio for about 4-6 weeks and can follow up with [facility] outpatient in structural clinic for further assessment (need for ambulatory monitor, EP involvement)  - Not anticoagulated: per discussion with Dr. Patricia, decision deferred to CVTS - need to hold off per discussion with CVTS as patient has chest tubes with small apical hemorrhage on 6/21, as well as c/f pulmonary hemorrhage into bleb on CT.      # COPD  # Tobacco Use  - Encourage cessation  - Continue PTA fluticasone-salmeterol     # Chronic Iron Deficiency Anemia   # MGUS  Baseline Hgb ~ 9  - Continue PTA iron supplementation  - Hemoglobin at discharge 8.3  - CBC at follow up     # PAD  # Near Syncope  Pt reported to RN and ICU CELI that she feels \"close to passing out\" when turns neck certain directions.   - Carotid US completed; less than 50% narrowing in right and left     # Severe Malnutrition 2/2 chronic illness  - Regular diet with supplements in between meals  - Follow with PCP    Pertinent Procedures:  1. TAVR    Consults:  Pain  CVTS    Medication Changes:   See Medication List Below     Discharge medications:   Current Discharge Medication List        START taking these medications    Details   amiodarone (PACERONE) 200 MG tablet 1 tablet (200 mg) by Oral or FT or NG tube route 2 times " daily for 3 days, THEN 1 tablet (200 mg) daily for 90 days.    Associated Diagnoses: Paroxysmal A-fib (H)      gabapentin (NEURONTIN) 100 MG capsule 1 capsule (100 mg) by Oral or Feeding Tube route 2 times daily    Associated Diagnoses: History of transcatheter aortic valve replacement (TAVR)      methocarbamol (ROBAXIN) 500 MG tablet 1 tablet (500 mg) by Oral or Feeding Tube route 3 times daily    Associated Diagnoses: History of transcatheter aortic valve replacement (TAVR)      pantoprazole (PROTONIX) 40 MG EC tablet Take 1 tablet (40 mg) by mouth daily    Associated Diagnoses: Gastroesophageal reflux disease without esophagitis           CONTINUE these medications which have NOT CHANGED    Details   aspirin 81 MG EC tablet Take 324 mg by mouth daily      cyanocobalamin (VITAMIN B-12) 1000 MCG tablet Take 1 tablet (1,000 mcg) by mouth daily  Qty: 90 tablet, Refills: 3    Associated Diagnoses: Vitamin B12 deficiency (non anemic)      fluticasone-salmeterol (ADVAIR DISKUS) 250-50 MCG/ACT inhaler Inhale 1 puff into the lungs 2 times daily WIXELA-Disp as covered by Pt's insurance  Qty: 3 each, Refills: 2    Associated Diagnoses: Pulmonary emphysema, unspecified emphysema type (H)      simvastatin (ZOCOR) 20 MG tablet Take 1 tablet (20 mg) by mouth at bedtime  Qty: 90 tablet, Refills: 2    Associated Diagnoses: Hyperlipidemia LDL goal <100             Follow-up:  Structural heart 1-2 week     Labs or imaging requiring follow-up after discharge:  BMP, CBC    Code status:  FULL    Condition on discharge  Temp:  [97.7  F (36.5  C)-98.7  F (37.1  C)] 98.1  F (36.7  C)  Pulse:  [72-82] 79  Resp:  [16-18] 18  BP: (105-137)/(55-74) 135/61  SpO2:  [92 %-99 %] 97 %    General: In bed, in NAD  HEENT: EOMI, PERRLA, no scleral icterus or injection  CARDIAC: RRR, no m/r/g appreciated. Peripheral pulses 2+  RESP: CTAB, no wheezes, rhonchi or crackles appreciated.  GI: NABS, NT/ND, no guarding or rebound  EXTREMITIES: No LE edema,  pulses 2+.  SKIN: No acute lesions appreciated. Pigtail site covered. Dressing c/d/I.   NEURO: Alert and oriented X3, CN II-XII grossly intact, no focal neurological deficits noted    Imaging with results:  EKG 12 Lead 6/21/2024: NSR, RBBB (baseline)        ECHO 6/20/2024:  Interpretation Summary  s/p TAVR with 20 mm Diaz Benedict 3 on 6/19/2024. The valve is well-seated  and without paravalvular regurgitation. Doppler interrogation of the  prosthetic valve reveals Vmax 2.2 m/s, mean pressure gradient 11 mmHg.     Left ventricular function is normal.The ejection fraction is 60-65%.  Global right ventricular function is normal.  Severe mitral annular calcification.  IVC diameter and respiratory changes fall into an intermediate range  suggesting an RA pressure of 8 mmHg.  No pericardial effusion.     This study was compared with the study from 6/19/2024. No significant changes  noted compared to post-TAVR images.     CT Chest 6/22/24  1.  New (compared to CT from 4/5/2024) left upper lobe ill marginated  confluent consolidative appearing density with surrounding  groundglass, mild anteroseptal thinning and possible tiny cystic  change suspicious for infection or potentially pulmonary hemorrhage.  Neoplastic etiology unlikely though cannot be entirely excluded.  Recommend short-term follow-up imaging to resolution.  2.  Left chest tube and apical pigtail chest catheter without  significant pleural fluid or evidence of hemothorax. Residual small  anterior left pneumothorax. Extensive presumed postprocedural  subcutaneous air in the left chest wall and lower neck.  3.  Small-to-moderate right pleural effusion with simple fluid  attenuation.  4.  Advanced pulmonary emphysema and chronic scarring, most prominent  in the apices and in the right upper lobe.  5.  Prominent precarinal lymph node, similar to prior. Consider  attention on follow-up.  6.   Additional incidental finding similar to prior as described  including  extensive atherosclerotic calcification of the thoracic  aorta, coronary artery calcification.          Recent Labs   Lab 06/28/24  0523 06/27/24  0538 06/26/24  0516 06/25/24  0603    140 140 141   POTASSIUM 4.1 3.9 3.7 4.6   CHLORIDE 105 104 104 106   CO2 29 30* 29 27   ANIONGAP 6* 6* 7 8   GLC 99 106* 102* 111*   BUN 13.1 17.6 22.6 25.7*   CR 0.77 0.74 0.80 0.78   GFRESTIMATED 78 82 75 77   DION 8.3* 8.1* 8.4* 8.3*   MAG 1.9 1.9 1.9 2.1   PHOS 3.5 3.4 3.8 3.4         Patient Care Team:  Ele Marc NP as PCP - General (Internal Medicine)  Amadeo Shafer as PCP - Internal Medicine  Reynaldo Hansen MD as Assigned Cancer Care Provider  Maya Jones, RN as Registered Nurse  Maya Jones RN as Registered Nurse (Cardiology)  Ele Marc NP as Assigned PCP  Polina De Leon NP as Assigned Heart and Vascular Provider    Time Spent on this Encounter   I, Eloina Jeffries PA-C, personally saw the patient today and spent greater than 30 minutes discharging this patient.    >30 minutes spent in discharge, including >50% in counseling and coordination of care, medication review and plan of care recommended on follow up. Questions were answered.   It was our pleasure to care for Loulou Lucero during this hospitalization. Please do not hesitate to contact me should there be questions regarding the hospital course or discharge plan.    Patient discussed with staff cardiologist, Dr. Mays, who agrees with the above documentation and plan. Documentation represents joint decision making.     Eloina Jeffries PA-C   [facility]

## 2024-06-25 NOTE — PROGRESS NOTES
Care Management Follow Up    Length of Stay (days): 6    Expected Discharge Date: 06/26/2024     Concerns to be Addressed: discharge planning     Patient plan of care discussed at interdisciplinary rounds: Yes    Anticipated Discharge Disposition: Transitional Care     Anticipated Discharge Services: Transportation Services  Anticipated Discharge DME: None    Patient/family educated on Medicare website which has current facility and service quality ratings: yes  Education Provided on the Discharge Plan: Yes.    Mid afternoon, LUIS met with the pt at bedside. SW introduced self, role in discharge planning, and updated her about accepting TCU facility. Pt appeared confused when SW spoke about TCU so SW explained what a TCU even is and how that given the pt's ADL and mobility needs, pt is currently TCU appropraite. Pt asked the SW about a home discharge and how long she'd be at TCU and SW explained again that home is not currently deemed safe given TCU need, that she'd need 24/7 assist at home which she doesn't have, and that SW wouldn't know currently about LOS at TCU but could guess a few week(s). Pt appeared to be processing info so SW told her that she'd stop by later on.    LUIS called the pt's daughter Danni (Phone: [phone]) at 1:59pm and per Danni, she just got out of work and will be at the hospital in about 30 mins so SW to meet with Danni at bedside to update her about acceping TCU.       Patient/Family in Agreement with the Plan: yes    Referrals Placed by CM/SW:      [facility] (Top Choice)  [address] [place] [id number]   Phone: [phone]  Admissions: [phone]  Fax: [phone]  - 6/25: SW received a call from admissions at 10:10am with them telling the SW that they should be able to meet the pt's needs and can accept when ready. She gave the SW her direct line and also the fax (both listed above).        Private pay costs discussed: Not  "applicable          Additional Information:  LUIS is following for discharge planning.    At 2:45pm, LUIS met with the pt and her daughter Danni at bedside. Pt was in bed and quiet so SW primarily spoke with the daughter Danni. SW informed Danni about accepting TCU (their top choice). Danni spoke with the SW about her mom's medical history with this hospitalization being the pt's 1st ever surgery and how the concept of needing TCU is new for the pt. Danni reports experience in working in TCUs before so has close connections still. Danni and LUIS spoke about varying recs for discharge and how the pt is in a \"grey area\" when it comes to discharge recs (TCU vs home). Per Danni, pt does prefer home and if able to, would like to discharge the pt to her home (45 mins from [place]). Danni works 10am to 1pm M-F, her daughter is at home all day Wednesday, and  is at home all day Fridays. Her brother (who lives in [place] as a teacher) is here for the summer visiting but will be back in [place] for school year starting 1st week of August. Danni wanted to speak with the PT provider about possibly working on a home discharge.     LUIS sent the PT provider that worked with the pt today about conversation above.     LUIS will continue to follow and assist as needed.     ___________________    TOREY Dupree, CAMRON  6C , covering beds 2261 to 65  [facility]   Phone: [phone]  6C Cards LUIS James                      "

## 2024-06-25 NOTE — DISCHARGE INSTRUCTIONS
AFTER YOU GO HOME FROM YOUR HEART SURGERY  (Transapical transcatheter aortic valve replacement via left thoracotomy - 6/19/2024)    You had a mini-thoracotomy, avoid lifting anything greater than ten pounds for 2 weeks and then 20 pounds for an additional 2 weeks.   Avoid twisting or reaching too far across your body.    Avoid strenuous activities such as bowling, vacuuming, raking, shoveling, golf or tennis for 12 weeks after your surgery.   It is okay to resume sex if you feel comfortable in doing so. You may have to try different positions with your partner.    Splint your chest incision by hugging a pillow or bringing your arms across your chest when coughing or sneezing.     You may drive after 2 weeks if not taking pain medication.      Shower or wash your incisions daily with soap and water (or as instructed), pat dry.   Keep wound clean and dry, showers are okay after discharge, but don't let spray hit directly on incision.   No baths or swimming for 1 month.   Cover chest tube sites with dry gauze until they stop draining, then leave open to air. It is not abnormal for chest tube sites to drain yellowish/clear fluid for up to 2-3 weeks after surgery.   Watch for signs of infection: increased redness, tenderness, warmth or any drainage that appears infected (pus like) or is persistent.  Also a temperature > 100.5 F or chills. Call your surgeon or primary care provider's office immediately.   Remove any skin glue left on incisions after 10-14 days. This will not affect your incision and can speed up healing.    Exercise is very important in your recovery. Please follow the guidelines set up for you in your cardiac rehab classes at the hospital. If outpatient cardiac rehab was ordered for you, we highly recommend you participate. If you have problems arranging your cardiac rehab, please call [phone] for all locations, with the exception of [place], please call [phone] and [facility], please call  [phone].    Avoid sitting for prolonged periods of time, try to walk every hour during the day. If you have a leg incision, elevate your leg often when you are not walking.    Check your weight when you get home from the hospital and continue to check it daily through your recovery for at least a month. If you notice a weight gain of 2-3 pounds in a week, notify your primary care physician, cardiologist or surgeon.    Bowel activity may be slow after surgery. If necessary, you may take an over the counter laxative such as Milk of Magnesia or Miralax. You may have stool softeners prescribed (docusate sodium, Senokot). We recommend using stool softeners while using narcotics for pain (oxycodone/percocet, hydrocodone/vicodin, hydromorphone/dilaudid).      Wean OFF of narcotics (oxycodone, dilaudid, hydrocodone) as soon as possible. You should continue taking acetaminophen as long as you have any surgical pain as the first choice for pain control and add narcotics as necessary for pain to be tolerable.      DENTAL VISITS AFTER SURGERY  You have had your aortic heart valve replaced, we do not recommend having any dental work done for 6 months and you will need to take an antibiotic prior to dental visits from now on.  Please notify your dentist before any procedure for the proper treatment needed. The antibiotic is taken by mouth one hour prior to visit. This includes routine cleanings.    DO NOT SMOKE.  IF YOU NEED HELP QUITTING, PLEASE TALK WITH YOUR CARDIOLOGIST OR PRIMARY DOCTOR.    REGARDING PRESCRIPTION REFILLS.  If you need a refill on your pain medication contact us to discuss your pain and a possible one time refill.   All other medications will be adjusted, discontinued and re-filled by your primary care physician and/or your cardiologist as they were prior to your surgery. We have given you enough for one to three month with possibly one refill.    POST-OPERATIVE CLINIC VISITS  You will have a follow up  visit in CVTS Advanced Practice Provider Clinic at the [facility] [place] on [facility] on 7/5. Please get chest xray prior to appointment, you can just walk in for this, the order is in the computer.  You will then return to the care of your primary provider and your cardiologist. Future medication refills should come from your PCP or Cardiologist.   You should see your primary care provider about 2 weeks after discharge.   It is important to see your cardiologist as directed.      SURGICAL QUESTIONS  Please call on of the RN Coordinators listed below with surgical recovery and medication questions.  They will assist you with your needs and contact other surgery care team members as indicated.    On weekends or after hours, please call [phone] and ask the  to page the Cardiothoracic Surgery fellow on call.      Thank you,    Your Cardiothoracic Surgery Team   Kendell Kramer, RN Care Coordinator -  [phone]  Ashanti Black, RN Care Coordinator - [phone]  Mehnaz Spencer RN Care Coordinator - [phone]  Jennifer Jeong, RN Care Coordinator - [phone]

## 2024-06-26 ENCOUNTER — APPOINTMENT (OUTPATIENT)
Dept: OCCUPATIONAL THERAPY | Facility: CLINIC | Age: 79
DRG: 266 | End: 2024-06-26
Attending: INTERNAL MEDICINE
Payer: MEDICARE

## 2024-06-26 ENCOUNTER — APPOINTMENT (OUTPATIENT)
Dept: GENERAL RADIOLOGY | Facility: CLINIC | Age: 79
DRG: 266 | End: 2024-06-26
Attending: PHYSICIAN ASSISTANT
Payer: MEDICARE

## 2024-06-26 LAB
ANION GAP SERPL CALCULATED.3IONS-SCNC: 7 MMOL/L (ref 7–15)
ATRIAL RATE - MUSE: 75 BPM
BUN SERPL-MCNC: 22.6 MG/DL (ref 8–23)
CALCIUM SERPL-MCNC: 8.4 MG/DL (ref 8.8–10.2)
CHLORIDE SERPL-SCNC: 104 MMOL/L (ref 98–107)
CREAT SERPL-MCNC: 0.8 MG/DL (ref 0.51–0.95)
DEPRECATED HCO3 PLAS-SCNC: 29 MMOL/L (ref 22–29)
DIASTOLIC BLOOD PRESSURE - MUSE: NORMAL MMHG
EGFRCR SERPLBLD CKD-EPI 2021: 75 ML/MIN/1.73M2
ERYTHROCYTE [DISTWIDTH] IN BLOOD BY AUTOMATED COUNT: 13.3 % (ref 10–15)
GLUCOSE SERPL-MCNC: 102 MG/DL (ref 70–99)
HCT VFR BLD AUTO: 26.4 % (ref 35–47)
HGB BLD-MCNC: 8.6 G/DL (ref 11.7–15.7)
INTERPRETATION ECG - MUSE: NORMAL
MAGNESIUM SERPL-MCNC: 1.9 MG/DL (ref 1.7–2.3)
MCH RBC QN AUTO: 33.3 PG (ref 26.5–33)
MCHC RBC AUTO-ENTMCNC: 32.6 G/DL (ref 31.5–36.5)
MCV RBC AUTO: 102 FL (ref 78–100)
P AXIS - MUSE: 83 DEGREES
PHOSPHATE SERPL-MCNC: 3.8 MG/DL (ref 2.5–4.5)
PLATELET # BLD AUTO: 155 10E3/UL (ref 150–450)
POTASSIUM SERPL-SCNC: 3.7 MMOL/L (ref 3.4–5.3)
PR INTERVAL - MUSE: 126 MS
QRS DURATION - MUSE: 120 MS
QT - MUSE: 452 MS
QTC - MUSE: 504 MS
R AXIS - MUSE: 33 DEGREES
RBC # BLD AUTO: 2.58 10E6/UL (ref 3.8–5.2)
SODIUM SERPL-SCNC: 140 MMOL/L (ref 135–145)
SYSTOLIC BLOOD PRESSURE - MUSE: NORMAL MMHG
T AXIS - MUSE: 36 DEGREES
VENTRICULAR RATE- MUSE: 75 BPM
WBC # BLD AUTO: 9.5 10E3/UL (ref 4–11)

## 2024-06-26 PROCEDURE — 71045 X-RAY EXAM CHEST 1 VIEW: CPT | Mod: 26 | Performed by: RADIOLOGY

## 2024-06-26 PROCEDURE — 84100 ASSAY OF PHOSPHORUS: CPT | Performed by: NURSE PRACTITIONER

## 2024-06-26 PROCEDURE — 250N000013 HC RX MED GY IP 250 OP 250 PS 637: Performed by: STUDENT IN AN ORGANIZED HEALTH CARE EDUCATION/TRAINING PROGRAM

## 2024-06-26 PROCEDURE — 250N000013 HC RX MED GY IP 250 OP 250 PS 637: Performed by: INTERNAL MEDICINE

## 2024-06-26 PROCEDURE — 97535 SELF CARE MNGMENT TRAINING: CPT | Mod: GO | Performed by: OCCUPATIONAL THERAPIST

## 2024-06-26 PROCEDURE — 71045 X-RAY EXAM CHEST 1 VIEW: CPT

## 2024-06-26 PROCEDURE — 120N000005 HC R&B MS OVERFLOW UMMC

## 2024-06-26 PROCEDURE — 97530 THERAPEUTIC ACTIVITIES: CPT | Mod: GO | Performed by: OCCUPATIONAL THERAPIST

## 2024-06-26 PROCEDURE — 93010 ELECTROCARDIOGRAM REPORT: CPT | Performed by: INTERNAL MEDICINE

## 2024-06-26 PROCEDURE — 99232 SBSQ HOSP IP/OBS MODERATE 35: CPT

## 2024-06-26 PROCEDURE — 80048 BASIC METABOLIC PNL TOTAL CA: CPT | Performed by: NURSE PRACTITIONER

## 2024-06-26 PROCEDURE — 83735 ASSAY OF MAGNESIUM: CPT | Performed by: NURSE PRACTITIONER

## 2024-06-26 PROCEDURE — 85027 COMPLETE CBC AUTOMATED: CPT | Performed by: NURSE PRACTITIONER

## 2024-06-26 PROCEDURE — 93005 ELECTROCARDIOGRAM TRACING: CPT

## 2024-06-26 PROCEDURE — 36415 COLL VENOUS BLD VENIPUNCTURE: CPT | Performed by: NURSE PRACTITIONER

## 2024-06-26 RX ORDER — POTASSIUM CHLORIDE 750 MG/1
10 TABLET, EXTENDED RELEASE ORAL ONCE
Status: COMPLETED | OUTPATIENT
Start: 2024-06-26 | End: 2024-06-26

## 2024-06-26 RX ADMIN — FLUTICASONE PROPIONATE AND SALMETEROL XINAFOATE 1 PUFF: 230; 21 AEROSOL, METERED RESPIRATORY (INHALATION) at 09:42

## 2024-06-26 RX ADMIN — METHOCARBAMOL 500 MG: 500 TABLET ORAL at 14:34

## 2024-06-26 RX ADMIN — METHOCARBAMOL 500 MG: 500 TABLET ORAL at 21:11

## 2024-06-26 RX ADMIN — AMIODARONE HYDROCHLORIDE 400 MG: 200 TABLET ORAL at 09:41

## 2024-06-26 RX ADMIN — ASPIRIN 81 MG CHEWABLE TABLET 81 MG: 81 TABLET CHEWABLE at 09:41

## 2024-06-26 RX ADMIN — Medication 1 TABLET: at 09:40

## 2024-06-26 RX ADMIN — METHOCARBAMOL 500 MG: 500 TABLET ORAL at 09:41

## 2024-06-26 RX ADMIN — PANTOPRAZOLE SODIUM 40 MG: 40 TABLET, DELAYED RELEASE ORAL at 09:41

## 2024-06-26 RX ADMIN — GABAPENTIN 100 MG: 100 CAPSULE ORAL at 09:41

## 2024-06-26 RX ADMIN — POTASSIUM CHLORIDE 10 MEQ: 750 TABLET, EXTENDED RELEASE ORAL at 09:41

## 2024-06-26 RX ADMIN — AMIODARONE HYDROCHLORIDE 400 MG: 200 TABLET ORAL at 21:11

## 2024-06-26 RX ADMIN — SIMVASTATIN 20 MG: 10 TABLET, FILM COATED ORAL at 21:11

## 2024-06-26 RX ADMIN — FLUTICASONE PROPIONATE AND SALMETEROL XINAFOATE 1 PUFF: 230; 21 AEROSOL, METERED RESPIRATORY (INHALATION) at 21:10

## 2024-06-26 RX ADMIN — GABAPENTIN 100 MG: 100 CAPSULE ORAL at 21:13

## 2024-06-26 ASSESSMENT — ACTIVITIES OF DAILY LIVING (ADL)
ADLS_ACUITY_SCORE: 32
ADLS_ACUITY_SCORE: 31
ADLS_ACUITY_SCORE: 31
ADLS_ACUITY_SCORE: 33
ADLS_ACUITY_SCORE: 31
ADLS_ACUITY_SCORE: 31
ADLS_ACUITY_SCORE: 33
ADLS_ACUITY_SCORE: 32
ADLS_ACUITY_SCORE: 31
ADLS_ACUITY_SCORE: 32
ADLS_ACUITY_SCORE: 33
ADLS_ACUITY_SCORE: 31
ADLS_ACUITY_SCORE: 32
ADLS_ACUITY_SCORE: 33
ADLS_ACUITY_SCORE: 31
ADLS_ACUITY_SCORE: 32
ADLS_ACUITY_SCORE: 32
ADLS_ACUITY_SCORE: 33

## 2024-06-26 NOTE — PROGRESS NOTES
Care Management Follow Up    Length of Stay (days): 7    Expected Discharge Date: 06/26/2024     Concerns to be Addressed: discharge planning     Patient plan of care discussed at interdisciplinary rounds: Yes    Anticipated Discharge Disposition: Transitional Care     Anticipated Discharge Services: Transportation Services  Anticipated Discharge DME: None    Patient/family educated on Medicare website which has current facility and service quality ratings: yes  Education Provided on the Discharge Plan: Yes  Patient/Family in Agreement with the Plan: yes    Referrals Placed by CM/SW:      Accepted:     [facility]  [address] [facility] [address]   Phone- [phone]     [facility]  [address]  [facility] [address]   Phone- [phone]    [facility]  [address] [facility] [address]   Phone- [phone]  6/26: Piedad- could take her tomorrow.     Pending:     [facility]  [address] [facility] [address]   Phone- [phone]  6/26: left VM to call SW.     [facility] (Top Choice)  [address] [facility] [address]   Phone- [phone]  6/26: Yuli- she will call SW about if they can take pt. Chw let her know they are pt's first choice.     [facility]  [address] [place] [address]  Phone- [phone]  6/26: Left VM to call SW.    Private pay costs discussed: Not applicable    Additional Information:   notified [facility] pt is likely ready tomorrow.      Chantal Bray   6A/B/C Community Health Worker   Phone: [phone]

## 2024-06-26 NOTE — PLAN OF CARE
No major changes this shift. NP ok'd pt to go outside with family, which she enjoyed. Potential discharge to home vs TCU depending on CXR in AM.     Hours of care: [phone]

## 2024-06-26 NOTE — PROGRESS NOTES
Interventional Cardiology Progress Note      Assessment & Plan:  Loulou Lucero is a 79 year old female with past medical history of tobacco use, COPD, lung nodule, HLD, mild CAD, chronic low back pain, MGUS, anemia and severe aortic stenosis who was admitted 6/19 for planned transapical TAVR.      Changes Today:  - CXR stable from previous  - Medically ready for discharge, appreciate SW assistance for TCU placement      # Severe Aortic Stenosis  # s/p Transapical TAVR on 6/19/24 by Dr. Cross  # Chronic Heart Failure w/ Preserved EF (60-65%)  # Hyperlipidemia  # Mild to Moderate CAD  # Acute Post Op Pain  # Post Procedural Hypertension  # Elevated Troponin  # Subcutaneous Emphysema: Improving   Prior to procedure, pt noted to have a mean gradient 30 mmHG, PETER 0.7 cm^2, PV 3.6 m/s. Now s/p TAVR with 20mm Diaz valve via transapical approach. Procedure was uncomplicated. POD 1 Echo with post op MG 11 mmHg, no PVL.  - Regarding concern for Drew on EKG, troponin checked and elevated at 536. EKG with RBBB rather than Drew, troponin elevated in setting of recent TAVR, pt with no chest pain  - CVTS following for chest tube management:             - Left apical pneumo resolved after pigtail placement: stable Hgb after left pulmonary hemorrhage into bleb.   - Left pleural chest tube removed 6/23   - Pigtail removed by CVTS 6/25; CXR this morning stable   - CVTS OP follow up with CXR 7/5/24  - Pain team now signed off; EPS inadvertently pulled on 6/21 overnight, now on PRNs  - Aspirin 81 mg for anti-platelet therapy  - Continue PTA Simvastatin 20mg q HS  - Cardiac rehab/PT/OT  - Daily weights   - Strict intake/output  - Electrolyte replacement protocols     # Paroxsymal Atrial Fibrillation  New atrial fibrillation with RVR on morning of 6/22. Patient reported intense shortness of breath. Given 5 mg IV metoprolol x 1, 2 g Mag IV x 1, 500 mL LR bolus over 2 hours  - Changed lopressor 25 mg Q6 to Toprol 25 BID per Dr. Patricia  "continue to titrate down/off --> Reduce to daily today  - Started on amio gtt, transitioned to PO on 6/23   - Patient should continue amio for about 4-6 weeks and can follow up with [facility] outpatient in structural clinic for further assessment (need for ambulatory monitor, EP involvement)  - Not anticoagulated: per discussion with Dr. Patricia, decision deferred to CVTS - need to hold off per discussion with CVTS as patient has chest tubes with small apical hemorrhage on 6/21, as well as c/f pulmonary hemorrhage into bleb on CT.      # COPD  # Tobacco Use  - Encourage cessation  - Continue PTA fluticasone-salmeterol     # Chronic Iron Deficiency Anemia   # MGUS  Baseline Hgb ~ 9  - Continue PTA iron supplementation  - CBC daily  - Hemoglobin 8.6 (8.5)     # PAD  # Near Syncope  Pt reported to RN and ICU CELI that she feels \"close to passing out\" when turns neck certain directions.   - Carotid US completed; less than 50% narrowing in right and left     # Severe Malnutrition   2/2 chronic illness  - RD following  - Regular diet with supplements in between meals     Diet: Regular  Activity: Up as tolerated  Code Status: Full  Disposition: Medically ready for discharge pending TCU acceptance     Patient discussed w/ Dr. Mays.      Eloina Jeffries, MS, PA-C  [facility] Structural/Interventional Cardiology   Pager [phone]/Vocera      Interval History:  Patient reports feeling well on exam. She expresses frustration at not going home, but understands that it isn't safe for her to be home alone at this time. She denies chest pain and shortness of breath. VSS.       Most recent vital signs:  /61 (BP Location: Right arm, Cuff Size: Adult Small)   Pulse 83   Temp 97.6  F (36.4  C) (Oral)   Resp 16   Ht 1.499 m (4' 11\")   Wt 37.7 kg (83 lb 1.6 oz)   LMP 01/01/1995 (Approximate)   SpO2 98%   BMI 16.78 kg/m    Temp:  [97.6  F (36.4  C)-98.3  F (36.8  C)] 97.6  F (36.4  C)  Pulse:  [80-87] 83  Resp:  [16-17] " 16  BP: (114-152)/(57-73) 126/61  SpO2:  [82 %-99 %] 98 %  Wt Readings from Last 2 Encounters:   06/26/24 37.7 kg (83 lb 1.6 oz)   06/03/24 36.4 kg (80 lb 3.2 oz)       Intake/Output Summary (Last 24 hours) at 6/26/2024 1053  Last data filed at 6/26/2024 0700  Gross per 24 hour   Intake 640 ml   Output 925 ml   Net -285 ml       Physical exam:  General: In bed, in NAD  HEENT: EOMI, PERRLA, no scleral icterus or injection  CARDIAC: RRR, no m/r/g appreciated. Peripheral pulses 2+  RESP: CTAB, no wheezes, rhonchi or crackles appreciated.  GI: NABS, NT/ND, no guarding or rebound  EXTREMITIES: No LE edema, pulses 2+.  SKIN: No acute lesions appreciated. Pigtail site covered. Dressing c/d/I.   NEURO: Alert and oriented X3, CN II-XII grossly intact, no focal neurological deficits noted    Labs (Past three days):  CBC  Recent Labs   Lab 06/26/24  0516 06/25/24  0603 06/24/24  0550 06/23/24  0659   WBC 9.5 8.6 7.6 7.9   RBC 2.58* 2.56* 2.64* 2.85*   HGB 8.6* 8.5* 8.7* 9.3*   HCT 26.4* 26.1* 26.8* 29.0*   * 102* 102* 102*   MCH 33.3* 33.2* 33.0 32.6   MCHC 32.6 32.6 32.5 32.1   RDW 13.3 13.1 13.1 13.0    131* 119* 116*     BMP  Recent Labs   Lab 06/26/24  0516 06/25/24  0603 06/24/24  0550 06/23/24  0659    141 140 140   POTASSIUM 3.7 4.6 4.2 4.1   CHLORIDE 104 106 106 107   CO2 29 27 28 25   ANIONGAP 7 8 6* 8   * 111* 109* 108*   BUN 22.6 25.7* 27.3* 26.1*   CR 0.80 0.78 0.77 0.72   GFRESTIMATED 75 77 78 85   DION 8.4* 8.3* 8.0* 8.2*   MAG 1.9 2.1 2.1 2.2   PHOS 3.8 3.4 2.4* 2.6     Troponins:   Lab Results   Component Value Date    CTROPT 391 () 06/24/2024    CTROPT 536 () 06/21/2024    CTROPT 20 (H) 01/17/2024    CTROPT 22 (H) 01/17/2024        INR  Recent Labs   Lab 06/20/24  1151   INR 1.49*     Liver panel  Recent Labs   Lab 06/19/24  2252 06/19/24  1204   PROTTOTAL 6.2* 5.7*   ALBUMIN 3.5 3.3*   BILITOTAL 0.6 0.5   ALKPHOS 48 46   AST 54* 30   ALT 13 14       Imaging/procedure  results:  EKG 12 Lead 6/21/2024: NSR, RBBB (baseline)        ECHO 6/20/2024:  Interpretation Summary  s/p TAVR with 20 mm Diaz Benedict 3 on 6/19/2024. The valve is well-seated  and without paravalvular regurgitation. Doppler interrogation of the  prosthetic valve reveals Vmax 2.2 m/s, mean pressure gradient 11 mmHg.     Left ventricular function is normal.The ejection fraction is 60-65%.  Global right ventricular function is normal.  Severe mitral annular calcification.  IVC diameter and respiratory changes fall into an intermediate range  suggesting an RA pressure of 8 mmHg.  No pericardial effusion.     This study was compared with the study from 6/19/2024. No significant changes  noted compared to post-TAVR images.     CT Chest 6/22/24  1.  New (compared to CT from 4/5/2024) left upper lobe ill marginated  confluent consolidative appearing density with surrounding  groundglass, mild anteroseptal thinning and possible tiny cystic  change suspicious for infection or potentially pulmonary hemorrhage.  Neoplastic etiology unlikely though cannot be entirely excluded.  Recommend short-term follow-up imaging to resolution.  2.  Left chest tube and apical pigtail chest catheter without  significant pleural fluid or evidence of hemothorax. Residual small  anterior left pneumothorax. Extensive presumed postprocedural  subcutaneous air in the left chest wall and lower neck.  3.  Small-to-moderate right pleural effusion with simple fluid  attenuation.  4.  Advanced pulmonary emphysema and chronic scarring, most prominent  in the apices and in the right upper lobe.  5.  Prominent precarinal lymph node, similar to prior. Consider  attention on follow-up.  6.   Additional incidental finding similar to prior as described  including extensive atherosclerotic calcification of the thoracic  aorta, coronary artery calcification.     Medical Decision Making       45 MINUTES SPENT BY ME on the date of service doing chart review,  history, exam, documentation & further activities per the note.

## 2024-06-26 NOTE — PLAN OF CARE
Temp: 98  F (36.7  C) Temp src: Oral BP: 126/61 Pulse: 83   Resp: 16 SpO2: 98 % O2 Device: Nasal cannula Oxygen Delivery: 2 LPM     Hours of care: 3629-7407    D: PMHx of tobacco use, COPD, lung nodule, HLD, mild CAD, chronic low back pain, MGUS, anemia and severe aortic stenosis who was admitted 6/19 for planned transapical TAVR.    I/A: Loulou (she/her) is A/O x4, neuros intact. Calls appropriately, calm and cooperative. Tele in place, SR, HR 70s. VSS on 2L NC overnight, was desatting while sleeping. L PIV in place SL. Old CT sites, dressing CDI, scattered bruising, bilateral groin sites. No new skin deficits noted. Tolerating regular diet. Up SBA, adequate UOP, no BM this shift. Appeared to sleep well overnight.    Changed:  Running:   PRN:    P: Continue to monitor and follow POC. Anticipating discharge to TCU or home. Notify CSI with changes.    Goal Outcome Evaluation:    Plan of Care Reviewed With: patient  Overall Patient Progress: improvingOverall Patient Progress: improving  Outcome Evaluation: VSS on 2L NC overnight, denies pain, up with SBA, neuros intact

## 2024-06-26 NOTE — PROGRESS NOTES
Cardiovascular Surgery Progress Note  06/26/2024         Assessment and Plan:     Loulou Lucero is a 79 year old female with past medical history of tobacco use, COPD, lung nodule, HLD, mild CAD, chronic low back pain, MGUS, anemia and severe aortic stenosis who was admitted for planned transapical approach TAVR.  Patient was extubated on POD #0.   Developed crepitus and increased left apical pneumothorax with associated dyspnea on 6/21. Surgical chest tube too low to capture PTX with no air leak or tidaling. Left apical pigtail placed in IR on 6/21 complicated by small left apical hemorrhage. Pneumothorax resolved. CT chest obtained and revealed no e/o hemothorax  but again noted c/f pulmonary hemorrhage. Likely bled into existing bleb. Hemoglobin stable and no e/o further bleeding. CVTS following for chest tube management.    - No air leaks and subcutaneous emphysema much improved.   - Left pleural surgical chest tube removed 6/23.  - Left apical pneumothorax resolved after pigtail placement. Stable Hemoglobin after left pulmonary hemorrhage, likely into bleb. Minimal output. Left pigtail placed chest tube to water seal 6/24 with stable follow up CXR at 3 pm. Repeat CXR 6/25 am showed stable/improved subcutaneous emphysema and no pneumo.  - Removed left pleural Chest tube 6/25. CXR stable this am, some subcutaneous emphysema noted, no pneumothorax. Chest xray ordered in discharge tab to be done before follow up CVTS appointment on 7/5. Ok to discharge from surgical perspective.   - Daily wound care: wound cleanser/soap & water, pat dry, keep wound clean and dry using Interdry prn  - Encourage good nutrition, particularly protein intake  - Maintain BG control <180 for optimal wound healing  - Maintain euvolemia  - Remainder of plan per primary teams; please call with questions.    Discussed with Dr Frank Cross through verbal communication during rounds.     Sonya Lopez PA-C  Cardiothoracic Surgery  Pager  "[phone]  10:39 AM on 6/26/2024          Interval History:     No overnight events.  Pain is well managed. Breathing feels stable.         Physical Exam:   Blood pressure 126/61, pulse 83, temperature 97.6  F (36.4  C), temperature source Oral, resp. rate 16, height 1.499 m (4' 11\"), weight 37.7 kg (83 lb 1.6 oz), last menstrual period 01/01/1995, SpO2 98%, not currently breastfeeding.  Vitals:    06/23/24 0300 06/24/24 0454 06/26/24 0442   Weight: 38.7 kg (85 lb 5.1 oz) 38.6 kg (85 lb) 37.7 kg (83 lb 1.6 oz)     Gen: A&Ox4, NAD  Neuro: no focal deficits   CV: HD stable.   Pulm: normal breathing on RA  Incision: clean, dry, intact, no erythema  Tubes/drain sites: dressing clean and dry         Data:    Imaging:  reviewed recent imaging, no acute concerns. Subcutaneous emphysema noted, and no visible pneumothorax.     Labs:  BMP  Recent Labs   Lab 06/26/24  0516 06/25/24  0603 06/24/24  0550 06/23/24  0659    141 140 140   POTASSIUM 3.7 4.6 4.2 4.1   CHLORIDE 104 106 106 107   DION 8.4* 8.3* 8.0* 8.2*   CO2 29 27 28 25   BUN 22.6 25.7* 27.3* 26.1*   CR 0.80 0.78 0.77 0.72   * 111* 109* 108*     CBC  Recent Labs   Lab 06/26/24  0516 06/25/24  0603 06/24/24  0550 06/23/24  0659   WBC 9.5 8.6 7.6 7.9   RBC 2.58* 2.56* 2.64* 2.85*   HGB 8.6* 8.5* 8.7* 9.3*   HCT 26.4* 26.1* 26.8* 29.0*   * 102* 102* 102*   MCH 33.3* 33.2* 33.0 32.6   MCHC 32.6 32.6 32.5 32.1   RDW 13.3 13.1 13.1 13.0    131* 119* 116*     INR  Recent Labs   Lab 06/20/24  1151   INR 1.49*      Hepatic Panel  Recent Labs   Lab 06/19/24  2252 06/19/24  1204   AST 54* 30   ALT 13 14   ALKPHOS 48 46   BILITOTAL 0.6 0.5   ALBUMIN 3.5 3.3*     GLUCOSE:   Recent Labs   Lab 06/26/24  0516 06/25/24  0603 06/24/24  0550 06/23/24  0659 06/22/24  0622 06/21/24  0535   * 111* 109* 108* 109* 105*       "

## 2024-06-27 ENCOUNTER — APPOINTMENT (OUTPATIENT)
Dept: PHYSICAL THERAPY | Facility: CLINIC | Age: 79
DRG: 266 | End: 2024-06-27
Attending: INTERNAL MEDICINE
Payer: MEDICARE

## 2024-06-27 ENCOUNTER — APPOINTMENT (OUTPATIENT)
Dept: OCCUPATIONAL THERAPY | Facility: CLINIC | Age: 79
DRG: 266 | End: 2024-06-27
Attending: INTERNAL MEDICINE
Payer: MEDICARE

## 2024-06-27 LAB
ANION GAP SERPL CALCULATED.3IONS-SCNC: 6 MMOL/L (ref 7–15)
BUN SERPL-MCNC: 17.6 MG/DL (ref 8–23)
CALCIUM SERPL-MCNC: 8.1 MG/DL (ref 8.8–10.2)
CHLORIDE SERPL-SCNC: 104 MMOL/L (ref 98–107)
CREAT SERPL-MCNC: 0.74 MG/DL (ref 0.51–0.95)
DEPRECATED HCO3 PLAS-SCNC: 30 MMOL/L (ref 22–29)
EGFRCR SERPLBLD CKD-EPI 2021: 82 ML/MIN/1.73M2
ERYTHROCYTE [DISTWIDTH] IN BLOOD BY AUTOMATED COUNT: 13.2 % (ref 10–15)
ERYTHROCYTE [DISTWIDTH] IN BLOOD BY AUTOMATED COUNT: 13.3 % (ref 10–15)
GLUCOSE SERPL-MCNC: 106 MG/DL (ref 70–99)
HCT VFR BLD AUTO: 24.2 % (ref 35–47)
HCT VFR BLD AUTO: 26.5 % (ref 35–47)
HGB BLD-MCNC: 7.7 G/DL (ref 11.7–15.7)
HGB BLD-MCNC: 8.5 G/DL (ref 11.7–15.7)
MAGNESIUM SERPL-MCNC: 1.9 MG/DL (ref 1.7–2.3)
MCH RBC QN AUTO: 32.8 PG (ref 26.5–33)
MCH RBC QN AUTO: 32.9 PG (ref 26.5–33)
MCHC RBC AUTO-ENTMCNC: 31.8 G/DL (ref 31.5–36.5)
MCHC RBC AUTO-ENTMCNC: 32.1 G/DL (ref 31.5–36.5)
MCV RBC AUTO: 103 FL (ref 78–100)
MCV RBC AUTO: 103 FL (ref 78–100)
PHOSPHATE SERPL-MCNC: 3.4 MG/DL (ref 2.5–4.5)
PLATELET # BLD AUTO: 163 10E3/UL (ref 150–450)
PLATELET # BLD AUTO: 177 10E3/UL (ref 150–450)
POTASSIUM SERPL-SCNC: 3.9 MMOL/L (ref 3.4–5.3)
RBC # BLD AUTO: 2.35 10E6/UL (ref 3.8–5.2)
RBC # BLD AUTO: 2.58 10E6/UL (ref 3.8–5.2)
SODIUM SERPL-SCNC: 140 MMOL/L (ref 135–145)
WBC # BLD AUTO: 7.5 10E3/UL (ref 4–11)
WBC # BLD AUTO: 7.9 10E3/UL (ref 4–11)

## 2024-06-27 PROCEDURE — 36415 COLL VENOUS BLD VENIPUNCTURE: CPT | Performed by: NURSE PRACTITIONER

## 2024-06-27 PROCEDURE — 36415 COLL VENOUS BLD VENIPUNCTURE: CPT

## 2024-06-27 PROCEDURE — 97530 THERAPEUTIC ACTIVITIES: CPT | Mod: GO

## 2024-06-27 PROCEDURE — 99232 SBSQ HOSP IP/OBS MODERATE 35: CPT

## 2024-06-27 PROCEDURE — 250N000013 HC RX MED GY IP 250 OP 250 PS 637: Performed by: STUDENT IN AN ORGANIZED HEALTH CARE EDUCATION/TRAINING PROGRAM

## 2024-06-27 PROCEDURE — 83735 ASSAY OF MAGNESIUM: CPT | Performed by: NURSE PRACTITIONER

## 2024-06-27 PROCEDURE — 93005 ELECTROCARDIOGRAM TRACING: CPT

## 2024-06-27 PROCEDURE — 85027 COMPLETE CBC AUTOMATED: CPT

## 2024-06-27 PROCEDURE — 85027 COMPLETE CBC AUTOMATED: CPT | Performed by: NURSE PRACTITIONER

## 2024-06-27 PROCEDURE — 97750 PHYSICAL PERFORMANCE TEST: CPT | Mod: GP | Performed by: PHYSICAL THERAPIST

## 2024-06-27 PROCEDURE — 97530 THERAPEUTIC ACTIVITIES: CPT | Mod: GP | Performed by: PHYSICAL THERAPIST

## 2024-06-27 PROCEDURE — 84100 ASSAY OF PHOSPHORUS: CPT | Performed by: NURSE PRACTITIONER

## 2024-06-27 PROCEDURE — 250N000013 HC RX MED GY IP 250 OP 250 PS 637: Performed by: NURSE PRACTITIONER

## 2024-06-27 PROCEDURE — 250N000013 HC RX MED GY IP 250 OP 250 PS 637: Performed by: INTERNAL MEDICINE

## 2024-06-27 PROCEDURE — 120N000005 HC R&B MS OVERFLOW UMMC

## 2024-06-27 PROCEDURE — 97116 GAIT TRAINING THERAPY: CPT | Mod: GP | Performed by: PHYSICAL THERAPIST

## 2024-06-27 PROCEDURE — 82565 ASSAY OF CREATININE: CPT | Performed by: NURSE PRACTITIONER

## 2024-06-27 PROCEDURE — 93010 ELECTROCARDIOGRAM REPORT: CPT | Performed by: INTERNAL MEDICINE

## 2024-06-27 PROCEDURE — 97535 SELF CARE MNGMENT TRAINING: CPT | Mod: GO

## 2024-06-27 RX ADMIN — FLUTICASONE PROPIONATE AND SALMETEROL XINAFOATE 1 PUFF: 230; 21 AEROSOL, METERED RESPIRATORY (INHALATION) at 08:57

## 2024-06-27 RX ADMIN — PANTOPRAZOLE SODIUM 40 MG: 40 TABLET, DELAYED RELEASE ORAL at 08:55

## 2024-06-27 RX ADMIN — AMIODARONE HYDROCHLORIDE 400 MG: 200 TABLET ORAL at 21:17

## 2024-06-27 RX ADMIN — AMIODARONE HYDROCHLORIDE 400 MG: 200 TABLET ORAL at 08:54

## 2024-06-27 RX ADMIN — METHOCARBAMOL 500 MG: 500 TABLET ORAL at 15:26

## 2024-06-27 RX ADMIN — SIMVASTATIN 20 MG: 10 TABLET, FILM COATED ORAL at 21:17

## 2024-06-27 RX ADMIN — FLUTICASONE PROPIONATE AND SALMETEROL XINAFOATE 1 PUFF: 230; 21 AEROSOL, METERED RESPIRATORY (INHALATION) at 21:18

## 2024-06-27 RX ADMIN — ASPIRIN 81 MG CHEWABLE TABLET 81 MG: 81 TABLET CHEWABLE at 08:54

## 2024-06-27 RX ADMIN — LIDOCAINE 4% 1 PATCH: 40 PATCH TOPICAL at 21:18

## 2024-06-27 RX ADMIN — METHOCARBAMOL 500 MG: 500 TABLET ORAL at 21:17

## 2024-06-27 RX ADMIN — METHOCARBAMOL 500 MG: 500 TABLET ORAL at 08:54

## 2024-06-27 RX ADMIN — GABAPENTIN 100 MG: 100 CAPSULE ORAL at 08:55

## 2024-06-27 RX ADMIN — GABAPENTIN 100 MG: 100 CAPSULE ORAL at 21:18

## 2024-06-27 RX ADMIN — Medication 1 TABLET: at 08:54

## 2024-06-27 ASSESSMENT — ACTIVITIES OF DAILY LIVING (ADL)
ADLS_ACUITY_SCORE: 32

## 2024-06-27 NOTE — PLAN OF CARE
Neuro: A&Ox4.   Cardiac: SR. VSS.   Respiratory: Sating upper 90s on RA.  GI/: Adequate urine output. LBM 6/26.   Diet/appetite: Tolerating regular diet. Eating well.  Activity:  SBA assist up to chair and in halls.  Pain: At acceptable level on current regimen, scheduled robaxin.   Skin: No new deficits noted.  LDA's: L PIV, SL.     Plan: Continue with POC. Notify primary team with changes.     Adeline Smith RN

## 2024-06-27 NOTE — PLAN OF CARE
Shift Events: No major events occurred during this shift.    A:   Neuro: A/Ox4. Calls appropriately. Pleasant and cooperative. Denies headache, dizziness, and lightheadedness.  Cardiac/Tele: VSS. SR.  Denies chest pain and palpitations. Afebrile.  Respiratory: 95 to 98 on room air to 1 L O2 nasal cannula overnight. Denies SOB.  GI/: Continent. Voided once during shift. No BM during shift.   Diet/Appetite: Regular diet.   Skin: Old chest tube site and surgical sites WDL  LDAs: PIV- SL.  Activity: SBA  Pain: No pain     P: Continue to monitor and notify team with changes.    Shift: 19:00 - 07:30

## 2024-06-27 NOTE — PROGRESS NOTES
Cardiovascular Surgery Progress Note  06/27/2024         Assessment and Plan:     Loulou Lucero is a 79 year old female with past medical history of tobacco use, COPD, lung nodule, HLD, mild CAD, chronic low back pain, MGUS, anemia and severe aortic stenosis who was admitted for planned transapical approach TAVR.  Patient was extubated on POD #0.   Developed crepitus and increased left apical pneumothorax with associated dyspnea on 6/21. Surgical chest tube too low to capture PTX with no air leak or tidaling. Left apical pigtail placed in IR on 6/21 complicated by small left apical hemorrhage. Pneumothorax resolved. CT chest obtained and revealed no e/o hemothorax  but again noted c/f pulmonary hemorrhage. Likely bled into existing bleb. Hemoglobin stable and no e/o further bleeding. CVTS following for chest tube management.      - Left pleural surgical chest tube removed 6/23.  - Left apical pneumothorax resolved after pigtail placement. Stable Hemoglobin after left pulmonary hemorrhage, likely into bleb. Minimal output. Left pigtail placed chest tube to water seal 6/24 with stable follow up CXR at 3 pm. Repeat CXR 6/25 am showed stable/improved subcutaneous emphysema and no pneumo.  - Removed left pleural Chest tube 6/25. Follow up CXR stable with some subcutaneous emphysema noted, no pneumothorax.   - Daily wound care: wound cleanser/soap & water, pat dry, keep wound clean and dry using Interdry prn  - Encourage good nutrition, particularly protein intake  - Maintain BG control <180 for optimal wound healing  - Maintain euvolemia  - Chest xray ordered in discharge tab to be done before follow up CVTS appointment on 7/5. Ok to discharge from surgical perspective.   - Remainder of plan per primary teams; Cardiovascular surgery will sign off, please call with questions.    Josesito Menard DNP APRN CNP CCRN   Cardiovascular Surgery   Pager [phone]            Interval History:     No overnight events.  Pain is well  "managed.   Stable on RA.          Physical Exam:   Blood pressure 105/58, pulse 72, temperature 97.7  F (36.5  C), temperature source Oral, resp. rate 16, height 1.499 m (4' 11\"), weight 39.5 kg (87 lb 1.3 oz), last menstrual period 01/01/1995, SpO2 94%, not currently breastfeeding.  Vitals:    06/24/24 0454 06/26/24 0442 06/27/24 0623   Weight: 38.6 kg (85 lb) 37.7 kg (83 lb 1.6 oz) 39.5 kg (87 lb 1.3 oz)     Gen: A&Ox4, NAD  Neuro: no focal deficits   CV: HD stable.   Pulm: normal breathing on RA  Incision: clean, dry, intact, no erythema  Tubes/drain sites: dressing clean and dry         Data:    Imaging:  reviewed recent imaging, no acute concerns. Subcutaneous emphysema noted, and no visible pneumothorax.     Labs:  BMP  Recent Labs   Lab 06/27/24  0538 06/26/24  0516 06/25/24  0603 06/24/24  0550    140 141 140   POTASSIUM 3.9 3.7 4.6 4.2   CHLORIDE 104 104 106 106   DION 8.1* 8.4* 8.3* 8.0*   CO2 30* 29 27 28   BUN 17.6 22.6 25.7* 27.3*   CR 0.74 0.80 0.78 0.77   * 102* 111* 109*     CBC  Recent Labs   Lab 06/27/24  0538 06/26/24  0516 06/25/24  0603 06/24/24  0550   WBC 7.9 9.5 8.6 7.6   RBC 2.35* 2.58* 2.56* 2.64*   HGB 7.7* 8.6* 8.5* 8.7*   HCT 24.2* 26.4* 26.1* 26.8*   * 102* 102* 102*   MCH 32.8 33.3* 33.2* 33.0   MCHC 31.8 32.6 32.6 32.5   RDW 13.3 13.3 13.1 13.1    155 131* 119*     INR  No lab results found in last 7 days.     Hepatic Panel  No lab results found in last 7 days.    GLUCOSE:   Recent Labs   Lab 06/27/24  0538 06/26/24  0516 06/25/24  0603 06/24/24  0550 06/23/24  0659 06/22/24  0622   * 102* 111* 109* 108* 109*       "

## 2024-06-27 NOTE — PROGRESS NOTES
Dynamic Gait Index   06/27/24 1400   Signing Clinician's Name / Credentials   Signing clinician's name / credentials Matt Zhang PT   Dynamic Gait Index (Erick and Dia Nuha, 1995)   Gait Level Surface 2   Change in Gait Speed 3   Gait and Horizontal Head Turns 1   Gait with Vertical Head Turns 1   Gait and Pivot Turns 3   Step Over Obstacle 2   Step Around Obstacles 3   Steps 1   Total Dynamic Gait Index Score  (A score of 19 or less has been correlated to an increased risk of falls in community dwelling older adults, patients with vestibular disorders, and patients with MS.)   Total Score (out of 24) 16     Dynamic Gait Index (DGI):The DGI is a measure of balance during gait that is reliable and valid for the elderly and individuals with Parkinson's disease, MS, vestibular disorders, or s/p stroke. Gait assistive device used: None    Patient score: 16/24  Scores ?19/24 indicate an increased risk for falls according to Kandace et al 2000  Minimal Detectable Change = 2.9 in community dwelling elderly according to Irwin et al 2011    Minutes billed as physical performance test

## 2024-06-27 NOTE — PROGRESS NOTES
Care Management Follow Up    Length of Stay (days): 8    Expected Discharge Date: 06/27/2024     Concerns to be Addressed: discharge planning     Patient plan of care discussed at interdisciplinary rounds: Yes    Anticipated Discharge Disposition: TCU     Anticipated Discharge Services: TCU therapy services  Anticipated Discharge DME: None    Patient/family educated on Medicare website which has current facility and service quality ratings: yes  Education Provided on the Discharge Plan: Yes.    LUIS called the pt's daughter Danni (Phone: [phone]) at 9:43am and gave her an update on pending and multiple accepting TCUs. Per Danni, she's on her way to work but plans to be in the pt's room at bedside around 2:30pm so SW to update her on if the [facility] TCU reviewed and accepted the pt or if pt will need to discharge to an another accepting facility.     LUIS met with the pt and her daughter at bedside and went over list of accepting TCUs. Pt and daughter selected [facility]. SW spoke about OOP costs of transport and daughter told the SW that she can transport in her personal vehicle tomorrow. Later on, daughter told the SW that she'll transport in personal vehicle and will be here around 1:30pm as she's not working tomorrow.           Patient/Family in Agreement with the Plan: yes    Referrals Placed by CM/LUIS:      ACCEPTING TCU for Friday 6/28  [facility]   [address] [place] [address]   Phone: [phone]  Admissions: [phone]  Nurse to Nurse report: [phone]  Fax: [phone]  - 6/27: SW received a VM at 9:10am from Yuli in admissions so SW called back at 3:06pm and left a VM. After confirmation from pt and daughter, SW called Yuli at 3:32pm and confirmed TCU admit for the pt tomorrow. Yuli gave the SW the above listed number for nurse report and confirmed the above fax number as the one to send signed discharge orders to.       Private pay costs  discussed: transportation costs    Additional Information:  LUIS is following for discharge planning.    Pt is medically ready for discharge to TCU.    LUIS updated PT, bedside RN, and the [facility] CSI provider (Nesha) about accepting TCU and plan for discharge tomorrow Friday 6/28.    LUIS will continue to follow and assist as needed.     ___________________    TOREY Dupree, DONNASW  6C , covering beds [address] to [address]  [facility]   Phone: [phone]  [address] Alissa James

## 2024-06-27 NOTE — PROGRESS NOTES
Interventional Cardiology Progress Note      Assessment & Plan:  Loulou Lucero is a 79 year old female with past medical history of tobacco use, COPD, lung nodule, HLD, mild CAD, chronic low back pain, MGUS, anemia and severe aortic stenosis who was admitted 6/19 for planned transapical TAVR.      Changes Today:  - Medically ready for discharge, appreciate SW assistance for TCU placement      # Severe Aortic Stenosis  # s/p Transapical TAVR on 6/19/24 by Dr. Cross  # Chronic Heart Failure w/ Preserved EF (60-65%)  # Hyperlipidemia  # Mild to Moderate CAD  # Acute Post Op Pain  # Post Procedural Hypertension  # Elevated Troponin  # Subcutaneous Emphysema: Improving   Prior to procedure, pt noted to have a mean gradient 30 mmHG, PETER 0.7 cm^2, PV 3.6 m/s. Now s/p TAVR with 20mm Diaz valve via transapical approach. Procedure was uncomplicated. POD 1 Echo with post op MG 11 mmHg, no PVL.  - Regarding concern for Drew on EKG, troponin checked and elevated at 536. EKG with RBBB rather than Drew, troponin elevated in setting of recent TAVR, pt with no chest pain  - CVTS following for chest tube management:             - Left apical pneumo resolved after pigtail placement: stable Hgb after left pulmonary hemorrhage into bleb.   - Left pleural chest tube removed 6/23   - Pigtail removed by CVTS 6/25; CXR this morning stable   - CVTS OP follow up with CXR 7/5/24  - Pain team now signed off; EPS inadvertently pulled on 6/21 overnight, now on PRNs  - Aspirin 81 mg for anti-platelet therapy  - Continue PTA Simvastatin 20mg q HS  - Cardiac rehab/PT/OT  - Daily weights   - Strict intake/output  - Electrolyte replacement protocols     # Paroxsymal Atrial Fibrillation  New atrial fibrillation with RVR on morning of 6/22. Patient reported intense shortness of breath. Given 5 mg IV metoprolol x 1, 2 g Mag IV x 1, 500 mL LR bolus over 2 hours  - Changed lopressor 25 mg Q6 to Toprol 25 BID per Dr. Patricia continue to titrate down/off  "--> Reduce to daily today  - Started on amio gtt, transitioned to PO on 6/23   - Patient should continue amio for about 4-6 weeks and can follow up with [facility] outpatient in structural clinic for further assessment (need for ambulatory monitor, EP involvement)  - Not anticoagulated: per discussion with Dr. Patricia, decision deferred to CVTS - need to hold off per discussion with CVTS as patient has chest tubes with small apical hemorrhage on 6/21, as well as c/f pulmonary hemorrhage into bleb on CT.      # COPD  # Tobacco Use  - Encourage cessation  - Continue PTA fluticasone-salmeterol     # Chronic Iron Deficiency Anemia   # MGUS  Baseline Hgb ~ 9  - Continue PTA iron supplementation  - CBC daily  - Hemoglobin 7.7 (8.6); will recheck CBC this afternoon     # PAD  # Near Syncope  Pt reported to RN and ICU CELI that she feels \"close to passing out\" when turns neck certain directions.   - Carotid US completed; less than 50% narrowing in right and left     # Severe Malnutrition   2/2 chronic illness  - RD following  - Regular diet with supplements in between meals     Diet: Regular  Activity: Up as tolerated  Code Status: Full  Disposition: Medically ready for discharge pending TCU acceptance     Patient discussed w/ Dr. Mays.      Eloina Jeffries, MS, PA-C  [facility] Structural/Interventional Cardiology   Pager [phone]/Vocera      Interval History:  Patient continues to feel well and is anxious to be discharged. VSS. She reports some pain at her chest tube site, site without signs of swelling or bleeding.     Most recent vital signs:  /56 (BP Location: Right arm)   Pulse 72   Temp 98  F (36.7  C) (Oral)   Resp 16   Ht 1.499 m (4' 11\")   Wt 39.5 kg (87 lb 1.3 oz)   LMP 01/01/1995 (Approximate)   SpO2 95%   BMI 17.59 kg/m    Temp:  [97.9  F (36.6  C)-98.5  F (36.9  C)] 98  F (36.7  C)  Pulse:  [72-84] 72  Resp:  [16-18] 16  BP: ()/(47-68) 100/56  SpO2:  [86 %-100 %] 95 %  Wt Readings from " Last 2 Encounters:   06/27/24 39.5 kg (87 lb 1.3 oz)   06/03/24 36.4 kg (80 lb 3.2 oz)       Intake/Output Summary (Last 24 hours) at 6/27/2024 1036  Last data filed at 6/27/2024 0853  Gross per 24 hour   Intake 120 ml   Output 200 ml   Net -80 ml       Physical exam:  General: In bed, in NAD  HEENT: EOMI, PERRLA, no scleral icterus or injection  CARDIAC: RRR, no m/r/g appreciated. Peripheral pulses 2+  RESP: CTAB, no wheezes, rhonchi or crackles appreciated.  GI: NABS, NT/ND, no guarding or rebound  EXTREMITIES: No LE edema, pulses 2+.  SKIN: No acute lesions appreciated. Pigtail site covered. Dressing c/d/I.   NEURO: Alert and oriented X3, CN II-XII grossly intact, no focal neurological deficits noted      Labs (Past three days):  CBC  Recent Labs   Lab 06/27/24  0538 06/26/24  0516 06/25/24  0603 06/24/24  0550   WBC 7.9 9.5 8.6 7.6   RBC 2.35* 2.58* 2.56* 2.64*   HGB 7.7* 8.6* 8.5* 8.7*   HCT 24.2* 26.4* 26.1* 26.8*   * 102* 102* 102*   MCH 32.8 33.3* 33.2* 33.0   MCHC 31.8 32.6 32.6 32.5   RDW 13.3 13.3 13.1 13.1    155 131* 119*     BMP  Recent Labs   Lab 06/27/24  0538 06/26/24  0516 06/25/24  0603 06/24/24  0550    140 141 140   POTASSIUM 3.9 3.7 4.6 4.2   CHLORIDE 104 104 106 106   CO2 30* 29 27 28   ANIONGAP 6* 7 8 6*   * 102* 111* 109*   BUN 17.6 22.6 25.7* 27.3*   CR 0.74 0.80 0.78 0.77   GFRESTIMATED 82 75 77 78   DION 8.1* 8.4* 8.3* 8.0*   MAG 1.9 1.9 2.1 2.1   PHOS 3.4 3.8 3.4 2.4*     Troponins:   Lab Results   Component Value Date    CTROPT 391 (HH) 06/24/2024    CTROPT 536 (HH) 06/21/2024    CTROPT 20 (H) 01/17/2024    CTROPT 22 (H) 01/17/2024        INR  Recent Labs   Lab 06/20/24  1151   INR 1.49*     Liver panelNo lab results found in last 7 days.    Imaging/procedure results:  EKG 12 Lead 6/21/2024: NSR, RBBB (baseline)        ECHO 6/20/2024:  Interpretation Summary  s/p TAVR with 20 mm Diaz Benedict 3 on 6/19/2024. The valve is well-seated  and without paravalvular  regurgitation. Doppler interrogation of the  prosthetic valve reveals Vmax 2.2 m/s, mean pressure gradient 11 mmHg.     Left ventricular function is normal.The ejection fraction is 60-65%.  Global right ventricular function is normal.  Severe mitral annular calcification.  IVC diameter and respiratory changes fall into an intermediate range  suggesting an RA pressure of 8 mmHg.  No pericardial effusion.     This study was compared with the study from 6/19/2024. No significant changes  noted compared to post-TAVR images.     CT Chest 6/22/24  1.  New (compared to CT from 4/5/2024) left upper lobe ill marginated  confluent consolidative appearing density with surrounding  groundglass, mild anteroseptal thinning and possible tiny cystic  change suspicious for infection or potentially pulmonary hemorrhage.  Neoplastic etiology unlikely though cannot be entirely excluded.  Recommend short-term follow-up imaging to resolution.  2.  Left chest tube and apical pigtail chest catheter without  significant pleural fluid or evidence of hemothorax. Residual small  anterior left pneumothorax. Extensive presumed postprocedural  subcutaneous air in the left chest wall and lower neck.  3.  Small-to-moderate right pleural effusion with simple fluid  attenuation.  4.  Advanced pulmonary emphysema and chronic scarring, most prominent  in the apices and in the right upper lobe.  5.  Prominent precarinal lymph node, similar to prior. Consider  attention on follow-up.  6.   Additional incidental finding similar to prior as described  including extensive atherosclerotic calcification of the thoracic  aorta, coronary artery calcification.        Medical Decision Making       30 MINUTES SPENT BY ME on the date of service doing chart review, history, exam, documentation & further activities per the note.

## 2024-06-27 NOTE — PLAN OF CARE
Neuro: A&Ox4.   Cardiac: SR. VSS.   Respiratory: Sating upper 90s on RA.  GI/: Adequate urine output. LBM 6/25.  Diet/appetite: Tolerating regular diet. Eating well.  Activity:  SBA, up to chair and in halls.  Pain: At acceptable level on current regimen.   Skin: No new deficits noted.  LDA's: L PIV.     Plan: Continue with POC. Notify primary team with changes.     Adeline Smith RN

## 2024-06-28 VITALS
DIASTOLIC BLOOD PRESSURE: 61 MMHG | BODY MASS INDEX: 16.96 KG/M2 | OXYGEN SATURATION: 97 % | SYSTOLIC BLOOD PRESSURE: 135 MMHG | HEART RATE: 79 BPM | TEMPERATURE: 98.1 F | RESPIRATION RATE: 18 BRPM | HEIGHT: 59 IN | WEIGHT: 84.1 LBS

## 2024-06-28 LAB
ANION GAP SERPL CALCULATED.3IONS-SCNC: 6 MMOL/L (ref 7–15)
ATRIAL RATE - MUSE: 72 BPM
ATRIAL RATE - MUSE: 75 BPM
BUN SERPL-MCNC: 13.1 MG/DL (ref 8–23)
CALCIUM SERPL-MCNC: 8.3 MG/DL (ref 8.8–10.2)
CHLORIDE SERPL-SCNC: 105 MMOL/L (ref 98–107)
CREAT SERPL-MCNC: 0.77 MG/DL (ref 0.51–0.95)
DEPRECATED HCO3 PLAS-SCNC: 29 MMOL/L (ref 22–29)
DIASTOLIC BLOOD PRESSURE - MUSE: NORMAL MMHG
DIASTOLIC BLOOD PRESSURE - MUSE: NORMAL MMHG
EGFRCR SERPLBLD CKD-EPI 2021: 78 ML/MIN/1.73M2
ERYTHROCYTE [DISTWIDTH] IN BLOOD BY AUTOMATED COUNT: 13.3 % (ref 10–15)
GLUCOSE SERPL-MCNC: 99 MG/DL (ref 70–99)
HCT VFR BLD AUTO: 25.7 % (ref 35–47)
HGB BLD-MCNC: 8.3 G/DL (ref 11.7–15.7)
INTERPRETATION ECG - MUSE: NORMAL
INTERPRETATION ECG - MUSE: NORMAL
MAGNESIUM SERPL-MCNC: 1.9 MG/DL (ref 1.7–2.3)
MCH RBC QN AUTO: 33.1 PG (ref 26.5–33)
MCHC RBC AUTO-ENTMCNC: 32.3 G/DL (ref 31.5–36.5)
MCV RBC AUTO: 102 FL (ref 78–100)
P AXIS - MUSE: 69 DEGREES
P AXIS - MUSE: 74 DEGREES
PHOSPHATE SERPL-MCNC: 3.5 MG/DL (ref 2.5–4.5)
PLATELET # BLD AUTO: 183 10E3/UL (ref 150–450)
POTASSIUM SERPL-SCNC: 4.1 MMOL/L (ref 3.4–5.3)
PR INTERVAL - MUSE: 134 MS
PR INTERVAL - MUSE: 134 MS
QRS DURATION - MUSE: 118 MS
QRS DURATION - MUSE: 120 MS
QT - MUSE: 392 MS
QT - MUSE: 454 MS
QTC - MUSE: 438 MS
QTC - MUSE: 497 MS
R AXIS - MUSE: 17 DEGREES
R AXIS - MUSE: 20 DEGREES
RBC # BLD AUTO: 2.51 10E6/UL (ref 3.8–5.2)
SODIUM SERPL-SCNC: 140 MMOL/L (ref 135–145)
SYSTOLIC BLOOD PRESSURE - MUSE: NORMAL MMHG
SYSTOLIC BLOOD PRESSURE - MUSE: NORMAL MMHG
T AXIS - MUSE: 57 DEGREES
T AXIS - MUSE: 61 DEGREES
VENTRICULAR RATE- MUSE: 72 BPM
VENTRICULAR RATE- MUSE: 75 BPM
WBC # BLD AUTO: 7.3 10E3/UL (ref 4–11)

## 2024-06-28 PROCEDURE — 250N000013 HC RX MED GY IP 250 OP 250 PS 637: Performed by: STUDENT IN AN ORGANIZED HEALTH CARE EDUCATION/TRAINING PROGRAM

## 2024-06-28 PROCEDURE — 85027 COMPLETE CBC AUTOMATED: CPT | Performed by: NURSE PRACTITIONER

## 2024-06-28 PROCEDURE — 36415 COLL VENOUS BLD VENIPUNCTURE: CPT | Performed by: NURSE PRACTITIONER

## 2024-06-28 PROCEDURE — 80048 BASIC METABOLIC PNL TOTAL CA: CPT | Performed by: NURSE PRACTITIONER

## 2024-06-28 PROCEDURE — 93010 ELECTROCARDIOGRAM REPORT: CPT | Performed by: INTERNAL MEDICINE

## 2024-06-28 PROCEDURE — 83735 ASSAY OF MAGNESIUM: CPT | Performed by: NURSE PRACTITIONER

## 2024-06-28 PROCEDURE — 84100 ASSAY OF PHOSPHORUS: CPT | Performed by: NURSE PRACTITIONER

## 2024-06-28 PROCEDURE — 250N000013 HC RX MED GY IP 250 OP 250 PS 637: Performed by: INTERNAL MEDICINE

## 2024-06-28 PROCEDURE — 99239 HOSP IP/OBS DSCHRG MGMT >30: CPT

## 2024-06-28 PROCEDURE — 93005 ELECTROCARDIOGRAM TRACING: CPT

## 2024-06-28 RX ORDER — METHOCARBAMOL 500 MG/1
500 TABLET, FILM COATED ORAL 3 TIMES DAILY
DISCHARGE
Start: 2024-06-28 | End: 2024-08-30

## 2024-06-28 RX ORDER — PANTOPRAZOLE SODIUM 40 MG/1
40 TABLET, DELAYED RELEASE ORAL DAILY
DISCHARGE
Start: 2024-06-28 | End: 2024-08-30

## 2024-06-28 RX ORDER — GABAPENTIN 100 MG/1
100 CAPSULE ORAL 2 TIMES DAILY
DISCHARGE
Start: 2024-06-28 | End: 2024-07-26

## 2024-06-28 RX ORDER — AMIODARONE HYDROCHLORIDE 200 MG/1
TABLET ORAL
DISCHARGE
Start: 2024-06-28 | End: 2024-07-29

## 2024-06-28 RX ADMIN — Medication 1 TABLET: at 08:08

## 2024-06-28 RX ADMIN — METHOCARBAMOL 500 MG: 500 TABLET ORAL at 08:08

## 2024-06-28 RX ADMIN — METHOCARBAMOL 500 MG: 500 TABLET ORAL at 13:24

## 2024-06-28 RX ADMIN — PANTOPRAZOLE SODIUM 40 MG: 40 TABLET, DELAYED RELEASE ORAL at 08:08

## 2024-06-28 RX ADMIN — AMIODARONE HYDROCHLORIDE 200 MG: 200 TABLET ORAL at 08:08

## 2024-06-28 RX ADMIN — ASPIRIN 81 MG CHEWABLE TABLET 81 MG: 81 TABLET CHEWABLE at 08:08

## 2024-06-28 RX ADMIN — POLYETHYLENE GLYCOL 3350 17 G: 17 POWDER, FOR SOLUTION ORAL at 08:08

## 2024-06-28 RX ADMIN — FLUTICASONE PROPIONATE AND SALMETEROL XINAFOATE 1 PUFF: 230; 21 AEROSOL, METERED RESPIRATORY (INHALATION) at 08:19

## 2024-06-28 RX ADMIN — GABAPENTIN 100 MG: 100 CAPSULE ORAL at 08:08

## 2024-06-28 RX ADMIN — DOCUSATE SODIUM 50 MG AND SENNOSIDES 8.6 MG 1 TABLET: 8.6; 5 TABLET, FILM COATED ORAL at 08:08

## 2024-06-28 ASSESSMENT — ACTIVITIES OF DAILY LIVING (ADL)
ADLS_ACUITY_SCORE: 32

## 2024-06-28 NOTE — PLAN OF CARE
Loulou Lucero is a 79 year old female with past medical history of tobacco use, COPD, lung nodule, HLD, mild CAD, chronic low back pain, MGUS, anemia and severe aortic stenosis who was admitted 6/19 for planned transapical TAVR.     Code status: Full Code  Team: Cards CSI     Neuro: AOx4, calm and cooperative, no deficits  Cardiac/Tele: SR, HR 60's-80's, normotensive, no cardiac events this shift. Afebrile and other VSS  Respiratory: Room air (awake), 2L NC (when sleeping) to maintain O2 sats > 92%. No SOB noted at rest. LS clear and dim on the bases  GI/: Voids spontaneously. BM regular. LBM 6/26  Diet/Appetite: Regular diet  Skin: CT sites GAETANO and intact, R groin site CDI, soft and no hematoma  Endocrine/Electrolytes: No replacements this shift. No BG checks  LDAs: PIV saline locked  Activity: Up with SBA  Pain: Endorses some general body aches     Plan: Possible discharge today. Continue POC per team

## 2024-06-28 NOTE — PLAN OF CARE
DISCHARGE   Discharged to: TCU  Via: Automobile  Accompanied by: Family  Discharge Instructions: diet, activity, medications, follow up appointments, when to call the MD, and what to watchout for (i.e. s/s of infection, increasing SOB, palpitations, chest pain,)  Prescriptions: To be filled byTCU pharmacy per pt's request; medication list reviewed & sent with pt  Follow Up Appointments: arranged; information given  Belongings: All sent with pt  IV: out  Telemetry: off  Pt exhibits understanding of above discharge instructions; all questions answered.  Discharge Paperwork: faxed, copy given to daughter      Plan of Care Reviewed With: patient    Overall Patient Progress: improvingOverall Patient Progress: improving    Outcome Evaluation: discharging to TCU

## 2024-06-28 NOTE — PROGRESS NOTES
Care Management Discharge Note    Discharge Date: 06/28/2024       Discharge Disposition: Transitional Care    ACCEPTING TCU for Friday 6/28  [facility]   [address], [place] [address]   Phone: [phone]  Admissions: [phone]  Nurse to Nurse report: [phone]  Fax: [phone]  - 6/28: LUIS gave the bedside RN the RN to RN number for report. LUIS faxed the signed discharge orders (LUSI wrote PAS code on fax cover sheet). LUIS called Yuli in admissions at 10:16am and left a detailed VM about fax and discharge timing.    Discharge Services: TCU therapy services    Discharge DME: n/a    Discharge Transportation: pt's daughter Danni (Phone: [phone]) to transport via personal vehicle around 1:30pm.    Private pay costs discussed: transportation costs    Does the patient's insurance plan have a 3 day qualifying hospital stay waiver?  No    PAS Confirmation Code: [id number]  Patient/family educated on Medicare website which has current facility and service quality ratings: yes    Education Provided on the Discharge Plan: Yes  Persons Notified of Discharge Plans: pt, dtr Danni, bedside RN, charge RN, Cards CSI provider, TCU admissions.  Patient/Family in Agreement with the Plan: yes    Handoff Referral Completed: Yes    ___________________    TOREY Dupree, CAMRON  6C , covering beds 6401 to 6519  [facility]   Phone: [phone]  [address] Alissa James

## 2024-06-28 NOTE — PLAN OF CARE
Occupational Therapy Discharge Summary    Reason for therapy discharge:    Discharged to transitional care facility.    Progress towards therapy goal(s). See goals on Care Plan in [facility] electronic health record for goal details.  Goals partially met.  Barriers to achieving goals:   discharge from facility.    Therapy recommendation(s):    Continued therapy is recommended.  Rationale/Recommendations:  continued OT to progress ADL IND and safety.

## 2024-07-04 LAB — PHQ9 SCORE: 0

## 2024-07-05 ENCOUNTER — OFFICE VISIT (OUTPATIENT)
Dept: CARDIOLOGY | Facility: CLINIC | Age: 79
End: 2024-07-05
Attending: SURGERY
Payer: MEDICARE

## 2024-07-05 VITALS
BODY MASS INDEX: 17.39 KG/M2 | OXYGEN SATURATION: 96 % | WEIGHT: 86.1 LBS | DIASTOLIC BLOOD PRESSURE: 63 MMHG | HEART RATE: 74 BPM | SYSTOLIC BLOOD PRESSURE: 142 MMHG

## 2024-07-05 DIAGNOSIS — Z95.2 S/P TAVR (TRANSCATHETER AORTIC VALVE REPLACEMENT): Primary | ICD-10-CM

## 2024-07-05 PROCEDURE — G0463 HOSPITAL OUTPT CLINIC VISIT: HCPCS

## 2024-07-05 PROCEDURE — 99213 OFFICE O/P EST LOW 20 MIN: CPT | Performed by: NURSE PRACTITIONER

## 2024-07-05 ASSESSMENT — PAIN SCALES - GENERAL: PAINLEVEL: SEVERE PAIN (6)

## 2024-07-05 NOTE — NURSING NOTE
Chief Complaint   Patient presents with    Follow Up     Post-op: trans-apical TAVR via thoracotomy Dr Tay Schuster were taken and medications reconciled.    Yogi Dyson, EMT  1:48 PM

## 2024-07-05 NOTE — PROGRESS NOTES
CARDIOTHORACIC SURGERY FOLLOW-UP VISIT     Loulou Lucero   1945   [id number]      Reason for visit: Post-op clinic visit    ASSESSMENT/PLAN:  Loulou Lucero is a 79 year old female who is status post Trans apical transcatheter aortic valve replacement with 20 mm Diaz Benedict 3 prosthetic valve via left thoracotomy  with Dr. Cross on 06/19/2024. Procedure complicated by left apical pneumothorax requiring chest tube placement by IR.    Patient Active Problem List   Diagnosis    Advanced care planning/counseling discussion    Chronic obstructive pulmonary disease (H)    Diverticular disease of colon    Systolic murmur    Tobacco abuse    Age-related osteoporosis without current pathological fracture    GERD (gastroesophageal reflux disease)    Anemia, unspecified type    Iron deficiency anemia    MGUS (monoclonal gammopathy of unknown significance)    Status post coronary angiogram    Severe aortic stenosis    Severe aortic stenosis by prior echocardiogram    Aortic stenosis, severe    History of transcatheter aortic valve replacement (TAVR)        Surgically doing well. Pain is overall controlled, continues to have left rib pain with certain movements.  Left thoracotomy incision healing well without signs of infection. Increasing activity and strength.  Sounds to be in a  regular rhythm with HR 70s bpm.  Appears  euvolemic.    Hemodynamics are stable. Medications reviewed and no changes were needed today.  Follow up with your PCP and cardiologist as scheduled.    Plans to the TCU on 7/10/2024  Outpatient Cardiac Rehab, once discharged from TCU, until completed.   Continue sternal precautions for 8 weeks from surgery date.   No driving for 2 weeks from surgery date.   Chest x-ray not completed today, will proceed without imaging as exam is benign.     Postoperative restrictions have been reviewed and all of the patient's questions were answered. Our contact information was given to the patient if he should have any  further questions/concerns. No further follow up is needed with us.  The total time spent with the patient was 25 minutes, > 50% of which was spent in counseling and coordination of care.    VEENA Ruggiero [facility]   Cardiovascular Surgery  __________________________________________________________________________________    HPI: Loulou Lucero is a 79 year old female with a PMH of Loulou Lucero is a 79 year old female with a history of tobacco use, COPD, lung nodule, HLD, mild CAD, chronic low back pain, MGUS, anemia and severe aortic stenosis who was admitted 6/19 for planned transapical TAVR .  Presents to clinic for a follow-up appointment after surgery. Hospital course was remarkable for left apical pneumothorax requiring IR chest tube placement. Patient was discharged TCU on 6/28/24.  She is accompanied today by her daughter.    Since discharge, has been recovering well.  States pain is still persistent but controlled with Tylenol, Robaxin, and gabapentin.   Sleep is going well without concerns. Participating in therapy at TCU, patient's daughter endorses that she will assist patient with getting set up for cardiac rehab when she leaves TCU.  Breathing has been stable/improving. Continues to work on C&DB. Denies SOB at rest, PND, orthopnea.  Endorses some increased work of breathing with activity, but resolves with rest.  Reports productive cough in the morning.  Patient denies any fever, chills, chest pain, palpitations, SOB, nausea, and vomiting.  Patient reports that she sometimes has dizziness, which is a chronic issue in nature given that she has vertigo.  Endorses that she does note some edema towards the end of the day but it is resolved when she wakes up in the morning.  Has had a good appetite, reports that she has been able to gain weight since surgery. Having regular bowel movements and voiding without difficulty.   Denies  incisional drainage/erythema.   No psychiatric concerns.    ROS:  Review of  symptoms otherwise negative unless commented about in HPI.     LABS:    CBC RESULTS:   Recent Labs   Lab Test 06/28/24  0523 06/27/24  1543 06/27/24  0538   WBC 7.3 7.5 7.9   HGB 8.3* 8.5* 7.7*   HCT 25.7* 26.5* 24.2*    177 163       CMP RESULTS:  Recent Labs   Lab Test 06/28/24  0523 06/27/24  0538 06/26/24  0516 06/19/24  2256 06/19/24  2252 06/19/24  1212 06/19/24  1204 06/19/24  0544 06/18/24  1228    140 140   < > 140  --  139   < > 142   POTASSIUM 4.1 3.9 3.7   < > 4.4  --  4.3   < > 4.8   CHLORIDE 105 104 104   < > 106  --  106  --  104   CO2 29 30* 29   < > 23  --  23  --  29   ANIONGAP 6* 6* 7   < > 11  --  10  --  9   GLC 99 106* 102*   < > 134*   < > 122*   < > 90   BUN 13.1 17.6 22.6   < > 25.6*  --  22.6  --  18.6   CR 0.77 0.74 0.80   < > 0.83  --  0.78  --  0.73   GFRESTIMATED 78 82 75   < > 71  --  77  --  83   DION 8.3* 8.1* 8.4*   < > 9.3  --  7.9*  --  9.7   BILITOTAL  --   --   --   --  0.6  --  0.5  --  0.6   ALKPHOS  --   --   --   --  48  --  46  --  60   ALT  --   --   --   --  13  --  14  --  11   AST  --   --   --   --  54*  --  30  --  26    < > = values in this interval not displayed.     Recent Labs   Lab Test 06/20/24  1151 06/18/24  1228 04/05/24  1132   INR 1.49* 1.20* 1.15       IMAGING:  None    PHYSICAL EXAM:   BP (!) 142/63 (BP Location: Right arm, Patient Position: Chair, Cuff Size: Adult Small)   Pulse 74   Wt 39.1 kg (86 lb 1.6 oz)   LMP 01/01/1995 (Approximate)   SpO2 96%   BMI 17.39 kg/m      General: alert and oriented x 3, pleasant, no acute distress, normal mood and affect  Neuro: no focal deficits   CV: S1 S2, no murmurs, rubs or gallops, regular rate and rhythm, no peripheral edema  Pulm: bilateral breath sounds, clear to auscultation, easy work of breathing  Incision: incision clean dry and intact without erythema, swelling or drainage    PROCEDURES:  None       CURRENT MEDICATIONS:   Current Outpatient Medications   Medication Sig Dispense Refill     amiodarone (PACERONE) 200 MG tablet 1 tablet (200 mg) by Oral or FT or NG tube route 2 times daily for 3 days, THEN 1 tablet (200 mg) daily for 90 days.      aspirin 81 MG EC tablet Take 324 mg by mouth daily      cyanocobalamin (VITAMIN B-12) 1000 MCG tablet Take 1 tablet (1,000 mcg) by mouth daily 90 tablet 3    fluticasone-salmeterol (ADVAIR DISKUS) 250-50 MCG/ACT inhaler Inhale 1 puff into the lungs 2 times daily WIXELA-Disp as covered by Pt's insurance 3 each 2    gabapentin (NEURONTIN) 100 MG capsule 1 capsule (100 mg) by Oral or Feeding Tube route 2 times daily      methocarbamol (ROBAXIN) 500 MG tablet 1 tablet (500 mg) by Oral or Feeding Tube route 3 times daily      pantoprazole (PROTONIX) 40 MG EC tablet Take 1 tablet (40 mg) by mouth daily      simvastatin (ZOCOR) 20 MG tablet Take 1 tablet (20 mg) by mouth at bedtime 90 tablet 2     No current facility-administered medications for this visit.       PENNIE Marc

## 2024-07-05 NOTE — PATIENT INSTRUCTIONS
Your surgery was on 06/19/2024    You had a mini-thoracotomy, avoid lifting anything greater than ten pounds for 2 weeks and then 20 pounds for an additional 2 weeks.   Avoid twisting or reaching too far across your body.    Avoid strenuous activities such as bowling, vacuuming, raking, shoveling, golf or tennis for 12 weeks after your surgery.   Splint your chest incision by hugging a pillow or bringing your arms across your chest when coughing or sneezing.     You may drive after 2 weeks if not taking pain medication.      Do not soak your incisions until fully healed over with no scabbing; this includes hot tubs, baths, pools etc.    You had valve surgery, ensure you take prophylactic antibiotics prior to any dental procedure.    If you have questions or concerns, please call us:    Jennifer Steele  RN Care Coordinators  [phone]    Kendell Kramer, RN Care Coordinator - [phone]   Ashanti Black, RN Care Coordinator - [phone]  Mehnaz Spencer, RN Care Coordinator - [phone]

## 2024-07-05 NOTE — LETTER
7/5/2024      RE: Loulou Lucero  [address] [place] [id number]       Dear Colleague,    Thank you for the opportunity to participate in the care of your patient, Loulou Lucero, at the [facility]. Please see a copy of my visit note below.    CARDIOTHORACIC SURGERY FOLLOW-UP VISIT     Loulou Lucero   1945   [id number]      Reason for visit: Post-op clinic visit    ASSESSMENT/PLAN:  Loulou Lucero is a 79 year old female who is status post Trans apical transcatheter aortic valve replacement with 20 mm Diaz Benedict 3 prosthetic valve via left thoracotomy  with Dr. Cross on 06/19/2024. Procedure complicated by left apical pneumothorax requiring chest tube placement by IR.    Patient Active Problem List   Diagnosis     Advanced care planning/counseling discussion     Chronic obstructive pulmonary disease (H)     Diverticular disease of colon     Systolic murmur     Tobacco abuse     Age-related osteoporosis without current pathological fracture     GERD (gastroesophageal reflux disease)     Anemia, unspecified type     Iron deficiency anemia     MGUS (monoclonal gammopathy of unknown significance)     Status post coronary angiogram     Severe aortic stenosis     Severe aortic stenosis by prior echocardiogram     Aortic stenosis, severe     History of transcatheter aortic valve replacement (TAVR)        Surgically doing well. Pain is overall controlled, continues to have left rib pain with certain movements.  Left thoracotomy incision healing well without signs of infection. Increasing activity and strength.  Sounds to be in a  regular rhythm with HR 70s bpm.  Appears  euvolemic.    Hemodynamics are stable. Medications reviewed and no changes were needed today.  Follow up with your PCP and cardiologist as scheduled.    Plans to the TCU on 7/10/2024  Outpatient Cardiac Rehab, once discharged from TCU, until completed.   Continue  sternal precautions for 8 weeks from surgery date.   No driving for 2 weeks from surgery date.   Chest x-ray not completed today, will proceed without imaging as exam is benign.     Postoperative restrictions have been reviewed and all of the patient's questions were answered. Our contact information was given to the patient if he should have any further questions/concerns. No further follow up is needed with us.  The total time spent with the patient was 25 minutes, > 50% of which was spent in counseling and coordination of care.    VEENA Ruggiero [facility]   Cardiovascular Surgery  __________________________________________________________________________________    HPI: Loulou Lucero is a 79 year old female with a PMH of Loulou Lucero is a 79 year old female with a history of tobacco use, COPD, lung nodule, HLD, mild CAD, chronic low back pain, MGUS, anemia and severe aortic stenosis who was admitted 6/19 for planned transapical TAVR .  Presents to clinic for a follow-up appointment after surgery. Hospital course was remarkable for left apical pneumothorax requiring IR chest tube placement. Patient was discharged TCU on 6/28/24.  She is accompanied today by her daughter.    Since discharge, has been recovering well.  States pain is still persistent but controlled with Tylenol, Robaxin, and gabapentin.   Sleep is going well without concerns. Participating in therapy at TCU, patient's daughter endorses that she will assist patient with getting set up for cardiac rehab when she leaves TCU.  Breathing has been stable/improving. Continues to work on C&DB. Denies SOB at rest, PND, orthopnea.  Endorses some increased work of breathing with activity, but resolves with rest.  Reports productive cough in the morning.  Patient denies any fever, chills, chest pain, palpitations, SOB, nausea, and vomiting.  Patient reports that she sometimes has dizziness, which is a chronic issue in nature given that she has vertigo.   Endorses that she does note some edema towards the end of the day but it is resolved when she wakes up in the morning.  Has had a good appetite, reports that she has been able to gain weight since surgery. Having regular bowel movements and voiding without difficulty.   Denies  incisional drainage/erythema.   No psychiatric concerns.    ROS:  Review of symptoms otherwise negative unless commented about in HPI.     LABS:    CBC RESULTS:   Recent Labs   Lab Test 06/28/24  0523 06/27/24  1543 06/27/24  0538   WBC 7.3 7.5 7.9   HGB 8.3* 8.5* 7.7*   HCT 25.7* 26.5* 24.2*    177 163       CMP RESULTS:  Recent Labs   Lab Test 06/28/24  0523 06/27/24  0538 06/26/24  0516 06/19/24  2256 06/19/24  2252 06/19/24  1212 06/19/24  1204 06/19/24  0544 06/18/24  1228    140 140   < > 140  --  139   < > 142   POTASSIUM 4.1 3.9 3.7   < > 4.4  --  4.3   < > 4.8   CHLORIDE 105 104 104   < > 106  --  106  --  104   CO2 29 30* 29   < > 23  --  23  --  29   ANIONGAP 6* 6* 7   < > 11  --  10  --  9   GLC 99 106* 102*   < > 134*   < > 122*   < > 90   BUN 13.1 17.6 22.6   < > 25.6*  --  22.6  --  18.6   CR 0.77 0.74 0.80   < > 0.83  --  0.78  --  0.73   GFRESTIMATED 78 82 75   < > 71  --  77  --  83   DION 8.3* 8.1* 8.4*   < > 9.3  --  7.9*  --  9.7   BILITOTAL  --   --   --   --  0.6  --  0.5  --  0.6   ALKPHOS  --   --   --   --  48  --  46  --  60   ALT  --   --   --   --  13  --  14  --  11   AST  --   --   --   --  54*  --  30  --  26    < > = values in this interval not displayed.     Recent Labs   Lab Test 06/20/24  1151 06/18/24  1228 04/05/24  1132   INR 1.49* 1.20* 1.15       IMAGING:  None    PHYSICAL EXAM:   BP (!) 142/63 (BP Location: Right arm, Patient Position: Chair, Cuff Size: Adult Small)   Pulse 74   Wt 39.1 kg (86 lb 1.6 oz)   LMP 01/01/1995 (Approximate)   SpO2 96%   BMI 17.39 kg/m      General: alert and oriented x 3, pleasant, no acute distress, normal mood and affect  Neuro: no focal deficits   CV: S1  S2, no murmurs, rubs or gallops, regular rate and rhythm, no peripheral edema  Pulm: bilateral breath sounds, clear to auscultation, easy work of breathing  Incision: incision clean dry and intact without erythema, swelling or drainage    PROCEDURES:  None       CURRENT MEDICATIONS:   Current Outpatient Medications   Medication Sig Dispense Refill     amiodarone (PACERONE) 200 MG tablet 1 tablet (200 mg) by Oral or FT or NG tube route 2 times daily for 3 days, THEN 1 tablet (200 mg) daily for 90 days.       aspirin 81 MG EC tablet Take 324 mg by mouth daily       cyanocobalamin (VITAMIN B-12) 1000 MCG tablet Take 1 tablet (1,000 mcg) by mouth daily 90 tablet 3     fluticasone-salmeterol (ADVAIR DISKUS) 250-50 MCG/ACT inhaler Inhale 1 puff into the lungs 2 times daily WIXELA-Disp as covered by Pt's insurance 3 each 2     gabapentin (NEURONTIN) 100 MG capsule 1 capsule (100 mg) by Oral or Feeding Tube route 2 times daily       methocarbamol (ROBAXIN) 500 MG tablet 1 tablet (500 mg) by Oral or Feeding Tube route 3 times daily       pantoprazole (PROTONIX) 40 MG EC tablet Take 1 tablet (40 mg) by mouth daily       simvastatin (ZOCOR) 20 MG tablet Take 1 tablet (20 mg) by mouth at bedtime 90 tablet 2     No current facility-administered medications for this visit.       CC  Ele Marc       Please do not hesitate to contact me if you have any questions/concerns.     Sincerely,     Cardiovascular Thoracic Surgery

## 2024-07-12 ENCOUNTER — TELEPHONE (OUTPATIENT)
Dept: CARDIAC REHAB | Facility: OTHER | Age: 79
End: 2024-07-12
Payer: MEDICARE

## 2024-07-12 NOTE — TELEPHONE ENCOUNTER
Patient verified their .  Called patient re: referral to cardiac rehab received from Polina De Leon NP.  Instructed on program and goals of cardiac rehab.  No Appointment set yet as patient states she thinks she will be doing Homecare first. Writer will call patient next week to see if she is getting Homecare and make appointment for cardiac rehab intake when appropiate.  Patient verbalized understanding.

## 2024-07-15 ENCOUNTER — TELEPHONE (OUTPATIENT)
Dept: INTERNAL MEDICINE | Facility: OTHER | Age: 79
End: 2024-07-15
Payer: MEDICARE

## 2024-07-15 NOTE — TELEPHONE ENCOUNTER
[facility] with orders to receive home care services. We were unable to start services within 48 hours of the referral. OK to start services on 7/17/24?      Thank you.     Mami Amaro LPN on 7/15/2024 at 2:24 PM

## 2024-07-17 ENCOUNTER — APPOINTMENT (OUTPATIENT)
Dept: GENERAL RADIOLOGY | Facility: OTHER | Age: 79
End: 2024-07-17
Attending: FAMILY MEDICINE
Payer: MEDICARE

## 2024-07-17 ENCOUNTER — HOSPITAL ENCOUNTER (EMERGENCY)
Facility: OTHER | Age: 79
Discharge: HOME | End: 2024-07-17
Attending: FAMILY MEDICINE | Admitting: FAMILY MEDICINE
Payer: MEDICARE

## 2024-07-17 VITALS
DIASTOLIC BLOOD PRESSURE: 62 MMHG | OXYGEN SATURATION: 91 % | HEIGHT: 59 IN | TEMPERATURE: 98.8 F | RESPIRATION RATE: 23 BRPM | BODY MASS INDEX: 16.53 KG/M2 | WEIGHT: 82 LBS | HEART RATE: 90 BPM | SYSTOLIC BLOOD PRESSURE: 134 MMHG

## 2024-07-17 DIAGNOSIS — J15.9 COMMUNITY ACQUIRED BACTERIAL PNEUMONIA: ICD-10-CM

## 2024-07-17 LAB
ANION GAP SERPL CALCULATED.3IONS-SCNC: 12 MMOL/L (ref 7–15)
BASOPHILS # BLD AUTO: 0 10E3/UL (ref 0–0.2)
BASOPHILS NFR BLD AUTO: 0 %
BUN SERPL-MCNC: 17.2 MG/DL (ref 8–23)
CALCIUM SERPL-MCNC: 9.2 MG/DL (ref 8.8–10.4)
CHLORIDE SERPL-SCNC: 102 MMOL/L (ref 98–107)
CREAT SERPL-MCNC: 0.85 MG/DL (ref 0.51–0.95)
EGFRCR SERPLBLD CKD-EPI 2021: 69 ML/MIN/1.73M2
EOSINOPHIL # BLD AUTO: 0.3 10E3/UL (ref 0–0.7)
EOSINOPHIL NFR BLD AUTO: 4 %
ERYTHROCYTE [DISTWIDTH] IN BLOOD BY AUTOMATED COUNT: 13.8 % (ref 10–15)
FLUAV RNA SPEC QL NAA+PROBE: NEGATIVE
FLUBV RNA RESP QL NAA+PROBE: NEGATIVE
GLUCOSE SERPL-MCNC: 120 MG/DL (ref 70–99)
HCO3 SERPL-SCNC: 27 MMOL/L (ref 22–29)
HCT VFR BLD AUTO: 31.4 % (ref 35–47)
HGB BLD-MCNC: 10 G/DL (ref 11.7–15.7)
HOLD SPECIMEN: NORMAL
IMM GRANULOCYTES # BLD: 0 10E3/UL
IMM GRANULOCYTES NFR BLD: 0 %
LYMPHOCYTES # BLD AUTO: 0.7 10E3/UL (ref 0.8–5.3)
LYMPHOCYTES NFR BLD AUTO: 8 %
MCH RBC QN AUTO: 32.2 PG (ref 26.5–33)
MCHC RBC AUTO-ENTMCNC: 31.8 G/DL (ref 31.5–36.5)
MCV RBC AUTO: 101 FL (ref 78–100)
MONOCYTES # BLD AUTO: 0.9 10E3/UL (ref 0–1.3)
MONOCYTES NFR BLD AUTO: 10 %
NEUTROPHILS # BLD AUTO: 7 10E3/UL (ref 1.6–8.3)
NEUTROPHILS NFR BLD AUTO: 78 %
NRBC # BLD AUTO: 0 10E3/UL
NRBC BLD AUTO-RTO: 0 /100
PLATELET # BLD AUTO: 270 10E3/UL (ref 150–450)
POTASSIUM SERPL-SCNC: 3.5 MMOL/L (ref 3.4–5.3)
RBC # BLD AUTO: 3.11 10E6/UL (ref 3.8–5.2)
RSV RNA SPEC NAA+PROBE: NEGATIVE
SARS-COV-2 RNA RESP QL NAA+PROBE: NEGATIVE
SODIUM SERPL-SCNC: 141 MMOL/L (ref 135–145)
WBC # BLD AUTO: 9 10E3/UL (ref 4–11)

## 2024-07-17 PROCEDURE — 87899 AGENT NOS ASSAY W/OPTIC: CPT | Performed by: FAMILY MEDICINE

## 2024-07-17 PROCEDURE — 93010 ELECTROCARDIOGRAM REPORT: CPT | Performed by: INTERNAL MEDICINE

## 2024-07-17 PROCEDURE — 80048 BASIC METABOLIC PNL TOTAL CA: CPT | Performed by: FAMILY MEDICINE

## 2024-07-17 PROCEDURE — 36415 COLL VENOUS BLD VENIPUNCTURE: CPT | Performed by: FAMILY MEDICINE

## 2024-07-17 PROCEDURE — 71045 X-RAY EXAM CHEST 1 VIEW: CPT

## 2024-07-17 PROCEDURE — G0180 MD CERTIFICATION HHA PATIENT: HCPCS | Performed by: NURSE PRACTITIONER

## 2024-07-17 PROCEDURE — 87637 SARSCOV2&INF A&B&RSV AMP PRB: CPT | Performed by: FAMILY MEDICINE

## 2024-07-17 PROCEDURE — 99285 EMERGENCY DEPT VISIT HI MDM: CPT | Mod: 25 | Performed by: FAMILY MEDICINE

## 2024-07-17 PROCEDURE — 93005 ELECTROCARDIOGRAM TRACING: CPT | Performed by: FAMILY MEDICINE

## 2024-07-17 PROCEDURE — 85048 AUTOMATED LEUKOCYTE COUNT: CPT | Performed by: FAMILY MEDICINE

## 2024-07-17 PROCEDURE — 99284 EMERGENCY DEPT VISIT MOD MDM: CPT | Performed by: FAMILY MEDICINE

## 2024-07-17 RX ORDER — AZITHROMYCIN 250 MG/1
500 TABLET, FILM COATED ORAL DAILY
Qty: 20 TABLET | Refills: 0 | Status: SHIPPED | OUTPATIENT
Start: 2024-07-17 | End: 2024-07-17

## 2024-07-17 ASSESSMENT — ENCOUNTER SYMPTOMS
COUGH: 1
SHORTNESS OF BREATH: 1
FEVER: 1

## 2024-07-17 ASSESSMENT — ACTIVITIES OF DAILY LIVING (ADL)
ADLS_ACUITY_SCORE: 38
ADLS_ACUITY_SCORE: 38

## 2024-07-17 ASSESSMENT — COLUMBIA-SUICIDE SEVERITY RATING SCALE - C-SSRS
1. IN THE PAST MONTH, HAVE YOU WISHED YOU WERE DEAD OR WISHED YOU COULD GO TO SLEEP AND NOT WAKE UP?: NO
2. HAVE YOU ACTUALLY HAD ANY THOUGHTS OF KILLING YOURSELF IN THE PAST MONTH?: NO
6. HAVE YOU EVER DONE ANYTHING, STARTED TO DO ANYTHING, OR PREPARED TO DO ANYTHING TO END YOUR LIFE?: NO

## 2024-07-17 NOTE — DISCHARGE INSTRUCTIONS
Loulou    We will call you with the results of the Legionaire test.    Please take the antibiotics- I sent prescriptions to [facility].    Thank you for choosing our Emergency Department for your care.     You may receive a phone call or letter for a survey about your care in our ED.  Please complete this as this is how we improve care for our patients.     If you have any questions after leaving the ED you can call or text me on my cell phone at [phone].  I am not on call so if I do not answer my phone please leave a message- I will get back to you.  If you are not doing well please return to the ED.     Sincerely,    Dr Arian Larsen M.D.  Medical Director  [facility] Emergency Department

## 2024-07-17 NOTE — ED TRIAGE NOTES
Pt presents to ED after TVR. Pt reports pain, SOB, fatigue, cough, nausea and weight loss. Pt here from home care nurse. Pt feels feverish, afebrile.    Christie Monzon RN on 7/17/2024 at 2:23 PM       Triage Assessment (Adult)       Row Name 07/17/24 1422          Respiratory WDL    Respiratory WDL X;all     Rhythm/Pattern, Respiratory shortness of breath        Cognitive/Neuro/Behavioral WDL    Cognitive/Neuro/Behavioral WDL WDL

## 2024-07-17 NOTE — ED PROVIDER NOTES
History     Chief Complaint   Patient presents with    Post-op Problem     Here with her son    The history is provided by the patient and a relative.     Loulou Lucero is a 79 year old female here with fever to 100.4  F, cough, SOB, decreased appetite and she found out today that she cannot smell anything.     He had a valve replacement June 19 at [facility], discharged June 28 after some complications of pneumothorax and left pulmonary hemorrhage. She has been in Rehab until one week ago. She lives in [place].     Allergies:  Allergies   Allergen Reactions    Metronidazole Nausea and Vomiting    Pneumococcal Vaccine      Other reaction(s): Edema  Arm swelling    Tramadol Visual Disturbance     Hallucinations          Problem List:    Patient Active Problem List    Diagnosis Date Noted    History of transcatheter aortic valve replacement (TAVR) 06/25/2024     Priority: Medium    Aortic stenosis, severe 06/19/2024     Priority: Medium    Status post coronary angiogram 04/05/2024     Priority: Medium    Severe aortic stenosis 04/05/2024     Priority: Medium    Severe aortic stenosis by prior echocardiogram 04/05/2024     Priority: Medium    MGUS (monoclonal gammopathy of unknown significance) 01/31/2024     Priority: Medium    Iron deficiency anemia 01/23/2024     Priority: Medium    Anemia, unspecified type 03/28/2023     Priority: Medium    Age-related osteoporosis without current pathological fracture 06/26/2018     Priority: Medium    Diverticular disease of colon 01/23/2018     Priority: Medium    Tobacco abuse 01/23/2018     Priority: Medium     Overview:   Trying to quit      Systolic murmur 06/06/2017     Priority: Medium     Overview:   Aortic sclerosis with no significant aortic stenosis per echo June 2017      GERD (gastroesophageal reflux disease) 06/18/2014     Priority: Medium    Advanced care planning/counseling discussion 04/07/2014     Priority: Medium     Overview:   Declined       Chronic  obstructive pulmonary disease (H) 12/29/2010     Priority: Medium        Past Medical History:    Past Medical History:   Diagnosis Date    Asymptomatic varicose veins of lower extremity     Benign paroxysmal vertigo     Chronic obstructive pulmonary disease (H)     Diarrhea     Disorder of cartilage     Diverticulosis of large intestine without perforation or abscess without bleeding     Lower abdominal pain     Other disorders of lung (CODE)     Personal history of other medical treatment (CODE)     Personal history of other medical treatment (CODE)     Zoster without complications        Past Surgical History:    Past Surgical History:   Procedure Laterality Date    CATARACT EXTRACTION Right     8/2023    COLONOSCOPY  03/22/2010    Normal; + family hx, next due 2015    COLONOSCOPY  10/01/2015    W/ POLYPECTOMY recommend follow up in 2020.    COLONOSCOPY N/A 11/07/2019    4 tubular adenoma, 3 year follow up    CV CORONARY ANGIOGRAM N/A 4/5/2024    Procedure: Coronary Angiogram;  Surgeon: Min So MD;  Location:  HEART CARDIAC CATH LAB    CV RIGHT HEART CATH MEASUREMENTS RECORDED N/A 4/5/2024    Procedure: Right Heart Catheterization;  Surgeon: Min So MD;  Location:  HEART CARDIAC CATH LAB    CV TRANSCATHETER AORTIC VALVE REPLACEMENT N/A 6/19/2024    Procedure: Transcatheter Aortic Valve Replacement - Transapical Approach;  Surgeon: Claude Patricia MD;  Location:  OR    ESOPHAGOSCOPY, GASTROSCOPY, DUODENOSCOPY (EGD), COMBINED  04/23/2014    Arce's esophagus fu egd 2 yrs    ESOPHAGOSCOPY, GASTROSCOPY, DUODENOSCOPY (EGD), COMBINED N/A 11/07/2019    Procedure: ESOPHAGOGASTRODUODENOSCOPY, WITH BIOPSY;  Surgeon: Santosh Barrera MD;  Location:  OR    IR CHEST TUBE PLACEMENT NON-TUNNELLED LEFT  6/21/2024    LAPAROSCOPIC TUBAL LIGATION  1978         OR TRANSCATHETER AORTIC VALVE REPLACEMENT, TRANSAPICAL OPEN APPROACH Left 6/19/2024    Procedure: Transcatheter  Aortic Valve Replacement, Transapical Open Approach using Diaz Pericardial Tissue Heart Valve size 20mm, Cardiopulmonary Bypass Pump on Standby, and Transesophageal Echocardiogram by Cardiology;  Surgeon: Frank Cross MD;  Location: [facility] OR    TONSILLECTOMY & ADENOIDECTOMY  1951            Family History:    Family History   Problem Relation Age of Onset    Colon Cancer Father         Cancer-colon    Other - See Comments Mother         Alzheimer's    Other - See Comments Maternal Grandmother         Alzheimer's    Other - See Comments Brother         Alzheimer's    Other - See Comments Brother         aneurysm and stents    Cancer Brother         Cancer,lung    Cancer Brother         Cancer,skin    Other - See Comments Brother         cirrhosis of the liver    Other - See Comments Maternal Uncle         3, Alzheimer's    Breast Cancer No family hx of         Cancer-breast       Social History:  Marital Status:   [5]  Social History     Tobacco Use    Smoking status: Every Day     Current packs/day: 0.50     Average packs/day: 0.5 packs/day for 63.5 years (31.8 ttl pk-yrs)     Types: Cigarettes     Start date: 1961     Passive exposure: Past    Smokeless tobacco: Never    Tobacco comments:     Passive exposure in adult home.    Vaping Use    Vaping status: Never Used   Substance Use Topics    Alcohol use: Not Currently     Comment: rarely at a wedding or special occasion    Drug use: No        Medications:    amiodarone (PACERONE) 200 MG tablet  amoxicillin-clavulanate (AUGMENTIN) 875-125 MG tablet  aspirin 81 MG EC tablet  cyanocobalamin (VITAMIN B-12) 1000 MCG tablet  fluticasone-salmeterol (ADVAIR DISKUS) 250-50 MCG/ACT inhaler  gabapentin (NEURONTIN) 100 MG capsule  methocarbamol (ROBAXIN) 500 MG tablet  pantoprazole (PROTONIX) 40 MG EC tablet  simvastatin (ZOCOR) 20 MG tablet          Review of Systems   Constitutional:  Positive for fever.   HENT:          Change in appetite and smell   Respiratory:   "Positive for cough and shortness of breath.    All other systems reviewed and are negative.      Physical Exam   BP: (!) 157/70  Pulse: 87  Temp: 98.8  F (37.1  C)  Resp: 20  Height: 149.9 cm (4' 11\")  Weight: 37.2 kg (82 lb)  SpO2: 95 %      Physical Exam  Vitals and nursing note reviewed.   Constitutional:       Appearance: She is ill-appearing. She is not toxic-appearing.   Cardiovascular:      Rate and Rhythm: Normal rate and regular rhythm.      Pulses: Normal pulses.      Heart sounds: Normal heart sounds.   Pulmonary:      Effort: Pulmonary effort is normal. No respiratory distress.      Breath sounds: Normal breath sounds.   Abdominal:      General: Bowel sounds are normal.      Palpations: Abdomen is soft.      Tenderness: There is no abdominal tenderness. There is no guarding.   Skin:     General: Skin is warm and dry.   Neurological:      General: No focal deficit present.      Mental Status: She is alert and oriented to person, place, and time.       EKG: NSR, rate 86, normal axis, LBBB    Results for orders placed or performed during the hospital encounter of 07/17/24 (from the past 24 hour(s))   [facility] Draw    Narrative    The following orders were created for panel order [facility].  Procedure                               Abnormality         Status                     ---------                               -----------         ------                     Extra Blue Top Tube[[id number]]                              Final result               Extra Red Top Tube[[id number]]                               Final result               Extra Green Top (Lithium...[[id number]]                      Final result               Extra Purple Top Tube[[id number]]                            Final result               Extra Green Top (Lithium...[[id number]]                      Final result                 Please view results for these tests on the individual orders.   Extra Blue Top Tube   Result Value Ref Range    Hold " Specimen JIC    Extra Red Top Tube   Result Value Ref Range    Hold Specimen JIC    Extra Green Top (Lithium Heparin) Tube   Result Value Ref Range    Hold Specimen JIC    Extra Purple Top Tube   Result Value Ref Range    Hold Specimen JIC    Extra Green Top (Lithium Heparin) ON ICE   Result Value Ref Range    Hold Specimen JIC    CBC with platelets differential    Narrative    The following orders were created for panel order CBC with platelets differential.  Procedure                               Abnormality         Status                     ---------                               -----------         ------                     CBC with platelets and d...[[id number]]  Abnormal            Final result                 Please view results for these tests on the individual orders.   Basic metabolic panel   Result Value Ref Range    Sodium 141 135 - 145 mmol/L    Potassium 3.5 3.4 - 5.3 mmol/L    Chloride 102 98 - 107 mmol/L    Carbon Dioxide (CO2) 27 22 - 29 mmol/L    Anion Gap 12 7 - 15 mmol/L    Urea Nitrogen 17.2 8.0 - 23.0 mg/dL    Creatinine 0.85 0.51 - 0.95 mg/dL    GFR Estimate 69 >60 mL/min/1.73m2    Calcium 9.2 8.8 - 10.4 mg/dL    Glucose 120 (H) 70 - 99 mg/dL   CBC with platelets and differential   Result Value Ref Range    WBC Count 9.0 4.0 - 11.0 10e3/uL    RBC Count 3.11 (L) 3.80 - 5.20 10e6/uL    Hemoglobin 10.0 (L) 11.7 - 15.7 g/dL    Hematocrit 31.4 (L) 35.0 - 47.0 %     (H) 78 - 100 fL    MCH 32.2 26.5 - 33.0 pg    MCHC 31.8 31.5 - 36.5 g/dL    RDW 13.8 10.0 - 15.0 %    Platelet Count 270 150 - 450 10e3/uL    % Neutrophils 78 %    % Lymphocytes 8 %    % Monocytes 10 %    % Eosinophils 4 %    % Basophils 0 %    % Immature Granulocytes 0 %    NRBCs per 100 WBC 0 <1 /100    Absolute Neutrophils 7.0 1.6 - 8.3 10e3/uL    Absolute Lymphocytes 0.7 (L) 0.8 - 5.3 10e3/uL    Absolute Monocytes 0.9 0.0 - 1.3 10e3/uL    Absolute Eosinophils 0.3 0.0 - 0.7 10e3/uL    Absolute Basophils 0.0 0.0 - 0.2 10e3/uL     Absolute Immature Granulocytes 0.0 <=0.4 10e3/uL    Absolute NRBCs 0.0 10e3/uL   Symptomatic Influenza A/B, RSV, & SARS-CoV2 PCR (COVID-19) Nose    Specimen: Nose; Swab   Result Value Ref Range    Influenza A PCR Negative Negative    Influenza B PCR Negative Negative    RSV PCR Negative Negative    SARS CoV2 PCR Negative Negative    Narrative    Testing was performed using the Xpert Xpress CoV2/Flu/RSV Assay on the [employer] GeneXpert Instrument. This test should be ordered for the detection of SARS-CoV-2, influenza, and RSV viruses in individuals who meet clinical and/or epidemiological criteria. Test performance is unknown in asymptomatic patients. This test is for in vitro diagnostic use under the FDA EUA for laboratories certified under CLIA to perform high or moderate complexity testing. This test has not been FDA cleared or approved. A negative result does not rule out the presence of PCR inhibitors in the specimen or target RNA in concentration below the limit of detection for the assay. If only one viral target is positive but coinfection with multiple targets is suspected, the sample should be re-tested with another FDA cleared, approved, or authorized test, if coinfection would change clinical management. This test was validated by the [facility] [employer]. These laboratories are certified under the Clinical Laboratory Improvement Amendments of 1988 (CLIA-88) as qualified to perform high complexity laboratory testing.   XR Chest Port 1 View    Narrative    Exam:  XR CHEST PORT 1 VIEW    HISTORY: SOB, fever.    COMPARISON:  6/26/2024, 6/25/2024, 6/22/2024    FINDINGS:     The cardiomediastinal contours are normal.      There are scattered new subtle patchy opacities. Small bilateral  pleural effusions. No pneumothorax.    No acute osseous abnormality.       Impression    IMPRESSION:      Scattered subtle patchy opacities may be due to multifocal pneumonia  or pulmonary edema.  Small bilateral  "pleural effusions.    JAZMINE FERREIRA MD         SYSTEM ID:  [id number]       Medications - No data to display    Assessments & Plan (with Medical Decision Making)  Loulou Lucero is a 79 year old female here with fever to 100.4  F, cough, SOB, decreased appetite and she found out today that she cannot smell anything.   He had a valve replacement June 19 at [facility], discharged June 28 after some complications of pneumothorax and left pulmonary hemorrhage. She has been in Rehab until one week ago. She lives in [place].   VS in the ED /62   Pulse 90   Temp 98.8  F (37.1  C) (Tympanic)   Resp 23   Ht 1.499 m (4' 11\")   Wt 37.2 kg (82 lb)   LMP 01/01/1995 (Approximate)   SpO2 91%   BMI 16.56 kg/m    Exam shows an ill appearing 80 yo female, normal heart and lung sounds.   EKG stable.  Labs show CBC with hgb 10.0 (improved), BMP okay, 4 Plex negative. Legionaires pending.  Chest xray shows scattered subtle patchy opacities may be due to multifocal pneumonia or pulmonary edema.  Small bilateral pleural effusions.  4:02 PM  She is still smoking daily.  She thinks that she is on [employer] water, so I will get a Legionaire test for her and start her on azithromycin and Augmentin, Rx to [facility] in [place].  5:17 PM  [facility] pharmacy called and the interaction between amiodarone and azithromycin or Levaquin means we will not prescribe either. She will not be covered for Legionaires if her test for Legionaires is positive.      I have reviewed the nursing notes.    I have reviewed the findings, diagnosis, plan and need for follow up with the patient.  Medical Decision Making  The patient's presentation was of moderate complexity (an acute illness with systemic symptoms).    The patient's evaluation involved:  an assessment requiring an independent historian (see separate area of note for details)  ordering and/or review of 3+ test(s) in this encounter (see separate area of note for details)    The patient's " management necessitated moderate risk (prescription drug management including medications given in the ED).        Discharge Medication List as of 7/17/2024  4:12 PM        START taking these medications    Details   amoxicillin-clavulanate (AUGMENTIN) 875-125 MG tablet Take 1 tablet by mouth 2 times daily for 7 days, Disp-14 tablet, R-0, E-Prescribe      azithromycin (ZITHROMAX) 250 MG tablet Take 2 tablets (500 mg) by mouth daily for 10 days, Disp-20 tablet, R-0, E-Prescribe             Final diagnoses:   Community acquired bacterial pneumonia       7/17/2024   [facility]       Tejas Larsen MD  07/17/24 [id number]       Tejas Larsen MD  07/17/24 1406       Tejas Larsen MD  07/17/24 0278

## 2024-07-18 ENCOUNTER — TELEPHONE (OUTPATIENT)
Dept: INTERNAL MEDICINE | Facility: OTHER | Age: 79
End: 2024-07-18
Payer: MEDICARE

## 2024-07-18 LAB
ATRIAL RATE - MUSE: 86 BPM
DIASTOLIC BLOOD PRESSURE - MUSE: NORMAL MMHG
INTERPRETATION ECG - MUSE: NORMAL
L PNEUMO1 AG UR QL IA: NEGATIVE
P AXIS - MUSE: 77 DEGREES
PR INTERVAL - MUSE: 144 MS
QRS DURATION - MUSE: 124 MS
QT - MUSE: 430 MS
QTC - MUSE: 514 MS
R AXIS - MUSE: 22 DEGREES
S PNEUM AG SPEC QL: NEGATIVE
SYSTOLIC BLOOD PRESSURE - MUSE: NORMAL MMHG
T AXIS - MUSE: -70 DEGREES
VENTRICULAR RATE- MUSE: 86 BPM

## 2024-07-18 NOTE — TELEPHONE ENCOUNTER
"Request for Home Care Skilled Nursing orders as follows:      Continuation frequency =    1x week x 1 week  2 x week x 1 week  1 x week x 4 weeks            Effective date= 7/18/24      Interventions include:    Assessment    O2 sat monitoring (all disciplines as needed)  Fall risk: instruct on fall prevention strategies, home safety, assess environment Observe for signs and symptoms of depression, assess effectiveness of medication, if at risk      Disease management education    Vital signs as follows:       check oxygen saturation - goal > 90%       check blood pressure - abnormal ranges = < 95/60 or > 140/90       check pulse - abnormal range < 60 or > 100       check respirations - abnormal range < 12 or > 20       check vital signs: temperature abnormal range > 100.4      Please respond with .HOMECAREAGREE, if you are in agreement. Please sign with your comments and signature, if you are not in agreement.          Severe interactions:     URGENT: per CMS best practice, response is requested within 24 hours.    Home Care regulation mandates that you are notified about drug discrepancies, interactions & contraindications. Response within a 24 hour timeframe is established by CMS as \"best practice\" for the delivery of home health care. Home Care is required to report if the 24 hour timeframe was met. The home health clinician will contact you again if this timeframe is not met or if the response does not address all concerns.       Situation:        The patient was admitted to [facility], Upon admission, a med reconciliation was completed to identify any drug discrepancies, interactions or contraindications. Home Care's drug regime review has revealed significant medication issues.     You are being contacted for clarifying orders related to the medication issues.     Background:  severe interactions      Assessment:     amiodarone and simvastatin    Recommendations:    Please evaluate this " information and indicate below whether or not changes are required. A copy of the patient's drug interaction/contraindications report is available upon request.       CLINIC NURSE - If changes are made, please update the Epic medication profile.     Please sign this encounter with your electronic signature.       Thanks,     Breanna Cannon RN on 7/18/2024 at 4:02 PM

## 2024-07-19 ENCOUNTER — TELEPHONE (OUTPATIENT)
Dept: INTERNAL MEDICINE | Facility: OTHER | Age: 79
End: 2024-07-19
Payer: MEDICARE

## 2024-07-19 NOTE — TELEPHONE ENCOUNTER
The occupational therapy evaluation and treatment that was ordered was completed on 7/18/24    Request for additional Home Care Occupational Therapy orders as follows:        Continuation frequency =   ___2___  x week x  ___3___ week(s)    Effective date = 7/22/24        Intervention include:    ADL skills training  Instruction - home exercise program  Education - energy conservation  Therapeutic exercise        For therapist: Brianda Rodarte OTR/L  Please respond with .HOMECAREAGREE, if you are in agreement. Please sign with your comments and signature, if you are not in agreement.

## 2024-07-23 ENCOUNTER — OFFICE VISIT (OUTPATIENT)
Dept: INTERNAL MEDICINE | Facility: OTHER | Age: 79
End: 2024-07-23
Attending: NURSE PRACTITIONER
Payer: MEDICARE

## 2024-07-23 VITALS
OXYGEN SATURATION: 92 % | DIASTOLIC BLOOD PRESSURE: 58 MMHG | TEMPERATURE: 98 F | BODY MASS INDEX: 16.97 KG/M2 | WEIGHT: 84.2 LBS | SYSTOLIC BLOOD PRESSURE: 142 MMHG | HEIGHT: 59 IN | RESPIRATION RATE: 16 BRPM | HEART RATE: 87 BPM

## 2024-07-23 DIAGNOSIS — J41.0 SIMPLE CHRONIC BRONCHITIS (H): ICD-10-CM

## 2024-07-23 DIAGNOSIS — J18.9 PNEUMONIA OF BOTH LUNGS DUE TO INFECTIOUS ORGANISM, UNSPECIFIED PART OF LUNG: Primary | ICD-10-CM

## 2024-07-23 DIAGNOSIS — Z95.2 S/P AVR: ICD-10-CM

## 2024-07-23 PROCEDURE — G0463 HOSPITAL OUTPT CLINIC VISIT: HCPCS

## 2024-07-23 PROCEDURE — G2211 COMPLEX E/M VISIT ADD ON: HCPCS | Performed by: NURSE PRACTITIONER

## 2024-07-23 PROCEDURE — 99214 OFFICE O/P EST MOD 30 MIN: CPT | Performed by: NURSE PRACTITIONER

## 2024-07-23 RX ORDER — CODEINE PHOSPHATE AND GUAIFENESIN 10; 100 MG/5ML; MG/5ML
1 SOLUTION ORAL EVERY 6 HOURS PRN
Qty: 180 ML | Refills: 4 | Status: ON HOLD | OUTPATIENT
Start: 2024-07-23 | End: 2024-08-05

## 2024-07-23 ASSESSMENT — PAIN SCALES - GENERAL: PAINLEVEL: MODERATE PAIN (4)

## 2024-07-23 NOTE — PROGRESS NOTES
ASSESSMENT:    ICD-10-CM    1. Pneumonia of both lungs due to infectious organism, unspecified part of lung  J18.9 guaiFENesin-codeine (ROBITUSSIN AC) 100-10 MG/5ML solution      2. Simple chronic bronchitis (H)  J41.0 guaiFENesin-codeine (ROBITUSSIN AC) 100-10 MG/5ML solution      3. S/P AVR  Z95.2           PLAN:  Finish Augmentin.  Prescription provided for Robitussin with codeine.  Potential side effects of medication reviewed and discussed.  Will need follow-up visit in 3-4 weeks to make sure pneumonia is clearing.  Will need follow-up chest x-ray at that time.  Follow-up sooner if not improving as expected.  Encouraged tobacco cessation.  Keep follow-up appointment with cardiology for AVR replacement.  Continue to work with home care OT and PT.    The longitudinal plan of care for the diagnosis(es)/condition(s) as documented were addressed during this visit. Due to the added complexity in care, I will continue to support Loulou in the subsequent management and with ongoing continuity of care.    SUBJECTIVE:    She is here today to follow-up on pneumonia.  She was seen in the emergency department on July 17.  She had a cough.  She is noticing slow improvement but still does not feel very well.  No fever or chills.  Does have mild left ear pain.  Reports shortness of breath after coughing.  In June she had aortic valve replacement.  She had short-term rehab stay and now has home care and working with PT and OT.    PROBLEM LIST:  Patient Active Problem List   Diagnosis    Advanced care planning/counseling discussion    Chronic obstructive pulmonary disease (H)    Diverticular disease of colon    Systolic murmur    Tobacco abuse    Age-related osteoporosis without current pathological fracture    GERD (gastroesophageal reflux disease)    Anemia, unspecified type    Iron deficiency anemia    MGUS (monoclonal gammopathy of unknown significance)    Status post coronary angiogram    Severe aortic stenosis    Severe  aortic stenosis by prior echocardiogram    Aortic stenosis, severe    History of transcatheter aortic valve replacement (TAVR)     PAST MEDICAL HISTORY:  Past Medical History:   Diagnosis Date    Asymptomatic varicose veins of lower extremity     6/12/2012    Benign paroxysmal vertigo     12/29/2010    Chronic obstructive pulmonary disease (H)     12/29/2010    Diarrhea     10/1/2015    Disorder of cartilage     Hip; Last dxa 06/2010    Diverticulosis of large intestine without perforation or abscess without bleeding          Lower abdominal pain     10/1/2015    Other disorders of lung (CODE)     Stable since July of 2002. RU lobe.    Personal history of other medical treatment (CODE)     L3, L4-4; Last MRI 7/09/12    Personal history of other medical treatment (CODE)     G-3, P-2, A-0  (Son had SIDS)    Zoster without complications     3/31/2012     SURGICAL HISTORY:  Past Surgical History:   Procedure Laterality Date    CATARACT EXTRACTION Right     8/2023    COLONOSCOPY  03/22/2010    Normal; + family hx, next due 2015    COLONOSCOPY  10/01/2015    W/ POLYPECTOMY recommend follow up in 2020.    COLONOSCOPY N/A 11/07/2019    4 tubular adenoma, 3 year follow up    CV CORONARY ANGIOGRAM N/A 4/5/2024    Procedure: Coronary Angiogram;  Surgeon: Min So MD;  Location:  HEART CARDIAC CATH LAB    CV RIGHT HEART CATH MEASUREMENTS RECORDED N/A 4/5/2024    Procedure: Right Heart Catheterization;  Surgeon: Min So MD;  Location:  HEART CARDIAC CATH LAB    CV TRANSCATHETER AORTIC VALVE REPLACEMENT N/A 6/19/2024    Procedure: Transcatheter Aortic Valve Replacement - Transapical Approach;  Surgeon: Claude Patricia MD;  Location:  OR    ESOPHAGOSCOPY, GASTROSCOPY, DUODENOSCOPY (EGD), COMBINED  04/23/2014    Arce's esophagus fu egd 2 yrs    ESOPHAGOSCOPY, GASTROSCOPY, DUODENOSCOPY (EGD), COMBINED N/A 11/07/2019    Procedure: ESOPHAGOGASTRODUODENOSCOPY, WITH BIOPSY;   Surgeon: Santosh Barrera MD;  Location: [facility] OR    IR CHEST TUBE PLACEMENT NON-TUNNELLED LEFT  6/21/2024    LAPAROSCOPIC TUBAL LIGATION  1978         OR TRANSCATHETER AORTIC VALVE REPLACEMENT, TRANSAPICAL OPEN APPROACH Left 6/19/2024    Procedure: Transcatheter Aortic Valve Replacement, Transapical Open Approach using Diaz Pericardial Tissue Heart Valve size 20mm, Cardiopulmonary Bypass Pump on Standby, and Transesophageal Echocardiogram by Cardiology;  Surgeon: Frank Cross MD;  Location: [facility] OR    TONSILLECTOMY & ADENOIDECTOMY  1951            SOCIAL HISTORY:  Social History     Socioeconomic History    Marital status:      Spouse name: Not on file    Number of children: Not on file    Years of education: Not on file    Highest education level: Not on file   Occupational History    Not on file   Tobacco Use    Smoking status: Every Day     Current packs/day: 0.50     Average packs/day: 0.5 packs/day for 63.6 years (31.8 ttl pk-yrs)     Types: Cigarettes     Start date: 1961     Passive exposure: Past    Smokeless tobacco: Never    Tobacco comments:     Passive exposure in adult home.    Vaping Use    Vaping status: Never Used   Substance and Sexual Activity    Alcohol use: Not Currently     Comment: rarely at a wedding or special occasion    Drug use: No    Sexual activity: Not Currently   Other Topics Concern    Parent/sibling w/ CABG, MI or angioplasty before 65F 55M? Not Asked   Social History Narrative    Patient in second marriage. Has two living children, both live in [place], [place]. Has 2 grandchildren. Is retired, worked at [employer] in [place].      Patient currently smokes, 1 ppd x 50 yrs. Smokes less in the winter months due to not smoking in the house    .   Alcohol Use - no  - disabled.     Social Determinants of Health     Financial Resource Strain: Low Risk  (3/21/2024)    Financial Resource Strain     Within the past 12 months, have you or your family members you live with been  unable to get utilities (heat, electricity) when it was really needed?: No   Food Insecurity: Low Risk  (3/21/2024)    Food Insecurity     Within the past 12 months, did you worry that your food would run out before you got money to buy more?: No     Within the past 12 months, did the food you bought just not last and you didn t have money to get more?: No   Transportation Needs: Low Risk  (3/21/2024)    Transportation Needs     Within the past 12 months, has lack of transportation kept you from medical appointments, getting your medicines, non-medical meetings or appointments, work, or from getting things that you need?: No   Physical Activity: Unknown (1/31/2024)    Exercise Vital Sign     Days of Exercise per Week: 0 days     Minutes of Exercise per Session: Not on file   Stress: No Stress Concern Present (1/31/2024)    [employer] - Occupational Stress Questionnaire     Feeling of Stress : Only a little   Social Connections: Unknown (1/31/2024)    Social Connection and Isolation Panel [NHANES]     Frequency of Communication with Friends and Family: Not on file     Frequency of Social Gatherings with Friends and Family: Once a week     Attends Orthodox Services: Not on file     Active Member of Clubs or Organizations: Not on file     Attends Club or Organization Meetings: Not on file     Marital Status: Not on file   Interpersonal Safety: Low Risk  (7/23/2024)    Interpersonal Safety     Do you feel physically and emotionally safe where you currently live?: Yes     Within the past 12 months, have you been hit, slapped, kicked or otherwise physically hurt by someone?: No     Within the past 12 months, have you been humiliated or emotionally abused in other ways by your partner or ex-partner?: No   Housing Stability: Low Risk  (3/21/2024)    Housing Stability     Do you have housing? : Yes     Are you worried about losing your housing?: No     FAMILYHISTORY:  Family History   Problem  Relation Age of Onset    Colon Cancer Father         Cancer-colon    Other - See Comments Mother         Alzheimer's    Other - See Comments Maternal Grandmother         Alzheimer's    Other - See Comments Brother         Alzheimer's    Other - See Comments Brother         aneurysm and stents    Cancer Brother         Cancer,lung    Cancer Brother         Cancer,skin    Other - See Comments Brother         cirrhosis of the liver    Other - See Comments Maternal Uncle         3, Alzheimer's    Breast Cancer No family hx of         Cancer-breast     CURRENT MEDICATIONS:   Current Outpatient Medications   Medication Sig Dispense Refill    amiodarone (PACERONE) 200 MG tablet 1 tablet (200 mg) by Oral or FT or NG tube route 2 times daily for 3 days, THEN 1 tablet (200 mg) daily for 90 days.      amoxicillin-clavulanate (AUGMENTIN) 875-125 MG tablet Take 1 tablet by mouth 2 times daily for 7 days 14 tablet 0    aspirin 81 MG EC tablet Take 324 mg by mouth daily      cyanocobalamin (VITAMIN B-12) 1000 MCG tablet Take 1 tablet (1,000 mcg) by mouth daily 90 tablet 3    fluticasone-salmeterol (ADVAIR DISKUS) 250-50 MCG/ACT inhaler Inhale 1 puff into the lungs 2 times daily WIXELA-Disp as covered by Pt's insurance 3 each 2    gabapentin (NEURONTIN) 100 MG capsule 1 capsule (100 mg) by Oral or Feeding Tube route 2 times daily      guaiFENesin-codeine (ROBITUSSIN AC) 100-10 MG/5ML solution Take 5 mLs by mouth every 6 hours as needed for cough 180 mL 4    methocarbamol (ROBAXIN) 500 MG tablet 1 tablet (500 mg) by Oral or Feeding Tube route 3 times daily      pantoprazole (PROTONIX) 40 MG EC tablet Take 1 tablet (40 mg) by mouth daily      simvastatin (ZOCOR) 20 MG tablet Take 1 tablet (20 mg) by mouth at bedtime 90 tablet 2     ALLERGIES:  Metronidazole, Pneumococcal vaccine, and Tramadol    REVIEW OF SYSTEMS:  Review of Systems  Denies fever, chills, exertional dyspnea, pleuritic chest pain  Positive for right ear pain,  "cough    OBJECTIVE:  BP (!) 142/58 (BP Location: Right arm, Patient Position: Sitting, Cuff Size: Adult Regular)   Pulse 87   Temp 98  F (36.7  C) (Tympanic)   Resp 16   Ht 1.499 m (4' 11\")   Wt 38.2 kg (84 lb 3.2 oz)   LMP 01/01/1995 (Approximate)   SpO2 92%   BMI 17.01 kg/m    EXAM:   Pleasant female no acute distress.  Affect normal.  Alert and oriented x 4.  TMs clear.  Oral mucosa pink and moist.  Neck supple without adenopathy.  Lung fields clear to auscultation throughout.  Cardiovascular regular rate and rhythm.    Emergency department note, laboratory diagnostic studies reviewed and discussed      Ele Marc NP      "

## 2024-07-23 NOTE — NURSING NOTE
"Chief Complaint   Patient presents with    ER F/U     Pneumonia        FOOD SECURITY SCREENING QUESTIONS  Hunger Vital Signs:  Within the past 12 months we worried whether our food would run out before we got money to buy more. Never  Within the past 12 months the food we bought just didn't last and we didn't have money to get more. Never  Allyson Castaneda LPN 7/23/2024 10:22 AM      Initial BP (!) 142/58 (BP Location: Right arm, Patient Position: Sitting, Cuff Size: Adult Regular)   Pulse 87   Temp 98  F (36.7  C) (Tympanic)   Resp 16   Ht 1.499 m (4' 11\")   Wt 38.2 kg (84 lb 3.2 oz)   LMP 01/01/1995 (Approximate)   SpO2 92%   BMI 17.01 kg/m   Estimated body mass index is 17.01 kg/m  as calculated from the following:    Height as of this encounter: 1.499 m (4' 11\").    Weight as of this encounter: 38.2 kg (84 lb 3.2 oz).  Medication Reconciliation: complete    Allyson Castaneda LPN    "

## 2024-07-25 ENCOUNTER — HOSPITAL ENCOUNTER (OUTPATIENT)
Dept: CARDIOLOGY | Facility: OTHER | Age: 79
Discharge: HOME | End: 2024-07-25
Attending: NURSE PRACTITIONER | Admitting: NURSE PRACTITIONER
Payer: MEDICARE

## 2024-07-25 DIAGNOSIS — Z95.2 S/P TAVR (TRANSCATHETER AORTIC VALVE REPLACEMENT): ICD-10-CM

## 2024-07-25 LAB — LVEF ECHO: NORMAL

## 2024-07-25 PROCEDURE — 93306 TTE W/DOPPLER COMPLETE: CPT

## 2024-07-25 PROCEDURE — 93306 TTE W/DOPPLER COMPLETE: CPT | Mod: 26 | Performed by: INTERNAL MEDICINE

## 2024-07-26 ENCOUNTER — TELEPHONE (OUTPATIENT)
Dept: INTERNAL MEDICINE | Facility: OTHER | Age: 79
End: 2024-07-26
Payer: MEDICARE

## 2024-07-26 DIAGNOSIS — E53.8 VITAMIN B12 DEFICIENCY (NON ANEMIC): ICD-10-CM

## 2024-07-26 DIAGNOSIS — J43.9 PULMONARY EMPHYSEMA, UNSPECIFIED EMPHYSEMA TYPE (H): Primary | ICD-10-CM

## 2024-07-26 DIAGNOSIS — Z95.2 HISTORY OF TRANSCATHETER AORTIC VALVE REPLACEMENT (TAVR): ICD-10-CM

## 2024-07-26 DIAGNOSIS — E78.5 HYPERLIPIDEMIA LDL GOAL <100: ICD-10-CM

## 2024-07-26 RX ORDER — FLUTICASONE PROPIONATE AND SALMETEROL 250; 50 UG/1; UG/1
1 POWDER RESPIRATORY (INHALATION) 2 TIMES DAILY
Qty: 3 EACH | Refills: 2 | Status: SHIPPED | OUTPATIENT
Start: 2024-07-26 | End: 2024-08-30

## 2024-07-26 RX ORDER — SIMVASTATIN 20 MG
20 TABLET ORAL AT BEDTIME
Qty: 90 TABLET | Refills: 2 | Status: SHIPPED | OUTPATIENT
Start: 2024-07-26 | End: 2024-08-30

## 2024-07-26 RX ORDER — ASPIRIN 81 MG/1
81 TABLET ORAL DAILY
Qty: 90 TABLET | Refills: 3 | Status: SHIPPED | OUTPATIENT
Start: 2024-07-26

## 2024-07-26 RX ORDER — GABAPENTIN 100 MG/1
100 CAPSULE ORAL 2 TIMES DAILY
Qty: 30 CAPSULE | Refills: 3 | Status: SHIPPED | OUTPATIENT
Start: 2024-07-26 | End: 2024-08-30

## 2024-07-26 RX ORDER — LANOLIN ALCOHOL/MO/W.PET/CERES
1000 CREAM (GRAM) TOPICAL DAILY
Qty: 90 TABLET | Refills: 3 | Status: SHIPPED | OUTPATIENT
Start: 2024-07-26 | End: 2024-08-30

## 2024-07-26 NOTE — TELEPHONE ENCOUNTER
Patient states she has been all out of her OTC aspirin the past couple days, patients son states he cannot find it OTC, could that be something that's sent in as a RX to [facility], all her other medications are on their last refill as well I noticed.     Thanks!      FYI: severe interaction between codeine and gabapentin.     Breanna Cannon RN on 7/26/2024 at 2:48 PM

## 2024-07-26 NOTE — TELEPHONE ENCOUNTER
After proper verification patient was relayed message from below.  Alicja Vidales LPN on 7/26/2024 at 3:27 PM  EXT. [id number]

## 2024-07-29 ENCOUNTER — PATIENT OUTREACH (OUTPATIENT)
Dept: CARE COORDINATION | Facility: CLINIC | Age: 79
End: 2024-07-29
Payer: MEDICARE

## 2024-07-29 ENCOUNTER — OFFICE VISIT (OUTPATIENT)
Dept: CARDIOLOGY | Facility: OTHER | Age: 79
DRG: 291 | End: 2024-07-29
Attending: NURSE PRACTITIONER
Payer: MEDICARE

## 2024-07-29 VITALS
WEIGHT: 83 LBS | RESPIRATION RATE: 16 BRPM | OXYGEN SATURATION: 95 % | HEIGHT: 60 IN | DIASTOLIC BLOOD PRESSURE: 58 MMHG | HEART RATE: 78 BPM | BODY MASS INDEX: 16.3 KG/M2 | TEMPERATURE: 98.5 F | SYSTOLIC BLOOD PRESSURE: 128 MMHG

## 2024-07-29 DIAGNOSIS — I48.91 POSTOPERATIVE ATRIAL FIBRILLATION (H): ICD-10-CM

## 2024-07-29 DIAGNOSIS — I97.89 POSTOPERATIVE ATRIAL FIBRILLATION (H): ICD-10-CM

## 2024-07-29 DIAGNOSIS — Z95.2 S/P TAVR (TRANSCATHETER AORTIC VALVE REPLACEMENT): Primary | ICD-10-CM

## 2024-07-29 PROCEDURE — 93010 ELECTROCARDIOGRAM REPORT: CPT | Performed by: INTERNAL MEDICINE

## 2024-07-29 PROCEDURE — G0463 HOSPITAL OUTPT CLINIC VISIT: HCPCS

## 2024-07-29 PROCEDURE — 99214 OFFICE O/P EST MOD 30 MIN: CPT | Performed by: NURSE PRACTITIONER

## 2024-07-29 PROCEDURE — 93005 ELECTROCARDIOGRAM TRACING: CPT | Performed by: NURSE PRACTITIONER

## 2024-07-29 RX ORDER — METOPROLOL SUCCINATE 25 MG/1
25 TABLET, EXTENDED RELEASE ORAL DAILY
Qty: 90 TABLET | Refills: 3 | Status: SHIPPED | OUTPATIENT
Start: 2024-07-29 | End: 2024-08-30

## 2024-07-29 ASSESSMENT — PAIN SCALES - GENERAL: PAINLEVEL: NO PAIN (0)

## 2024-07-29 NOTE — NURSING NOTE
Patient comes in for f/unit(s) after TAVR on 6/19/24.    Chief Complaint   Patient presents with    Follow Up       Initial /58 (BP Location: Right arm, Patient Position: Sitting, Cuff Size: Adult Regular)   Pulse 78   Temp 98.5  F (36.9  C) (Temporal)   Resp 16   Ht 1.524 m (5')   Wt 37.6 kg (83 lb)   LMP 01/01/1995 (Approximate)   SpO2 95%   BMI 16.21 kg/m   Estimated body mass index is 16.21 kg/m  as calculated from the following:    Height as of this encounter: 1.524 m (5').    Weight as of this encounter: 37.6 kg (83 lb).  Meds Reconciled: complete  Pt is on Aspirin  Pt is on a Statin  PHQ and/or DEENA reviewed. Pt referred to PCP/MH Provider as appropriate.    Giulia Lizama LPN

## 2024-07-29 NOTE — PATIENT INSTRUCTIONS
You were seen today in the Cardiovascular Clinic at the [facility] by:       VEENA DSOUZA CNP       ECHO looks good - TAVR is functioning well. EKG shows normal rhythm. Last checked labs were stable. All good news.     Your visit summary and instructions are as follows:    Continue PT/OT. Increase activity as able.   Continue aspirin - reduce dose to 81 mg daily.  Stop amiodarone and start metoprolol XL 25 mg daily  Have heart monitor placed on 9/3/24 at [facility] to evaluate for a-fib- we will arrange placement   Delay any nonurgent dental procedures for the first 6 month post TAVR.  For all future dental cleanings and procedures you will need to take antibiotics prior - see instructions below.   Follow-up in Valve Clinic in 3 months     Prevention of Infective (Bacterial) Endocarditis:  You are at increased risk for developing adverse outcomes from infective endocarditis (IE), also known as bacterial endocarditis (BE) because of the new device in your heart. The guidelines for prevention of IE are to give patients antibiotics prior to any dental procedures that involve manipulation of gingival tissue or the periapical region of teeth, or perforation of the oral mucosa:      It is recommended to take Amoxicillin 2 gm by mouth as a single dose 30 to 60 minutes before procedure.     OR if allergic to Penicillin or Ampicillin:     Cephalexin 2 gm by mouth, or  Clindamycin 600 mg by mouth, or  Azithromycin or Clarithromycin 500 mg PO       Thank you for your visit today!      Please MyChart message me or call my nurse if you have any questions or concerns.     During Business Hours:   [phone], Structural Heart  Erin Ballard     After hours, weekends or holidays:   [phone], Option #4  Ask to speak to the On-Call Cardiologist. Inform them you are a cardiology patient at the [facility].

## 2024-07-29 NOTE — NURSING NOTE
[place] Cardiomyopathy Questionnaire  (CQ-12)  Date: 7/29/24    30-day post-TAVR    1.  A.  3      B.  1       C.  1  2.  4  3.  2  4.  3  5.  2  6.  1  7.  1  8.  A. 1     B.  1     C.  2        Provided additional education regarding TAVR/MitraClip procedure, after being present for discussion with physician. Explained the work-up process and next steps for patient. Patient provided our direct contact number and instructed to call with any questions.

## 2024-07-29 NOTE — PROGRESS NOTES
[facility] HEART [facility]  CARDIOVASCULAR DIVISION    VALVE CLINIC RETURN VISIT    PRIMARY CARDIOLOGIST: Dr. Ansari       PERTINENT CLINICAL HISTORY:     Loulou Lucero is a very pleasant 79 year old female who presents for 1 month TAVR follow-up. She has a history of tobacco use, COPD, lung nodule, HLD, mild CAD, chronic low back pain, MGUS, anemia and severe aortic valvular stenosis that was treated with transapical transcatheter aortic valve replacement (TAVR) with a 20 mm Diaz Benedict 3 Ultra on 6/19/24 by Pascual Patricia and Tay.  The TAVR and post-procedural course were notable for left apical pneumothorax and left pulmonary hemorrhage. CVTS placed chest tubes and these were removed 6/25 without incidence. She was noted to have pAF, was started on amiodarone, AC deferred due to recent surgery and chest tubes. Post procedure ECHO showed mean PG 11 mmHg without PVL. Post procedure EKG showed NSR with baseline LBBB. She was discharged on aspirin monotherapy. She was readmitted with pneumonia and treated with antibiotic therapy.     Loulou says its been a longer recovery than anticipated. She was in TCU for 3 weeks. Has been home for the past 10 days where her son has been staying with her. She is working with PT/OT daily. She reports weakness, fatigue and SOB. She is coughing quite a bit from pneumonia. Seems worse in the morning. No fevers or chills. She has completed antibiotic therapy. She denies angina. Her chest wall pain is improving everyday. Her left chest incision is healing well. She has mild leg swelling which began about 2 weeks ago. Wonders if its related to heat and humidity. Weight has been stable at 82-83 since she got home from the TCU. She reports occasional lightheadedness. No palpitations, syncope.      PAST MEDICAL HISTORY:   # Severe Aortic Stenosis s/p Transapical TAVR on 6/19/24 by Dr. Cross  # Chronic Heart Failure w/ Preserved EF (60-65%)  # Hyperlipidemia  # Mild to Moderate CAD  # Acute  Post Op Pain  # Post Procedural Hypertension  # Elevated Troponin  # Subcutaneous Emphysema, resolved  # Paroxysmal atrial fibrillation  # COPD  # Tobacco Use  # Chronic Iron Deficiency Anemia  # MGUS  # PAD  # Near syncope  # Severe Malnutrition     PAST SURGICAL HISTORY:     Past Surgical History:   Procedure Laterality Date    CATARACT EXTRACTION Right     8/2023    COLONOSCOPY  03/22/2010    Normal; + family hx, next due 2015    COLONOSCOPY  10/01/2015    W/ POLYPECTOMY recommend follow up in 2020.    COLONOSCOPY N/A 11/07/2019    4 tubular adenoma, 3 year follow up    CV CORONARY ANGIOGRAM N/A 4/5/2024    Procedure: Coronary Angiogram;  Surgeon: Min So MD;  Location:  HEART CARDIAC CATH LAB    CV RIGHT HEART CATH MEASUREMENTS RECORDED N/A 4/5/2024    Procedure: Right Heart Catheterization;  Surgeon: Min So MD;  Location:  HEART CARDIAC CATH LAB    CV TRANSCATHETER AORTIC VALVE REPLACEMENT N/A 6/19/2024    Procedure: Transcatheter Aortic Valve Replacement - Transapical Approach;  Surgeon: Claude Patricia MD;  Location:  OR    ESOPHAGOSCOPY, GASTROSCOPY, DUODENOSCOPY (EGD), COMBINED  04/23/2014    Arce's esophagus fu egd 2 yrs    ESOPHAGOSCOPY, GASTROSCOPY, DUODENOSCOPY (EGD), COMBINED N/A 11/07/2019    Procedure: ESOPHAGOGASTRODUODENOSCOPY, WITH BIOPSY;  Surgeon: Santosh Barrera MD;  Location: [facility] OR    IR CHEST TUBE PLACEMENT NON-TUNNELLED LEFT  6/21/2024    LAPAROSCOPIC TUBAL LIGATION  1978         OR TRANSCATHETER AORTIC VALVE REPLACEMENT, TRANSAPICAL OPEN APPROACH Left 6/19/2024    Procedure: Transcatheter Aortic Valve Replacement, Transapical Open Approach using Diaz Pericardial Tissue Heart Valve size 20mm, Cardiopulmonary Bypass Pump on Standby, and Transesophageal Echocardiogram by Cardiology;  Surgeon: Frank Cross MD;  Location:  OR    TONSILLECTOMY & ADENOIDECTOMY  1951             CURRENT MEDICATIONS:     Current Outpatient  Medications   Medication Sig Dispense Refill    amiodarone (PACERONE) 200 MG tablet 1 tablet (200 mg) by Oral or FT or NG tube route 2 times daily for 3 days, THEN 1 tablet (200 mg) daily for 90 days.      aspirin 81 MG EC tablet Take 1 tablet (81 mg) by mouth daily 90 tablet 3    cyanocobalamin (VITAMIN B-12) 1000 MCG tablet Take 1 tablet (1,000 mcg) by mouth daily 90 tablet 3    fluticasone-salmeterol (ADVAIR DISKUS) 250-50 MCG/ACT inhaler Inhale 1 puff into the lungs 2 times daily WIXELA-Disp as covered by Pt's insurance 3 each 2    gabapentin (NEURONTIN) 100 MG capsule Take 1 capsule (100 mg) by mouth or Feeding Tube 2 times daily 30 capsule 3    guaiFENesin-codeine (ROBITUSSIN AC) 100-10 MG/5ML solution Take 5 mLs by mouth every 6 hours as needed for cough 180 mL 4    methocarbamol (ROBAXIN) 500 MG tablet 1 tablet (500 mg) by Oral or Feeding Tube route 3 times daily      pantoprazole (PROTONIX) 40 MG EC tablet Take 1 tablet (40 mg) by mouth daily      simvastatin (ZOCOR) 20 MG tablet Take 1 tablet (20 mg) by mouth at bedtime 90 tablet 2        ALLERGIES:     Allergies   Allergen Reactions    Metronidazole Nausea and Vomiting    Pneumococcal Vaccine      Other reaction(s): Edema  Arm swelling    Tramadol Visual Disturbance     Hallucinations           FAMILY HISTORY:     Family History   Problem Relation Age of Onset    Colon Cancer Father         Cancer-colon    Other - See Comments Mother         Alzheimer's    Other - See Comments Maternal Grandmother         Alzheimer's    Other - See Comments Brother         Alzheimer's    Other - See Comments Brother         aneurysm and stents    Cancer Brother         Cancer,lung    Cancer Brother         Cancer,skin    Other - See Comments Brother         cirrhosis of the liver    Other - See Comments Maternal Uncle         3, Alzheimer's    Breast Cancer No family hx of         Cancer-breast        SOCIAL HISTORY:     Social History     Socioeconomic History    Marital  status:    Tobacco Use    Smoking status: Every Day     Current packs/day: 0.50     Average packs/day: 0.5 packs/day for 63.6 years (31.8 ttl pk-yrs)     Types: Cigarettes     Start date: 1961     Passive exposure: Past    Smokeless tobacco: Never    Tobacco comments:     Passive exposure in adult home.    Vaping Use    Vaping status: Never Used   Substance and Sexual Activity    Alcohol use: Not Currently     Comment: rarely at a wedding or special occasion    Drug use: No    Sexual activity: Not Currently   Social History Narrative    Patient in second marriage. Has two living children, both live in [place], [place]. Has 2 grandchildren. Is retired, worked at [employer] in [place].      Patient currently smokes, 1 ppd x 50 yrs. Smokes less in the winter months due to not smoking in the house    .   Alcohol Use - no  - disabled.     Social Determinants of Health     Financial Resource Strain: Low Risk  (3/21/2024)    Financial Resource Strain     Within the past 12 months, have you or your family members you live with been unable to get utilities (heat, electricity) when it was really needed?: No   Food Insecurity: Low Risk  (3/21/2024)    Food Insecurity     Within the past 12 months, did you worry that your food would run out before you got money to buy more?: No     Within the past 12 months, did the food you bought just not last and you didn t have money to get more?: No   Transportation Needs: Low Risk  (3/21/2024)    Transportation Needs     Within the past 12 months, has lack of transportation kept you from medical appointments, getting your medicines, non-medical meetings or appointments, work, or from getting things that you need?: No   Physical Activity: Unknown (1/31/2024)    Exercise Vital Sign     Days of Exercise per Week: 0 days   Stress: No Stress Concern Present (1/31/2024)    [employer] [place] [employer] - Occupational Stress Questionnaire     Feeling of Stress : Only a  little   Social Connections: Unknown (1/31/2024)    Social Connection and Isolation Panel [NHANES]     Frequency of Social Gatherings with Friends and Family: Once a week   Interpersonal Safety: Low Risk  (7/23/2024)    Interpersonal Safety     Do you feel physically and emotionally safe where you currently live?: Yes     Within the past 12 months, have you been hit, slapped, kicked or otherwise physically hurt by someone?: No     Within the past 12 months, have you been humiliated or emotionally abused in other ways by your partner or ex-partner?: No   Housing Stability: Low Risk  (3/21/2024)    Housing Stability     Do you have housing? : Yes     Are you worried about losing your housing?: No        REVIEW OF SYSTEMS:     Constitutional: No fevers or chills  Skin: No new rash or itching  Eyes: No acute change in vision  Ears/Nose/Throat: No purulent rhinorrhea, new hearing loss, or new vertigo  Respiratory: No cough or hemoptysis  Cardiovascular: See HPI  Gastrointestinal: No change in appetite, vomiting, hematemesis or diarrhea  Genitourinary: No dysuria or hematuria  Musculoskeletal: No new back pain, neck pain or muscle pain  Neurologic: No new headaches, focal weakness or behavior changes  Psychiatric: No hallucinations, excessive alcohol consumption or illegal drug usage  Hematologic/Lymphatic/Immunologic: No bleeding, chills, fever, night sweats or weight loss  Endocrine: No new cold intolerance, heat intolerance, polyphagia, polydipsia or polyuria      PHYSICAL EXAMINATION:     /58 (BP Location: Right arm, Patient Position: Sitting, Cuff Size: Adult Regular)   Pulse 78   Temp 98.5  F (36.9  C) (Temporal)   Resp 16   Ht 1.524 m (5')   Wt 37.6 kg (83 lb)   LMP 01/01/1995 (Approximate)   SpO2 95%   BMI 16.21 kg/m      GENERAL: No acute distress.  HEENT: EOMI. Sclerae white, not injected. Nares clear. Pharynx without erythema or exudate.   Neck: No adenopathy. No thyromegaly. No jugular venous  distension.   Heart: Regular rate and rhythm. No murmur.   Lungs: Clear to auscultation. No ronchi, wheezes, rales.   Abdomen: Soft, nontender, nondistended. Bowel sounds present.  Extremities: No clubbing, cyanosis, or edema.   Neurologic: Alert and oriented to person/place/time, normal speech and affect. No focal deficits.  Skin: No petechiae, purpura or rash.     LABORATORY DATA:     LIPID RESULTS:  Lab Results   Component Value Date    CHOL 216 (H) 10/18/2022    CHOL 205 (H) 10/09/2020    HDL 66 10/18/2022    HDL 63 10/09/2020     (H) 10/18/2022     (H) 10/09/2020    TRIG 174 (H) 10/18/2022    TRIG 109 10/09/2020       LIVER ENZYME RESULTS:  Lab Results   Component Value Date    AST 54 (H) 06/19/2024    AST 19 10/09/2020    ALT 13 06/19/2024    ALT 9 10/09/2020       CBC RESULTS:  Lab Results   Component Value Date    WBC 9.9 08/03/2024    WBC 7.0 10/24/2019    RBC 3.55 (L) 08/03/2024    RBC 3.89 10/24/2019    HGB 11.1 (L) 08/03/2024    HGB 12.1 10/24/2019    HCT 34.6 (L) 08/03/2024    HCT 36.5 10/24/2019    MCV 98 08/03/2024    MCV 94 10/24/2019    MCH 31.3 08/03/2024    MCH 31.1 10/24/2019    MCHC 32.1 08/03/2024    MCHC 33.2 10/24/2019    RDW 14.6 08/03/2024    RDW 12.9 10/24/2019     08/03/2024     10/24/2019       BMP RESULTS:  Lab Results   Component Value Date     07/17/2024     10/09/2020    POTASSIUM 3.5 07/17/2024    POTASSIUM 4.2 06/19/2024    POTASSIUM 4.6 10/18/2022    POTASSIUM 4.0 10/09/2020    CHLORIDE 102 07/17/2024    CHLORIDE 103 10/18/2022    CHLORIDE 102 10/09/2020    CO2 27 07/17/2024    CO2 34 (H) 10/18/2022    CO2 33 (H) 10/09/2020    ANIONGAP 12 07/17/2024    ANIONGAP 6 10/18/2022    ANIONGAP 7 10/09/2020     (H) 07/17/2024     (H) 06/20/2024    GLC 94 10/18/2022     (H) 10/09/2020    BUN 17.2 07/17/2024    BUN 15 10/18/2022    BUN 21 10/09/2020    CR 0.85 07/17/2024    CR 1.00 10/09/2020    GFRESTIMATED 69 07/17/2024     GFRESTIMATED 54 (L) 10/09/2020    GFRESTBLACK 65 10/09/2020    DION 9.2 07/17/2024    DION 10.0 10/09/2020          PROCEDURES & FURTHER ASSESSMENTS:   EKG 7/29/24:  NSR with LBBB     ECHO 7/25/24:  Interpretation Summary  Global and regional left ventricular function is normal with an EF of 60-65%.  Global right ventricular function is normal.  s/p TAVR with 20 mm Diaz Benedict 3 on 6/19/2024  The prosthetic aortic valve is well-seated. There is trace paravalvular  regurgitation. PV 2.4m/s, MG 12mmHg.  Estimated pulmonary artery systolic pressure is 49 mmHg.  The inferior vena cava is normal.  No pericardial effusion.     This study was compared with the study from 6/20/24. Estimated PA pressure is  higher.  ________________________________    EKG 12 Lead 6/21/2024: NSR, LBBB (baseline)        ECHO 6/20/2024:  Interpretation Summary  s/p TAVR with 20 mm Diaz Benedict 3 on 6/19/2024. The valve is well-seated  and without paravalvular regurgitation. Doppler interrogation of the  prosthetic valve reveals Vmax 2.2 m/s, mean pressure gradient 11 mmHg.     Left ventricular function is normal.The ejection fraction is 60-65%.  Global right ventricular function is normal.  Severe mitral annular calcification.  IVC diameter and respiratory changes fall into an intermediate range  suggesting an RA pressure of 8 mmHg.  No pericardial effusion.     This study was compared with the study from 6/19/2024. No significant changes  noted compared to post-TAVR images.     CT Chest 6/22/24  1.  New (compared to CT from 4/5/2024) left upper lobe ill marginated  confluent consolidative appearing density with surrounding  groundglass, mild anteroseptal thinning and possible tiny cystic  change suspicious for infection or potentially pulmonary hemorrhage.  Neoplastic etiology unlikely though cannot be entirely excluded.  Recommend short-term follow-up imaging to resolution.  2.  Left chest tube and apical pigtail chest catheter  without  significant pleural fluid or evidence of hemothorax. Residual small  anterior left pneumothorax. Extensive presumed postprocedural  subcutaneous air in the left chest wall and lower neck.  3.  Small-to-moderate right pleural effusion with simple fluid  attenuation.  4.  Advanced pulmonary emphysema and chronic scarring, most prominent  in the apices and in the right upper lobe.  5.  Prominent precarinal lymph node, similar to prior. Consider  attention on follow-up.  6.   Additional incidental finding similar to prior as described  including extensive atherosclerotic calcification of the thoracic  aorta, coronary artery calcification.     NYHA Class: II    Amyloid screening: No     CLINICAL IMPRESSION:     Loulou Lucero is a 79 year old female who presents for 1 month TAVR follow-up. History of tobacco use, COPD, lung nodule, HLD, mild CAD, chronic low back pain, MGUS, anemia and severe aortic stenosis who was admitted 6/19 for planned transapical TAVR. Course complicated by left apical pneumothorax and left pulmonary hemorrhage s/p CT placement, pAF on amiodarone, pneumonia treated with antibiotics.      # Severe Aortic Stenosis s/p Transapical TAVR on 6/19/24 by Dr. Cross  # Chronic Heart Failure w/ Preserved EF (60-65%)  # Left apical pneumothorax s/p CT, improved   # Subcutaneous Emphysema: Improved   # Pneumonia, admitted 7/17/24 s/p antibiotics  Now s/p TAVR with 20mm Diaz valve via transapical approach. POD 1 Echo with post op MG 11 mmHg, no PVL. Left apical pneumo resolved after pigtail placement, removed 6/25/24. Left chest incision healing well. Continues to have cough, suspect related to resolving pneumonia. Has intermittent leg swelling, patient thinks related to humidity. Weight has been stable. ECHO today shows normal TAVR function with mean PG 12 mmHg without PVL. Euvolemic based on normal IVC.   - Aspirin 81 mg for anti-platelet therapy  - Cardiac rehab   - Daily weights - contact us if you  gain > 3lbs in a day or 5 lbs in a week  - SBE prophylaxis lifelong   - Cardiology follow up 3 months      # Hyperlipidemia  # Mild to Moderate CAD  # Post Procedural Hypertension  - Continue ASA 81 mg daily   - Continue PTA Simvastatin 20mg q HS    # Paroxsymal Atrial Fibrillation  New atrial fibrillation with RVR on morning of 6/22. Patient reported intense shortness of breath. Treated with IV amio, converted to oral 6/23. Has now completed about 1 month of amiodarone. EKG today with NSR.   - Stop amiodarone and start metoprolol XL 25 mg daily  - Recommend 2 week ZioPatch after 1 month amio wash out - place on 9/3/24  - If no recurrent AF, no need to start AC  - If AF noted on monitor, will intiate AC for stroke prophylaxis.      # COPD  # Tobacco Use  - Encourage cessation  - Continue fluticasone-salmeterol     # Chronic Iron Deficiency Anemia   # MGUS  Baseline Hgb ~ 9  - Continue PTA iron supplementation  - Hemoglobin 10 at recent PCP visit  - CTM     RTC: 3 months with VEENA Arteaga, CNP  [facility] Cardiology Team      CC  Patient Care Team:  Ele Marc NP as PCP - General (Internal Medicine)  Reynaldo Hansen MD as Assigned Cancer Care Provider  Maya Jones, RN as Registered Nurse  Maya Jones, RN as Registered Nurse (Cardiology)  Ele Marc NP as Assigned PCP  Polina De eLon NP as Assigned Heart and Vascular Provider

## 2024-07-29 NOTE — PROGRESS NOTES
Clinic Care Coordination Contact  Care Team Conversations    Call received from [facility] home care RN, Breanna with patient and son, Alex on speaker phone. Her son will be leaving back to [place] and they are trying to understand what resources they can get in the home to help.     Discussed options including some of the PCA agencies who may have staff available, PCA choice options and [facility]  to review their supportive services.     Plan:  They accepted [facility] [place] referral which was made today. Also supplied PCA agency information and my phone number to call if further care coordination is needed.   Mary Rodriguez RN on 7/29/2024 at 12:09 PM

## 2024-07-31 ENCOUNTER — TELEPHONE (OUTPATIENT)
Dept: FAMILY MEDICINE | Facility: OTHER | Age: 79
End: 2024-07-31
Payer: MEDICARE

## 2024-07-31 DIAGNOSIS — J41.0 SIMPLE CHRONIC BRONCHITIS (H): ICD-10-CM

## 2024-07-31 DIAGNOSIS — I35.0 SEVERE AORTIC STENOSIS: Primary | ICD-10-CM

## 2024-08-01 ENCOUNTER — NURSE TRIAGE (OUTPATIENT)
Dept: NURSING | Facility: CLINIC | Age: 79
End: 2024-08-01

## 2024-08-01 ENCOUNTER — APPOINTMENT (OUTPATIENT)
Dept: CT IMAGING | Facility: OTHER | Age: 79
DRG: 291 | End: 2024-08-01
Payer: MEDICARE

## 2024-08-01 ENCOUNTER — HOSPITAL ENCOUNTER (INPATIENT)
Facility: OTHER | Age: 79
LOS: 4 days | Discharge: NURSING, REHAB OR LONG-TERM CARE | DRG: 291 | End: 2024-08-05
Admitting: INTERNAL MEDICINE
Payer: MEDICARE

## 2024-08-01 ENCOUNTER — APPOINTMENT (OUTPATIENT)
Dept: GENERAL RADIOLOGY | Facility: OTHER | Age: 79
DRG: 291 | End: 2024-08-01
Payer: MEDICARE

## 2024-08-01 DIAGNOSIS — J43.1 PANLOBULAR EMPHYSEMA (H): ICD-10-CM

## 2024-08-01 DIAGNOSIS — J18.9 PNEUMONIA OF BOTH LUNGS DUE TO INFECTIOUS ORGANISM, UNSPECIFIED PART OF LUNG: ICD-10-CM

## 2024-08-01 DIAGNOSIS — J41.0 SIMPLE CHRONIC BRONCHITIS (H): ICD-10-CM

## 2024-08-01 DIAGNOSIS — J44.9 CHRONIC OBSTRUCTIVE PULMONARY DISEASE, UNSPECIFIED COPD TYPE (H): Primary | ICD-10-CM

## 2024-08-01 DIAGNOSIS — Z95.2 HISTORY OF TRANSCATHETER AORTIC VALVE REPLACEMENT (TAVR): ICD-10-CM

## 2024-08-01 DIAGNOSIS — I50.33 ACUTE ON CHRONIC HEART FAILURE WITH PRESERVED EJECTION FRACTION (HFPEF) (H): ICD-10-CM

## 2024-08-01 DIAGNOSIS — N17.0 ACUTE KIDNEY FAILURE WITH TUBULAR NECROSIS (H): ICD-10-CM

## 2024-08-01 DIAGNOSIS — J90 BILATERAL PLEURAL EFFUSION: ICD-10-CM

## 2024-08-01 LAB
ALBUMIN SERPL BCG-MCNC: 3.3 G/DL (ref 3.5–5.2)
ALP SERPL-CCNC: 105 U/L (ref 40–150)
ALT SERPL W P-5'-P-CCNC: 14 U/L (ref 0–50)
ANION GAP SERPL CALCULATED.3IONS-SCNC: 8 MMOL/L (ref 7–15)
AST SERPL W P-5'-P-CCNC: 26 U/L (ref 0–45)
BASOPHILS # BLD AUTO: 0.1 10E3/UL (ref 0–0.2)
BASOPHILS NFR BLD AUTO: 1 %
BILIRUB SERPL-MCNC: 0.5 MG/DL
BUN SERPL-MCNC: 17.2 MG/DL (ref 8–23)
CALCIUM SERPL-MCNC: 8.6 MG/DL (ref 8.8–10.4)
CHLORIDE SERPL-SCNC: 102 MMOL/L (ref 98–107)
CREAT SERPL-MCNC: 0.93 MG/DL (ref 0.51–0.95)
D DIMER PPP FEU-MCNC: 2.27 UG/ML FEU (ref 0–0.5)
EGFRCR SERPLBLD CKD-EPI 2021: 62 ML/MIN/1.73M2
EOSINOPHIL # BLD AUTO: 0.1 10E3/UL (ref 0–0.7)
EOSINOPHIL NFR BLD AUTO: 1 %
ERYTHROCYTE [DISTWIDTH] IN BLOOD BY AUTOMATED COUNT: 14.6 % (ref 10–15)
FLUAV RNA SPEC QL NAA+PROBE: NEGATIVE
FLUBV RNA RESP QL NAA+PROBE: NEGATIVE
GLUCOSE SERPL-MCNC: 137 MG/DL (ref 70–99)
HCO3 SERPL-SCNC: 28 MMOL/L (ref 22–29)
HCT VFR BLD AUTO: 30.9 % (ref 35–47)
HGB BLD-MCNC: 9.9 G/DL (ref 11.7–15.7)
HOLD SPECIMEN: NORMAL
IMM GRANULOCYTES # BLD: 0.1 10E3/UL
IMM GRANULOCYTES NFR BLD: 1 %
LYMPHOCYTES # BLD AUTO: 0.9 10E3/UL (ref 0.8–5.3)
LYMPHOCYTES NFR BLD AUTO: 9 %
MCH RBC QN AUTO: 31.6 PG (ref 26.5–33)
MCHC RBC AUTO-ENTMCNC: 32 G/DL (ref 31.5–36.5)
MCV RBC AUTO: 99 FL (ref 78–100)
MONOCYTES # BLD AUTO: 0.8 10E3/UL (ref 0–1.3)
MONOCYTES NFR BLD AUTO: 9 %
NEUTROPHILS # BLD AUTO: 7.8 10E3/UL (ref 1.6–8.3)
NEUTROPHILS NFR BLD AUTO: 80 %
NRBC # BLD AUTO: 0 10E3/UL
NRBC BLD AUTO-RTO: 0 /100
NT-PROBNP SERPL-MCNC: 4472 PG/ML (ref 0–1800)
PLATELET # BLD AUTO: 275 10E3/UL (ref 150–450)
POTASSIUM SERPL-SCNC: 4 MMOL/L (ref 3.4–5.3)
PROT SERPL-MCNC: 6.7 G/DL (ref 6.4–8.3)
RBC # BLD AUTO: 3.13 10E6/UL (ref 3.8–5.2)
RSV RNA SPEC NAA+PROBE: NEGATIVE
SARS-COV-2 RNA RESP QL NAA+PROBE: NEGATIVE
SODIUM SERPL-SCNC: 138 MMOL/L (ref 135–145)
TROPONIN T SERPL HS-MCNC: 32 NG/L
WBC # BLD AUTO: 9.8 10E3/UL (ref 4–11)

## 2024-08-01 PROCEDURE — 250N000013 HC RX MED GY IP 250 OP 250 PS 637

## 2024-08-01 PROCEDURE — 36415 COLL VENOUS BLD VENIPUNCTURE: CPT

## 2024-08-01 PROCEDURE — 250N000011 HC RX IP 250 OP 636: Performed by: INTERNAL MEDICINE

## 2024-08-01 PROCEDURE — 120N000001 HC R&B MED SURG/OB

## 2024-08-01 PROCEDURE — 250N000011 HC RX IP 250 OP 636

## 2024-08-01 PROCEDURE — 93010 ELECTROCARDIOGRAM REPORT: CPT | Performed by: INTERNAL MEDICINE

## 2024-08-01 PROCEDURE — 85004 AUTOMATED DIFF WBC COUNT: CPT

## 2024-08-01 PROCEDURE — 96374 THER/PROPH/DIAG INJ IV PUSH: CPT | Mod: XU

## 2024-08-01 PROCEDURE — 85379 FIBRIN DEGRADATION QUANT: CPT

## 2024-08-01 PROCEDURE — 87637 SARSCOV2&INF A&B&RSV AMP PRB: CPT

## 2024-08-01 PROCEDURE — 99285 EMERGENCY DEPT VISIT HI MDM: CPT

## 2024-08-01 PROCEDURE — 99221 1ST HOSP IP/OBS SF/LOW 40: CPT | Mod: AI | Performed by: INTERNAL MEDICINE

## 2024-08-01 PROCEDURE — 83880 ASSAY OF NATRIURETIC PEPTIDE: CPT

## 2024-08-01 PROCEDURE — 99285 EMERGENCY DEPT VISIT HI MDM: CPT | Mod: 25

## 2024-08-01 PROCEDURE — G1010 CDSM STANSON: HCPCS

## 2024-08-01 PROCEDURE — 250N000013 HC RX MED GY IP 250 OP 250 PS 637: Performed by: INTERNAL MEDICINE

## 2024-08-01 PROCEDURE — 80053 COMPREHEN METABOLIC PANEL: CPT

## 2024-08-01 PROCEDURE — 84484 ASSAY OF TROPONIN QUANT: CPT

## 2024-08-01 PROCEDURE — 71046 X-RAY EXAM CHEST 2 VIEWS: CPT

## 2024-08-01 PROCEDURE — 250N000009 HC RX 250

## 2024-08-01 RX ORDER — AMOXICILLIN 250 MG
2 CAPSULE ORAL 2 TIMES DAILY PRN
Status: DISCONTINUED | OUTPATIENT
Start: 2024-08-01 | End: 2024-08-05 | Stop reason: HOSPADM

## 2024-08-01 RX ORDER — GABAPENTIN 100 MG/1
100 CAPSULE ORAL 2 TIMES DAILY
Status: DISCONTINUED | OUTPATIENT
Start: 2024-08-01 | End: 2024-08-05 | Stop reason: HOSPADM

## 2024-08-01 RX ORDER — IOPAMIDOL 755 MG/ML
51 INJECTION, SOLUTION INTRAVASCULAR ONCE
Status: COMPLETED | OUTPATIENT
Start: 2024-08-01 | End: 2024-08-01

## 2024-08-01 RX ORDER — ASPIRIN 81 MG/1
81 TABLET ORAL DAILY
Status: DISCONTINUED | OUTPATIENT
Start: 2024-08-02 | End: 2024-08-05 | Stop reason: HOSPADM

## 2024-08-01 RX ORDER — ACETAMINOPHEN 325 MG/1
650 TABLET ORAL EVERY 6 HOURS PRN
Status: DISCONTINUED | OUTPATIENT
Start: 2024-08-01 | End: 2024-08-05 | Stop reason: HOSPADM

## 2024-08-01 RX ORDER — FLUTICASONE FUROATE AND VILANTEROL 100; 25 UG/1; UG/1
1 POWDER RESPIRATORY (INHALATION) DAILY
Status: DISCONTINUED | OUTPATIENT
Start: 2024-08-02 | End: 2024-08-04

## 2024-08-01 RX ORDER — FUROSEMIDE 10 MG/ML
20 INJECTION INTRAMUSCULAR; INTRAVENOUS ONCE
Status: COMPLETED | OUTPATIENT
Start: 2024-08-01 | End: 2024-08-01

## 2024-08-01 RX ORDER — IPRATROPIUM BROMIDE AND ALBUTEROL SULFATE 2.5; .5 MG/3ML; MG/3ML
3 SOLUTION RESPIRATORY (INHALATION) EVERY 4 HOURS PRN
Status: DISCONTINUED | OUTPATIENT
Start: 2024-08-01 | End: 2024-08-05 | Stop reason: HOSPADM

## 2024-08-01 RX ORDER — DOXYCYCLINE 100 MG/1
100 CAPSULE ORAL ONCE
Status: COMPLETED | OUTPATIENT
Start: 2024-08-01 | End: 2024-08-01

## 2024-08-01 RX ORDER — METOPROLOL SUCCINATE 25 MG/1
25 TABLET, EXTENDED RELEASE ORAL AT BEDTIME
Status: DISCONTINUED | OUTPATIENT
Start: 2024-08-01 | End: 2024-08-05 | Stop reason: HOSPADM

## 2024-08-01 RX ORDER — BENZONATATE 100 MG/1
200 CAPSULE ORAL 3 TIMES DAILY PRN
Status: DISCONTINUED | OUTPATIENT
Start: 2024-08-01 | End: 2024-08-05 | Stop reason: HOSPADM

## 2024-08-01 RX ORDER — METHOCARBAMOL 500 MG/1
500 TABLET, FILM COATED ORAL 3 TIMES DAILY
Status: DISCONTINUED | OUTPATIENT
Start: 2024-08-02 | End: 2024-08-05 | Stop reason: HOSPADM

## 2024-08-01 RX ORDER — LISINOPRIL 2.5 MG/1
2.5 TABLET ORAL DAILY
Status: DISCONTINUED | OUTPATIENT
Start: 2024-08-02 | End: 2024-08-05 | Stop reason: HOSPADM

## 2024-08-01 RX ORDER — CALCIUM CARBONATE 500 MG/1
1000 TABLET, CHEWABLE ORAL 4 TIMES DAILY PRN
Status: DISCONTINUED | OUTPATIENT
Start: 2024-08-01 | End: 2024-08-05 | Stop reason: HOSPADM

## 2024-08-01 RX ORDER — UREA 10 %
1000 LOTION (ML) TOPICAL DAILY
Status: DISCONTINUED | OUTPATIENT
Start: 2024-08-02 | End: 2024-08-05 | Stop reason: HOSPADM

## 2024-08-01 RX ORDER — FUROSEMIDE 10 MG/ML
40 INJECTION INTRAMUSCULAR; INTRAVENOUS DAILY
Status: DISCONTINUED | OUTPATIENT
Start: 2024-08-02 | End: 2024-08-02

## 2024-08-01 RX ORDER — LIDOCAINE 40 MG/G
CREAM TOPICAL
Status: DISCONTINUED | OUTPATIENT
Start: 2024-08-01 | End: 2024-08-05 | Stop reason: HOSPADM

## 2024-08-01 RX ORDER — AMOXICILLIN 250 MG
1 CAPSULE ORAL 2 TIMES DAILY PRN
Status: DISCONTINUED | OUTPATIENT
Start: 2024-08-01 | End: 2024-08-05 | Stop reason: HOSPADM

## 2024-08-01 RX ORDER — SIMVASTATIN 10 MG
20 TABLET ORAL AT BEDTIME
Status: DISCONTINUED | OUTPATIENT
Start: 2024-08-01 | End: 2024-08-05 | Stop reason: HOSPADM

## 2024-08-01 RX ORDER — ACETAMINOPHEN 325 MG/1
325-650 TABLET ORAL EVERY 6 HOURS PRN
COMMUNITY
End: 2024-08-30

## 2024-08-01 RX ADMIN — METOPROLOL SUCCINATE 25 MG: 25 TABLET, EXTENDED RELEASE ORAL at 22:07

## 2024-08-01 RX ADMIN — DOXYCYCLINE HYCLATE 100 MG: 100 CAPSULE ORAL at 20:14

## 2024-08-01 RX ADMIN — FUROSEMIDE 20 MG: 10 INJECTION, SOLUTION INTRAMUSCULAR; INTRAVENOUS at 18:54

## 2024-08-01 RX ADMIN — SIMVASTATIN 20 MG: 10 TABLET, FILM COATED ORAL at 22:08

## 2024-08-01 RX ADMIN — BENZONATATE 200 MG: 100 CAPSULE ORAL at 22:47

## 2024-08-01 RX ADMIN — IOPAMIDOL 51 ML: 755 INJECTION, SOLUTION INTRAVENOUS at 17:28

## 2024-08-01 RX ADMIN — GABAPENTIN 100 MG: 100 CAPSULE ORAL at 22:08

## 2024-08-01 RX ADMIN — SODIUM CHLORIDE 60 ML: 9 INJECTION, SOLUTION INTRAVENOUS at 17:27

## 2024-08-01 RX ADMIN — FAMOTIDINE 20 MG: 10 INJECTION, SOLUTION INTRAVENOUS at 22:09

## 2024-08-01 ASSESSMENT — ENCOUNTER SYMPTOMS
COUGH: 1
SHORTNESS OF BREATH: 1
ABDOMINAL PAIN: 0
DIAPHORESIS: 1
FATIGUE: 1
MYALGIAS: 0

## 2024-08-01 ASSESSMENT — COLUMBIA-SUICIDE SEVERITY RATING SCALE - C-SSRS
2. HAVE YOU ACTUALLY HAD ANY THOUGHTS OF KILLING YOURSELF IN THE PAST MONTH?: NO
1. IN THE PAST MONTH, HAVE YOU WISHED YOU WERE DEAD OR WISHED YOU COULD GO TO SLEEP AND NOT WAKE UP?: NO
6. HAVE YOU EVER DONE ANYTHING, STARTED TO DO ANYTHING, OR PREPARED TO DO ANYTHING TO END YOUR LIFE?: NO

## 2024-08-01 ASSESSMENT — ACTIVITIES OF DAILY LIVING (ADL)
ADLS_ACUITY_SCORE: 38
ADLS_ACUITY_SCORE: 38
ADLS_ACUITY_SCORE: 25
ADLS_ACUITY_SCORE: 38
ADLS_ACUITY_SCORE: 23

## 2024-08-01 NOTE — ED TRIAGE NOTES
ED Nursing Triage Note (General)   ________________________________    Loulou Lucero is a 79 year old Female that presents to triage via private vehicle with her son for complaints of shortness of breath.  Patient states she is recovering from pneumonia and recently had a valve replacement on June 20th.  Patient states over the past couple days she is also experiencing nausea and coughing at night, patient denies these sx during the day.   Significant symptoms had onset 2 day(s) ago.  /66   Pulse 74   Temp 98.7  F (37.1  C) (Tympanic)   Resp 20   Ht 1.524 m (5')   Wt 37.6 kg (83 lb)   LMP 01/01/1995 (Approximate)   SpO2 91%   BMI 16.21 kg/m    Vital signs:  Temp: 98.7  F (37.1  C) Temp src: Tympanic BP: 138/66 Pulse: 74   Resp: 20 SpO2: 91 %     Height: 152.4 cm (5') Weight: 37.6 kg (83 lb)  Estimated body mass index is 16.21 kg/m  as calculated from the following:    Height as of this encounter: 1.524 m (5').    Weight as of this encounter: 37.6 kg (83 lb).  PRE HOSPITAL PRIOR LIVING SITUATION Alone      Triage Assessment (Adult)       Row Name 08/01/24 1511          Triage Assessment    Airway WDL WDL        Respiratory WDL    Respiratory WDL X        Skin Circulation/Temperature WDL    Skin Circulation/Temperature WDL WDL        Cardiac WDL    Cardiac WDL WDL     Cardiac Rhythm NSR        Peripheral/Neurovascular WDL    Peripheral Neurovascular WDL WDL     Capillary Refill, General less than/equal to 3 secs        Cognitive/Neuro/Behavioral WDL    Cognitive/Neuro/Behavioral WDL WDL        Sofy Coma Scale    Best Eye Response 4-->(E4) spontaneous     Best Motor Response 6-->(M6) obeys commands     Best Verbal Response 5-->(V5) oriented     Sofy Coma Scale Score 15

## 2024-08-01 NOTE — ED PROVIDER NOTES
History     Chief Complaint   Patient presents with    Shortness of Breath     The history is provided by the patient and medical records.     Loulou Lucero is a 79 year old female who presents to the emergency department today reporting increased shortness of breath over the past couple of days.  She reports that she feels worse when she is laying down falt, at night, she gets up 3-4 times a night with a severe cough.She has been propping herself up with pillows.  During those coughing spells she does break out in a sweat like a hot flash.  She is coughing up mucus.  She tells me that she was recently treated for pneumonia.  She denies having any fevers, chest pain.  No lower extremity edema.  She is eating and drinking.    She had a aortic valve replacement June 19 at [facility], discharged June 28 after some complications of pneumothorax and left pulmonary hemorrhage. She has been in Rehab following.  She lives in [place] alone.     Recently treated for pneumonia with Augmentin 7/17/24 during that visit she was negative for Legionella, COVID flu RSV    Patient has a past medical history of COPD, aortic valve replacement, iron deficiency anemia, GERD, tobacco abuse    Recent echo 7/25/24: Interpretation Summary  Global and regional left ventricular function is normal with an EF of 60-65%.  Global right ventricular function is normal.  s/p TAVR with 20 mm Diaz Benedict 3 on 6/19/2024  The prosthetic aortic valve is well-seated. There is trace paravalvular  regurgitation. PV 2.4m/s, MG 12mmHg.  Estimated pulmonary artery systolic pressure is 49 mmHg.  The inferior vena cava is normal.  No pericardial effusion.     This study was compared with the study from 6/20/24. Estimated PA pressure is  higher.    Allergies:  Allergies   Allergen Reactions    Metronidazole Nausea and Vomiting    Pneumococcal Vaccine      Other reaction(s): Edema  Arm swelling    Tramadol Visual Disturbance     Hallucinations          Problem  List:    Patient Active Problem List    Diagnosis Date Noted    History of transcatheter aortic valve replacement (TAVR) 06/25/2024     Priority: Medium    Aortic stenosis, severe 06/19/2024     Priority: Medium    Status post coronary angiogram 04/05/2024     Priority: Medium    Severe aortic stenosis 04/05/2024     Priority: Medium    Severe aortic stenosis by prior echocardiogram 04/05/2024     Priority: Medium    MGUS (monoclonal gammopathy of unknown significance) 01/31/2024     Priority: Medium    Iron deficiency anemia 01/23/2024     Priority: Medium    Anemia, unspecified type 03/28/2023     Priority: Medium    Age-related osteoporosis without current pathological fracture 06/26/2018     Priority: Medium    Diverticular disease of colon 01/23/2018     Priority: Medium    Tobacco abuse 01/23/2018     Priority: Medium     Overview:   Trying to quit      Systolic murmur 06/06/2017     Priority: Medium     Overview:   Aortic sclerosis with no significant aortic stenosis per echo June 2017      GERD (gastroesophageal reflux disease) 06/18/2014     Priority: Medium    Advanced care planning/counseling discussion 04/07/2014     Priority: Medium     Overview:   Declined       Chronic obstructive pulmonary disease (H) 12/29/2010     Priority: Medium        Past Medical History:    Past Medical History:   Diagnosis Date    Asymptomatic varicose veins of lower extremity     Benign paroxysmal vertigo     Chronic obstructive pulmonary disease (H)     Diarrhea     Disorder of cartilage     Diverticulosis of large intestine without perforation or abscess without bleeding     Lower abdominal pain     Other disorders of lung (CODE)     Personal history of other medical treatment (CODE)     Personal history of other medical treatment (CODE)     Zoster without complications        Past Surgical History:    Past Surgical History:   Procedure Laterality Date    CATARACT EXTRACTION Right     8/2023    COLONOSCOPY  03/22/2010     Normal; + family hx, next due 2015    COLONOSCOPY  10/01/2015    W/ POLYPECTOMY recommend follow up in 2020.    COLONOSCOPY N/A 11/07/2019    4 tubular adenoma, 3 year follow up    CV CORONARY ANGIOGRAM N/A 4/5/2024    Procedure: Coronary Angiogram;  Surgeon: Min So MD;  Location:  HEART CARDIAC CATH LAB    CV RIGHT HEART CATH MEASUREMENTS RECORDED N/A 4/5/2024    Procedure: Right Heart Catheterization;  Surgeon: Min So MD;  Location:  HEART CARDIAC CATH LAB    CV TRANSCATHETER AORTIC VALVE REPLACEMENT N/A 6/19/2024    Procedure: Transcatheter Aortic Valve Replacement - Transapical Approach;  Surgeon: Claude Patricia MD;  Location:  OR    ESOPHAGOSCOPY, GASTROSCOPY, DUODENOSCOPY (EGD), COMBINED  04/23/2014    Arce's esophagus fu egd 2 yrs    ESOPHAGOSCOPY, GASTROSCOPY, DUODENOSCOPY (EGD), COMBINED N/A 11/07/2019    Procedure: ESOPHAGOGASTRODUODENOSCOPY, WITH BIOPSY;  Surgeon: Santosh Barrera MD;  Location: [facility] OR    IR CHEST TUBE PLACEMENT NON-TUNNELLED LEFT  6/21/2024    LAPAROSCOPIC TUBAL LIGATION  1978         OR TRANSCATHETER AORTIC VALVE REPLACEMENT, TRANSAPICAL OPEN APPROACH Left 6/19/2024    Procedure: Transcatheter Aortic Valve Replacement, Transapical Open Approach using Diaz Pericardial Tissue Heart Valve size 20mm, Cardiopulmonary Bypass Pump on Standby, and Transesophageal Echocardiogram by Cardiology;  Surgeon: Frank Cross MD;  Location:  OR    TONSILLECTOMY & ADENOIDECTOMY  1951            Family History:    Family History   Problem Relation Age of Onset    Colon Cancer Father         Cancer-colon    Other - See Comments Mother         Alzheimer's    Other - See Comments Maternal Grandmother         Alzheimer's    Other - See Comments Brother         Alzheimer's    Other - See Comments Brother         aneurysm and stents    Cancer Brother         Cancer,lung    Cancer Brother         Cancer,skin    Other - See Comments Brother          cirrhosis of the liver    Other - See Comments Maternal Uncle         3, Alzheimer's    Breast Cancer No family hx of         Cancer-breast       Social History:  Marital Status:   [5]  Social History     Tobacco Use    Smoking status: Every Day     Current packs/day: 0.50     Average packs/day: 0.5 packs/day for 63.6 years (31.8 ttl pk-yrs)     Types: Cigarettes     Start date: 1961     Passive exposure: Past    Smokeless tobacco: Never    Tobacco comments:     Passive exposure in adult home. 6 cigs a day   Vaping Use    Vaping status: Never Used   Substance Use Topics    Alcohol use: Not Currently     Comment: rarely at a wedding or special occasion    Drug use: No        Medications:    acetaminophen (TYLENOL) 325 MG tablet  aspirin 81 MG EC tablet  cyanocobalamin (VITAMIN B-12) 1000 MCG tablet  fluticasone-salmeterol (ADVAIR DISKUS) 250-50 MCG/ACT inhaler  gabapentin (NEURONTIN) 100 MG capsule  guaiFENesin-codeine (ROBITUSSIN AC) 100-10 MG/5ML solution  metoprolol succinate ER (TOPROL XL) 25 MG 24 hr tablet  pantoprazole (PROTONIX) 40 MG EC tablet  simvastatin (ZOCOR) 20 MG tablet  methocarbamol (ROBAXIN) 500 MG tablet          Review of Systems   Constitutional:  Positive for diaphoresis and fatigue.   Respiratory:  Positive for cough and shortness of breath.    Cardiovascular:  Negative for chest pain and leg swelling.   Gastrointestinal:  Negative for abdominal pain.   Musculoskeletal:  Negative for myalgias.   All other systems reviewed and are negative.  See HPI    Physical Exam   BP: 138/66  Pulse: 74  Temp: 98.7  F (37.1  C)  Resp: 20  Height: 152.4 cm (5')  Weight: 37.6 kg (83 lb)  SpO2: 91 %      Physical Exam  Vitals and nursing note reviewed.   Constitutional:       General: She is not in acute distress.     Appearance: She is ill-appearing. She is not toxic-appearing.   HENT:      Mouth/Throat:      Pharynx: Oropharynx is clear.   Cardiovascular:      Rate and Rhythm: Normal rate and regular  rhythm.   Pulmonary:      Effort: Tachypnea present.      Breath sounds: Examination of the left-middle field reveals decreased breath sounds. Examination of the left-lower field reveals decreased breath sounds. Decreased breath sounds present.   Abdominal:      Palpations: Abdomen is soft.   Musculoskeletal:         General: Normal range of motion.      Cervical back: Normal range of motion and neck supple.      Right lower leg: No edema.      Left lower leg: No edema.   Skin:     General: Skin is warm.      Capillary Refill: Capillary refill takes less than 2 seconds.   Neurological:      General: No focal deficit present.      Mental Status: She is alert.   Psychiatric:         Mood and Affect: Mood normal.         ED Course            EKG Interpretation:      Interpreted by VEENA Thomas CNP  Time reviewed: 1518  Symptoms at time of EKG: Shortness of breath  Rhythm: sinus rhythm  Rate: normal-77  Ectopy: none  Conduction: normal  ST Segments/ T Waves: No ST-T wave changes  Clinical Impression: Sinus rhythm, anterior infarct, age undetermined.  Pending  EKG over read by internal medicine         Results for orders placed or performed during the hospital encounter of 08/01/24 (from the past 24 hour(s))   [place] Draw    Narrative    The following orders were created for panel order [place] Draw.  Procedure                               Abnormality         Status                     ---------                               -----------         ------                     Extra Red Top Tube[[id number]]                               Final result               Extra Green Top (Lithium...[[id number]]                      Final result               Extra Green Top (Lithium...[[id number]]                      Final result               Extra Purple Top Tube[[id number]]                            Final result                 Please view results for these tests on the individual orders.   Extra Red Top Tube   Result Value  Ref Range    Hold Specimen JIC    Extra Green Top (Lithium Heparin) Tube   Result Value Ref Range    Hold Specimen JIC    Extra Green Top (Lithium Heparin) Tube   Result Value Ref Range    Hold Specimen JIC    Extra Purple Top Tube   Result Value Ref Range    Hold Specimen JIC    CBC with platelets differential    Narrative    The following orders were created for panel order CBC with platelets differential.  Procedure                               Abnormality         Status                     ---------                               -----------         ------                     CBC with platelets and d...[[id number]]  Abnormal            Final result                 Please view results for these tests on the individual orders.   Comprehensive metabolic panel   Result Value Ref Range    Sodium 138 135 - 145 mmol/L    Potassium 4.0 3.4 - 5.3 mmol/L    Carbon Dioxide (CO2) 28 22 - 29 mmol/L    Anion Gap 8 7 - 15 mmol/L    Urea Nitrogen 17.2 8.0 - 23.0 mg/dL    Creatinine 0.93 0.51 - 0.95 mg/dL    GFR Estimate 62 >60 mL/min/1.73m2    Calcium 8.6 (L) 8.8 - 10.4 mg/dL    Chloride 102 98 - 107 mmol/L    Glucose 137 (H) 70 - 99 mg/dL    Alkaline Phosphatase 105 40 - 150 U/L    AST 26 0 - 45 U/L    ALT 14 0 - 50 U/L    Protein Total 6.7 6.4 - 8.3 g/dL    Albumin 3.3 (L) 3.5 - 5.2 g/dL    Bilirubin Total 0.5 <=1.2 mg/dL   Troponin T, High Sensitivity   Result Value Ref Range    Troponin T, High Sensitivity 32 (H) <=14 ng/L   Nt probnp inpatient (BNP)   Result Value Ref Range    N terminal Pro BNP Inpatient 4,472 (H) 0 - 1,800 pg/mL   CBC with platelets and differential   Result Value Ref Range    WBC Count 9.8 4.0 - 11.0 10e3/uL    RBC Count 3.13 (L) 3.80 - 5.20 10e6/uL    Hemoglobin 9.9 (L) 11.7 - 15.7 g/dL    Hematocrit 30.9 (L) 35.0 - 47.0 %    MCV 99 78 - 100 fL    MCH 31.6 26.5 - 33.0 pg    MCHC 32.0 31.5 - 36.5 g/dL    RDW 14.6 10.0 - 15.0 %    Platelet Count 275 150 - 450 10e3/uL    % Neutrophils 80 %    % Lymphocytes  9 %    % Monocytes 9 %    % Eosinophils 1 %    % Basophils 1 %    % Immature Granulocytes 1 %    NRBCs per 100 WBC 0 <1 /100    Absolute Neutrophils 7.8 1.6 - 8.3 10e3/uL    Absolute Lymphocytes 0.9 0.8 - 5.3 10e3/uL    Absolute Monocytes 0.8 0.0 - 1.3 10e3/uL    Absolute Eosinophils 0.1 0.0 - 0.7 10e3/uL    Absolute Basophils 0.1 0.0 - 0.2 10e3/uL    Absolute Immature Granulocytes 0.1 <=0.4 10e3/uL    Absolute NRBCs 0.0 10e3/uL   D dimer quantitative   Result Value Ref Range    D-Dimer Quantitative 2.27 (H) 0.00 - 0.50 ug/mL FEU    Narrative    This D-dimer assay is intended for use in conjunction with a clinical pretest probability assessment model to exclude pulmonary embolism (PE) and deep venous thrombosis (DVT) in outpatients suspected of PE or DVT. The cut-off value is 0.50 ug/mL FEU.    For patients 50 years of age or older, the application of age-adjusted cut-off values for D-Dimer may increase the specificity without significant effect on sensitivity. The literature suggested calculation age adjusted cut-off in ug/L = age in years x 10 ug/L. The results in this laboratory are reported as ug/mL rather than ug/L. The calculation for age adjusted cut off in ug/mL= age in years x 0.01 ug/mL. For example, the cut off for a 76 year old male is 76 x 0.01 ug/mL = 0.76 ug/mL (760 ug/L).    M Francesca et al. Age adjusted D-dimer cut-off levels to rule out pulmonary embolism: The ADJUST-PE Study. DEBBIE 2014;311:6418-3598.; HJ Khang et al. Diagnostic accuracy of conventional or age adjusted D-dimer cutoff values in older patients with suspected venous thromboembolism. Systemic review and meta-analysis. BMJ 2013:346:f2492.   Symptomatic Influenza A/B, RSV, & SARS-CoV2 PCR (COVID-19) Nose    Specimen: Nose; Swab   Result Value Ref Range    Influenza A PCR Negative Negative    Influenza B PCR Negative Negative    RSV PCR Negative Negative    SARS CoV2 PCR Negative Negative    Narrative    Testing was performed using the  Xpert Xpress CoV2/Flu/RSV Assay on the [employer] GeneXpert Instrument. This test should be ordered for the detection of SARS-CoV-2, influenza, and RSV viruses in individuals who meet clinical and/or epidemiological criteria. Test performance is unknown in asymptomatic patients. This test is for in vitro diagnostic use under the FDA EUA for laboratories certified under CLIA to perform high or moderate complexity testing. This test has not been FDA cleared or approved. A negative result does not rule out the presence of PCR inhibitors in the specimen or target RNA in concentration below the limit of detection for the assay. If only one viral target is positive but coinfection with multiple targets is suspected, the sample should be re-tested with another FDA cleared, approved, or authorized test, if coinfection would change clinical management. This test was validated by the [facility]. These laboratories are certified under the Clinical Laboratory Improvement Amendments of 1988 (CLIA-88) as qualified to perform high complexity laboratory testing.   XR Chest 2 Views    Narrative    PROCEDURE:  XR CHEST 2 VIEWS    HISTORY: Shortness of breath    COMPARISON: Chest radiograph 7/17/2024, 6/26/2024    FINDINGS: PA and lateral chest radiographs  Postsurgical changes of TAVR.  Cardiomediastinal silhouette is within normal limits. There is  calcific aortic atherosclerosis.  Perihilar and peripheral interstitial opacities. Dependent mixed  opacities. Bilateral pleural effusions. No pneumothorax.    No suspicious osseous lesion or subdiaphragmatic free air.      Impression    IMPRESSION:    Changes in the chest which are likely due to heart failure, including  small effusions and bilateral pulmonary opacities. Pneumonia cannot be  excluded.    PACO RIVERA MD         SYSTEM ID:  [id number]   CT Chest Pulmonary Embolism w Contrast    Narrative    EXAM: CT CHEST PULMONARY EMBOLISM W CONTRAST, 8/1/2024 5:26  PM    HISTORY: elevated DDimer, SOB, recent surgery in June ? Pe    COMPARISON: Chest radiographs 8/1/2024; CT chest 6/22/2024    TECHNIQUE:   Imaging protocol: Multiplanar CT images of the chest after  administration of intravenous contrast. Meds given: isovue 370 51 ML.  Acquisition: This CT exam was performed using one or more the  following dose reduction techniques: automated exposure control,  adjustment of the mA and/or kV according to patient size, and/or  iterative reconstruction technique.  Processing: 3D rendering on independent workstation using Maximum  Intensity Projection (MIP) was performed and archived to PACS. 3D  reconstructions are interpreted and reported by supervising  radiologist.    FINDINGS:     CHEST:    VESSELS AND HEART: There is adequate contrast bolus in the study.  Contrast bolus adequately opacifies the pulmonary arteries. No acute  pulmonary emboli to the subsegmental level. Atherosclerotic  calcification of the aorta without aneurysmal dilation. Mitral annular  calcification. Postsurgical changes of TAVR. Moderate coronary  calcification.    LUNGS: No pulmonary mass. Interlobular septal thickening. Chronic  interstitial changes with paraseptal emphysema. Dependent bronchial  wall thickening. Tree-in-bud opacities in the left base and left upper  lobe. Dependent consolidations.  PLEURA: Moderate to large bilateral pleural effusions. No  pneumothorax.  LYMPH NODES: No enlarged lymph nodes.  THYROID: Within normal limits.    BONES AND SOFT TISSUES:  No suspicious osseous lesions. Degenerative periarticular changes and  spinal spondylosis.    UPPER ABDOMEN:  Limited evaluation of the upper abdomen demonstrates no acute  parenchymal abnormalities, nonobstructive bowel gas pattern, and no  free fluid or free air.      Impression    IMPRESSION:   1.  No acute pulmonary emboli to the subsegmental level.  2.  Changes in the chest which may be due to heart failure, including  effusions, and  bilateral interstitial opacities with dependent  consolidations.   3.  Patchy groundglass opacities in left upper and left lower lobe are  concerning for infection.    PACO RIVERA MD         SYSTEM ID:  [id number]       Medications   iopamidol (ISOVUE-370) solution 51 mL (51 mLs Intravenous $Given 8/1/24 1728)   sodium chloride 0.9 % bag 500 mL for CT scan flush use (60 mLs Intravenous $Given 8/1/24 1727)   furosemide (LASIX) injection 20 mg (20 mg Intravenous $Given 8/1/24 1854)       Assessments & Plan (with Medical Decision Making)  Blood pressure 138/66 temp of 98.7 pulse of 74 respiratory rate of 20 SpO2 of 88% on room air, she is placed on 1 L nasal cannula SpO2 low 90s  Physical exam: Patient is alert and oriented and nontoxic, but she does appear to be fatigued, tachypnea noted respiratory rate 20-24, lung sounds diminished on the left side lower lobes, no lower extremity edema. Orthopnea.   Concern for pneumonia, pleural effusion, PE due to recent surgery, viral infection, ACS, COPD exacerbation  Labs & Radiology results interpreted by radiologist:   ED Course as of 08/01/24 1947   Thu Aug 01, 2024   1621 XR Chest 2 Views  IMPRESSION:    Changes in the chest which are likely due to heart failure, including  small effusions and bilateral pulmonary opacities. Pneumonia cannot be  excluded.     PACO RIVERA MD     1645 Troponin T, High Sensitivity(!)  32   1645 Comprehensive metabolic panel(!)  Stable, calcium 8.6 glucose 137 albumin 3.3;    1645 Symptomatic Influenza A/B, RSV, & SARS-CoV2 PCR (COVID-19) Nose  Negative   1645 D dimer quantitative(!)  2.27, PE study ordered for further evaluation   1645 CBC with platelets differential(!)  No leukocytosis, hemoglobin 9.9 appears to be around patient's recent baseline levels.   1744 CT Chest Pulmonary Embolism w Contrast                                                                   IMPRESSION:   1.  No acute pulmonary emboli to the subsegmental level.  2.   Changes in the chest which may be due to heart failure, including  effusions, and bilateral interstitial opacities with dependent  consolidations.   3.  Patchy groundglass opacities in left upper and left lower lobe are  concerning for infection     1757 Nt probnp inpatient (BNP)(!)  4472      Meds:lasix 20 mg. doxycycline  Vitals:    08/01/24 1545 08/01/24 1842 08/01/24 1845 08/01/24 1852   BP: (!) 140/76  (!) 156/81    Pulse: 74  74    Resp: 28  18    Temp:       TempSrc:       SpO2: 95% 90% (!) 87%    Weight:    38.7 kg (85 lb 6.4 oz)   Height:          6:18 PM Patient is on 3 L nasal cannula.   6:18 PM Consult cardiology [facility]. Dr. Hair- worsening bilateral pleural effusions on CT today, elevated BNP;  concern for  heart failure, particularly higher concern given her recent TAVR procedure, recommended ECHO- if stable, ok to get tomorrow, treat for heart failure with diuretic; if vitally stable ok for medical floor. Recent echo 7/25/24 showed a well seated aortic valve, normal EF  Patient is not sure if she is on amiodarone, she does not set up her medications, her family does. Recent clinic visit  with cardiology 7/29 mentions continuing this medication for paroxsymal atrial fibrillation. At this time, due to interaction with azithromycin, will not prescribe.   7:53 PM Dr. Hayes accepted patient for admission, advised to give doxycycline for COPD exacerbation/pneumonia. Patient is accepting for admission. Patient has urinated 3 times following lasix IV     I have reviewed the nursing notes.    I have reviewed the findings, diagnosis, plan and need for follow up with the patient.    Medical Decision Making  The patient's presentation was of moderate complexity (an undiagnosed new problem with uncertain prognosis).    The patient's evaluation involved:  review of external note(s) from 1 sources (see separate area of note for details)  review of 3+ test result(s) ordered prior to this encounter (see separate  area of note for details)  ordering and/or review of 3+ test(s) in this encounter (see separate area of note for details)  discussion of management or test interpretation with another health professional (see separate area of note for details)    The patient's management necessitated high risk (a decision regarding hospitalization).        New Prescriptions    No medications on file       Final diagnoses:   Bilateral pleural effusion   History of transcatheter aortic valve replacement (TAVR)       8/1/2024   [facility] VEENA SHORE  08/01/24 2027

## 2024-08-02 ENCOUNTER — APPOINTMENT (OUTPATIENT)
Dept: OCCUPATIONAL THERAPY | Facility: OTHER | Age: 79
DRG: 291 | End: 2024-08-02
Attending: INTERNAL MEDICINE
Payer: MEDICARE

## 2024-08-02 ENCOUNTER — APPOINTMENT (OUTPATIENT)
Dept: PHYSICAL THERAPY | Facility: OTHER | Age: 79
DRG: 291 | End: 2024-08-02
Attending: INTERNAL MEDICINE
Payer: MEDICARE

## 2024-08-02 ENCOUNTER — APPOINTMENT (OUTPATIENT)
Dept: CARDIOLOGY | Facility: OTHER | Age: 79
DRG: 291 | End: 2024-08-02
Attending: INTERNAL MEDICINE
Payer: MEDICARE

## 2024-08-02 PROBLEM — I50.33 ACUTE ON CHRONIC HEART FAILURE WITH PRESERVED EJECTION FRACTION (HFPEF) (H): Status: ACTIVE | Noted: 2024-08-02

## 2024-08-02 LAB
ANION GAP SERPL CALCULATED.3IONS-SCNC: 9 MMOL/L (ref 7–15)
BUN SERPL-MCNC: 13.3 MG/DL (ref 8–23)
CALCIUM SERPL-MCNC: 8.6 MG/DL (ref 8.8–10.4)
CHLORIDE SERPL-SCNC: 102 MMOL/L (ref 98–107)
CREAT SERPL-MCNC: 0.85 MG/DL (ref 0.51–0.95)
EGFRCR SERPLBLD CKD-EPI 2021: 69 ML/MIN/1.73M2
ERYTHROCYTE [DISTWIDTH] IN BLOOD BY AUTOMATED COUNT: 14.5 % (ref 10–15)
GLUCOSE SERPL-MCNC: 98 MG/DL (ref 70–99)
HCO3 SERPL-SCNC: 31 MMOL/L (ref 22–29)
HCT VFR BLD AUTO: 31.6 % (ref 35–47)
HGB BLD-MCNC: 10.1 G/DL (ref 11.7–15.7)
LVEF ECHO: NORMAL
MCH RBC QN AUTO: 31.5 PG (ref 26.5–33)
MCHC RBC AUTO-ENTMCNC: 32 G/DL (ref 31.5–36.5)
MCV RBC AUTO: 98 FL (ref 78–100)
NT-PROBNP SERPL-MCNC: 3179 PG/ML (ref 0–1800)
PLATELET # BLD AUTO: 269 10E3/UL (ref 150–450)
POTASSIUM SERPL-SCNC: 3.4 MMOL/L (ref 3.4–5.3)
RBC # BLD AUTO: 3.21 10E6/UL (ref 3.8–5.2)
SODIUM SERPL-SCNC: 142 MMOL/L (ref 135–145)
WBC # BLD AUTO: 7.8 10E3/UL (ref 4–11)

## 2024-08-02 PROCEDURE — 250N000011 HC RX IP 250 OP 636: Performed by: FAMILY MEDICINE

## 2024-08-02 PROCEDURE — 85027 COMPLETE CBC AUTOMATED: CPT | Performed by: INTERNAL MEDICINE

## 2024-08-02 PROCEDURE — 97530 THERAPEUTIC ACTIVITIES: CPT | Mod: GP

## 2024-08-02 PROCEDURE — 97535 SELF CARE MNGMENT TRAINING: CPT | Mod: GO | Performed by: OCCUPATIONAL THERAPIST

## 2024-08-02 PROCEDURE — 250N000013 HC RX MED GY IP 250 OP 250 PS 637: Performed by: FAMILY MEDICINE

## 2024-08-02 PROCEDURE — 97165 OT EVAL LOW COMPLEX 30 MIN: CPT | Mod: GO | Performed by: OCCUPATIONAL THERAPIST

## 2024-08-02 PROCEDURE — 99232 SBSQ HOSP IP/OBS MODERATE 35: CPT | Performed by: FAMILY MEDICINE

## 2024-08-02 PROCEDURE — 999N000157 HC STATISTIC RCP TIME EA 10 MIN

## 2024-08-02 PROCEDURE — 83880 ASSAY OF NATRIURETIC PEPTIDE: CPT | Performed by: INTERNAL MEDICINE

## 2024-08-02 PROCEDURE — 97161 PT EVAL LOW COMPLEX 20 MIN: CPT | Mod: GP

## 2024-08-02 PROCEDURE — 93306 TTE W/DOPPLER COMPLETE: CPT

## 2024-08-02 PROCEDURE — 97530 THERAPEUTIC ACTIVITIES: CPT | Mod: GO | Performed by: OCCUPATIONAL THERAPIST

## 2024-08-02 PROCEDURE — 93306 TTE W/DOPPLER COMPLETE: CPT | Mod: 26 | Performed by: INTERNAL MEDICINE

## 2024-08-02 PROCEDURE — 120N000001 HC R&B MED SURG/OB

## 2024-08-02 PROCEDURE — 80048 BASIC METABOLIC PNL TOTAL CA: CPT | Performed by: INTERNAL MEDICINE

## 2024-08-02 PROCEDURE — 36415 COLL VENOUS BLD VENIPUNCTURE: CPT | Performed by: INTERNAL MEDICINE

## 2024-08-02 PROCEDURE — 250N000013 HC RX MED GY IP 250 OP 250 PS 637: Performed by: INTERNAL MEDICINE

## 2024-08-02 PROCEDURE — 97116 GAIT TRAINING THERAPY: CPT | Mod: GP

## 2024-08-02 RX ORDER — GUAIFENESIN 600 MG/1
600 TABLET, EXTENDED RELEASE ORAL 2 TIMES DAILY
Status: DISCONTINUED | OUTPATIENT
Start: 2024-08-02 | End: 2024-08-05 | Stop reason: HOSPADM

## 2024-08-02 RX ORDER — FAMOTIDINE 20 MG/1
20 TABLET, FILM COATED ORAL 2 TIMES DAILY
Status: DISCONTINUED | OUTPATIENT
Start: 2024-08-02 | End: 2024-08-02 | Stop reason: DRUGHIGH

## 2024-08-02 RX ORDER — FAMOTIDINE 20 MG/1
20 TABLET, FILM COATED ORAL AT BEDTIME
Status: DISCONTINUED | OUTPATIENT
Start: 2024-08-02 | End: 2024-08-04

## 2024-08-02 RX ORDER — AMIODARONE HYDROCHLORIDE 200 MG/1
200 TABLET ORAL DAILY
Status: DISCONTINUED | OUTPATIENT
Start: 2024-08-02 | End: 2024-08-02

## 2024-08-02 RX ORDER — FUROSEMIDE 10 MG/ML
40 INJECTION INTRAMUSCULAR; INTRAVENOUS EVERY 8 HOURS
Status: DISCONTINUED | OUTPATIENT
Start: 2024-08-02 | End: 2024-08-03

## 2024-08-02 RX ADMIN — SIMVASTATIN 20 MG: 10 TABLET, FILM COATED ORAL at 21:25

## 2024-08-02 RX ADMIN — METHOCARBAMOL TABLETS 500 MG: 500 TABLET, COATED ORAL at 05:18

## 2024-08-02 RX ADMIN — GUAIFENESIN 600 MG: 600 TABLET ORAL at 21:25

## 2024-08-02 RX ADMIN — METOPROLOL SUCCINATE 25 MG: 25 TABLET, EXTENDED RELEASE ORAL at 21:24

## 2024-08-02 RX ADMIN — CYANOCOBALAMIN TAB 500 MCG 1000 MCG: 500 TAB at 10:15

## 2024-08-02 RX ADMIN — FAMOTIDINE 20 MG: 20 TABLET ORAL at 21:25

## 2024-08-02 RX ADMIN — GUAIFENESIN 600 MG: 600 TABLET ORAL at 14:06

## 2024-08-02 RX ADMIN — GABAPENTIN 100 MG: 100 CAPSULE ORAL at 21:24

## 2024-08-02 RX ADMIN — GABAPENTIN 100 MG: 100 CAPSULE ORAL at 10:15

## 2024-08-02 RX ADMIN — LISINOPRIL 2.5 MG: 2.5 TABLET ORAL at 10:15

## 2024-08-02 RX ADMIN — METHOCARBAMOL TABLETS 500 MG: 500 TABLET, COATED ORAL at 14:06

## 2024-08-02 RX ADMIN — FUROSEMIDE 40 MG: 10 INJECTION, SOLUTION INTRAMUSCULAR; INTRAVENOUS at 16:48

## 2024-08-02 RX ADMIN — ASPIRIN 81 MG: 81 TABLET, COATED ORAL at 10:15

## 2024-08-02 RX ADMIN — METHOCARBAMOL TABLETS 500 MG: 500 TABLET, COATED ORAL at 21:24

## 2024-08-02 RX ADMIN — FLUTICASONE FUROATE AND VILANTEROL TRIFENATATE 1 PUFF: 100; 25 POWDER RESPIRATORY (INHALATION) at 09:45

## 2024-08-02 RX ADMIN — FUROSEMIDE 40 MG: 10 INJECTION, SOLUTION INTRAMUSCULAR; INTRAVENOUS at 08:44

## 2024-08-02 ASSESSMENT — ACTIVITIES OF DAILY LIVING (ADL)
ADLS_ACUITY_SCORE: 25
ADLS_ACUITY_SCORE: 38
ADLS_ACUITY_SCORE: 34
ADLS_ACUITY_SCORE: 38
ADLS_ACUITY_SCORE: 25
ADLS_ACUITY_SCORE: 34
ADLS_ACUITY_SCORE: 25
ADLS_ACUITY_SCORE: 38
ADLS_ACUITY_SCORE: 38
ADLS_ACUITY_SCORE: 26
ADLS_ACUITY_SCORE: 38
ADLS_ACUITY_SCORE: 38
ADLS_ACUITY_SCORE: 25
ADLS_ACUITY_SCORE: 25
ADLS_ACUITY_SCORE: 34
ADLS_ACUITY_SCORE: 25
ADLS_ACUITY_SCORE: 34
ADLS_ACUITY_SCORE: 34
ADLS_ACUITY_SCORE: 25
ADLS_ACUITY_SCORE: 38
ADLS_ACUITY_SCORE: 38
ADLS_ACUITY_SCORE: 34
ADLS_ACUITY_SCORE: 25

## 2024-08-02 NOTE — PROGRESS NOTES
Interdisciplinary Discharge Planning Note    Anticipated Discharge Date: 8/3-8/4    Anticipated Discharge Location: Home    Clinical Needs Before Discharge:  stable functional status and stable oxygen requirement    Treatment Needs After Discharge:  homecare    Potential Barriers to Discharge: None identified     KEITH Tierney  8/2/2024,  12:23 PM

## 2024-08-02 NOTE — PROGRESS NOTES
[facility] ADMISSION NOTE    Patient admitted to room 336 at approximately 2100 via wheelchair from emergency room. Patient was accompanied by other:staff.     Verbal SBAR report received from Pennie FULLER prior to patient arrival.     Patient ambulated to bed with stand-by assist. Patient alert and oriented X 3. The patient is not having any pain.  . Admission vital signs: Blood pressure (!) 151/66, pulse 73, temperature 99.9  F (37.7  C), temperature source Tympanic, resp. rate 22, height 1.524 m (5'), weight 37.2 kg (82 lb), last menstrual period 01/01/1995, SpO2 93%, not currently breastfeeding. Patient was oriented to plan of care, call light, bed controls, tv, telephone, bathroom, and visiting hours.     Risk Assessment    The following safety risks were identified during admission: fall. Yellow risk band applied: YES.     Skin Initial Assessment    This writer admitted this patient and completed a full skin assessment and Frederick score in the Adult PCS flowsheet.   Photo documentation of skin problem and/or wound competed via [employer] application (located under Media):  N/A    Appropriate interventions initiated as needed.              Education    Patient has a [place] to Observation order: No  Observation education completed and documented: N/A      Jennifer Sherman RN

## 2024-08-02 NOTE — PROGRESS NOTES
:     Patient comes from home alone. Patient is receiving services from [facility] prior to this hospitalization. Updated [facility] that patient is anticipated to discharge home in 1-2 days.     KEITH Tierney on 8/2/2024 at 12:23 PM

## 2024-08-02 NOTE — PHARMACY
Pharmacy- Renal Dose Adjustment    Patient Active Problem List   Diagnosis    Advanced care planning/counseling discussion    Panlobular emphysema (H)    Diverticular disease of colon    Systolic murmur    Tobacco abuse    Age-related osteoporosis without current pathological fracture    Gastroesophageal reflux disease without esophagitis    Anemia, unspecified type    Iron deficiency anemia    MGUS (monoclonal gammopathy of unknown significance)    Status post coronary angiogram    Severe aortic stenosis    Severe aortic stenosis by prior echocardiogram    Aortic stenosis, severe    History of transcatheter aortic valve replacement (TAVR)    Bilateral pleural effusion    Acute on chronic heart failure with preserved ejection fraction (HFpEF) (H)        Relevant Labs:  Recent Labs   Lab Test 08/02/24  0635 08/01/24  1546   WBC 7.8 9.8   HGB 10.1* 9.9*    275        CrCl: 30.3 mL/min      Intake/Output Summary (Last 24 hours) at 8/2/2024 1531  Last data filed at 8/2/2024 1324  Gross per 24 hour   Intake 535 ml   Output 2525 ml   Net -1990 ml          Per Renal Dose Adjustment Protocol, will adjust:  -Famotidine to 20 mg once daily (from twice daily) due to CrCl 30-60 mL/min.      Will continue to follow and make adjustments accordingly. Thank You.    Olvin Steele [facility] ....................  8/2/2024   3:31 PM

## 2024-08-02 NOTE — PROGRESS NOTES
08/02/24 1256   Appointment Info   Signing Clinician's Name / Credentials (PT) Vishal Niño MPT   Living Environment   People in Home alone   Current Living Arrangements house   Home Accessibility no concerns   Transportation Anticipated family or friend will provide   Self-Care   Usual Activity Tolerance good   Current Activity Tolerance fair   Equipment Currently Used at Home none   Fall history within last six months no   General Information   Referring Physician Yovani   Patient/Family Therapy Goals Statement (PT) return home when able   Existing Precautions/Restrictions fall;oxygen therapy device and L/min   Weight-Bearing Status - LLE full weight-bearing   Weight-Bearing Status - RLE full weight-bearing   Cognition   Affect/Mental Status (Cognition) WFL   Orientation Status (Cognition) oriented x 4   Follows Commands (Cognition) WFL   Pain Assessment   Patient Currently in Pain No   Integumentary/Edema   Integumentary/Edema Comments mild lower leg edema noted bilaterally   Posture    Posture Not impaired   Range of Motion (ROM)   Range of Motion ROM is WFL   Strength (Manual Muscle Testing)   Strength (Manual Muscle Testing) strength is WFL   Strength Comments however, patient fatigues with activity   Bed Mobility   Impairments Contributing to Impaired Bed Mobility decreased strength   Comment, (Bed Mobility) minimal assist   Transfers   Impairments Contributing to Impaired Transfers decreased strength   Comment, (Transfers) sit to stand and stand pivot with minimal assist of 1   Gait/Stairs (Locomotion)   [place] Level (Gait) minimum assist (75% patient effort)   Assistive Device (Gait)   (hand held assist)   Distance in Feet (Gait) 20   Pattern (Gait) step-through   Balance   Balance Comments only fair with hand held assist   Sensory Examination   Sensory Perception WFL   Coordination   Coordination no deficits were identified   Muscle Tone   Muscle Tone no deficits were identified   Clinical  Impression   Criteria for Skilled Therapeutic Intervention Yes, treatment indicated   PT Diagnosis (PT) impaired mobility   Influenced by the following impairments fatigue   Functional limitations due to impairments activity/gait tolerance and stability   Clinical Presentation (PT Evaluation Complexity) stable   Clinical Decision Making (Complexity) low complexity   Planned Therapy Interventions (PT) bed mobility training;gait training;transfer training   Risk & Benefits of therapy have been explained evaluation/treatment results reviewed;risks/benefits reviewed;patient   PT Total Evaluation Time   PT Eval, Low Complexity Minutes (35905) 15   Physical Therapy Goals   PT Frequency Daily   PT Predicted Duration/Target Date for Goal Attainment 08/06/24   PT Goals Bed Mobility;Transfers;Gait   PT: Bed Mobility Supervision/stand-by assist   PT: Transfers Supervision/stand-by assist   PT: Gait Supervision/stand-by assist   PT Discharge Planning   PT Plan Continue PT   PT Discharge Recommendation (DC Rec) home with assist;home with home care physical therapy   PT Rationale for DC Rec to promote strength, stability and safe mobility   PT Brief overview of current status Pleasant patient who is now on supplemental O2 (not chronic) is requiring minimal assist for mobilities due to fatigue and weakness; she will certainly benefit from continued PT either through home care or short term rehab   PT Equipment Needed at Discharge   (patient may not require any assistive gait device at time of discharge from this hospital admission)

## 2024-08-02 NOTE — PROGRESS NOTES
08/02/24 1302   Appointment Info   Signing Clinician's Name / Credentials (OT) Mary Campoverde, OTR/L   Living Environment   People in Home alone   Current Living Arrangements house   Home Accessibility no concerns   Transportation Anticipated car, drives self   Self-Care   Usual Activity Tolerance good   Current Activity Tolerance fair   Cognitive Status Examination   Orientation Status orientation to person, place and time   Affect/Mental Status (Cognitive) WFL   Follows Commands WFL   Pain Assessment   Patient Currently in Pain No   Range of Motion Comprehensive   General Range of Motion bilateral upper extremity ROM WFL   Coordination   Upper Extremity Coordination No deficits were identified   Bed Mobility   Bed Mobility supine-sit   Supine-Sit [place] (Bed Mobility) supervision   Transfers   Transfers sit-stand transfer   Sit-Stand Transfer   Sit-Stand [place] (Transfers) minimum assist (75% patient effort);1 person to manage equipment   Clinical Impression   Criteria for Skilled Therapeutic Interventions Met (OT) Yes, treatment indicated   OT Diagnosis CHF   Influenced by the following impairments decreased activity tolerance   OT Problem List-Impairments impacting ADL activity tolerance impaired   Assessment of Occupational Performance 1-3 Performance Deficits   Identified Performance Deficits mobility/self care   Planned Therapy Interventions (OT) ADL retraining;progressive activity/exercise   Clinical Decision Making Complexity (OT) problem focused assessment/low complexity   Risk & Benefits of therapy have been explained risks/benefits reviewed   OT Total Evaluation Time   OT Eval, Low Complexity Minutes (18825) 15   OT Goals   Therapy Frequency (OT) Daily   OT Predicted Duration/Target Date for Goal Attainment 08/05/24   OT Goals Hygiene/Grooming;Upper Body Dressing;Lower Body Dressing;Upper Body Bathing;Lower Body Bathing;Toilet Transfer/Toileting   OT: Hygiene/Grooming supervision/stand-by  assist   OT: Upper Body Dressing Supervision/stand-by assist   OT: Lower Body Dressing Supervision/stand-by assist   OT: Upper Body Bathing Supervision/stand-by assist   OT: Lower Body Bathing Supervision/stand-by assist   OT: Toilet Transfer/Toileting Supervision/stand-by assist   Interventions   Interventions Quick Adds Therapeutic Activity;Self-Care/Home Management   Self-Care/Home Management   Self-Care/Home Mgmt/ADL, Compensatory, Meal Prep Minutes (05372) 15   Symptoms Noted During/After Treatment (Meal Preparation/Planning Training) fatigue   [place] Level (Grooming Training) stand-by assist   Assistance (Grooming Training) supervision   [place] Level (Bathing Training) moderate assist (50% patient effort)   Assistance (Bathing Training) 1 person assist   [place] Level (Upper Body Dressing Training) stand-by assist   Assistance (Upper Body Dressing Training) supervision   Lower Body Dressing Training Assistance moderate assist (50% patient effort)   Lower Body Dressing Training Assistance 1 person assist   Therapeutic Activities   Therapeutic Activity Minutes (68429) 15   Symptoms noted during/after treatment fatigue;shortness of breath   Treatment Detail/Skilled Intervention Pt ambulated with hand held assist from bed to recliner with CGA, pt is currently on 1 liter of O2, is not on O2 chronically at home   OT Discharge Planning   OT Plan progress functional activity tolerance   OT Discharge Recommendation (DC Rec) home with home care occupational therapy   OT Rationale for DC Rec Pt currently has home PT/OT, recommend resumption of this upon D/C   OT Brief overview of current status see above for details   OT Equipment Needed at Discharge   (TBA further)   Total Session Time   Timed Code Treatment Minutes 30   Total Session Time (sum of timed and untimed services) 45

## 2024-08-02 NOTE — PROGRESS NOTES
Inhaler given as ordered. Pt requiring 1L NC. No acute RT concerns at this time. Pt did IS at bedside. Pt needs lots of encouragement to complete IS.     Andrea Riedell, RT

## 2024-08-02 NOTE — TELEPHONE ENCOUNTER
Daughter Danni is calling to speak with [facility] direct about her mother and how she is doing.         Reason for Disposition   Health Information question, no triage required and triager able to answer question    Additional Information   Negative: [1] Caller is not with the adult (patient) AND [2] reporting urgent symptoms   Negative: Lab result questions   Negative: Medication questions   Negative: Caller can't be reached by phone   Negative: Caller has already spoken to PCP or another triager   Negative: RN needs further essential information from caller in order to complete triage   Negative: Requesting regular office appointment   Negative: [1] Caller requesting NON-URGENT health information AND [2] PCP's office is the best resource    Protocols used: Information Only Call - No Triage-A-

## 2024-08-02 NOTE — PROVIDER NOTIFICATION
08/01/24 2120   Initial Information   Patient Belongings remains with patient   Patient Belongings Remaining with Patient clothing   Did you bring any home meds/supplements to the hospital?  No     A               Admission:  I am responsible for any personal items that are not sent to the safe or pharmacy.  [facility] is not responsible for loss, theft or damage of any property in my possession.    Signature:  _________________________________ Date: _______  Time: _____                                              Staff Signature:  ____________________________ Date: ________  Time: _____      2nd Staff person, if patient is unable/unwilling to sign:    Signature: ________________________________ Date: ________  Time: _____     Discharge:  [place] has returned all of my personal belongings:    Signature: _________________________________ Date: ________  Time: _____                                          Staff Signature:  ____________________________ Date: ________  Time: _____

## 2024-08-02 NOTE — PLAN OF CARE
Goal Outcome Evaluation:      Plan of Care Reviewed With: patient    Overall Patient Progress: no changeOverall Patient Progress: no change         Patient continues to have SOB especially with activity. Coarse productive cough. Tessalon effective.LS dim throughout. Accessory and abdominal muscle use, Saturation 91-93% on 1 LPM via nasal cannula. Purewick in place, patient has had 1000 ml of output and has also had large incontinent occurrence. Complains of pain around incision site on left rib area.

## 2024-08-02 NOTE — PHARMACY-ADMISSION MEDICATION HISTORY
Pharmacist Admission Medication History    Admission medication history is complete. The information provided in this note is only as accurate as the sources available at the time of the update.    Information Source(s): Patient, Caregiver, Hospital records, and CareEverywhere/SureScripts via in-person    Pertinent Information: Patient is a poor historian and medication is managed by Home Health.   Per home health, patient has recently been brought into their care. They medication did contain discrepancies since it was in the process of being updated.   Patient was recently at her cardiologist on 7/29. At this visit, the patient medication list had few changes: Patient switch from Aspirin 325 mg to 81 mg, Plavix was discontinued, stop amiodarone, and start metoprolol.      Changes made to PTA medication list:  Added:   Acetaminophen  Deleted: None  Changed: None    Allergies reviewed with patient and updates made in EHR: yes    Medication History Completed By: Rick Packer RPH 8/1/2024 7:31 PM    PTA Med List   Medication Sig Last Dose    acetaminophen (TYLENOL) 325 MG tablet Take 325-650 mg by mouth every 6 hours as needed for mild pain 7/31/2024    aspirin 81 MG EC tablet Take 1 tablet (81 mg) by mouth daily 8/1/2024 at am    cyanocobalamin (VITAMIN B-12) 1000 MCG tablet Take 1 tablet (1,000 mcg) by mouth daily 8/1/2024 at am    fluticasone-salmeterol (ADVAIR DISKUS) 250-50 MCG/ACT inhaler Inhale 1 puff into the lungs 2 times daily WIXELA-Disp as covered by Pt's insurance 8/1/2024 at am    gabapentin (NEURONTIN) 100 MG capsule Take 1 capsule (100 mg) by mouth or Feeding Tube 2 times daily 8/1/2024 at am    guaiFENesin-codeine (ROBITUSSIN AC) 100-10 MG/5ML solution Take 5 mLs by mouth every 6 hours as needed for cough 8/1/2024 at am    metoprolol succinate ER (TOPROL XL) 25 MG 24 hr tablet Take 1 tablet (25 mg) by mouth daily 7/31/2024 at pm    pantoprazole (PROTONIX) 40 MG EC tablet Take 1 tablet (40  mg) by mouth daily 8/1/2024 at am    simvastatin (ZOCOR) 20 MG tablet Take 1 tablet (20 mg) by mouth at bedtime 7/31/2024 at pm

## 2024-08-02 NOTE — PROGRESS NOTES
Clinical Nutrition / Initial Assessment     Reason for Assessment:  Screened at nutritional risk due to:  Eating poorly due to decreased appetite    Assessment:   Client History:  pt admitted with cough/shortness of breath, hx of CHF, COPD. Attempted to visit with her but she was curled up napping in her chair and did not stay awake to chat. She did state she had no concerns with the food so far. Her weight has maintained near current over past few years. She is very thin appearing.   Diet Order:  2 g na, 2000 ml fluid restriction  Oral Intake:  25%  Supplement Intake:  will add TID to trays  Wt Readings from Last 10 Encounters:   08/02/24 35.8 kg (79 lb)   07/29/24 37.6 kg (83 lb)   07/23/24 38.2 kg (84 lb 3.2 oz)   07/17/24 37.2 kg (82 lb)   07/05/24 39.1 kg (86 lb 1.6 oz)   06/28/24 38.1 kg (84 lb 1.6 oz)   06/03/24 36.4 kg (80 lb 3.2 oz)   05/30/24 35.5 kg (78 lb 3.2 oz)   05/06/24 37 kg (81 lb 8 oz)   04/05/24 35.5 kg (78 lb 4.8 oz)      Malnutrition Criteria:  (Need to have 2 indicators to qualify recommendation)  Energy Intake:  Chronic Moderate: < 75% of estimated energy requirement for >/= 1 month  Interpretation of Weight Loss:  No significant weight loss  Physical Findings:  Chronic Body Fat Loss:  severe and Chronic Muscle Mass Loss:  severe  Reduced  Strength:  Not Measured    Recommended Nutrition Diagnosis:   Severe Malnutrition in the context of chronic illness - based on AND/ASPEN Clinical Characterstics of Malnutrition May 2012  Malnutrition:    - Level of malnutrition: Severe       Nutrition Education: Nutrition education will be provided as appropriate.    Nutrition Diagnosis: Weight:  underweight related to inadequate energy vs expenditure as evidenced by BMI under 20.    Intervention:  Nutrition Prescription:     Nutrition Intervention(s):Recommended general, healthful diet  1. Meals and Snacks: encourage small/frequent meals/snacks, high daron  2. Medical Food Supplement: Ensure TID on trays      Nutrition Goal(s):  1. Pt will consume 50% or more at meals and supplements   2. Pt will not have unplanned wt loss during hospitalization  3. Pt will tolerate diet as ordered. Consider liberalizing for more options at meals if intakes remain less than 50%.     Monitoring and Evaluation:   Food Intake, diet tolerance, weights     Discharge Recommendation:   Nutrition Discharge Planning  Supplements TID between meals at home to help maintain strength and weight.    RD will reassess in within 1-5 days or sooner.  Sarah Mcarthur RD on 8/2/2024 at 2:26 PM

## 2024-08-02 NOTE — PROGRESS NOTES
Incentive Spirometry education completed.  Pt goal 1000 mls.  Pt achieved 1200 mls.  Pt instructed to perform 10/hr while awake with at least one deep breath and cough per hour until able to perform baseline activity.  How to use an Incentive Spirometer written instructions provided to patient. RT will follow and re-assess as need.      Tavo Bailey on 8/2/2024 at 9:51 AM

## 2024-08-02 NOTE — PROGRESS NOTES
"[facility]    Medicine Progress Note - Hospitalist Service    Date of Admission:  8/1/2024    Assessment & Plan   This 80 yo female with a PMH significant for ongoing tobacco abuse, CAD, HFpEF, transapical TAVR on 6/19/2024, COPD, MGUS and malnutrition presented to the ER with complaints of increased shortness of breath.  Admits this was worsening over about a week.  Has a cough and has been unable to lay flat because she gets more short of breath.  Had complications after her TAVR including subcutaneous emphysema, left apical pneumothorax and left pulmonary hemorrhage.  Was hospitalized from 6/19-28/2024 and then went to rehab until 7/17/2024.  Has felt like she has \"gone downhill\" since getting out of rehab despite having home PT and OT.  Does not want to return to rehab now and feels she will do fine at home.  She was started on IV Lasix and admitted to the hospital for a CHF exacerbation.  She is breathing a little better the following morning.  Has pain in her left side under her rib where she had the surgery and the coughing is making her very tired and drained.    Principal Problem:    Acute on chronic heart failure with preserved ejection fraction (HFpEF) (H)    Assessment: Slightly improving    Plan: Increase Lasix to 40 mg IV TID for today.  Recheck labs in AM.  Continue to monitor weight and I&O.  Encourage increased activity as able.  Repeated echo today with no significant changes since her last with EF 60-65%, well-seated valve with trace paravalvular regurgitation.    Active Problems:    Panlobular emphysema (H)    Assessment: Ongoing tobacco use.      Plan: Encourage cessation. Wants to use the low dose nicotine patch during her stay.  May continue at discharge if desired.        Gastroesophageal reflux disease without esophagitis    Assessment: Stable    Plan: On Pepcid IV but as she is able to tolerate po will change to po.      Iron deficiency anemia    Assessment: Stable    " Plan: Hgb improving since her post-op levels which went as low as 7.7.  Likely due to acute blood loss following her TAVR with complications.      MGUS (monoclonal gammopathy of unknown significance)    Assessment: Stable    Plan: No specific treatment at this time.  Also has a hx of LLL lung mass and is followed by oncology.  Follow.      History of transcatheter aortic valve replacement (TAVR)    Assessment: Improving    Plan: Valve in position.  Patient feels her CHF is new since the valve but the diagnosis was recorded prior.      Bilateral pleural effusion    Assessment: Improving    Plan: Having good diuresis with weight decreased 3 pounds and net output 1,525.  Continue to treat CHF and expect continued improvement.          Diet: Combination Diet 2 gm NA Diet (and additional linked orders)    DVT Prophylaxis: Pneumatic Compression Devices  Roberson Catheter: Not present  Lines: None     Cardiac Monitoring: ACTIVE order. Indication: Acute decompensated heart failure (48 hours)  Code Status: Full Code      Clinically Significant Risk Factors Present on Admission          # Hypocalcemia: Lowest Ca = 8.6 mg/dL in last 2 days, will monitor and replace as appropriate     # Hypoalbuminemia: Lowest albumin = 3.3 g/dL at 8/1/2024  3:46 PM, will monitor as appropriate   # Drug Induced Platelet Defect: home medication list includes an antiplatelet medication      # Anemia: based on hgb <11       # Cachexia: Estimated body mass index is 15.43 kg/m  as calculated from the following:    Height as of this encounter: 1.524 m (5').    Weight as of this encounter: 35.8 kg (79 lb).       # Financial/Environmental Concerns:           Disposition Plan     Medically Ready for Discharge: Anticipated in 2-4 Days             Yuli Pina MD  Hospitalist Service  [facility]  Securely message with [employer] (more info)  Text page via [facility] Paging/Directory    ______________________________________________________________________    Interval History   Breathing a little better.  Continues with a cough, generally non-productive but may have some clear pflegm at times.  Throat burns, mouth dry but no white patches.  Admits she declined quickly when at home after getting out of rehab but does not feel she would benefit from going back to rehab.  Has home health with PT and OT about 4 times per week.    Physical Exam   Vital Signs: Temp: 97.1  F (36.2  C) Temp src: Tympanic BP: 112/48 Pulse: 65   Resp: 16 SpO2: 92 % O2 Device: Nasal cannula Oxygen Delivery: 1 LPM  Weight: 79 lbs 0 oz    Constitutional: awake, alert, cooperative, no apparent distress, appears much older than stated age, and cachectic  Eyes: pupils equal, round and reactive to light, extra-ocular muscles intact, and conjunctiva normal  ENT: normocephalic, without obvious abnormality, atraumatic  Respiratory: no increased work of breathing, fair air exchange and quite clear to auscultation, voice weak  Cardiovascular: regular rate and rhythm, normal S1 and S2, murmurs include systolic murmur I/VI located at left lower sternal border and apex and no edema  GI: normal bowel sounds, soft, non-distended, and non-tender  Skin: normal skin color, texture, turgor and no redness, warmth, or swelling  Musculoskeletal: very thin with generalized muscle wasting, no lower extremity pitting edema present, there is no redness, warmth, or swelling of the joints, full range of motion noted  Neurologic: Mental Status Exam:  Fund of Knowledge:  normal  Attention/Concentration:  normal  Language:  normal  Cranial Nerves:  cranial nerves II-XII are grossly intact  Motor Exam:  moves all extremities well and symmetrically  Neuropsychiatric: General: normal, calm, and normal eye contact  Level of consciousness: alert / normal  Affect: normal  Orientation: oriented to self, place, time and situation  Memory and insight: normal,  memory for past and recent events intact, and thought process normal    Medical Decision Making             Data     I have personally reviewed the following data over the past 24 hrs:    7.8  \   10.1 (L)   / 269     142 102 13.3 /  98   3.4 31 (H) 0.85 \     ALT: N/A AST: N/A AP: N/A TBILI: N/A   ALB: N/A TOT PROTEIN: N/A LIPASE: N/A     Trop: N/A BNP: 3,179 (H)       Imaging results reviewed over the past 24 hrs:   Recent Results (from the past 24 hour(s))   CT Chest Pulmonary Embolism w Contrast    Narrative    EXAM: CT CHEST PULMONARY EMBOLISM W CONTRAST, 8/1/2024 5:26 PM    HISTORY: elevated DDimer, SOB, recent surgery in June ? Pe    COMPARISON: Chest radiographs 8/1/2024; CT chest 6/22/2024    TECHNIQUE:   Imaging protocol: Multiplanar CT images of the chest after  administration of intravenous contrast. Meds given: isovue 370 51 ML.  Acquisition: This CT exam was performed using one or more the  following dose reduction techniques: automated exposure control,  adjustment of the mA and/or kV according to patient size, and/or  iterative reconstruction technique.  Processing: 3D rendering on independent workstation using Maximum  Intensity Projection (MIP) was performed and archived to PACS. 3D  reconstructions are interpreted and reported by supervising  radiologist.    FINDINGS:     CHEST:    VESSELS AND HEART: There is adequate contrast bolus in the study.  Contrast bolus adequately opacifies the pulmonary arteries. No acute  pulmonary emboli to the subsegmental level. Atherosclerotic  calcification of the aorta without aneurysmal dilation. Mitral annular  calcification. Postsurgical changes of TAVR. Moderate coronary  calcification.    LUNGS: No pulmonary mass. Interlobular septal thickening. Chronic  interstitial changes with paraseptal emphysema. Dependent bronchial  wall thickening. Tree-in-bud opacities in the left base and left upper  lobe. Dependent consolidations.  PLEURA: Moderate to large  bilateral pleural effusions. No  pneumothorax.  LYMPH NODES: No enlarged lymph nodes.  THYROID: Within normal limits.    BONES AND SOFT TISSUES:  No suspicious osseous lesions. Degenerative periarticular changes and  spinal spondylosis.    UPPER ABDOMEN:  Limited evaluation of the upper abdomen demonstrates no acute  parenchymal abnormalities, nonobstructive bowel gas pattern, and no  free fluid or free air.      Impression    IMPRESSION:   1.  No acute pulmonary emboli to the subsegmental level.  2.  Changes in the chest which may be due to heart failure, including  effusions, and bilateral interstitial opacities with dependent  consolidations.   3.  Patchy groundglass opacities in left upper and left lower lobe are  concerning for infection.    PACO RIVERA MD         SYSTEM ID:  [id number]   Echocardiogram Complete   Result Value    LVEF  60-65%    Narrative    [id number]  [id number]  [id number]  [id number]^[id number]^DEA^SUNG     [facility]  [address] [place] [address]     Name: OSMAR COLE  MRN: [id number]  : 1945  Study Date: 2024 06:58 AM  Age: 79 yrs  Gender: Female  Patient Location: [facility]  Reason For Study: Heart Failure  Ordering Physician: DEA AMADOR  Performed By: Cat Cast     BSA: 1.3 m2  Height: 60 in  Weight: 79 lb  HR: 74  BP: 147/62 mmHg  ______________________________________________________________________________  Procedure  Complete Portable Echo Adult.  ______________________________________________________________________________  Interpretation Summary  Left ventricular size, wall motion and function are normal. The ejection  fraction is 60-65%.  Right ventricular function, chamber size, wall motion, and thickness are  normal.  Trace mitral insufficiency is present.  There is mild MS. The mean gradient across the valve is 5mmHg in sinus and a  rate of 70BPM  s/p TAVR with 20 mm Diaz Benedict 3 on 2024. The prosthetic aortic  valve  is well-seated. There is trace paravalvular regurgitation. MG 11mmHg  IVC diameter and respiratory changes fall into an intermediate range  suggesting an RA pressure of 8 mmHg. No pericardial effusion is present.     No significant changes noted.  ______________________________________________________________________________  Left Ventricle  Left ventricular size, wall motion and function are normal. The ejection  fraction is 60-65%. Left ventricular diastolic function is not assessable. No  regional wall motion abnormalities are seen.     Right Ventricle  Right ventricular function, chamber size, wall motion, and thickness are  normal.     Atria  Both atria appear normal.     Mitral Valve  Moderate mitral annular calcification is present. Trace mitral insufficiency  is present. There is mild MS. The mean gradient across the valve is 5mmHg in  sinus and a rate of 70BPM.     Aortic Valve  s/p TAVR with 20 mm Diaz Benedict 3 on 2024. The prosthetic aortic valve  is well-seated. There is trace paravalvular regurgitation. MG 11mmHg.     Tricuspid Valve  Mild tricuspid insufficiency is present. The right ventricular systolic  pressure is approximated at 27.2 mmHg plus the right atrial pressure.     Pulmonic Valve  The pulmonic valve is normal.     Vessels  The aorta root is normal. IVC diameter and respiratory changes fall into an  intermediate range suggesting an RA pressure of 8 mmHg.     Pericardium  No pericardial effusion is present.     Compared to Previous Study  No significant changes noted.     ______________________________________________________________________________  MMode/2D Measurements & Calculations  IVSd: 0.54 cm  LVIDd: 3.6 cm  LVIDs: 1.8 cm  LVPWd: 0.75 cm  FS: 50.9 %     LV mass(C)d: 59.7 grams  LV mass(C)dI: 47.5 grams/m2  RWT: 0.41  TAPSE: 1.3 cm     Doppler Measurements & Calculations  MV E max francis: 154.0 cm/sec  MV A max francis: 111.0 cm/sec  MV E/A: 1.4  MV max P.1 mmHg  MV mean  P.0 mmHg  MV V2 VTI: 50.9 cm  MV dec time: 0.20 sec  Ao V2 max: 228.0 cm/sec  Ao max P.0 mmHg  Ao V2 mean: 153.0 cm/sec  Ao mean P.0 mmHg  Ao V2 VTI: 46.2 cm  LV V1 max P.1 mmHg  LV V1 max: 101.0 cm/sec  LV V1 VTI: 24.5 cm  PA acc time: 0.11 sec  TR max ron: 258.0 cm/sec  TR max P.2 mmHg  AV Ron Ratio (DI): 0.44     ______________________________________________________________________________  Report approved by: Jane CARDONA 2024 08:27 AM

## 2024-08-02 NOTE — PLAN OF CARE
"Goal Outcome Evaluation:      Pt A&O X4, afebrile. VS stable on 1LPM o2 NC. Pt up in chair with physical therapy. Found pt had taken off o2, pt o2 at 86%, reapplied o2 at 1LPM and improved to 91%. HOBSON, lung sounds clear in upper lobed, diminished and expiratory wheezes in lower bases. Pt stated that she \"is very tired\" and wants to rest.    /48 (BP Location: Right arm, Patient Position: Semi-Erickson's, Cuff Size: Adult Small)   Pulse 65   Temp 97.1  F (36.2  C) (Tympanic)   Resp 16   Ht 1.524 m (5')   Wt 35.8 kg (79 lb)   LMP 01/01/1995 (Approximate)   SpO2 91%   BMI 15.43 kg/m          Plan of Care Reviewed With: patient    Overall Patient Progress: no changeOverall Patient Progress: no change           "

## 2024-08-02 NOTE — H&P
HOSPITALIST TELEMED ADMISSION H&P    Service Date : 8/1/2024  Dr. Rama IBANEZ am located in [place]  Loulou Lucero is located in [place] at [facility].     RN Jennifer on med/surg unit is assisting me today with this patient.    chief complaint:  cough, shortness of breath,    History of Present Illness:  Loulou Lucero is a 79 year old female  TAVR  (6/19/2024), tobacco use, COPD, HLD, mild CAD, MGUS, anemia and severe aortic valvular stenosis treated with transapical transcatheter aortic valve replacement (TAVR) on 06/19/2024. Presenting to ED today  with shortness of breath and cough that she has had for the last couple of days.  She gets up frequently at night because of shortness of breath and coughing.Said that she is not able to lie flat because she cannot breathe.   She describes the coughing as violent to the point of gagging almost vomiting.  She said that she has some sputum production intermittently.  She denied any fever or chills, she states that she has some rib tenderness from her previous surgery, denied chest pain or abdominal pain. Stated that she has had some ankle swelling off and on over the past few weeks.  She had some cardiac complications after her TAVR, in that she developed new onset  paroxysmal atrial fibrillation,  she also had a left apical pneumothorax with left pulmonary hemorrhage and  subcutaneous emphysema.  She was subsequently discharged to TCU for 3 weeks.   Based on the cardiology note 07/29/2024,  the patient had a discharge diagnosis of chronic heart failure with preserved EF (60 to 65%).  The cardiology note states that patient is suppose to continue with Amiodarone for 4 - 6 weeks in addition to iron supplementation. Neither of these two medications are on her home medication list and our pharmacist has verified her medications.   She was recently seen in the emergency room and treated for pneumonia on 07/16/2024.     Emergency Room Course - in the emergency room,  serologies for CMP, CBC,  Troponin T,   D-dimer = 2.27, Influenza A/B, RSV, COVID  proBNP = 4,472       Radiological diagnostics included:     Narrative & Impression   PROCEDURE:  XR CHEST 2 VIEWS     HISTORY: Shortness of breath     COMPARISON: Chest radiograph 7/17/2024, 6/26/2024     FINDINGS: PA and lateral chest radiographs  Postsurgical changes of TAVR.  Cardiomediastinal silhouette is within normal limits. There is  calcific aortic atherosclerosis.  Perihilar and peripheral interstitial opacities. Dependent mixed  opacities. Bilateral pleural effusions. No pneumothorax.    No suspicious osseous lesion or subdiaphragmatic free air.                                                                      IMPRESSION:    Changes in the chest which are likely due to heart failure, including  small effusions and bilateral pulmonary opacities. Pneumonia cannot be  excluded.             Narrative & Impression   EXAM: CT CHEST PULMONARY EMBOLISM W CONTRAST, 8/1/2024 5:26 PM     HISTORY: elevated DDimer, SOB, recent surgery in June ? Pe     COMPARISON: Chest radiographs 8/1/2024; CT chest 6/22/2024     TECHNIQUE:   Imaging protocol: Multiplanar CT images of the chest after  administration of intravenous contrast. Meds given: isovue 370 51 ML.  Acquisition: This CT exam was performed using one or more the  following dose reduction techniques: automated exposure control,  adjustment of the mA and/or kV according to patient size, and/or  iterative reconstruction technique.  Processing: 3D rendering on independent workstation using Maximum  Intensity Projection (MIP) was performed and archived to PACS. 3D  reconstructions are interpreted and reported by supervising  radiologist.     FINDINGS:      CHEST:     VESSELS AND HEART: There is adequate contrast bolus in the study.  Contrast bolus adequately opacifies the pulmonary arteries. No acute  pulmonary emboli to the subsegmental level. Atherosclerotic  calcification of the  aorta without aneurysmal dilation. Mitral annular  calcification. Postsurgical changes of TAVR. Moderate coronary  calcification.     LUNGS: No pulmonary mass. Interlobular septal thickening. Chronic  interstitial changes with paraseptal emphysema. Dependent bronchial  wall thickening. Tree-in-bud opacities in the left base and left upper  lobe. Dependent consolidations.  PLEURA: Moderate to large bilateral pleural effusions. No  pneumothorax.  LYMPH NODES: No enlarged lymph nodes.  THYROID: Within normal limits.     BONES AND SOFT TISSUES:  No suspicious osseous lesions. Degenerative periarticular changes and  spinal spondylosis.     UPPER ABDOMEN:  Limited evaluation of the upper abdomen demonstrates no acute  parenchymal abnormalities, nonobstructive bowel gas pattern, and no  free fluid or free air.                                                                      IMPRESSION:   1.  No acute pulmonary emboli to the subsegmental level.  2.  Changes in the chest which may be due to heart failure, including  effusions, and bilateral interstitial opacities with dependent  consolidations.   3.  Patchy groundglass opacities in left upper and left lower lobe are  concerning for infection.                   Past Medical History  Past Medical History:   Diagnosis Date    Asymptomatic varicose veins of lower extremity     6/12/2012    Benign paroxysmal vertigo     12/29/2010    Chronic obstructive pulmonary disease (H)     12/29/2010    Diarrhea     10/1/2015    Disorder of cartilage     Hip; Last dxa 06/2010    Diverticulosis of large intestine without perforation or abscess without bleeding          Lower abdominal pain     10/1/2015    Other disorders of lung (CODE)     Stable since July of 2002. RU lobe.    Personal history of other medical treatment (CODE)     L3, L4-4; Last MRI 7/09/12    Personal history of other medical treatment (CODE)     G-3, P-2, A-0  (Son had SIDS)    Zoster without complications      3/31/2012      Patient Active Problem List   Diagnosis    Advanced care planning/counseling discussion    Chronic obstructive pulmonary disease (H)    Diverticular disease of colon    Systolic murmur    Tobacco abuse    Age-related osteoporosis without current pathological fracture    GERD (gastroesophageal reflux disease)    Anemia, unspecified type    Iron deficiency anemia    MGUS (monoclonal gammopathy of unknown significance)    Status post coronary angiogram    Severe aortic stenosis    Severe aortic stenosis by prior echocardiogram    Aortic stenosis, severe    History of transcatheter aortic valve replacement (TAVR)    Bilateral pleural effusion         Past Surgical History  Past Surgical History:   Procedure Laterality Date    CATARACT EXTRACTION Right     8/2023    COLONOSCOPY  03/22/2010    Normal; + family hx, next due 2015    COLONOSCOPY  10/01/2015    W/ POLYPECTOMY recommend follow up in 2020.    COLONOSCOPY N/A 11/07/2019    4 tubular adenoma, 3 year follow up    CV CORONARY ANGIOGRAM N/A 4/5/2024    Procedure: Coronary Angiogram;  Surgeon: Min So MD;  Location:  HEART CARDIAC CATH LAB    CV RIGHT HEART CATH MEASUREMENTS RECORDED N/A 4/5/2024    Procedure: Right Heart Catheterization;  Surgeon: Min So MD;  Location:  HEART CARDIAC CATH LAB    CV TRANSCATHETER AORTIC VALVE REPLACEMENT N/A 6/19/2024    Procedure: Transcatheter Aortic Valve Replacement - Transapical Approach;  Surgeon: Claude Patricia MD;  Location:  OR    ESOPHAGOSCOPY, GASTROSCOPY, DUODENOSCOPY (EGD), COMBINED  04/23/2014    Arce's esophagus fu egd 2 yrs    ESOPHAGOSCOPY, GASTROSCOPY, DUODENOSCOPY (EGD), COMBINED N/A 11/07/2019    Procedure: ESOPHAGOGASTRODUODENOSCOPY, WITH BIOPSY;  Surgeon: Santosh Barrera MD;  Location:  OR    IR CHEST TUBE PLACEMENT NON-TUNNELLED LEFT  6/21/2024    LAPAROSCOPIC TUBAL LIGATION  1978         OR TRANSCATHETER AORTIC VALVE REPLACEMENT,  TRANSAPICAL OPEN APPROACH Left 6/19/2024    Procedure: Transcatheter Aortic Valve Replacement, Transapical Open Approach using Diaz Pericardial Tissue Heart Valve size 20mm, Cardiopulmonary Bypass Pump on Standby, and Transesophageal Echocardiogram by Cardiology;  Surgeon: Frank Cross MD;  Location:  OR    TONSILLECTOMY & ADENOIDECTOMY  1951           Social History  Loulou  reports that she has been smoking cigarettes. She started smoking about 63 years ago. She has a 31.8 pack-year smoking history. She has been exposed to tobacco smoke. She has never used smokeless tobacco. She reports that she does not currently use alcohol. She reports that she does not use drugs.    Family History  Loulou's family history includes Cancer in her brother and brother; Colon Cancer in her father; Other - See Comments in her brother, brother, brother, maternal grandmother, maternal uncle, and mother.    Home Medications  Current Outpatient Medications   Medication Instructions    acetaminophen (TYLENOL) 325-650 mg, Oral, EVERY 6 HOURS PRN    aspirin 81 mg, Oral, DAILY    cyanocobalamin (VITAMIN B-12) 1,000 mcg, Oral, DAILY    fluticasone-salmeterol (ADVAIR DISKUS) 250-50 MCG/ACT inhaler 1 puff, Inhalation, 2 TIMES DAILY, WIXELA-Disp as covered by Pt's insurance    gabapentin (NEURONTIN) 100 mg, Oral or Feeding Tube, 2 TIMES DAILY    guaiFENesin-codeine (ROBITUSSIN AC) 100-10 MG/5ML solution 5 mLs, Oral, EVERY 6 HOURS PRN    methocarbamol (ROBAXIN) 500 mg, Oral or Feeding Tube, 3 TIMES DAILY    metoprolol succinate ER (TOPROL XL) 25 mg, Oral, DAILY    pantoprazole (PROTONIX) 40 mg, Oral, DAILY    simvastatin (ZOCOR) 20 mg, Oral, AT BEDTIME       Allergies  Allergies   Allergen Reactions    Metronidazole Nausea and Vomiting    Pneumococcal Vaccine      Other reaction(s): Edema  Arm swelling    Tramadol Visual Disturbance     Hallucinations           10 pt ROS neg except as noted in HPI    Physical Exam:  I performed all aspects  of the physical examination via Telemedicine associated with two way audio and video communication.    Vital Signs: Blood pressure 134/70, pulse 73, temperature 98.7  F (37.1  C), temperature source Tympanic, resp. rate 19, height 1.524 m (5'), weight 38.7 kg (85 lb 6.4 oz), last menstrual period 01/01/1995, SpO2 94%, not currently breastfeeding.    Physical Exam    Gen:  Well-developed,  under nourished female in no acute distress, lying semi-supine in her hospital bed  HEENT:  NC/AT,  hearing intact to voice  Resp:  No accessory muscle use,  bilateral breath sounds are decreased in the lower lung bases, apices are without any wheezes or crackles  Card:  No murmur, normal S1, S2   Abd:  Soft per RN exam, no TTP, non-distended, normoactive bowel sounds are present  Ext:  No clubbing, cyanosis or edema was noted  Skin: Normal, no rashes or skin breakdown noted.   Psych:  Oriented to person, place and time, not anxious, not agitated    DATA:   I&O: No intake/output data recorded.       Clinically Significant Risk Factors Present on Admission                Labs:  I have personally reviewed the following studies:  Recent Labs   Lab 08/01/24  1546   POTASSIUM 4.0   CHLORIDE 102   CO2 28   BUN 17.2   ALBUMIN 3.3*   ALKPHOS 105   AST 26   ALT 14      Recent Labs   Lab 08/01/24  1546   WBC 9.8   HGB 9.9*   HCT 30.9*            Imaging: ER imaging reviewed        Misc  DVT prophylaxis:  pneumatic compression device  Code Status: Full code   Cardiac Monitoring:  yes cardiac telemetry  Roberson:  no  Lines:  peripheral IV  Diet:  2 g sodium/cardiac/no caffeine      ASSESSMENT/PLAN:   79-year-old female with  productive cough, shortness of breath, recent TVAR, and new bilateral pleural effusions, will be admitted for possible new onset CHF, in addition to COPD exacerbation versus pneumonia      This patient's current admission to an acute care setting is essential for the treatment of  new onset congestive heart failure,  bilateral pleural effusions, and possible pneumonia versus COPD exacerbation.    The patient's recovery is dependent on the following treatments of  IV diuretics,    oxygen support,  Start lisinopril 2.5mg to help reduce afterload, complete echocardiogram and Cardiology Consult.    Doxycycline 100 mg for COPD exacerbation versus pneumonia, PT/OT    Monitoring for any electrolyte imbalances and correcting to stabilize this condition.       The patient is at risk of developing further complications of  sepsis, respiratory failure, cardiac dysrhythmias if not treated in an acute care setting.     I expect the patient's hospital stay to require 2 - 4 days    Our patient will be admitted under the hospitalist services and further management to be based on patient's clinical progress.       # ACTIVE PROBLEM LIST:     ## Hyperlipidemia:   Cholesterol   Date Value Ref Range Status   10/18/2022 216 (H) <200 mg/dL Final   10/09/2020 205 (H) <200 mg/dL Final    Patient is prescribed simvastatin, medication on hold  because of drug -drug interactions with Amiodarone   Patient is not sure if she is to be on Amiodarone.    Cardiology note  07/29/24 states patient is to continue with amiodarone for the next 4 to 6 weeks.    # Hypertension:  134/70  Stable. Continue with daily vitals, continue medication Metoprolol Succinate ER    # GERD: Stable.  Continue with  oral famotidine     # Hypertension:  134/70  Stable. Continue with daily vitals, continue medication  metoprolol succinate and lisinopril.     # COPD: Azithromycin cannot be used secondary to patient maybe suppose to be taking Amiodarone.   Oxygen Support,   Continue with Breo-Ellipta  Duoneb q4,prn short of breath, cough  Incentive  spirometer q shift  Tessalon pearls 200 mg every 8, as needed cough      # Cachexia: Estimated body mass index is 16.68 kg/m  as calculated from the following:    Height as of this encounter: 1.524 m (5').    Weight as of this encounter: 38.7  kg (85 lb 6.4 oz).   - decreased albumin    #Tobacco Use Disorder:  Patient smokes about 4 cigs per day. Will encourage patient to continue to decrease with the hope of stopping.     # Mild to Moderate CAD: Continue with Aspirin 81mg daily     # PAD: Simvastatin on hold for now,  if patient is not to start back on Amiodarone, then resume Simvastatin.      # Paroxsymal Atrial Fibrillation:  continue with metoprolol succinate ER     # MGUS/ Iron Def Anemia: based on hgb <11,   Current Hgb is 9.9  Per 07/29/24 cardiology note, pt is suppose to be taking iron supplementation.  She is not taking and is unaware if she is suppose to be taking.   - CBC for monitoring           As the provider for the telehealth service, I attest that I introduced myself to the patient and provided my credentials. Based on review of the patient s chart and/or a discussion with members of the patient s treatment team, I determined that telemedicine via a real-time, two-way, interactive audio and video platform is an appropriate means of providing this service. The patient and I mutually agreed that this visit is appropriate for telemedicine as well.    The encounter was approximately 20 minutes. The nurse was present for the duration of the encounter.    Chart reviewed,labs and available imaging reviewed,consultant notes reviewed. Previous available medical records have been reviewed.  Care plan discussed with care team    I have seen and examined the patient today. Documentation for this visit was completed using a template. Everything documented was personally performed today with the necessary additions, deletions and changes made as appropriate with the diagnoses being listed in no particular order of clinical relevance.    Rama Hayes MD, FACP  Securely message with the Vocera Web Console (learn more here)  Text page via [facility] Paging/Directory

## 2024-08-02 NOTE — PROGRESS NOTES
SAFETY CHECKLIST  ID Bands and Risk clasps correct and in place (DNR, Fall risk, Allergy, Latex, Limb):  Yes  All Lines Reconciled and labeled correctly: Yes  Whiteboard updated:Yes  Environmental interventions: Yes  Verify Tele #:  [email or web address]

## 2024-08-03 ENCOUNTER — APPOINTMENT (OUTPATIENT)
Dept: PHYSICAL THERAPY | Facility: OTHER | Age: 79
DRG: 291 | End: 2024-08-03
Payer: MEDICARE

## 2024-08-03 ENCOUNTER — APPOINTMENT (OUTPATIENT)
Dept: OCCUPATIONAL THERAPY | Facility: OTHER | Age: 79
DRG: 291 | End: 2024-08-03
Payer: MEDICARE

## 2024-08-03 LAB
ANION GAP SERPL CALCULATED.3IONS-SCNC: 9 MMOL/L (ref 7–15)
BUN SERPL-MCNC: 15 MG/DL (ref 8–23)
CALCIUM SERPL-MCNC: 8.8 MG/DL (ref 8.8–10.4)
CHLORIDE SERPL-SCNC: 97 MMOL/L (ref 98–107)
CREAT SERPL-MCNC: 0.99 MG/DL (ref 0.51–0.95)
EGFRCR SERPLBLD CKD-EPI 2021: 58 ML/MIN/1.73M2
ERYTHROCYTE [DISTWIDTH] IN BLOOD BY AUTOMATED COUNT: 14.6 % (ref 10–15)
GLUCOSE SERPL-MCNC: 101 MG/DL (ref 70–99)
HCO3 SERPL-SCNC: 35 MMOL/L (ref 22–29)
HCT VFR BLD AUTO: 34.6 % (ref 35–47)
HGB BLD-MCNC: 11.1 G/DL (ref 11.7–15.7)
HOLD SPECIMEN: NORMAL
MCH RBC QN AUTO: 31.3 PG (ref 26.5–33)
MCHC RBC AUTO-ENTMCNC: 32.1 G/DL (ref 31.5–36.5)
MCV RBC AUTO: 98 FL (ref 78–100)
PLATELET # BLD AUTO: 294 10E3/UL (ref 150–450)
POTASSIUM SERPL-SCNC: 3.2 MMOL/L (ref 3.4–5.3)
RBC # BLD AUTO: 3.55 10E6/UL (ref 3.8–5.2)
SODIUM SERPL-SCNC: 141 MMOL/L (ref 135–145)
WBC # BLD AUTO: 9.9 10E3/UL (ref 4–11)

## 2024-08-03 PROCEDURE — 97530 THERAPEUTIC ACTIVITIES: CPT | Mod: GO

## 2024-08-03 PROCEDURE — 97530 THERAPEUTIC ACTIVITIES: CPT | Mod: GP

## 2024-08-03 PROCEDURE — 250N000013 HC RX MED GY IP 250 OP 250 PS 637: Performed by: FAMILY MEDICINE

## 2024-08-03 PROCEDURE — 94640 AIRWAY INHALATION TREATMENT: CPT

## 2024-08-03 PROCEDURE — 99232 SBSQ HOSP IP/OBS MODERATE 35: CPT | Performed by: FAMILY MEDICINE

## 2024-08-03 PROCEDURE — 97535 SELF CARE MNGMENT TRAINING: CPT | Mod: GO

## 2024-08-03 PROCEDURE — 97116 GAIT TRAINING THERAPY: CPT | Mod: GP

## 2024-08-03 PROCEDURE — 120N000001 HC R&B MED SURG/OB

## 2024-08-03 PROCEDURE — 250N000013 HC RX MED GY IP 250 OP 250 PS 637: Performed by: INTERNAL MEDICINE

## 2024-08-03 PROCEDURE — 36415 COLL VENOUS BLD VENIPUNCTURE: CPT | Performed by: FAMILY MEDICINE

## 2024-08-03 PROCEDURE — 80048 BASIC METABOLIC PNL TOTAL CA: CPT | Performed by: FAMILY MEDICINE

## 2024-08-03 PROCEDURE — 85014 HEMATOCRIT: CPT | Performed by: FAMILY MEDICINE

## 2024-08-03 PROCEDURE — 250N000011 HC RX IP 250 OP 636: Performed by: FAMILY MEDICINE

## 2024-08-03 RX ORDER — POTASSIUM CHLORIDE 1500 MG/1
20 TABLET, EXTENDED RELEASE ORAL DAILY
Status: DISCONTINUED | OUTPATIENT
Start: 2024-08-03 | End: 2024-08-05 | Stop reason: HOSPADM

## 2024-08-03 RX ORDER — POTASSIUM CHLORIDE 1.5 G/1.58G
40 POWDER, FOR SOLUTION ORAL ONCE
Status: COMPLETED | OUTPATIENT
Start: 2024-08-03 | End: 2024-08-03

## 2024-08-03 RX ORDER — FUROSEMIDE 20 MG
20 TABLET ORAL DAILY
Status: DISCONTINUED | OUTPATIENT
Start: 2024-08-03 | End: 2024-08-05 | Stop reason: HOSPADM

## 2024-08-03 RX ADMIN — ASPIRIN 81 MG: 81 TABLET, COATED ORAL at 09:19

## 2024-08-03 RX ADMIN — FUROSEMIDE 40 MG: 10 INJECTION, SOLUTION INTRAMUSCULAR; INTRAVENOUS at 01:03

## 2024-08-03 RX ADMIN — SENNOSIDES AND DOCUSATE SODIUM 1 TABLET: 50; 8.6 TABLET ORAL at 16:52

## 2024-08-03 RX ADMIN — POTASSIUM CHLORIDE 20 MEQ: 1500 TABLET, EXTENDED RELEASE ORAL at 11:45

## 2024-08-03 RX ADMIN — FAMOTIDINE 20 MG: 20 TABLET ORAL at 23:13

## 2024-08-03 RX ADMIN — CYANOCOBALAMIN TAB 500 MCG 1000 MCG: 500 TAB at 09:18

## 2024-08-03 RX ADMIN — LISINOPRIL 2.5 MG: 2.5 TABLET ORAL at 09:19

## 2024-08-03 RX ADMIN — METHOCARBAMOL TABLETS 500 MG: 500 TABLET, COATED ORAL at 23:15

## 2024-08-03 RX ADMIN — FLUTICASONE FUROATE AND VILANTEROL TRIFENATATE 1 PUFF: 100; 25 POWDER RESPIRATORY (INHALATION) at 06:30

## 2024-08-03 RX ADMIN — GUAIFENESIN 600 MG: 600 TABLET ORAL at 09:19

## 2024-08-03 RX ADMIN — SIMVASTATIN 20 MG: 10 TABLET, FILM COATED ORAL at 23:13

## 2024-08-03 RX ADMIN — FUROSEMIDE 20 MG: 20 TABLET ORAL at 11:44

## 2024-08-03 RX ADMIN — METOPROLOL SUCCINATE 25 MG: 25 TABLET, EXTENDED RELEASE ORAL at 23:13

## 2024-08-03 RX ADMIN — GABAPENTIN 100 MG: 100 CAPSULE ORAL at 23:14

## 2024-08-03 RX ADMIN — GUAIFENESIN 600 MG: 600 TABLET ORAL at 23:14

## 2024-08-03 RX ADMIN — FUROSEMIDE 40 MG: 10 INJECTION, SOLUTION INTRAMUSCULAR; INTRAVENOUS at 07:40

## 2024-08-03 RX ADMIN — METHOCARBAMOL TABLETS 500 MG: 500 TABLET, COATED ORAL at 13:21

## 2024-08-03 RX ADMIN — GABAPENTIN 100 MG: 100 CAPSULE ORAL at 09:19

## 2024-08-03 RX ADMIN — POTASSIUM CHLORIDE 40 MEQ: 1.5 POWDER, FOR SOLUTION ORAL at 09:18

## 2024-08-03 RX ADMIN — METHOCARBAMOL TABLETS 500 MG: 500 TABLET, COATED ORAL at 06:40

## 2024-08-03 ASSESSMENT — ACTIVITIES OF DAILY LIVING (ADL)
ADLS_ACUITY_SCORE: 38

## 2024-08-03 NOTE — PROGRESS NOTES
08/03/24 1100   Appointment Info   Signing Clinician's Name / Credentials (PT) Holli Hairston DPT   Gait/Stairs (Locomotion)   Assistive Device (Gait)   (hand held assist)   Physical Therapy Goals   PT Frequency Daily   PT Predicted Duration/Target Date for Goal Attainment 08/06/24   PT Goals Bed Mobility;Transfers;Gait   PT: Bed Mobility Supervision/stand-by assist   PT: Transfers Supervision/stand-by assist   PT: Gait Supervision/stand-by assist   Interventions   Interventions Quick Adds Gait Training;Therapeutic Activity   Therapeutic Activity   Therapeutic Activities: dynamic activities to improve functional performance Minutes (72285) 20   Symptoms Noted During/After Treatment Fatigue;Dizziness   Treatment Detail/Skilled Intervention pt on 1L of oxgyen during session, 98% in seated EOB, 92% after 6 min of standing at sink for self cares. supine to sitting with min assist to boost pt to EOB. sit<>stand from bed with CGA and noted weakness and some dizziness which is baseline with supine to sitting. no dizziness with Sit<>stand or standing. sit<>stand from toilet with SBA. static standing at sink for self cares for 6 min with CGA.   Gait Training   Gait Training Minutes (52434) 10   Symptoms Noted During/After Treatment (Gait Training) fatigue;shortness of breath   Treatment Detail/Skilled Intervention oxygen remained above 92% during session. pt walking 10 feet with HHA and mild instability and reported fatigue and weakness. FWW given to pt due to instability and weakness and SBA with FWW for 15 feet with decreased instability noted to walk from bathroom to recliner.   Distance in Feet 15   [place] Level (Gait Training) contact guard   Physical Assistance Level (Gait Training) 1 person assist   Weight Bearing (Gait Training) full weight-bearing   Assistive Device (Gait Training) rolling walker   Pattern Analysis (Gait Training) swing-to gait   Gait Analysis Deviations decreased belle;decreased  stride length;increased stride width;decreased step length   Impairments (Gait Analysis/Training) balance impaired;strength decreased;coordination impaired   PT Discharge Planning   PT Plan Continue PT   PT Discharge Recommendation (DC Rec) home with assist;home with home care physical therapy;Transitional Care Facility   PT Rationale for DC Rec to promote strength, stability and safe mobility   PT Brief overview of current status pt continues to report a lot of fatigue and weakness. noted dizziness with supine to sitting with min assist which pt reports dizziness is baseline. sit<>stand with CGA and HHA from bed to bathroom with mild to moderate instability. standing at sink with CGA for 6 min for cares. gait from bathroom to recliner with FWW with SBA and significant improvement in stability and gait quality. educated pt to use FWW for her safety.   PT Equipment Needed at Discharge   (patient may not require any assistive gait device at time of discharge from this hospital admission)   Total Session Time   Timed Code Treatment Minutes 30   Total Session Time (sum of timed and untimed services) 30

## 2024-08-03 NOTE — PROGRESS NOTES
08/03/24 1200   Appointment Info   Signing Clinician's Name / Credentials (OT) Claudia Espana OTR/L   Self-Care/Home Management   Self-Care/Home Mgmt/ADL, Compensatory, Meal Prep Minutes (25162) 16   Symptoms Noted During/After Treatment (Meal Preparation/Planning Training) fatigue;dizziness   [place] Level (Grooming Training) contact guard  (Patient was a little dizzy and unstable on her feet today.)   [place] Level (Upper Body Dressing Training) stand-by assist   Assistance (Upper Body Dressing Training) supervision   Therapeutic Activities   Therapeutic Activity Minutes (51852) 10   Symptoms noted during/after treatment fatigue;shortness of breath;dizziness   Treatment Detail/Skilled Intervention Supine to sit with min assist. Patient reports she is feeling really weak and a little bit dizzy today. Sat at EOB for 3 minutes with SBA. Sit to stand with CGA ambulated to the bathroom with hand held assist. Stood at the sink for 5 minutes to complete grooming and hygiene, required assist with opening caps. Used a FWW to ambulate back to the bed and patient was much more steady.   OT Discharge Planning   OT Plan Continue with OT daily while hospitalized to address ADLs, activity tolerance and strength   OT Discharge Recommendation (DC Rec) home with home care occupational therapy;Transitional Care Facility   OT Rationale for DC Rec Based on her dizziness and unsteadiness today TCU may be more appropriate before returning home with home care.   OT Brief overview of current status See above   OT Equipment Needed at Discharge walker, standard   Total Session Time   Timed Code Treatment Minutes 26   Total Session Time (sum of timed and untimed services) 26

## 2024-08-03 NOTE — PROGRESS NOTES
"[facility]    Medicine Progress Note - Hospitalist Service    Date of Admission:  8/1/2024    Assessment & Plan   This 80 yo female with a PMH significant for ongoing tobacco abuse, CAD, HFpEF, transapical TAVR on 6/19/2024, COPD, MGUS and malnutrition presented to the ER with complaints of increased shortness of breath.  Admits this was worsening over about a week.  Has a cough and has been unable to lay flat because she gets more short of breath.  Had complications after her TAVR including subcutaneous emphysema, left apical pneumothorax and left pulmonary hemorrhage.  Was hospitalized from 6/19-28/2024 and then went to rehab until 7/17/2024.  Has felt like she has \"gone downhill\" since getting out of rehab despite having home PT and OT.  Does not want to return to rehab now and feels she will do fine at home.  She was started on IV Lasix and admitted to the hospital for a CHF exacerbation.  She is breathing a little better the following morning.  Has pain in her left side under her rib where she had the surgery and the coughing is making her very tired and drained.    Principal Problem:    Acute on chronic heart failure with preserved ejection fraction (HFpEF) (H)    Assessment: Slightly improving    Plan: Increase Lasix to 40 mg IV TID for today.  Recheck labs in AM.  Continue to monitor weight and I&O.  Encourage increased activity as able.  Repeat echo with no significant changes since her last with EF 60-65%, well-seated valve with trace paravalvular regurgitation.    Active Problems:    Panlobular emphysema (H)    Assessment: Ongoing tobacco use.      Plan: Encourage cessation. Wants to use the low dose nicotine patch during her stay.  May continue at discharge if desired.        Gastroesophageal reflux disease without esophagitis    Assessment: Stable    Plan: Continue Pepcid po.      Iron deficiency anemia    Assessment: Stable    Plan: Hgb improving since her post-op levels which " went as low as 7.7.  Likely due to acute blood loss following her TAVR with complications.      MGUS (monoclonal gammopathy of unknown significance)    Assessment: Stable    Plan: No specific treatment at this time.  Also has a hx of LLL lung mass and is followed by oncology.      History of transcatheter aortic valve replacement (TAVR)    Assessment: Improving    Plan: Valve in position.  Patient feels her CHF is new since the valve but the diagnosis was recorded prior.      Bilateral pleural effusion    Assessment: Improving    Plan: Having good diuresis with weight decreased 6 pounds and net output 1,378.  Continue to treat CHF and expect continued improvement.  Will not repeat CXR unless clinically worse.      Hypokalemia    Assessment: Acute    Plan: Replace po and continue daily supplement as she will stay on furosemide for now.  Recheck in AM.          Diet: Combination Diet 2 gm NA Diet (and additional linked orders)  Snacks/Supplements Adult: Ensure Enlive; With Meals    DVT Prophylaxis: Pneumatic Compression Devices  Roberson Catheter: Not present  Lines: None     Cardiac Monitoring: ACTIVE order. Indication: Acute decompensated heart failure (48 hours)  Code Status: Full Code      Clinically Significant Risk Factors        # Hypokalemia: Lowest K = 3.2 mmol/L in last 2 days, will replace as needed   # Hypocalcemia: Lowest Ca = 8.6 mg/dL in last 2 days, will monitor and replace as appropriate     # Hypoalbuminemia: Lowest albumin = 3.3 g/dL at 8/1/2024  3:46 PM, will monitor as appropriate      # Acute heart failure with preserved ejection fraction: heart failure noted on problem list, last echo with EF >50%, and receiving IV diuretics           # Cachexia: Estimated body mass index is 14.86 kg/m  as calculated from the following:    Height as of this encounter: 1.524 m (5').    Weight as of this encounter: 34.5 kg (76 lb 1.6 oz)., PRESENT ON ADMISSION  # Severe Malnutrition: based on nutrition assessment,  "PRESENT ON ADMISSION   # Financial/Environmental Concerns:           Disposition Plan     Medically Ready for Discharge: Anticipated Tomorrow unless she agrees to go to rehab             Yuli Pina MD  Hospitalist Service  [facility]  Securely message with Jacob (more info)  Text page via [facility] Paging/Directory   ______________________________________________________________________    Interval History   Feeling a little better.  Breathing improved.  Talking about how lonely she is since her   over 2 years ago.  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  Now has no friends and just sits at home.  No hobbies and does not want to go out.  Admits she was doing much better when she was in rehab and cannot understand why she is so weak now since she came home.  Has home OT and PT about 4 times per week and has been able to walk with them but now is so weak.  When asked if she thinks she will do OK at home is only able to report \"I have to\" and does not sound confident in her ability.    Physical Exam   Vital Signs: Temp: 98.9  F (37.2  C) Temp src: Tympanic BP: 104/51 Pulse: 65   Resp: 16 SpO2: 94 % O2 Device: Nasal cannula Oxygen Delivery: 2 LPM  Weight: 76 lbs 1.6 oz    Constitutional: awake, alert, cooperative, no apparent distress, appears much older than stated age, and cachectic but looks a little better today, becomes fatigued with minimal exertion  Eyes: pupils equal, round and reactive to light, extra-ocular muscles intact, and conjunctiva normal  ENT: normocephalic, without obvious abnormality, atraumatic  Respiratory: no increased work of breathing, decreased air exchange and clear to auscultation, voice weak, very loose, gurgling cough  Cardiovascular: regular rate and rhythm, normal S1 and S2, murmur includes systolic murmur II/VI located at left lower sternal border and apex and no edema  GI: normal bowel sounds, soft, " non-distended, and non-tender  Skin: normal skin color, texture, turgor and no redness, warmth, or swelling  Musculoskeletal: very thin with generalized muscle wasting, no lower extremity pitting edema present, there is no redness, warmth, or swelling of the joints, full range of motion noted  Neurologic: Mental Status Exam:  Fund of Knowledge:  normal  Attention/Concentration:  normal  Language:  normal  Cranial Nerves:  cranial nerves II-XII are grossly intact  Motor Exam:  moves all extremities well and symmetrically  Neuropsychiatric: General: normal, calm, and normal eye contact  Level of consciousness: alert / normal  Affect: normal  Orientation: oriented to self, place, time and situation  Memory and insight: normal, memory for past and recent events intact, and thought process normal    Medical Decision Making             Data     I have personally reviewed the following data over the past 24 hrs:    9.9  \   11.1 (L)   / 294     141 97 (L) 15.0 /  101 (H)   3.2 (L) 35 (H) 0.99 (H) \

## 2024-08-03 NOTE — PLAN OF CARE
Goal Outcome Evaluation: Patients vss. /51 (BP Location: Right arm, Patient Position: Semi-Erickson's, Cuff Size: Adult Small)   Pulse 65   Temp 98.9  F (37.2  C) (Tympanic)   Resp 16   Ht 1.524 m (5')   Wt 34.5 kg (76 lb 1.6 oz)   LMP 01/01/1995 (Approximate)   SpO2 94%   BMI 14.86 kg/m   Attempted to wean oxygen to 1L this morning but O2 dropped to 90% from 97%, turned O2 back up to 2L. Patients lung sounds are clear, diminished in the left lower lobe. Patient has a loose, non productive cough. No shortness of breath noted at rest.

## 2024-08-03 NOTE — PROGRESS NOTES
Update received from RN.  Patient dropped to 86% on 1L NC while sleeping, increased to 2L NC to maintain sats above 90%.

## 2024-08-03 NOTE — PLAN OF CARE
Goal Outcome Evaluation:  Pt A&O. Yocha Dehe at times. SBA in room. VSS. LS expiratory wheezes and diminished in bases. Pt denies pain. Minimal SOB. Pt O2 increased to 2L/NC due to desatting to 86%. Tele MS6.     Plan of Care Reviewed With: patient    Overall Patient Progress: no changeOverall Patient Progress: no change    Outcome Evaluation: Use of 2L/NC. VSS. Exp wheezes noted.       stand-by assist

## 2024-08-04 ENCOUNTER — APPOINTMENT (OUTPATIENT)
Dept: PHYSICAL THERAPY | Facility: OTHER | Age: 79
DRG: 291 | End: 2024-08-04
Payer: MEDICARE

## 2024-08-04 ENCOUNTER — APPOINTMENT (OUTPATIENT)
Dept: OCCUPATIONAL THERAPY | Facility: OTHER | Age: 79
DRG: 291 | End: 2024-08-04
Payer: MEDICARE

## 2024-08-04 PROBLEM — N17.0 ACUTE KIDNEY FAILURE WITH TUBULAR NECROSIS (H): Status: ACTIVE | Noted: 2024-08-04

## 2024-08-04 LAB
ANION GAP SERPL CALCULATED.3IONS-SCNC: 9 MMOL/L (ref 7–15)
BUN SERPL-MCNC: 25.4 MG/DL (ref 8–23)
CALCIUM SERPL-MCNC: 9 MG/DL (ref 8.8–10.4)
CHLORIDE SERPL-SCNC: 101 MMOL/L (ref 98–107)
CREAT SERPL-MCNC: 1.33 MG/DL (ref 0.51–0.95)
EGFRCR SERPLBLD CKD-EPI 2021: 40 ML/MIN/1.73M2
GLUCOSE SERPL-MCNC: 90 MG/DL (ref 70–99)
HCO3 SERPL-SCNC: 32 MMOL/L (ref 22–29)
HOLD SPECIMEN: NORMAL
POTASSIUM SERPL-SCNC: 4.4 MMOL/L (ref 3.4–5.3)
SODIUM SERPL-SCNC: 142 MMOL/L (ref 135–145)

## 2024-08-04 PROCEDURE — 250N000013 HC RX MED GY IP 250 OP 250 PS 637: Performed by: INTERNAL MEDICINE

## 2024-08-04 PROCEDURE — 250N000013 HC RX MED GY IP 250 OP 250 PS 637: Performed by: FAMILY MEDICINE

## 2024-08-04 PROCEDURE — 97530 THERAPEUTIC ACTIVITIES: CPT | Mod: GP

## 2024-08-04 PROCEDURE — 99232 SBSQ HOSP IP/OBS MODERATE 35: CPT | Performed by: FAMILY MEDICINE

## 2024-08-04 PROCEDURE — 97535 SELF CARE MNGMENT TRAINING: CPT | Mod: GO

## 2024-08-04 PROCEDURE — 97116 GAIT TRAINING THERAPY: CPT | Mod: GP

## 2024-08-04 PROCEDURE — 36415 COLL VENOUS BLD VENIPUNCTURE: CPT | Performed by: FAMILY MEDICINE

## 2024-08-04 PROCEDURE — 120N000001 HC R&B MED SURG/OB

## 2024-08-04 PROCEDURE — 94640 AIRWAY INHALATION TREATMENT: CPT | Mod: 76

## 2024-08-04 PROCEDURE — 80048 BASIC METABOLIC PNL TOTAL CA: CPT | Performed by: FAMILY MEDICINE

## 2024-08-04 RX ORDER — FLUTICASONE FUROATE AND VILANTEROL 100; 25 UG/1; UG/1
1 POWDER RESPIRATORY (INHALATION) 2 TIMES DAILY
Status: DISCONTINUED | OUTPATIENT
Start: 2024-08-04 | End: 2024-08-04 | Stop reason: DRUGHIGH

## 2024-08-04 RX ORDER — FAMOTIDINE 20 MG/1
20 TABLET, FILM COATED ORAL
Status: DISCONTINUED | OUTPATIENT
Start: 2024-08-05 | End: 2024-08-05 | Stop reason: HOSPADM

## 2024-08-04 RX ORDER — FLUTICASONE FUROATE AND VILANTEROL 100; 25 UG/1; UG/1
1 POWDER RESPIRATORY (INHALATION) DAILY
Status: DISCONTINUED | OUTPATIENT
Start: 2024-08-05 | End: 2024-08-05 | Stop reason: HOSPADM

## 2024-08-04 RX ADMIN — FLUTICASONE FUROATE AND VILANTEROL TRIFENATATE 1 PUFF: 100; 25 POWDER RESPIRATORY (INHALATION) at 07:11

## 2024-08-04 RX ADMIN — METHOCARBAMOL TABLETS 500 MG: 500 TABLET, COATED ORAL at 22:42

## 2024-08-04 RX ADMIN — GUAIFENESIN 600 MG: 600 TABLET ORAL at 22:42

## 2024-08-04 RX ADMIN — METHOCARBAMOL TABLETS 500 MG: 500 TABLET, COATED ORAL at 05:31

## 2024-08-04 RX ADMIN — ASPIRIN 81 MG: 81 TABLET, COATED ORAL at 10:16

## 2024-08-04 RX ADMIN — GABAPENTIN 100 MG: 100 CAPSULE ORAL at 10:16

## 2024-08-04 RX ADMIN — POTASSIUM CHLORIDE 20 MEQ: 1500 TABLET, EXTENDED RELEASE ORAL at 10:16

## 2024-08-04 RX ADMIN — METHOCARBAMOL TABLETS 500 MG: 500 TABLET, COATED ORAL at 14:58

## 2024-08-04 RX ADMIN — GUAIFENESIN 600 MG: 600 TABLET ORAL at 10:16

## 2024-08-04 RX ADMIN — GABAPENTIN 100 MG: 100 CAPSULE ORAL at 22:42

## 2024-08-04 RX ADMIN — METOPROLOL SUCCINATE 25 MG: 25 TABLET, EXTENDED RELEASE ORAL at 22:41

## 2024-08-04 RX ADMIN — SIMVASTATIN 20 MG: 10 TABLET, FILM COATED ORAL at 22:42

## 2024-08-04 RX ADMIN — CYANOCOBALAMIN TAB 500 MCG 1000 MCG: 500 TAB at 10:16

## 2024-08-04 ASSESSMENT — ACTIVITIES OF DAILY LIVING (ADL)
ADLS_ACUITY_SCORE: 38
ADLS_ACUITY_SCORE: 34
ADLS_ACUITY_SCORE: 38

## 2024-08-04 NOTE — PROGRESS NOTES
Patient has been ambulating in the hallway with family and staff, states she is feeling much stronger today

## 2024-08-04 NOTE — PHARMACY
Pharmacy- Renal Dose Adjustment    Patient Active Problem List   Diagnosis    Advanced care planning/counseling discussion    Panlobular emphysema (H)    Diverticular disease of colon    Systolic murmur    Tobacco abuse    Age-related osteoporosis without current pathological fracture    Gastroesophageal reflux disease without esophagitis    Anemia, unspecified type    Iron deficiency anemia    MGUS (monoclonal gammopathy of unknown significance)    Status post coronary angiogram    Severe aortic stenosis    Severe aortic stenosis by prior echocardiogram    Aortic stenosis, severe    History of transcatheter aortic valve replacement (TAVR)    Bilateral pleural effusion    Acute on chronic heart failure with preserved ejection fraction (HFpEF) (H)    Acute kidney failure with tubular necrosis (H)        Relevant Labs:  Recent Labs   Lab Test 08/03/24  0643 08/02/24  0635   WBC 9.9 7.8   HGB 11.1* 10.1*    269        CrCl: 18.6 mL/min      Intake/Output Summary (Last 24 hours) at 8/4/2024 1056  Last data filed at 8/4/2024 1012  Gross per 24 hour   Intake 707 ml   Output 820 ml   Net -113 ml          Per Renal Dose Adjustment Protocol, will adjust:  -Famotidine to 20 mg Q48H due to CrCl 10-29 mL/min.      Will continue to follow and make adjustments accordingly. Thank You.    Olvin Steele [facility] ....................  8/4/2024   10:56 AM

## 2024-08-04 NOTE — PROGRESS NOTES
08/04/24 1200   Appointment Info   Signing Clinician's Name / Credentials (OT) Claudia Espana OTR/L   Self-Care/Home Management   Self-Care/Home Mgmt/ADL, Compensatory, Meal Prep Minutes (51298) 38   Symptoms Noted During/After Treatment (Meal Preparation/Planning Training) fatigue   [place] Level (Grooming Training) contact guard  (CGA while standing at the sink)   [place] Level (Bathing Training) minimum assist (75% patient effort)   Assistance (Bathing Training) 1 person assist   [place] Level (Upper Body Dressing Training) stand-by assist   Assistance (Upper Body Dressing Training) supervision   Lower Body Dressing Training Assistance minimum assist (75% patient effort)   Lower Body Dressing Training Assistance 1 person assist   [place] Level (Toilet Training) contact guard   Assistance (Toilet Training) 1 person assist   Therapeutic Activities   Symptoms noted during/after treatment fatigue;shortness of breath;dizziness   Treatment Detail/Skilled Intervention Supine to sit with min assist. Patient reports she is feeling really weak and a little bit dizzy today. Sat at EOB for 3 minutes with SBA. Sit to stand with CGA ambulated to the bathroom with hand held assist. Stood at the sink for 5 minutes to complete grooming and hygiene, required assist with opening caps. Used a FWW to ambulate back to the bed and patient was much more steady.   OT Discharge Planning   OT Plan Continue with OT daily while hospitalized to address ADLs, activity tolerance and strength   OT Discharge Recommendation (DC Rec) Transitional Care Facility   OT Rationale for DC Rec Patient is improving with functional mobility and ADLs but would still benefit from TCU stay to increase strength and independence with ADLs before retuning home alone.   OT Equipment Needed at Discharge walker, standard   Total Session Time   Timed Code Treatment Minutes 38   Total Session Time (sum of timed and untimed services) 38

## 2024-08-04 NOTE — PLAN OF CARE
Goal Outcome Evaluation:  Pt is A&O. SBA. VSS. LS clear and diminished in bases. Int productive cough. Pt is still feeling weak and has poor appetite. Denies pain and SOB.     Plan of Care Reviewed With: patient    Overall Patient Progress: improvingOverall Patient Progress: improving    Outcome Evaluation: increased strength. 2L/NC. Diminshed LS.

## 2024-08-04 NOTE — PROGRESS NOTES
08/04/24 1200   Appointment Info   Signing Clinician's Name / Credentials (PT) Holli Hairston DPT   Gait/Stairs (Locomotion)   Assistive Device (Gait)   (hand held assist)   Physical Therapy Goals   PT Frequency Daily   PT Predicted Duration/Target Date for Goal Attainment 08/06/24   PT Goals Bed Mobility;Transfers;Gait   PT: Bed Mobility Supervision/stand-by assist   PT: Transfers Supervision/stand-by assist   PT: Gait Supervision/stand-by assist   Interventions   Interventions Quick Adds Gait Training;Therapeutic Activity   Therapeutic Activity   Therapeutic Activities: dynamic activities to improve functional performance Minutes (98717) 15   Symptoms Noted During/After Treatment Fatigue   Treatment Detail/Skilled Intervention pt on room air today, supine to sitting with supervision and no increase in dizziness, pt really wanting a shower today. sit<>stand from bed to FWW with CGA. supervision for standing at sink for 5 min to complete self cares with no LOB. stand to sit to recliner with SBA and FWW. overall better tolerance and stability today.   Gait Training   Gait Training Minutes (82373) 10   Symptoms Noted During/After Treatment (Gait Training) fatigue   Treatment Detail/Skilled Intervention Pt feeling better after a shower. gait from bed to shower with FWW and CGA with improved step through step length today. gait with FWW from bathroom to recliner 15 feet with SBA and no noted instability or SOB.   Distance in Feet 15   [place] Level (Gait Training) stand-by assist   Physical Assistance Level (Gait Training) 1 person assist   Weight Bearing (Gait Training) full weight-bearing   Assistive Device (Gait Training) rolling walker   Pattern Analysis (Gait Training) swing-through gait   Gait Analysis Deviations decreased belle;increased stride width   Impairments (Gait Analysis/Training) coordination impaired;balance impaired;strength decreased   PT Discharge Planning   PT Plan Continue PT   PT  Discharge Recommendation (DC Rec) Transitional Care Facility   PT Rationale for DC Rec to promote strength, stability and safe mobility   PT Brief overview of current status pt agreeable to discharge to TCU the next few days. really wanting a shower and felt able to tolerate without her oxygen. CGA to SBA for bed mobility and all transfers, FWW for gait in room with SBA and improved step length over yesterday. overall more energetic today.   PT Equipment Needed at Discharge   (patient may not require any assistive gait device at time of discharge from this hospital admission)   Total Session Time   Timed Code Treatment Minutes 25   Total Session Time (sum of timed and untimed services) 25

## 2024-08-04 NOTE — PROGRESS NOTES
Pt titrated to room air. Inhaler given as ordered. No acute RT concerns at this time.     Andrea Riedell, RT

## 2024-08-04 NOTE — PROGRESS NOTES
"[facility]    Medicine Progress Note - Hospitalist Service    Date of Admission:  8/1/2024    Assessment & Plan   This 78 yo female with a PMH significant for ongoing tobacco abuse, CAD, HFpEF, transapical TAVR on 6/19/2024, COPD, MGUS and malnutrition presented to the ER with complaints of increased shortness of breath.  Admits this was worsening over about a week.  Has a cough and has been unable to lay flat because she gets more short of breath.  Had complications after her TAVR including subcutaneous emphysema, left apical pneumothorax and left pulmonary hemorrhage.  Was hospitalized from 6/19-28/2024 and then went to rehab until 7/17/2024.  Feels like she has \"gone downhill\" since getting out of rehab despite having home PT and OT.  Reluctantly agrees to return to rehab now.  She was initially treated with IV Lasix and admitted to the hospital for a CHF exacerbation.  Breathing improved, renal function decreased following diuresis.    Principal Problem:    Acute on chronic heart failure with preserved ejection fraction (HFpEF) (H)    ILIANA with ATN    Assessment: Slightly improving    Plan: Decreased Lasix to 20 mg po daily when renal function started to decline.  Recheck labs in AM show progressive decline the following day but expect improvement with the decreased, anticipated to be maintenance dose, of Lasix 20 mg daily.  Continue to monitor weight and I&O.  Weight decreased 3.9 kg since admission.  Encourage increased activity.  Repeat echo with no significant changes since her last with EF 60-65%, well-seated valve with trace paravalvular regurgitation.    Active Problems:    Panlobular emphysema (H)    Assessment: Ongoing tobacco use.      Plan: Encourage cessation. Wants to use the low dose nicotine patch during her stay.  May continue at discharge if desired.        Gastroesophageal reflux disease without esophagitis    Assessment: Stable    Plan: Continue Pepcid po, renally dosed by " pharmacy.      Iron deficiency anemia    Assessment: Stable    Plan: Hgb improving since her post-op levels which went as low as 7.7.  Likely due to acute blood loss following her TAVR with complications.  Also a component of dilution with fluid overload as diuresis has increased the Hgb to 11.1.      MGUS (monoclonal gammopathy of unknown significance)    Assessment: Stable    Plan: No specific treatment at this time.  Also has a hx of LLL lung mass and is followed by oncology.      History of transcatheter aortic valve replacement (TAVR)    Assessment: Improving    Plan: Valve in position.  Patient feels her CHF is new since the valve but the diagnosis was recorded prior.      Bilateral pleural effusion    Assessment: Improving    Plan: Having good diuresis with weight decreased 3.9 kg and net output negative.  Continue to treat CHF but use a maintenance dose of Lasix 20 mg po daily.  On ACE, beta blocker and statin.  Will not repeat CXR unless clinically worse.      Hypokalemia    Assessment: Acute    Plan: Replace po and continue daily supplement as she will stay on furosemide for now.  Recheck in AM back to normal.          Diet: Combination Diet 2 gm NA Diet (and additional linked orders)  Snacks/Supplements Adult: Ensure Enlive; With Meals    DVT Prophylaxis: Pneumatic Compression Devices  Roberson Catheter: Not present  Lines: None     Cardiac Monitoring: None  Code Status: Full Code      Clinically Significant Risk Factors        # Hypokalemia: Lowest K = 3.2 mmol/L in last 2 days, will replace as needed       # Hypoalbuminemia: Lowest albumin = 3.3 g/dL at 8/1/2024  3:46 PM, will monitor as appropriate    # Acute Kidney Injury, unspecified: based on a >150% or 0.3 mg/dL increase in last creatinine compared to past 90 day average, will monitor renal function   # Acute heart failure with preserved ejection fraction: heart failure noted on problem list, last echo with EF >50%, and receiving IV diuretics            # Cachexia: Estimated body mass index is 14.76 kg/m  as calculated from the following:    Height as of this encounter: 1.524 m (5').    Weight as of this encounter: 34.3 kg (75 lb 9.6 oz)., PRESENT ON ADMISSION  # Severe Malnutrition: based on nutrition assessment, PRESENT ON ADMISSION   # Financial/Environmental Concerns:           Disposition Plan     Medically Ready for Discharge: Anticipated in 2-4 Days - now agreeable to TCU             Yuli Pina MD  Hospitalist Service  [facility]  Securely message with [employer] (more info)  Text page via [facility] Paging/Directory   ______________________________________________________________________    Interval History   Feeling better.  Able to wean off O2.  Worked with therapies yesterday but very limited, required assistance, has balance, strength and coordination issues.  Refused to walk with nursing staff yesterday per notes.  Admits she is not strong and agrees she will not do well at home right now.  Was doing great at rehab before, able to walk all over and even outside, but declined since returning home.   Reluctantly agrees to go to [facility] for short term rehab.    Physical Exam   Vital Signs: Temp: 98.6  F (37  C) Temp src: Tympanic BP: 99/43 Pulse: 74   Resp: 16 SpO2: 91 % O2 Device: None (Room air) Oxygen Delivery: 1 LPM  Weight: 75 lbs 9.6 oz    Constitutional: awake, alert, cooperative, no apparent distress, appears much older than stated age, and cachectic but looks better today, less fatigued with minimal exertion, more alert  Eyes: pupils equal, round and reactive to light, extra-ocular muscles intact, and conjunctiva normal  ENT: normocephalic, atraumatic  Respiratory: no increased work of breathing, decreased air exchange throughout but clear to auscultation, voice weak, very loose, gurgling cough persists with some sneezing this morning (usual for her)  Cardiovascular: regular rate and rhythm, normal S1 and S2, murmur  includes systolic murmur II/VI located at left lower sternal border and apex and no edema  GI: normal bowel sounds, soft, non-distended, and non-tender  Skin: normal skin color, texture and no redness, warmth, or swelling  Musculoskeletal: very thin with generalized muscle wasting, no lower extremity pitting edema present, there is no redness, warmth, or swelling of the joints, full range of motion noted  Neurologic: Mental Status Exam:  Fund of Knowledge:  normal  Attention/Concentration:  normal  Language:  normal  Cranial Nerves:  cranial nerves II-XII are grossly intact  Motor Exam:  moves all extremities well and symmetrically  Neuropsychiatric: General: normal, calm, and normal eye contact  Level of consciousness: alert / normal  Affect: normal  Orientation: oriented to self, place, time and situation  Memory and insight: normal, memory for past and recent events intact, and thought process normal    Medical Decision Making             Data     I have personally reviewed the following data over the past 24 hrs:    N/A  \   N/A   / N/A     142 101 25.4 (H) /  90   4.4 32 (H) 1.33 (H) \

## 2024-08-04 NOTE — PLAN OF CARE
Goal Outcome Evaluation:  Patients vss. BP 99/43 (BP Location: Right arm, Patient Position: Semi-Erickson's, Cuff Size: Adult Small)   Pulse 74   Temp 98.6  F (37  C) (Tympanic)   Resp 16   Ht 1.524 m (5')   Wt 34.3 kg (75 lb 9.6 oz)   LMP 01/01/1995 (Approximate)   SpO2 91%   BMI 14.76 kg/m   Patient has been on room air and maintaining sats in the 90's. Patient is feeling weak and tired today, not motivated to get out of bed. Will encourage patient. Lung sounds clear. Productive cough present.

## 2024-08-05 ENCOUNTER — APPOINTMENT (OUTPATIENT)
Dept: OCCUPATIONAL THERAPY | Facility: OTHER | Age: 79
DRG: 291 | End: 2024-08-05
Payer: MEDICARE

## 2024-08-05 ENCOUNTER — APPOINTMENT (OUTPATIENT)
Dept: PHYSICAL THERAPY | Facility: OTHER | Age: 79
DRG: 291 | End: 2024-08-05
Payer: MEDICARE

## 2024-08-05 VITALS
HEIGHT: 60 IN | RESPIRATION RATE: 16 BRPM | DIASTOLIC BLOOD PRESSURE: 59 MMHG | OXYGEN SATURATION: 93 % | HEART RATE: 76 BPM | BODY MASS INDEX: 15.08 KG/M2 | WEIGHT: 76.8 LBS | TEMPERATURE: 98.1 F | SYSTOLIC BLOOD PRESSURE: 125 MMHG

## 2024-08-05 LAB
ATRIAL RATE - MUSE: 77 BPM
ATRIAL RATE - MUSE: 77 BPM
DIASTOLIC BLOOD PRESSURE - MUSE: NORMAL MMHG
DIASTOLIC BLOOD PRESSURE - MUSE: NORMAL MMHG
INTERPRETATION ECG - MUSE: NORMAL
INTERPRETATION ECG - MUSE: NORMAL
P AXIS - MUSE: 63 DEGREES
P AXIS - MUSE: 75 DEGREES
PR INTERVAL - MUSE: 152 MS
PR INTERVAL - MUSE: 154 MS
QRS DURATION - MUSE: 118 MS
QRS DURATION - MUSE: 122 MS
QT - MUSE: 434 MS
QT - MUSE: 444 MS
QTC - MUSE: 491 MS
QTC - MUSE: 502 MS
R AXIS - MUSE: 3 DEGREES
R AXIS - MUSE: 5 DEGREES
SYSTOLIC BLOOD PRESSURE - MUSE: NORMAL MMHG
SYSTOLIC BLOOD PRESSURE - MUSE: NORMAL MMHG
T AXIS - MUSE: 103 DEGREES
T AXIS - MUSE: 69 DEGREES
VENTRICULAR RATE- MUSE: 77 BPM
VENTRICULAR RATE- MUSE: 77 BPM

## 2024-08-05 PROCEDURE — 94640 AIRWAY INHALATION TREATMENT: CPT

## 2024-08-05 PROCEDURE — 97530 THERAPEUTIC ACTIVITIES: CPT | Mod: GO | Performed by: OCCUPATIONAL THERAPIST

## 2024-08-05 PROCEDURE — 97530 THERAPEUTIC ACTIVITIES: CPT | Mod: GP

## 2024-08-05 PROCEDURE — 250N000013 HC RX MED GY IP 250 OP 250 PS 637: Performed by: FAMILY MEDICINE

## 2024-08-05 PROCEDURE — 99239 HOSP IP/OBS DSCHRG MGMT >30: CPT | Performed by: FAMILY MEDICINE

## 2024-08-05 PROCEDURE — 999N000157 HC STATISTIC RCP TIME EA 10 MIN

## 2024-08-05 PROCEDURE — 97116 GAIT TRAINING THERAPY: CPT | Mod: GP

## 2024-08-05 PROCEDURE — 250N000013 HC RX MED GY IP 250 OP 250 PS 637: Performed by: INTERNAL MEDICINE

## 2024-08-05 RX ORDER — FUROSEMIDE 20 MG
20 TABLET ORAL DAILY
Qty: 30 TABLET | Refills: 0 | Status: SHIPPED | OUTPATIENT
Start: 2024-08-06 | End: 2024-08-30

## 2024-08-05 RX ORDER — LISINOPRIL 2.5 MG/1
2.5 TABLET ORAL DAILY
Qty: 30 TABLET | Refills: 0 | Status: SHIPPED | OUTPATIENT
Start: 2024-08-08 | End: 2024-08-29

## 2024-08-05 RX ORDER — GUAIFENESIN 600 MG/1
600 TABLET, EXTENDED RELEASE ORAL 2 TIMES DAILY
Qty: 60 TABLET | Refills: 0 | Status: SHIPPED | OUTPATIENT
Start: 2024-08-05 | End: 2024-08-30

## 2024-08-05 RX ORDER — POTASSIUM CHLORIDE 1500 MG/1
20 TABLET, EXTENDED RELEASE ORAL DAILY
Qty: 30 TABLET | Refills: 0 | Status: SHIPPED | OUTPATIENT
Start: 2024-08-06 | End: 2024-08-30

## 2024-08-05 RX ORDER — BENZONATATE 200 MG/1
200 CAPSULE ORAL 3 TIMES DAILY PRN
Qty: 30 CAPSULE | Refills: 0 | Status: SHIPPED | OUTPATIENT
Start: 2024-08-05 | End: 2024-08-30

## 2024-08-05 RX ORDER — CODEINE PHOSPHATE AND GUAIFENESIN 10; 100 MG/5ML; MG/5ML
1 SOLUTION ORAL EVERY 6 HOURS PRN
COMMUNITY
Start: 2024-08-05 | End: 2024-08-14

## 2024-08-05 RX ADMIN — GUAIFENESIN 600 MG: 600 TABLET ORAL at 09:19

## 2024-08-05 RX ADMIN — GABAPENTIN 100 MG: 100 CAPSULE ORAL at 09:18

## 2024-08-05 RX ADMIN — FUROSEMIDE 20 MG: 20 TABLET ORAL at 09:19

## 2024-08-05 RX ADMIN — ASPIRIN 81 MG: 81 TABLET, COATED ORAL at 09:19

## 2024-08-05 RX ADMIN — FLUTICASONE FUROATE AND VILANTEROL 1 PUFF: 100; 25 POWDER RESPIRATORY (INHALATION) at 07:30

## 2024-08-05 RX ADMIN — METHOCARBAMOL TABLETS 500 MG: 500 TABLET, COATED ORAL at 05:15

## 2024-08-05 RX ADMIN — CYANOCOBALAMIN TAB 500 MCG 1000 MCG: 500 TAB at 09:19

## 2024-08-05 RX ADMIN — LISINOPRIL 2.5 MG: 2.5 TABLET ORAL at 09:18

## 2024-08-05 RX ADMIN — POTASSIUM CHLORIDE 20 MEQ: 1500 TABLET, EXTENDED RELEASE ORAL at 09:19

## 2024-08-05 ASSESSMENT — ACTIVITIES OF DAILY LIVING (ADL)
ADLS_ACUITY_SCORE: 34
ADLS_ACUITY_SCORE: 36
ADLS_ACUITY_SCORE: 36
ADLS_ACUITY_SCORE: 34

## 2024-08-05 NOTE — PLAN OF CARE
Goal Outcome Evaluation:  Pt is A&O. VSS. Afebrile. LS clear & diminished in bases. Denies pain. Pt did get up to bathroom multiple times and did well with transferring and use of walker.      Plan of Care Reviewed With: patient    Overall Patient Progress: improvingOverall Patient Progress: improving    Outcome Evaluation: Increased strength. RA. LS clear. Denies pain.

## 2024-08-05 NOTE — PROGRESS NOTES
[facility] DISCHARGE NOTE    Patient discharged to nursing home at 1:33 PM via wheel chair. Accompanied by staff. Discharge instructions reviewed with patient and caregiver, opportunity offered to ask questions. Prescriptions sent to patients preferred pharmacy. All belongings sent with patient.    Cris Saldaña RN

## 2024-08-05 NOTE — PROGRESS NOTES
:     Met with patient in the room to discuss discharge planning. Patient is considering going to [facility] for STR. Patient was agreeable to have a referral sent to .     Sent referral and [facility] is able to accept patient for STR.     Pt/family was given the Medicare Compare list for SNF, with associated star ratings to assist with choice for referrals/discharge planning Yes    Education was given to pt/family that star ratings are updated/maintained by Medicare and can be reviewed by visiting www.medicare.gov Yes    KEITH Tierney on 8/5/2024 at 10:27 AM    Spoke with patient that  is able to accept patient today. Left voicemail with daughter letting her know of patient's discharge plan.      working on setting a transportation time.     KEITH Tierney on 8/5/2024 at 11:10 AM     will pick patient up at 13:30.     PAS was completed and Sheryl at  was notified. [phone]    No further needs form  at this time.     KEITH Tierney on 8/5/2024 at 12:16 PM

## 2024-08-05 NOTE — PROGRESS NOTES
08/05/24 6050   Appointment Info   Signing Clinician's Name / Credentials (OT) Mary Campoverde, OTR/L   Therapeutic Activities   Therapeutic Activity Minutes (22953) 35   Symptoms noted during/after treatment fatigue   Treatment Detail/Skilled Intervention Pt moved supine to sit with SBA, ambulated in hallway with Min assist/CGA with FWW for safety and balance. Pt fatigues with activity and coughing noted throuhgout   OT Discharge Planning   OT Plan D/C to STR today with continuing therapies   OT Discharge Recommendation (DC Rec) Transitional Care Facility   OT Rationale for DC Rec Pt is making steady progress but continues to need on going therapies due to decreased tolerance for activity   OT Brief overview of current status has all necessary equipment   Total Session Time   Timed Code Treatment Minutes 35   Total Session Time (sum of timed and untimed services) 35

## 2024-08-05 NOTE — PROGRESS NOTES
Occupational Therapy Discharge Summary    Reason for therapy discharge:    Discharged to transitional care facility.    Progress towards therapy goal(s). See goals on Care Plan in [facility] electronic health record for goal details.  Goals partially met.  Barriers to achieving goals:   discharge from facility.    Therapy recommendation(s):    Continued therapy is recommended.  Rationale/Recommendations:  pt would benefit from continued PT/OT at [facility] prior to returning home alone.

## 2024-08-05 NOTE — PLAN OF CARE
Patients vss. /59 (BP Location: Right arm, Patient Position: Semi-Erickson's, Cuff Size: Adult Small)   Pulse 76   Temp 98.1  F (36.7  C) (Tympanic)   Resp 16   Ht 1.524 m (5')   Wt 34.8 kg (76 lb 12.8 oz)   LMP 01/01/1995 (Approximate)   SpO2 93%   BMI 15.00 kg/m   Denies pain. Lung sounds are clear. Patient remains on room air. Loose productive cough still present. Patient is up and ambulatory with SBA and a walker.

## 2024-08-05 NOTE — DISCHARGE SUMMARY
[facility]  Hospitalist Discharge Summary      Date of Admission:  8/1/2024  Date of Discharge:  8/5/2024  Discharging Provider: Yuli Pina MD  Discharge Service: Hospitalist Service    Discharge Diagnoses   Principal Problem:    Acute on chronic heart failure with preserved ejection fraction (HFpEF) (H)    Date Noted: 8/2/2024  Active Problems:    Panlobular emphysema (H)    Date Noted: 12/29/2010    Acute on chronic respiratory failure with hypoxia    Date Noted: 8/2/2024    Tobacco abuse    Date Noted: 1/23/2018    Gastroesophageal reflux disease without esophagitis    Date Noted: 6/18/2014    Iron deficiency anemia    Date Noted: 1/23/2024    MGUS (monoclonal gammopathy of unknown significance)    Date Noted: 1/31/2024    History of transcatheter aortic valve replacement (TAVR)    Date Noted: 6/25/2024    Bilateral pleural effusion    Date Noted: 8/1/2024    Acute kidney failure with tubular necrosis (H)    Date Noted: 8/4/2024      Clinically Significant Risk Factors     # Cachexia: Estimated body mass index is 15 kg/m  as calculated from the following:    Height as of this encounter: 1.524 m (5').    Weight as of this encounter: 34.8 kg (76 lb 12.8 oz).    # Severe Malnutrition: based on nutrition assessment      Follow-ups Needed After Discharge   Follow-up Appointments     Follow Up and recommended labs and tests      Follow up with [facility] physician.  The following labs/tests are   recommended: BMP, CBC.            Unresulted Labs Ordered in the Past 30 Days of this Admission       No orders found from 7/2/2024 to 8/2/2024.        These results will be followed up by NH provider and PCP    Discharge Disposition   Discharged to short-term care facility  Condition at discharge: Satisfactory    Hospital Course   This 78 yo female with a PMH significant for ongoing tobacco abuse, CAD, HFpEF, transapical TAVR on 6/19/2024, COPD, MGUS and malnutrition presented to the ER with  "complaints of increased shortness of breath.  Admits this was worsening over about a week.  Has a cough and has been unable to lay flat because she gets more short of breath.  Had complications after her TAVR including subcutaneous emphysema, left apical pneumothorax and left pulmonary hemorrhage.  Was hospitalized from 6/19-28/2024 and then went to rehab until 7/17/2024.  Feels like she has \"gone downhill\" since getting out of rehab despite having home PT and OT.  Reluctantly agrees to return to rehab now.  She was initially treated with IV Lasix and admitted to the hospital for a CHF exacerbation.  Breathing improved, renal function decreased following diuresis.  Feeling better but remains weak so will discharge to [facility] for continued rehab.    Principal Problem:    Acute on chronic heart failure with preserved ejection fraction (HFpEF) (H)    ILIANA with ATN    Acute on chronic respiratory failure with hypoxia    Assessment: Improved    Plan: Decreased Lasix to 20 mg po daily when renal function started to decline and will continue this dose as her maintenance dose.  Continue to monitor weight after discharge with routine CHF discharge orders.  Weight decreased 3.9 kg since admission, increased to a final loss of 2.4 kg after transitioning to po Lasix.  Encourage increased activity.  Repeat echo with no significant changes since her last with EF 60-65%, well-seated valve with trace paravalvular regurgitation.  Start lisinopril for optimal CHF care but allow an additional couple of days for renal function to improve.  Recheck labs later this week.    Active Problems:    Panlobular emphysema (H)    Assessment: Ongoing tobacco use.      Plan: Encourage cessation. Used the low dose nicotine patch during her stay.  May continue at discharge if desired.        Gastroesophageal reflux disease without esophagitis    Assessment: Stable    Plan: Return to the use of Protonix at discharge.      Iron deficiency anemia   "  Assessment: Stable    Plan: Hgb improving since her post-op levels which went as low as 7.7.  Likely due to acute blood loss following her TAVR with complications.  Also a component of dilution with fluid overload as diuresis has increased the Hgb to 11.1.  Recheck later this week to assure stability.      MGUS (monoclonal gammopathy of unknown significance)    Assessment: Stable    Plan: No specific treatment at this time.  Also has a hx of LLL lung mass and is followed by oncology.      History of transcatheter aortic valve replacement (TAVR)    Assessment: Improving    Plan: Valve in position.  Patient feels her CHF is new since the valve but the diagnosis was recorded prior.  Follow-up with her cardiologist as directed.      Bilateral pleural effusion    Assessment: Improving    Plan: Continue to treat CHF long term with a maintenance dose of Lasix 20 mg po daily.  On ACE, beta blocker and statin.  Will not repeat CXR unless clinically worse.      Hypokalemia    Assessment: Replaced    Plan: Replaced po and continue daily supplement as she will stay on furosemide.      Consultations This Hospital Stay   OCCUPATIONAL THERAPY ADULT IP CONSULT  PHYSICAL THERAPY ADULT IP CONSULT  CARDIOLOGY IP CONSULT  SOCIAL WORK IP CONSULT  PHYSICAL THERAPY ADULT IP CONSULT  OCCUPATIONAL THERAPY ADULT IP CONSULT    Code Status   Full Code    Time Spent on this Encounter   I, Yuli Pina MD, personally saw the patient today and spent greater than 30 minutes discharging this patient.       Yuli Pina MD  [facility] \[facility]\""  [address]  [facility] [address]  Phone: [phone]  Fax: [phone]  ______________________________________________________________________    Physical Exam   Vital Signs: Temp: 98.1  F (36.7  C) Temp src: Tympanic BP: 125/59 Pulse: 76   Resp: 16 SpO2: 93 % O2 Device: None (Room air)    Weight: 76 lbs 12.8 oz    Constitutional: awake, alert, cooperative, no apparent  distress, appears much older than stated age and cachectic but looks better today, less fatigued with minimal exertion, continues to speak with shorter sentences but does not appear short of breath  Eyes: pupils equal, round and reactive to light, extra-ocular muscles intact, and conjunctiva normal  ENT: normocephalic, atraumatic  Respiratory: no increased work of breathing, fair air exchange throughout but clear to auscultation, voice stronger, very loose, gurgling cough persists but decreased in frequency  Cardiovascular: regular rate and rhythm, normal S1 and S2, murmur includes systolic murmur II/VI located at left lower sternal border and no edema  GI: normal bowel sounds, soft, non-distended, and non-tender  Skin: normal skin color, texture and no redness, warmth, or swelling  Musculoskeletal: very thin with generalized muscle wasting, no lower extremity pitting edema present, there is no redness, warmth, or swelling of the joints, full range of motion noted  Neurologic: Mental Status Exam:  Fund of Knowledge:  normal  Attention/Concentration:  normal  Language:  normal  Cranial Nerves:  cranial nerves II-XII are grossly intact  Motor Exam:  moves all extremities well and symmetrically  Neuropsychiatric: General: normal, calm, and normal eye contact  Level of consciousness: alert / normal  Affect: normal  Orientation: oriented to self, place, time and situation  Memory and insight: normal, memory for past and recent events intact, and thought process normal       Primary Care Physician   Ele Marc    Discharge Orders      Basic metabolic panel     CBC with platelets     Follow-Up with Cardiology [facility] Heart Failure Discharge      General info for SNF    Length of Stay Estimate: Short Term Care: Estimated # of Days <30  Condition at Discharge: Improving  Level of care:skilled   Rehabilitation Potential: Good  Admission H&P remains valid and up-to-date: Yes  Recent Chemotherapy: N/A  Use Nursing Home  Standing Orders: Yes     Reason for your hospital stay    Congestive heart failure exacerbation.     Daily weights    Call Provider for weight gain of more than 2 pounds per day or 5 pounds per week.     Follow Up and recommended labs and tests    Follow up with snf physician.  The following labs/tests are recommended: BMP, CBC.     Activity - Up ad rosy     Full Code     Physical Therapy Adult Consult    Evaluate and treat as clinically indicated.    Reason:  Weakness, CHF, COPD, wants to return home independently.     Occupational Therapy Adult Consult    Evaluate and treat as clinically indicated.    Reason:  Weakness, CHF, COPD, wants to return home independently.     Diet    Follow this diet upon discharge: Orders Placed This Encounter      Snacks/Supplements Adult: Ensure Enlive; With Meals      Combination Diet 2 gm NA Diet       Significant Results and Procedures   Most Recent 3 CBC's:  Recent Labs   Lab Test 08/03/24  0643 08/02/24  0635 08/01/24  1546   WBC 9.9 7.8 9.8   HGB 11.1* 10.1* 9.9*   MCV 98 98 99    269 275     Most Recent 3 BMP's:  Recent Labs   Lab Test 08/04/24  0535 08/03/24  0643 08/02/24  0635    141 142   POTASSIUM 4.4 3.2* 3.4   CHLORIDE 101 97* 102   CO2 32* 35* 31*   BUN 25.4* 15.0 13.3   CR 1.33* 0.99* 0.85   ANIONGAP 9 9 9   DION 9.0 8.8 8.6*   GLC 90 101* 98     Most Recent 2 LFT's:  Recent Labs   Lab Test 08/01/24  1546 06/19/24  2252   AST 26 54*   ALT 14 13   ALKPHOS 105 48   BILITOTAL 0.5 0.6     7-Day Micro Results       Collected Updated Procedure Result Status      08/01/2024 1558 08/01/2024 1638 Symptomatic Influenza A/B, RSV, & SARS-CoV2 PCR (COVID-19) Nose [[id number]]    Swab from Nose    Final result Component Value   Influenza A PCR Negative   Influenza B PCR Negative   RSV PCR Negative   SARS CoV2 PCR Negative   NEGATIVE: SARS-CoV-2 (COVID-19) RNA not detected, presumed negative.                ,   Results for orders placed or performed during the  hospital encounter of 08/01/24   XR Chest 2 Views    Narrative    PROCEDURE:  XR CHEST 2 VIEWS    HISTORY: Shortness of breath    COMPARISON: Chest radiograph 7/17/2024, 6/26/2024    FINDINGS: PA and lateral chest radiographs  Postsurgical changes of TAVR.  Cardiomediastinal silhouette is within normal limits. There is  calcific aortic atherosclerosis.  Perihilar and peripheral interstitial opacities. Dependent mixed  opacities. Bilateral pleural effusions. No pneumothorax.    No suspicious osseous lesion or subdiaphragmatic free air.      Impression    IMPRESSION:    Changes in the chest which are likely due to heart failure, including  small effusions and bilateral pulmonary opacities. Pneumonia cannot be  excluded.    PACO RIVERA MD         SYSTEM ID:  [id number]   CT Chest Pulmonary Embolism w Contrast    Narrative    EXAM: CT CHEST PULMONARY EMBOLISM W CONTRAST, 8/1/2024 5:26 PM    HISTORY: elevated DDimer, SOB, recent surgery in June ? Pe    COMPARISON: Chest radiographs 8/1/2024; CT chest 6/22/2024    TECHNIQUE:   Imaging protocol: Multiplanar CT images of the chest after  administration of intravenous contrast. Meds given: isovue 370 51 ML.  Acquisition: This CT exam was performed using one or more the  following dose reduction techniques: automated exposure control,  adjustment of the mA and/or kV according to patient size, and/or  iterative reconstruction technique.  Processing: 3D rendering on independent workstation using Maximum  Intensity Projection (MIP) was performed and archived to PACS. 3D  reconstructions are interpreted and reported by supervising  radiologist.    FINDINGS:     CHEST:    VESSELS AND HEART: There is adequate contrast bolus in the study.  Contrast bolus adequately opacifies the pulmonary arteries. No acute  pulmonary emboli to the subsegmental level. Atherosclerotic  calcification of the aorta without aneurysmal dilation. Mitral annular  calcification. Postsurgical changes of  TAVR. Moderate coronary  calcification.    LUNGS: No pulmonary mass. Interlobular septal thickening. Chronic  interstitial changes with paraseptal emphysema. Dependent bronchial  wall thickening. Tree-in-bud opacities in the left base and left upper  lobe. Dependent consolidations.  PLEURA: Moderate to large bilateral pleural effusions. No  pneumothorax.  LYMPH NODES: No enlarged lymph nodes.  THYROID: Within normal limits.    BONES AND SOFT TISSUES:  No suspicious osseous lesions. Degenerative periarticular changes and  spinal spondylosis.    UPPER ABDOMEN:  Limited evaluation of the upper abdomen demonstrates no acute  parenchymal abnormalities, nonobstructive bowel gas pattern, and no  free fluid or free air.      Impression    IMPRESSION:   1.  No acute pulmonary emboli to the subsegmental level.  2.  Changes in the chest which may be due to heart failure, including  effusions, and bilateral interstitial opacities with dependent  consolidations.   3.  Patchy groundglass opacities in left upper and left lower lobe are  concerning for infection.    PACO RIVERA MD         SYSTEM ID:  [id number]   Echocardiogram Complete     Value    LVEF  60-65%    Narrative    [id number]  [id number]  [id number]  [id number]^PERRY^DEA^SUNG     [facility]  [address] [place] [address]     Name: OSMAR COLE  MRN: [id number]  : 1945  Study Date: 2024 06:58 AM  Age: 79 yrs  Gender: Female  Patient Location: [facility]  Reason For Study: Heart Failure  Ordering Physician: DEA AMADOR  Performed By: Cat Cast     BSA: 1.3 m2  Height: 60 in  Weight: 79 lb  HR: 74  BP: 147/62 mmHg  ______________________________________________________________________________  Procedure  Complete Portable Echo Adult.  ______________________________________________________________________________  Interpretation Summary  Left ventricular size, wall motion and function are normal. The  ejection  fraction is 60-65%.  Right ventricular function, chamber size, wall motion, and thickness are  normal.  Trace mitral insufficiency is present.  There is mild MS. The mean gradient across the valve is 5mmHg in sinus and a  rate of 70BPM  s/p TAVR with 20 mm Diaz Benedict 3 on 6/19/2024. The prosthetic aortic valve  is well-seated. There is trace paravalvular regurgitation. MG 11mmHg  IVC diameter and respiratory changes fall into an intermediate range  suggesting an RA pressure of 8 mmHg. No pericardial effusion is present.     No significant changes noted.  ______________________________________________________________________________  Left Ventricle  Left ventricular size, wall motion and function are normal. The ejection  fraction is 60-65%. Left ventricular diastolic function is not assessable. No  regional wall motion abnormalities are seen.     Right Ventricle  Right ventricular function, chamber size, wall motion, and thickness are  normal.     Atria  Both atria appear normal.     Mitral Valve  Moderate mitral annular calcification is present. Trace mitral insufficiency  is present. There is mild MS. The mean gradient across the valve is 5mmHg in  sinus and a rate of 70BPM.     Aortic Valve  s/p TAVR with 20 mm Diaz Benedict 3 on 6/19/2024. The prosthetic aortic valve  is well-seated. There is trace paravalvular regurgitation. MG 11mmHg.     Tricuspid Valve  Mild tricuspid insufficiency is present. The right ventricular systolic  pressure is approximated at 27.2 mmHg plus the right atrial pressure.     Pulmonic Valve  The pulmonic valve is normal.     Vessels  The aorta root is normal. IVC diameter and respiratory changes fall into an  intermediate range suggesting an RA pressure of 8 mmHg.     Pericardium  No pericardial effusion is present.     Compared to Previous Study  No significant changes noted.     ______________________________________________________________________________  MMode/2D  Measurements & Calculations  IVSd: 0.54 cm  LVIDd: 3.6 cm  LVIDs: 1.8 cm  LVPWd: 0.75 cm  FS: 50.9 %     LV mass(C)d: 59.7 grams  LV mass(C)dI: 47.5 grams/m2  RWT: 0.41  TAPSE: 1.3 cm     Doppler Measurements & Calculations  MV E max ron: 154.0 cm/sec  MV A max ron: 111.0 cm/sec  MV E/A: 1.4  MV max P.1 mmHg  MV mean P.0 mmHg  MV V2 VTI: 50.9 cm  MV dec time: 0.20 sec  Ao V2 max: 228.0 cm/sec  Ao max P.0 mmHg  Ao V2 mean: 153.0 cm/sec  Ao mean P.0 mmHg  Ao V2 VTI: 46.2 cm  LV V1 max P.1 mmHg  LV V1 max: 101.0 cm/sec  LV V1 VTI: 24.5 cm  PA acc time: 0.11 sec  TR max ron: 258.0 cm/sec  TR max P.2 mmHg  AV Ron Ratio (DI): 0.44     ______________________________________________________________________________  Report approved by: Jane CARDONA 2024 08:27 AM             Discharge Medications   Current Discharge Medication List        START taking these medications    Details   benzonatate (TESSALON) 200 MG capsule Take 1 capsule (200 mg) by mouth 3 times daily as needed for cough  Qty: 30 capsule, Refills: 0    Associated Diagnoses: Acute on chronic heart failure with preserved ejection fraction (HFpEF) (H); Acute kidney failure with tubular necrosis (H24); Panlobular emphysema (H)      furosemide (LASIX) 20 MG tablet Take 1 tablet (20 mg) by mouth daily  Qty: 30 tablet, Refills: 0    Associated Diagnoses: Acute on chronic heart failure with preserved ejection fraction (HFpEF) (H); Acute kidney failure with tubular necrosis (H24); Panlobular emphysema (H)      guaiFENesin (MUCINEX) 600 MG 12 hr tablet Take 1 tablet (600 mg) by mouth 2 times daily  Qty: 60 tablet, Refills: 0    Associated Diagnoses: Acute on chronic heart failure with preserved ejection fraction (HFpEF) (H); Acute kidney failure with tubular necrosis (H24); Panlobular emphysema (H)      lisinopril (ZESTRIL) 2.5 MG tablet Take 1 tablet (2.5 mg) by mouth daily  Qty: 30 tablet, Refills: 0    Associated Diagnoses: Acute  on chronic heart failure with preserved ejection fraction (HFpEF) (H); Acute kidney failure with tubular necrosis (H24); Panlobular emphysema (H)      potassium chloride juan carlso ER (KLOR-CON M20) 20 MEQ CR tablet Take 1 tablet (20 mEq) by mouth daily  Qty: 30 tablet, Refills: 0    Associated Diagnoses: Acute on chronic heart failure with preserved ejection fraction (HFpEF) (H); Acute kidney failure with tubular necrosis (H24); Panlobular emphysema (H)           CONTINUE these medications which have CHANGED    Details   guaiFENesin-codeine (ROBITUSSIN AC) 100-10 MG/5ML solution Take 5 mLs by mouth every 6 hours as needed for cough    Comments: Hold until after NH discharge.  Associated Diagnoses: Pneumonia of both lungs due to infectious organism, unspecified part of lung; Simple chronic bronchitis (H)           CONTINUE these medications which have NOT CHANGED    Details   acetaminophen (TYLENOL) 325 MG tablet Take 325-650 mg by mouth every 6 hours as needed for mild pain      aspirin 81 MG EC tablet Take 1 tablet (81 mg) by mouth daily  Qty: 90 tablet, Refills: 3    Associated Diagnoses: Pulmonary emphysema, unspecified emphysema type (H)      cyanocobalamin (VITAMIN B-12) 1000 MCG tablet Take 1 tablet (1,000 mcg) by mouth daily  Qty: 90 tablet, Refills: 3    Associated Diagnoses: Vitamin B12 deficiency (non anemic)      fluticasone-salmeterol (ADVAIR DISKUS) 250-50 MCG/ACT inhaler Inhale 1 puff into the lungs 2 times daily WIXELA-Disp as covered by Pt's insurance  Qty: 3 each, Refills: 2    Associated Diagnoses: Pulmonary emphysema, unspecified emphysema type (H)      gabapentin (NEURONTIN) 100 MG capsule Take 1 capsule (100 mg) by mouth or Feeding Tube 2 times daily  Qty: 30 capsule, Refills: 3    Associated Diagnoses: History of transcatheter aortic valve replacement (TAVR)      metoprolol succinate ER (TOPROL XL) 25 MG 24 hr tablet Take 1 tablet (25 mg) by mouth daily  Qty: 90 tablet, Refills: 3    Associated  Diagnoses: S/P TAVR (transcatheter aortic valve replacement)      pantoprazole (PROTONIX) 40 MG EC tablet Take 1 tablet (40 mg) by mouth daily    Associated Diagnoses: Gastroesophageal reflux disease without esophagitis      simvastatin (ZOCOR) 20 MG tablet Take 1 tablet (20 mg) by mouth at bedtime  Qty: 90 tablet, Refills: 2    Associated Diagnoses: Hyperlipidemia LDL goal <100      methocarbamol (ROBAXIN) 500 MG tablet 1 tablet (500 mg) by Oral or Feeding Tube route 3 times daily    Associated Diagnoses: History of transcatheter aortic valve replacement (TAVR)           Allergies   Allergies   Allergen Reactions    Metronidazole Nausea and Vomiting    Pneumococcal Vaccine      Other reaction(s): Edema  Arm swelling    Tramadol Visual Disturbance     Hallucinations

## 2024-08-05 NOTE — PROGRESS NOTES
08/05/24 [id number]   Appointment Info   Signing Clinician's Name / Credentials (PT) Vishal Niño MPT   Therapeutic Activity   Symptoms Noted During/After Treatment Fatigue   Treatment Detail/Skilled Intervention bed mobility with minimal assist; sit to stand and stand pivot with CGA using a Fww   Gait Training   Symptoms Noted During/After Treatment (Gait Training) fatigue   Treatment Detail/Skilled Intervention ambulation in hallway   Distance in Feet 125   [place] Level (Gait Training) contact guard   Physical Assistance Level (Gait Training) 1 person assist   Weight Bearing (Gait Training) full weight-bearing   Assistive Device (Gait Training) rolling walker   Pattern Analysis (Gait Training) swing-through gait   Gait Analysis Deviations decreased belle   Impairments (Gait Analysis/Training) balance impaired;strength decreased   PT Discharge Planning   PT Plan Patient discharging   PT Discharge Recommendation (DC Rec) Transitional Care Facility   PT Rationale for DC Rec to promote strength, stability and safe mobility   PT Brief overview of current status Patient demonstrating progress with increased gait tolerance and stability; she will benefit from continued PT in short term rehab prior to returning home

## 2024-08-06 ENCOUNTER — PATIENT OUTREACH (OUTPATIENT)
Dept: INTERNAL MEDICINE | Facility: OTHER | Age: 79
End: 2024-08-06
Payer: MEDICARE

## 2024-08-06 ENCOUNTER — NURSING HOME VISIT (OUTPATIENT)
Dept: GERIATRICS | Facility: OTHER | Age: 79
End: 2024-08-06
Payer: MEDICARE

## 2024-08-06 DIAGNOSIS — R05.2 SUBACUTE COUGH: ICD-10-CM

## 2024-08-06 DIAGNOSIS — Z95.2 HISTORY OF TRANSCATHETER AORTIC VALVE REPLACEMENT (TAVR): ICD-10-CM

## 2024-08-06 DIAGNOSIS — K21.9 GASTROESOPHAGEAL REFLUX DISEASE WITHOUT ESOPHAGITIS: ICD-10-CM

## 2024-08-06 DIAGNOSIS — D50.0 IRON DEFICIENCY ANEMIA DUE TO CHRONIC BLOOD LOSS: ICD-10-CM

## 2024-08-06 DIAGNOSIS — Z72.0 TOBACCO ABUSE: ICD-10-CM

## 2024-08-06 DIAGNOSIS — I50.32 CHRONIC HEART FAILURE WITH PRESERVED EJECTION FRACTION (H): Primary | ICD-10-CM

## 2024-08-06 DIAGNOSIS — J43.1 PANLOBULAR EMPHYSEMA (H): ICD-10-CM

## 2024-08-06 DIAGNOSIS — D47.2 MGUS (MONOCLONAL GAMMOPATHY OF UNKNOWN SIGNIFICANCE): ICD-10-CM

## 2024-08-06 DIAGNOSIS — J90 BILATERAL PLEURAL EFFUSION: ICD-10-CM

## 2024-08-06 PROCEDURE — 99418 PROLNG IP/OBS E/M EA 15 MIN: CPT | Performed by: NURSE PRACTITIONER

## 2024-08-06 PROCEDURE — 99310 SBSQ NF CARE HIGH MDM 45: CPT | Performed by: NURSE PRACTITIONER

## 2024-08-06 NOTE — PROGRESS NOTES
[facility]  Hospital follow up/Initial Assessment    Patient Name: Loulou Lucero   : 1945  MRN: [id number]    Place of Service: [facility]  DOS: 2024    CC: Hospital follow up/Initial Assessment    HPI:  Loulou Lucero is a 79 year old female with PMH of multiple chronic medical concerns, who is seen today regarding pt was hospitalized -24 at [facility] for acute on chronic heart failure.  Presented to ED with increases shortness of breath and orthopnea. Diuresed with IV lasix, some decline in renal function with diuresis. Was discharged on lasix 20 mg daily.   Echo showed EF 60-65%, well-seated valve with trace paravalvular regurgitation.   Lisinopril 2.5 mg daily started to optimize meds with heart failure. Also taking statin, metoprolol succinate, and aspirin.     COPD: ongoing tobacco use. Nicotine patch was used in hospital but not discharged with this.   Transapical TAVR done 2024: complications post op with subcutaneous emphysema, left apical pneumothorax and left pulmonary hemorrhage. Was hospitalized from - and then went to rehab until 2024. Following up with cardiology  Polina De Leon NP at [facility]  MGUS: no specific treatment. Followed by oncology  GERD: currently taking protonix  Iron deficiency anemia: Post op TAVR and complications Hgb had dropped to 7.7 but with diuresis Hgb  improved to 11.1  Left lower lung mass: followed by oncology  Bilateral pleural effusion: cont lasix 20 mg daily.   Pneumonia: recently in ED  and treated for pneumonia with augmentin    Pt seen in her room. Pt has a loose congested cough that at times is thick productive mucous. She is sneezing at times which she states is triggered by the air conditioning. She denies feeling short of breath and felt therapy went quite well earlier today. She takes advair. She is currently smoking when last at home up to 6 cigarettes/day and currently in  rehab not smoking and declining nicorette patch or gum.   She reports had a bowel movement this morning. Urinating ok.   She tells me she had right cataract surgery done and needs left cataract surgery but today she reports she has been having intermittent black floaters in left eye. She has glasses but does not always wear them.   She reports that her son was visiting from [place] when she was at home after being discharged from rehab in the [facility] (s/p TAVR) and he took her to get her hearing tested and she was prescribed hearing aides that she felt she didn't need. She is quite distressed by this as $5600 was removed from her bank account to pay for the hearing aides. Today she is able to have a conversation with me and communicate/hear me appropriately without using hearing aides.     Called daughter to update on visit today. Questions answered. Daughter will call  Arcadio Garcia to see if he can visit with her given the high amount of stress she is under given family circumstances.         Multidisciplinary notes, laboratory values, medications, vital signs, weight and orders arereviewed from nursing home records. I have reviewed the patient s medical history and updated the computerized patient record.     PMH:  Past Medical History:   Diagnosis Date    Asymptomatic varicose veins of lower extremity     6/12/2012    Benign paroxysmal vertigo     12/29/2010    Chronic obstructive pulmonary disease (H)     12/29/2010    Diarrhea     10/1/2015    Disorder of cartilage     Hip; Last dxa 06/2010    Diverticulosis of large intestine without perforation or abscess without bleeding          Lower abdominal pain     10/1/2015    Other disorders of lung (CODE)     Stable since July of 2002. RU lobe.    Personal history of other medical treatment (CODE)     L3, L4-4; Last MRI 7/09/12    Personal history of other medical treatment (CODE)     G-3, P-2, A-0  (Son had SIDS)    Zoster without complications     3/31/2012        Medications:  Current Outpatient Medications   Medication Sig Dispense Refill    acetaminophen (TYLENOL) 325 MG tablet Take 325-650 mg by mouth every 6 hours as needed for mild pain      aspirin 81 MG EC tablet Take 1 tablet (81 mg) by mouth daily 90 tablet 3    benzonatate (TESSALON) 200 MG capsule Take 1 capsule (200 mg) by mouth 3 times daily as needed for cough 30 capsule 0    cyanocobalamin (VITAMIN B-12) 1000 MCG tablet Take 1 tablet (1,000 mcg) by mouth daily 90 tablet 3    fluticasone-salmeterol (ADVAIR DISKUS) 250-50 MCG/ACT inhaler Inhale 1 puff into the lungs 2 times daily WIXELA-Disp as covered by Pt's insurance 3 each 2    furosemide (LASIX) 20 MG tablet Take 1 tablet (20 mg) by mouth daily 30 tablet 0    gabapentin (NEURONTIN) 100 MG capsule Take 1 capsule (100 mg) by mouth or Feeding Tube 2 times daily 30 capsule 3    guaiFENesin (MUCINEX) 600 MG 12 hr tablet Take 1 tablet (600 mg) by mouth 2 times daily 60 tablet 0    guaiFENesin-codeine (ROBITUSSIN AC) 100-10 MG/5ML solution Take 5 mLs by mouth every 6 hours as needed for cough      [START ON 8/8/2024] lisinopril (ZESTRIL) 2.5 MG tablet Take 1 tablet (2.5 mg) by mouth daily 30 tablet 0    methocarbamol (ROBAXIN) 500 MG tablet 1 tablet (500 mg) by Oral or Feeding Tube route 3 times daily      metoprolol succinate ER (TOPROL XL) 25 MG 24 hr tablet Take 1 tablet (25 mg) by mouth daily 90 tablet 3    pantoprazole (PROTONIX) 40 MG EC tablet Take 1 tablet (40 mg) by mouth daily      potassium chloride juan carlos ER (KLOR-CON M20) 20 MEQ CR tablet Take 1 tablet (20 mEq) by mouth daily 30 tablet 0    simvastatin (ZOCOR) 20 MG tablet Take 1 tablet (20 mg) by mouth at bedtime 90 tablet 2      Medication reconciliation complete between Epic record and [facility].     Allergies:  Allergies   Allergen Reactions    Metronidazole Nausea and Vomiting    Pneumococcal Vaccine      Other reaction(s): Edema  Arm swelling    Tramadol Visual Disturbance     Hallucinations     "      Review of Systems:  See HPI    Vital Signs:  Temp 97.8   Pulse 74   Respirations 14   /55   Oxygen Sats 92%   Weight 77.7#    Physical Exam:     Pleasant and alert without distress.  Thin body habitus. Appears quite weak  Sclera nonicteric, conjunctiva non-inflamed.  Skin color pink. Mucous membranes moist.   Lungs clear to auscultation throughout. No wheezes or rales noted.   Cardiovascular regular, S1, S2 auscultated. Click heard  Abdomen soft and without tenderness   Extremities without edema.     Able to move upper and lower extremities       New Labs/Diagnostics:  Last Comprehensive Metabolic Panel:  Lab Results   Component Value Date     08/04/2024    POTASSIUM 4.4 08/04/2024    CHLORIDE 101 08/04/2024    CO2 32 (H) 08/04/2024    ANIONGAP 9 08/04/2024    GLC 90 08/04/2024    BUN 25.4 (H) 08/04/2024    CR 1.33 (H) 08/04/2024    GFRESTIMATED 40 (L) 08/04/2024    DION 9.0 08/04/2024       Lab Results   Component Value Date    WBC 9.9 08/03/2024    WBC 7.0 10/24/2019     Lab Results   Component Value Date    RBC 3.55 08/03/2024    RBC 3.89 10/24/2019     Lab Results   Component Value Date    HGB 11.1 08/03/2024    HGB 12.1 10/24/2019     Lab Results   Component Value Date    HCT 34.6 08/03/2024    HCT 36.5 10/24/2019     No components found for: \"MCT\"  Lab Results   Component Value Date    MCV 98 08/03/2024    MCV 94 10/24/2019     Lab Results   Component Value Date    MCH 31.3 08/03/2024    MCH 31.1 10/24/2019     Lab Results   Component Value Date    MCHC 32.1 08/03/2024    MCHC 33.2 10/24/2019     Lab Results   Component Value Date    RDW 14.6 08/03/2024    RDW 12.9 10/24/2019     Lab Results   Component Value Date     08/03/2024     10/24/2019     8/1/24 FINDINGS: CT chest     CHEST:     VESSELS AND HEART: There is adequate contrast bolus in the study.  Contrast bolus adequately opacifies the pulmonary arteries. No acute  pulmonary emboli to the subsegmental level. " Atherosclerotic  calcification of the aorta without aneurysmal dilation. Mitral annular  calcification. Postsurgical changes of TAVR. Moderate coronary  calcification.     LUNGS: No pulmonary mass. Interlobular septal thickening. Chronic  interstitial changes with paraseptal emphysema. Dependent bronchial  wall thickening. Tree-in-bud opacities in the left base and left upper  lobe. Dependent consolidations.  PLEURA: Moderate to large bilateral pleural effusions. No  pneumothorax.  LYMPH NODES: No enlarged lymph nodes.  THYROID: Within normal limits.     BONES AND SOFT TISSUES:  No suspicious osseous lesions. Degenerative periarticular changes and  spinal spondylosis.     UPPER ABDOMEN:  Limited evaluation of the upper abdomen demonstrates no acute  parenchymal abnormalities, nonobstructive bowel gas pattern, and no  free fluid or free air.                                                                      IMPRESSION:   1.  No acute pulmonary emboli to the subsegmental level.  2.  Changes in the chest which may be due to heart failure, including  effusions, and bilateral interstitial opacities with dependent  consolidations.   3.  Patchy groundglass opacities in left upper and left lower lobe are  concerning for infection.     PACO RIVERA MD        Assessment/Plan:  (I50.32) Chronic heart failure with preserved ejection fraction (H)  (primary encounter diagnosis)  Comment: chronic stable  Plan: cont lasix 20 mg daily, lisinopril, metoprolol succinate, check BMP    (J90) Bilateral pleural effusion  Comment: unclear if chronic or since TAVR?  Plan: cont lasix    (J43.1) Panlobular emphysema (H)  Comment: chronic-loose cough  Plan: cont advair    (K21.9) Gastroesophageal reflux disease without esophagitis  Comment: chronic  Plan: cont protonix    (D47.2) MGUS (monoclonal gammopathy of unknown significance)  Comment: chronic   Plan: currently no treatment-follow up with oncology    (D50.0) Iron deficiency anemia due  to chronic blood loss  Comment: improving  Plan: check CBC    (Z72.0) Tobacco abuse  Comment: currently not smoking but was recently smoking 6 cigs/day at home  Plan: education re: smoking cessation, declining nicorette gum or patch    (Z95.2) History of transcatheter aortic valve replacement (TAVR)  Comment: performed in 6/2024  Plan: follow up with cardiology    Left eye black floaters intermittent--setting up eye appointment with Dr Lemos    A total of 115 minutes spent by me on the date of the encounter doing chart review from [place], review of test results, interpretation of tests, review of medications, patient visit, and documentation.    VEENA Dickey, CNP ....................  8/6/2024   10:05 AM

## 2024-08-06 NOTE — TELEPHONE ENCOUNTER
Transitional Care Management    Patient discharged to skilled nursing facility for short term rehab. No TCM call required per policy.   Jennifer Cuba RN on 8/6/2024 at 10:25 AM

## 2024-08-07 ENCOUNTER — DOCUMENTATION ONLY (OUTPATIENT)
Dept: OTHER | Facility: CLINIC | Age: 79
End: 2024-08-07
Payer: MEDICARE

## 2024-08-07 ENCOUNTER — HOSPITAL ENCOUNTER (OUTPATIENT)
Dept: GENERAL RADIOLOGY | Facility: OTHER | Age: 79
Discharge: HOME | End: 2024-08-07
Attending: NURSE PRACTITIONER | Admitting: NURSE PRACTITIONER
Payer: MEDICARE

## 2024-08-07 DIAGNOSIS — R05.2 SUBACUTE COUGH: ICD-10-CM

## 2024-08-07 DIAGNOSIS — J43.1 PANLOBULAR EMPHYSEMA (H): ICD-10-CM

## 2024-08-07 DIAGNOSIS — J90 BILATERAL PLEURAL EFFUSION: ICD-10-CM

## 2024-08-07 PROCEDURE — 71046 X-RAY EXAM CHEST 2 VIEWS: CPT

## 2024-08-08 ENCOUNTER — LAB REQUISITION (OUTPATIENT)
Dept: LAB | Facility: OTHER | Age: 79
End: 2024-08-08

## 2024-08-08 DIAGNOSIS — I50.33 ACUTE ON CHRONIC DIASTOLIC (CONGESTIVE) HEART FAILURE (H): ICD-10-CM

## 2024-08-08 LAB
ANION GAP SERPL CALCULATED.3IONS-SCNC: 11 MMOL/L (ref 7–15)
BASOPHILS # BLD AUTO: 0.1 10E3/UL (ref 0–0.2)
BASOPHILS NFR BLD AUTO: 2 %
BUN SERPL-MCNC: 20.8 MG/DL (ref 8–23)
CALCIUM SERPL-MCNC: 9.2 MG/DL (ref 8.8–10.4)
CHLORIDE SERPL-SCNC: 101 MMOL/L (ref 98–107)
CREAT SERPL-MCNC: 1.08 MG/DL (ref 0.51–0.95)
EGFRCR SERPLBLD CKD-EPI 2021: 52 ML/MIN/1.73M2
EOSINOPHIL # BLD AUTO: 0.4 10E3/UL (ref 0–0.7)
EOSINOPHIL NFR BLD AUTO: 8 %
ERYTHROCYTE [DISTWIDTH] IN BLOOD BY AUTOMATED COUNT: 14.5 % (ref 10–15)
GLUCOSE SERPL-MCNC: 86 MG/DL (ref 70–99)
HCO3 SERPL-SCNC: 28 MMOL/L (ref 22–29)
HCT VFR BLD AUTO: 32.8 % (ref 35–47)
HGB BLD-MCNC: 10.4 G/DL (ref 11.7–15.7)
IMM GRANULOCYTES # BLD: 0 10E3/UL
IMM GRANULOCYTES NFR BLD: 1 %
LYMPHOCYTES # BLD AUTO: 0.7 10E3/UL (ref 0.8–5.3)
LYMPHOCYTES NFR BLD AUTO: 14 %
MCH RBC QN AUTO: 31.4 PG (ref 26.5–33)
MCHC RBC AUTO-ENTMCNC: 31.7 G/DL (ref 31.5–36.5)
MCV RBC AUTO: 99 FL (ref 78–100)
MONOCYTES # BLD AUTO: 0.4 10E3/UL (ref 0–1.3)
MONOCYTES NFR BLD AUTO: 9 %
NEUTROPHILS # BLD AUTO: 3.1 10E3/UL (ref 1.6–8.3)
NEUTROPHILS NFR BLD AUTO: 67 %
NRBC # BLD AUTO: 0 10E3/UL
NRBC BLD AUTO-RTO: 0 /100
PLATELET # BLD AUTO: 250 10E3/UL (ref 150–450)
POTASSIUM SERPL-SCNC: 4.3 MMOL/L (ref 3.4–5.3)
RBC # BLD AUTO: 3.31 10E6/UL (ref 3.8–5.2)
SODIUM SERPL-SCNC: 140 MMOL/L (ref 135–145)
WBC # BLD AUTO: 4.7 10E3/UL (ref 4–11)

## 2024-08-08 PROCEDURE — 85004 AUTOMATED DIFF WBC COUNT: CPT | Performed by: NURSE PRACTITIONER

## 2024-08-08 PROCEDURE — 80048 BASIC METABOLIC PNL TOTAL CA: CPT | Performed by: NURSE PRACTITIONER

## 2024-08-12 ENCOUNTER — LAB REQUISITION (OUTPATIENT)
Dept: LAB | Facility: OTHER | Age: 79
End: 2024-08-12

## 2024-08-12 DIAGNOSIS — D64.9 ANEMIA, UNSPECIFIED: ICD-10-CM

## 2024-08-12 LAB
ERYTHROCYTE [DISTWIDTH] IN BLOOD BY AUTOMATED COUNT: 14.6 % (ref 10–15)
FOLATE SERPL-MCNC: 8.7 NG/ML (ref 4.6–34.8)
HCT VFR BLD AUTO: 32.1 % (ref 35–47)
HGB BLD-MCNC: 10.2 G/DL (ref 11.7–15.7)
IRON BINDING CAPACITY (ROCHE): 220 UG/DL (ref 240–430)
IRON SATN MFR SERPL: 37 % (ref 15–46)
IRON SERPL-MCNC: 81 UG/DL (ref 37–145)
MCH RBC QN AUTO: 30.9 PG (ref 26.5–33)
MCHC RBC AUTO-ENTMCNC: 31.8 G/DL (ref 31.5–36.5)
MCV RBC AUTO: 97 FL (ref 78–100)
PLATELET # BLD AUTO: 272 10E3/UL (ref 150–450)
RBC # BLD AUTO: 3.3 10E6/UL (ref 3.8–5.2)
VIT B12 SERPL-MCNC: 1503 PG/ML (ref 232–1245)
WBC # BLD AUTO: 5.8 10E3/UL (ref 4–11)

## 2024-08-12 PROCEDURE — 83550 IRON BINDING TEST: CPT | Performed by: NURSE PRACTITIONER

## 2024-08-12 PROCEDURE — 85041 AUTOMATED RBC COUNT: CPT | Performed by: NURSE PRACTITIONER

## 2024-08-12 PROCEDURE — 82746 ASSAY OF FOLIC ACID SERUM: CPT | Performed by: NURSE PRACTITIONER

## 2024-08-12 PROCEDURE — 82607 VITAMIN B-12: CPT | Performed by: NURSE PRACTITIONER

## 2024-08-14 ENCOUNTER — NURSING HOME VISIT (OUTPATIENT)
Dept: GERIATRICS | Facility: OTHER | Age: 79
End: 2024-08-14
Payer: MEDICARE

## 2024-08-14 DIAGNOSIS — D47.2 MGUS (MONOCLONAL GAMMOPATHY OF UNKNOWN SIGNIFICANCE): ICD-10-CM

## 2024-08-14 DIAGNOSIS — M81.0 AGE-RELATED OSTEOPOROSIS WITHOUT CURRENT PATHOLOGICAL FRACTURE: ICD-10-CM

## 2024-08-14 DIAGNOSIS — Z95.2 HISTORY OF TRANSCATHETER AORTIC VALVE REPLACEMENT (TAVR): ICD-10-CM

## 2024-08-14 DIAGNOSIS — D50.8 IRON DEFICIENCY ANEMIA SECONDARY TO INADEQUATE DIETARY IRON INTAKE: ICD-10-CM

## 2024-08-14 DIAGNOSIS — J90 BILATERAL PLEURAL EFFUSION: ICD-10-CM

## 2024-08-14 DIAGNOSIS — I50.32 CHRONIC HEART FAILURE WITH PRESERVED EJECTION FRACTION (H): Primary | ICD-10-CM

## 2024-08-14 DIAGNOSIS — J43.1 PANLOBULAR EMPHYSEMA (H): ICD-10-CM

## 2024-08-14 PROCEDURE — 99310 SBSQ NF CARE HIGH MDM 45: CPT | Performed by: NURSE PRACTITIONER

## 2024-08-14 PROCEDURE — 99418 PROLNG IP/OBS E/M EA 15 MIN: CPT | Performed by: NURSE PRACTITIONER

## 2024-08-14 NOTE — PROGRESS NOTES
[facility]  Face to face for discharge with home health care services           Patient Name: Loulou Lucero   : 1945  MRN: [id number]    Place of Service: [facility]  DOS: 2024    CC: Face to face for discharge with home health care services      HPI:  Loulou Lucero is a 79 year old female with PMH of multiple chronic medical concerns, who is seen today regarding Face to face for discharge with home health care services for the following:    Bilateral pleural effusion  Panlobular emphysema (H)  Chronic heart failure with preserved ejection fraction (H)  Age-related osteoporosis without current pathological fracture  MGUS (monoclonal gammopathy of unknown significance)  Iron deficiency anemia secondary to inadequate dietary iron intake  History of transcatheter aortic valve replacement (TAVR)      Pt was hospitalized -24 at [facility] for acute on chronic heart failure.  Presented to ED with increases shortness of breath and orthopnea. Diuresed with IV lasix, some decline in renal function with diuresis. Was discharged on lasix 20 mg daily.   Echo showed EF 60-65%, well-seated valve with trace paravalvular regurgitation.   Lisinopril 2.5 mg daily started to optimize meds with heart failure. Also taking statin, metoprolol succinate, and aspirin.      COPD: ongoing tobacco use. Nicotine patch was used in hospital but not discharged with this.   Transapical TAVR done 2024: complications post op with subcutaneous emphysema, left apical pneumothorax and left pulmonary hemorrhage. Was hospitalized from - and then went to rehab until 2024. Following up with cardiology  Polina De Leon NP at [facility]  MGUS: no specific treatment. Followed by oncology  GERD: currently taking protonix  Iron deficiency anemia: Post op TAVR and complications Hgb had dropped to 7.7 but with diuresis Hgb  improved to 11.1  Left lower lung mass: followed by  oncology  Bilateral pleural effusion: cont lasix 20 mg daily.   Pneumonia: recently in ED 7/17 and treated for pneumonia with augmentin      Pt seen today for plans to discharge home 8/16 with PT/nursing [facility]. She has reacher, shower bench, and grab bars at home. She has meals on wheels and support from daughter. Discharge follow up scheduled with Dr Pisano 8/30.   Per therapy pt is modified independent with all ADLs without FWW, ambulating 300+ feet. She has addressed 4 stairs for transition home. Tinetti balance 25/28. SLUMS 21/30. 0/5 on med set up. Home nursing to help with  med set up and education.     While pt was in rehab she did sneak outside to have a cigarette on a couple occasions. She had declined a nicotine patch while here and stated she was not having cravings.     Pt denies shortness of breath, chest pain. She reports still having some mild discomfort left side from surgery. Cough is much improved and has not been taking the guaifenesin-codeine cough syrup so will discontinue this. She seems a little unsure of the medication changes and gets used to her routine and so is requiring med set up. Her daughter will be able to do this and home care nursing can help educate as well.   Pt has no concerns for going home.       Weight has remained stable around 77#  BP ranging 101-129, with a couple in 130-140s  HR 60-70s  Oxygenation stable on room air 95-96%        Sept 4 she is getting Zio patch placed at [facility]  Oct 8 cardiology follow up in [place] as well as Nov 4 cardiology follow up at [facility]  Oct 22 eye exam with Dr Galloway in [place]  Dec 3 oncology/hematology with Dr Hansen      Multidisciplinary notes, laboratory values, medications, vital signs, weight and orders arereviewed from nursing home records. I have reviewed the patient s medical history and updated the computerized patient record.     PMH:  Past Medical History:   Diagnosis Date    Asymptomatic  varicose veins of lower extremity     6/12/2012    Benign paroxysmal vertigo     12/29/2010    Chronic obstructive pulmonary disease (H)     12/29/2010    Diarrhea     10/1/2015    Disorder of cartilage     Hip; Last dxa 06/2010    Diverticulosis of large intestine without perforation or abscess without bleeding          Lower abdominal pain     10/1/2015    Other disorders of lung (CODE)     Stable since July of 2002. RU lobe.    Personal history of other medical treatment (CODE)     L3, L4-4; Last MRI 7/09/12    Personal history of other medical treatment (CODE)     G-3, P-2, A-0  (Son had SIDS)    Zoster without complications     3/31/2012       Medications:  Current Outpatient Medications   Medication Sig Dispense Refill    acetaminophen (TYLENOL) 325 MG tablet Take 325-650 mg by mouth every 6 hours as needed for mild pain      aspirin 81 MG EC tablet Take 1 tablet (81 mg) by mouth daily 90 tablet 3    benzonatate (TESSALON) 200 MG capsule Take 1 capsule (200 mg) by mouth 3 times daily as needed for cough 30 capsule 0    cyanocobalamin (VITAMIN B-12) 1000 MCG tablet Take 1 tablet (1,000 mcg) by mouth daily 90 tablet 3    fluticasone-salmeterol (ADVAIR DISKUS) 250-50 MCG/ACT inhaler Inhale 1 puff into the lungs 2 times daily WIXELA-Disp as covered by Pt's insurance 3 each 2    furosemide (LASIX) 20 MG tablet Take 1 tablet (20 mg) by mouth daily 30 tablet 0    gabapentin (NEURONTIN) 100 MG capsule Take 1 capsule (100 mg) by mouth or Feeding Tube 2 times daily 30 capsule 3    guaiFENesin (MUCINEX) 600 MG 12 hr tablet Take 1 tablet (600 mg) by mouth 2 times daily 60 tablet 0    lisinopril (ZESTRIL) 2.5 MG tablet Take 1 tablet (2.5 mg) by mouth daily 30 tablet 0    methocarbamol (ROBAXIN) 500 MG tablet 1 tablet (500 mg) by Oral or Feeding Tube route 3 times daily      metoprolol succinate ER (TOPROL XL) 25 MG 24 hr tablet Take 1 tablet (25 mg) by mouth daily 90 tablet 3    pantoprazole (PROTONIX) 40 MG EC tablet Take  "1 tablet (40 mg) by mouth daily      potassium chloride juan carlos ER (KLOR-CON M20) 20 MEQ CR tablet Take 1 tablet (20 mEq) by mouth daily 30 tablet 0    simvastatin (ZOCOR) 20 MG tablet Take 1 tablet (20 mg) by mouth at bedtime 90 tablet 2      Medication reconciliation complete between Epic record and NH MAR.     Allergies:  Allergies   Allergen Reactions    Metronidazole Nausea and Vomiting    Pneumococcal Vaccine      Other reaction(s): Edema  Arm swelling    Tramadol Visual Disturbance     Hallucinations          Review of Systems:  See HPI    Vital Signs:  Temp 97.1   Pulse 67   Respirations 14   /52   Oxygen Sats 95%   Weight 77.1#    Physical Exam:     Pleasant and alert without distress. Thin, cachectic appearance   Sclera nonicteric, conjunctiva non-inflamed.  Skin color pink. Mucous membranes moist.   Lungs clear to auscultation throughout. No wheezes or rales noted.   Cardiovascular regular, S1, S2 auscultated. No murmur noted.  Abdomen soft and without tenderness   Extremities without edema.    Able to move upper and lower extremities       New Labs/Diagnostics:  Vitamin B12  232 - 1,245 pg/mL 1,503 High      Lab Results   Component Value Date    WBC 5.8 08/12/2024    WBC 7.0 10/24/2019     Lab Results   Component Value Date    RBC 3.30 08/12/2024    RBC 3.89 10/24/2019     Lab Results   Component Value Date    HGB 10.2 08/12/2024    HGB 12.1 10/24/2019     Lab Results   Component Value Date    HCT 32.1 08/12/2024    HCT 36.5 10/24/2019     No components found for: \"MCT\"  Lab Results   Component Value Date    MCV 97 08/12/2024    MCV 94 10/24/2019     Lab Results   Component Value Date    MCH 30.9 08/12/2024    MCH 31.1 10/24/2019     Lab Results   Component Value Date    MCHC 31.8 08/12/2024    MCHC 33.2 10/24/2019     Lab Results   Component Value Date    RDW 14.6 08/12/2024    RDW 12.9 10/24/2019     Lab Results   Component Value Date     08/12/2024     10/24/2019     Iron  37 - 145 " ug/dL 81 128 23 Low      Iron Binding Capacity  240 - 430 ug/dL 220 Low  216 Low  348    Iron Sat Index  15 - 46 % 37       Folic Acid  4.6 - 34.8 ng/mL 8.7   Last Comprehensive Metabolic Panel:  Lab Results   Component Value Date     08/08/2024    POTASSIUM 4.3 08/08/2024    CHLORIDE 101 08/08/2024    CO2 28 08/08/2024    ANIONGAP 11 08/08/2024    GLC 86 08/08/2024    BUN 20.8 08/08/2024    CR 1.08 (H) 08/08/2024    GFRESTIMATED 52 (L) 08/08/2024    DION 9.2 08/08/2024 8/7/24 CXR  HISTORY: Bilateral pleural effusion; Panlobular emphysema (H);  Subacute cough     COMPARISON: CT chest from 8/1/2024; radiograph 8/1/2024     FINDINGS: PA and lateral chest radiographs  Postsurgical changes of TAVR.  Cardiomediastinal silhouette is within normal limits. There is  calcific aortic atherosclerosis.  Chronic interstitial changes in the lungs with biapical predominant  emphysema. No focal consolidation for pneumothorax. Decreased  pulmonary opacities. Decreased bilateral pleural effusions, with small  residual left pleural effusion present.    No suspicious osseous lesion or subdiaphragmatic free air.                                                                      IMPRESSION:    Decreased bilateral pleural effusions with small residual left  effusion present.  Chronic interstitial changes with decreased pulmonary opacities.     PACO RIVERA MD          Assessment/Plan:  (I50.32) Chronic heart failure with preserved ejection fraction (H)  (primary encounter diagnosis)  Comment: chronic stable  Plan: weight daily, cont lasix 20 mg daily, metoprolol succinate, statin, lisinprol    (J90) Bilateral pleural effusion  Comment: improving  Plan: cont lasix    (J43.1) Panlobular emphysema (H)  Comment: chronic, morning cough with some phlegm  Plan: cont advair, discussed smoking cessation but does not seem too interested, offered nicotine patch but refused and has nicorette gum at home  Cont mucinex, tessalon  pearls    (M81.0) Age-related osteoporosis without current pathological fracture  Comment: chronic  Plan: monitor    (D47.2) MGUS (monoclonal gammopathy of unknown significance)  Comment: chronic  Plan: followed by oncology    (D50.8) Iron deficiency anemia secondary to inadequate dietary iron intake  Comment: chronic stable  Plan: cont B12    (Z95.2) History of transcatheter aortic valve replacement (TAVR)  Comment: noted  Plan: follow up with cardiology, zio patch, aspirin        A total of 85 minutes spent by me on the date of the encounter doing chart review from [facility] cardiology notes, review of test results, interpretation of tests, review of medications, patient visit, and documentation.    VEENA Dickey, CNP ....................  8/14/2024   8:33 AM         Documentation of Face-to-Face and Certification for Home Health Services     Patient: Loulou Lucero   YOB: 1945  MR Number: [id number]  Today's Date: 8/14/2024    I certify that patient: Loulou Lucero is under my care and that I, or a nurse practitioner or physician's assistant working with me, had a face-to-face encounter that meets the physician face-to-face encounter requirements with this patient on: 8/14/2024.    This encounter with the patient was in whole, or in part, for the following medical condition, which is the primary reason for home health care:    Bilateral pleural effusion  Panlobular emphysema (H)  Chronic heart failure with preserved ejection fraction (H)  Age-related osteoporosis without current pathological fracture  MGUS (monoclonal gammopathy of unknown significance)  Iron deficiency anemia secondary to inadequate dietary iron intake  History of transcatheter aortic valve replacement (TAVR)      I certify that, based on my findings, the following services are medically necessary home health services: Nursing and Physical Therapy.    My clinical findings support the need for the above services  because: Nurse is needed: To assess vitals, weights (heart failure), med set up after changes in medications or other medical regimen. and To provide caregiver training to assist with: med set up.. and Physical Therapy Services are needed to assess and treat the following functional impairments: s/p TAVR with complications, heart failure.    Further, I certify that my clinical findings support that this patient is homebound (i.e. absences from home require considerable and taxing effort and are for medical reasons or [employer] services or infrequently or of short duration when for other reasons) because: Requires assistance of another person or specialized equipment to access medical services because patient: Requires supervision of another for safe transfer...    Based on the above findings. I certify that this patient is confined to the home and needs intermittent skilled nursing care, physical therapy and/or speech therapy.  The patient is under my care, and I have initiated the establishment of the plan of care.  This patient will be followed by a physician who will periodically review the plan of care.  Physician/Provider to provide follow up care: Ele Marc

## 2024-08-18 ENCOUNTER — TELEPHONE (OUTPATIENT)
Dept: INTERNAL MEDICINE | Facility: OTHER | Age: 79
End: 2024-08-18
Payer: MEDICARE

## 2024-08-18 NOTE — TELEPHONE ENCOUNTER
"Patient was discharged from [facility] and was resumed by home care.         Request for Home Care Skilled Nursing orders as follows:    Continuation frequency =   2x week x 1 week   1 x week x 2 weeks          Effective date= 8/19/24    3 PRN    Interventions include:    Assessment  Medication management  O2 sat monitoring (all disciplines as needed)  Patient/caregiver education  Fall risk: instruct on fall prevention strategies, home safety, assess environment Observe for signs and symptoms of depression, assess effectiveness of medication, if at risk  Instruct on pain relief measures and assess pain with each visit, if has pain. Assess effectiveness of medications.    Vital signs as follows:       check oxygen saturation - goal > 90%       check blood pressure - abnormal ranges = < 95/60 or > 140/90       check pulse - abnormal range < 60 or > 100       check respirations - abnormal range < 12 or > 20       check vital signs: temperature abnormal range > 100.4      Please respond with .HOMECAREAGREE, if you are in agreement. Please sign with your comments and signature, if you are not in agreement.                URGENT: per CMS best practice, response is requested within 24 hours.    Home Care regulation mandates that you are notified about drug discrepancies, interactions & contraindications. Response within a 24 hour timeframe is established by CMS as \"best practice\" for the delivery of home health care. Home Care is required to report if the 24 hour timeframe was met. The home health clinician will contact you again if this timeframe is not met or if the response does not address all concerns.       Situation:        The patient was admitted to [facility], Upon admission, a med reconciliation was completed to identify any drug discrepancies, interactions or contraindications. Home Care's drug regime review has revealed significant medication issues.     You are being contacted for clarifying " "orders related to the medication issues.     Background:    severe interaction:    Assessment:        Potassium chloride and lisinopril.       Recommendations:    Please evaluate this information and indicate below whether or not changes are required. A copy of the patient's drug interaction/contraindications report is available upon request.       CLINIC NURSE - If changes are made, please update the Epic medication profile.             Vital signs:     S: The patient had a vital sign that was outside of \"normal\" parameters at the end of their home visit today. The vital sign was 117/52    B: You are being contacted because The Joint Commission expects home care clinicians to notify the provider when the patient's vital signs are outside of defined parameters as a best practice of patient care.    [facility]'s vital sign parameters for adults are as follows:        oxygen saturation - less than 90%         blood pressure - less than 95/60 or greater than 140/90         respirations - less than 12 or greater than 20        pulse - less than 60 or greater than 100         temperature - less than 96.8 or greater than 100.4     A: If you are not concerned about the report above and do not want future communications like this, you can change the patient's defined parameters or specify when you would like to be notified by responding to this message.     R: Consider revising parameters or provide follow-up instructions         Please sign this encounter with your electronic signature.       Thanks,     Breanna Cannon RN on 8/18/2024 at 2:07 PM           "

## 2024-08-20 ENCOUNTER — MEDICAL CORRESPONDENCE (OUTPATIENT)
Dept: HEALTH INFORMATION MANAGEMENT | Facility: OTHER | Age: 79
End: 2024-08-20
Payer: MEDICARE

## 2024-08-21 ENCOUNTER — TELEPHONE (OUTPATIENT)
Dept: INTERNAL MEDICINE | Facility: OTHER | Age: 79
End: 2024-08-21
Payer: MEDICARE

## 2024-08-21 NOTE — TELEPHONE ENCOUNTER
Request for additional Home Care Physical Therapy orders as follows:      Continuation frequency =   ___1___  x week x  ____1___ week(s)    Effective date = 8/21/24      Continuation frequency =   ___2___  x week x  ____4___ week(s)    Effective date = 8/25/24      Interventions include:    Gait Training  Muscle strengthening exercises  Balance training  Transfer Training  Education - home safety  Instruction - home exercise program  Therapeutic exercise  Pain assessment & management  Home safety assessment        Therapist: Claudia Roe PT    Please respond with .HOMECAREAGREE, if you are in agreement. Please sign with your comments and signature, if you are not in agreement.

## 2024-08-21 NOTE — TELEPHONE ENCOUNTER
"S: The patient had a vital sign that was outside of \"normal\" parameters at the end of their home visit today. The vital sign was: 102/56    B: You are being contacted because The Joint Commission expects home care clinicians to notify the provider when the patient's vital signs are outside of defined parameters as a best practice of patient care.    [facility]'s vital sign parameters for adults are as follows:        oxygen saturation - less than 90%         blood pressure - less than 95/60 or greater than 140/90         respirations - less than 12 or greater than 20        pulse - less than 60 or greater than 100         temperature - less than 96.8 or greater than 100.4     A: If you are not concerned about the report above and do not want future communications like this, you can change the patient's defined parameters or specify when you would like to be notified by responding to this message.     R: Consider revising parameters or provide follow-up instructions        ThanksSarah LPN           "

## 2024-08-22 ENCOUNTER — TELEPHONE (OUTPATIENT)
Dept: INTERNAL MEDICINE | Facility: OTHER | Age: 79
End: 2024-08-22

## 2024-08-22 ENCOUNTER — MEDICAL CORRESPONDENCE (OUTPATIENT)
Dept: HEALTH INFORMATION MANAGEMENT | Facility: OTHER | Age: 79
End: 2024-08-22
Payer: MEDICARE

## 2024-08-22 NOTE — TELEPHONE ENCOUNTER
"S: The patient had a vital sign that was outside of \"normal\" parameters at the end of their home visit today. The vital sign was blood pressure: 108/50, r ue with pediatric manual cuff    B: You are being contacted because The Joint Commission expects home care clinicians to notify the provider when the patient's vital signs are outside of defined parameters as a best practice of patient care.    [facility]'s vital sign parameters for adults are as follows:        oxygen saturation - less than 90%         blood pressure - less than 95/60 or greater than 140/90         respirations - less than 12 or greater than 20        pulse - less than 60 or greater than 100         temperature - less than 96.8 or greater than 100.4     A: If you are not concerned about the report above and do not want future communications like this, you can change the patient's defined parameters or specify when you would like to be notified by responding to this message.     R: Consider revising parameters or provide follow-up instructions    Claudia Roe DPT       "

## 2024-08-23 ENCOUNTER — TELEPHONE (OUTPATIENT)
Dept: INTERNAL MEDICINE | Facility: OTHER | Age: 79
End: 2024-08-23
Payer: MEDICARE

## 2024-08-23 DIAGNOSIS — J43.9 PULMONARY EMPHYSEMA, UNSPECIFIED EMPHYSEMA TYPE (H): Primary | ICD-10-CM

## 2024-08-23 NOTE — TELEPHONE ENCOUNTER
Ele PATEL-FNP   was given home care or hospice form(s) for review and signature. Certification period effective 07/17/24 to 09/14/24.  Alicja Vidales LPN on 8/23/2024 at 3:10 PM

## 2024-08-26 ENCOUNTER — TELEPHONE (OUTPATIENT)
Dept: INTERNAL MEDICINE | Facility: OTHER | Age: 79
End: 2024-08-26

## 2024-08-26 NOTE — TELEPHONE ENCOUNTER
"S: The patient had a vital sign that was outside of \"normal\" parameters at the end of their home visit today. The vital sign was blood pressure 98/50.    B: You are being contacted because The Joint Commission expects home care clinicians to notify the provider when the patient's vital signs are outside of defined parameters as a best practice of patient care.    [facility]'s vital sign parameters for adults are as follows:        oxygen saturation - less than 90%         blood pressure - less than 95/60 or greater than 140/90         respirations - less than 12 or greater than 20        pulse - less than 60 or greater than 100         temperature - less than 96.8 or greater than 100.4     A: If you are not concerned about the report above and do not want future communications like this, you can change the patient's defined parameters or specify when you would like to be notified by responding to this message.     R: Consider revising parameters or provide follow-up instructions        "

## 2024-08-29 DIAGNOSIS — I50.33 ACUTE ON CHRONIC HEART FAILURE WITH PRESERVED EJECTION FRACTION (HFPEF) (H): ICD-10-CM

## 2024-08-29 DIAGNOSIS — J43.1 PANLOBULAR EMPHYSEMA (H): ICD-10-CM

## 2024-08-29 DIAGNOSIS — N17.0 ACUTE KIDNEY FAILURE WITH TUBULAR NECROSIS (H): ICD-10-CM

## 2024-08-29 RX ORDER — LISINOPRIL 2.5 MG/1
2.5 TABLET ORAL DAILY
Qty: 30 TABLET | Refills: 0 | Status: SHIPPED | OUTPATIENT
Start: 2024-08-29 | End: 2024-08-30

## 2024-08-29 NOTE — TELEPHONE ENCOUNTER
[facility] sent Rx request for the following:      Requested Prescriptions   Pending Prescriptions Disp Refills    lisinopril (ZESTRIL) 2.5 MG tablet 30 tablet 0     Sig: Take 1 tablet (2.5 mg) by mouth daily.       ACE Inhibitors (Including Combos) Protocol Passed - 8/29/2024  8:33 AM        Passed - Blood pressure under 140/90 in past 12 months- Clinicial or Patient Reported     BP Readings from Last 3 Encounters:   08/05/24 125/59   07/29/24 128/58   07/23/24 (!) 142/58       No data recorded            Passed - Medication is active on med list        Passed - Medication indicated for associated diagnosis     Medication is associated with one or more of the following diagnoses:     Chronic Kidney Disease (CKD)   Coronary Artery Disease (CAD)   Diabetes   Heart Failure (HF)   Hypertension (HTN)   Nephropathy            Passed - Recent (12 mo) or future (90 days) visit within the authorizing provider's specialty     The patient must have completed an in-person or virtual visit within the past 12 months or has a future visit scheduled within the next 90 days with the authorizing provider s specialty.  Urgent care and e-visits do not quality as an office visit for this protocol.          Passed - Most recent GFR on file in the past 12 months >30        Passed - Patient is age 18 or older        Passed - No active pregnancy on record        Passed - Normal serum potassium on file in past 12 months     Recent Labs   Lab Test 08/08/24  0831   POTASSIUM 4.3             Passed - No positive pregnancy test within past 12 months             Last Prescription Date:   8/8/24  Last Fill Qty/Refills:         30, R-0    Last Office Visit:              8/14/24   Future Office visit:           8/30/24  Next 5 appointments (look out 90 days)      Aug 30, 2024 8:00 AM  (Arrive by 7:45 AM)  Provider Visit with Trevon Pisano DO  [facility] ([facility] ) [address]  [place] [address]  [phone]     Sep 04, 2024 12:30 PM  (Arrive by 12:15 PM)  Nurse Visit with YARA MOSES  [facility] ([facility] ) [address]  [place] [address]  [phone]     Oct 08, 2024 8:30 AM  (Arrive by 8:15 AM)  Return Visit with Annie Ansari MD  [facility] ([facility] [address] [facility] [address]  [phone]     Nov 04, 2024 1:30 PM  (Arrive by 1:15 PM)  Return Visit with Polina De Leon NP  [facility] ([facility] ) [address]  [place] [address]  [phone]           Unable to complete prescription refill per RN Medication Refill Policy.    (BP readings)    Bobby Chacko RN on 8/29/2024 at 10:29 AM

## 2024-08-30 ENCOUNTER — OFFICE VISIT (OUTPATIENT)
Dept: FAMILY MEDICINE | Facility: OTHER | Age: 79
End: 2024-08-30
Attending: STUDENT IN AN ORGANIZED HEALTH CARE EDUCATION/TRAINING PROGRAM
Payer: MEDICARE

## 2024-08-30 VITALS
TEMPERATURE: 97.5 F | OXYGEN SATURATION: 93 % | BODY MASS INDEX: 14.72 KG/M2 | DIASTOLIC BLOOD PRESSURE: 54 MMHG | SYSTOLIC BLOOD PRESSURE: 124 MMHG | WEIGHT: 75 LBS | RESPIRATION RATE: 18 BRPM | HEART RATE: 65 BPM | HEIGHT: 60 IN

## 2024-08-30 DIAGNOSIS — D50.9 IRON DEFICIENCY ANEMIA, UNSPECIFIED IRON DEFICIENCY ANEMIA TYPE: ICD-10-CM

## 2024-08-30 DIAGNOSIS — Z01.30 BP CHECK: ICD-10-CM

## 2024-08-30 DIAGNOSIS — I50.33 ACUTE ON CHRONIC HEART FAILURE WITH PRESERVED EJECTION FRACTION (HFPEF) (H): Primary | ICD-10-CM

## 2024-08-30 DIAGNOSIS — R79.89 ABNORMAL CBC: ICD-10-CM

## 2024-08-30 DIAGNOSIS — E53.8 DEFICIENCY OF OTHER SPECIFIED B GROUP VITAMINS: ICD-10-CM

## 2024-08-30 DIAGNOSIS — I35.0 AORTIC STENOSIS, SEVERE: ICD-10-CM

## 2024-08-30 DIAGNOSIS — Z95.2 HISTORY OF TRANSCATHETER AORTIC VALVE REPLACEMENT (TAVR): ICD-10-CM

## 2024-08-30 DIAGNOSIS — F17.219 CIGARETTE NICOTINE DEPENDENCE WITH NICOTINE-INDUCED DISORDER: ICD-10-CM

## 2024-08-30 DIAGNOSIS — R63.6 UNDERWEIGHT: ICD-10-CM

## 2024-08-30 DIAGNOSIS — Z95.2 S/P TAVR (TRANSCATHETER AORTIC VALVE REPLACEMENT): ICD-10-CM

## 2024-08-30 DIAGNOSIS — J43.1 PANLOBULAR EMPHYSEMA (H): ICD-10-CM

## 2024-08-30 DIAGNOSIS — R53.83 OTHER FATIGUE: ICD-10-CM

## 2024-08-30 DIAGNOSIS — N17.0 ACUTE KIDNEY FAILURE WITH TUBULAR NECROSIS (H): ICD-10-CM

## 2024-08-30 DIAGNOSIS — J44.9 CHRONIC OBSTRUCTIVE PULMONARY DISEASE, UNSPECIFIED COPD TYPE (H): ICD-10-CM

## 2024-08-30 DIAGNOSIS — Z13.29 SCREENING FOR THYROID DISORDER: ICD-10-CM

## 2024-08-30 DIAGNOSIS — M81.0 AGE-RELATED OSTEOPOROSIS WITHOUT CURRENT PATHOLOGICAL FRACTURE: ICD-10-CM

## 2024-08-30 DIAGNOSIS — J90 BILATERAL PLEURAL EFFUSION: ICD-10-CM

## 2024-08-30 DIAGNOSIS — M54.50 BILATERAL LOW BACK PAIN, UNSPECIFIED CHRONICITY, UNSPECIFIED WHETHER SCIATICA PRESENT: ICD-10-CM

## 2024-08-30 DIAGNOSIS — M85.89 OSTEOPENIA OF MULTIPLE SITES: ICD-10-CM

## 2024-08-30 DIAGNOSIS — E78.5 HYPERLIPIDEMIA LDL GOAL <100: ICD-10-CM

## 2024-08-30 DIAGNOSIS — I50.33 ACUTE ON CHRONIC HEART FAILURE WITH PRESERVED EJECTION FRACTION (HFPEF) (H): ICD-10-CM

## 2024-08-30 DIAGNOSIS — K21.9 GASTRO-ESOPHAGEAL REFLUX DISEASE WITHOUT ESOPHAGITIS: ICD-10-CM

## 2024-08-30 DIAGNOSIS — E55.9 VITAMIN D DEFICIENCY: ICD-10-CM

## 2024-08-30 DIAGNOSIS — E53.8 VITAMIN B12 DEFICIENCY (NON ANEMIC): Primary | ICD-10-CM

## 2024-08-30 DIAGNOSIS — J43.9 PULMONARY EMPHYSEMA, UNSPECIFIED EMPHYSEMA TYPE (H): ICD-10-CM

## 2024-08-30 DIAGNOSIS — R91.8 LUNG MASS: ICD-10-CM

## 2024-08-30 DIAGNOSIS — Z72.0 TOBACCO ABUSE: ICD-10-CM

## 2024-08-30 DIAGNOSIS — I50.30 HEART FAILURE WITH PRESERVED EJECTION FRACTION, NYHA CLASS I (H): ICD-10-CM

## 2024-08-30 DIAGNOSIS — D47.2 MGUS (MONOCLONAL GAMMOPATHY OF UNKNOWN SIGNIFICANCE): ICD-10-CM

## 2024-08-30 DIAGNOSIS — I25.10 ATHEROSCLEROSIS OF NATIVE CORONARY ARTERY OF NATIVE HEART WITHOUT ANGINA PECTORIS: ICD-10-CM

## 2024-08-30 LAB
CHOLEST SERPL-MCNC: 152 MG/DL
FASTING STATUS PATIENT QL REPORTED: YES
HDLC SERPL-MCNC: 68 MG/DL
LDLC SERPL CALC-MCNC: 57 MG/DL
NONHDLC SERPL-MCNC: 84 MG/DL
T4 FREE SERPL-MCNC: 1.39 NG/DL (ref 0.9–1.7)
TRIGL SERPL-MCNC: 134 MG/DL
TSH SERPL DL<=0.005 MIU/L-ACNC: 5.13 UIU/ML (ref 0.3–4.2)
VIT D+METAB SERPL-MCNC: 26 NG/ML (ref 20–50)

## 2024-08-30 PROCEDURE — 99215 OFFICE O/P EST HI 40 MIN: CPT | Mod: 25 | Performed by: STUDENT IN AN ORGANIZED HEALTH CARE EDUCATION/TRAINING PROGRAM

## 2024-08-30 PROCEDURE — 84443 ASSAY THYROID STIM HORMONE: CPT | Mod: ZL | Performed by: STUDENT IN AN ORGANIZED HEALTH CARE EDUCATION/TRAINING PROGRAM

## 2024-08-30 PROCEDURE — 80061 LIPID PANEL: CPT | Mod: ZL | Performed by: STUDENT IN AN ORGANIZED HEALTH CARE EDUCATION/TRAINING PROGRAM

## 2024-08-30 PROCEDURE — G0463 HOSPITAL OUTPT CLINIC VISIT: HCPCS | Mod: 25

## 2024-08-30 PROCEDURE — 82306 VITAMIN D 25 HYDROXY: CPT | Mod: ZL | Performed by: STUDENT IN AN ORGANIZED HEALTH CARE EDUCATION/TRAINING PROGRAM

## 2024-08-30 PROCEDURE — 84439 ASSAY OF FREE THYROXINE: CPT | Mod: ZL | Performed by: STUDENT IN AN ORGANIZED HEALTH CARE EDUCATION/TRAINING PROGRAM

## 2024-08-30 PROCEDURE — 99406 BEHAV CHNG SMOKING 3-10 MIN: CPT | Performed by: STUDENT IN AN ORGANIZED HEALTH CARE EDUCATION/TRAINING PROGRAM

## 2024-08-30 PROCEDURE — 36415 COLL VENOUS BLD VENIPUNCTURE: CPT | Mod: ZL | Performed by: STUDENT IN AN ORGANIZED HEALTH CARE EDUCATION/TRAINING PROGRAM

## 2024-08-30 PROCEDURE — 99417 PROLNG OP E/M EACH 15 MIN: CPT | Performed by: STUDENT IN AN ORGANIZED HEALTH CARE EDUCATION/TRAINING PROGRAM

## 2024-08-30 RX ORDER — POTASSIUM CHLORIDE 1500 MG/1
20 TABLET, EXTENDED RELEASE ORAL EVERY OTHER DAY
Qty: 15 TABLET | Refills: 1 | Status: SHIPPED | OUTPATIENT
Start: 2024-08-30

## 2024-08-30 RX ORDER — LISINOPRIL 2.5 MG/1
2.5 TABLET ORAL AT BEDTIME
Qty: 90 TABLET | Refills: 0 | Status: SHIPPED | OUTPATIENT
Start: 2024-08-30

## 2024-08-30 RX ORDER — FLUTICASONE PROPIONATE AND SALMETEROL 250; 50 UG/1; UG/1
1 POWDER RESPIRATORY (INHALATION) 2 TIMES DAILY
Qty: 1 EACH | Refills: 0 | Status: SHIPPED | OUTPATIENT
Start: 2024-08-30

## 2024-08-30 RX ORDER — FUROSEMIDE 20 MG
20 TABLET ORAL EVERY OTHER DAY
Qty: 15 TABLET | Refills: 1 | Status: SHIPPED | OUTPATIENT
Start: 2024-08-30

## 2024-08-30 RX ORDER — FERROUS SULFATE 325(65) MG
325 TABLET ORAL EVERY OTHER DAY
Qty: 45 TABLET | Refills: 1 | Status: SHIPPED | OUTPATIENT
Start: 2024-08-30

## 2024-08-30 RX ORDER — FOLIC ACID 1 MG/1
1 TABLET ORAL DAILY
Qty: 90 TABLET | Refills: 0 | Status: SHIPPED | OUTPATIENT
Start: 2024-08-30

## 2024-08-30 RX ORDER — METOPROLOL SUCCINATE 25 MG/1
12.5 TABLET, EXTENDED RELEASE ORAL DAILY
Qty: 45 TABLET | Refills: 0 | Status: SHIPPED | OUTPATIENT
Start: 2024-08-30

## 2024-08-30 RX ORDER — MULTIVIT WITH MINERALS/LUTEIN
250 TABLET ORAL EVERY OTHER DAY
Qty: 45 TABLET | Refills: 1 | Status: SHIPPED | OUTPATIENT
Start: 2024-08-30

## 2024-08-30 RX ORDER — ACETAMINOPHEN 325 MG/1
TABLET ORAL
Qty: 180 TABLET | Refills: 1 | Status: SHIPPED | OUTPATIENT
Start: 2024-08-30

## 2024-08-30 RX ORDER — GABAPENTIN 100 MG/1
CAPSULE ORAL
Qty: 90 CAPSULE | Refills: 1 | Status: SHIPPED | OUTPATIENT
Start: 2024-08-30

## 2024-08-30 RX ORDER — SIMVASTATIN 20 MG
20 TABLET ORAL AT BEDTIME
Qty: 90 TABLET | Refills: 0 | Status: SHIPPED | OUTPATIENT
Start: 2024-08-30

## 2024-08-30 RX ORDER — LANOLIN ALCOHOL/MO/W.PET/CERES
1000 CREAM (GRAM) TOPICAL DAILY
Qty: 90 TABLET | Refills: 3 | Status: SHIPPED | OUTPATIENT
Start: 2024-08-30

## 2024-08-30 ASSESSMENT — PAIN SCALES - GENERAL: PAINLEVEL: NO PAIN (0)

## 2024-08-30 NOTE — NURSING NOTE
Chief Complaint   Patient presents with    Discharge Summary Nursing Home     [facility] for bilateral pleural effusion and complications with Emphysema       Initial /54 (BP Location: Right arm, Patient Position: Sitting, Cuff Size: Child)   Pulse 65   Temp 97.5  F (36.4  C) (Tympanic)   Resp 18   Ht 1.524 m (5')   Wt 34 kg (75 lb)   LMP 01/01/1995 (Approximate)   SpO2 93%   BMI 14.65 kg/m   Estimated body mass index is 14.65 kg/m  as calculated from the following:    Height as of this encounter: 1.524 m (5').    Weight as of this encounter: 34 kg (75 lb).  Medication Review: complete    The next two questions are to help us understand your food security.  If you are feeling you need any assistance in this area, we have resources available to support you today.          3/21/2024   SDOH- Food Insecurity   Within the past 12 months, did you worry that your food would run out before you got money to buy more? N   Within the past 12 months, did the food you bought just not last and you didn t have money to get more? N            Health Care Directive:  Patient has a Health Care Directive on file      Mary Perry LPN

## 2024-08-30 NOTE — PROGRESS NOTES
Assessment & Plan     (I50.33) Acute on chronic heart failure with preserved ejection fraction (HFpEF) (H)  (primary encounter diagnosis)  Comment: acutity has improved; still on lasix daily. Will try taper down lasix to every other day then prn for leg swelling, SOB, & weight gain 3lb/day or 5# in 3days    (I50.30) Heart failure with preserved ejection fraction, NYHA class I (H)  Comment: consider SGLT2; on metoprolol XL 25mg - may only need 12.5mg based on HR. on very low dose ACE. Also on statin and aspirin. symptoms improved  Plan: lisinopril (ZESTRIL) 2.5 MG tablet, metoprolol         succinate ER (TOPROL XL) 25 MG 24 hr tablet,         DISCONTINUED: furosemide (LASIX) 20 MG tablet,         DISCONTINUED: potassium chloride juan carlos ER         (KLOR-CON M20) 20 MEQ CR tablet    (E78.5) Hyperlipidemia LDL goal <100  Comment: continue simvastatin  Plan: simvastatin (ZOCOR) 20 MG tablet, Lipid Panel    (I25.10) Atherosclerosis of native coronary artery of native heart without angina pectoris  Comment: calcification seen on CT. on aspirin and statin    (I35.0) Aortic stenosis, severe  Comment: recent diagnosed; s/p TAVR    (Z95.2) S/P TAVR (transcatheter aortic valve replacement)  Comment: 6/19/24  Plan: lisinopril (ZESTRIL) 2.5 MG tablet, metoprolol         succinate ER (TOPROL XL) 25 MG 24 hr tablet    (N17.0) Acute kidney failure with tubular necrosis (H)  Comment: likely s/t diuresis in hospital; improving but not yet back to baseline - CKD3a may be new baseline. Avoid nephrotoxins; taper down lasix. Consider SGLT2  Plan: lisinopril (ZESTRIL) 2.5 MG tablet,         DISCONTINUED: potassium chloride juan carlos ER         (KLOR-CON M20) 20 MEQ CR tablet    (J44.9) Chronic obstructive pulmonary disease, unspecified COPD type (H)  Comment: encouraged inhaler BID  Plan: fluticasone-salmeterol (ADVAIR DISKUS) 250-50         MCG/ACT inhaler    (F17.219) Cigarette nicotine dependence with nicotine-induced disorder  Comment:  0.5-1ppd currently  Plan: SMOKING CESSATION COUNSELING 3-10 MIN    (Z72.0) Tobacco abuse  Comment: > 30pack-yrs. Counseled quiting  Plan: SMOKING CESSATION COUNSELING 3-10 MIN    (J43.1) Panlobular emphysema (H)  Comment: Chronic interstitial changes with paraseptal emphysema.  Plan: fluticasone-salmeterol (ADVAIR DISKUS) 250-50         MCG/ACT inhaler, DISCONTINUED: furosemide         (LASIX) 20 MG tablet    (R91.8) Lung mass  Comment: non-malignant on PET    (D50.9) Iron deficiency anemia, unspecified iron deficiency anemia type  Comment: take iron and vitamin C together in evening every Monday, Wednesday, and Friday  Plan: ferrous sulfate (FEROSUL) 325 (65 Fe) MG         tablet, vitamin C (ASCORBIC ACID) 250 MG tablet    (E53.8) Deficiency of other specified B group vitamins  Comment: taking B12 but not folic acid which is borderline    (R79.89) Abnormal CBC  Comment: hx of elevated MCV now borderline high with RDW borderline high;    (R53.83) Other fatigue  Comment: likely multifactorial  Plan: TSH with free T4 reflex, T4 free    (E55.9) Vitamin D deficiency  Comment: D3 2000units daily with food  Plan: Vitamin D Total, vitamin D3 (CHOLECALCIFEROL)         50 mcg (2000 units) tablet    (M85.89) Osteopenia of multiple sites  Comment: continue vit D and calcium. Not on bisphosphonate.  Plan: vitamin D3 (CHOLECALCIFEROL) 50 mcg (2000         units) tablet    (M54.50) Bilateral low back pain, unspecified chronicity, unspecified whether sciatica present  Comment: recommend increasing tylenol to take consistently. Also recommend changing gabapentin from 1 capsule BID to 2 capsules qHS.  Plan: acetaminophen (TYLENOL) 325 MG tablet,         gabapentin (NEURONTIN) 100 MG capsule    (Z13.29) Screening for thyroid disorder  Comment: check TSH but rememer unlikely to affect T4 unless TSH > 10  Plan: TSH with free T4 reflex, T4 free    (J90) Bilateral pleural effusion  Comment: improving    (K21.9) Gastro-esophageal reflux  disease without esophagitis  Comment: on protonix, if continue should take in mornings and take iron and calcium in evenings    (D47.2) MGUS (monoclonal gammopathy of unknown significance)  Comment: no current treatment. Follows with heme/onc    (Z01.30) BP check  Comment: 124/54 today on lisinopril 2.5mg and metoprolol 50mg for her heart    (R63.6) Underweight  Comment: discussed gaining healthy weight    (Z68.1) Body mass index (BMI) less than 16.5  Comment: BMI 14.65; discussed healthy weight       With patient's heart failure and newly worsened kidney function I believe it would be appropriate to start patient on SGLT2 over furosemide; however she wishes to discuss with cardiologist who she is scheduled to see later this month.  Continue ACE inhibitor.  Continue beta-blockade but reduce dose of Toprol-XL to 12.5 mg daily as patient's heart rate is in the low 60s consistently  Discussed patient's weight and her need for nutrition especially iron calcium and vitamin D and B vitamins.  Also discussed that Protonix and other PPIs can decrease absorption.  Try to improve patient's chronic pain with rescheduling gabapentin which has been shown to give more relief with higher dose at bedtime rather than throughout the day.  Also recommended taking Tylenol consistently.  Patient unwilling to try 1000 mg 3 times a day but will try 650 mg every night.            Return in about 8 weeks (around 10/22/2024) for Follow up.    Vel Jamil is a 79 year old, presenting for the following health issues:  Discharge Summary Nursing Home ([facility] for bilateral pleural effusion and complications with Emphysema)        7/23/2024    10:20 AM   Additional Questions   Roomed by Allyson FULLER     HPI   79 year old female with complex medical concerns (such as HFpEF, aortic stenosis s/p TAVR, pleural effusions, COPD, MGUS, iron deficiency, anemia, GERD, osteoporosis) who presents for SNF discharge followup. Discharged home 8/16  with PT and nursing by [facility].    Weight has remained stable in SNF around 77 lbs; 75 lbs today in clinic.  BP ranging 101-129, with a couple in 130-140s in SNF; 124/54 today in clinic.  HR 60-70s  Oxygenation stable on room air 95-96%    Pt was hospitalized 8/1-8/5/24 at [facility] for acute on chronic heart failure.  Presented to ED with increased shortness of breath and orthopnea. Diuresed with IV lasix, some decline in renal function with diuresis. Was discharged on lasix 20 mg daily.   Echo showed EF 60-65%, well-seated valve with trace paravalvular regurgitation.   Lisinopril 2.5 mg daily started to optimize meds with heart failure. Also taking statin, metoprolol succinate, and aspirin.   Recent hx of pna: recently in ED 7/17 and treated for pneumonia with augmentin      Patient reports feeling well overall  HOBSON has improved.  No chest pain, frequent dizziness, palpitations, lower extremity edema. Fatigued - worsening over last year. Tires easily, lightheaded, dizzy and unstable on her feet at times.  Denies syncope, shortness of breath and cough.      Patient has no prior history of cardiac disease but calcifications noted in arteries on imaging.  Severe aortic stenosis recently diagnosed by echocardiogram on 11/28/2023.  Aortic valve area is 0.7 cm . Prior echocardiogram in  2017 showed nonsignificant aortic valve stenosis.   Transapical TAVR done 6/19/2024: complications post op with subcutaneous emphysema, left apical pneumothorax, left pulmonary hemorrhage, and pAF. Was hospitalized from 6/19/24 - 6/28/24 and then went to rehab until 7/17/2024.   Patient was started on amiodarone for AFib, AC deferred.  Post procedure ECHO showed mean PG 11 mmHg without PVL. Post procedure EKG showed NSR with baseline LBBB. She was discharged on aspirin monotherapy.   Patient was seen in TAVR clinic 7/29/2024 when recovering from pneumonia.  Echo on 7/29 showed normal TAVR function with mean  gradient at 12 mmHg without AI.  She was found to be euvolemic with normal IVC.  Most recent echocardiogram 8/2/2024 showed normal functioning TAVR valve.  Normal biventricular function.  Mild MS.  Following up with cardiology Nov 4th Polina De Leon NP at [facility].    Evaluated in lung nodule clinic for new left lower lung nodule in late 2022 which was found to be negative for malignancy per PET scan.   She has been a smoker for several decades.  Currently smoking half to 1 pack a day. Recent CTs with contrast show stable nodules.  COPD: ongoing tobacco use. Nicotine patch was used in hospital. Has restarted smoking since being home. Due for spirometry.  Bilateral pleural effusion: cont lasix 20 mg daily.   Left lower lung mass: followed by oncology.  MGUS: no specific treatment. Followed by oncology.    CKD 2+ILIANA vs 3a.  Iron deficiency anemia: Post op TAVR and complications Hgb had dropped to 7.7 but with diuresis Hgb  improved to 11.1.  Osteopenia: DEXA in 12/23/2022- 9.0% decrease in bone mineral density compared to the prior exam which is statistically significant. Estimated ten-year probability of major osteoporotic fracture is 12.2% with risk of hip fracture of 5.8%.normal Xray bone survey 9/25/23.  GERD: Denies symptoms but currently taking protonix.    She has reacher, shower bench, and grab bars at home. She has meals on wheels and support from daughter. Per therapy pt is modified independent with all ADLs without FWW, ambulating 300+ feet as well as 4 steps/stairs which she has at home. Home nursing helping with med set up and education [facility]     Upcoming appts:  Sept 4 she is getting Zio patch.  Oct 8 cardiology follow up in [place] as well as Nov 4 cardiology follow up at [facility]  Oct 22 eye exam with Dr Galloway in [place]  Dec 3 oncology/hematology with Dr Hansen      Review of Systems  Constitutional, HEENT, cardiovascular, pulmonary, GI, ,  musculoskeletal, neuro, skin, endocrine and psych systems are negative, except as otherwise noted.        Objective    /54 (BP Location: Right arm, Patient Position: Sitting, Cuff Size: Child)   Pulse 65   Temp 97.5  F (36.4  C) (Tympanic)   Resp 18   Ht 1.524 m (5')   Wt 34 kg (75 lb)   LMP 01/01/1995 (Approximate)   SpO2 93%   BMI 14.65 kg/m    Body mass index is 14.65 kg/m .    Physical Exam   Pleasant and alert without distress. Thin, cachectic appearance   Sclera nonicteric, conjunctiva non-inflamed.  Skin color pink. Mucous membranes moist.   Lungs clear to auscultation throughout. No wheezes or rales noted.   Cardiovascular regular, S1, S2 auscultated. No murmur noted.  Abdomen soft and without tenderness   Extremities without edema.    Able to move upper and lower extremities     Lab Requisition on 08/12/2024   Component Date Value Ref Range Status    Folic Acid 08/12/2024 8.7  4.6 - 34.8 ng/mL Final    Iron 08/12/2024 81  37 - 145 ug/dL Final    Iron Binding Capacity 08/12/2024 220 (L)  240 - 430 ug/dL Final    Iron Sat Index 08/12/2024 37  15 - 46 % Final    Vitamin B12 08/12/2024 1,503 (H)  232 - 1,245 pg/mL Final    WBC Count 08/12/2024 5.8  4.0 - 11.0 10e3/uL Final    RBC Count 08/12/2024 3.30 (L)  3.80 - 5.20 10e6/uL Final    Hemoglobin 08/12/2024 10.2 (L)  11.7 - 15.7 g/dL Final    Hematocrit 08/12/2024 32.1 (L)  35.0 - 47.0 % Final    MCV 08/12/2024 97  78 - 100 fL Final    MCH 08/12/2024 30.9  26.5 - 33.0 pg Final    MCHC 08/12/2024 31.8  31.5 - 36.5 g/dL Final    RDW 08/12/2024 14.6  10.0 - 15.0 % Final    Platelet Count 08/12/2024 272  150 - 450 10e3/uL Final     Results for orders placed or performed in visit on 08/30/24   Lipid Panel     Status: None   Result Value Ref Range    Cholesterol 152 <200 mg/dL    Triglycerides 134 <150 mg/dL    Direct Measure HDL 68 >=50 mg/dL    LDL Cholesterol Calculated 57 <=100 mg/dL    Non HDL Cholesterol 84 <130 mg/dL    Patient Fasting > 8hrs? Yes      Narrative    Cholesterol  Desirable:  <200 mg/dL    Triglycerides  Normal:  Less than 150 mg/dL  Borderline High:  150-199 mg/dL  High:  200-499 mg/dL  Very High:  Greater than or equal to 500 mg/dL    Direct Measure HDL  Female:  Greater than or equal to 50 mg/dL   Male:  Greater than or equal to 40 mg/dL    LDL Cholesterol  Desirable:  <100mg/dL  Above Desirable:  100-129 mg/dL   Borderline High:  130-159 mg/dL   High:  160-189 mg/dL   Very High:  >= 190 mg/dL    Non HDL Cholesterol  Desirable:  130 mg/dL  Above Desirable:  130-159 mg/dL  Borderline High:  160-189 mg/dL  High:  190-219 mg/dL  Very High:  Greater than or equal to 220 mg/dL   Vitamin D Total     Status: Normal   Result Value Ref Range    Vitamin D, Total (25-Hydroxy) 26 20 - 50 ng/mL    Narrative    Season, race, dietary intake, and treatment affect the concentration of 25-hydroxy-Vitamin D. Values may decrease during winter months and increase during summer months.    Vitamin D determination is routinely performed by an immunoassay specific for 25 hydroxyvitamin D3.  If an individual is on vitamin D2(ergocalciferol) supplementation, please specify 25 OH vitamin D2 and D3 level determination by LCMSMS test [id number].     TSH with free T4 reflex     Status: Abnormal   Result Value Ref Range    TSH 5.13 (H) 0.30 - 4.20 uIU/mL   T4 free     Status: Normal   Result Value Ref Range    Free T4 1.39 0.90 - 1.70 ng/dL           Moderate medical decision making with number of chronic conditions and exacerbations to be considered, review of medical records (internal and external) including labs and imaging ordered by the physicians which I interpreted and reviewed with patient, ordered test, and ordered medications.  I spent a total of 99 minutes on the day of the visit.  I spent 47 minutes face-to-face with the patient with at least half that time spent in counseling/education; in particular 3 minutes were spent on nicotine cessation counseling.  Total time  spent by me doing chart review, history and exam, documentation and further activities per the note on day of encounter.    The longitudinal plan of care for the diagnosis(es)/condition(s) as documented were addressed during this visit. Due to the added complexity in care, I will continue to support Loulou in the subsequent management and with ongoing continuity of care.      Signed Electronically by: Trevon Pisano DO

## 2024-09-01 RX ORDER — FERROUS SULFATE 325(65) MG
325 TABLET ORAL 2 TIMES DAILY
Qty: 60 TABLET | Refills: 0 | OUTPATIENT
Start: 2024-09-01

## 2024-09-03 ENCOUNTER — TELEPHONE (OUTPATIENT)
Dept: INTERNAL MEDICINE | Facility: OTHER | Age: 79
End: 2024-09-03
Payer: MEDICARE

## 2024-09-03 ENCOUNTER — MEDICAL CORRESPONDENCE (OUTPATIENT)
Dept: HEALTH INFORMATION MANAGEMENT | Facility: OTHER | Age: 79
End: 2024-09-03
Payer: MEDICARE

## 2024-09-03 NOTE — TELEPHONE ENCOUNTER
Request for Home Care Skilled Nursing orders as follows:    SN eval and treat date:     Continuation frequency =  1_ X week X 1_ weeks           Effective date= 9/10/24      Interventions include:    Assessment  Medication management  O2 sat monitoring (all disciplines as needed)    Vital signs as follows:       check oxygen saturation - goal > 90%       check blood pressure - abnormal ranges = < 95/60 or > 140/90       check pulse - abnormal range < 60 or > 100       check respirations - abnormal range < 12 or > 20       check vital signs: temperature abnormal range > 100.4      Please respond with .HOMECAREAGREE, if you are in agreement. Please sign with your comments and signature, if you are not in agreement.      Breanna Cannon RN on 9/3/2024 at 2:19 PM

## 2024-09-03 NOTE — TELEPHONE ENCOUNTER
"S: The patient had a vital sign that was outside of \"normal\" parameters at the end of their home visit today. The vital sign was 112/52    B: You are being contacted because The Joint Commission expects home care clinicians to notify the provider when the patient's vital signs are outside of defined parameters as a best practice of patient care.    [facility]'s vital sign parameters for adults are as follows:        oxygen saturation - less than 90%         blood pressure - less than 95/60 or greater than 140/90         respirations - less than 12 or greater than 20        pulse - less than 60 or greater than 100         temperature - less than 96.8 or greater than 100.4     A: If you are not concerned about the report above and do not want future communications like this, you can change the patient's defined parameters or specify when you would like to be notified by responding to this message.     R: Consider revising parameters or provide follow-up instructions      Thanks,     Breanna Cannon RN on 9/3/2024 at 5:16 PM       "

## 2024-09-04 ENCOUNTER — ALLIED HEALTH/NURSE VISIT (OUTPATIENT)
Dept: FAMILY MEDICINE | Facility: OTHER | Age: 79
End: 2024-09-04
Attending: NURSE PRACTITIONER
Payer: MEDICARE

## 2024-09-04 ENCOUNTER — MEDICAL CORRESPONDENCE (OUTPATIENT)
Dept: HEALTH INFORMATION MANAGEMENT | Facility: OTHER | Age: 79
End: 2024-09-04

## 2024-09-04 DIAGNOSIS — I48.91 POSTOPERATIVE ATRIAL FIBRILLATION (H): ICD-10-CM

## 2024-09-04 DIAGNOSIS — I97.89 POSTOPERATIVE ATRIAL FIBRILLATION (H): ICD-10-CM

## 2024-09-04 DIAGNOSIS — Z95.2 S/P TAVR (TRANSCATHETER AORTIC VALVE REPLACEMENT): ICD-10-CM

## 2024-09-04 PROCEDURE — 93246 EXT ECG>7D<15D RECORDING: CPT

## 2024-09-04 PROCEDURE — 999N000157 HC STATISTIC RCP TIME EA 10 MIN

## 2024-09-04 NOTE — PROGRESS NOTES
Patient arrived (date)9/4/2024 (time)1240 for a 14 day Zio monitor per (provider name) Dr. De Leon for S/P TAVR (Dx).  Patient's skin was prepped per protocol.  Zio monitor was placed.  Patient verbalized understanding of instructions and plan of care.  Patient was given Zio diary and prepaid .  Respiratory Therapist reviewed Zio Patch placement process and asked patient if they are comfortable proceeding with placement. Patient (is or is not) is comfortable proceeding. Patient (would or would not) would not like an employee of same gender to be (present or to place) present or to place the Zio Patch.  Documentation of Zio Patch site (Bleeding or Not Bleeding) Not Bleeding  If there is bleeding Documentation of why. N/A  Documentation of accomodation made for patient. No    Device placed by Lisbeth Jauregui, RT    Jenny Garcia, RT

## 2024-09-05 ENCOUNTER — MEDICAL CORRESPONDENCE (OUTPATIENT)
Dept: HEALTH INFORMATION MANAGEMENT | Facility: OTHER | Age: 79
End: 2024-09-05
Payer: MEDICARE

## 2024-09-06 ENCOUNTER — TELEPHONE (OUTPATIENT)
Dept: INTERNAL MEDICINE | Facility: OTHER | Age: 79
End: 2024-09-06

## 2024-09-06 NOTE — TELEPHONE ENCOUNTER
Betsey,     Home care is assisting the patient with her medication set up as therapies are in the home. PT and OT are discharging, I am planning to recertify her for nursing for a couple more weeks to formulate a plan on who can help her long term set up her medications.     1- I notice she now has medications that are every other day, would it be okay for setting up purposes to have them be every Monday, Wednesday, Friday?    2- I am going to call the community paramedic to see if she can use the patients secondary insurance of [facility] to set up, or I was thinking maybe pill packs with [facility], do you or care coordination RN have any other ideas?     I will be seeing her today and will see her again on Tuesday next week to do her recert visit.     Thanks!    Breanna Cannon RN on 9/6/2024 at 9:14 AM

## 2024-09-10 ENCOUNTER — TELEPHONE (OUTPATIENT)
Dept: INTERNAL MEDICINE | Facility: OTHER | Age: 79
End: 2024-09-10
Payer: MEDICARE

## 2024-09-10 NOTE — TELEPHONE ENCOUNTER
"Recertification:     Request for Home Care Skilled Nursing orders as follows:        Continuation frequency =  1  X week X 3  weeks               Effective date= 9/17/24    [facility] should have pill packs ready in approximately 2 weeks.     Interventions include:    Assessment    Medication management    Fall risk: instruct on fall prevention strategies, home safety, assess environment Observe for signs and symptoms of depression, assess effectiveness of medication, if at risk  Instruct on pain relief measures and assess pain with each visit, if has pain. Assess effectiveness of medications.    Vital signs as follows:       check oxygen saturation - goal > 90%       check blood pressure - abnormal ranges = < 95/60 or > 140/90       check pulse - abnormal range < 60 or > 100       check respirations - abnormal range < 12 or > 20       check vital signs: temperature abnormal range > 100.4      Please respond with .HOMECAREAGREE, if you are in agreement. Please sign with your comments and signature, if you are not in agreement.        Vital signs:       S: The patient had a vital sign that was outside of \"normal\" parameters at the end of their home visit today. The vital sign was /52    B: You are being contacted because The Joint Commission expects home care clinicians to notify the provider when the patient's vital signs are outside of defined parameters as a best practice of patient care.    [facility]'s vital sign parameters for adults are as follows:        oxygen saturation - less than 90%         blood pressure - less than 95/60 or greater than 140/90         respirations - less than 12 or greater than 20        pulse - less than 60 or greater than 100         temperature - less than 96.8 or greater than 100.4     A: If you are not concerned about the report above and do not want future communications like this, you can change the patient's defined parameters or specify when you would like " to be notified by responding to this message.     R: Consider revising parameters or provide follow-up instructions      Thanks,     Breanna Cannon RN on 9/10/2024 at 3:18 PM

## 2024-09-13 RX ORDER — CHOLECALCIFEROL (VITAMIN D3) 50 MCG
1 TABLET ORAL DAILY
Qty: 90 TABLET | Refills: 2 | Status: SHIPPED | OUTPATIENT
Start: 2024-09-13

## 2024-09-15 PROCEDURE — G0179 MD RECERTIFICATION HHA PT: HCPCS | Performed by: NURSE PRACTITIONER

## 2024-09-17 ENCOUNTER — TELEPHONE (OUTPATIENT)
Dept: INTERNAL MEDICINE | Facility: OTHER | Age: 79
End: 2024-09-17
Payer: MEDICARE

## 2024-09-17 NOTE — TELEPHONE ENCOUNTER
"S: The patient had a vital sign that was outside of \"normal\" parameters at the end of their home visit today. The vital sign was 122/54    B: You are being contacted because The Joint Commission expects home care clinicians to notify the provider when the patient's vital signs are outside of defined parameters as a best practice of patient care.    [facility]'s vital sign parameters for adults are as follows:        oxygen saturation - less than 90%         blood pressure - less than 95/60 or greater than 140/90         respirations - less than 12 or greater than 20        pulse - less than 60 or greater than 100         temperature - less than 96.8 or greater than 100.4     A: If you are not concerned about the report above and do not want future communications like this, you can change the patient's defined parameters or specify when you would like to be notified by responding to this message.     R: Consider revising parameters or provide follow-up instructions      ThanksSarah LPN       "

## 2024-09-21 ENCOUNTER — TELEPHONE (OUTPATIENT)
Dept: INTERNAL MEDICINE | Facility: OTHER | Age: 79
End: 2024-09-21

## 2024-09-21 PROCEDURE — 93248 EXT ECG>7D<15D REV&INTERPJ: CPT | Performed by: INTERNAL MEDICINE

## 2024-09-21 NOTE — TELEPHONE ENCOUNTER
Deirdre Leggett states that the pill packs should be delivered this week to get started.     Requestin x wk x 1 wk and 3 PRN     Effective: 24    to educate patient on her pill packs when they get delivered.     Thanks!     Breanna Cannon RN on 2024 at 3:28 PM

## 2024-09-24 DIAGNOSIS — J43.9 PULMONARY EMPHYSEMA, UNSPECIFIED EMPHYSEMA TYPE (H): ICD-10-CM

## 2024-09-24 DIAGNOSIS — E53.8 VITAMIN B12 DEFICIENCY (NON ANEMIC): ICD-10-CM

## 2024-09-24 NOTE — TELEPHONE ENCOUNTER
Christiane,  No refills are needed.  Home Care is looking for a provider order/approval to make an in home RN visit to teach the patient how to use the pill packs.    Adelina Kemp RN on 9/24/2024 at 4:40 PM

## 2024-09-25 DIAGNOSIS — E53.8 VITAMIN B12 DEFICIENCY (NON ANEMIC): ICD-10-CM

## 2024-09-26 ENCOUNTER — TELEPHONE (OUTPATIENT)
Dept: INTERNAL MEDICINE | Facility: OTHER | Age: 79
End: 2024-09-26

## 2024-09-26 RX ORDER — ASPIRIN 81 MG/1
TABLET, COATED ORAL
Qty: 90 TABLET | Refills: 0 | OUTPATIENT
Start: 2024-09-26

## 2024-09-26 RX ORDER — FOLIC ACID 1 MG/1
1000 TABLET ORAL DAILY
Qty: 30 TABLET | Refills: 0 | OUTPATIENT
Start: 2024-09-26

## 2024-09-26 RX ORDER — FOLIC ACID 1 MG/1
TABLET ORAL
Qty: 90 TABLET | Refills: 0 | OUTPATIENT
Start: 2024-09-26

## 2024-09-26 NOTE — TELEPHONE ENCOUNTER
[id number] [place] sent Rx request for the following:      aspirin 81 MG EC tablet 90 tablet 3 7/26/2024 -- No   Sig - Route: Take 1 tablet (81 mg) by mouth daily - Oral   Sent to pharmacy as: Aspirin 81 MG Oral Tablet Delayed Release   Class: E-Prescribe   Order: [id number]   E-Prescribing Status: Receipt confirmed by pharmacy (7/26/2024  3:21 PM CDT)     folic acid (FOLVITE) 1 MG tablet 90 tablet 0 8/30/2024 -- No   Sig - Route: Take 1 tablet (1 mg) by mouth daily. - Oral   Sent to pharmacy as: Folic Acid 1 MG Oral Tablet (FOLVITE)   Class: E-Prescribe   Notes to Pharmacy: Take with meal   Order: [id number]   E-Prescribing Status: Receipt confirmed by pharmacy (8/30/2024  8:57 AM CDT)     [id number] - [address], with note to transfer prescriptions. If folic acid filled 8/30- would be too soon. Mary Mccormack RN .............. 9/26/2024  4:30 PM

## 2024-09-26 NOTE — TELEPHONE ENCOUNTER
Betsey,     The patients pill packs arrived yesterday from [facility], the packs were missing 4 pills this month, (they were to early to get into the packs per the pharmacy).     She is struggling to remember to take the pills out of her med box and the pill packs, further education is needed.     Request for Home Care Skilled Nursing orders as follows:        Continuation frequency =    1x wk x 1 wk for 9/27/24     1 x wk x 4 wk  3 PRN       Effective date= 10/8/24        Interventions include:    Assessment  Medication management  O2 sat monitoring (all disciplines as needed)  Patient/caregiver education    Fall risk: instruct on fall prevention strategies, home safety, assess environment Observe for signs and symptoms of depression, assess effectiveness of medication, if at risk  Instruct on pain relief measures and assess pain with each visit, if has pain. Assess effectiveness of medications.  Instruct on pressure relief methods to prevent pressure ulcers    Vital signs as follows:       check oxygen saturation - goal > 90%       check blood pressure - abnormal ranges = < 95/60 or > 140/90       check pulse - abnormal range < 60 or > 100       check respirations - abnormal range < 12 or > 20       check vital signs: temperature abnormal range > 100.4      Please respond with .HOMECAREAGREE, if you are in agreement. Please sign with your comments and signature, if you are not in agreement.      Breanna Cannon RN on 9/26/2024 at 8:39 AM

## 2024-10-07 NOTE — PROGRESS NOTES
General Cardiology Clinic-[facility]      Referring provider:Reynaldo Hansen MD     HPI: Ms. Loulou Lucero is a 78 year old  female with PMH significant for    -Current Tobacco abuse (63 years, half pack a day)  -COPD  -Severe aortic stenosis (recently diagnosed)  -MGUS (no evidence of endorgan damage)    Patient is being seen today to discuss severe aortic stenosis recently diagnosed by echocardiogram on 11/28/2023.  LV size and function is normal.  No other significant valve disease.  Aortic valve area is 0.7 cm , mean gradient 30 mm gradient and peak velocity 3.6 m/s.  Prior echocardiogram in  2017 showed nonsignificant aortic valve stenosis.  Patient was recently evaluated in lung nodule clinic for left lower lung nodule which was found to be negative for malignancy.  Patient is accompaniedby her daughter today. Her daughter tells me that she went through evaluation with regards to skin lesions and cleared not to have melanoma.    Patient reports tiredness over the last year or so.  Feels fatigued, tire easily, lightheaded, dizzy and unstable on her feet at times.  Denies syncope or lower extremity edema.  Denies chest pain.    Patient has no prior history of cardiac disease.    She has been a smoker for several years.  Currently smoking half to 1 pack a day.    Current medications: Aspirin, simvastatin, vitamins.    Medications, personal, family, and social history reviewed with patient and revised.    Interval history 8/11/2024:  Patient underwent TAVR with 20 mm Diaz Benedict 3 Ultra on 6/19/24 by Pascual Patricia and Tay.  The TAVR and post-procedural course were notable for left apical pneumothorax and left pulmonary hemorrhage and pAF.  Patient was started on amiodarone, AC deferred.  Post procedure ECHO showed mean PG 11 mmHg without PVL. Post procedure EKG showed NSR with baseline LBBB. She was discharged on aspirin monotherapy. She was readmitted with pneumonia and treated with antibiotic  therapy.      Patient was seen in TAVR clinic 7/29/2024.  Reported recovering from pneumonia.  Echo on 7/29 showed normal TAVR function with mean gradient at 12 mmHg without AI.  She was found to be euvolemic with normal IVC.  Most recent echocardiogram 8/2/2024 showed normal functioning TAVR valve.  Normal biventricular function.  Mild MS.    Patient reports feeling well.  HOBSON has improved.  No chest pain, frequent dizziness, palpitations, lower extremity edema.  She tells me she is no longer taking potassium or Lasix.    PAST MEDICAL HISTORY:  Past Medical History:   Diagnosis Date    Asymptomatic varicose veins of lower extremity     6/12/2012    Benign paroxysmal vertigo     12/29/2010    Chronic obstructive pulmonary disease (H)     12/29/2010    Diarrhea     10/1/2015    Disorder of cartilage     Hip; Last dxa 06/2010    Diverticulosis of large intestine without perforation or abscess without bleeding          Lower abdominal pain     10/1/2015    Other disorders of lung (CODE)     Stable since July of 2002. RU lobe.    Personal history of other medical treatment (CODE)     L3, L4-4; Last MRI 7/09/12    Personal history of other medical treatment (CODE)     G-3, P-2, A-0  (Son had SIDS)    Zoster without complications     3/31/2012       CURRENT MEDICATIONS:  Current Outpatient Medications   Medication Sig Dispense Refill    acetaminophen (TYLENOL) 325 MG tablet Take 2 tablets every night at bedtime; may take 2 tab at breakfast and lunch time as needed for pain 180 tablet 1    aspirin 81 MG EC tablet Take 1 tablet (81 mg) by mouth daily 90 tablet 3    cyanocobalamin (VITAMIN B-12) 1000 MCG tablet Take 1 tablet (1,000 mcg) by mouth daily. 90 tablet 3    ferrous sulfate (FEROSUL) 325 (65 Fe) MG tablet Take 1 tablet (325 mg) by mouth every other day. 45 tablet 1    fluticasone-salmeterol (ADVAIR DISKUS) 250-50 MCG/ACT inhaler Inhale 1 puff into the lungs 2 times daily. WIXELA-Disp as covered by Pt's insurance 1  each 0    folic acid (FOLVITE) 1 MG tablet Take 1 tablet (1 mg) by mouth daily. 90 tablet 0    furosemide (LASIX) 20 MG tablet Take 1 tablet (20 mg) by mouth every other day. 15 tablet 1    gabapentin (NEURONTIN) 100 MG capsule Take 2 capsules every night; may take 1 capsule in morning as needed 90 capsule 1    lisinopril (ZESTRIL) 2.5 MG tablet Take 1 tablet (2.5 mg) by mouth at bedtime. 90 tablet 0    metoprolol succinate ER (TOPROL XL) 25 MG 24 hr tablet Take 0.5 tablets (12.5 mg) by mouth daily. 45 tablet 0    potassium chloride juan carlos ER (KLOR-CON M20) 20 MEQ CR tablet Take 1 tablet (20 mEq) by mouth every other day. 15 tablet 1    simvastatin (ZOCOR) 20 MG tablet Take 1 tablet (20 mg) by mouth at bedtime. 90 tablet 0    vitamin C (ASCORBIC ACID) 250 MG tablet Take 1 tablet (250 mg) by mouth every other day. 45 tablet 1    vitamin D3 (CHOLECALCIFEROL) 50 mcg (2000 units) tablet Take 1 tablet (50 mcg) by mouth daily. 90 tablet 2       PAST SURGICAL HISTORY:  Past Surgical History:   Procedure Laterality Date    CATARACT EXTRACTION Right     8/2023    COLONOSCOPY  03/22/2010    Normal; + family hx, next due 2015    COLONOSCOPY  10/01/2015    W/ POLYPECTOMY recommend follow up in 2020.    COLONOSCOPY N/A 11/07/2019    4 tubular adenoma, 3 year follow up    CV CORONARY ANGIOGRAM N/A 4/5/2024    Procedure: Coronary Angiogram;  Surgeon: Min So MD;  Location:  HEART CARDIAC CATH LAB    CV RIGHT HEART CATH MEASUREMENTS RECORDED N/A 4/5/2024    Procedure: Right Heart Catheterization;  Surgeon: Min So MD;  Location:  HEART CARDIAC CATH LAB    CV TRANSCATHETER AORTIC VALVE REPLACEMENT N/A 6/19/2024    Procedure: Transcatheter Aortic Valve Replacement - Transapical Approach;  Surgeon: Claude Patricia MD;  Location:  OR    ESOPHAGOSCOPY, GASTROSCOPY, DUODENOSCOPY (EGD), COMBINED  04/23/2014    Arce's esophagus fu egd 2 yrs    ESOPHAGOSCOPY, GASTROSCOPY, DUODENOSCOPY  (EGD), COMBINED N/A 11/07/2019    Procedure: ESOPHAGOGASTRODUODENOSCOPY, WITH BIOPSY;  Surgeon: Santosh Barrera MD;  Location: [facility] OR    IR CHEST TUBE PLACEMENT NON-TUNNELLED LEFT  6/21/2024    LAPAROSCOPIC TUBAL LIGATION  1978         OR TRANSCATHETER AORTIC VALVE REPLACEMENT, TRANSAPICAL OPEN APPROACH Left 6/19/2024    Procedure: Transcatheter Aortic Valve Replacement, Transapical Open Approach using Diaz Pericardial Tissue Heart Valve size 20mm, Cardiopulmonary Bypass Pump on Standby, and Transesophageal Echocardiogram by Cardiology;  Surgeon: Frank Cross MD;  Location: [facility] OR    TONSILLECTOMY & ADENOIDECTOMY  1951            ALLERGIES:     Allergies   Allergen Reactions    Metronidazole Nausea and Vomiting    Pneumococcal Vaccine      Other reaction(s): Edema  Arm swelling    Tramadol Visual Disturbance     Hallucinations          FAMILY HISTORY:  Family History   Problem Relation Age of Onset    Colon Cancer Father         Cancer-colon    Other - See Comments Mother         Alzheimer's    Other - See Comments Maternal Grandmother         Alzheimer's    Other - See Comments Brother         Alzheimer's    Other - See Comments Brother         aneurysm and stents    Cancer Brother         Cancer,lung    Cancer Brother         Cancer,skin    Other - See Comments Brother         cirrhosis of the liver    Other - See Comments Maternal Uncle         3, Alzheimer's    Breast Cancer No family hx of         Cancer-breast         SOCIAL HISTORY:  Social History     Tobacco Use    Smoking status: Every Day     Current packs/day: 0.50     Average packs/day: 0.5 packs/day for 63.8 years (31.9 ttl pk-yrs)     Types: Cigarettes     Start date: 1961     Passive exposure: Past    Smokeless tobacco: Never    Tobacco comments:     Passive exposure in adult home. 6 cigs a day   Vaping Use    Vaping status: Never Used   Substance Use Topics    Alcohol use: Not Currently     Comment: rarely at a wedding or special occasion    Drug  use: No       ROS:   Constitutional: No fever, chills, or sweats. Weight stable.   Cardiovascular: As per HPI.       Exam:  /58   Pulse 66   Temp (!) 95.4  F (35.2  C) (Tympanic)   Resp 15   Ht 1.524 m (5')   Wt 35.2 kg (77 lb 8 oz)   LMP 01/01/1995 (Approximate)   SpO2 96%   BMI 15.14 kg/m    GENERAL APPEARANCE: alert and no distress  HEENT: no icterus, no central cyanosis  LYMPH/NECK: no adenopathy, no asymmetry, JVP not elevated  RESPIRATORY: lungs clear to auscultation - no rales, rhonchi or wheezes, no use of accessory muscles, no retractions, respirations are unlabored, normal respiratory rate  CARDIOVASCULAR: regular rhythm, normal S1, S2, no S3 or S4, left parasternal 3/6 midsystolic ejection murmur.  GI: soft, non tender  EXTREMITIES: no edema  NEURO: alert, normal speech,and affect  SKIN: no ecchymoses, no rashes     I have reviewed the labs and personally reviewed the imaging below and made my comment in the assessment and plan.    Labs:  CBC RESULTS:   Lab Results   Component Value Date    WBC 5.8 08/12/2024    WBC 7.0 10/24/2019    RBC 3.30 (L) 08/12/2024    RBC 3.89 10/24/2019    HGB 10.2 (L) 08/12/2024    HGB 12.1 10/24/2019    HCT 32.1 (L) 08/12/2024    HCT 36.5 10/24/2019    MCV 97 08/12/2024    MCV 94 10/24/2019    MCH 30.9 08/12/2024    MCH 31.1 10/24/2019    MCHC 31.8 08/12/2024    MCHC 33.2 10/24/2019    RDW 14.6 08/12/2024    RDW 12.9 10/24/2019     08/12/2024     10/24/2019       BMP RESULTS:  Lab Results   Component Value Date     08/08/2024     10/09/2020    POTASSIUM 4.3 08/08/2024    POTASSIUM 4.2 06/19/2024    POTASSIUM 4.6 10/18/2022    POTASSIUM 4.0 10/09/2020    CHLORIDE 101 08/08/2024    CHLORIDE 103 10/18/2022    CHLORIDE 102 10/09/2020    CO2 28 08/08/2024    CO2 34 (H) 10/18/2022    CO2 33 (H) 10/09/2020    ANIONGAP 11 08/08/2024    ANIONGAP 6 10/18/2022    ANIONGAP 7 10/09/2020    GLC 86 08/08/2024     (H) 06/20/2024    GLC 94  10/18/2022     (H) 10/09/2020    BUN 20.8 08/08/2024    BUN 15 10/18/2022    BUN 21 10/09/2020    CR 1.08 (H) 08/08/2024    CR 1.00 10/09/2020    GFRESTIMATED 52 (L) 08/08/2024    GFRESTIMATED 54 (L) 10/09/2020    GFRESTBLACK 65 10/09/2020    DION 9.2 08/08/2024    DION 10.0 10/09/2020   Echocardiogram 8/2/2024  Left ventricular size, wall motion and function are normal. The ejection fraction is 60-65%.  Right ventricular function, chamber size, wall motion, and thickness are normal.  Trace mitral insufficiency is present.  There is mild MS. The mean gradient across the valve is 5mmHg in sinus and a  rate of 70BPM  s/p TAVR with 20 mm Diaz Benedict 3 on 6/19/2024. The prosthetic aortic valve  is well-seated. There is trace paravalvular regurgitation. MG 11mmHg  IVC diameter and respiratory changes fall into an intermediate range  suggesting an RA pressure of 8 mmHg. No pericardial effusion is present.   No significant changes noted.  Coronary angiogram 4/5/2024: Mild nonobstructive CAD.  Normal filling pressures.  Echocardiogram 11/28/2023  Global and regional left ventricular function is normal with an EF of 60-65%.  There is no thrombus seen in the left ventricle.  Global right ventricular function is normal.  Severe aortic stenosis is present.  IVC diameter <2.1 cm collapsing >50% with sniff suggests a normal RA pressure  of 3 mmHg.  No pericardial effusion is present.  There is no prior study for direct comparison.    Assessment and Plan:   Patient is being seen today for follow-up after recent TAVR.  She tells me she is overall feeling well from cardiac standpoint.  Took her a while to recover from pneumonia and after effects of muscle relaxants.  Can walk further since TAVR.  Able to do her own laundry.  She feels cold.  No other symptoms today.    # Severe aortic stenosis status post TAVR 6/19/2024  # Severe mitral annular calcification mild mitral stenosis.  Mean gradient 6 mmHg.  -TAVR function on  most recent echo 8/2/2024, LVEF normal.  Euvolemic on exam.  No longer requires Lasix.  I told her to stop potassium supplement as well.    # Anemia  -Follows with oncology.    # MGUS  -Follows with heme-onc.  -Continue aspirin and simvastatin.    # Active smoker  # COPD  -She is an active smoker.  Counseled to stop smoking.    # Paroxysmal atrial fibrillation  -A-fib RVR on 6/22.  She was treated with amiodarone, currently sinus rhythm.  She is no longer on amiodarone.  Recent Zio patch for 14 days showed no A-fib.  Will continue aspirin and metoprolol.  Given anemia she might be at high risk with oral anticoagulation.    # Hypertension  -Reports home blood pressure is 120/70 mmHg.  Currently on lisinopril 2.5 mg and metoprolol 25 mg.    Recommend to follow-up with TAVR clinic in 6 months.  General cardiology CELI in 1 year.    Total time spent today for this visit is 40 minutes including precharting, face-to-face clinic visit, review of labs/imaging and medical documentation.    Annie BROWN MD  [facility] Division of Cardiology  Pager [phone]

## 2024-10-08 ENCOUNTER — OFFICE VISIT (OUTPATIENT)
Dept: CARDIOLOGY | Facility: OTHER | Age: 79
End: 2024-10-08
Attending: INTERNAL MEDICINE
Payer: MEDICARE

## 2024-10-08 VITALS
BODY MASS INDEX: 15.21 KG/M2 | OXYGEN SATURATION: 96 % | HEIGHT: 60 IN | TEMPERATURE: 95.4 F | RESPIRATION RATE: 15 BRPM | HEART RATE: 66 BPM | WEIGHT: 77.5 LBS | DIASTOLIC BLOOD PRESSURE: 58 MMHG | SYSTOLIC BLOOD PRESSURE: 124 MMHG

## 2024-10-08 DIAGNOSIS — F17.200 CURRENT SMOKER: ICD-10-CM

## 2024-10-08 DIAGNOSIS — Z95.2 S/P TAVR (TRANSCATHETER AORTIC VALVE REPLACEMENT): Primary | ICD-10-CM

## 2024-10-08 PROCEDURE — G2211 COMPLEX E/M VISIT ADD ON: HCPCS | Performed by: INTERNAL MEDICINE

## 2024-10-08 PROCEDURE — 99215 OFFICE O/P EST HI 40 MIN: CPT | Performed by: INTERNAL MEDICINE

## 2024-10-08 PROCEDURE — G0463 HOSPITAL OUTPT CLINIC VISIT: HCPCS

## 2024-10-08 ASSESSMENT — PAIN SCALES - GENERAL: PAINLEVEL: NO PAIN (0)

## 2024-10-08 NOTE — PATIENT INSTRUCTIONS
Thank you for allowing Dr. Ansari and our  team to participate in your care. Please call our office at [phone] with scheduling questions or if you need to cancel or change your appointment. With any other questions or concerns you may call cardiology nurse at [phone].       If you experience chest pain, chest pressure, chest tightness, shortness of breath, fainting, lightheadedness, nausea, vomiting, or other concerning symptoms, please report to the Emergency Department or call 911. These symptoms may be emergent, and best treated in the Emergency Department.    1.  Will reschedule Polina De Leonnn November appointment to 6 months.  Your preferred location [facility].    2.  Follow-up with cardiology CELI in 1 year.  Your preferred location is [facility].    3.  We have deleted potassium and Lasix from your medication list.  Let us know if you are still taking them.

## 2024-10-09 ENCOUNTER — TELEPHONE (OUTPATIENT)
Dept: INTERNAL MEDICINE | Facility: OTHER | Age: 79
End: 2024-10-09
Payer: MEDICARE

## 2024-10-09 DIAGNOSIS — E53.8 VITAMIN B12 DEFICIENCY (NON ANEMIC): ICD-10-CM

## 2024-10-09 RX ORDER — FOLIC ACID 1 MG/1
1 TABLET ORAL DAILY
Qty: 90 TABLET | Refills: 4 | Status: SHIPPED | OUTPATIENT
Start: 2024-10-09

## 2024-10-09 NOTE — TELEPHONE ENCOUNTER
Betsey,     I spoke to Caroline, PharmD at [facility] drug, we are just clarifying orders for Loulou's pill packs coming out at the end of the month.     Her cardiologist discontinued her potassium and lasix yesterday, so I called [facility] to get those off of the pill packs,     [facility] said the only 2 medications they don't have in the packs is her vitamin D3 and her folic acid. Could you please send an RX for the folic acid if this is something you want the patient to get in the pill packs.     Thanks,     Breanna Cannon, RN on 10/9/2024 at 9:34 AM

## 2024-10-10 PROBLEM — I50.33 ACUTE ON CHRONIC DIASTOLIC (CONGESTIVE) HEART FAILURE (H): Status: ACTIVE | Noted: 2024-08-05

## 2024-10-10 PROBLEM — Z95.2 PRESENCE OF PROSTHETIC HEART VALVE: Status: ACTIVE | Noted: 2024-08-05

## 2024-10-10 PROBLEM — E53.8 DEFICIENCY OF OTHER SPECIFIED B GROUP VITAMINS: Status: ACTIVE | Noted: 2024-08-05

## 2024-10-10 PROBLEM — J90 PLEURAL EFFUSION, NOT ELSEWHERE CLASSIFIED: Status: ACTIVE | Noted: 2024-08-05

## 2024-10-15 ENCOUNTER — TELEPHONE (OUTPATIENT)
Dept: INTERNAL MEDICINE | Facility: OTHER | Age: 79
End: 2024-10-15
Payer: MEDICARE

## 2024-10-15 NOTE — TELEPHONE ENCOUNTER
"S: The patient had a vital sign that was outside of \"normal\" parameters at the end of their home visit today. The vital sign was 128/57    B: You are being contacted because The [employer] expects home care clinicians to notify the provider when the patient's vital signs are outside of defined parameters as a best practice of patient care.    [facility]'s vital sign parameters for adults are as follows:        oxygen saturation - less than 90%         blood pressure - less than 95/60 or greater than 140/90         respirations - less than 12 or greater than 20        pulse - less than 60 or greater than 100         temperature - less than 96.8 or greater than 100.4     A: If you are not concerned about the report above and do not want future communications like this, you can change the patient's defined parameters or specify when you would like to be notified by responding to this message.     R: Consider revising parameters or provide follow-up instructions      ThanksSarah LPN       "

## 2024-10-16 DIAGNOSIS — M54.50 BILATERAL LOW BACK PAIN, UNSPECIFIED CHRONICITY, UNSPECIFIED WHETHER SCIATICA PRESENT: ICD-10-CM

## 2024-10-17 RX ORDER — GABAPENTIN 100 MG/1
CAPSULE ORAL
Qty: 90 CAPSULE | Refills: 0 | OUTPATIENT
Start: 2024-10-17

## 2024-10-17 NOTE — TELEPHONE ENCOUNTER
[id number] of [place] sent Rx request for the following:      Requested Prescriptions   Pending Prescriptions Disp Refills    gabapentin (NEURONTIN) 100 MG capsule [Pharmacy Med Name: GABAPENTIN 100MG CAPSULE] 90 capsule 0     Sig: TAKE 2 CAPSULES BY MOUTH AT BEDTIME - MAY TAKE TAKE 1 CAPSULE EVERY MORNING AS NEEDED       There is no refill protocol information for this order        Last Prescription Date:   8/30/24  Last Fill Qty/Refills:         90, R-1    Bilateral low back pain, unspecified chronicity, unspecified whether sciatica present [M54.50]   recommend increasing tylenol to take consistently. Also recommend changing gabapentin from 1 capsule BID to 2 capsules qHS.        Last Office Visit:              8/30/24 (Discharge Nursing Home Visit)  Future Office visit:           10/22/24    Per LOV note:  Return in about 8 weeks (around 10/22/2024) for Follow up.     If taking as directed, Pt would run out 10/27. Pt has appointment 10/22 and can discuss refills at that time. Pharmacy notified. Mary Mccormack RN .............. 10/17/2024  10:06 AM

## 2024-10-22 ENCOUNTER — OFFICE VISIT (OUTPATIENT)
Dept: FAMILY MEDICINE | Facility: OTHER | Age: 79
End: 2024-10-22
Attending: STUDENT IN AN ORGANIZED HEALTH CARE EDUCATION/TRAINING PROGRAM
Payer: MEDICARE

## 2024-10-22 VITALS
BODY MASS INDEX: 16.13 KG/M2 | DIASTOLIC BLOOD PRESSURE: 64 MMHG | SYSTOLIC BLOOD PRESSURE: 131 MMHG | HEIGHT: 59 IN | RESPIRATION RATE: 16 BRPM | TEMPERATURE: 98.4 F | WEIGHT: 80 LBS | HEART RATE: 79 BPM | OXYGEN SATURATION: 97 %

## 2024-10-22 DIAGNOSIS — D64.9 ANEMIA, UNSPECIFIED TYPE: ICD-10-CM

## 2024-10-22 DIAGNOSIS — J43.1 PANLOBULAR EMPHYSEMA (H): ICD-10-CM

## 2024-10-22 DIAGNOSIS — E61.1 IRON DEFICIENCY: ICD-10-CM

## 2024-10-22 DIAGNOSIS — D47.2 MGUS (MONOCLONAL GAMMOPATHY OF UNKNOWN SIGNIFICANCE): ICD-10-CM

## 2024-10-22 DIAGNOSIS — E53.8 DEFICIENCY OF OTHER SPECIFIED B GROUP VITAMINS: ICD-10-CM

## 2024-10-22 DIAGNOSIS — N18.31 STAGE 3A CHRONIC KIDNEY DISEASE (H): ICD-10-CM

## 2024-10-22 DIAGNOSIS — Z01.30 BP CHECK: ICD-10-CM

## 2024-10-22 DIAGNOSIS — Z95.2 S/P TAVR (TRANSCATHETER AORTIC VALVE REPLACEMENT): ICD-10-CM

## 2024-10-22 DIAGNOSIS — M54.50 BILATERAL LOW BACK PAIN, UNSPECIFIED CHRONICITY, UNSPECIFIED WHETHER SCIATICA PRESENT: Primary | ICD-10-CM

## 2024-10-22 DIAGNOSIS — I25.10 ATHEROSCLEROSIS OF NATIVE CORONARY ARTERY OF NATIVE HEART WITHOUT ANGINA PECTORIS: ICD-10-CM

## 2024-10-22 DIAGNOSIS — F17.219 CIGARETTE NICOTINE DEPENDENCE WITH NICOTINE-INDUCED DISORDER: ICD-10-CM

## 2024-10-22 DIAGNOSIS — E55.9 VITAMIN D DEFICIENCY: ICD-10-CM

## 2024-10-22 DIAGNOSIS — M85.89 OSTEOPENIA OF MULTIPLE SITES: ICD-10-CM

## 2024-10-22 DIAGNOSIS — K21.9 GASTRO-ESOPHAGEAL REFLUX DISEASE WITHOUT ESOPHAGITIS: ICD-10-CM

## 2024-10-22 DIAGNOSIS — R63.6 UNDERWEIGHT: ICD-10-CM

## 2024-10-22 DIAGNOSIS — I50.30 HEART FAILURE WITH PRESERVED EJECTION FRACTION, NYHA CLASS I (H): ICD-10-CM

## 2024-10-22 DIAGNOSIS — E78.5 HYPERLIPIDEMIA LDL GOAL <100: ICD-10-CM

## 2024-10-22 DIAGNOSIS — Z72.0 TOBACCO ABUSE: ICD-10-CM

## 2024-10-22 DIAGNOSIS — R53.83 OTHER FATIGUE: ICD-10-CM

## 2024-10-22 DIAGNOSIS — R79.89 ABNORMAL CBC: ICD-10-CM

## 2024-10-22 PROCEDURE — G0463 HOSPITAL OUTPT CLINIC VISIT: HCPCS | Mod: 25

## 2024-10-22 PROCEDURE — 99214 OFFICE O/P EST MOD 30 MIN: CPT | Performed by: STUDENT IN AN ORGANIZED HEALTH CARE EDUCATION/TRAINING PROGRAM

## 2024-10-22 PROCEDURE — G2211 COMPLEX E/M VISIT ADD ON: HCPCS | Performed by: STUDENT IN AN ORGANIZED HEALTH CARE EDUCATION/TRAINING PROGRAM

## 2024-10-22 PROCEDURE — 99406 BEHAV CHNG SMOKING 3-10 MIN: CPT | Performed by: STUDENT IN AN ORGANIZED HEALTH CARE EDUCATION/TRAINING PROGRAM

## 2024-10-22 ASSESSMENT — PAIN SCALES - GENERAL: PAINLEVEL: NO PAIN (0)

## 2024-10-22 NOTE — PROGRESS NOTES
Assessment & Plan     (M54.50) Bilateral low back pain, unspecified chronicity, unspecified whether sciatica present  (primary encounter diagnosis)  Comment: improved with change in tylenol and gabapentin dosing.    (Z95.2) S/P TAVR (transcatheter aortic valve replacement)  Comment: June 2024. Complicated by A.Fib postoperatively but did not persist.    (I50.30) Heart failure with preserved ejection fraction, NYHA class I (H)  Comment: No longer in exacerbation. Maintained weight logs and has not needed Lasix.  May stop lasix and K; consider SGLT2 but financial concern.    (E78.5) Hyperlipidemia LDL goal <100  Comment: Lipid panel shows significant improvement and at goal    (I25.10) Atherosclerosis of native coronary artery of native heart without angina pectoris  Comment: Continue aspirin, statin, beta-blocker, and ACE    (F17.219) Cigarette nicotine dependence with nicotine-induced disorder  Comment: currently smokes 0.5ppd  Plan: SMOKING CESSATION COUNSELING 3-10 MIN    (Z72.0) Tobacco abuse  Comment: > 30pack-yrs. Counseled quiting  Plan: SMOKING CESSATION COUNSELING 3-10 MIN    (J43.1) Panlobular emphysema (H)  Comment: Chronic interstitial changes with paraseptal emphysema predominantly biapical seen on CT. Continue Advair, SOA improved with BID use    (N18.31) Stage 3a chronic kidney disease (H)  Comment: had tubular necrosis but resolved/improved; GFR had improved back into 50s at last vist. stage 3a may be new normal  Plan: Renal Function Panel    (D47.2) MGUS (monoclonal gammopathy of unknown significance)  Comment: Follows with heme/onc    (D64.9) Anemia, unspecified type  Comment: Multifactorial with iron deficiency, B12 deficiency, and inflammatory disease. Will recheck labs at follow to allow sufficient time on supplements    (R79.89) Abnormal CBC  Comment: Anemia; borderline moderate macrocytosis with B12; slightly improved RDW but still borderline high with B12 and iron    (E61.1) Iron  deficiency  Comment: iron supplementation and vitamin C recently added    (E53.8) Deficiency of other specified B group vitamins  Comment: B12 deficiency improved with supplementation. Macrocytic anemia has improved but not controlled. Folic acid insufficient, recently started folic acid 1 mg.    (E55.9) Vitamin D deficiency  Comment: double current dose of D3 from 2000 units to 4000 units daily during winter.  Plan: vitamin D3 (CHOLECALCIFEROL) 50 mcg (2000         units) tablet    (M85.89) Osteopenia of multiple sites  Comment: Bone density significantly decreased on DEXA 12/23/2022 when compared to previous exam. Continue calcium and vitamin D.  Recommended repeat DEXA at 2 years.    (K21.9) Gastro-esophageal reflux disease without esophagitis  Comment: previously on protonix. PPIs & H2 blockers decrease absorption of Ca and iron. Recommend avoiding triggers, elevating head of bed, and other lifestyle changes    (R53.83) Other fatigue  Comment: likely multifactorial; improving    (Z01.30) BP check  Comment: no hx of HTN diagnosis. On ACE and B-blocker for heart disease.    (R63.6) Underweight  Comment: discussed gaining healthy weight    (Z68.1) Body mass index (BMI) less than 16.5  Comment: BMI improved from 14 to 16       COPD improved with more consistent use on inhaler. SOA likely also improved as pleural effusions resolved as no evidence on exam; as patient has not needed lasix she may stop - will order CXR if SOA returns.  With patient's heart failure and newly worsened kidney function I believe it would be appropriate to start patient on SGLT2 over furosemide; however she wishes to discuss with cardiologist who she is scheduled to see in a few weeks.  Continue ACE inhibitor.  Continue Toprol-XL to 12.5 mg daily as patient's heart rate has been controlled without bradycardia. Keep appt with cardiology.  Discussed patient's weight and her need for calories and nutrition especially iron, calcium, vitamin D,  and B vitamins. Keep appt with Hem/Onc. Also discussed that Protonix and other PPIs can decrease absorption - recommend lifestyle changes over protonix.  Rescheduling gabapentin (higher dose at bedtime rather than equal throughout the day) as well as taking Tylenol consistently has improved patient's chronic pain.    Ready to quit: No  Counseling given: Yes  Tobacco comments: Passive exposure in adult home. 6 cigs a day  It is very important that she quit smoking. The usefulness of behavioral therapy is discussed and suggested.  Discussed NRT or medication like Zyban.      Return in about 3 months (around 1/22/2025) for Follow up.    Vel Jamil is a 79 year old, presenting for the following health issues:  Follow Up        7/23/2024    10:20 AM   Additional Questions   Roomed by Allyson FULLER     HPI   79-year-old female with aortic stenosis s/p TAVR, HFpEF, COPD, CAD, HLD, CKD, GERD, MGUS, anemia, B vitamin deficiency, iron deficiency, osteopenia, vitamin D deficiency, and chronic low back pain who presents for followup.    Patient reports feeling well - HOBSON has improved/resolved.  No chest pain, dizziness/lightheadedness, palpitations. Wears compression socks up to knees; pt states home health nurses were concerned about oddly shaped area of swelling on legs.  Fatigue - had been worsening over last year but slightly improved; still tires easily when walking which makes her slightly unstable on her feet at times so uses rolling walker with seat.      HFpEF/AORTIC STENOSIS/CAD: Reports that she has not needed to take Lasix 20mg since last visit. Weight remained stable in SNF around 77 lbs; patient brings in her daily weight logs with no episodes of gaining 3 pounds in 1 day or 5 pounds over 3 days.  Patient has been actively trying to put on weight that she lost during hospitalization; currently 80 pounds in clinic today. She consumes 1 sweetened beverage(s) daily.She exercises with enough effort to increase her  heart rate 9 or less minutes per day, 3 or less days per week.   Severe aortic stenosis recently diagnosed by echocardiogram on 11/28/2023.  Aortic valve area is 0.7 cm . Prior echocardiogram in 2017 showed nonsignificant aortic valve stenosis. Pt had TAVR in June with post-op complications including: ubcutaneous emphysema, left apical pneumothorax, left pulmonary hemorrhage, and pAF; thus hospitalized from 6/19/24 - 6/28/24 and then went to rehab until 7/17/2024. Shortly after diagnosed with pneumonia. Additionally, she was hospitalized again 8/1-8/5/24 for acute on chronic heart failure.  Echo showed EF 60-65%, well-seated valve with trace paravalvular regurgitation. Taking aspirin, statin, metoprolol succinate, and lisinopril to optimize heart.  Most recent echocardiogram 8/2/2024 showed normal functioning TAVR valve. Calcifications noted in aorta and coronary arteries on imaging.    COPD: She has been a smoker for several decades.  Was smoking upto 1 pack a day after recent hospitalization but has cut down to about 6cigs/day since last visit. Recent CTs with contrast show stable nodules. She has no cough or wheezing. Started using Advair BID since last visit.      CKD 2+ILIANA vs 3a: Diuresed in hospital then oral lasix after discharge. GFR improving.  Iron deficiency anemia: Hgb  improving.  Osteopenia/vit D deficiency: DEXA in 12/23/2022- 9.0% decrease in bone mineral density compared to the prior exam which is statistically significant. Taking Ca and vit D3.  GERD: Denies symptoms but currently taking protonix.  MGUS: no specific treatment. Followed by oncology.     She has reacher, shower bench, and grab bars at home. She has meals on wheels and support from daughter. Per therapy pt is modified independent with all ADLs without FWW, ambulating 300+ feet as well as 4 steps/stairs which she has at home. Home nursing helping with med set up and education - blister packing by [facility] pharmacy but struggling  "to use them. She is missing 1 dose(s) of medications per week due to forgetting.       Upcoming appts:  Nov 4 cardiology with  Polina De Leon NP  Dec 3 oncology/hematology with Dr Hansen      Review of Systems  Constitutional, HEENT, cardiovascular, pulmonary, GI, , musculoskeletal, neuro, skin, endocrine and psych systems are negative, except as otherwise noted.        Objective    /64   Pulse 79   Temp 98.4  F (36.9  C) (Tympanic)   Resp 16   Ht 1.499 m (4' 11\")   Wt 36.3 kg (80 lb)   LMP 01/01/1995 (Approximate)   SpO2 97%   BMI 16.16 kg/m    Body mass index is 16.16 kg/m .    Physical Exam  Vitals and nursing note reviewed.   Constitutional:       General: She is not in acute distress.     Appearance: She is underweight.   HENT:      Head: Normocephalic.      Mouth/Throat:      Mouth: Mucous membranes are moist.   Eyes:      Extraocular Movements: Extraocular movements intact.   Cardiovascular:      Rate and Rhythm: Normal rate and regular rhythm.      Pulses: Normal pulses.      Heart sounds: No murmur heard.     No friction rub. No gallop.   Pulmonary:      Effort: Pulmonary effort is normal.      Breath sounds: No wheezing or rales.   Abdominal:      General: Bowel sounds are normal.      Tenderness: There is no abdominal tenderness.   Musculoskeletal:      Comments: No gross musculoskeletal defects noted. Minimal edema in areas where long underwear were bunched up under compression socks.   Skin:     General: Skin is warm.      Findings: No lesion.   Neurological:      Mental Status: She is alert.      Sensory: No sensory deficit.      Motor: Weakness (generalized) present.   Psychiatric:         Mood and Affect: Mood normal.            Office Visit on 08/30/2024   Component Date Value Ref Range Status    Cholesterol 08/30/2024 152  <200 mg/dL Final    Triglycerides 08/30/2024 134  <150 mg/dL Final    Direct Measure HDL 08/30/2024 68  >=50 mg/dL Final    LDL Cholesterol Calculated " 08/30/2024 57  <=100 mg/dL Final    Non HDL Cholesterol 08/30/2024 84  <130 mg/dL Final    Patient Fasting > 8hrs? 08/30/2024 Yes   Final    Vitamin D, Total (25-Hydroxy) 08/30/2024 26  20 - 50 ng/mL Final    TSH 08/30/2024 5.13 (H)  0.30 - 4.20 uIU/mL Final    Free T4 08/30/2024 1.39  0.90 - 1.70 ng/dL Final             Lab Requisition on 08/12/2024   Component Date Value Ref Range Status    Folic Acid 08/12/2024 8.7  4.6 - 34.8 ng/mL Final    Iron 08/12/2024 81  37 - 145 ug/dL Final    Iron Binding Capacity 08/12/2024 220 (L)  240 - 430 ug/dL Final    Iron Sat Index 08/12/2024 37  15 - 46 % Final    Vitamin B12 08/12/2024 1,503 (H)  232 - 1,245 pg/mL Final    WBC Count 08/12/2024 5.8  4.0 - 11.0 10e3/uL Final    RBC Count 08/12/2024 3.30 (L)  3.80 - 5.20 10e6/uL Final    Hemoglobin 08/12/2024 10.2 (L)  11.7 - 15.7 g/dL Final    Hematocrit 08/12/2024 32.1 (L)  35.0 - 47.0 % Final    MCV 08/12/2024 97  78 - 100 fL Final    MCH 08/12/2024 30.9  26.5 - 33.0 pg Final    MCHC 08/12/2024 31.8  31.5 - 36.5 g/dL Final    RDW 08/12/2024 14.6  10.0 - 15.0 % Final    Platelet Count 08/12/2024 272  150 - 450 10e3/uL Final                Moderate medical decision making with number of chronic conditions to be considered, review of medical records (internal and external), interpreted labd and reviewed with patient, counseling, ordered test, and ordered medications.  I spent a total of 35 minutes on the day of the visit.  I spent 28 minutes face-to-face with patient including 3 minutes spent on nicotine cessation counseling.  Total time spent by me today doing chart review, history and exam, documentation and further activities per the note on day of encounter.     The longitudinal plan of care for the diagnosis(es)/condition(s) as documented were addressed during this visit. Due to the added complexity in care, I will continue to support Loulou in the subsequent management and with ongoing continuity of care.      Signed  Electronically by: Trevon Pisano DO

## 2024-10-25 ENCOUNTER — MEDICAL CORRESPONDENCE (OUTPATIENT)
Dept: HEALTH INFORMATION MANAGEMENT | Facility: OTHER | Age: 79
End: 2024-10-25
Payer: MEDICARE

## 2024-10-28 ENCOUNTER — PATIENT OUTREACH (OUTPATIENT)
Dept: CARE COORDINATION | Facility: CLINIC | Age: 79
End: 2024-10-28
Payer: MEDICARE

## 2024-10-28 DIAGNOSIS — M54.50 BILATERAL LOW BACK PAIN, UNSPECIFIED CHRONICITY, UNSPECIFIED WHETHER SCIATICA PRESENT: ICD-10-CM

## 2024-10-28 NOTE — TELEPHONE ENCOUNTER
Patient notified of providers note and denies the symptoms listed in note from Obi Johnston MD.    Mariah Thayer LPN 12/7/2023 11:39 AM     No

## 2024-10-28 NOTE — PROGRESS NOTES
Clinic Care Coordination Contact  Care Team Conversations    Talked with home care RN, Breanna. They will be discharging patient on 10/29/24. She was assisting patient to get her medications arranged for [facility] from [facility]. However, they were unable to get all the prescriptions in on time to have one pack. Patient gets confused with medications, so RN agreed to set up three weeks of medications for patient.     Plan:  We discussed possible solutions for ongoing medication management. Unfortunately her insurance does not cover options such as community paramedic, and patient states she can't private pay.   Mary Esteves RN on 10/28/2024 at 11:18 AM

## 2024-10-29 ENCOUNTER — TELEPHONE (OUTPATIENT)
Dept: INTERNAL MEDICINE | Facility: OTHER | Age: 79
End: 2024-10-29
Payer: MEDICARE

## 2024-10-29 DIAGNOSIS — M85.89 OSTEOPENIA OF MULTIPLE SITES: Primary | ICD-10-CM

## 2024-10-29 DIAGNOSIS — M54.50 BILATERAL LOW BACK PAIN, UNSPECIFIED CHRONICITY, UNSPECIFIED WHETHER SCIATICA PRESENT: ICD-10-CM

## 2024-10-29 DIAGNOSIS — E55.9 VITAMIN D DEFICIENCY: ICD-10-CM

## 2024-10-29 NOTE — TELEPHONE ENCOUNTER
[facility] RN called and she states she is being discharged from homecare. Please see the following:        Pt will need new prescriptions, as pended, in the next month, sent to [id number]. Prescriptions will be mailed to Pt. Last office visit with Dr. Pisano 10/22. Notes incomplete. Ok to wait for Dr. Pisano to return Monday 11/4/24.    Mary Mccormack RN .............. 10/29/2024  4:14 PM

## 2024-10-29 NOTE — TELEPHONE ENCOUNTER
Betsey and Dr. Pisano     S: the patient was discharged from home care today.     B: the patient was unable to set up her medications independently and she could not figure out how to use the [facility] pill packs.   she will now be having a friend come in every other week to set them up for her.     A: she needs a new scripts for her gabapentin and Vitamin D3. She has enough gabapentin for approximately 4.5 weeks, as she does not take the as needed morning dose, she just takes the 2 caps at night.     Vitaman D3: the patient states that she should now be on 5,000 units of vitamin D3, she is presently on 2,000 units, and that is what was set up for the next month in her pill boxes.     R: [facility] is needing a new script that just has the 2 gabapentin caps at bed since the patient is not wanting to take the morning PRN dose. And a new scrpit for the vitamin D3 if you want that dose increased to 5,000 units.     Home care will now be done with the patient and her friend Sarah will be taking over medication set ups.     Thanks,     Breanna Cannon RN on 10/29/2024 at 3:55 PM

## 2024-10-29 NOTE — TELEPHONE ENCOUNTER
Writer spoke with [facility] and there is an encounter, routed to Dr. Pisano to address when he returns 11/4, as his note from 10/22 is incomplete.    See REFILL encounter, dated 10/29/24.    We will wait for his return.    Thank you, Mary Mccormack RN .............. 10/29/2024  4:31 PM

## 2024-10-30 RX ORDER — GABAPENTIN 100 MG/1
CAPSULE ORAL
Qty: 90 CAPSULE | Refills: 1 | Status: SHIPPED | OUTPATIENT
Start: 2024-10-30

## 2024-10-30 NOTE — TELEPHONE ENCOUNTER
krzysztof sent Rx request for the following:      Requested Prescriptions   Pending Prescriptions Disp Refills    gabapentin (NEURONTIN) 100 MG capsule 90 capsule 1     Sig: Take 2 capsules every night; may take 1 capsule in morning as needed       There is no refill protocol information for this order          Last Prescription Date:   8/30/24  Last Fill Qty/Refills:         90, R-1    Last Office Visit:              8/30/24   Future Office visit:             Next 5 appointments (look out 90 days)      Dec 03, 2024 2:00 PM  Return Visit with Reynaldo Hansen MD  [facility] [address]  [place] [address]  [phone]           Routing refill request to provider for review/approval because:  Drug not on the G refill protocol   Lina Steele RN on 10/30/2024 at 6:09 AM

## 2024-10-31 PROBLEM — E78.5 HYPERLIPIDEMIA, UNSPECIFIED: Status: ACTIVE | Noted: 2024-08-05

## 2024-10-31 RX ORDER — GABAPENTIN 100 MG/1
CAPSULE ORAL
Qty: 180 CAPSULE | Refills: 3 | OUTPATIENT
Start: 2024-10-31

## 2024-10-31 RX ORDER — CHOLECALCIFEROL (VITAMIN D3) 50 MCG
2 TABLET ORAL DAILY
Qty: 180 TABLET | Refills: 1 | Status: SHIPPED | OUTPATIENT
Start: 2024-10-31

## 2024-11-04 DIAGNOSIS — N17.0 ACUTE KIDNEY FAILURE WITH TUBULAR NECROSIS (H): ICD-10-CM

## 2024-11-04 DIAGNOSIS — Z95.2 S/P TAVR (TRANSCATHETER AORTIC VALVE REPLACEMENT): ICD-10-CM

## 2024-11-04 DIAGNOSIS — I50.30 HEART FAILURE WITH PRESERVED EJECTION FRACTION, NYHA CLASS I (H): ICD-10-CM

## 2024-11-05 ENCOUNTER — MEDICAL CORRESPONDENCE (OUTPATIENT)
Dept: HEALTH INFORMATION MANAGEMENT | Facility: OTHER | Age: 79
End: 2024-11-05
Payer: MEDICARE

## 2024-11-05 RX ORDER — LISINOPRIL 2.5 MG/1
2.5 TABLET ORAL AT BEDTIME
Qty: 90 TABLET | Refills: 0 | Status: SHIPPED | OUTPATIENT
Start: 2024-11-05

## 2024-11-05 NOTE — TELEPHONE ENCOUNTER
[facility] sent Rx request for the following:      Requested Prescriptions   Pending Prescriptions Disp Refills    lisinopril (ZESTRIL) 2.5 MG tablet [Pharmacy Med Name: LISINOPRIL 2.5MG TABLET] 90 tablet 0     Sig: TAKE 1 TABLET BY MOUTH AT BEDTIME       ACE Inhibitors (Including Combos) Protocol Passed - 11/5/2024  1:44 PM        Passed - Most recent blood pressure under 140/90 in past 12 months- Clinicial or Patient Reported     BP Readings from Last 3 Encounters:   10/22/24 131/64   10/08/24 124/58   08/30/24 124/54       No data recorded            Passed - Medication is active on med list        Passed - Medication indicated for associated diagnosis     Medication is associated with one or more of the following diagnoses:     Chronic Kidney Disease (CKD)   Coronary Artery Disease (CAD)   Diabetes   Heart Failure (HF)   Hypertension (HTN)   Nephropathy   History of myocarditis   Tachycardia induced cardiomyopathy   STEMI (ST elevation myocardial infarction)   Spontaneous dissection of coronary artery   Status post percutaneous transluminal coronary angioplasty            Passed - Recent (12 mo) or future (90 days) visit within the authorizing provider's specialty     The patient must have completed an in-person or virtual visit within the past 12 months or has a future visit scheduled within the next 90 days with the authorizing provider s specialty.  Urgent care and e-visits do not quality as an office visit for this protocol.          Passed - Most recent GFR on file in the past 12 months >30        Passed - Patient is age 18 or older        Passed - No active pregnancy on record        Passed - Normal serum potassium on file in past 12 months     Recent Labs   Lab Test 08/08/24  0831   POTASSIUM 4.3             Passed - No positive pregnancy test within past 12 months             Last Prescription Date:   08/302024  Last Fill Qty/Refills:         90, R-3    Last Office Visit:              08/30/2024    Future Office visit:           None    Prescription approved per [facility] Refill Protocol.   Liza Suarez RN on 11/5/2024 at 1:48 PM

## 2024-11-26 ENCOUNTER — LAB (OUTPATIENT)
Dept: LAB | Facility: OTHER | Age: 79
End: 2024-11-26
Attending: INTERNAL MEDICINE
Payer: MEDICARE

## 2024-11-26 DIAGNOSIS — D47.2 MGUS (MONOCLONAL GAMMOPATHY OF UNKNOWN SIGNIFICANCE): ICD-10-CM

## 2024-11-26 DIAGNOSIS — D50.8 OTHER IRON DEFICIENCY ANEMIA: ICD-10-CM

## 2024-11-26 DIAGNOSIS — N18.31 STAGE 3A CHRONIC KIDNEY DISEASE (H): ICD-10-CM

## 2024-11-26 LAB
ALBUMIN SERPL BCG-MCNC: 4.2 G/DL (ref 3.5–5.2)
ALBUMIN SERPL BCG-MCNC: 4.3 G/DL (ref 3.5–5.2)
ALP SERPL-CCNC: 75 U/L (ref 40–150)
ALT SERPL W P-5'-P-CCNC: 8 U/L (ref 0–50)
ANION GAP SERPL CALCULATED.3IONS-SCNC: 7 MMOL/L (ref 7–15)
ANION GAP SERPL CALCULATED.3IONS-SCNC: 8 MMOL/L (ref 7–15)
AST SERPL W P-5'-P-CCNC: 24 U/L (ref 0–45)
BASOPHILS # BLD AUTO: 0 10E3/UL (ref 0–0.2)
BASOPHILS NFR BLD AUTO: 1 %
BILIRUB SERPL-MCNC: 0.5 MG/DL
BUN SERPL-MCNC: 20.8 MG/DL (ref 8–23)
BUN SERPL-MCNC: 21.6 MG/DL (ref 8–23)
CALCIUM SERPL-MCNC: 9.5 MG/DL (ref 8.8–10.4)
CALCIUM SERPL-MCNC: 9.5 MG/DL (ref 8.8–10.4)
CHLORIDE SERPL-SCNC: 104 MMOL/L (ref 98–107)
CHLORIDE SERPL-SCNC: 104 MMOL/L (ref 98–107)
CREAT SERPL-MCNC: 0.83 MG/DL (ref 0.51–0.95)
CREAT SERPL-MCNC: 0.84 MG/DL (ref 0.51–0.95)
EGFRCR SERPLBLD CKD-EPI 2021: 70 ML/MIN/1.73M2
EGFRCR SERPLBLD CKD-EPI 2021: 71 ML/MIN/1.73M2
EOSINOPHIL # BLD AUTO: 0.1 10E3/UL (ref 0–0.7)
EOSINOPHIL NFR BLD AUTO: 2 %
ERYTHROCYTE [DISTWIDTH] IN BLOOD BY AUTOMATED COUNT: 13.3 % (ref 10–15)
FERRITIN SERPL-MCNC: 109 NG/ML (ref 11–328)
GLUCOSE SERPL-MCNC: 110 MG/DL (ref 70–99)
GLUCOSE SERPL-MCNC: 114 MG/DL (ref 70–99)
HCO3 SERPL-SCNC: 30 MMOL/L (ref 22–29)
HCO3 SERPL-SCNC: 31 MMOL/L (ref 22–29)
HCT VFR BLD AUTO: 32.6 % (ref 35–47)
HGB BLD-MCNC: 10.5 G/DL (ref 11.7–15.7)
IMM GRANULOCYTES # BLD: 0 10E3/UL
IMM GRANULOCYTES NFR BLD: 0 %
IRON BINDING CAPACITY (ROCHE): 232 UG/DL (ref 240–430)
IRON SATN MFR SERPL: 22 % (ref 15–46)
IRON SERPL-MCNC: 51 UG/DL (ref 37–145)
LDH SERPL L TO P-CCNC: 291 U/L (ref 0–250)
LYMPHOCYTES # BLD AUTO: 1.1 10E3/UL (ref 0.8–5.3)
LYMPHOCYTES NFR BLD AUTO: 22 %
MCH RBC QN AUTO: 32.6 PG (ref 26.5–33)
MCHC RBC AUTO-ENTMCNC: 32.2 G/DL (ref 31.5–36.5)
MCV RBC AUTO: 101 FL (ref 78–100)
MONOCYTES # BLD AUTO: 0.4 10E3/UL (ref 0–1.3)
MONOCYTES NFR BLD AUTO: 8 %
NEUTROPHILS # BLD AUTO: 3.2 10E3/UL (ref 1.6–8.3)
NEUTROPHILS NFR BLD AUTO: 67 %
NRBC # BLD AUTO: 0 10E3/UL
NRBC BLD AUTO-RTO: 0 /100
PHOSPHATE SERPL-MCNC: 3.4 MG/DL (ref 2.5–4.5)
PLATELET # BLD AUTO: 216 10E3/UL (ref 150–450)
POTASSIUM SERPL-SCNC: 3.5 MMOL/L (ref 3.4–5.3)
POTASSIUM SERPL-SCNC: 3.6 MMOL/L (ref 3.4–5.3)
PROT SERPL-MCNC: 7.7 G/DL (ref 6.4–8.3)
RBC # BLD AUTO: 3.22 10E6/UL (ref 3.8–5.2)
SODIUM SERPL-SCNC: 142 MMOL/L (ref 135–145)
SODIUM SERPL-SCNC: 142 MMOL/L (ref 135–145)
TOTAL PROTEIN SERUM FOR ELP: 7.1 G/DL (ref 6.4–8.3)
WBC # BLD AUTO: 4.8 10E3/UL (ref 4–11)

## 2024-11-26 PROCEDURE — 84165 PROTEIN E-PHORESIS SERUM: CPT | Mod: 26 | Performed by: PATHOLOGY

## 2024-11-26 PROCEDURE — 86334 IMMUNOFIX E-PHORESIS SERUM: CPT | Mod: ZL | Performed by: PATHOLOGY

## 2024-11-26 PROCEDURE — 83550 IRON BINDING TEST: CPT | Mod: ZL

## 2024-11-26 PROCEDURE — 36415 COLL VENOUS BLD VENIPUNCTURE: CPT | Mod: ZL

## 2024-11-26 PROCEDURE — 82310 ASSAY OF CALCIUM: CPT | Mod: ZL

## 2024-11-26 PROCEDURE — 85810 BLOOD VISCOSITY EXAMINATION: CPT | Mod: ZL

## 2024-11-26 PROCEDURE — 86334 IMMUNOFIX E-PHORESIS SERUM: CPT | Mod: 26 | Performed by: PATHOLOGY

## 2024-11-26 PROCEDURE — 80053 COMPREHEN METABOLIC PANEL: CPT | Mod: ZL

## 2024-11-26 PROCEDURE — 82040 ASSAY OF SERUM ALBUMIN: CPT | Mod: ZL

## 2024-11-26 PROCEDURE — 82232 ASSAY OF BETA-2 PROTEIN: CPT | Mod: ZL

## 2024-11-26 PROCEDURE — 84155 ASSAY OF PROTEIN SERUM: CPT | Mod: ZL

## 2024-11-26 PROCEDURE — 85041 AUTOMATED RBC COUNT: CPT | Mod: ZL

## 2024-11-26 PROCEDURE — 85004 AUTOMATED DIFF WBC COUNT: CPT | Mod: ZL

## 2024-11-26 PROCEDURE — 82784 ASSAY IGA/IGD/IGG/IGM EACH: CPT | Mod: ZL

## 2024-11-26 PROCEDURE — 83540 ASSAY OF IRON: CPT | Mod: ZL

## 2024-11-26 PROCEDURE — 83521 IG LIGHT CHAINS FREE EACH: CPT | Mod: ZL

## 2024-11-26 PROCEDURE — 82728 ASSAY OF FERRITIN: CPT | Mod: ZL

## 2024-11-26 PROCEDURE — 83615 LACTATE (LD) (LDH) ENZYME: CPT | Mod: ZL

## 2024-11-26 PROCEDURE — 84165 PROTEIN E-PHORESIS SERUM: CPT | Mod: TC,ZL | Performed by: PATHOLOGY

## 2024-11-29 LAB
ALBUMIN SERPL ELPH-MCNC: 3.9 G/DL (ref 3.7–5.1)
ALPHA1 GLOB SERPL ELPH-MCNC: 0.4 G/DL (ref 0.2–0.4)
ALPHA2 GLOB SERPL ELPH-MCNC: 0.6 G/DL (ref 0.5–0.9)
B-GLOBULIN SERPL ELPH-MCNC: 0.9 G/DL (ref 0.6–1)
B2 MICROGLOB TUMOR MARKER SER-MCNC: 3.1 MG/L
GAMMA GLOB SERPL ELPH-MCNC: 1.3 G/DL (ref 0.7–1.6)
IGA SERPL-MCNC: 497 MG/DL (ref 84–499)
IGG SERPL-MCNC: 1235 MG/DL (ref 610–1616)
IGM SERPL-MCNC: 100 MG/DL (ref 35–242)
KAPPA LC FREE SER-MCNC: 7.61 MG/DL (ref 0.33–1.94)
KAPPA LC FREE/LAMBDA FREE SER NEPH: 2.13 {RATIO} (ref 0.26–1.65)
LAMBDA LC FREE SERPL-MCNC: 3.57 MG/DL (ref 0.57–2.63)
LOCATION OF TASK: NORMAL
LOCATION OF TASK: NORMAL
M PROTEIN SERPL ELPH-MCNC: 0 G/DL
PROT PATTERN SERPL ELPH-IMP: NORMAL
PROT PATTERN SERPL IFE-IMP: NORMAL
VISC SER: 1.6 CP (ref 1.4–1.8)

## 2024-12-03 ENCOUNTER — VIRTUAL VISIT (OUTPATIENT)
Dept: ONCOLOGY | Facility: OTHER | Age: 79
End: 2024-12-03
Attending: NURSE PRACTITIONER
Payer: MEDICARE

## 2024-12-03 VITALS — WEIGHT: 74.6 LBS | BODY MASS INDEX: 15.07 KG/M2

## 2024-12-03 DIAGNOSIS — D64.9 ANEMIA, UNSPECIFIED TYPE: ICD-10-CM

## 2024-12-03 DIAGNOSIS — Z95.2 S/P TAVR (TRANSCATHETER AORTIC VALVE REPLACEMENT): Primary | ICD-10-CM

## 2024-12-03 DIAGNOSIS — D47.2 MGUS (MONOCLONAL GAMMOPATHY OF UNKNOWN SIGNIFICANCE): Primary | ICD-10-CM

## 2024-12-03 DIAGNOSIS — D50.8 OTHER IRON DEFICIENCY ANEMIA: ICD-10-CM

## 2024-12-03 ASSESSMENT — PAIN SCALES - GENERAL: PAINLEVEL_OUTOF10: NO PAIN (0)

## 2024-12-03 NOTE — NURSING NOTE
Patient requests telephone visit today for follow up on MGUS.   Her taste and smell has diminished, several episodes of lip swelling lately.   She has a lot of loss happening in her family.  Medication list reviewed.  Norma Ayala CMA([facility])..................12/3/2024   2:47 PM

## 2024-12-03 NOTE — PROGRESS NOTES
Oncology Follow-up Visit:  December 3, 2024  Diagnosis:MGUS    History Of Present Illness:  Telephone encounter for follow-up of MGUS and history of left lower lobe lung mass as well as iron deficiency anemia. For complete history, please see previous notes. Patient was seen in consultation on 12/21/22 to evaluate concerning a new left lower lobe lung nodule. Apparently, she had recently been seen by Dr. Johnston. Given her history of tobacco abuse, he ordered a low dose CT scan of the chest. This was done on 11/21/2022 and was read as a new solid spiculated nodule seen in the periphery of the left lower lobe measuring 14.5 mm in craniocaudal dimension by 11.8 x 10.6 mm in size, highly concerning for small malignant neoplasm. We decided to order a PET scan. This was done on 12/14/2022 and was read as no evidence of abnormal FDG uptake that would suggest malignant disease. The spiculated nodule seen previously was much smaller in size and decreased from 11.8 mm down to 7.8 mm. It was felt to be sequela of possible pneumonia in the past. It was felt to be a benign entity, possibly representing the remnants of focal pneumonia. The patient otherwise had a history of tobacco abuse. She said she smoked for at least 60 years, at least a pack per day. She most recently had cut back to half pack per day. She had at least a 15-pound weight loss over the last year. She was complaining of diffuse bone pain. We recommended that we should repeat CT chest in 3 months. A CT chest that was performed on March 21, 2023 revealed that the previous left lower lobe lung nodule had resolved. There was evidence of COPD. Patient was mildly anemic with hemoglobin 11.6 further workup revealed that she had elevated kappa lambda ratio. Dr Hansen wanted to rule out end organ damage by obtaining a PET scan. This was performed on 11/15/23 and there was no evidence of hypermetabolic disease suggest myeloma there was no evidence of lung nodules.  There was a hypermetabolic focus in the left ventricle and recommend the patient undergo echocardiogram. Echocardiogram revealed the patient had critical aortic stenosis. She did have a bone survey on 9/25/23 which did not show any suspicious lytic lesions. When patient was seen on 1/23/24, she was noted to be anemic. Her lab work was consistent with iron deficiency anemia. Peripheral blood smear was consistent with moderate normocytic anemia.  Her ferritin was low at 16 and iron is low as well as iron percent saturation.  Her hemoglobin was 8.2. Patient was set up for IV Feraheme. She did have her infusions on 2/1 and 2/9/2024. Patient did not follow up after her infusions. [facility] Patient does have ongoing shortness of breath. She continues to have dizziness with position changes. Patient has been taking her oral iron twice daily. She continues to smoke about 1ppd. Labs done on 11/26/24 show a normal M-spike. Kappa lambda light chains are stable. Hemoglobin is stable at 10.5. Iron and ferritin are normal. All other labs are stable. Patient has had several chest xrays recently due to pneumonia. Most recent cxr from 8/7/24 was stable. Recent CTs with contrast show stable nodules. Patient has no other new concerns.     Review Of Systems:  Review Of Systems  Respiratory: reports chronic shortness of breath with exertion, denies cough  Cardiovascular: denies chest pain  Gastrointestinal: reports occasional constipation, denies abdominal pain  Genitourinary: denies dysuria   Musculoskeletal: denies new bone pain  Neurologic: reports occasional dizziness, denies headaches  Hematologic/Lymphatic/Immunologic: denies fevers, no recent illness    Nursing Notes:   Norma Ayala, [facility]  12/3/2024  2:50 PM  Signed  Patient requests telephone visit today for follow up on MGUS.   Her taste and smell has diminished, several episodes of lip swelling lately.   She has a lot of loss  happening in her family.  Medication list reviewed.  Norma Ayala [facility])..................12/3/2024   2:47 PM       Past medical, social, surgical, and family histories reviewed.    Allergies:  Allergies as of 12/03/2024 - Reviewed 10/22/2024   Allergen Reaction Noted    Metronidazole Nausea and Vomiting 09/24/2015    Pneumococcal vaccine  10/08/2012    Tramadol Visual Disturbance 10/08/2012       Current Medications:  Current Outpatient Medications   Medication Sig Dispense Refill    aspirin 81 MG EC tablet Take 1 tablet (81 mg) by mouth daily 90 tablet 3    cyanocobalamin (VITAMIN B-12) 1000 MCG tablet Take 1 tablet (1,000 mcg) by mouth daily. 90 tablet 3    ferrous sulfate (FEROSUL) 325 (65 Fe) MG tablet Take 1 tablet (325 mg) by mouth every other day. 45 tablet 1    fluticasone-salmeterol (ADVAIR DISKUS) 250-50 MCG/ACT inhaler Inhale 1 puff into the lungs 2 times daily. WIXELA-Disp as covered by Pt's insurance 1 each 0    folic acid (FOLVITE) 1 MG tablet Take 1 tablet (1 mg) by mouth daily. 90 tablet 4    gabapentin (NEURONTIN) 100 MG capsule Take 2 capsules every night; may take 1 capsule in morning as needed 90 capsule 1    lisinopril (ZESTRIL) 2.5 MG tablet TAKE 1 TABLET BY MOUTH AT BEDTIME 90 tablet 0    metoprolol succinate ER (TOPROL XL) 25 MG 24 hr tablet Take 0.5 tablets (12.5 mg) by mouth daily. 45 tablet 0    simvastatin (ZOCOR) 20 MG tablet Take 1 tablet (20 mg) by mouth at bedtime. 90 tablet 0    vitamin C (ASCORBIC ACID) 250 MG tablet Take 1 tablet (250 mg) by mouth every other day. 45 tablet 1    vitamin D3 (CHOLECALCIFEROL) 50 mcg (2000 units) tablet Take 2 tablets (100 mcg) by mouth daily. 180 tablet 1    acetaminophen (TYLENOL) 325 MG tablet Take 2 tablets every night at bedtime; may take 2 tab at breakfast and lunch time as needed for pain 180 tablet 1    vitamin D3 (CHOLECALCIFEROL) 50 mcg (2000 units) tablet Take 1 tablet (50 mcg) by mouth daily. 90 tablet 2        Physical  Exam:  Unable to perform due to telephone encounter      Laboratory/Imaging Studies  Lab on 11/26/2024   Component Date Value Ref Range Status    Sodium 11/26/2024 142  135 - 145 mmol/L Final    Potassium 11/26/2024 3.6  3.4 - 5.3 mmol/L Final    Carbon Dioxide (CO2) 11/26/2024 31 (H)  22 - 29 mmol/L Final    Anion Gap 11/26/2024 7  7 - 15 mmol/L Final    Urea Nitrogen 11/26/2024 21.6  8.0 - 23.0 mg/dL Final    Creatinine 11/26/2024 0.83  0.51 - 0.95 mg/dL Final    GFR Estimate 11/26/2024 71  >60 mL/min/1.73m2 Final    eGFR calculated using 2021 CKD-EPI equation.    Calcium 11/26/2024 9.5  8.8 - 10.4 mg/dL Final    Reference intervals for this test were updated on 7/16/2024 to reflect our healthy population more accurately. There may be differences in the flagging of prior results with similar values performed with this method. Those prior results can be interpreted in the context of the updated reference intervals.    Chloride 11/26/2024 104  98 - 107 mmol/L Final    Glucose 11/26/2024 114 (H)  70 - 99 mg/dL Final    Alkaline Phosphatase 11/26/2024 75  40 - 150 U/L Final    AST 11/26/2024 24  0 - 45 U/L Final    ALT 11/26/2024 8  0 - 50 U/L Final    Protein Total 11/26/2024 7.7  6.4 - 8.3 g/dL Final    Albumin 11/26/2024 4.3  3.5 - 5.2 g/dL Final    Bilirubin Total 11/26/2024 0.5  <=1.2 mg/dL Final    Lactate Dehydrogenase 11/26/2024 291 (H)  0 - 250 U/L Final    Iron 11/26/2024 51  37 - 145 ug/dL Final    Iron Binding Capacity 11/26/2024 232 (L)  240 - 430 ug/dL Final    Iron Sat Index 11/26/2024 22  15 - 46 % Final    Ferritin 11/26/2024 109  11 - 328 ng/mL Final    Kappa Free Light Chains 11/26/2024 7.61 (H)  0.33 - 1.94 mg/dL Final    Lambda Free Light Chains 11/26/2024 3.57 (H)  0.57 - 2.63 mg/dL Final    Kappa /Lambda Ratio 11/26/2024 2.13 (H)  0.26 - 1.65 Final    Immunofixation ELP 11/26/2024 No monoclonal protein seen on immunofixation. Pathologic significance requires clinical correlation.  DIPESH Weller,  M.D., Ph.D., Pathologist ()   Final    Signout Location if Remote 11/26/2024 JHE1   Final    Viscosity Index 11/26/2024 1.6  1.4 - 1.8 Final    Immunoglobulin G 11/26/2024 1,235  610 - 1,616 mg/dL Final    Immunoglobulin A 11/26/2024 497  84 - 499 mg/dL Final    Immunoglobulin M 11/26/2024 100  35 - 242 mg/dL Final    Beta-2-Microglobulin 11/26/2024 3.1 (H)  <2.3 mg/L Final    WBC Count 11/26/2024 4.8  4.0 - 11.0 10e3/uL Final    RBC Count 11/26/2024 3.22 (L)  3.80 - 5.20 10e6/uL Final    Hemoglobin 11/26/2024 10.5 (L)  11.7 - 15.7 g/dL Final    Hematocrit 11/26/2024 32.6 (L)  35.0 - 47.0 % Final    MCV 11/26/2024 101 (H)  78 - 100 fL Final    MCH 11/26/2024 32.6  26.5 - 33.0 pg Final    MCHC 11/26/2024 32.2  31.5 - 36.5 g/dL Final    RDW 11/26/2024 13.3  10.0 - 15.0 % Final    Platelet Count 11/26/2024 216  150 - 450 10e3/uL Final    % Neutrophils 11/26/2024 67  % Final    % Lymphocytes 11/26/2024 22  % Final    % Monocytes 11/26/2024 8  % Final    % Eosinophils 11/26/2024 2  % Final    % Basophils 11/26/2024 1  % Final    % Immature Granulocytes 11/26/2024 0  % Final    NRBCs per 100 WBC 11/26/2024 0  <1 /100 Final    Absolute Neutrophils 11/26/2024 3.2  1.6 - 8.3 10e3/uL Final    Absolute Lymphocytes 11/26/2024 1.1  0.8 - 5.3 10e3/uL Final    Absolute Monocytes 11/26/2024 0.4  0.0 - 1.3 10e3/uL Final    Absolute Eosinophils 11/26/2024 0.1  0.0 - 0.7 10e3/uL Final    Absolute Basophils 11/26/2024 0.0  0.0 - 0.2 10e3/uL Final    Absolute Immature Granulocytes 11/26/2024 0.0  <=0.4 10e3/uL Final    Absolute NRBCs 11/26/2024 0.0  10e3/uL Final    Total Protein Serum for ELP 11/26/2024 7.1  6.4 - 8.3 g/dL Final    Albumin 11/26/2024 3.9  3.7 - 5.1 g/dL Final    Alpha 1 11/26/2024 0.4  0.2 - 0.4 g/dL Final    Alpha 2 11/26/2024 0.6  0.5 - 0.9 g/dL Final    Beta Globulin 11/26/2024 0.9  0.6 - 1.0 g/dL Final    Gamma Globulin 11/26/2024 1.3  0.7 - 1.6 g/dL Final    Monoclonal Peak 11/26/2024 0.0  <=0.0 g/dL  Final    ELP Interpretation 11/26/2024 Essentially normal electrophoretic pattern. No obvious monoclonal proteins seen. Pathologic significance requires clinical correlation. DIPESH Weller M.D., Ph.D., Pathologist ().   Final    Signout Location if Remote 11/26/2024 JHE1   Final    Sodium 11/26/2024 142  135 - 145 mmol/L Final    Potassium 11/26/2024 3.5  3.4 - 5.3 mmol/L Final    Chloride 11/26/2024 104  98 - 107 mmol/L Final    Carbon Dioxide (CO2) 11/26/2024 30 (H)  22 - 29 mmol/L Final    Anion Gap 11/26/2024 8  7 - 15 mmol/L Final    Glucose 11/26/2024 110 (H)  70 - 99 mg/dL Final    Urea Nitrogen 11/26/2024 20.8  8.0 - 23.0 mg/dL Final    Creatinine 11/26/2024 0.84  0.51 - 0.95 mg/dL Final    GFR Estimate 11/26/2024 70  >60 mL/min/1.73m2 Final    eGFR calculated using 2021 CKD-EPI equation.    Calcium 11/26/2024 9.5  8.8 - 10.4 mg/dL Final    Reference intervals for this test were updated on 7/16/2024 to reflect our healthy population more accurately. There may be differences in the flagging of prior results with similar values performed with this method. Those prior results can be interpreted in the context of the updated reference intervals.    Albumin 11/26/2024 4.2  3.5 - 5.2 g/dL Final    Phosphorus 11/26/2024 3.4  2.5 - 4.5 mg/dL Final        ASSESSMENT/PLAN:  1. MGUS, elevated kappa free light chains. Patient was found to be anemic. She was then found to have elevated kappa free light chains. Plan was to perform a peripheral smear. This was done on 3/28/23 and showed mild leukopenia with no evidence of dysplasia. She did have a bone survey on 9/25/23 which did not show any suspicious lytic lesions. PET scan was performed on 11/15/23 and there was no evidence of hypermetabolic disease suggest myeloma there was no evidence of lung nodules. Labs done on 5/23/24 show her M-spike is at 0.0. Kappa lambda ratio is stable. All other labs are stable. Will see patient in 6 months with repeat labs.       2. Iron deficiency anemia. This was unresponsive to oral iron. When patient was seen on 1/23/24, she was noted to be anemic. Her lab work was consistent with iron deficiency anemia. Peripheral blood smear was consistent with moderate normocytic anemia.  Her ferritin was low at 16 and iron was low at well as iron percent saturation.  Her hemoglobin was 8.2. Patient was set up for IV Feraheme. She did have her infusions on 2/1 and 2/9/2024. Patient did not follow up after her infusions. She continues on oral iron 325mg every other day. Recent iron and ferritin were normal. Will continue to monitor.      3. Pulmonary nodule. Left lower lobe lung nodule in a patient with a history of tobacco abuse.  Initial scan was positive, but now her most recent CT chest indicates that the left lower lobe lung nodule has resolved.  This was felt to likely be a sequelae of a previous pneumonia. Recent imaging of the chest has been stable. Will continue with yearly low dose CT scanning.      Twenty three minutes spent with this encounter with time spent reviewing patient records, counseling patient regarding disease process, interpretation and review of labs with patient, obtaining a review of systems, discussing plan for follow up, ordering tests, documenting in EHR and coordination of care

## 2024-12-04 DIAGNOSIS — Z95.2 S/P TAVR (TRANSCATHETER AORTIC VALVE REPLACEMENT): ICD-10-CM

## 2024-12-04 DIAGNOSIS — E78.5 HYPERLIPIDEMIA LDL GOAL <100: ICD-10-CM

## 2024-12-04 DIAGNOSIS — I50.30 HEART FAILURE WITH PRESERVED EJECTION FRACTION, NYHA CLASS I (H): ICD-10-CM

## 2024-12-05 RX ORDER — SIMVASTATIN 20 MG
20 TABLET ORAL AT BEDTIME
Qty: 90 TABLET | Refills: 0 | Status: SHIPPED | OUTPATIENT
Start: 2024-12-05

## 2024-12-05 RX ORDER — FERROUS SULFATE 325(65) MG
TABLET, DELAYED RELEASE (ENTERIC COATED) ORAL
Qty: 45 TABLET | Refills: 0 | OUTPATIENT
Start: 2024-12-05

## 2024-12-05 RX ORDER — METOPROLOL SUCCINATE 25 MG/1
12.5 TABLET, EXTENDED RELEASE ORAL DAILY
Qty: 45 TABLET | Refills: 0 | Status: SHIPPED | OUTPATIENT
Start: 2024-12-05

## 2024-12-05 NOTE — TELEPHONE ENCOUNTER
[id number] of [place] sent Rx request for the following:      ferrous sulfate (FEROSUL) 325 (65 Fe) MG tablet 45 tablet 1 8/30/2024 -- No   Sig - Route: Take 1 tablet (325 mg) by mouth every other day. - Oral   Sent to pharmacy as: Ferrous Sulfate 325 (65 Fe) MG Oral Tablet (FEROSUL)   Class: E-Prescribe   Notes to Pharmacy: Take with vit C   Order: [id number]   E-Prescribing Status: Receipt confirmed by pharmacy (8/30/2024  8:57 AM CDT)     [facility] [address]     Refused, with note to pharmacy, to transfer prescription.  Requested Prescriptions   Pending Prescriptions Disp Refills    simvastatin (ZOCOR) 20 MG tablet [Pharmacy Med Name: SIMVASTATIN 20MG TABLET] 90 tablet 0     Sig: TAKE 1 TABLET BY MOUTH AT BEDTIME   Last Prescription Date:   8/30/24  Last Fill Qty/Refills:         90, R-0        metoprolol succinate ER (TOPROL XL) 25 MG 24 hr tablet [Pharmacy Med Name: METOPROLOL SUCC 25MG ER TABLET] 45 tablet 0     Sig: TAKE 1/2 TABLET BY MOUTH ONCE DAILY   Last Prescription Date:   8/30/24  Last Fill Qty/Refills:         45, R-0         Last Office Visit:              10/22/24   Future Office visit:           None    Per LOV note:  Return in about 3 months (around 1/22/2025) for Follow up.     Prescription refilled per RN Medication Refill Policy.................... Mary Mccormack RN ....................  12/5/2024   8:49 AM    12/30

## 2025-01-03 DIAGNOSIS — M54.50 BILATERAL LOW BACK PAIN, UNSPECIFIED CHRONICITY, UNSPECIFIED WHETHER SCIATICA PRESENT: ICD-10-CM

## 2025-01-04 NOTE — TELEPHONE ENCOUNTER
GABAPENTIN 100MG CAPSULE         Last Written Prescription Date:  10/30/24  Last Fill Quantity: 90,   # refills: 1  Last Office Visit: 10/22/24  Future Office visit:       Routing refill request to provider for review/approval because:  Drug not on the [facility] or [facility] refill protocol or controlled substance. Unable to complete prescription refill per RNMedication Refill Policy.................... Grace Dykes RN ....................  1/4/2025   8:24 AM

## 2025-01-06 RX ORDER — GABAPENTIN 100 MG/1
CAPSULE ORAL
Qty: 90 CAPSULE | Refills: 1 | Status: SHIPPED | OUTPATIENT
Start: 2025-01-06

## 2025-01-14 NOTE — NURSING NOTE
Patient scheduled for a phone visit to discuss results of CT lung scan. Medication Reconciliation: complete.    Mckenna Blandon LPN  11/19/2020 11:30 AM   Myself

## 2025-01-16 DIAGNOSIS — N17.0 ACUTE KIDNEY FAILURE WITH TUBULAR NECROSIS: ICD-10-CM

## 2025-01-16 DIAGNOSIS — E53.8 VITAMIN B12 DEFICIENCY (NON ANEMIC): ICD-10-CM

## 2025-01-16 DIAGNOSIS — M54.50 BILATERAL LOW BACK PAIN, UNSPECIFIED CHRONICITY, UNSPECIFIED WHETHER SCIATICA PRESENT: ICD-10-CM

## 2025-01-16 DIAGNOSIS — D50.9 IRON DEFICIENCY ANEMIA, UNSPECIFIED IRON DEFICIENCY ANEMIA TYPE: ICD-10-CM

## 2025-01-16 DIAGNOSIS — E55.9 VITAMIN D DEFICIENCY: ICD-10-CM

## 2025-01-16 DIAGNOSIS — E78.5 HYPERLIPIDEMIA LDL GOAL <100: ICD-10-CM

## 2025-01-16 DIAGNOSIS — J43.1 PANLOBULAR EMPHYSEMA (H): ICD-10-CM

## 2025-01-16 DIAGNOSIS — J43.9 PULMONARY EMPHYSEMA, UNSPECIFIED EMPHYSEMA TYPE (H): ICD-10-CM

## 2025-01-16 DIAGNOSIS — M85.89 OSTEOPENIA OF MULTIPLE SITES: ICD-10-CM

## 2025-01-16 DIAGNOSIS — I50.30 HEART FAILURE WITH PRESERVED EJECTION FRACTION, NYHA CLASS I (H): ICD-10-CM

## 2025-01-16 DIAGNOSIS — Z95.2 S/P TAVR (TRANSCATHETER AORTIC VALVE REPLACEMENT): ICD-10-CM

## 2025-01-16 DIAGNOSIS — J44.9 CHRONIC OBSTRUCTIVE PULMONARY DISEASE, UNSPECIFIED COPD TYPE (H): ICD-10-CM

## 2025-01-16 RX ORDER — METOPROLOL SUCCINATE 25 MG/1
12.5 TABLET, EXTENDED RELEASE ORAL DAILY
Qty: 45 TABLET | Refills: 0 | Status: SHIPPED | OUTPATIENT
Start: 2025-01-16

## 2025-01-16 RX ORDER — MULTIVIT WITH MINERALS/LUTEIN
250 TABLET ORAL EVERY OTHER DAY
Qty: 45 TABLET | Refills: 1 | Status: SHIPPED | OUTPATIENT
Start: 2025-01-16

## 2025-01-16 RX ORDER — LANOLIN ALCOHOL/MO/W.PET/CERES
1000 CREAM (GRAM) TOPICAL DAILY
Qty: 90 TABLET | Refills: 3 | Status: SHIPPED | OUTPATIENT
Start: 2025-01-16

## 2025-01-16 RX ORDER — FERROUS SULFATE 325(65) MG
325 TABLET ORAL EVERY OTHER DAY
Qty: 45 TABLET | Refills: 1 | Status: SHIPPED | OUTPATIENT
Start: 2025-01-16

## 2025-01-16 RX ORDER — CHOLECALCIFEROL (VITAMIN D3) 50 MCG
1 TABLET ORAL DAILY
Qty: 90 TABLET | Refills: 2 | Status: CANCELLED | OUTPATIENT
Start: 2025-01-16

## 2025-01-16 RX ORDER — FLUTICASONE PROPIONATE AND SALMETEROL 250; 50 UG/1; UG/1
1 POWDER RESPIRATORY (INHALATION) 2 TIMES DAILY
Qty: 1 EACH | Refills: 0 | Status: SHIPPED | OUTPATIENT
Start: 2025-01-16

## 2025-01-16 RX ORDER — FOLIC ACID 1 MG/1
1 TABLET ORAL DAILY
Qty: 90 TABLET | Refills: 4 | Status: SHIPPED | OUTPATIENT
Start: 2025-01-16

## 2025-01-16 RX ORDER — LISINOPRIL 2.5 MG/1
2.5 TABLET ORAL AT BEDTIME
Qty: 90 TABLET | Refills: 0 | Status: SHIPPED | OUTPATIENT
Start: 2025-01-16

## 2025-01-16 RX ORDER — ASPIRIN 81 MG/1
81 TABLET ORAL DAILY
Qty: 90 TABLET | Refills: 3 | Status: SHIPPED | OUTPATIENT
Start: 2025-01-16

## 2025-01-16 RX ORDER — SIMVASTATIN 20 MG
20 TABLET ORAL AT BEDTIME
Qty: 90 TABLET | Refills: 0 | Status: SHIPPED | OUTPATIENT
Start: 2025-01-16

## 2025-01-16 RX ORDER — CHOLECALCIFEROL (VITAMIN D3) 50 MCG
2 TABLET ORAL DAILY
Qty: 180 TABLET | Refills: 1 | Status: SHIPPED | OUTPATIENT
Start: 2025-01-16

## 2025-01-16 RX ORDER — GABAPENTIN 100 MG/1
CAPSULE ORAL
Qty: 90 CAPSULE | Refills: 1 | Status: SHIPPED | OUTPATIENT
Start: 2025-01-16

## 2025-01-16 NOTE — TELEPHONE ENCOUNTER
[facility] by mail sent Rx request for the following:      Requested Prescriptions   Pending Prescriptions Disp Refills    folic acid (FOLVITE) 1 MG tablet 90 tablet 4     Sig: Take 1 tablet (1 mg) by mouth daily.       Vitamin Supplements Protocol Failed - 1/16/2025  1:19 PM        Failed - Normal Hgb on file in past 12 mos     Recent Labs   Lab Test 11/26/24  1517   HGB 10.5*            Last Prescription Date:   10/9/24  Last Fill Qty/Refills:         90, R-4           lisinopril (ZESTRIL) 2.5 MG tablet 90 tablet 0     Sig: Take 1 tablet (2.5 mg) by mouth at bedtime.       ACE Inhibitors (Including Combos) Protocol Passed - 1/16/2025  1:19 PM        Last Prescription Date:   11/5/24  Last Fill Qty/Refills:         90, R-0           vitamin D3 (CHOLECALCIFEROL) 50 mcg (2000 units) tablet 90 tablet 2     Sig: Take 1 tablet (50 mcg) by mouth daily.       Vitamin Supplements Protocol Failed - 1/16/2025  1:19 PM        Failed - Normal Hgb on file in past 12 mos     Recent Labs   Lab Test 11/26/24  1517   HGB 10.5*            Last Prescription Date:   10/31/24  Last Fill Qty/Refills:         180, R-1    Per patient takes twice daily         simvastatin (ZOCOR) 20 MG tablet 90 tablet 0     Sig: Take 1 tablet (20 mg) by mouth at bedtime.       Antihyperlipidemic agents Passed - 1/16/2025  1:19 PM        Last Prescription Date:   12/5/24  Last Fill Qty/Refills:         90, R-0             metoprolol succinate ER (TOPROL XL) 25 MG 24 hr tablet 45 tablet 0     Sig: Take 0.5 tablets (12.5 mg) by mouth daily.       Beta-Blockers Protocol Passed - 1/16/2025  1:19 PM        Last Prescription Date:   12/5/24  Last Fill Qty/Refills:         45, R-0           gabapentin (NEURONTIN) 100 MG capsule 90 capsule 1     Sig: Take 2 capsules every night; may take 1 capsule in morning as needed       There is no refill protocol information for this order     Last Prescription Date:   1/6/25  Last Fill Qty/Refills:         90, R-1           vitamin C (ASCORBIC ACID) 250 MG tablet 45 tablet 1     Sig: Take 1 tablet (250 mg) by mouth every other day.       There is no refill protocol information for this order     Last Prescription Date:   8/30/24  Last Fill Qty/Refills:         45, R-1          ferrous sulfate (FEROSUL) 325 (65 Fe) MG tablet 45 tablet 1     Sig: Take 1 tablet (325 mg) by mouth every other day.       Iron Supplements Passed - 1/16/2025  1:19 PM          Last Prescription Date:   8/30/24  Last Fill Qty/Refills:         45, R-1           cyanocobalamin (VITAMIN B-12) 1000 MCG tablet 90 tablet 3     Sig: Take 1 tablet (1,000 mcg) by mouth daily.       Vitamin Supplements Protocol Failed - 1/16/2025  1:19 PM        Failed - Normal Hgb on file in past 12 mos     Recent Labs   Lab Test 11/26/24  1517   HGB 10.5*            Last Prescription Date:   8/30/24  Last Fill Qty/Refills:         90, R-3             aspirin 81 MG EC tablet 90 tablet 3     Sig: Take 1 tablet (81 mg) by mouth daily.       Analgesics (Non-Narcotic Tylenol and ASA Only) Passed - 1/16/2025  1:19 PM        Last Prescription Date:   7/26/24  Last Fill Qty/Refills:         90, R-3           fluticasone-salmeterol (ADVAIR DISKUS) 250-50 MCG/ACT inhaler 1 each 0     Sig: Inhale 1 puff into the lungs 2 times daily. WIXELA-Disp as covered by Pt's insurance       Inhaled Steroids Protocol Passed - 1/16/2025  1:19 PM          Last Prescription Date:   8/30/24  Last Fill Qty/Refills:         1, R-0    Last Office Visit:              10/22/24   Future Office visit:           none    Patient is changing to mail order pharmacy, needs new prescriptions.    Routing refill request to provider for review/approval because:  Drug not on the [facility] refill protocol       Maya Green RN on 1/16/2025 at 1:31 PM

## 2025-01-16 NOTE — TELEPHONE ENCOUNTER
Patient's nurse Alexus is calling to stated new scripts are needed to be sent to [facility] pharmacy through [facility] pharmacy.   New scripts needed for the following medications:    Folic Acid  Lisinopril  Vit D3  Simvastatin  Metoprolol  Gabapentin  Vit C  Ferrous sulfate  Vit B12  Aspirin 81 mg  Inhaler-fluticasone    Please call patient at [phone] with any questions.

## 2025-03-09 ENCOUNTER — HEALTH MAINTENANCE LETTER (OUTPATIENT)
Age: 80
End: 2025-03-09

## 2025-03-12 DIAGNOSIS — J44.9 CHRONIC OBSTRUCTIVE PULMONARY DISEASE, UNSPECIFIED COPD TYPE (H): ICD-10-CM

## 2025-03-12 DIAGNOSIS — J43.1 PANLOBULAR EMPHYSEMA (H): ICD-10-CM

## 2025-03-12 RX ORDER — FLUTICASONE PROPIONATE AND SALMETEROL 250; 50 UG/1; UG/1
1 POWDER RESPIRATORY (INHALATION) 2 TIMES DAILY
Qty: 1 EACH | Refills: 0 | Status: SHIPPED | OUTPATIENT
Start: 2025-03-12

## 2025-03-12 NOTE — TELEPHONE ENCOUNTER
Reason for call: Medication or medication refill    Have you contacted your pharmacy regarding this refill? Yes     If No, direct the patient to contact the Pharmacy and discontinue telephone note using Erroneous Encounter.  If Yes, continue.    Name of medication requested: Advair inhaler     How many days of medication do you have left? 1 left     What pharmacy do you use? [facility] by mail     Preferred method for responding to this message: Telephone Call    Phone number patient can be reached at: Cell number on file:    Telephone Information:   Mobile [phone]         If we cannot reach you directly, may we leave a detailed response at the number you provided? Yes  Patient aware DWS out until 03/17/25.      Nikole Cantrell on 3/12/2025 at 12:42 PM

## 2025-03-12 NOTE — TELEPHONE ENCOUNTER
Requested Prescriptions   Pending Prescriptions Disp Refills    fluticasone-salmeterol (ADVAIR DISKUS) 250-50 MCG/ACT inhaler 1 each 0     Sig: Inhale 1 puff into the lungs 2 times daily. WIXELA-Disp as covered by Pt's insurance   Last Prescription Date:   1/16/25  Last Fill Qty/Refills:         1, R-0    Chronic obstructive pulmonary disease, unspecified COPD type (H) [J44.9]   encouraged inhaler BID      Panlobular emphysema (H) [J43.1]   Chronic interstitial changes with paraseptal emphysema.        Last Office Visit:              10/22/24 ([facility]ris)  Future Office visit:           None    Per LOV note:  Return in about 3 months (around 1/22/2025) for Follow up.     Pt due for follow up. Routing to provider for refill consideration. Routing to Unit scheduling pool, to assist Pt in scheduling appointment. Unable to complete prescription refill per RN Medication Refill Policy. Routing to covering provider for refill consideration, as PCP/provider is out of clinic >48 hours or Pt is completely out of medication and provider is out of the clinic today. Mary Mccormack RN .............. 3/12/2025  1:41 PM

## 2025-04-01 DIAGNOSIS — I50.30 HEART FAILURE WITH PRESERVED EJECTION FRACTION, NYHA CLASS I (H): ICD-10-CM

## 2025-04-01 DIAGNOSIS — N17.0 ACUTE KIDNEY FAILURE WITH TUBULAR NECROSIS: ICD-10-CM

## 2025-04-01 DIAGNOSIS — E78.5 HYPERLIPIDEMIA LDL GOAL <100: ICD-10-CM

## 2025-04-01 DIAGNOSIS — Z95.2 S/P TAVR (TRANSCATHETER AORTIC VALVE REPLACEMENT): ICD-10-CM

## 2025-04-01 DIAGNOSIS — J44.9 CHRONIC OBSTRUCTIVE PULMONARY DISEASE, UNSPECIFIED COPD TYPE (H): ICD-10-CM

## 2025-04-01 DIAGNOSIS — J43.1 PANLOBULAR EMPHYSEMA (H): ICD-10-CM

## 2025-04-02 RX ORDER — LISINOPRIL 2.5 MG/1
2.5 TABLET ORAL AT BEDTIME
Qty: 90 TABLET | Refills: 1 | Status: SHIPPED | OUTPATIENT
Start: 2025-04-02

## 2025-04-02 RX ORDER — SIMVASTATIN 20 MG
20 TABLET ORAL AT BEDTIME
Qty: 90 TABLET | Refills: 1 | Status: SHIPPED | OUTPATIENT
Start: 2025-04-02

## 2025-04-03 RX ORDER — FLUTICASONE PROPIONATE AND SALMETEROL 250; 50 UG/1; UG/1
POWDER RESPIRATORY (INHALATION)
Qty: 180 EACH | Refills: 2 | Status: SHIPPED | OUTPATIENT
Start: 2025-04-03

## 2025-04-03 NOTE — TELEPHONE ENCOUNTER
[facility] sent Rx request for the following:      Requested Prescriptions   Pending Prescriptions Disp Refills    WIXELA INHUB 250-50 MCG/ACT inhaler [Pharmacy Med Name: FLUTICAS 250/SALMETEROL 50 INHL DISK 60]  0     Sig: INHALE ONE PUFF BY MOUTH TWICE A DAY AND RINSE MOUTH AFTER USE (DISCARD 1 MONTH AFTER REMOVAL FROM FOIL POUCH)       Inhaled Steroids Protocol Failed - 4/3/2025 11:00 AM        Failed - Medication is active on med list and the sig matches. RN to manually verify dose and sig if red X/fail.     If the protocol passes (green check), you do not need to verify med dose and sig.    A prescription matches if they are the same clinical intention.    For Example: once daily and every morning are the same.    The protocol can not identify upper and lower case letters as matching and will fail.     For Example: Take 1 tablet (50 mg) by mouth daily     TAKE 1 TABLET (50 MG) BY MOUTH DAILY    For all fails (red x), verify dose and sig.    If the refill does match what is on file, the RN can still proceed to approve the refill request.       If they do not match, route to the appropriate provider.     Last Prescription Date:   3/12/25  Last Fill Qty/Refills:         1, R-0    Chronic obstructive pulmonary disease, unspecified COPD type (H) [J44.9]   encouraged inhaler BID   Panlobular emphysema (H) [J43.1]   Chronic interstitial changes with paraseptal emphysema.     Last Office Visit:              10/22/24 (Dr. Pisano)   Future Office visit:           None    Per LOV note:    Return in about 3 months (around 1/22/2025) for Follow up.    Unable to complete prescription refill per RN Medication Refill Policy. Mary Mccormack RN .............. 4/3/2025  11:08 AM

## 2025-04-22 ENCOUNTER — LAB (OUTPATIENT)
Dept: LAB | Facility: OTHER | Age: 80
End: 2025-04-22
Attending: INTERNAL MEDICINE
Payer: MEDICARE

## 2025-04-22 DIAGNOSIS — D64.9 ANEMIA, UNSPECIFIED TYPE: ICD-10-CM

## 2025-04-22 DIAGNOSIS — D50.8 OTHER IRON DEFICIENCY ANEMIA: ICD-10-CM

## 2025-04-22 DIAGNOSIS — D47.2 MGUS (MONOCLONAL GAMMOPATHY OF UNKNOWN SIGNIFICANCE): ICD-10-CM

## 2025-04-22 LAB
ALBUMIN SERPL BCG-MCNC: 4.2 G/DL (ref 3.5–5.2)
ALP SERPL-CCNC: 74 U/L (ref 40–150)
ALT SERPL W P-5'-P-CCNC: 13 U/L (ref 0–50)
ANION GAP SERPL CALCULATED.3IONS-SCNC: 10 MMOL/L (ref 7–15)
AST SERPL W P-5'-P-CCNC: 33 U/L (ref 0–45)
BASOPHILS # BLD AUTO: 0 10E3/UL (ref 0–0.2)
BASOPHILS NFR BLD AUTO: 1 %
BILIRUB SERPL-MCNC: 0.7 MG/DL
BUN SERPL-MCNC: 20 MG/DL (ref 8–23)
CALCIUM SERPL-MCNC: 9.7 MG/DL (ref 8.8–10.4)
CHLORIDE SERPL-SCNC: 105 MMOL/L (ref 98–107)
CREAT SERPL-MCNC: 0.83 MG/DL (ref 0.51–0.95)
EGFRCR SERPLBLD CKD-EPI 2021: 71 ML/MIN/1.73M2
EOSINOPHIL # BLD AUTO: 0.1 10E3/UL (ref 0–0.7)
EOSINOPHIL NFR BLD AUTO: 2 %
ERYTHROCYTE [DISTWIDTH] IN BLOOD BY AUTOMATED COUNT: 14 % (ref 10–15)
FERRITIN SERPL-MCNC: 54 NG/ML (ref 11–328)
GLUCOSE SERPL-MCNC: 115 MG/DL (ref 70–99)
HCO3 SERPL-SCNC: 29 MMOL/L (ref 22–29)
HCT VFR BLD AUTO: 32.4 % (ref 35–47)
HGB BLD-MCNC: 10.5 G/DL (ref 11.7–15.7)
IMM GRANULOCYTES # BLD: 0 10E3/UL
IMM GRANULOCYTES NFR BLD: 0 %
IRON BINDING CAPACITY (ROCHE): 293 UG/DL (ref 240–430)
IRON SATN MFR SERPL: 33 % (ref 15–46)
IRON SERPL-MCNC: 97 UG/DL (ref 37–145)
LDH SERPL L TO P-CCNC: 348 U/L (ref 0–250)
LYMPHOCYTES # BLD AUTO: 1 10E3/UL (ref 0.8–5.3)
LYMPHOCYTES NFR BLD AUTO: 20 %
MCH RBC QN AUTO: 32.2 PG (ref 26.5–33)
MCHC RBC AUTO-ENTMCNC: 32.4 G/DL (ref 31.5–36.5)
MCV RBC AUTO: 99 FL (ref 78–100)
MONOCYTES # BLD AUTO: 0.3 10E3/UL (ref 0–1.3)
MONOCYTES NFR BLD AUTO: 5 %
NEUTROPHILS # BLD AUTO: 3.7 10E3/UL (ref 1.6–8.3)
NEUTROPHILS NFR BLD AUTO: 71 %
NRBC # BLD AUTO: 0 10E3/UL
NRBC BLD AUTO-RTO: 0 /100
PLATELET # BLD AUTO: 215 10E3/UL (ref 150–450)
POTASSIUM SERPL-SCNC: 3.9 MMOL/L (ref 3.4–5.3)
PROT SERPL-MCNC: 7.7 G/DL (ref 6.4–8.3)
RBC # BLD AUTO: 3.26 10E6/UL (ref 3.8–5.2)
SODIUM SERPL-SCNC: 144 MMOL/L (ref 135–145)
TOTAL PROTEIN SERUM FOR ELP: 7.3 G/DL (ref 6.4–8.3)
WBC # BLD AUTO: 5.1 10E3/UL (ref 4–11)

## 2025-04-22 PROCEDURE — 83521 IG LIGHT CHAINS FREE EACH: CPT | Mod: ZL

## 2025-04-22 PROCEDURE — 86334 IMMUNOFIX E-PHORESIS SERUM: CPT | Mod: 26 | Performed by: PATHOLOGY

## 2025-04-22 PROCEDURE — 84165 PROTEIN E-PHORESIS SERUM: CPT | Mod: 26 | Performed by: PATHOLOGY

## 2025-04-22 PROCEDURE — 82310 ASSAY OF CALCIUM: CPT | Mod: ZL

## 2025-04-22 PROCEDURE — 83615 LACTATE (LD) (LDH) ENZYME: CPT | Mod: ZL

## 2025-04-22 PROCEDURE — 86334 IMMUNOFIX E-PHORESIS SERUM: CPT | Mod: ZL | Performed by: PATHOLOGY

## 2025-04-22 PROCEDURE — 82728 ASSAY OF FERRITIN: CPT | Mod: ZL

## 2025-04-22 PROCEDURE — 85810 BLOOD VISCOSITY EXAMINATION: CPT | Mod: ZL

## 2025-04-22 PROCEDURE — 85025 COMPLETE CBC W/AUTO DIFF WBC: CPT | Mod: ZL

## 2025-04-22 PROCEDURE — 84155 ASSAY OF PROTEIN SERUM: CPT | Mod: ZL

## 2025-04-22 PROCEDURE — 83550 IRON BINDING TEST: CPT | Mod: ZL

## 2025-04-22 PROCEDURE — 84165 PROTEIN E-PHORESIS SERUM: CPT | Mod: TC,ZL | Performed by: PATHOLOGY

## 2025-04-22 PROCEDURE — 82232 ASSAY OF BETA-2 PROTEIN: CPT | Mod: ZL

## 2025-04-22 PROCEDURE — 82784 ASSAY IGA/IGD/IGG/IGM EACH: CPT | Mod: ZL

## 2025-04-22 PROCEDURE — 36415 COLL VENOUS BLD VENIPUNCTURE: CPT | Mod: ZL

## 2025-04-23 LAB
ALBUMIN SERPL ELPH-MCNC: 4.1 G/DL (ref 3.7–5.1)
ALPHA1 GLOB SERPL ELPH-MCNC: 0.3 G/DL (ref 0.2–0.4)
ALPHA2 GLOB SERPL ELPH-MCNC: 0.5 G/DL (ref 0.5–0.9)
B-GLOBULIN SERPL ELPH-MCNC: 1 G/DL (ref 0.6–1)
B2 MICROGLOB TUMOR MARKER SER-MCNC: 2.6 MG/L
GAMMA GLOB SERPL ELPH-MCNC: 1.4 G/DL (ref 0.7–1.6)
IGA SERPL-MCNC: 479 MG/DL (ref 84–499)
IGG SERPL-MCNC: 1247 MG/DL (ref 610–1616)
IGM SERPL-MCNC: 101 MG/DL (ref 35–242)
KAPPA LC FREE SER-MCNC: 6.59 MG/DL (ref 0.33–1.94)
KAPPA LC FREE/LAMBDA FREE SER NEPH: 1.76 {RATIO} (ref 0.26–1.65)
LAMBDA LC FREE SERPL-MCNC: 3.74 MG/DL (ref 0.57–2.63)
LOCATION OF TASK: NORMAL
LOCATION OF TASK: NORMAL
M PROTEIN SERPL ELPH-MCNC: 0 G/DL
PROT PATTERN SERPL ELPH-IMP: NORMAL
PROT PATTERN SERPL IFE-IMP: NORMAL
VISC SER: 1.6 CP (ref 1.4–1.8)

## 2025-05-20 ENCOUNTER — VIRTUAL VISIT (OUTPATIENT)
Dept: ONCOLOGY | Facility: OTHER | Age: 80
End: 2025-05-20
Attending: NURSE PRACTITIONER
Payer: MEDICARE

## 2025-05-20 VITALS
BODY MASS INDEX: 16.16 KG/M2 | RESPIRATION RATE: 16 BRPM | OXYGEN SATURATION: 99 % | SYSTOLIC BLOOD PRESSURE: 136 MMHG | WEIGHT: 80 LBS | DIASTOLIC BLOOD PRESSURE: 60 MMHG | TEMPERATURE: 97.8 F | HEART RATE: 86 BPM

## 2025-05-20 DIAGNOSIS — R91.8 PULMONARY NODULES: ICD-10-CM

## 2025-05-20 DIAGNOSIS — D47.2 MGUS (MONOCLONAL GAMMOPATHY OF UNKNOWN SIGNIFICANCE): Primary | ICD-10-CM

## 2025-05-20 DIAGNOSIS — D64.9 ANEMIA, UNSPECIFIED TYPE: ICD-10-CM

## 2025-05-20 ASSESSMENT — PAIN SCALES - GENERAL: PAINLEVEL_OUTOF10: NO PAIN (0)

## 2025-05-20 NOTE — NURSING NOTE
"Oncology Rooming Note    May 20, 2025 2:47 PM   Loulou Lucero is a 80 year old female who presents for:    Chief Complaint   Patient presents with    Oncology Clinic Visit     6 month follow up, MGUS, GIRMA     Initial Vitals: /60 (BP Location: Right arm, Patient Position: Sitting, Cuff Size: Adult Regular)   Pulse 86   Temp 97.8  F (36.6  C) (Tympanic)   Resp 16   Wt 36.3 kg (80 lb)   LMP 01/01/1995 (Approximate)   SpO2 99%   BMI 16.16 kg/m   Estimated body mass index is 16.16 kg/m  as calculated from the following:    Height as of 10/22/24: 1.499 m (4' 11\").    Weight as of this encounter: 36.3 kg (80 lb). Body surface area is 1.23 meters squared.  No Pain (0) Comment: Data Unavailable   Patient's last menstrual period was 01/01/1995 (approximate).  Allergies reviewed: Yes  Medications reviewed: Yes    Medications: Medication refills not needed today.  Pharmacy name entered into EPIC:     [facility] [address] [id number] - [place], [place] - [address] [facility] - [place], [place] - [address]    Frailty Screening:   Is the patient here for a new oncology consult visit in cancer care? 2. No    PHQ9:  Did this patient require a PHQ9?: No      Clinical concerns: no       Mariah López LPN            "

## 2025-05-20 NOTE — PROGRESS NOTES
Hematology Follow-up Visit    Reason for Visit:  Loulou is a 80 year old woman with a diagnosis of MGUS, anemia, hx lung nodule, who presents to the clinic today for routine follow-up. This is a video visit    Nursing Note and documentation reviewed: Yes    Interval History: Loulou notes that she is doing very well. She has no big concerns. She does try to gain weight but notes that she is unable to. She lost a lot of weight, strength, energy etc following her TAVR procedure back in 06/2024 and has struggled since that time to get back to her prior baseline. She is eating. Weight stable over last couple years, varying a couple lbs either way.     No recent infections. She does have some productive cough, she continues to smoke. No new breathing concerns. No bleeding concerns. No melana or hematochezia. No hemoptysis. Iron does constipatie her at times. Trying to stay active.     History of Present Illness:   Patient is a prior patient of both Dr. Hansen and Ximena Casas NP. For full details, please see their prior tnotes.     In short, patient was seen in consultation on 12/21/22 to evaluate concerning a new left lower lobe lung nodule. Apparently, she had seen by Dr. Johnston and given her history of tobacco abuse, he ordered a low dose CT scan of the chest. This was done on 11/21/2022 and was read as a new solid spiculated nodule seen in the periphery of the left lower lobe measuring 14.5 mm in craniocaudal dimension by 11.8 x 10.6 mm in size, highly concerning for small malignant neoplasm. It was decided to order a PET scan which was done on 12/14/2022 and was read as no evidence of abnormal FDG uptake that would suggest malignant disease. The spiculated nodule seen previously was much smaller in size and decreased from 11.8 mm down to 7.8 mm. It was felt to be sequela of possible pneumonia in the past. It was felt to be a benign entity, possibly representing the remnants of focal pneumonia.    A 3 months  follow-up CT was ordered and performed on March 21, 2023 and this revealed that the previous left lower lobe lung nodule had resolved. There was evidence of COPD. Patient was mildly anemic with hemoglobin 11.6. Further workup revealed that she had elevated kappa lambda ratio. Dr Hansen wanted to rule out end organ damage by obtaining a PET scan. This was performed on 11/15/23 and there was no evidence of hypermetabolic disease suggest myeloma. There was no evidence of lung nodules. There was a hypermetabolic focus in the left ventricle and recommend the patient undergo echocardiogram. Echocardiogram revealed the patient had critical aortic stenosis (Underwent TAVR on 6/16/24 at the [facility]). She did have a bone survey on 9/25/23 which did not show any suspicious lytic lesions.     When patient was seen on 1/23/24, she was again noted to be anemic. Her lab work was consistent with iron deficiency anemia. Peripheral blood smear was consistent with moderate normocytic anemia.  Her ferritin was low at 16 and iron is low as well as iron percent saturation.  Her hemoglobin was 8.2. Patient was set up for IV Feraheme. She did have her infusions on 2/1 and 2/9/2024. Patient did not follow up after her infusions. She was continued on oral iron.     Since, she has been followed in surveillance.     Current Treatment: Ferrous sulfate 325 mg three times weekly    Past Medical History:   Diagnosis Date    Asymptomatic varicose veins of lower extremity     6/12/2012    Benign paroxysmal vertigo     12/29/2010    Chronic obstructive pulmonary disease (H)     12/29/2010    Diarrhea     10/1/2015    Disorder of cartilage     Hip; Last dxa 06/2010    Diverticulosis of large intestine without perforation or abscess without bleeding          Lower abdominal pain     10/1/2015    Other disorders of lung (CODE)     Stable since July of 2002. RU lobe.    Personal history of other medical treatment (CODE)     L3, L4-4; Last MRI 7/09/12     Personal history of other medical treatment (CODE)     G-3, P-2, A-0  (Son had SIDS)    Zoster without complications     3/31/2012       Social History     Socioeconomic History    Marital status:      Spouse name: Not on file    Number of children: Not on file    Years of education: Not on file    Highest education level: Not on file   Occupational History    Not on file   Tobacco Use    Smoking status: Every Day     Current packs/day: 0.33     Average packs/day: 0.5 packs/day for 64.4 years (32.1 ttl pk-yrs)     Types: Cigarettes     Start date: 1961     Passive exposure: Past    Smokeless tobacco: Never    Tobacco comments:     Passive exposure in adult home. 6 cigs a day   Vaping Use    Vaping status: Never Used   Substance and Sexual Activity    Alcohol use: Not Currently     Comment: rarely at a wedding or special occasion    Drug use: No    Sexual activity: Not Currently   Other Topics Concern    Parent/sibling w/ CABG, MI or angioplasty before 65F 55M? Not Asked   Social History Narrative    Patient in second marriage. Has two living children, both live in [place], [place]. Has 2 grandchildren. Is retired, worked at [employer] in [place].      Patient currently smokes, 1 ppd x 50 yrs. Smokes less in the winter months due to not smoking in the house    .   Alcohol Use - no  - disabled.     Social Drivers of Health     Financial Resource Strain: Low Risk  (3/21/2024)    Financial Resource Strain     Within the past 12 months, have you or your family members you live with been unable to get utilities (heat, electricity) when it was really needed?: No   Food Insecurity: Low Risk  (3/21/2024)    Food Insecurity     Within the past 12 months, did you worry that your food would run out before you got money to buy more?: No     Within the past 12 months, did the food you bought just not last and you didn t have money to get more?: No   Transportation Needs: Low Risk  (3/21/2024)    Transportation Needs      Within the past 12 months, has lack of transportation kept you from medical appointments, getting your medicines, non-medical meetings or appointments, work, or from getting things that you need?: No   Physical Activity: Unknown (1/31/2024)    Exercise Vital Sign     Days of Exercise per Week: 0 days     Minutes of Exercise per Session: Not on file   Stress: No Stress Concern Present (1/31/2024)    [employer] - Occupational Stress Questionnaire     Feeling of Stress : Only a little   Social Connections: Unknown (1/31/2024)    Social Connection and Isolation Panel [NHANES]     Frequency of Communication with Friends and Family: Not on file     Frequency of Social Gatherings with Friends and Family: Once a week     Attends Congregation Services: Not on file     Active Member of Clubs or Organizations: Not on file     Attends Club or Organization Meetings: Not on file     Marital Status: Not on file   Interpersonal Safety: Low Risk  (5/20/2025)    Interpersonal Safety     Do you feel physically and emotionally safe where you currently live?: Yes     Within the past 12 months, have you been hit, slapped, kicked or otherwise physically hurt by someone?: No     Within the past 12 months, have you been humiliated or emotionally abused in other ways by your partner or ex-partner?: No   Housing Stability: Low Risk  (3/21/2024)    Housing Stability     Do you have housing? : Yes     Are you worried about losing your housing?: No       Past Surgical History:   Procedure Laterality Date    CATARACT EXTRACTION Right     8/2023    COLONOSCOPY  03/22/2010    Normal; + family hx, next due 2015    COLONOSCOPY  10/01/2015    W/ POLYPECTOMY recommend follow up in 2020.    COLONOSCOPY N/A 11/07/2019    4 tubular adenoma, 3 year follow up    CV CORONARY ANGIOGRAM N/A 4/5/2024    Procedure: Coronary Angiogram;  Surgeon: Min So MD;  Location:  [facility]    CV RIGHT HEART  CATH MEASUREMENTS RECORDED N/A 4/5/2024    Procedure: Right Heart Catheterization;  Surgeon: Min So MD;  Location:  [facility] CATH [facility]    CV TRANSCATHETER AORTIC VALVE REPLACEMENT N/A 6/19/2024    Procedure: Transcatheter Aortic Valve Replacement - Transapical Approach;  Surgeon: Claude Patricia MD;  Location:  OR    ESOPHAGOSCOPY, GASTROSCOPY, DUODENOSCOPY (EGD), COMBINED  04/23/2014    Arce's esophagus fu egd 2 yrs    ESOPHAGOSCOPY, GASTROSCOPY, DUODENOSCOPY (EGD), COMBINED N/A 11/07/2019    Procedure: ESOPHAGOGASTRODUODENOSCOPY, WITH BIOPSY;  Surgeon: Santosh Barrera MD;  Location:  OR    IR CHEST TUBE PLACEMENT NON-TUNNELLED LEFT  6/21/2024    LAPAROSCOPIC TUBAL LIGATION  1978         OR TRANSCATHETER AORTIC VALVE REPLACEMENT, TRANSAPICAL OPEN APPROACH Left 6/19/2024    Procedure: Transcatheter Aortic Valve Replacement, Transapical Open Approach using Diaz Pericardial Tissue Heart Valve size 20mm, Cardiopulmonary Bypass Pump on Standby, and Transesophageal Echocardiogram by Cardiology;  Surgeon: Frank Cross MD;  Location:  OR    TONSILLECTOMY & ADENOIDECTOMY  1951            Family History   Problem Relation Age of Onset    Colon Cancer Father         Cancer-colon    Other - See Comments Mother         Alzheimer's    Other - See Comments Maternal Grandmother         Alzheimer's    Other - See Comments Brother         Alzheimer's    Other - See Comments Brother         aneurysm and stents    Cancer Brother         Cancer,lung    Cancer Brother         Cancer,skin    Other - See Comments Brother         cirrhosis of the liver    Other - See Comments Maternal Uncle         3, Alzheimer's    Breast Cancer No family hx of         Cancer-breast       Allergies:  Allergies as of 05/20/2025 - Reviewed 10/22/2024   Allergen Reaction Noted    Metronidazole Nausea and Vomiting 09/24/2015    Pneumococcal vaccine  10/08/2012    Tramadol Visual Disturbance 10/08/2012        Current Medications:  Current Outpatient Medications   Medication Sig Dispense Refill    acetaminophen (TYLENOL) 325 MG tablet Take 2 tablets every night at bedtime; may take 2 tab at breakfast and lunch time as needed for pain 180 tablet 1    aspirin 81 MG EC tablet Take 1 tablet (81 mg) by mouth daily. 90 tablet 3    cyanocobalamin (VITAMIN B-12) 1000 MCG tablet Take 1 tablet (1,000 mcg) by mouth daily. 90 tablet 3    ferrous sulfate (FEROSUL) 325 (65 Fe) MG tablet Take 1 tablet (325 mg) by mouth every other day. 45 tablet 1    folic acid (FOLVITE) 1 MG tablet Take 1 tablet (1 mg) by mouth daily. 90 tablet 4    gabapentin (NEURONTIN) 100 MG capsule Take 2 capsules every night; may take 1 capsule in morning as needed 90 capsule 1    lisinopril (ZESTRIL) 2.5 MG tablet TAKE ONE TABLET BY MOUTH EVERY DAY AT BEDTIME 90 tablet 1    metoprolol succinate ER (TOPROL XL) 25 MG 24 hr tablet Take 0.5 tablets (12.5 mg) by mouth daily. 45 tablet 0    simvastatin (ZOCOR) 20 MG tablet TAKE ONE TABLET BY MOUTH EVERY DAY AT BEDTIME 90 tablet 1    vitamin C (ASCORBIC ACID) 250 MG tablet Take 1 tablet (250 mg) by mouth every other day. 45 tablet 1    vitamin D3 (CHOLECALCIFEROL) 50 mcg (2000 units) tablet Take 2 tablets (100 mcg) by mouth daily. 180 tablet 1    vitamin D3 (CHOLECALCIFEROL) 50 mcg (2000 units) tablet Take 1 tablet (50 mcg) by mouth daily. 90 tablet 2    WIXELA INHUB 250-50 MCG/ACT inhaler INHALE ONE PUFF BY MOUTH TWICE A DAY AND RINSE MOUTH AFTER USE (DISCARD 1 MONTH AFTER REMOVAL FROM FOIL POUCH) 180 each 2        Review Of Systems:  A complete review of systems is negative except for the above mentioned items in the interval history.     Physical Exam:  /60 (BP Location: Right arm, Patient Position: Sitting, Cuff Size: Adult Regular)   Pulse 86   Temp 97.8  F (36.6  C) (Tympanic)   Resp 16   Wt 36.3 kg (80 lb)   LMP 01/01/1995 (Approximate)   SpO2 99%   BMI 16.16 kg/m    Limited given virtual  nature of this exam  GENERAL APPEARANCE: Healthy, alert and in no acute distress.  HEENT: Eyes appear normal without scleral icterus. Extraocular movements intact.   RESP: Respirations regular and easy.  NEURO: Alert and oriented x 3.   PSYCHIATRIC: Mentation and affect appear normal.  Mood appropriate.    Laboratory/Imaging Studies:  Component      Latest Ref Rng 4/22/2025  10:40 AM   WBC      4.0 - 11.0 10e3/uL 5.1    RBC Count      3.80 - 5.20 10e6/uL 3.26 (L)    Hemoglobin      11.7 - 15.7 g/dL 10.5 (L)    Hematocrit      35.0 - 47.0 % 32.4 (L)    MCV      78 - 100 fL 99    MCH      26.5 - 33.0 pg 32.2    MCHC      31.5 - 36.5 g/dL 32.4    RDW      10.0 - 15.0 % 14.0    Platelet Count      150 - 450 10e3/uL 215    % Neutrophils      % 71    % Lymphocytes      % 20    % Monocytes      % 5    % Eosinophils      % 2    % Basophils      % 1    % Immature Granulocytes      % 0    NRBC/W      <1 /100 0    Absolute Neutrophil      1.6 - 8.3 10e3/uL 3.7    Absolute Lymphocytes      0.8 - 5.3 10e3/uL 1.0    Absolute Monocytes      0.0 - 1.3 10e3/uL 0.3    Absolute Eosinophils      0.0 - 0.7 10e3/uL 0.1    Absolute Basophils      0.0 - 0.2 10e3/uL 0.0    Absolute Immature Granulocytes      <=0.4 10e3/uL 0.0    Absolute NRBCs      10e3/uL 0.0    Sodium      135 - 145 mmol/L 144    Potassium      3.4 - 5.3 mmol/L 3.9    Carbon Dioxide (CO2)      22 - 29 mmol/L 29    Anion Gap      7 - 15 mmol/L 10    Urea Nitrogen      8.0 - 23.0 mg/dL 20.0    Creatinine      0.51 - 0.95 mg/dL 0.83    GFR Estimate      >60 mL/min/1.73m2 71    Calcium      8.8 - 10.4 mg/dL 9.7    Chloride      98 - 107 mmol/L 105    Glucose      70 - 99 mg/dL 115 (H)    Alkaline Phosphatase      40 - 150 U/L 74    AST      0 - 45 U/L 33    ALT      0 - 50 U/L 13    Protein Total      6.4 - 8.3 g/dL 7.7    Albumin      3.5 - 5.2 g/dL 4.2    Bilirubin Total      <=1.2 mg/dL 0.7    Albumin Fraction      3.7 - 5.1 g/dL 4.1    Alpha 1 Fraction      0.2 - 0.4 g/dL  0.3    Alpha 2 Fraction      0.5 - 0.9 g/dL 0.5    Beta Fraction      0.6 - 1.0 g/dL 1.0    Gamma Fraction      0.7 - 1.6 g/dL 1.4    Monoclonal Peak      <=0.0 g/dL 0.0    ELP Interpretation: Essentially normal electrophoretic pattern. No obvious monoclonal proteins seen. Pathologic significance requires clinical correlation. DIPESH Weller M.D., Ph.D., Pathologist ().    Signout Location if Remote JHE1    Signout Location if Remote JHE1    Kappa Free Lt Chain      0.33 - 1.94 mg/dL 6.59 (H)    Lambda Free Lt Chain      0.57 - 2.63 mg/dL 3.74 (H)    Kappa Lambda Ratio      0.26 - 1.65  1.76 (H)    IGG      610 - 1,616 mg/dL 1,247    IGA      84 - 499 mg/dL 479    IGM      35 - 242 mg/dL 101    Iron      37 - 145 ug/dL 97    Iron Binding Capacity      240 - 430 ug/dL 293    Iron Sat Index      15 - 46 % 33    Immunofixation ELP No monoclonal protein seen on immunofixation. Pathologic significance requires clinical correlation.  DIPESH Weller M.D., Ph.D., Pathologist ()    Lactate Dehydrogenase      0 - 250 U/L 348 (H)    Beta-2-Microglobulin      <2.3 mg/L 2.6 (H)    Viscosity Index      1.4 - 1.8  1.6    Ferritin      11 - 328 ng/mL 54    Total Protein Serum for ELP      6.4 - 8.3 g/dL 7.3       Legend:  (L) Low  (H) High       ASSESSMENT/PLAN:    1. MGUS, elevated kappa free light chains. Patient was found to be anemic. She was then found to have elevated kappa free light chains. Plan was to perform a peripheral smear. This was done on 3/28/23 and showed mild leukopenia with no evidence of dysplasia. She did have a bone survey on 9/25/23 which did not show any suspicious lytic lesions. PET scan was performed on 11/15/23 and there was no evidence of hypermetabolic disease suggest myeloma there was no evidence of lung nodules. She has been followed in surveillance.     Today, labs are very stable. No monoclonal protein detected. KLR slightly improved. No evidence of end organ damage. Will  recheck labs in 1 year and follow-up afterwards. Sooner with concerns.      2. Iron deficiency anemia.   3. Hx colonic polyps  This was unresponsive to oral iron. When patient was seen on 1/23/24, she was noted to be anemic. Her lab work was consistent with iron deficiency anemia. Peripheral blood smear was consistent with moderate normocytic anemia.  Her ferritin was low at 16 and iron was low at well as iron percent saturation.  Her hemoglobin was 8.2. Patient was set up for IV Feraheme. She did have her infusions on 2/1 and 2/9/2024. Patient did not follow up after her infusions. She continues on oral iron 325mg three times weekly.Today, her most recent labs endorse no evidence of iron deficiency. She is still slightly anemic but stable.  Upon review, she was due for colonoscopy in 2022 after polyps were removed in 2019. Discussed this with patient but she is declining colonoscopy at this time. She notes no issues and therefore does not want to embark on this given her age. I don't think this is inappropriate. Some anemia could be related to anemia of chronic disease. We will continue iron three times weekly. Will check back in one year with labs prior, sooner with concerns.      4. Pulmonary nodule Discussed her hx of pulmonary nodes. She still smokes but she is uninterested in continuing to pursue CT chest to omero veil nodes.     Patient in agreement with plan and verbalizes understanding. Agrees to call with any questions or concerns.    51 minutes spent in the patient's encounter today with time spent in review of patient's chart along with chart preparation and review of the treatment plan and signing of treatment plan.  Time was also spent with the patient in obtaining a review of systems and performing a physical exam along with detailed review of all test results.  Time was also spent in discussing plan for future follow-up and relating instructions for follow-up and in placing future orders.    Jannet  VEENA Good CNP

## 2025-05-27 ENCOUNTER — HOSPITAL ENCOUNTER (OUTPATIENT)
Dept: CARDIOLOGY | Facility: OTHER | Age: 80
Discharge: HOME | End: 2025-05-27
Attending: NURSE PRACTITIONER
Payer: MEDICARE

## 2025-05-27 DIAGNOSIS — Z95.2 S/P TAVR (TRANSCATHETER AORTIC VALVE REPLACEMENT): ICD-10-CM

## 2025-05-27 LAB — LVEF ECHO: NORMAL

## 2025-05-27 PROCEDURE — 93306 TTE W/DOPPLER COMPLETE: CPT

## 2025-06-02 ENCOUNTER — DOCUMENTATION ONLY (OUTPATIENT)
Dept: CARDIOLOGY | Facility: OTHER | Age: 80
End: 2025-06-02

## 2025-06-02 ENCOUNTER — OFFICE VISIT (OUTPATIENT)
Dept: CARDIOLOGY | Facility: OTHER | Age: 80
End: 2025-06-02
Attending: NURSE PRACTITIONER
Payer: MEDICARE

## 2025-06-02 ENCOUNTER — LAB (OUTPATIENT)
Dept: LAB | Facility: OTHER | Age: 80
End: 2025-06-02
Payer: MEDICARE

## 2025-06-02 ENCOUNTER — RESULTS FOLLOW-UP (OUTPATIENT)
Dept: CARDIOLOGY | Facility: OTHER | Age: 80
End: 2025-06-02

## 2025-06-02 VITALS
RESPIRATION RATE: 16 BRPM | HEART RATE: 77 BPM | OXYGEN SATURATION: 97 % | WEIGHT: 77 LBS | HEIGHT: 59 IN | DIASTOLIC BLOOD PRESSURE: 62 MMHG | SYSTOLIC BLOOD PRESSURE: 158 MMHG | TEMPERATURE: 97.9 F | BODY MASS INDEX: 15.52 KG/M2

## 2025-06-02 DIAGNOSIS — Z95.2 S/P TAVR (TRANSCATHETER AORTIC VALVE REPLACEMENT): Primary | ICD-10-CM

## 2025-06-02 DIAGNOSIS — Z95.2 S/P TAVR (TRANSCATHETER AORTIC VALVE REPLACEMENT): ICD-10-CM

## 2025-06-02 DIAGNOSIS — I97.89 POSTOPERATIVE ATRIAL FIBRILLATION (H): ICD-10-CM

## 2025-06-02 DIAGNOSIS — I48.0 PAROXYSMAL A-FIB (H): ICD-10-CM

## 2025-06-02 DIAGNOSIS — I48.91 POSTOPERATIVE ATRIAL FIBRILLATION (H): ICD-10-CM

## 2025-06-02 LAB
ALBUMIN SERPL BCG-MCNC: 4.1 G/DL (ref 3.5–5.2)
ALP SERPL-CCNC: 70 U/L (ref 40–150)
ALT SERPL W P-5'-P-CCNC: 12 U/L (ref 0–50)
ANION GAP SERPL CALCULATED.3IONS-SCNC: 9 MMOL/L (ref 7–15)
AST SERPL W P-5'-P-CCNC: 34 U/L (ref 0–45)
ATRIAL RATE - MUSE: 76 BPM
BILIRUB SERPL-MCNC: 1 MG/DL
BUN SERPL-MCNC: 16.9 MG/DL (ref 8–23)
CALCIUM SERPL-MCNC: 9.4 MG/DL (ref 8.8–10.4)
CHLORIDE SERPL-SCNC: 104 MMOL/L (ref 98–107)
CREAT SERPL-MCNC: 0.87 MG/DL (ref 0.51–0.95)
DIASTOLIC BLOOD PRESSURE - MUSE: NORMAL MMHG
EGFRCR SERPLBLD CKD-EPI 2021: 67 ML/MIN/1.73M2
ERYTHROCYTE [DISTWIDTH] IN BLOOD BY AUTOMATED COUNT: 13.5 % (ref 10–15)
GLUCOSE SERPL-MCNC: 116 MG/DL (ref 70–99)
HCO3 SERPL-SCNC: 29 MMOL/L (ref 22–29)
HCT VFR BLD AUTO: 32.5 % (ref 35–47)
HGB BLD-MCNC: 10.6 G/DL (ref 11.7–15.7)
HOLD SPECIMEN: NORMAL
INTERPRETATION ECG - MUSE: NORMAL
MCH RBC QN AUTO: 32.3 PG (ref 26.5–33)
MCHC RBC AUTO-ENTMCNC: 32.6 G/DL (ref 31.5–36.5)
MCV RBC AUTO: 99 FL (ref 78–100)
P AXIS - MUSE: 79 DEGREES
PLATELET # BLD AUTO: 200 10E3/UL (ref 150–450)
POTASSIUM SERPL-SCNC: 3.7 MMOL/L (ref 3.4–5.3)
PR INTERVAL - MUSE: 134 MS
PROT SERPL-MCNC: 7.5 G/DL (ref 6.4–8.3)
QRS DURATION - MUSE: 112 MS
QT - MUSE: 442 MS
QTC - MUSE: 497 MS
R AXIS - MUSE: 40 DEGREES
RBC # BLD AUTO: 3.28 10E6/UL (ref 3.8–5.2)
SODIUM SERPL-SCNC: 142 MMOL/L (ref 135–145)
SYSTOLIC BLOOD PRESSURE - MUSE: NORMAL MMHG
T AXIS - MUSE: 66 DEGREES
VENTRICULAR RATE- MUSE: 76 BPM
WBC # BLD AUTO: 5.5 10E3/UL (ref 4–11)

## 2025-06-02 PROCEDURE — 3078F DIAST BP <80 MM HG: CPT | Performed by: NURSE PRACTITIONER

## 2025-06-02 PROCEDURE — 85048 AUTOMATED LEUKOCYTE COUNT: CPT | Mod: ZL

## 2025-06-02 PROCEDURE — 1126F AMNT PAIN NOTED NONE PRSNT: CPT | Performed by: NURSE PRACTITIONER

## 2025-06-02 PROCEDURE — 80053 COMPREHEN METABOLIC PANEL: CPT | Mod: ZL

## 2025-06-02 PROCEDURE — 93005 ELECTROCARDIOGRAM TRACING: CPT | Performed by: NURSE PRACTITIONER

## 2025-06-02 PROCEDURE — 99214 OFFICE O/P EST MOD 30 MIN: CPT | Performed by: NURSE PRACTITIONER

## 2025-06-02 PROCEDURE — 93010 ELECTROCARDIOGRAM REPORT: CPT | Performed by: INTERNAL MEDICINE

## 2025-06-02 PROCEDURE — 36415 COLL VENOUS BLD VENIPUNCTURE: CPT | Mod: ZL

## 2025-06-02 PROCEDURE — G0463 HOSPITAL OUTPT CLINIC VISIT: HCPCS

## 2025-06-02 PROCEDURE — 3077F SYST BP >= 140 MM HG: CPT | Performed by: NURSE PRACTITIONER

## 2025-06-02 ASSESSMENT — PAIN SCALES - GENERAL: PAINLEVEL_OUTOF10: NO PAIN (0)

## 2025-06-02 NOTE — PATIENT INSTRUCTIONS
You were seen today in the Cardiovascular Clinic at the [facility] by:       VEENA DSOUZA CNP       ECHO looks good - TAVR is functioning well. EKG shows normal rhythm. Last checked labs were stable. All good news.     Your visit summary and instructions are as follows:    Continue aspirin 81 mg daily.  Continue lisinopril and metoprolol XL 25 mg daily  Continue oral Iron tablets   Call if you gain > 2 lbs in a day or 5 lbs in a week   For all future dental cleanings and procedures you will need to take antibiotics prior - see instructions below.   Follow-up in 1 year with labs and echo prior     Prevention of Infective (Bacterial) Endocarditis:  You are at increased risk for developing adverse outcomes from infective endocarditis (IE), also known as bacterial endocarditis (BE) because of the new device in your heart. The guidelines for prevention of IE are to give patients antibiotics prior to any dental procedures that involve manipulation of gingival tissue or the periapical region of teeth, or perforation of the oral mucosa:      It is recommended to take Amoxicillin 2 gm by mouth as a single dose 30 to 60 minutes before procedure.     OR if allergic to Penicillin or Ampicillin:     Cephalexin 2 gm by mouth, or  Clindamycin 600 mg by mouth, or  Azithromycin or Clarithromycin 500 mg PO       Thank you for your visit today!      Please MyChart message me or call my nurse if you have any questions or concerns.     During Business Hours:   [phone], Structural Heart  Erin Ballard     After hours, weekends or holidays:   [phone], Option #4  Ask to speak to the On-Call Cardiologist. Inform them you are a cardiology patient at the [facility].

## 2025-06-02 NOTE — PROGRESS NOTES
[facility] HEART [facility]  CARDIOVASCULAR DIVISION    VALVE CLINIC RETURN VISIT    PRIMARY CARDIOLOGIST: Dr. Ansari       PERTINENT CLINICAL HISTORY:     Loulou Lucero is a very pleasant 80 year old female who presents for 1 year TAVR follow-up. She has a history of tobacco use, COPD, lung nodule, HLD, mild CAD, chronic low back pain, MGUS, anemia and severe aortic valvular stenosis that was treated with transapical transcatheter aortic valve replacement (TAVR) with a 20 mm Diaz Benedict 3 Ultra on 6/19/24 by Pascual Patricia and Tay.  The TAVR and post-procedural course were notable for left apical pneumothorax and left pulmonary hemorrhage. CVTS placed chest tubes and these were removed 6/25 without incidence. She was noted to have pAF, was started on amiodarone, AC deferred due to recent surgery and chest tubes. Post procedure ECHO showed mean PG 11 mmHg without PVL. Post procedure EKG showed NSR with baseline LBBB. She was discharged on aspirin monotherapy. She was readmitted with pneumonia and treated with antibiotic therapy.     Loulou says its been a longer recovery than anticipated. She was in TCU for 3 weeks. Has been home for the past 10 days where her son has been staying with her. She is working with PT/OT daily. She reports weakness, fatigue and SOB. She is coughing quite a bit from pneumonia. Seems worse in the morning. No fevers or chills. She has completed antibiotic therapy. She denies angina. Her chest wall pain is improving everyday. Her left chest incision is healing well. She has mild leg swelling which began about 2 weeks ago. Wonders if its related to heat and humidity. Weight has been stable at 82-83 since she got home from the TCU. She reports occasional lightheadedness. No palpitations, syncope.     Interval History 6/2/25:  Patient reports feeling well. HOBSON has improved since her TAVR. She continues to have mild dyspnea with walking longer distances.  She continues to smoke 1 PPD. No chest  pain, frequent dizziness, palpitations, lower extremity edema. She tells me she is no longer taking potassium or Lasix. Weight has been stable at 77 lbs. She can't seem to gain weight, she is eating, but small meals. BP high today but normal in clinic historically.      PAST MEDICAL HISTORY:   # Severe Aortic Stenosis s/p Transapical TAVR on 6/19/24 by Dr. Cross  # Chronic Heart Failure w/ Preserved EF (60-65%)  # Hyperlipidemia  # Mild to Moderate CAD  # Acute Post Op Pain  # Post Procedural Hypertension  # Elevated Troponin  # Subcutaneous Emphysema, resolved  # Paroxysmal atrial fibrillation  # COPD  # Tobacco Use  # Chronic Iron Deficiency Anemia  # MGUS  # PAD  # Near syncope  # Severe Malnutrition     PAST SURGICAL HISTORY:     Past Surgical History:   Procedure Laterality Date    CATARACT EXTRACTION Right     8/2023    COLONOSCOPY  03/22/2010    Normal; + family hx, next due 2015    COLONOSCOPY  10/01/2015    W/ POLYPECTOMY recommend follow up in 2020.    COLONOSCOPY N/A 11/07/2019    4 tubular adenoma, 3 year follow up    CV CORONARY ANGIOGRAM N/A 4/5/2024    Procedure: Coronary Angiogram;  Surgeon: Min So MD;  Location:  HEART CARDIAC CATH LAB    CV RIGHT HEART CATH MEASUREMENTS RECORDED N/A 4/5/2024    Procedure: Right Heart Catheterization;  Surgeon: Min So MD;  Location:  HEART CARDIAC CATH LAB    CV TRANSCATHETER AORTIC VALVE REPLACEMENT N/A 6/19/2024    Procedure: Transcatheter Aortic Valve Replacement - Transapical Approach;  Surgeon: Claude Patricia MD;  Location:  OR    ESOPHAGOSCOPY, GASTROSCOPY, DUODENOSCOPY (EGD), COMBINED  04/23/2014    Arce's esophagus fu egd 2 yrs    ESOPHAGOSCOPY, GASTROSCOPY, DUODENOSCOPY (EGD), COMBINED N/A 11/07/2019    Procedure: ESOPHAGOGASTRODUODENOSCOPY, WITH BIOPSY;  Surgeon: Santosh Barrera MD;  Location:  OR    IR CHEST TUBE PLACEMENT NON-TUNNELLED LEFT  6/21/2024    LAPAROSCOPIC TUBAL LIGATION  1978          OR TRANSCATHETER AORTIC VALVE REPLACEMENT, TRANSAPICAL OPEN APPROACH Left 6/19/2024    Procedure: Transcatheter Aortic Valve Replacement, Transapical Open Approach using Diaz Pericardial Tissue Heart Valve size 20mm, Cardiopulmonary Bypass Pump on Standby, and Transesophageal Echocardiogram by Cardiology;  Surgeon: Frank Cross MD;  Location:  OR    TONSILLECTOMY & ADENOIDECTOMY  1951             CURRENT MEDICATIONS:     Current Outpatient Medications   Medication Sig Dispense Refill    acetaminophen (TYLENOL) 325 MG tablet Take 2 tablets every night at bedtime; may take 2 tab at breakfast and lunch time as needed for pain 180 tablet 1    aspirin 81 MG EC tablet Take 1 tablet (81 mg) by mouth daily. 90 tablet 3    cyanocobalamin (VITAMIN B-12) 1000 MCG tablet Take 1 tablet (1,000 mcg) by mouth daily. 90 tablet 3    ferrous sulfate (FEROSUL) 325 (65 Fe) MG tablet Take 1 tablet (325 mg) by mouth every other day. 45 tablet 1    folic acid (FOLVITE) 1 MG tablet Take 1 tablet (1 mg) by mouth daily. 90 tablet 4    gabapentin (NEURONTIN) 100 MG capsule Take 2 capsules every night; may take 1 capsule in morning as needed 90 capsule 1    lisinopril (ZESTRIL) 2.5 MG tablet TAKE ONE TABLET BY MOUTH EVERY DAY AT BEDTIME 90 tablet 1    metoprolol succinate ER (TOPROL XL) 25 MG 24 hr tablet Take 0.5 tablets (12.5 mg) by mouth daily. 45 tablet 0    simvastatin (ZOCOR) 20 MG tablet TAKE ONE TABLET BY MOUTH EVERY DAY AT BEDTIME 90 tablet 1    vitamin C (ASCORBIC ACID) 250 MG tablet Take 1 tablet (250 mg) by mouth every other day. 45 tablet 1    vitamin D3 (CHOLECALCIFEROL) 50 mcg (2000 units) tablet Take 2 tablets (100 mcg) by mouth daily. 180 tablet 1    vitamin D3 (CHOLECALCIFEROL) 50 mcg (2000 units) tablet Take 1 tablet (50 mcg) by mouth daily. 90 tablet 2    WIXELA INHUB 250-50 MCG/ACT inhaler INHALE ONE PUFF BY MOUTH TWICE A DAY AND RINSE MOUTH AFTER USE (DISCARD 1 MONTH AFTER REMOVAL FROM FOIL POUCH) 180 each 2         ALLERGIES:     Allergies   Allergen Reactions    Metronidazole Nausea and Vomiting    Pneumococcal Vaccine      Other reaction(s): Edema  Arm swelling    Tramadol Visual Disturbance     Hallucinations           FAMILY HISTORY:     Family History   Problem Relation Age of Onset    Colon Cancer Father         Cancer-colon    Other - See Comments Mother         Alzheimer's    Other - See Comments Maternal Grandmother         Alzheimer's    Other - See Comments Brother         Alzheimer's    Other - See Comments Brother         aneurysm and stents    Cancer Brother         Cancer,lung    Cancer Brother         Cancer,skin    Other - See Comments Brother         cirrhosis of the liver    Other - See Comments Maternal Uncle         3, Alzheimer's    Breast Cancer No family hx of         Cancer-breast        SOCIAL HISTORY:     Social History     Socioeconomic History    Marital status:    Tobacco Use    Smoking status: Every Day     Current packs/day: 0.50     Average packs/day: 0.5 packs/day for 63.6 years (31.8 ttl pk-yrs)     Types: Cigarettes     Start date: 1961     Passive exposure: Past    Smokeless tobacco: Never    Tobacco comments:     Passive exposure in adult home.    Vaping Use    Vaping status: Never Used   Substance and Sexual Activity    Alcohol use: Not Currently     Comment: rarely at a wedding or special occasion    Drug use: No    Sexual activity: Not Currently   Social History Narrative    Patient in second marriage. Has two living children, both live in [place], [place]. Has 2 grandchildren. Is retired, worked at [employer] in [place].      Patient currently smokes, 1 ppd x 50 yrs. Smokes less in the winter months due to not smoking in the house    .   Alcohol Use - no  - disabled.     Social Determinants of Health     Financial Resource Strain: Low Risk  (3/21/2024)    Financial Resource Strain     Within the past 12 months, have you or your family members you live with been unable  to get utilities (heat, electricity) when it was really needed?: No   Food Insecurity: Low Risk  (3/21/2024)    Food Insecurity     Within the past 12 months, did you worry that your food would run out before you got money to buy more?: No     Within the past 12 months, did the food you bought just not last and you didn t have money to get more?: No   Transportation Needs: Low Risk  (3/21/2024)    Transportation Needs     Within the past 12 months, has lack of transportation kept you from medical appointments, getting your medicines, non-medical meetings or appointments, work, or from getting things that you need?: No   Physical Activity: Unknown (1/31/2024)    Exercise Vital Sign     Days of Exercise per Week: 0 days   Stress: No Stress Concern Present (1/31/2024)    [place] [place] [employer] - Occupational Stress Questionnaire     Feeling of Stress : Only a little   Social Connections: Unknown (1/31/2024)    Social Connection and Isolation Panel [NHANES]     Frequency of Social Gatherings with Friends and Family: Once a week   Interpersonal Safety: Low Risk  (7/23/2024)    Interpersonal Safety     Do you feel physically and emotionally safe where you currently live?: Yes     Within the past 12 months, have you been hit, slapped, kicked or otherwise physically hurt by someone?: No     Within the past 12 months, have you been humiliated or emotionally abused in other ways by your partner or ex-partner?: No   Housing Stability: Low Risk  (3/21/2024)    Housing Stability     Do you have housing? : Yes     Are you worried about losing your housing?: No        REVIEW OF SYSTEMS:     Constitutional: No fevers or chills  Skin: No new rash or itching  Eyes: No acute change in vision  Ears/Nose/Throat: No purulent rhinorrhea, new hearing loss, or new vertigo  Respiratory: No cough or hemoptysis  Cardiovascular: See HPI  Gastrointestinal: No change in appetite, vomiting, hematemesis or diarrhea  Genitourinary:  "No dysuria or hematuria  Musculoskeletal: No new back pain, neck pain or muscle pain  Neurologic: No new headaches, focal weakness or behavior changes  Psychiatric: No hallucinations, excessive alcohol consumption or illegal drug usage  Hematologic/Lymphatic/Immunologic: No bleeding, chills, fever, night sweats or weight loss  Endocrine: No new cold intolerance, heat intolerance, polyphagia, polydipsia or polyuria      PHYSICAL EXAMINATION:     BP (!) 150/62 (BP Location: Left arm, Patient Position: Sitting, Cuff Size: Child)   Pulse 77   Temp 97.9  F (36.6  C) (Temporal)   Resp 16   Ht 1.49 m (4' 10.66\")   Wt 34.9 kg (77 lb)   LMP 01/01/1995 (Approximate)   SpO2 97%   BMI 15.73 kg/m      GENERAL: No acute distress.  HEENT: EOMI. Sclerae white, not injected. Nares clear. Pharynx without erythema or exudate.   Neck: No adenopathy. No thyromegaly. No jugular venous distension.   Heart: Regular rate and rhythm. No murmur.   Lungs: Clear to auscultation. No ronchi, wheezes, rales.   Abdomen: Soft, nontender, nondistended. Bowel sounds present.  Extremities: No clubbing, cyanosis, or edema.   Neurologic: Alert and oriented to person/place/time, normal speech and affect. No focal deficits.  Skin: No petechiae, purpura or rash.     LABORATORY DATA:     LIPID RESULTS:  Lab Results   Component Value Date    CHOL 152 08/30/2024    CHOL 205 (H) 10/09/2020    HDL 68 08/30/2024    HDL 63 10/09/2020    LDL 57 08/30/2024     (H) 10/09/2020    TRIG 134 08/30/2024    TRIG 109 10/09/2020       LIVER ENZYME RESULTS:  Lab Results   Component Value Date    AST 34 06/02/2025    AST 19 10/09/2020    ALT 12 06/02/2025    ALT 9 10/09/2020       CBC RESULTS:  Lab Results   Component Value Date    WBC 5.5 06/02/2025    WBC 7.0 10/24/2019    RBC 3.28 (L) 06/02/2025    RBC 3.89 10/24/2019    HGB 10.6 (L) 06/02/2025    HGB 12.1 10/24/2019    HCT 32.5 (L) 06/02/2025    HCT 36.5 10/24/2019    MCV 99 06/02/2025    MCV 94 10/24/2019 "    MCH 32.3 06/02/2025    MCH 31.1 10/24/2019    MCHC 32.6 06/02/2025    MCHC 33.2 10/24/2019    RDW 13.5 06/02/2025    RDW 12.9 10/24/2019     06/02/2025     10/24/2019       BMP RESULTS:  Lab Results   Component Value Date     06/02/2025     10/09/2020    POTASSIUM 3.7 06/02/2025    POTASSIUM 4.2 06/19/2024    POTASSIUM 4.6 10/18/2022    POTASSIUM 4.0 10/09/2020    CHLORIDE 104 06/02/2025    CHLORIDE 103 10/18/2022    CHLORIDE 102 10/09/2020    CO2 29 06/02/2025    CO2 34 (H) 10/18/2022    CO2 33 (H) 10/09/2020    ANIONGAP 9 06/02/2025    ANIONGAP 6 10/18/2022    ANIONGAP 7 10/09/2020     (H) 06/02/2025     (H) 06/20/2024    GLC 94 10/18/2022     (H) 10/09/2020    BUN 16.9 06/02/2025    BUN 15 10/18/2022    BUN 21 10/09/2020    CR 0.87 06/02/2025    CR 1.00 10/09/2020    GFRESTIMATED 67 06/02/2025    GFRESTIMATED 54 (L) 10/09/2020    GFRESTBLACK 65 10/09/2020    DION 9.4 06/02/2025    DION 10.0 10/09/2020          PROCEDURES & FURTHER ASSESSMENTS:   EKG 6/2/25  HR 76 with incomplete LBBB    5/27/25  Interpretation Summary  Global and regional left ventricular function is normal with an EF of 55-60%.  Global right ventricular function is normal.  s/p TAVR with 20 mm Diaz Benedict 3 on 6/19/2024.  The prosthetic aortic valve is well-seated.  Doppler interrogation of the aortic valve is normal.  There is trace paravalvular regurgitation.  Mild mitral stenosis is present.The etiology of the mitral stenosis is severe  mitral annular calcification .  Pulmonary artery systolic pressure is normal.     This study was compared with the study from 8/2024 .  No significant changes noted.  ______________________________________________________________________________  Left Ventricle  Left ventricular size is normal. Global and regional left ventricular function  is normal with an EF of 55-60%. Diastolic function not assessed due to  significant mitral annular calcification.      Right Ventricle  The right ventricle is normal size. Global right ventricular function is  normal.     Atria  Both atria appear normal.     Mitral Valve  Severe mitral annular calcification is present. Trace mitral insufficiency is  present. The mean mitral valve gradient is 5.8 mmHg. The etiology of the  mitral stenosis is mitral annular calcification . Mild mitral stenosis is  present.     Aortic Valve  The calculated aortic valve are is 1.7 cm^2. The mean AoV pressure gradient  is  8.5 mmHg. s/p TAVR with 20 mm Diaz Benedict 3 on 6/19/2024. The prosthetic  aortic valve is well-seated. Doppler interrogation of the aortic valve is  normal. There is trace paravalvular regurgitation.     Tricuspid Valve  The right ventricular systolic pressure is approximated at 23.9 mmHg plus the  right atrial pressure. Trace tricuspid insufficiency is present. Pulmonary  artery systolic pressure is normal.     Pulmonic Valve  The pulmonic valve is normal.     Vessels  The inferior vena cava was normal in size with preserved respiratory  variability.     Pericardium  No pericardial effusion is present.     Compared to Previous Study  This study was compared with the study from 8/2024 . No significant changes  noted.      EKG 7/29/24:  NSR with LBBB     ECHO 7/25/24:  Interpretation Summary  Global and regional left ventricular function is normal with an EF of 60-65%.  Global right ventricular function is normal.  s/p TAVR with 20 mm Diaz Benedict 3 on 6/19/2024  The prosthetic aortic valve is well-seated. There is trace paravalvular  regurgitation. PV 2.4m/s, MG 12mmHg.  Estimated pulmonary artery systolic pressure is 49 mmHg.  The inferior vena cava is normal.  No pericardial effusion.     This study was compared with the study from 6/20/24. Estimated PA pressure is  higher.  ________________________________    EKG 12 Lead 6/21/2024: NSR, LBBB (baseline)        ECHO 6/20/2024:  Interpretation Summary  s/p TAVR with 20 mm  Diaz Benedict 3 on 6/19/2024. The valve is well-seated  and without paravalvular regurgitation. Doppler interrogation of the  prosthetic valve reveals Vmax 2.2 m/s, mean pressure gradient 11 mmHg.     Left ventricular function is normal.The ejection fraction is 60-65%.  Global right ventricular function is normal.  Severe mitral annular calcification.  IVC diameter and respiratory changes fall into an intermediate range  suggesting an RA pressure of 8 mmHg.  No pericardial effusion.     This study was compared with the study from 6/19/2024. No significant changes  noted compared to post-TAVR images.     CT Chest 6/22/24  1.  New (compared to CT from 4/5/2024) left upper lobe ill marginated  confluent consolidative appearing density with surrounding  groundglass, mild anteroseptal thinning and possible tiny cystic  change suspicious for infection or potentially pulmonary hemorrhage.  Neoplastic etiology unlikely though cannot be entirely excluded.  Recommend short-term follow-up imaging to resolution.  2.  Left chest tube and apical pigtail chest catheter without  significant pleural fluid or evidence of hemothorax. Residual small  anterior left pneumothorax. Extensive presumed postprocedural  subcutaneous air in the left chest wall and lower neck.  3.  Small-to-moderate right pleural effusion with simple fluid  attenuation.  4.  Advanced pulmonary emphysema and chronic scarring, most prominent  in the apices and in the right upper lobe.  5.  Prominent precarinal lymph node, similar to prior. Consider  attention on follow-up.  6.   Additional incidental finding similar to prior as described  including extensive atherosclerotic calcification of the thoracic  aorta, coronary artery calcification.     NYHA Class: II    Amyloid screening: No     CLINICAL IMPRESSION:     Loulou Lucero is a 80 year old female who presents for 1 year TAVR follow-up. History of tobacco use, COPD, lung nodule, HLD, mild CAD, chronic low back  pain, MGUS, anemia and severe aortic stenosis who was admitted 6/19 for planned transapical TAVR. Course complicated by left apical pneumothorax and left pulmonary hemorrhage s/p CT placement, pAF on amiodarone, pneumonia treated with antibiotics.      # Severe Aortic Stenosis s/p Transapical TAVR on 6/19/24 by Dr. Cross  # Chronic Heart Failure w/ Preserved EF (60-65%)  # Left apical pneumothorax s/p CT, improved   # Subcutaneous Emphysema: Improved   # Pneumonia, admitted 7/17/24 s/p antibiotics  Now s/p TAVR with 20mm Diaz valve via transapical approach. POD 1 Echo with post op MG 11 mmHg, no PVL. Left apical pneumo resolved after pigtail placement, removed 6/25/24. Today she is 1 year s/p TAVR. She is doing well. ECHO today mean PG 8.5 mmHg with trace PVL.   - Aspirin 81 mg for anti-platelet therapy  - Stable weights and euvolemic, no diuretics needed   - Daily weights - contact us if you gain > 3lbs in a day or 5 lbs in a week  - SBE prophylaxis lifelong   - Cardiology in 1 year      # Hyperlipidemia  # Mild to Moderate CAD  # Post Procedural Hypertension  - Continue ASA 81 mg daily and Simvastatin 20mg q HS  - Continue lisinopril 2.5 mg daily    # Paroxsymal Atrial Fibrillation  New atrial fibrillation with RVR on morning of 6/22. Patient reported intense shortness of breath. Treated with IV amio, converted to oral 6/23. Has now completed about 1 month of amiodarone. Follow-up monitor after amio wash out showed no recurrent AF. EKG today shows NSR.   - Metoprolol XL 25 mg daily  - Defer anticoagulation given no recurrent AF on monitor   - CTM      # COPD  # Tobacco Use  - Encourage cessation  - Continue fluticasone-salmeterol     # Chronic Iron Deficiency Anemia   # MGUS  Baseline Hgb ~ 10  - Continue oral iron supplementation  - Hemoglobin 10.6 at recent PCP visit  - CTM     RTC: 1 year with echo and labs prior     VEENA Shine, CNP  [facility] Cardiology Team      CC  Patient Care Team:  [place]  Ele TUCKER NP as PCP - General (Internal Medicine)  Maya Jones, RN as Registered Nurse  Maya Jones, RN as Registered Nurse (Cardiology)  Ele Marc NP as Assigned PCP  Annie Ansari MD as Assigned Heart and Vascular Provider  Mary Esteves RN as Lead Care Coordinator (Primary Care - CC)  Frank Cross MD as Assigned Heart and Vascular Surgical Provider  Ximena Casas, VEENA CNP as Assigned Cancer Care Provider

## 2025-06-02 NOTE — NURSING NOTE
"Chief Complaint   Patient presents with    Follow Up     One year s/p TAVR        Initial BP (!) 150/62 (BP Location: Left arm, Patient Position: Sitting, Cuff Size: Child)   Pulse 77   Temp 97.9  F (36.6  C) (Temporal)   Resp 16   Ht 1.49 m (4' 10.66\")   Wt 34.9 kg (77 lb)   LMP 01/01/1995 (Approximate)   SpO2 97%   BMI 15.73 kg/m   Estimated body mass index is 15.73 kg/m  as calculated from the following:    Height as of this encounter: 1.49 m (4' 10.66\").    Weight as of this encounter: 34.9 kg (77 lb).  Meds Reconciled: complete  Pt is on Aspirin  Pt is on a Statin  PHQ and/or DEENA reviewed. Pt referred to PCP/MH Provider as appropriate.    Giulia Lizmaa LPN      "

## 2025-06-03 NOTE — PROGRESS NOTES
[place] Cardiomyopathy Questionnaire  (CQ-12)  Date: 6/2/25    1 year post-TAVR      1.  A.  5      B.  4       C.  3  2.  4  3.  3  4.  5  5.  5  6.  3  7.  3  8.  A. 4     B.  4     C.  4

## 2025-06-04 ENCOUNTER — RESULTS FOLLOW-UP (OUTPATIENT)
Dept: CARDIOLOGY | Facility: OTHER | Age: 80
End: 2025-06-04

## 2025-06-18 DIAGNOSIS — M54.50 BILATERAL LOW BACK PAIN, UNSPECIFIED CHRONICITY, UNSPECIFIED WHETHER SCIATICA PRESENT: ICD-10-CM

## 2025-06-18 DIAGNOSIS — Z95.2 S/P TAVR (TRANSCATHETER AORTIC VALVE REPLACEMENT): ICD-10-CM

## 2025-06-18 DIAGNOSIS — I50.30 HEART FAILURE WITH PRESERVED EJECTION FRACTION, NYHA CLASS I (H): ICD-10-CM

## 2025-06-18 NOTE — TELEPHONE ENCOUNTER
Reason for call: Medication or medication refill    Have you contacted your pharmacy regarding this refill? YES    Name of medication requested: metoprolol succinate ER and gabapentin    How many days of medication do you have left? About 1 month    What pharmacy do you use?  mail order    Preferred method for responding to this message: Telephone Call    Phone number patient can be reached at: Home number on file [phone] (home)    If we cannot reach you directly, may we leave a detailed response at the number you provided? No    The patient's friend and the patient called regarding a refill. The friend stated the pharmacy will not send refill requests thus the patient has to.      Elise Coats on 6/18/2025 at 3:57 PM

## 2025-06-20 NOTE — TELEPHONE ENCOUNTER
Patient sent Rx request for the following:      Requested Prescriptions   Pending Prescriptions Disp Refills    metoprolol succinate ER (TOPROL XL) 25 MG 24 hr tablet 45 tablet 0     Sig: Take 0.5 tablets (12.5 mg) by mouth daily.   Last Prescription Date:   1/16/25  Last Fill Qty/Refills:         45, R-0    Last Office Visit:              10/22/24     Beta-Blockers Protocol Failed - 6/20/2025  3:08 PM        Failed - Most recent blood pressure under 140/90 in past 12 months     BP Readings from Last 3 Encounters:   06/02/25 (!) 158/62   05/20/25 136/60   10/22/24 131/64       No data recorded              gabapentin (NEURONTIN) 100 MG capsule 90 capsule 1     Sig: Take 2 capsules every night; may take 1 capsule in morning as needed       There is no refill protocol information for this order          Last Prescription Date:   1/16/25  Last Fill Qty/Refills:         90, R-1    Last Office Visit:              10/22/24   Future Office visit:            None    Unable to complete prescription refill per RN Medication Refill Policy. Pt due for establish care visit. Routing to provider for refill consideration. Routing to Unit scheduling pool, to assist Pt in scheduling appointment.

## 2025-06-22 RX ORDER — METOPROLOL SUCCINATE 25 MG/1
12.5 TABLET, EXTENDED RELEASE ORAL DAILY
Qty: 45 TABLET | Refills: 0 | Status: SHIPPED | OUTPATIENT
Start: 2025-06-22

## 2025-06-22 RX ORDER — GABAPENTIN 100 MG/1
CAPSULE ORAL
Qty: 90 CAPSULE | Refills: 0 | Status: SHIPPED | OUTPATIENT
Start: 2025-06-22

## 2025-07-08 ENCOUNTER — OFFICE VISIT (OUTPATIENT)
Dept: FAMILY MEDICINE | Facility: OTHER | Age: 80
End: 2025-07-08
Attending: FAMILY MEDICINE
Payer: MEDICARE

## 2025-07-08 VITALS
RESPIRATION RATE: 16 BRPM | TEMPERATURE: 97.5 F | OXYGEN SATURATION: 97 % | BODY MASS INDEX: 15.73 KG/M2 | WEIGHT: 77 LBS | HEART RATE: 70 BPM | DIASTOLIC BLOOD PRESSURE: 58 MMHG | SYSTOLIC BLOOD PRESSURE: 152 MMHG

## 2025-07-08 DIAGNOSIS — M54.50 BILATERAL LOW BACK PAIN, UNSPECIFIED CHRONICITY, UNSPECIFIED WHETHER SCIATICA PRESENT: Primary | ICD-10-CM

## 2025-07-08 DIAGNOSIS — I35.0 AORTIC STENOSIS, SEVERE: ICD-10-CM

## 2025-07-08 DIAGNOSIS — J43.1 PANLOBULAR EMPHYSEMA (H): ICD-10-CM

## 2025-07-08 PROCEDURE — G0463 HOSPITAL OUTPT CLINIC VISIT: HCPCS

## 2025-07-08 ASSESSMENT — PAIN SCALES - GENERAL: PAINLEVEL_OUTOF10: NO PAIN (0)

## 2025-07-08 NOTE — NURSING NOTE
"Chief Complaint   Patient presents with    Medication Refill     Wondering if she needs to take the full dose of gabapentin        Initial BP (!) 191/51 (BP Location: Right arm, Patient Position: Sitting, Cuff Size: Adult Regular)   Pulse 70   Temp 97.5  F (36.4  C) (Tympanic)   Resp 16   Wt 34.9 kg (77 lb)   LMP 01/01/1995 (Approximate)   SpO2 97%   Breastfeeding No   BMI 15.73 kg/m   Estimated body mass index is 15.73 kg/m  as calculated from the following:    Height as of 6/2/25: 1.49 m (4' 10.66\").    Weight as of this encounter: 34.9 kg (77 lb).  Medication Reconciliation: complete        Gifty Mario, SNEHAL   "

## 2025-07-08 NOTE — PROGRESS NOTES
Assessment & Plan     Bilateral low back pain, unspecified chronicity, unspecified whether sciatica present  Stable on gabapentin.  No changes recommended.  [facility]    Aortic stenosis, severe  Doing much better status post aortic valve replacement June 2024.  Medications have been stable.  She just saw cardiology last month and does not need to see them again for another full year.  Blood pressure was high initially today and slightly high on recheck, but much improved.  She will continue to monitor blood pressures at home.  As long as they are running okay, then no medication changes needed.    Panlobular emphysema (H)  Well-controlled.  Continue the Wixela.  She is not interested in smoking cessation.    Nicotine/Tobacco Cessation  She reports that she has been smoking cigarettes. She started smoking about 64 years ago. She has a 32.1 pack-year smoking history. She has been exposed to tobacco smoke. She has never used smokeless tobacco.  Nicotine/Tobacco Cessation Plan  Information offered: Patient not interested at this time          Vel Jamil is a 80 year old, presenting for the following health issues:  Medication Refill (Wondering if she needs to take the full dose of gabapentin )        7/8/2025    10:54 AM   Additional Questions   Roomed by SNEHAL Durbin     Medication Refill    History of Present Illness       Reason for visit:  Medication refill    She eats 0-1 servings of fruits and vegetables daily.She consumes 0 sweetened beverage(s) daily.She exercises with enough effort to increase her heart rate 9 or less minutes per day.  She exercises with enough effort to increase her heart rate 3 or less days per week. She is missing 1 dose(s) of medications per week.  She is not taking prescribed medications regularly due to remembering to take.      80-year-old female here for medication refill.  Se does have underlying multiple medical problems with history of congestive  heart failure and severe aortic stenosis, emphysema, monoclonal gammopathy of unknown significance and chronic low back pain.  She was planning to establish care with Dr. Johnston who she has seen before, but felt like she needed some refills prior to that appointment.  However, upon further review she really [facility] and is seemingly up-to-date with all her medications at this time.  She did have aortic valve replacement June 2024 down in the [facility].  She just saw them for the 1 year follow-up with cardiology last month and they felt like things were going well.  She has no chest pain, no dyspnea, no palpitations, no lightheadedness.  Medications as below with lisinopril, metoprolol, and simvastatin have been going well without any recent changes.    She did have some question related to chronic low back pain on the gabapentin.  That had been previously prescribed at 100 mg 2 caps at night and then 1 cap in the morning as needed.  She does have a home health nurse who helps get her medications in order every month.  She feels like she is just taking 2 at night and not really taken the morning dose.  She did not really feel like she needed to make a change, but just wanted to clarify things.  She does have low back pain on a daily basis with achy, mild to moderate in severity pain, worse with activities.  She will have more pain with yard work and housework if she has to do any kind of lifting and bending or twisting.  Gabapentin does seem to help.  She is  for the last 3 years and so manages her home and yard all on her own.  She has 1 acre to deal with.  She feels like she manages well with that.  It overall has not worsened, but not improved.  She will take Tylenol generally 1 tab at night and that helps with pain as well.    She also takes Wixela 1 puff twice daily on a regular basis for underlying emphysema.  She has been a longtime smoker with just a few  cigarettes per day typically in the winter and then about a pack a day through the summer.  She is not really interested in quitting even though she knows that she should quit.  No recent illnesses.  She feels like the Wixela controls things nicely for her without need for rescue inhaler.  She is overall otherwise doing well no other complaints.    I have reviewed the patient's medical history in detail and updated the computerized patient record.    Current Outpatient Medications   Medication Sig Dispense Refill    acetaminophen (TYLENOL) 325 MG tablet Take 2 tablets every night at bedtime; may take 2 tab at breakfast and lunch time as needed for pain (Patient taking differently: Take 1 tablets every night at bedtime; may take 1 tab at breakfast and lunch time as needed for pain) 180 tablet 1    aspirin 81 MG EC tablet Take 1 tablet (81 mg) by mouth daily. 90 tablet 3    cyanocobalamin (VITAMIN B-12) 1000 MCG tablet Take 1 tablet (1,000 mcg) by mouth daily. 90 tablet 3    ferrous sulfate (FEROSUL) 325 (65 Fe) MG tablet Take 1 tablet (325 mg) by mouth every other day. 45 tablet 1    folic acid (FOLVITE) 1 MG tablet Take 1 tablet (1 mg) by mouth daily. 90 tablet 4    gabapentin (NEURONTIN) 100 MG capsule Take 2 capsules every night; may take 1 capsule in morning as needed 90 capsule 0    lisinopril (ZESTRIL) 2.5 MG tablet TAKE ONE TABLET BY MOUTH EVERY DAY AT BEDTIME 90 tablet 1    metoprolol succinate ER (TOPROL XL) 25 MG 24 hr tablet Take 0.5 tablets (12.5 mg) by mouth daily. 45 tablet 0    simvastatin (ZOCOR) 20 MG tablet TAKE ONE TABLET BY MOUTH EVERY DAY AT BEDTIME 90 tablet 1    vitamin C (ASCORBIC ACID) 250 MG tablet Take 1 tablet (250 mg) by mouth every other day. 45 tablet 1    vitamin D3 (CHOLECALCIFEROL) 50 mcg (2000 units) tablet Take 2 tablets (100 mcg) by mouth daily. 180 tablet 1    WIXELA INHUB 250-50 MCG/ACT inhaler INHALE ONE PUFF BY MOUTH TWICE A DAY AND RINSE MOUTH AFTER USE (DISCARD 1 MONTH AFTER  REMOVAL FROM FOIL POUCH) 180 each 2    vitamin D3 (CHOLECALCIFEROL) 50 mcg (2000 units) tablet Take 1 tablet (50 mcg) by mouth daily. 90 tablet 2                     Objective    BP (!) 152/58   Pulse 70   Temp 97.5  F (36.4  C) (Tympanic)   Resp 16   Wt 34.9 kg (77 lb)   LMP 01/01/1995 (Approximate)   SpO2 97%   Breastfeeding No   BMI 15.73 kg/m    Body mass index is 15.73 kg/m .  Physical Exam   GENERAL: alert and no distress  RESP: lungs clear to auscultation - no rales, rhonchi or wheezes  CV: regular rate and rhythm, normal S1 S2, no S3 or S4, 2/6 systolic murmur right upper sternal border, no click or rub, no peripheral edema             Signed Electronically by: Dave Jeter MD

## (undated) DEVICE — SU ETHIBOND 2-0 CT-1 8X18" CX22D

## (undated) DEVICE — WIRE GUIDE 0.035"X150CM EMERALD J TIP 502521

## (undated) DEVICE — SOL WATER 1500ML

## (undated) DEVICE — SU PROLENE 4-0 RB-1DA 36" 8557H

## (undated) DEVICE — DRSG TEGADERM 8X12" 1629

## (undated) DEVICE — GLOVE BIOGEL PI SZ 7.0 40870

## (undated) DEVICE — KIT HAND CONTROL ACIST 014644 AR-P54

## (undated) DEVICE — TUBING PRESSURE 30"

## (undated) DEVICE — SUCTION MANIFOLD NEPTUNE 2 SYS 4 PORT 0702-020-000

## (undated) DEVICE — INTRO SHEATH 7FRX25CM PINNACLE RSS706

## (undated) DEVICE — LINEN TOWEL PACK X6 WHITE 5487

## (undated) DEVICE — LINEN TOWEL PACK X5 5464

## (undated) DEVICE — Device

## (undated) DEVICE — SPONGE LAP 18X18" X8435

## (undated) DEVICE — SLEEVE TR BAND RADIAL COMPRESSION DEVICE 24CM TRB24-REG

## (undated) DEVICE — PACK HEART LEFT CUSTOM

## (undated) DEVICE — INTRO SHEATH 7FRX10CM PINNACLE RSS702

## (undated) DEVICE — DRAPE BACK TABLE  44X90" 8377

## (undated) DEVICE — CONNECTOR BLAKE DRAIN SGL BCC1

## (undated) DEVICE — INTRO GLIDESHEATH SLENDER 6FR 10X45CM 60-1060

## (undated) DEVICE — TUBING SUCTION 10'X3/16" N510

## (undated) DEVICE — FASTENER CATH BALLOON CLAMPX2 STATLOCK 0684-00-493

## (undated) DEVICE — SU VICRYL 3-0 FS-1 27" J442H

## (undated) DEVICE — MANIFOLD KIT ANGIO AUTOMATED 014613

## (undated) DEVICE — ENDO BITE BLOCK 60 MAXI LF 00712804

## (undated) DEVICE — SU SILK 0 TIE 6X30" A306H

## (undated) DEVICE — GLOVE BIOGEL PI SZ 8.0 40880

## (undated) DEVICE — INTRO SHEATH 6FRX25CM PINNACLE RSS606

## (undated) DEVICE — ENDO FORCEP ENDOJAW BIOPSY 2.8MMX230CM FB-220U

## (undated) DEVICE — SU ETHIBOND 5 V-37 4X30" MB66G

## (undated) DEVICE — WIRE GUIDE LUNDERQUIST 0.035"X260CM DBL CVD

## (undated) DEVICE — GUIDEWIRE VASC SAFARI2 0.035X275CM H74939406XS1

## (undated) DEVICE — SU PROLENE 4-0 SHDA 36" 8521H

## (undated) DEVICE — ESU ELEC BLADE E-SEP INSULATED NEPTUNE 165MM 0703-165-002

## (undated) DEVICE — PACK ADULT HEART UMMC PV15CG92D

## (undated) DEVICE — SU PROLENE 3-0 MH 36" 8842H

## (undated) DEVICE — SUCTION DRY CHEST DRAIN OASIS 3600-100

## (undated) DEVICE — WIRE GUIDE AMPLATZ SUPER STIFF 0.035"X260CM J-TIP

## (undated) DEVICE — GW VASC .035IN DIA 260CML 7CML 3 MM RADIUS J CURVE 502455

## (undated) DEVICE — NDL PERC ENTRY THINWALL 18GA 7.0" G00166

## (undated) DEVICE — STRAP UNIVERSAL POSITIONING 2-PIECE 4X47X76" 91-287

## (undated) DEVICE — SURGICEL HEMOSTAT 4X8" 1952

## (undated) DEVICE — WIRE GUIDE 0.035"X150CM EMERALD STR 502542

## (undated) DEVICE — SU PLEDGET SOFT TFE 13MMX7MMX1.5MM D7044

## (undated) DEVICE — SYR 50ML LL W/O NDL 309653

## (undated) DEVICE — SPONGE RAY-TEC 4X8" 7318

## (undated) DEVICE — KIT HAND CONTROL ACIST 016795

## (undated) DEVICE — COVER CAMERA IN-LIGHT LENS LT-C02-P

## (undated) DEVICE — CATH ANGIO SUPERTORQUE PLUS JR4 6FRX100CM 533621

## (undated) DEVICE — SU VICRYL 0 CT-1 27" UND J260H

## (undated) DEVICE — SU VICRYL 2-0 CT-1 27" UND J259H

## (undated) DEVICE — INFLATION DEVICE ATRION QL2530

## (undated) DEVICE — CATH EP PACEL 5FRX110CM 1MM RIGHT HEART CURVE 401763

## (undated) DEVICE — DRSG PRIMAPORE 02X3" 7133

## (undated) DEVICE — ESU GROUND PAD ADULT REM W/15' CORD E7507DB

## (undated) DEVICE — ESU ELEC BLADE E-SEP INSULATED NEPTUNE 70MM 0703-070-002

## (undated) DEVICE — SYR 10ML LL W/O NDL 302995

## (undated) DEVICE — CATH ANGIO INFINITI PIGTAIL 145 6 SH 6FRX110CM  534-652S

## (undated) DEVICE — ENDO BRUSH CHANNEL MASTER CLEANING 2-4.2MM BW-412T

## (undated) DEVICE — ENDO KIT COMPLIANCE DYKENDOCMPLY

## (undated) DEVICE — SU DERMABOND ADVANCED .7ML DNX12

## (undated) DEVICE — GLOVE BIOGEL PI MICRO INDICATOR UNDERGLOVE SZ 8.0 48980

## (undated) DEVICE — SU PROLENE 2-0 MHDA 36" 8843H

## (undated) RX ORDER — HEPARIN SODIUM 1000 [USP'U]/ML
INJECTION, SOLUTION INTRAVENOUS; SUBCUTANEOUS
Status: DISPENSED
Start: 2024-06-19

## (undated) RX ORDER — HEPARIN SODIUM 1000 [USP'U]/ML
INJECTION, SOLUTION INTRAVENOUS; SUBCUTANEOUS
Status: DISPENSED
Start: 2024-04-05

## (undated) RX ORDER — FUROSEMIDE 10 MG/ML
INJECTION INTRAMUSCULAR; INTRAVENOUS
Status: DISPENSED
Start: 2024-08-01

## (undated) RX ORDER — CIPROFLOXACIN 500 MG/1
TABLET, FILM COATED ORAL
Status: DISPENSED
Start: 2019-04-25

## (undated) RX ORDER — PREDNISONE 20 MG/1
TABLET ORAL
Status: DISPENSED
Start: 2018-09-30

## (undated) RX ORDER — SODIUM CHLORIDE 9 MG/ML
INJECTION, SOLUTION INTRAVENOUS
Status: DISPENSED
Start: 2019-04-25

## (undated) RX ORDER — LIDOCAINE 40 MG/G
CREAM TOPICAL
Status: DISPENSED
Start: 2024-04-05

## (undated) RX ORDER — LIDOCAINE HYDROCHLORIDE 10 MG/ML
INJECTION, SOLUTION EPIDURAL; INFILTRATION; INTRACAUDAL; PERINEURAL
Status: DISPENSED
Start: 2024-06-21

## (undated) RX ORDER — ESMOLOL HYDROCHLORIDE 10 MG/ML
INJECTION INTRAVENOUS
Status: DISPENSED
Start: 2024-06-19

## (undated) RX ORDER — BUPIVACAINE HYDROCHLORIDE 5 MG/ML
INJECTION, SOLUTION EPIDURAL; INTRACAUDAL
Status: DISPENSED
Start: 2024-06-19

## (undated) RX ORDER — SODIUM CHLORIDE 9 MG/ML
INJECTION, SOLUTION INTRAVENOUS
Status: DISPENSED
Start: 2024-04-05

## (undated) RX ORDER — CEFAZOLIN SODIUM/WATER 2 G/20 ML
SYRINGE (ML) INTRAVENOUS
Status: DISPENSED
Start: 2024-06-19

## (undated) RX ORDER — NICARDIPINE HCL-0.9% SOD CHLOR 1 MG/10 ML
SYRINGE (ML) INTRAVENOUS
Status: DISPENSED
Start: 2024-04-05

## (undated) RX ORDER — ASPIRIN 325 MG
TABLET ORAL
Status: DISPENSED
Start: 2024-06-19

## (undated) RX ORDER — LIDOCAINE HYDROCHLORIDE 10 MG/ML
INJECTION, SOLUTION EPIDURAL; INFILTRATION; INTRACAUDAL; PERINEURAL
Status: DISPENSED
Start: 2024-06-19

## (undated) RX ORDER — IPRATROPIUM BROMIDE AND ALBUTEROL SULFATE 2.5; .5 MG/3ML; MG/3ML
SOLUTION RESPIRATORY (INHALATION)
Status: DISPENSED
Start: 2019-10-24

## (undated) RX ORDER — FENTANYL CITRATE 50 UG/ML
INJECTION, SOLUTION INTRAMUSCULAR; INTRAVENOUS
Status: DISPENSED
Start: 2024-06-19

## (undated) RX ORDER — ONDANSETRON 2 MG/ML
INJECTION INTRAMUSCULAR; INTRAVENOUS
Status: DISPENSED
Start: 2019-04-25

## (undated) RX ORDER — LIDOCAINE 4 G/G
PATCH TOPICAL
Status: DISPENSED
Start: 2024-01-17

## (undated) RX ORDER — NITROGLYCERIN 5 MG/ML
VIAL (ML) INTRAVENOUS
Status: DISPENSED
Start: 2024-04-05

## (undated) RX ORDER — DOXYCYCLINE 100 MG/1
CAPSULE ORAL
Status: DISPENSED
Start: 2024-08-01

## (undated) RX ORDER — FENTANYL CITRATE 50 UG/ML
INJECTION, SOLUTION INTRAMUSCULAR; INTRAVENOUS
Status: DISPENSED
Start: 2024-04-05

## (undated) RX ORDER — LEVOFLOXACIN 750 MG/1
TABLET, FILM COATED ORAL
Status: DISPENSED
Start: 2018-09-30

## (undated) RX ORDER — PROPOFOL 10 MG/ML
INJECTION, EMULSION INTRAVENOUS
Status: DISPENSED
Start: 2019-11-07